# Patient Record
Sex: MALE | Race: WHITE | URBAN - NONMETROPOLITAN AREA
[De-identification: names, ages, dates, MRNs, and addresses within clinical notes are randomized per-mention and may not be internally consistent; named-entity substitution may affect disease eponyms.]

---

## 2018-01-03 ENCOUNTER — OFFICE VISIT (OUTPATIENT)
Dept: SURGERY | Age: 58
End: 2018-01-03

## 2018-01-03 VITALS
WEIGHT: 240.08 LBS | HEART RATE: 89 BPM | HEIGHT: 70 IN | SYSTOLIC BLOOD PRESSURE: 159 MMHG | DIASTOLIC BLOOD PRESSURE: 110 MMHG | BODY MASS INDEX: 34.37 KG/M2

## 2018-01-03 DIAGNOSIS — K40.90 INGUINAL HERNIA OF LEFT SIDE WITHOUT OBSTRUCTION OR GANGRENE: Primary | ICD-10-CM

## 2018-01-03 PROCEDURE — 99204 OFFICE O/P NEW MOD 45 MIN: CPT | Performed by: SURGERY

## 2018-01-08 ENCOUNTER — TELEPHONE (OUTPATIENT)
Dept: SURGERY | Age: 58
End: 2018-01-08

## 2018-01-09 ENCOUNTER — TELEPHONE (OUTPATIENT)
Dept: SURGERY | Age: 58
End: 2018-01-09

## 2018-01-19 PROBLEM — K40.20 NON-RECURRENT BILATERAL INGUINAL HERNIA WITHOUT OBSTRUCTION OR GANGRENE: Status: ACTIVE | Noted: 2018-01-19

## 2018-01-19 PROBLEM — K40.90 RIGHT INGUINAL HERNIA: Status: ACTIVE | Noted: 2018-01-19

## 2018-02-01 ENCOUNTER — OFFICE VISIT (OUTPATIENT)
Dept: SURGERY | Age: 58
End: 2018-02-01

## 2018-02-01 VITALS
WEIGHT: 240.08 LBS | BODY MASS INDEX: 34.37 KG/M2 | HEIGHT: 70 IN | SYSTOLIC BLOOD PRESSURE: 144 MMHG | DIASTOLIC BLOOD PRESSURE: 84 MMHG

## 2018-02-01 DIAGNOSIS — Z98.890 POST-OPERATIVE STATE: Primary | ICD-10-CM

## 2018-02-01 PROCEDURE — 99024 POSTOP FOLLOW-UP VISIT: CPT | Performed by: PHYSICIAN ASSISTANT

## 2018-02-15 ENCOUNTER — OFFICE VISIT (OUTPATIENT)
Dept: SURGERY | Age: 58
End: 2018-02-15

## 2018-02-15 VITALS
BODY MASS INDEX: 34.36 KG/M2 | HEIGHT: 70 IN | DIASTOLIC BLOOD PRESSURE: 90 MMHG | SYSTOLIC BLOOD PRESSURE: 138 MMHG | RESPIRATION RATE: 16 BRPM | WEIGHT: 240 LBS

## 2018-02-15 DIAGNOSIS — Z98.890 POST-OPERATIVE STATE: Primary | ICD-10-CM

## 2018-02-15 PROCEDURE — 99024 POSTOP FOLLOW-UP VISIT: CPT | Performed by: PHYSICIAN ASSISTANT

## 2018-02-15 RX ORDER — NAPROXEN 500 MG/1
TABLET ORAL
Refills: 2 | COMMUNITY
Start: 2017-12-28 | End: 2022-04-01

## 2018-02-15 NOTE — PROGRESS NOTES
Chief Complaint   Patient presents with    Post-Op Check     2nd p/o robotic RIHR         SUBJECTIVE:  Patient here for post op visit s/p robotic right inguinal hernia repair. This is 2nd visit. Pain is absent. Wounds: min bruising and no discharge. OBJECTIVE:  Physical Exam   Constitutional: He is oriented to person, place, and time. He appears well-developed and well-nourished. No distress. Abdominal: Soft. Bowel sounds are normal. He exhibits no distension and no mass. There is no tenderness. There is no rebound and no guarding. Neurological: He is alert and oriented to person, place, and time. Skin: Skin is warm and dry. No rash noted. He is not diaphoretic. No erythema. No pallor. Psychiatric: He has a normal mood and affect. Wound well healed without signs of active infection. Suture line intact. Abdomen soft, nontender, nondistended. ASSESSMENT:  Patient doing well on this post operative check. Wounds well healed. 1. Post-operative state        PLAN:  Continue same  Increase activity as tolerated  Back to work full duty no wt rest  PRN FU    No orders of the defined types were placed in this encounter. No orders of the defined types were placed in this encounter. Follow Up: Return if symptoms worsen or fail to improve.   Marylu Carl PA-C

## 2018-03-09 ASSESSMENT — ENCOUNTER SYMPTOMS
CONSTIPATION: 0
EYE REDNESS: 0
EYE ITCHING: 0
CHOKING: 0
APNEA: 0
RECTAL PAIN: 0
ANAL BLEEDING: 0
COLOR CHANGE: 0
STRIDOR: 0
SORE THROAT: 0
ABDOMINAL PAIN: 1
PHOTOPHOBIA: 0
BACK PAIN: 0

## 2018-04-11 PROBLEM — Z98.890 POST-OPERATIVE STATE: Status: RESOLVED | Noted: 2018-02-01 | Resolved: 2018-04-11

## 2023-01-06 ENCOUNTER — HOSPITAL ENCOUNTER (OUTPATIENT)
Age: 63
Discharge: HOME | End: 2023-01-06
Payer: COMMERCIAL

## 2023-01-06 LAB
ALT SERPL-CCNC: 13 U/L (ref 10–40)
ANION GAP SERPL CALCULATED.3IONS-SCNC: 11 MMOL/L (ref 4–16)
BASOPHILS ABSOLUTE: 0.1 K/CU MM
BASOPHILS RELATIVE PERCENT: 0.5 % (ref 0–1)
BUN BLDV-MCNC: 15 MG/DL (ref 6–23)
CALCIUM SERPL-MCNC: 8.8 MG/DL (ref 8.3–10.6)
CHLORIDE BLD-SCNC: 105 MMOL/L (ref 99–110)
CHOLESTEROL, FASTING: 190 MG/DL
CO2: 23 MMOL/L (ref 21–32)
CREAT SERPL-MCNC: 0.9 MG/DL (ref 0.9–1.3)
DIFFERENTIAL TYPE: ABNORMAL
EOSINOPHILS ABSOLUTE: 0.2 K/CU MM
EOSINOPHILS RELATIVE PERCENT: 2.4 % (ref 0–3)
GFR SERPL CREATININE-BSD FRML MDRD: >60 ML/MIN/1.73M2
GLUCOSE BLD-MCNC: 96 MG/DL (ref 70–99)
HCT VFR BLD CALC: 48.7 % (ref 42–52)
HDLC SERPL-MCNC: 33 MG/DL
HEMOGLOBIN: 16.8 GM/DL (ref 13.5–18)
IMMATURE NEUTROPHIL %: 0.3 % (ref 0–0.43)
LDL CHOLESTEROL CALCULATED: 125 MG/DL
LYMPHOCYTES ABSOLUTE: 2.6 K/CU MM
LYMPHOCYTES RELATIVE PERCENT: 27.7 % (ref 24–44)
MCH RBC QN AUTO: 29.3 PG (ref 27–31)
MCHC RBC AUTO-ENTMCNC: 34.5 % (ref 32–36)
MCV RBC AUTO: 84.8 FL (ref 78–100)
MONOCYTES ABSOLUTE: 0.4 K/CU MM
MONOCYTES RELATIVE PERCENT: 4.1 % (ref 0–4)
PDW BLD-RTO: 12 % (ref 11.7–14.9)
PLATELET # BLD: 289 K/CU MM (ref 140–440)
PMV BLD AUTO: 9.5 FL (ref 7.5–11.1)
POTASSIUM SERPL-SCNC: 4.2 MMOL/L (ref 3.5–5.1)
PROSTATE SPECIFIC ANTIGEN: 1.54 NG/ML (ref 0–4)
RBC # BLD: 5.74 M/CU MM (ref 4.6–6.2)
SEGMENTED NEUTROPHILS ABSOLUTE COUNT: 6.2 K/CU MM
SEGMENTED NEUTROPHILS RELATIVE PERCENT: 65 % (ref 36–66)
SODIUM BLD-SCNC: 139 MMOL/L (ref 135–145)
TOTAL IMMATURE NEUTOROPHIL: 0.03 K/CU MM
TRIGLYCERIDE, FASTING: 161 MG/DL
TSH HIGH SENSITIVITY: 1.38 UIU/ML (ref 0.27–4.2)
WBC # BLD: 9.5 K/CU MM (ref 4–10.5)

## 2023-01-06 PROCEDURE — 80061 LIPID PANEL: CPT

## 2023-01-06 PROCEDURE — 36415 COLL VENOUS BLD VENIPUNCTURE: CPT

## 2023-01-06 PROCEDURE — 84460 ALANINE AMINO (ALT) (SGPT): CPT

## 2023-01-06 PROCEDURE — 85025 COMPLETE CBC W/AUTO DIFF WBC: CPT

## 2023-01-06 PROCEDURE — G0103 PSA SCREENING: HCPCS

## 2023-01-06 PROCEDURE — 84443 ASSAY THYROID STIM HORMONE: CPT

## 2023-01-06 PROCEDURE — 80048 BASIC METABOLIC PNL TOTAL CA: CPT

## 2023-10-17 ENCOUNTER — HOSPITAL ENCOUNTER (EMERGENCY)
Age: 63
Discharge: HOME | End: 2023-10-17
Attending: EMERGENCY MEDICINE
Payer: COMMERCIAL

## 2023-10-17 VITALS
DIASTOLIC BLOOD PRESSURE: 92 MMHG | OXYGEN SATURATION: 97 % | HEART RATE: 75 BPM | RESPIRATION RATE: 18 BRPM | BODY MASS INDEX: 32.93 KG/M2 | TEMPERATURE: 97.7 F | HEIGHT: 70 IN | SYSTOLIC BLOOD PRESSURE: 152 MMHG | WEIGHT: 230 LBS

## 2023-10-17 DIAGNOSIS — R31.9 URINARY TRACT INFECTION WITH HEMATURIA, SITE UNSPECIFIED: Primary | ICD-10-CM

## 2023-10-17 DIAGNOSIS — N39.0 URINARY TRACT INFECTION WITH HEMATURIA, SITE UNSPECIFIED: Primary | ICD-10-CM

## 2023-10-17 LAB
BACTERIA: ABNORMAL /HPF
BILIRUBIN URINE: NEGATIVE MG/DL
BLOOD, URINE: ABNORMAL
CAST TYPE: ABNORMAL /HPF
CLARITY: CLEAR
COLOR: YELLOW
CRYSTAL TYPE: NEGATIVE /HPF
EPITHELIAL CELLS, UA: 1 /HPF
GLUCOSE, URINE: NEGATIVE MG/DL
KETONES, URINE: NEGATIVE MG/DL
LEUKOCYTE ESTERASE, URINE: NEGATIVE
NITRITE URINE, QUANTITATIVE: NEGATIVE
PH, URINE: 6 (ref 5–8)
PROTEIN UA: 100 MG/DL
RBC URINE: 8 /HPF (ref 0–3)
SPECIFIC GRAVITY UA: >1.03 (ref 1–1.03)
UROBILINOGEN, URINE: 1 MG/DL (ref 0.2–1)
WBC UA: 8 /HPF (ref 0–2)

## 2023-10-17 PROCEDURE — 6370000000 HC RX 637 (ALT 250 FOR IP): Performed by: EMERGENCY MEDICINE

## 2023-10-17 PROCEDURE — 81001 URINALYSIS AUTO W/SCOPE: CPT

## 2023-10-17 PROCEDURE — 99283 EMERGENCY DEPT VISIT LOW MDM: CPT

## 2023-10-17 RX ORDER — CEPHALEXIN 500 MG/1
500 CAPSULE ORAL
Status: COMPLETED | OUTPATIENT
Start: 2023-10-17 | End: 2023-10-17

## 2023-10-17 RX ORDER — CEPHALEXIN 500 MG/1
500 CAPSULE ORAL 4 TIMES DAILY
Qty: 28 CAPSULE | Refills: 0 | Status: SHIPPED | OUTPATIENT
Start: 2023-10-17 | End: 2023-10-24

## 2023-10-17 RX ADMIN — CEPHALEXIN 500 MG: 500 CAPSULE ORAL at 21:46

## 2023-10-17 ASSESSMENT — PAIN DESCRIPTION - LOCATION: LOCATION: OTHER (COMMENT)

## 2023-10-17 ASSESSMENT — PAIN SCALES - GENERAL: PAINLEVEL_OUTOF10: 2

## 2023-10-17 ASSESSMENT — LIFESTYLE VARIABLES
HOW OFTEN DO YOU HAVE A DRINK CONTAINING ALCOHOL: NEVER
HOW MANY STANDARD DRINKS CONTAINING ALCOHOL DO YOU HAVE ON A TYPICAL DAY: PATIENT DOES NOT DRINK

## 2023-10-17 ASSESSMENT — PAIN DESCRIPTION - PAIN TYPE: TYPE: ACUTE PAIN

## 2023-10-17 ASSESSMENT — PAIN - FUNCTIONAL ASSESSMENT: PAIN_FUNCTIONAL_ASSESSMENT: 0-10

## 2023-10-17 ASSESSMENT — PAIN DESCRIPTION - DESCRIPTORS: DESCRIPTORS: BURNING

## 2023-10-17 ASSESSMENT — PAIN DESCRIPTION - FREQUENCY: FREQUENCY: CONTINUOUS

## 2023-10-18 NOTE — ED NOTES
Pt made aware of need for urine sample. States he will let us know when he is able to go.       Frannie Quintana RN  10/17/23 2052

## 2023-10-18 NOTE — DISCHARGE INSTRUCTIONS
Drink lots of water. Keflex 4 times/day. Follow up with your doctor in 2 days for reevaluation. Return for worsening or concerning symptoms.

## 2023-10-21 NOTE — ED PROVIDER NOTES
CC: dysuria  Seen in room 3    HPI: This is a 72-year-old male with history of kidney stones who comes for evaluation of dysuria. Denied any pain save for this. He additionally notes a drop of blood at the end of urination. Denied trauma. Denied abdominal pain or flank pain. Does not feel like his previous stones.      Nursing Notes Reviewed    Past Medical History:   Diagnosis Date    Acid reflux     History of kidney stones     \"had kidney stone 2017- passed it\"    Hypertension     \"on medication since age 47's\"    No natural teeth      Past Surgical History:   Procedure Laterality Date    ANKLE SURGERY Left     age 17\"did have pins and then later removed\"    [address]      OTHER SURGICAL HISTORY Bilateral 2018    bilat inq hernia repair with mesh     Social History     Socioeconomic History    Marital status:      Spouse name: Not on file    Number of children: Not on file    Years of education: Not on file    Highest education level: Not on file   Occupational History    Not on file   Tobacco Use    Smoking status: Former     Packs/day: 0.50     Years: 34.00     Additional pack years: 0.00     Total pack years: 17.00     Types: Cigarettes     Quit date: 2005     Years since quittin.7    Smokeless tobacco: Never   Vaping Use    Vaping Use: Never used   Substance and Sexual Activity    Alcohol use: No    Drug use: Yes     Types: Marijuana (Weed)     Comment: \"use to use cocaine- last used 19 yrs ago, marijuana- last used  19 yrs ago\"    Sexual activity: Not on file   Other Topics Concern    Not on file   Social History Narrative    Not on file     Social Determinants of Health     Financial Resource Strain: Not on file   Food Insecurity: Not on file   Transportation Needs: Not on file   Physical Activity: Not on file   Stress: Not on file   Social Connections: Not on file   Intimate Partner Violence: Not on file   Housing Stability: Not on file     No Known Allergies    Physical exam:  Vitals: per triage note  General: awake, alert. No acute distress. Skin: No rashes noted. HENT: NC/AT. EOMI, PERRL. Mucous membranes moist.   Neck: Trachea midline. Supple. Chest: Symmetrical rise and fall of the chest. Normal work of breathing. Cardio: RRR  Abdomen: Soft, nontender, nondistended. Back: Active ROM. Extremities: No deformities. Pulses present. Neuro: A/O x 3. No gross focal motor deficits noted. --------  Medical decision making    Differential diagnosis includes but not limited to: indirect and direct pathophysiologic etiologies vascular, inflammatory, infectious, neoplastic, degenerative, deficiency, drugs, idiopathic, intoxication, iatrogenic, congenital, autoimmune, allergic, anatomic, trauma, endocrine, environmental, and metabolic. My bedside ultrasound: suprapubic abdominal ultrasound shows no evidence of urinary retention    ED course: Patient seen and evaluate by me or dysuria and hematuria. Vitals are stable. Exam benign, he has no urinary retention on POCUS. Urinalysis consistent with UTI. Will treat with Keflex. Discussed other differentials including bladder cancer with patient and recommended close follow up and possible urology evaluation if this does not clear up. I discussed all results with the patient at length and they were agreeable with the plan and verbalized understanding. Discharged stable with return precautions.          Medications   cephALEXin (KEFLEX) capsule 500 mg (500 mg Oral Given 10/17/23 5282)       Impression:  #1.  UTI      Beronica Montgomery MD  10/21/23 4109

## 2023-11-06 PROBLEM — E66.3 OVERWEIGHT: Status: ACTIVE | Noted: 2023-11-06

## 2023-11-06 PROBLEM — G89.29 CHRONIC LEFT-SIDED LOW BACK PAIN WITHOUT SCIATICA: Status: ACTIVE | Noted: 2023-11-06

## 2023-11-06 PROBLEM — I10 PRIMARY HYPERTENSION: Status: ACTIVE | Noted: 2023-11-06

## 2023-11-06 PROBLEM — M54.50 CHRONIC LEFT-SIDED LOW BACK PAIN WITHOUT SCIATICA: Status: ACTIVE | Noted: 2023-11-06

## 2023-11-06 PROBLEM — N30.01 ACUTE CYSTITIS WITH HEMATURIA: Status: ACTIVE | Noted: 2023-11-06

## 2023-11-06 PROBLEM — R31.1 BENIGN ESSENTIAL MICROSCOPIC HEMATURIA: Status: ACTIVE | Noted: 2023-11-06

## 2023-11-14 ENCOUNTER — HOSPITAL ENCOUNTER (OUTPATIENT)
Age: 63
Discharge: HOME | End: 2023-11-14
Payer: COMMERCIAL

## 2023-11-14 LAB — PROSTATE SPECIFIC ANTIGEN: 4.46 NG/ML (ref 0–4)

## 2023-11-14 PROCEDURE — G0103 PSA SCREENING: HCPCS

## 2023-11-14 PROCEDURE — 36415 COLL VENOUS BLD VENIPUNCTURE: CPT

## 2023-11-22 ENCOUNTER — HOSPITAL ENCOUNTER (OUTPATIENT)
Dept: CT IMAGING | Age: 63
Discharge: HOME | End: 2023-11-22
Attending: SPECIALIST
Payer: COMMERCIAL

## 2023-11-22 DIAGNOSIS — R31.0 GROSS HEMATURIA: ICD-10-CM

## 2023-11-22 LAB
EGFR, POC: >60 ML/MIN/1.73M2
POC CREATININE: 1.2 MG/DL (ref 0.9–1.3)

## 2023-11-22 PROCEDURE — 6360000004 HC RX CONTRAST MEDICATION: Performed by: SPECIALIST

## 2023-11-22 PROCEDURE — 74178 CT ABD&PLV WO CNTR FLWD CNTR: CPT

## 2023-11-22 PROCEDURE — 82565 ASSAY OF CREATININE: CPT

## 2023-11-22 RX ADMIN — IOPAMIDOL 120 ML: 755 INJECTION, SOLUTION INTRAVENOUS at 11:25

## 2023-11-27 ENCOUNTER — HOSPITAL ENCOUNTER (EMERGENCY)
Age: 63
Discharge: HOME | End: 2023-11-28
Attending: EMERGENCY MEDICINE
Payer: COMMERCIAL

## 2023-11-27 DIAGNOSIS — N30.01 ACUTE CYSTITIS WITH HEMATURIA: Primary | ICD-10-CM

## 2023-11-27 DIAGNOSIS — N13.30 HYDRONEPHROSIS, UNSPECIFIED HYDRONEPHROSIS TYPE: ICD-10-CM

## 2023-11-27 LAB
ALBUMIN SERPL-MCNC: 3.5 GM/DL (ref 3.4–5)
ALP BLD-CCNC: 112 IU/L (ref 40–129)
ALT SERPL-CCNC: 19 U/L (ref 10–40)
ANION GAP SERPL CALCULATED.3IONS-SCNC: 11 MMOL/L (ref 4–16)
AST SERPL-CCNC: 14 IU/L (ref 15–37)
BACTERIA: ABNORMAL /HPF
BASOPHILS ABSOLUTE: 0 K/CU MM
BASOPHILS RELATIVE PERCENT: 0.4 % (ref 0–1)
BILIRUB SERPL-MCNC: 0.3 MG/DL (ref 0–1)
BILIRUBIN URINE: NEGATIVE MG/DL
BLOOD, URINE: ABNORMAL
BUN SERPL-MCNC: 18 MG/DL (ref 6–23)
CALCIUM SERPL-MCNC: 8.7 MG/DL (ref 8.3–10.6)
CAST TYPE: ABNORMAL /HPF
CHLORIDE BLD-SCNC: 103 MMOL/L (ref 99–110)
CLARITY: CLEAR
CO2: 24 MMOL/L (ref 21–32)
COLOR: YELLOW
CREAT SERPL-MCNC: 1.5 MG/DL (ref 0.9–1.3)
CRYSTAL TYPE: NEGATIVE /HPF
DIFFERENTIAL TYPE: ABNORMAL
EOSINOPHILS ABSOLUTE: 0.3 K/CU MM
EOSINOPHILS RELATIVE PERCENT: 3.8 % (ref 0–3)
EPITHELIAL CELLS, UA: 1 /HPF
GFR SERPL CREATININE-BSD FRML MDRD: 52 ML/MIN/1.73M2
GLUCOSE SERPL-MCNC: 103 MG/DL (ref 70–99)
GLUCOSE, URINE: NEGATIVE MG/DL
HCT VFR BLD CALC: 41.3 % (ref 42–52)
HEMOGLOBIN: 14.1 GM/DL (ref 13.5–18)
IMMATURE NEUTROPHIL %: 0.5 % (ref 0–0.43)
KETONES, URINE: NEGATIVE MG/DL
LEUKOCYTE ESTERASE, URINE: ABNORMAL
LYMPHOCYTES ABSOLUTE: 2.2 K/CU MM
LYMPHOCYTES RELATIVE PERCENT: 28.5 % (ref 24–44)
MCH RBC QN AUTO: 29.6 PG (ref 27–31)
MCHC RBC AUTO-ENTMCNC: 34.1 % (ref 32–36)
MCV RBC AUTO: 86.8 FL (ref 78–100)
MONOCYTES ABSOLUTE: 0.6 K/CU MM
MONOCYTES RELATIVE PERCENT: 7.3 % (ref 0–4)
NITRITE URINE, QUANTITATIVE: NEGATIVE
PDW BLD-RTO: 12.2 % (ref 11.7–14.9)
PH, URINE: 6 (ref 5–8)
PLATELET # BLD: 305 K/CU MM (ref 140–440)
PMV BLD AUTO: 9.3 FL (ref 7.5–11.1)
POTASSIUM SERPL-SCNC: 3.6 MMOL/L (ref 3.5–5.1)
PROTEIN UA: 30 MG/DL
RBC # BLD: 4.76 M/CU MM (ref 4.6–6.2)
RBC URINE: 10 /HPF (ref 0–3)
SEGMENTED NEUTROPHILS ABSOLUTE COUNT: 4.5 K/CU MM
SEGMENTED NEUTROPHILS RELATIVE PERCENT: 59.5 % (ref 36–66)
SODIUM BLD-SCNC: 138 MMOL/L (ref 135–145)
SPECIFIC GRAVITY UA: 1.02 (ref 1–1.03)
TOTAL IMMATURE NEUTOROPHIL: 0.04 K/CU MM
TOTAL PROTEIN: 6.9 GM/DL (ref 6.4–8.2)
UROBILINOGEN, URINE: 0.2 MG/DL (ref 0.2–1)
WBC # BLD: 7.6 K/CU MM (ref 4–10.5)
WBC UA: 20 /HPF (ref 0–2)

## 2023-11-27 PROCEDURE — 85025 COMPLETE CBC W/AUTO DIFF WBC: CPT

## 2023-11-27 PROCEDURE — 6360000002 HC RX W HCPCS: Performed by: EMERGENCY MEDICINE

## 2023-11-27 PROCEDURE — 96375 TX/PRO/DX INJ NEW DRUG ADDON: CPT

## 2023-11-27 PROCEDURE — 2580000003 HC RX 258: Performed by: EMERGENCY MEDICINE

## 2023-11-27 PROCEDURE — 80053 COMPREHEN METABOLIC PANEL: CPT

## 2023-11-27 PROCEDURE — 99284 EMERGENCY DEPT VISIT MOD MDM: CPT

## 2023-11-27 PROCEDURE — 81001 URINALYSIS AUTO W/SCOPE: CPT

## 2023-11-27 PROCEDURE — 87086 URINE CULTURE/COLONY COUNT: CPT

## 2023-11-27 RX ORDER — MORPHINE SULFATE 4 MG/ML
4 INJECTION, SOLUTION INTRAMUSCULAR; INTRAVENOUS ONCE
Status: COMPLETED | OUTPATIENT
Start: 2023-11-27 | End: 2023-11-27

## 2023-11-27 RX ORDER — ACETAMINOPHEN 500 MG
1000 TABLET ORAL EVERY 6 HOURS PRN
COMMUNITY

## 2023-11-27 RX ORDER — KETOROLAC TROMETHAMINE 30 MG/ML
30 INJECTION, SOLUTION INTRAMUSCULAR; INTRAVENOUS ONCE
Status: COMPLETED | OUTPATIENT
Start: 2023-11-27 | End: 2023-11-27

## 2023-11-27 RX ORDER — 0.9 % SODIUM CHLORIDE 0.9 %
1000 INTRAVENOUS SOLUTION INTRAVENOUS ONCE
Status: COMPLETED | OUTPATIENT
Start: 2023-11-27 | End: 2023-11-28

## 2023-11-27 RX ADMIN — KETOROLAC TROMETHAMINE 30 MG: 30 INJECTION, SOLUTION INTRAMUSCULAR; INTRAVENOUS at 23:28

## 2023-11-27 RX ADMIN — MORPHINE SULFATE 4 MG: 4 INJECTION, SOLUTION INTRAMUSCULAR; INTRAVENOUS at 23:27

## 2023-11-27 RX ADMIN — SODIUM CHLORIDE 1000 ML: 9 INJECTION, SOLUTION INTRAVENOUS at 23:27

## 2023-11-27 ASSESSMENT — PAIN DESCRIPTION - LOCATION
LOCATION: FLANK;ABDOMEN
LOCATION: BACK

## 2023-11-27 ASSESSMENT — PAIN DESCRIPTION - PAIN TYPE: TYPE: ACUTE PAIN

## 2023-11-27 ASSESSMENT — PAIN DESCRIPTION - ONSET: ONSET: ON-GOING

## 2023-11-27 ASSESSMENT — PAIN DESCRIPTION - DESCRIPTORS
DESCRIPTORS: SPASM
DESCRIPTORS: BURNING;STABBING

## 2023-11-27 ASSESSMENT — PAIN DESCRIPTION - FREQUENCY: FREQUENCY: INTERMITTENT

## 2023-11-27 ASSESSMENT — PAIN - FUNCTIONAL ASSESSMENT
PAIN_FUNCTIONAL_ASSESSMENT: ACTIVITIES ARE NOT PREVENTED
PAIN_FUNCTIONAL_ASSESSMENT: 0-10
PAIN_FUNCTIONAL_ASSESSMENT: PREVENTS OR INTERFERES SOME ACTIVE ACTIVITIES AND ADLS

## 2023-11-27 ASSESSMENT — PAIN DESCRIPTION - ORIENTATION
ORIENTATION: LEFT
ORIENTATION: LEFT;LOWER

## 2023-11-27 ASSESSMENT — PAIN SCALES - GENERAL
PAINLEVEL_OUTOF10: 10
PAINLEVEL_OUTOF10: 8

## 2023-11-28 VITALS
HEIGHT: 70 IN | RESPIRATION RATE: 18 BRPM | WEIGHT: 216 LBS | OXYGEN SATURATION: 97 % | SYSTOLIC BLOOD PRESSURE: 146 MMHG | BODY MASS INDEX: 30.92 KG/M2 | TEMPERATURE: 97.8 F | HEART RATE: 86 BPM | DIASTOLIC BLOOD PRESSURE: 95 MMHG

## 2023-11-28 PROCEDURE — 6360000002 HC RX W HCPCS: Performed by: EMERGENCY MEDICINE

## 2023-11-28 PROCEDURE — 2580000003 HC RX 258: Performed by: EMERGENCY MEDICINE

## 2023-11-28 PROCEDURE — 96365 THER/PROPH/DIAG IV INF INIT: CPT

## 2023-11-28 RX ORDER — OXYCODONE HYDROCHLORIDE AND ACETAMINOPHEN 5; 325 MG/1; MG/1
1 TABLET ORAL EVERY 6 HOURS PRN
Qty: 20 TABLET | Refills: 0 | Status: SHIPPED | OUTPATIENT
Start: 2023-11-28 | End: 2023-12-03

## 2023-11-28 RX ORDER — CEPHALEXIN 500 MG/1
500 CAPSULE ORAL 2 TIMES DAILY
Qty: 20 CAPSULE | Refills: 0 | Status: SHIPPED | OUTPATIENT
Start: 2023-11-28 | End: 2023-12-08

## 2023-11-28 RX ORDER — TAMSULOSIN HYDROCHLORIDE 0.4 MG/1
0.4 CAPSULE ORAL DAILY
Qty: 30 CAPSULE | Refills: 0 | Status: SHIPPED | OUTPATIENT
Start: 2023-11-28

## 2023-11-28 RX ADMIN — CEFTRIAXONE SODIUM 1000 MG: 1 INJECTION, POWDER, FOR SOLUTION INTRAMUSCULAR; INTRAVENOUS at 00:17

## 2023-11-28 NOTE — DISCHARGE INSTRUCTIONS
Please call your urology team first thing in the morning to discuss urgent follow-up. Please inform them that the ER doctor spoke with MOOK Pettit who wanted you to have close ED follow up. Please head to [facility] should you spike a fever or have further intractable pain or had any difficulty voiding. Please complete antibiotics as prescribed.

## 2023-11-28 NOTE — ED PROVIDER NOTES
Profesionals : Consultant (UROLOGY)    Social Determinants : None    Records Reviewed : Outpatient Labs & Studies including recent CT imaging which is discussed with patient. Disposition Considerations (tests considered but not done, Shared Decision Making, Pt Expectation of Test or Tx.):   Appropriate for outpatient management    I discussed specific signs and symptoms on when to return to the emergency department as well as the need for close outpatient follow-up. Questions sought and answered with the patient. They voice understanding and agree with plan. I am the Primary Clinician of Record. Is this patient to be included in the SEP-1 Core Measure due to severe sepsis or septic shock? No   Exclusion criteria - the patient is NOT to be included for SEP-1 Core Measure due to:  2+ SIRS criteria are not met                  Clinical Impression:  1. Acute cystitis with hematuria    2. Hydronephrosis, unspecified hydronephrosis type      Disposition referral (if applicable):  Heide Myers, [address] [facility]  [phone]    Schedule an appointment as soon as possible for a visit   CALL FIRST THING IN THE MORNING TO DISCUSS URGENT ED FOLLOW UP    Asaf Alcantar MD  [address] [facility],[address] [address]  [address] [place] [address] Emergency Department  [address] [place] [address] Drive  [address] [facility]  [phone]  Today  If symptoms worsen    Disposition medications (if applicable):  New Prescriptions    CEPHALEXIN (KEFLEX) 500 MG CAPSULE    Take 1 capsule by mouth 2 times daily for 10 days    OXYCODONE-ACETAMINOPHEN (PERCOCET) 5-325 MG PER TABLET    Take 1 tablet by mouth every 6 hours as needed for Pain for up to 5 days. Intended supply: 5 days.  Take lowest dose possible to manage pain Max Daily Amount: 4 tablets       Comment: Please note this report has been produced using speech recognition software and may contain errors related to that system

## 2023-11-29 LAB
CULTURE: NORMAL
Lab: NORMAL
SPECIMEN: NORMAL

## 2023-12-05 ENCOUNTER — HOSPITAL ENCOUNTER (EMERGENCY)
Age: 63
Discharge: HOME | End: 2023-12-05
Attending: EMERGENCY MEDICINE
Payer: COMMERCIAL

## 2023-12-05 VITALS
TEMPERATURE: 98.1 F | RESPIRATION RATE: 22 BRPM | SYSTOLIC BLOOD PRESSURE: 157 MMHG | HEART RATE: 108 BPM | DIASTOLIC BLOOD PRESSURE: 115 MMHG | OXYGEN SATURATION: 98 % | WEIGHT: 216 LBS | HEIGHT: 70 IN | BODY MASS INDEX: 30.92 KG/M2

## 2023-12-05 DIAGNOSIS — R30.1 PAINFUL BLADDER SPASM: Primary | ICD-10-CM

## 2023-12-05 LAB
BACTERIA: ABNORMAL /HPF
BILIRUBIN URINE: NEGATIVE MG/DL
BLOOD, URINE: ABNORMAL
CAST TYPE: ABNORMAL /HPF
CLARITY: ABNORMAL
COLOR: YELLOW
COMMENT UA: ABNORMAL
CRYSTAL TYPE: ABNORMAL /HPF
EPITHELIAL CELLS, UA: ABNORMAL /HPF
GLUCOSE, URINE: NEGATIVE MG/DL
KETONES, URINE: NEGATIVE MG/DL
LEUKOCYTE ESTERASE, URINE: NEGATIVE
NITRITE URINE, QUANTITATIVE: NEGATIVE
PH, URINE: 5.5 (ref 5–8)
PROTEIN UA: 100 MG/DL
RBC URINE: >50 /HPF (ref 0–3)
SPECIFIC GRAVITY UA: 1.02 (ref 1–1.03)
UROBILINOGEN, URINE: 0.2 MG/DL (ref 0.2–1)
WBC UA: 10 /HPF (ref 0–2)

## 2023-12-05 PROCEDURE — 87086 URINE CULTURE/COLONY COUNT: CPT

## 2023-12-05 PROCEDURE — 99283 EMERGENCY DEPT VISIT LOW MDM: CPT

## 2023-12-05 PROCEDURE — 6370000000 HC RX 637 (ALT 250 FOR IP): Performed by: EMERGENCY MEDICINE

## 2023-12-05 PROCEDURE — 81001 URINALYSIS AUTO W/SCOPE: CPT

## 2023-12-05 RX ORDER — OXYBUTYNIN CHLORIDE 5 MG/1
5 TABLET ORAL ONCE
Status: COMPLETED | OUTPATIENT
Start: 2023-12-05 | End: 2023-12-05

## 2023-12-05 RX ORDER — PHENAZOPYRIDINE HYDROCHLORIDE 100 MG/1
200 TABLET, FILM COATED ORAL ONCE
Status: COMPLETED | OUTPATIENT
Start: 2023-12-05 | End: 2023-12-05

## 2023-12-05 RX ORDER — OXYBUTYNIN CHLORIDE 5 MG/1
5 TABLET ORAL 3 TIMES DAILY
Qty: 30 TABLET | Refills: 1 | Status: SHIPPED | OUTPATIENT
Start: 2023-12-05

## 2023-12-05 RX ADMIN — OXYBUTYNIN CHLORIDE 5 MG: 5 TABLET ORAL at 18:27

## 2023-12-05 RX ADMIN — PHENAZOPYRIDINE 200 MG: 100 TABLET ORAL at 18:04

## 2023-12-05 ASSESSMENT — PAIN DESCRIPTION - ORIENTATION: ORIENTATION: RIGHT;LEFT

## 2023-12-05 ASSESSMENT — PAIN DESCRIPTION - DESCRIPTORS: DESCRIPTORS: ACHING;DISCOMFORT

## 2023-12-05 ASSESSMENT — PAIN DESCRIPTION - LOCATION: LOCATION: ABDOMEN;FLANK

## 2023-12-05 ASSESSMENT — PAIN - FUNCTIONAL ASSESSMENT: PAIN_FUNCTIONAL_ASSESSMENT: 0-10

## 2023-12-05 ASSESSMENT — PAIN SCALES - GENERAL
PAINLEVEL_OUTOF10: 10
PAINLEVEL_OUTOF10: 10

## 2023-12-05 NOTE — ED PROVIDER NOTES
Clinical Impression:  1. Painful bladder spasm      Disposition referral (if applicable):  Lucho Mcduffie, [address]   [phone]    Schedule an appointment as soon as possible for a visit in 1 week  7:45 am WED am !    Disposition medications (if applicable):  New Prescriptions    OXYBUTYNIN (DITROPAN) 5 MG TABLET    Take 1 tablet by mouth 3 times daily           Morgan Hendrickson DO, FACEP      Comment: Please note this report has been produced using speech recognition software and maycontain errors related to that system including errors in grammar, punctuation, and spelling, as well as words and phrases that may be inappropriate. If there are any questions or concerns please feel free to contact thedictating provider for clarification.         Altaf Delacruz DO  12/05/23 6979

## 2023-12-06 NOTE — ED NOTES
Pt continues to complain of pain when discharging. Dr Mcclure Dire back to bedside.       Vasiliy Dawn RN  12/05/23 0333

## 2023-12-07 LAB
CULTURE: NORMAL
Lab: NORMAL
SPECIMEN: NORMAL

## 2023-12-13 ENCOUNTER — ANESTHESIA EVENT (OUTPATIENT)
Dept: OPERATING ROOM | Age: 63
End: 2023-12-13
Payer: COMMERCIAL

## 2023-12-13 NOTE — PROGRESS NOTES
Patient notified of surgery time at [facility] 12/14/23 0830 arrival 0700, NPO instructions and DOS meds reviewed

## 2023-12-14 ENCOUNTER — APPOINTMENT (OUTPATIENT)
Dept: GENERAL RADIOLOGY | Age: 63
End: 2023-12-14
Attending: SPECIALIST
Payer: COMMERCIAL

## 2023-12-14 ENCOUNTER — HOSPITAL ENCOUNTER (OUTPATIENT)
Age: 63
Setting detail: OUTPATIENT SURGERY
Discharge: HOME | End: 2023-12-16
Attending: SPECIALIST | Admitting: SPECIALIST
Payer: COMMERCIAL

## 2023-12-14 ENCOUNTER — ANESTHESIA (OUTPATIENT)
Dept: OPERATING ROOM | Age: 63
End: 2023-12-14
Payer: COMMERCIAL

## 2023-12-14 DIAGNOSIS — R31.9 HEMATURIA, UNSPECIFIED TYPE: ICD-10-CM

## 2023-12-14 PROCEDURE — 94761 N-INVAS EAR/PLS OXIMETRY MLT: CPT

## 2023-12-14 PROCEDURE — 2709999900 HC NON-CHARGEABLE SUPPLY: Performed by: SPECIALIST

## 2023-12-14 PROCEDURE — 2700000000 HC OXYGEN THERAPY PER DAY

## 2023-12-14 PROCEDURE — 2500000003 HC RX 250 WO HCPCS: Performed by: NURSE ANESTHETIST, CERTIFIED REGISTERED

## 2023-12-14 PROCEDURE — 6360000002 HC RX W HCPCS: Performed by: ANESTHESIOLOGY

## 2023-12-14 PROCEDURE — 93005 ELECTROCARDIOGRAM TRACING: CPT | Performed by: ANESTHESIOLOGY

## 2023-12-14 PROCEDURE — 7100000000 HC PACU RECOVERY - FIRST 15 MIN: Performed by: SPECIALIST

## 2023-12-14 PROCEDURE — 6370000000 HC RX 637 (ALT 250 FOR IP): Performed by: SPECIALIST

## 2023-12-14 PROCEDURE — 2580000003 HC RX 258: Performed by: SPECIALIST

## 2023-12-14 PROCEDURE — 6360000002 HC RX W HCPCS: Performed by: NURSE ANESTHETIST, CERTIFIED REGISTERED

## 2023-12-14 PROCEDURE — 3600000003 HC SURGERY LEVEL 3 BASE: Performed by: SPECIALIST

## 2023-12-14 PROCEDURE — 7100000001 HC PACU RECOVERY - ADDTL 15 MIN: Performed by: SPECIALIST

## 2023-12-14 PROCEDURE — C1769 GUIDE WIRE: HCPCS | Performed by: SPECIALIST

## 2023-12-14 PROCEDURE — C1758 CATHETER, URETERAL: HCPCS | Performed by: SPECIALIST

## 2023-12-14 PROCEDURE — 3700000001 HC ADD 15 MINUTES (ANESTHESIA): Performed by: SPECIALIST

## 2023-12-14 PROCEDURE — 2580000003 HC RX 258: Performed by: ANESTHESIOLOGY

## 2023-12-14 PROCEDURE — 3700000000 HC ANESTHESIA ATTENDED CARE: Performed by: SPECIALIST

## 2023-12-14 PROCEDURE — 3600000013 HC SURGERY LEVEL 3 ADDTL 15MIN: Performed by: SPECIALIST

## 2023-12-14 RX ORDER — FENTANYL CITRATE 50 UG/ML
INJECTION, SOLUTION INTRAMUSCULAR; INTRAVENOUS PRN
Status: DISCONTINUED | OUTPATIENT
Start: 2023-12-14 | End: 2023-12-14 | Stop reason: SDUPTHER

## 2023-12-14 RX ORDER — DROPERIDOL 2.5 MG/ML
0.62 INJECTION, SOLUTION INTRAMUSCULAR; INTRAVENOUS
Status: DISCONTINUED | OUTPATIENT
Start: 2023-12-14 | End: 2023-12-14 | Stop reason: HOSPADM

## 2023-12-14 RX ORDER — OXYCODONE HYDROCHLORIDE 5 MG/1
5 TABLET ORAL PRN
Status: DISCONTINUED | OUTPATIENT
Start: 2023-12-14 | End: 2023-12-14 | Stop reason: HOSPADM

## 2023-12-14 RX ORDER — SODIUM CHLORIDE 0.9 % (FLUSH) 0.9 %
5-40 SYRINGE (ML) INJECTION PRN
Status: DISCONTINUED | OUTPATIENT
Start: 2023-12-14 | End: 2023-12-14 | Stop reason: HOSPADM

## 2023-12-14 RX ORDER — ONDANSETRON 2 MG/ML
4 INJECTION INTRAMUSCULAR; INTRAVENOUS EVERY 6 HOURS PRN
Status: DISCONTINUED | OUTPATIENT
Start: 2023-12-14 | End: 2023-12-16 | Stop reason: HOSPADM

## 2023-12-14 RX ORDER — OXYCODONE HYDROCHLORIDE 5 MG/1
10 TABLET ORAL PRN
Status: DISCONTINUED | OUTPATIENT
Start: 2023-12-14 | End: 2023-12-14 | Stop reason: HOSPADM

## 2023-12-14 RX ORDER — ONDANSETRON 2 MG/ML
INJECTION INTRAMUSCULAR; INTRAVENOUS PRN
Status: DISCONTINUED | OUTPATIENT
Start: 2023-12-14 | End: 2023-12-14 | Stop reason: SDUPTHER

## 2023-12-14 RX ORDER — ROCURONIUM BROMIDE 10 MG/ML
INJECTION, SOLUTION INTRAVENOUS PRN
Status: DISCONTINUED | OUTPATIENT
Start: 2023-12-14 | End: 2023-12-14 | Stop reason: SDUPTHER

## 2023-12-14 RX ORDER — SODIUM CHLORIDE 0.9 % (FLUSH) 0.9 %
5-40 SYRINGE (ML) INJECTION EVERY 12 HOURS SCHEDULED
Status: DISCONTINUED | OUTPATIENT
Start: 2023-12-14 | End: 2023-12-14 | Stop reason: HOSPADM

## 2023-12-14 RX ORDER — LIDOCAINE HYDROCHLORIDE 20 MG/ML
JELLY TOPICAL EVERY 4 HOURS PRN
Status: DISCONTINUED | OUTPATIENT
Start: 2023-12-14 | End: 2023-12-16 | Stop reason: HOSPADM

## 2023-12-14 RX ORDER — HYDRALAZINE HYDROCHLORIDE 20 MG/ML
INJECTION INTRAMUSCULAR; INTRAVENOUS PRN
Status: DISCONTINUED | OUTPATIENT
Start: 2023-12-14 | End: 2023-12-14 | Stop reason: SDUPTHER

## 2023-12-14 RX ORDER — SODIUM CHLORIDE 450 MG/100ML
INJECTION, SOLUTION INTRAVENOUS CONTINUOUS
Status: DISCONTINUED | OUTPATIENT
Start: 2023-12-14 | End: 2023-12-16 | Stop reason: HOSPADM

## 2023-12-14 RX ORDER — DEXAMETHASONE SODIUM PHOSPHATE 4 MG/ML
INJECTION, SOLUTION INTRA-ARTICULAR; INTRALESIONAL; INTRAMUSCULAR; INTRAVENOUS; SOFT TISSUE PRN
Status: DISCONTINUED | OUTPATIENT
Start: 2023-12-14 | End: 2023-12-14 | Stop reason: SDUPTHER

## 2023-12-14 RX ORDER — LABETALOL HYDROCHLORIDE 5 MG/ML
INJECTION, SOLUTION INTRAVENOUS PRN
Status: DISCONTINUED | OUTPATIENT
Start: 2023-12-14 | End: 2023-12-14 | Stop reason: SDUPTHER

## 2023-12-14 RX ORDER — SODIUM CHLORIDE 9 MG/ML
INJECTION, SOLUTION INTRAVENOUS PRN
Status: DISCONTINUED | OUTPATIENT
Start: 2023-12-14 | End: 2023-12-14 | Stop reason: HOSPADM

## 2023-12-14 RX ORDER — SODIUM CHLORIDE, SODIUM LACTATE, POTASSIUM CHLORIDE, CALCIUM CHLORIDE 600; 310; 30; 20 MG/100ML; MG/100ML; MG/100ML; MG/100ML
INJECTION, SOLUTION INTRAVENOUS CONTINUOUS
Status: DISCONTINUED | OUTPATIENT
Start: 2023-12-14 | End: 2023-12-14 | Stop reason: HOSPADM

## 2023-12-14 RX ORDER — ONDANSETRON 2 MG/ML
4 INJECTION INTRAMUSCULAR; INTRAVENOUS
Status: DISCONTINUED | OUTPATIENT
Start: 2023-12-14 | End: 2023-12-14 | Stop reason: HOSPADM

## 2023-12-14 RX ORDER — PHENAZOPYRIDINE HYDROCHLORIDE 100 MG/1
200 TABLET, FILM COATED ORAL
Status: DISCONTINUED | OUTPATIENT
Start: 2023-12-14 | End: 2023-12-15

## 2023-12-14 RX ORDER — PROPOFOL 10 MG/ML
INJECTION, EMULSION INTRAVENOUS PRN
Status: DISCONTINUED | OUTPATIENT
Start: 2023-12-14 | End: 2023-12-14 | Stop reason: SDUPTHER

## 2023-12-14 RX ORDER — OXYCODONE HYDROCHLORIDE AND ACETAMINOPHEN 5; 325 MG/1; MG/1
1 TABLET ORAL EVERY 4 HOURS PRN
Status: DISCONTINUED | OUTPATIENT
Start: 2023-12-14 | End: 2023-12-16 | Stop reason: HOSPADM

## 2023-12-14 RX ORDER — FENTANYL CITRATE 50 UG/ML
25 INJECTION, SOLUTION INTRAMUSCULAR; INTRAVENOUS EVERY 5 MIN PRN
Status: DISCONTINUED | OUTPATIENT
Start: 2023-12-14 | End: 2023-12-14 | Stop reason: HOSPADM

## 2023-12-14 RX ORDER — MAGNESIUM HYDROXIDE 1200 MG/15ML
LIQUID ORAL CONTINUOUS PRN
Status: COMPLETED | OUTPATIENT
Start: 2023-12-14 | End: 2023-12-14

## 2023-12-14 RX ORDER — LIDOCAINE HYDROCHLORIDE 20 MG/ML
INJECTION, SOLUTION INFILTRATION; PERINEURAL PRN
Status: DISCONTINUED | OUTPATIENT
Start: 2023-12-14 | End: 2023-12-14 | Stop reason: SDUPTHER

## 2023-12-14 RX ADMIN — LIDOCAINE HYDROCHLORIDE: 20 JELLY TOPICAL at 15:05

## 2023-12-14 RX ADMIN — HYDROMORPHONE HYDROCHLORIDE 0.5 MG: 1 INJECTION, SOLUTION INTRAMUSCULAR; INTRAVENOUS; SUBCUTANEOUS at 08:49

## 2023-12-14 RX ADMIN — CEFAZOLIN 2000 MG: 2 INJECTION, POWDER, FOR SOLUTION INTRAMUSCULAR; INTRAVENOUS at 08:14

## 2023-12-14 RX ADMIN — FENTANYL CITRATE 25 MCG: 50 INJECTION, SOLUTION INTRAMUSCULAR; INTRAVENOUS at 09:47

## 2023-12-14 RX ADMIN — PROPOFOL 50 MG: 10 INJECTION, EMULSION INTRAVENOUS at 09:25

## 2023-12-14 RX ADMIN — SODIUM CHLORIDE, POTASSIUM CHLORIDE, SODIUM LACTATE AND CALCIUM CHLORIDE: 600; 310; 30; 20 INJECTION, SOLUTION INTRAVENOUS at 07:00

## 2023-12-14 RX ADMIN — FENTANYL CITRATE 50 MCG: 50 INJECTION, SOLUTION INTRAMUSCULAR; INTRAVENOUS at 08:21

## 2023-12-14 RX ADMIN — LIDOCAINE HYDROCHLORIDE 80 MG: 20 INJECTION, SOLUTION INFILTRATION; PERINEURAL at 08:23

## 2023-12-14 RX ADMIN — LABETALOL HYDROCHLORIDE 10 MG: 5 INJECTION, SOLUTION INTRAVENOUS at 08:52

## 2023-12-14 RX ADMIN — LIDOCAINE HYDROCHLORIDE: 20 JELLY TOPICAL at 10:24

## 2023-12-14 RX ADMIN — PROPOFOL 25 MG: 10 INJECTION, EMULSION INTRAVENOUS at 09:28

## 2023-12-14 RX ADMIN — OXYCODONE AND ACETAMINOPHEN 1 TABLET: 5; 325 TABLET ORAL at 11:19

## 2023-12-14 RX ADMIN — HYDRALAZINE HYDROCHLORIDE 5 MG: 20 INJECTION, SOLUTION INTRAMUSCULAR; INTRAVENOUS at 09:03

## 2023-12-14 RX ADMIN — FENTANYL CITRATE 25 MCG: 50 INJECTION, SOLUTION INTRAMUSCULAR; INTRAVENOUS at 09:53

## 2023-12-14 RX ADMIN — PHENAZOPYRIDINE 200 MG: 100 TABLET ORAL at 11:19

## 2023-12-14 RX ADMIN — OXYCODONE AND ACETAMINOPHEN 1 TABLET: 5; 325 TABLET ORAL at 15:04

## 2023-12-14 RX ADMIN — FENTANYL CITRATE 50 MCG: 50 INJECTION, SOLUTION INTRAMUSCULAR; INTRAVENOUS at 08:44

## 2023-12-14 RX ADMIN — PROPOFOL 150 MG: 10 INJECTION, EMULSION INTRAVENOUS at 08:23

## 2023-12-14 RX ADMIN — SODIUM CHLORIDE: 4.5 INJECTION, SOLUTION INTRAVENOUS at 12:07

## 2023-12-14 RX ADMIN — SUGAMMADEX 200 MG: 100 INJECTION, SOLUTION INTRAVENOUS at 09:13

## 2023-12-14 RX ADMIN — ROCURONIUM BROMIDE 50 MG: 10 INJECTION INTRAVENOUS at 08:23

## 2023-12-14 RX ADMIN — ONDANSETRON 4 MG: 2 INJECTION INTRAMUSCULAR; INTRAVENOUS at 08:50

## 2023-12-14 RX ADMIN — PROPOFOL 50 MG: 10 INJECTION, EMULSION INTRAVENOUS at 09:22

## 2023-12-14 RX ADMIN — HYDROMORPHONE HYDROCHLORIDE 0.5 MG: 1 INJECTION, SOLUTION INTRAMUSCULAR; INTRAVENOUS; SUBCUTANEOUS at 09:40

## 2023-12-14 RX ADMIN — DEXAMETHASONE SODIUM PHOSPHATE 4 MG: 4 INJECTION, SOLUTION INTRAMUSCULAR; INTRAVENOUS at 08:50

## 2023-12-14 ASSESSMENT — PAIN - FUNCTIONAL ASSESSMENT
PAIN_FUNCTIONAL_ASSESSMENT: 0-10
PAIN_FUNCTIONAL_ASSESSMENT: ACTIVITIES ARE NOT PREVENTED
PAIN_FUNCTIONAL_ASSESSMENT: 0-10
PAIN_FUNCTIONAL_ASSESSMENT: ACTIVITIES ARE NOT PREVENTED
PAIN_FUNCTIONAL_ASSESSMENT: ACTIVITIES ARE NOT PREVENTED

## 2023-12-14 ASSESSMENT — PAIN DESCRIPTION - LOCATION
LOCATION: PENIS
LOCATION: PENIS
LOCATION: GROIN

## 2023-12-14 ASSESSMENT — PAIN DESCRIPTION - DESCRIPTORS
DESCRIPTORS: ACHING;CRUSHING;DISCOMFORT;BURNING
DESCRIPTORS: BURNING
DESCRIPTORS: BURNING;DISCOMFORT

## 2023-12-14 ASSESSMENT — PAIN SCALES - GENERAL
PAINLEVEL_OUTOF10: 6
PAINLEVEL_OUTOF10: 7
PAINLEVEL_OUTOF10: 10

## 2023-12-14 NOTE — BRIEF OP NOTE
Brief Postoperative Note      Patient: Charlie Grigsby  YOB: 1960  MRN: [id number]    Date of Procedure: 12/14/2023    Pre-Op Diagnosis Codes:     * Hematuria, unspecified type [R31.9]    Post-Op Diagnosis: Same       Procedure(s):  BILATERAL CYSTOSCOPY RETROGRADE PYELOGRAM AND POSSIBLE LEFT URETEROSCOPY  CYSTOSCOPY TRANSURETHRAL RESECTION BLADDER TUMOR    Surgeon(s):  Alvino Garrison MD    Assistant:  * No surgical staff found *    Anesthesia: General    Estimated Blood Loss (mL): Minimal    Complications: None    Specimens:   ID Type Source Tests Collected by Time Destination   A : BLADDER TUMOR BIOPSY Tissue Tissue SURGICAL PATHOLOGY Alvino Garrison MD 12/14/2023 0912        Implants:  * No implants in log *      Drains: * No LDAs found *    Findings: large left sided bladder tumor; ureteral orifices not visible      Electronically signed by Alvino Garrison MD on 12/14/2023 at 9:33 AM

## 2023-12-14 NOTE — PROGRESS NOTES
4 Eyes Skin Assessment     NAME:  Jethro Vázquez  YOB: 1960  MEDICAL RECORD NUMBER:  [id number]    The patient is being assessed for  Admission    I agree that at least one RN has performed a thorough Head to Toe Skin Assessment on the patient. ALL assessment sites listed below have been assessed. Areas assessed by both nurses:    Head, Face, Ears, Shoulders, Back, Chest, Arms, Elbows, Hands, Sacrum. Buttock, Coccyx, Ischium, Legs. Feet and Heels, and Under Medical Devices         Does the Patient have a Wound?  No noted wound(s)       Guilherme Prevention initiated by RN: No  Wound Care Orders initiated by RN: No    Pressure Injury (Stage 3,4, Unstageable, DTI, NWPT, and Complex wounds) if present, place Wound referral order by RN under : No    New Ostomies, if present place, Ostomy referral order under : No     Nurse 1 eSignature: Electronically signed by Adan Zaman RN on 12/14/23 at 12:12 PM EST    **SHARE this note so that the co-signing nurse can place an eSignature**    Nurse 2 eSignature: {Esignature:[id number]}

## 2023-12-14 NOTE — ANESTHESIA POSTPROCEDURE EVALUATION
Department of Anesthesiology  Postprocedure Note    Patient: Sherrine Boast  MRN: [id number]  YOB: 1960  Date of evaluation: 12/14/2023    Procedure Summary     Date: 12/14/23 Room / Location: [address] [place] [address]    Anesthesia Start: 5401 Anesthesia Stop: 0940    Procedure: CYSTOSCOPY TRANSURETHRAL RESECTION BLADDER TUMOR Diagnosis:       Hematuria, unspecified type      (Hematuria, unspecified type [R31.9])    Surgeons: Ras Marshall MD Responsible Provider: Leora Starr MD    Anesthesia Type: general ASA Status: 2          Anesthesia Type: No value filed. Arthur Phase I: Arthur Score: 10    Arthur Phase II:      Anesthesia Post Evaluation    Patient location during evaluation: PACU  Level of consciousness: awake  Airway patency: patent  Nausea & Vomiting: no nausea  Cardiovascular status: hemodynamically stable  Respiratory status: acceptable  Pain management: adequate        There were no known notable events for this encounter.

## 2023-12-14 NOTE — OP NOTE
[address], [address] [place] [address]                                OPERATIVE REPORT    PATIENT NAME: Maria De Jesus Garza                   :        1960  MED REC NO:   [id number]                          ROOM:       1120  ACCOUNT NO:   [de-identified]                           ADMIT DATE: 2023  PROVIDER:     Natty Bird MD    DATE OF PROCEDURE:  2023    PREOPERATIVE DIAGNOSES:  1.  Bladder tumor. 2.  Left ureteral calculus. POSTOPERATIVE DIAGNOSES:  1.  Bladder tumor. 2.  Left ureteral calculus. OPERATION PERFORMED:  Cystoscopy and transurethral resection of large  bladder tumor. SURGEON:  Natty Bird MD    ANESTHESIA:  General anesthesia. ESTIMATED BLOOD LOSS:  Less than 100 mL. COMPLICATIONS:  Gross hematuria. BRIEF HISTORY:  This is a 25-year-old gentleman whom I saw in  consultation for hematuria, large left-sided bladder tumor and also  question of a 1.8-mm left distal ureteral stone as well as left  adenopathy in the pelvis adjacent to this left tumor, highly suspicious  for an invasive bladder cancer. He is here now for treatment of the  stone as well as the bladder tumor. DESCRIPTION OF THE PROCEDURE:   The patient was identified in the  holding area, taken to procedure room, and placed in the dorsal  lithotomy position. The patient's genital region was prepped and draped  in the usual sterile fashion. A 21-Nepali cystoscope sheath was  introduced per urethra under direct visualization. The bladder was all  along the left lateral wall and all proximal.  The left ureteral orifice  was not visible. At this point, I elected to hold off on the retrograde  ureteroscopy on the left side since it was not visible to go ahead and  perform the bladder tumor resection to see if we can see the ureteral  orifice.   At this point then, I removed the cystoscope and replaced it  with a 24-Nepali

## 2023-12-14 NOTE — PROGRESS NOTES
36 Pt arrived to PACU. Monitors applied and alarms set. Report received. Pt awake c/o burning in penis. Has 3 way nunez cath in place. Leg strap applied. 7514 Utilizing prn meds as needed for comfort. 7822 Dr. Krupa Montalvo at bedside. Will place orders for transfer and additional pain meds. 1010 Taking sips of water. Utilizing prn meds as needed. Room 1120 ready. 1017 Report called to 1N RN. Transport arranged.

## 2023-12-15 ENCOUNTER — APPOINTMENT (OUTPATIENT)
Dept: ULTRASOUND IMAGING | Age: 63
End: 2023-12-15
Attending: SPECIALIST
Payer: COMMERCIAL

## 2023-12-15 LAB
ANION GAP SERPL CALCULATED.3IONS-SCNC: 12 MMOL/L (ref 4–16)
BASOPHILS ABSOLUTE: 0 K/CU MM
BASOPHILS RELATIVE PERCENT: 0.1 % (ref 0–1)
BUN SERPL-MCNC: 22 MG/DL (ref 6–23)
CALCIUM SERPL-MCNC: 7.9 MG/DL (ref 8.3–10.6)
CHLORIDE BLD-SCNC: 103 MMOL/L (ref 99–110)
CO2: 23 MMOL/L (ref 21–32)
CREAT SERPL-MCNC: 2.1 MG/DL (ref 0.9–1.3)
DIFFERENTIAL TYPE: ABNORMAL
EOSINOPHILS ABSOLUTE: 0 K/CU MM
EOSINOPHILS RELATIVE PERCENT: 0.1 % (ref 0–3)
GFR SERPL CREATININE-BSD FRML MDRD: 35 ML/MIN/1.73M2
GLUCOSE SERPL-MCNC: 101 MG/DL (ref 70–99)
HCT VFR BLD CALC: 39.6 % (ref 42–52)
HEMOGLOBIN: 13.2 GM/DL (ref 13.5–18)
IMMATURE NEUTROPHIL %: 0.5 % (ref 0–0.43)
LYMPHOCYTES ABSOLUTE: 1.7 K/CU MM
LYMPHOCYTES RELATIVE PERCENT: 12.5 % (ref 24–44)
MCH RBC QN AUTO: 28.8 PG (ref 27–31)
MCHC RBC AUTO-ENTMCNC: 33.3 % (ref 32–36)
MCV RBC AUTO: 86.3 FL (ref 78–100)
MONOCYTES ABSOLUTE: 0.8 K/CU MM
MONOCYTES RELATIVE PERCENT: 5.7 % (ref 0–4)
NUCLEATED RBC %: 0 %
PDW BLD-RTO: 11.7 % (ref 11.7–14.9)
PLATELET # BLD: 303 K/CU MM (ref 140–440)
PMV BLD AUTO: 9.1 FL (ref 7.5–11.1)
POTASSIUM SERPL-SCNC: 3.6 MMOL/L (ref 3.5–5.1)
RBC # BLD: 4.59 M/CU MM (ref 4.6–6.2)
SEGMENTED NEUTROPHILS ABSOLUTE COUNT: 10.7 K/CU MM
SEGMENTED NEUTROPHILS RELATIVE PERCENT: 81.1 % (ref 36–66)
SODIUM BLD-SCNC: 138 MMOL/L (ref 135–145)
TOTAL IMMATURE NEUTOROPHIL: 0.07 K/CU MM
TOTAL NUCLEATED RBC: 0 K/CU MM
WBC # BLD: 13.2 K/CU MM (ref 4–10.5)

## 2023-12-15 PROCEDURE — 6370000000 HC RX 637 (ALT 250 FOR IP): Performed by: SPECIALIST

## 2023-12-15 PROCEDURE — 6370000000 HC RX 637 (ALT 250 FOR IP): Performed by: PHYSICIAN ASSISTANT

## 2023-12-15 PROCEDURE — 80048 BASIC METABOLIC PNL TOTAL CA: CPT

## 2023-12-15 PROCEDURE — 6370000000 HC RX 637 (ALT 250 FOR IP): Performed by: UROLOGY

## 2023-12-15 PROCEDURE — 85025 COMPLETE CBC W/AUTO DIFF WBC: CPT

## 2023-12-15 PROCEDURE — 97530 THERAPEUTIC ACTIVITIES: CPT

## 2023-12-15 PROCEDURE — 94761 N-INVAS EAR/PLS OXIMETRY MLT: CPT

## 2023-12-15 PROCEDURE — 36415 COLL VENOUS BLD VENIPUNCTURE: CPT

## 2023-12-15 PROCEDURE — 76775 US EXAM ABDO BACK WALL LIM: CPT

## 2023-12-15 PROCEDURE — 83874 ASSAY OF MYOGLOBIN: CPT

## 2023-12-15 PROCEDURE — 97163 PT EVAL HIGH COMPLEX 45 MIN: CPT

## 2023-12-15 PROCEDURE — 97116 GAIT TRAINING THERAPY: CPT

## 2023-12-15 RX ORDER — CLONIDINE HYDROCHLORIDE 0.1 MG/1
0.1 TABLET ORAL ONCE
Status: COMPLETED | OUTPATIENT
Start: 2023-12-15 | End: 2023-12-15

## 2023-12-15 RX ORDER — AMLODIPINE BESYLATE 10 MG/1
10 TABLET ORAL DAILY
Status: DISCONTINUED | OUTPATIENT
Start: 2023-12-15 | End: 2023-12-16 | Stop reason: HOSPADM

## 2023-12-15 RX ORDER — ACETAMINOPHEN 325 MG/1
650 TABLET ORAL EVERY 4 HOURS PRN
Status: DISCONTINUED | OUTPATIENT
Start: 2023-12-15 | End: 2023-12-16 | Stop reason: HOSPADM

## 2023-12-15 RX ORDER — POLYETHYLENE GLYCOL 3350 17 G/17G
17 POWDER, FOR SOLUTION ORAL DAILY
Status: DISCONTINUED | OUTPATIENT
Start: 2023-12-15 | End: 2023-12-16 | Stop reason: HOSPADM

## 2023-12-15 RX ORDER — ONDANSETRON 4 MG/1
4 TABLET, ORALLY DISINTEGRATING ORAL EVERY 6 HOURS PRN
Status: DISCONTINUED | OUTPATIENT
Start: 2023-12-15 | End: 2023-12-16 | Stop reason: HOSPADM

## 2023-12-15 RX ADMIN — OXYCODONE AND ACETAMINOPHEN 1 TABLET: 5; 325 TABLET ORAL at 09:14

## 2023-12-15 RX ADMIN — ACETAMINOPHEN 650 MG: 325 TABLET ORAL at 21:39

## 2023-12-15 RX ADMIN — CLONIDINE HYDROCHLORIDE 0.1 MG: 0.1 TABLET ORAL at 21:38

## 2023-12-15 RX ADMIN — ONDANSETRON 4 MG: 4 TABLET, ORALLY DISINTEGRATING ORAL at 16:05

## 2023-12-15 RX ADMIN — OXYCODONE AND ACETAMINOPHEN 1 TABLET: 5; 325 TABLET ORAL at 00:22

## 2023-12-15 RX ADMIN — LIDOCAINE HYDROCHLORIDE: 20 JELLY TOPICAL at 16:06

## 2023-12-15 RX ADMIN — OXYCODONE AND ACETAMINOPHEN 1 TABLET: 5; 325 TABLET ORAL at 20:11

## 2023-12-15 RX ADMIN — AMLODIPINE BESYLATE 10 MG: 10 TABLET ORAL at 10:25

## 2023-12-15 RX ADMIN — ACETAMINOPHEN 650 MG: 325 TABLET ORAL at 16:04

## 2023-12-15 ASSESSMENT — PAIN SCALES - GENERAL
PAINLEVEL_OUTOF10: 9
PAINLEVEL_OUTOF10: 6
PAINLEVEL_OUTOF10: 6
PAINLEVEL_OUTOF10: 1
PAINLEVEL_OUTOF10: 2
PAINLEVEL_OUTOF10: 8

## 2023-12-15 ASSESSMENT — PAIN DESCRIPTION - ORIENTATION
ORIENTATION: LOWER
ORIENTATION: MID

## 2023-12-15 ASSESSMENT — PAIN - FUNCTIONAL ASSESSMENT
PAIN_FUNCTIONAL_ASSESSMENT: ACTIVITIES ARE NOT PREVENTED

## 2023-12-15 ASSESSMENT — PAIN DESCRIPTION - DESCRIPTORS
DESCRIPTORS: ACHING
DESCRIPTORS: ACHING;THROBBING
DESCRIPTORS: BURNING
DESCRIPTORS: ACHING;THROBBING
DESCRIPTORS: ACHING

## 2023-12-15 ASSESSMENT — PAIN DESCRIPTION - LOCATION
LOCATION: GENERALIZED;ABDOMEN
LOCATION: HEAD
LOCATION: GROIN;PENIS
LOCATION: ABDOMEN
LOCATION: GROIN

## 2023-12-15 ASSESSMENT — PAIN SCALES - WONG BAKER
WONGBAKER_NUMERICALRESPONSE: 0
WONGBAKER_NUMERICALRESPONSE: 0

## 2023-12-15 NOTE — CARE COORDINATION
From home. Has PCP and insurance. Independent of his ADLs PTA. No CM needs id'd at this time. Discharge home with no needs. CM is available if needed. 12/15/23 1412   Service Assessment   Patient Orientation Alert and Oriented   Cognition Alert   History Provided By Medical Record   Primary Caregiver Self   Support Systems Family Members   Patient's Healthcare Decision Maker is: Legal Next of [facility]   PCP Verified by CM Yes   Prior Functional Level Independent in ADLs/IADLs   Current Functional Level Independent in ADLs/IADLs   Can patient return to prior living arrangement Yes   Ability to make needs known: Good   Family able to assist with home care needs: Yes   Would you like for me to discuss the discharge plan with any other family members/significant others, and if so, who?  No   Financial Resources Justin Lucero

## 2023-12-16 VITALS
BODY MASS INDEX: 30.92 KG/M2 | SYSTOLIC BLOOD PRESSURE: 163 MMHG | TEMPERATURE: 97.7 F | OXYGEN SATURATION: 94 % | WEIGHT: 216 LBS | RESPIRATION RATE: 18 BRPM | HEIGHT: 70 IN | HEART RATE: 92 BPM | DIASTOLIC BLOOD PRESSURE: 98 MMHG

## 2023-12-16 LAB
ANION GAP SERPL CALCULATED.3IONS-SCNC: 12 MMOL/L (ref 4–16)
BUN SERPL-MCNC: 24 MG/DL (ref 6–23)
CALCIUM SERPL-MCNC: 8.2 MG/DL (ref 8.3–10.6)
CHLORIDE BLD-SCNC: 102 MMOL/L (ref 99–110)
CO2: 24 MMOL/L (ref 21–32)
CREAT SERPL-MCNC: 2.1 MG/DL (ref 0.9–1.3)
EKG ATRIAL RATE: 90 BPM
EKG DIAGNOSIS: NORMAL
EKG P AXIS: 37 DEGREES
EKG P-R INTERVAL: 150 MS
EKG Q-T INTERVAL: 370 MS
EKG QRS DURATION: 90 MS
EKG QTC CALCULATION (BAZETT): 452 MS
EKG R AXIS: -24 DEGREES
EKG T AXIS: 16 DEGREES
EKG VENTRICULAR RATE: 90 BPM
GFR SERPL CREATININE-BSD FRML MDRD: 35 ML/MIN/1.73M2
GLUCOSE SERPL-MCNC: 102 MG/DL (ref 70–99)
HCT VFR BLD CALC: 39.9 % (ref 42–52)
HEMOGLOBIN: 13.2 GM/DL (ref 13.5–18)
MCH RBC QN AUTO: 28.6 PG (ref 27–31)
MCHC RBC AUTO-ENTMCNC: 33.1 % (ref 32–36)
MCV RBC AUTO: 86.4 FL (ref 78–100)
PDW BLD-RTO: 11.9 % (ref 11.7–14.9)
PLATELET # BLD: 325 K/CU MM (ref 140–440)
PMV BLD AUTO: 9 FL (ref 7.5–11.1)
POTASSIUM SERPL-SCNC: 3.5 MMOL/L (ref 3.5–5.1)
RBC # BLD: 4.62 M/CU MM (ref 4.6–6.2)
SODIUM BLD-SCNC: 138 MMOL/L (ref 135–145)
WBC # BLD: 13.6 K/CU MM (ref 4–10.5)

## 2023-12-16 PROCEDURE — 6370000000 HC RX 637 (ALT 250 FOR IP): Performed by: SPECIALIST

## 2023-12-16 PROCEDURE — 85027 COMPLETE CBC AUTOMATED: CPT

## 2023-12-16 PROCEDURE — 80048 BASIC METABOLIC PNL TOTAL CA: CPT

## 2023-12-16 PROCEDURE — 6370000000 HC RX 637 (ALT 250 FOR IP): Performed by: PHYSICIAN ASSISTANT

## 2023-12-16 PROCEDURE — 94761 N-INVAS EAR/PLS OXIMETRY MLT: CPT

## 2023-12-16 PROCEDURE — 36415 COLL VENOUS BLD VENIPUNCTURE: CPT

## 2023-12-16 PROCEDURE — 6370000000 HC RX 637 (ALT 250 FOR IP): Performed by: UROLOGY

## 2023-12-16 RX ADMIN — ACETAMINOPHEN 650 MG: 325 TABLET ORAL at 10:39

## 2023-12-16 RX ADMIN — AMLODIPINE BESYLATE 10 MG: 10 TABLET ORAL at 10:41

## 2023-12-16 NOTE — DISCHARGE SUMMARY
[address] [facility], [address] [place] [address]     Discharge Summary   [facility] 0 1 2  Patient ID:   Joanne Abernathy   [id number]   59 y.o.   1960     Admit date: 12/14/2023     Admission Problem/Diagnosis: Hematuria, unspecified type [R31.9] Active Problems:    * No active hospital problems. *  Resolved Problems:    * No resolved hospital problems. *      Admitting Physician: Adina Stringer MD     Discharge Diagnoses: bladder mass, hypertension     Condition at Discharge: good     Discharge date and time: 12/16/23 afternoon     Patient Instructions:     Current Discharge Medication List        CONTINUE these medications which have NOT CHANGED    Details   oxybutynin (DITROPAN) 5 MG tablet Take 1 tablet by mouth 3 times daily  Qty: 30 tablet, Refills: 1      tamsulosin (FLOMAX) 0.4 MG capsule Take 1 capsule by mouth daily  Qty: 30 capsule, Refills: 0      acetaminophen (TYLENOL) 500 MG tablet Take 2 tablets by mouth every 6 hours as needed for Pain      amLODIPine (NORVASC) 10 MG tablet TAKE 1 TABLET BY MOUTH DAILY  Refills: 3              Activity: no lifting, Driving, or Strenuous exercise for 1 month     Diet: regular diet     Wound Care: routine catheter care     Follow-up with Dr. Promise Mclaughlin this week with bmp/cbc to discuss pathology report. Consults: none     Procedures Performed & Treatment Rendered: TURBT 12/14/23 with Dr. Promise Mclaughlin, pathology pending     Summary of Hospital Stay/ Conclusions at Discharge: Joanne Abernathy was admitted after TURBT for hematuria for CBI. AF.  +HTN - IM consult wasn't able to be addressed. Home with leg bag, he will need to f/u with his PCP for HTN.       Taras Noriega MD     Electronically signed at 12/16/2023

## 2023-12-16 NOTE — PROGRESS NOTES
[address] [facility] [address] [employer], [address]   [facility]  Progress Note 0 1 2      Date: 2023   Patient: Natacha Llanos   : 1960   DOA: 2023   MRN: [id number]   ROOM#: 1120/1120-A     Subjective     I feel good     Objective     AF  163/98      CBC:   Lab Results   Component Value Date/Time    WBC 13.6 2023 04:55 AM    RBC 4.62 2023 04:55 AM    HGB 13.2 2023 04:55 AM    HCT 39.9 2023 04:55 AM    MCV 86.4 2023 04:55 AM    MCH 28.6 2023 04:55 AM    MCHC 33.1 2023 04:55 AM    RDW 11.9 2023 04:55 AM     2023 04:55 AM    MPV 9.0 2023 04:55 AM     BMP:    Lab Results   Component Value Date/Time     2023 04:55 AM    K 3.5 2023 04:55 AM     2023 04:55 AM    CO2 24 2023 04:55 AM    BUN 24 2023 04:55 AM    LABALBU 3.5 2023 11:15 PM    CREATININE 2.1 2023 04:55 AM    CALCIUM 8.2 2023 04:55 AM    GFRAA >60 2018 07:38 AM    LABGLOM 35 2023 04:55 AM    GLUCOSE 102 2023 04:55 AM       Imaging Studies:     CT ABDOMEN PELVIS W WO CONTRAST Additional Contrast? None     Result Date: 2023  EXAMINATION: CT OF THE ABDOMEN AND PELVIS WITH AND WITHOUT CONTRAST 2023 10:02 am TECHNIQUE: CT of the abdomen and pelvis was performed with and without the administration of intravenous contrast.  Multiplanar reformatted images are provided for review. Automated exposure control, iterative reconstruction, and/or weight based adjustment of the mA/kV was utilized to reduce the radiation dose to as low as reasonably achievable.  COMPARISON: 2017 HISTORY: ORDERING SYSTEM PROVIDED HISTORY: Gross hematuria TECHNOLOGIST PROVIDED HISTORY: Additional Contrast?->None STAT Creatinine as needed:->No Reason for Exam: Gross hematuria Additional signs and symptoms: 125 ml isovue 370 @ rt anti @ 1115 hrs, gfr>60, creat 1.2, Gross hematuria, FINDINGS: Lower Chest: A right lower is an incidental tiny calculus seen measuring 1.8 mm at the left UV junction. 5. Left high iliac lymphadenopathy with nodes measuring up to 1.2 cm. 6. Cholelithiasis but no acute cholecystitis. 7. Mild fat containing periumbilical hernia. 8. Consolidative lung infiltrate in the right lower lobe medially consistent with pneumonia. This does not have the appearance of metastatic disease. RECOMMENDATIONS: Urology consult, cystoscopy with biopsy. Physical Exam:     NAD  Urine clear    Assessment/Plan    Lyric Vázquez is a 59 y.o. male admitted 12/14/2023 for hematuria. 1) Bladder Tumor w Gross Hematuria: S/p cystoscopy and transurethral resection of large bladder tumor 12/14/23. Ureteral orifices not visible. Hgb 13.2; afebrile, hypertensive but had not taken home amlodipine              Urine clear yellow this morning. Tolerating diet, awaiting return of bowel function. PT/OT consulted. Will continue catheter upon discharge and remove for voiding trial next week. Pathology pending. 2) SHIRLEY: Cr 2.1, increased from 1.5 11/27. JANA 12/15/23 mild bilateral HN. 3) HTN: per IM. Will need to discuss with Dr. Lisa Arango his PCP. Disposition - home today with leg bag. Will need next with Dr. Khurram Ambrose with repeat labs - CBC & BMP - to discuss pathology. Patient seen and examined, chart reviewed.      Migdalia Velazquez MD     Electronically signed at 12/16/2023

## 2023-12-16 NOTE — PROGRESS NOTES
Physical Therapy  Facility/Department: [facility]  Physical Therapy Initial Assessment    Name: Obie Chong  : 1960  MRN: [id number]  Date of Service: 12/15/2023    Discharge Recommendations:  [address]          Patient Diagnosis(es): The encounter diagnosis was Hematuria, unspecified type. Past Medical History:  has a past medical history of Acid reflux, History of kidney stones, Hypertension, and No natural teeth. Past Surgical History:  has a past surgical history that includes Appendectomy (); Ankle surgery (Left); hernia repair; and other surgical history (Bilateral, 2018). Assessment   Body Structures, Functions, Activity Limitations Requiring Skilled Therapeutic Intervention: Decreased functional mobility ; Decreased safe awareness;Decreased high-level IADLs;Decreased ADL status; Decreased endurance;Decreased cognition;Decreased posture;Decreased strength;Decreased balance; Increased pain  Therapy Prognosis: Good  Decision Making: High Complexity  Clinical Presentation: unpredictable characteristics  Requires PT Follow-Up: Yes  Activity Tolerance  Activity Tolerance: Patient tolerated evaluation without incident     Plan   Physical Therapy Plan  General Plan: 3-5 times per week  Current Treatment Recommendations: Strengthening, ROM, Balance training, Functional mobility training, Transfer training, ADL/Self-care training, IADL training, Cognitive/Perceptual training, Endurance training, Equipment evaluation, education, & procurement, Pain management, Gait training, Stair training, Neuromuscular re-education, Safety education & training, Patient/Caregiver education & training, Therapeutic activities, Home exercise program, Positioning  Safety Devices  Type of Devices:  All fall risk precautions in place, Patient at risk for falls, Call light within reach, Left in chair, Chair alarm in place, Gait belt, Nurse notified

## 2023-12-17 LAB — MYOGLOBIN SERPL-MCNC: 69 NG/ML

## 2023-12-24 ENCOUNTER — APPOINTMENT (OUTPATIENT)
Dept: CT IMAGING | Age: 63
End: 2023-12-24
Payer: COMMERCIAL

## 2023-12-24 ENCOUNTER — HOSPITAL ENCOUNTER (EMERGENCY)
Age: 63
Discharge: HOME | End: 2023-12-24
Attending: EMERGENCY MEDICINE
Payer: COMMERCIAL

## 2023-12-24 VITALS
HEART RATE: 108 BPM | TEMPERATURE: 98.6 F | BODY MASS INDEX: 30.92 KG/M2 | DIASTOLIC BLOOD PRESSURE: 96 MMHG | OXYGEN SATURATION: 98 % | RESPIRATION RATE: 18 BRPM | HEIGHT: 70 IN | WEIGHT: 216 LBS | SYSTOLIC BLOOD PRESSURE: 144 MMHG

## 2023-12-24 DIAGNOSIS — R10.9 FLANK PAIN: ICD-10-CM

## 2023-12-24 DIAGNOSIS — R59.1 LYMPHADENOPATHY: ICD-10-CM

## 2023-12-24 DIAGNOSIS — N13.30 HYDRONEPHROSIS, UNSPECIFIED HYDRONEPHROSIS TYPE: Primary | ICD-10-CM

## 2023-12-24 LAB
ALBUMIN SERPL-MCNC: 3.5 GM/DL (ref 3.4–5)
ALP BLD-CCNC: 369 IU/L (ref 40–129)
ALT SERPL-CCNC: 32 U/L (ref 10–40)
ANION GAP SERPL CALCULATED.3IONS-SCNC: 16 MMOL/L (ref 7–16)
AST SERPL-CCNC: 20 IU/L (ref 15–37)
BACTERIA: ABNORMAL /HPF
BASOPHILS ABSOLUTE: 0.2 K/CU MM
BASOPHILS RELATIVE PERCENT: 1 % (ref 0–1)
BILIRUB SERPL-MCNC: 0.3 MG/DL (ref 0–1)
BILIRUBIN URINE: NEGATIVE MG/DL
BLOOD, URINE: ABNORMAL
BUN SERPL-MCNC: 15 MG/DL (ref 6–23)
CALCIUM SERPL-MCNC: 8.9 MG/DL (ref 8.3–10.6)
CAST TYPE: ABNORMAL /HPF
CHLORIDE BLD-SCNC: 100 MMOL/L (ref 99–110)
CLARITY: CLEAR
CO2: 23 MMOL/L (ref 21–32)
COLOR: YELLOW
CREAT SERPL-MCNC: 1.6 MG/DL (ref 0.9–1.3)
CRYSTAL TYPE: NEGATIVE /HPF
DIFFERENTIAL TYPE: ABNORMAL
EOSINOPHILS ABSOLUTE: 0.5 K/CU MM
EOSINOPHILS RELATIVE PERCENT: 3 % (ref 0–3)
EPITHELIAL CELLS, UA: 2 /HPF
GFR SERPL CREATININE-BSD FRML MDRD: 48 ML/MIN/1.73M2
GLUCOSE SERPL-MCNC: 105 MG/DL (ref 70–99)
GLUCOSE, URINE: NEGATIVE MG/DL
HCT VFR BLD CALC: 37.1 % (ref 42–52)
HEMOGLOBIN: 12.5 GM/DL (ref 13.5–18)
KETONES, URINE: NEGATIVE MG/DL
LEUKOCYTE ESTERASE, URINE: ABNORMAL
LYMPHOCYTES ABSOLUTE: 1.2 K/CU MM
LYMPHOCYTES RELATIVE PERCENT: 7 % (ref 24–44)
MCH RBC QN AUTO: 28.5 PG (ref 27–31)
MCHC RBC AUTO-ENTMCNC: 33.7 % (ref 32–36)
MCV RBC AUTO: 84.7 FL (ref 78–100)
MONOCYTES ABSOLUTE: 0.2 K/CU MM
MONOCYTES RELATIVE PERCENT: 1 % (ref 0–4)
NITRITE URINE, QUANTITATIVE: NEGATIVE
PDW BLD-RTO: 12 % (ref 11.7–14.9)
PH, URINE: 6 (ref 5–8)
PLATELET # BLD: 457 K/CU MM (ref 140–440)
PMV BLD AUTO: 9.2 FL (ref 7.5–11.1)
POTASSIUM SERPL-SCNC: 3.2 MMOL/L (ref 3.5–5.1)
PROTEIN UA: 100 MG/DL
RBC # BLD: 4.38 M/CU MM (ref 4.6–6.2)
RBC URINE: 100 /HPF (ref 0–3)
SEGMENTED NEUTROPHILS ABSOLUTE COUNT: 14.9 K/CU MM
SEGMENTED NEUTROPHILS RELATIVE PERCENT: 88 % (ref 36–66)
SODIUM BLD-SCNC: 139 MMOL/L (ref 135–145)
SPECIFIC GRAVITY UA: 1.02 (ref 1–1.03)
TARGET CELLS: ABNORMAL
TOTAL PROTEIN: 7.6 GM/DL (ref 6.4–8.2)
UROBILINOGEN, URINE: 0.2 MG/DL (ref 0.2–1)
WBC # BLD: 17 K/CU MM (ref 4–10.5)
WBC UA: 10 /HPF (ref 0–2)

## 2023-12-24 PROCEDURE — 99284 EMERGENCY DEPT VISIT MOD MDM: CPT

## 2023-12-24 PROCEDURE — 87186 SC STD MICRODIL/AGAR DIL: CPT

## 2023-12-24 PROCEDURE — 87077 CULTURE AEROBIC IDENTIFY: CPT

## 2023-12-24 PROCEDURE — 85007 BL SMEAR W/DIFF WBC COUNT: CPT

## 2023-12-24 PROCEDURE — 81001 URINALYSIS AUTO W/SCOPE: CPT

## 2023-12-24 PROCEDURE — 80053 COMPREHEN METABOLIC PANEL: CPT

## 2023-12-24 PROCEDURE — 6360000002 HC RX W HCPCS: Performed by: EMERGENCY MEDICINE

## 2023-12-24 PROCEDURE — 85027 COMPLETE CBC AUTOMATED: CPT

## 2023-12-24 PROCEDURE — 96375 TX/PRO/DX INJ NEW DRUG ADDON: CPT

## 2023-12-24 PROCEDURE — 87086 URINE CULTURE/COLONY COUNT: CPT

## 2023-12-24 PROCEDURE — 74176 CT ABD & PELVIS W/O CONTRAST: CPT

## 2023-12-24 PROCEDURE — 96374 THER/PROPH/DIAG INJ IV PUSH: CPT

## 2023-12-24 PROCEDURE — 6370000000 HC RX 637 (ALT 250 FOR IP): Performed by: EMERGENCY MEDICINE

## 2023-12-24 RX ORDER — CEPHALEXIN 500 MG/1
500 CAPSULE ORAL 2 TIMES DAILY
Qty: 20 CAPSULE | Refills: 0 | Status: ON HOLD | OUTPATIENT
Start: 2023-12-24 | End: 2024-01-03

## 2023-12-24 RX ORDER — CEPHALEXIN 500 MG/1
500 CAPSULE ORAL 2 TIMES DAILY
Qty: 20 CAPSULE | Refills: 0 | Status: SHIPPED | OUTPATIENT
Start: 2023-12-24 | End: 2023-12-24

## 2023-12-24 RX ORDER — ONDANSETRON 2 MG/ML
4 INJECTION INTRAMUSCULAR; INTRAVENOUS ONCE
Status: COMPLETED | OUTPATIENT
Start: 2023-12-24 | End: 2023-12-24

## 2023-12-24 RX ORDER — MORPHINE SULFATE 4 MG/ML
4 INJECTION, SOLUTION INTRAMUSCULAR; INTRAVENOUS ONCE
Status: COMPLETED | OUTPATIENT
Start: 2023-12-24 | End: 2023-12-24

## 2023-12-24 RX ORDER — OXYCODONE HYDROCHLORIDE AND ACETAMINOPHEN 5; 325 MG/1; MG/1
1 TABLET ORAL EVERY 6 HOURS PRN
Qty: 12 TABLET | Refills: 0 | Status: SHIPPED | OUTPATIENT
Start: 2023-12-24 | End: 2023-12-27

## 2023-12-24 RX ORDER — OXYCODONE HYDROCHLORIDE AND ACETAMINOPHEN 5; 325 MG/1; MG/1
1 TABLET ORAL EVERY 6 HOURS PRN
Qty: 12 TABLET | Refills: 0 | Status: SHIPPED | OUTPATIENT
Start: 2023-12-24 | End: 2023-12-24

## 2023-12-24 RX ORDER — CEPHALEXIN 500 MG/1
500 CAPSULE ORAL ONCE
Status: COMPLETED | OUTPATIENT
Start: 2023-12-24 | End: 2023-12-24

## 2023-12-24 RX ORDER — HYDROCODONE BITARTRATE AND ACETAMINOPHEN 5; 325 MG/1; MG/1
1 TABLET ORAL ONCE
Status: DISCONTINUED | OUTPATIENT
Start: 2023-12-24 | End: 2023-12-24

## 2023-12-24 RX ORDER — OXYCODONE HYDROCHLORIDE AND ACETAMINOPHEN 5; 325 MG/1; MG/1
1 TABLET ORAL ONCE
Status: COMPLETED | OUTPATIENT
Start: 2023-12-24 | End: 2023-12-24

## 2023-12-24 RX ORDER — ONDANSETRON 4 MG/1
4 TABLET, ORALLY DISINTEGRATING ORAL ONCE
Status: COMPLETED | OUTPATIENT
Start: 2023-12-24 | End: 2023-12-24

## 2023-12-24 RX ADMIN — CEPHALEXIN 500 MG: 500 CAPSULE ORAL at 22:56

## 2023-12-24 RX ADMIN — OXYCODONE HYDROCHLORIDE AND ACETAMINOPHEN 1 TABLET: 5; 325 TABLET ORAL at 22:56

## 2023-12-24 RX ADMIN — ONDANSETRON 4 MG: 4 TABLET, ORALLY DISINTEGRATING ORAL at 22:56

## 2023-12-24 RX ADMIN — MORPHINE SULFATE 4 MG: 4 INJECTION, SOLUTION INTRAMUSCULAR; INTRAVENOUS at 20:40

## 2023-12-24 RX ADMIN — ONDANSETRON 4 MG: 2 INJECTION INTRAMUSCULAR; INTRAVENOUS at 20:36

## 2023-12-25 NOTE — DISCHARGE INSTRUCTIONS
No kidney stones were seen on your CT scan today. Your CT scan continues to show thickening of your bladder as well as some swelling in your kidneys. Your kidney function is within its normal range. Urine did not overtly show signs of infection but we are starting on antibiotics given what looks like to be some inflammation in your bladder. Please follow-up promptly with urology for further evaluation management. If you develop any worsening or concerning symptoms, please seek immediate medical evaluation.

## 2023-12-25 NOTE — ED PROVIDER NOTES
[facility]      Pt Name: Marguerite Lao  MRN: [id number]  [address] [facility] 1960  Date of evaluation: 12/24/2023  Provider: Dario Acosta MD    CHIEF COMPLAINT       Chief Complaint   Patient presents with    Flank Pain     C/o sharp lt flank pain since last evening. Pt had kidney stones removed to lt side 12/14/23. Pt had a catheter removed on Friday , states he having no difficulty urinating at this time         [address] is a 59 y.o. male who presents to the emergency department  for   Chief Complaint   Patient presents with    Flank Pain     C/o sharp lt flank pain since last evening. Pt had kidney stones removed to lt side 12/14/23. Pt had a catheter removed on Friday , states he having no difficulty urinating at this time       59 yom presents reporting left sided flank pain. He has a h/o left sided flank pain 2/2 urinary obstruction from a bladder tumor. He was hospitalized at [facility] from 12/14 to 162/16 and underwent resection of the bladder tumor (path report shows urothelial carcinoma). He also had some small kidney stones. He was discharge home with an indwelling nunez catheter. He states that the cathter has been since removed. He reports overall hydrating well but developed left sided flank pain again over the past few days. He states he is urinating well. No fever, chills or other constitutional infectious symptoms. He denies hematuria. No flank or abdominal trauma. He does not identify any alleviating factors. Nursing Notes, Triage Notes & Vital Signs were reviewed. REVIEW OF SYSTEMS    (2-9 systems for level 4, 10 or more for level 5)     Review of Systems    Except as noted above the remainder of the review of systems was reviewed and negative.        PAST MEDICAL HISTORY     Past Medical History:   Diagnosis Date    Acid reflux     History of kidney stones     \"had kidney stone

## 2023-12-27 LAB
CULTURE: ABNORMAL
CULTURE: ABNORMAL
Lab: ABNORMAL
SPECIMEN: ABNORMAL

## 2023-12-31 ENCOUNTER — APPOINTMENT (OUTPATIENT)
Dept: GENERAL RADIOLOGY | Age: 63
End: 2023-12-31
Payer: COMMERCIAL

## 2023-12-31 ENCOUNTER — APPOINTMENT (OUTPATIENT)
Dept: CT IMAGING | Age: 63
End: 2023-12-31
Payer: COMMERCIAL

## 2023-12-31 ENCOUNTER — HOSPITAL ENCOUNTER (EMERGENCY)
Age: 63
Discharge: ANOTHER HOSPITAL | End: 2023-12-31
Attending: EMERGENCY MEDICINE
Payer: COMMERCIAL

## 2023-12-31 ENCOUNTER — HOSPITAL ENCOUNTER (INPATIENT)
Age: 63
LOS: 4 days | Discharge: HOME | DRG: 720 | End: 2024-01-04
Attending: INTERNAL MEDICINE | Admitting: INTERNAL MEDICINE
Payer: COMMERCIAL

## 2023-12-31 VITALS
HEIGHT: 70 IN | TEMPERATURE: 98.4 F | HEART RATE: 95 BPM | WEIGHT: 210 LBS | SYSTOLIC BLOOD PRESSURE: 165 MMHG | BODY MASS INDEX: 30.06 KG/M2 | OXYGEN SATURATION: 96 % | RESPIRATION RATE: 16 BRPM | DIASTOLIC BLOOD PRESSURE: 97 MMHG

## 2023-12-31 DIAGNOSIS — N32.89 BLADDER MASS: ICD-10-CM

## 2023-12-31 DIAGNOSIS — N10 ACUTE PYELONEPHRITIS: ICD-10-CM

## 2023-12-31 DIAGNOSIS — R10.9 LEFT FLANK PAIN: ICD-10-CM

## 2023-12-31 DIAGNOSIS — C67.8 MALIGNANT NEOPLASM OF OVERLAPPING SITES OF BLADDER (HCC): Primary | ICD-10-CM

## 2023-12-31 DIAGNOSIS — N13.5 BILATERAL URETERAL OBSTRUCTION: Primary | ICD-10-CM

## 2023-12-31 PROBLEM — N12 PYELONEPHRITIS: Status: ACTIVE | Noted: 2023-12-31

## 2023-12-31 LAB
ALBUMIN SERPL-MCNC: 3.4 GM/DL (ref 3.4–5)
ALP BLD-CCNC: 336 IU/L (ref 40–129)
ALT SERPL-CCNC: 22 U/L (ref 10–40)
ANION GAP SERPL CALCULATED.3IONS-SCNC: 12 MMOL/L (ref 7–16)
ANION GAP SERPL CALCULATED.3IONS-SCNC: 18 MMOL/L (ref 7–16)
AST SERPL-CCNC: 13 IU/L (ref 15–37)
BACTERIA: ABNORMAL /HPF
BASOPHILS ABSOLUTE: 0.2 K/CU MM
BASOPHILS RELATIVE PERCENT: 1 % (ref 0–1)
BILIRUB SERPL-MCNC: 0.5 MG/DL (ref 0–1)
BILIRUBIN URINE: NEGATIVE MG/DL
BLOOD, URINE: ABNORMAL
BUN SERPL-MCNC: 11 MG/DL (ref 6–23)
BUN SERPL-MCNC: 12 MG/DL (ref 6–23)
CALCIUM SERPL-MCNC: 8.7 MG/DL (ref 8.3–10.6)
CALCIUM SERPL-MCNC: 9.1 MG/DL (ref 8.3–10.6)
CAST TYPE: ABNORMAL /HPF
CHLORIDE BLD-SCNC: 97 MMOL/L (ref 99–110)
CHLORIDE BLD-SCNC: 98 MMOL/L (ref 99–110)
CLARITY: ABNORMAL
CO2: 22 MMOL/L (ref 21–32)
CO2: 27 MMOL/L (ref 21–32)
COLOR: YELLOW
CREAT SERPL-MCNC: 1.2 MG/DL (ref 0.9–1.3)
CREAT SERPL-MCNC: 1.2 MG/DL (ref 0.9–1.3)
CRP SERPL HS-MCNC: 52 MG/L
CRYSTAL TYPE: NEGATIVE /HPF
DIFFERENTIAL TYPE: ABNORMAL
EOSINOPHILS ABSOLUTE: 0.2 K/CU MM
EOSINOPHILS RELATIVE PERCENT: 1 % (ref 0–3)
EPITHELIAL CELLS, UA: 2 /HPF
ERYTHROCYTE SEDIMENTATION RATE: 85 MM/HR (ref 0–20)
GFR SERPL CREATININE-BSD FRML MDRD: >60 ML/MIN/1.73M2
GFR SERPL CREATININE-BSD FRML MDRD: >60 ML/MIN/1.73M2
GIANT PLATELETS: PRESENT
GLUCOSE SERPL-MCNC: 113 MG/DL (ref 70–99)
GLUCOSE SERPL-MCNC: 127 MG/DL (ref 70–99)
GLUCOSE, URINE: NEGATIVE MG/DL
HCT VFR BLD CALC: 39.8 % (ref 42–52)
HEMOGLOBIN: 13.3 GM/DL (ref 13.5–18)
KETONES, URINE: NEGATIVE MG/DL
LACTIC ACID, SEPSIS: 1.9 MMOL/L (ref 0.4–2)
LEUKOCYTE ESTERASE, URINE: ABNORMAL
LIPASE: 26 IU/L (ref 13–60)
LYMPHOCYTES ABSOLUTE: 1.4 K/CU MM
LYMPHOCYTES RELATIVE PERCENT: 8 % (ref 24–44)
MAGNESIUM: 1.8 MG/DL (ref 1.8–2.4)
MCH RBC QN AUTO: 28 PG (ref 27–31)
MCHC RBC AUTO-ENTMCNC: 33.4 % (ref 32–36)
MCV RBC AUTO: 83.8 FL (ref 78–100)
MONOCYTES ABSOLUTE: 0.5 K/CU MM
MONOCYTES RELATIVE PERCENT: 3 % (ref 0–4)
NITRITE URINE, QUANTITATIVE: NEGATIVE
PDW BLD-RTO: 11.8 % (ref 11.7–14.9)
PH, URINE: 7 (ref 5–8)
PLATELET # BLD: 591 K/CU MM (ref 140–440)
PMV BLD AUTO: 9 FL (ref 7.5–11.1)
POTASSIUM SERPL-SCNC: 3.3 MMOL/L (ref 3.5–5.1)
POTASSIUM SERPL-SCNC: 3.7 MMOL/L (ref 3.5–5.1)
PROCALCITONIN SERPL-MCNC: 0.1 NG/ML
PROTEIN UA: 100 MG/DL
RBC # BLD: 4.75 M/CU MM (ref 4.6–6.2)
RBC URINE: ABNORMAL /HPF (ref 0–3)
SARS-COV-2 RDRP RESP QL NAA+PROBE: NOT DETECTED
SEGMENTED NEUTROPHILS ABSOLUTE COUNT: 15 K/CU MM
SEGMENTED NEUTROPHILS RELATIVE PERCENT: 87 % (ref 36–66)
SODIUM BLD-SCNC: 137 MMOL/L (ref 135–145)
SODIUM BLD-SCNC: 137 MMOL/L (ref 135–145)
SOURCE: NORMAL
SPECIFIC GRAVITY UA: 1.01 (ref 1–1.03)
STOMATOCYTES: ABNORMAL
TARGET CELLS: ABNORMAL
TOTAL PROTEIN: 7.8 GM/DL (ref 6.4–8.2)
TROPONIN, HIGH SENSITIVITY: 24 NG/L (ref 0–22)
TROPONIN, HIGH SENSITIVITY: 26 NG/L (ref 0–22)
UROBILINOGEN, URINE: 1 MG/DL (ref 0.2–1)
WBC # BLD: 17.3 K/CU MM (ref 4–10.5)
WBC UA: ABNORMAL /HPF (ref 0–2)

## 2023-12-31 PROCEDURE — 81001 URINALYSIS AUTO W/SCOPE: CPT

## 2023-12-31 PROCEDURE — 71045 X-RAY EXAM CHEST 1 VIEW: CPT

## 2023-12-31 PROCEDURE — 84145 PROCALCITONIN (PCT): CPT

## 2023-12-31 PROCEDURE — 96361 HYDRATE IV INFUSION ADD-ON: CPT

## 2023-12-31 PROCEDURE — 96376 TX/PRO/DX INJ SAME DRUG ADON: CPT

## 2023-12-31 PROCEDURE — 87088 URINE BACTERIA CULTURE: CPT

## 2023-12-31 PROCEDURE — 87635 SARS-COV-2 COVID-19 AMP PRB: CPT

## 2023-12-31 PROCEDURE — 87040 BLOOD CULTURE FOR BACTERIA: CPT

## 2023-12-31 PROCEDURE — 96375 TX/PRO/DX INJ NEW DRUG ADDON: CPT

## 2023-12-31 PROCEDURE — 2500000003 HC RX 250 WO HCPCS: Performed by: EMERGENCY MEDICINE

## 2023-12-31 PROCEDURE — 2580000003 HC RX 258: Performed by: EMERGENCY MEDICINE

## 2023-12-31 PROCEDURE — 74177 CT ABD & PELVIS W/CONTRAST: CPT

## 2023-12-31 PROCEDURE — 87086 URINE CULTURE/COLONY COUNT: CPT

## 2023-12-31 PROCEDURE — 85652 RBC SED RATE AUTOMATED: CPT

## 2023-12-31 PROCEDURE — 6360000002 HC RX W HCPCS: Performed by: INTERNAL MEDICINE

## 2023-12-31 PROCEDURE — 96365 THER/PROPH/DIAG IV INF INIT: CPT

## 2023-12-31 PROCEDURE — 87186 SC STD MICRODIL/AGAR DIL: CPT

## 2023-12-31 PROCEDURE — 86140 C-REACTIVE PROTEIN: CPT

## 2023-12-31 PROCEDURE — 83605 ASSAY OF LACTIC ACID: CPT

## 2023-12-31 PROCEDURE — 36415 COLL VENOUS BLD VENIPUNCTURE: CPT

## 2023-12-31 PROCEDURE — 80048 BASIC METABOLIC PNL TOTAL CA: CPT

## 2023-12-31 PROCEDURE — 99285 EMERGENCY DEPT VISIT HI MDM: CPT

## 2023-12-31 PROCEDURE — 1200000000 HC SEMI PRIVATE

## 2023-12-31 PROCEDURE — 85007 BL SMEAR W/DIFF WBC COUNT: CPT

## 2023-12-31 PROCEDURE — 2580000003 HC RX 258: Performed by: INTERNAL MEDICINE

## 2023-12-31 PROCEDURE — 6360000002 HC RX W HCPCS: Performed by: EMERGENCY MEDICINE

## 2023-12-31 PROCEDURE — 83735 ASSAY OF MAGNESIUM: CPT

## 2023-12-31 PROCEDURE — 80053 COMPREHEN METABOLIC PANEL: CPT

## 2023-12-31 PROCEDURE — 6370000000 HC RX 637 (ALT 250 FOR IP): Performed by: INTERNAL MEDICINE

## 2023-12-31 PROCEDURE — 84484 ASSAY OF TROPONIN QUANT: CPT

## 2023-12-31 PROCEDURE — 94761 N-INVAS EAR/PLS OXIMETRY MLT: CPT

## 2023-12-31 PROCEDURE — 83690 ASSAY OF LIPASE: CPT

## 2023-12-31 PROCEDURE — 6360000004 HC RX CONTRAST MEDICATION: Performed by: EMERGENCY MEDICINE

## 2023-12-31 PROCEDURE — 85027 COMPLETE CBC AUTOMATED: CPT

## 2023-12-31 RX ORDER — SODIUM CHLORIDE 9 MG/ML
1000 INJECTION, SOLUTION INTRAVENOUS CONTINUOUS
Status: DISPENSED | OUTPATIENT
Start: 2023-12-31 | End: 2024-01-01

## 2023-12-31 RX ORDER — AMLODIPINE BESYLATE 10 MG/1
10 TABLET ORAL DAILY
Status: DISCONTINUED | OUTPATIENT
Start: 2023-12-31 | End: 2024-01-04 | Stop reason: HOSPADM

## 2023-12-31 RX ORDER — TAMSULOSIN HYDROCHLORIDE 0.4 MG/1
0.4 CAPSULE ORAL EVERY EVENING
Status: DISCONTINUED | OUTPATIENT
Start: 2023-12-31 | End: 2024-01-04 | Stop reason: HOSPADM

## 2023-12-31 RX ORDER — POTASSIUM CHLORIDE 7.45 MG/ML
10 INJECTION INTRAVENOUS PRN
Status: DISCONTINUED | OUTPATIENT
Start: 2023-12-31 | End: 2024-01-04 | Stop reason: HOSPADM

## 2023-12-31 RX ORDER — SODIUM CHLORIDE 9 MG/ML
500 INJECTION, SOLUTION INTRAVENOUS PRN
Status: DISCONTINUED | OUTPATIENT
Start: 2023-12-31 | End: 2024-01-04 | Stop reason: HOSPADM

## 2023-12-31 RX ORDER — SODIUM CHLORIDE 9 MG/ML
1000 INJECTION, SOLUTION INTRAVENOUS CONTINUOUS
Status: DISCONTINUED | OUTPATIENT
Start: 2023-12-31 | End: 2023-12-31 | Stop reason: HOSPADM

## 2023-12-31 RX ORDER — METOPROLOL TARTRATE 1 MG/ML
5 INJECTION, SOLUTION INTRAVENOUS ONCE
Status: COMPLETED | OUTPATIENT
Start: 2023-12-31 | End: 2023-12-31

## 2023-12-31 RX ORDER — ENOXAPARIN SODIUM 100 MG/ML
40 INJECTION SUBCUTANEOUS DAILY
Status: DISCONTINUED | OUTPATIENT
Start: 2024-01-01 | End: 2024-01-04 | Stop reason: HOSPADM

## 2023-12-31 RX ORDER — POLYETHYLENE GLYCOL 3350 17 G/17G
17 POWDER, FOR SOLUTION ORAL DAILY PRN
Status: DISCONTINUED | OUTPATIENT
Start: 2023-12-31 | End: 2024-01-04 | Stop reason: HOSPADM

## 2023-12-31 RX ORDER — POTASSIUM CHLORIDE 20 MEQ/1
40 TABLET, EXTENDED RELEASE ORAL PRN
Status: DISCONTINUED | OUTPATIENT
Start: 2023-12-31 | End: 2024-01-04 | Stop reason: HOSPADM

## 2023-12-31 RX ORDER — MAGNESIUM SULFATE IN WATER 40 MG/ML
2000 INJECTION, SOLUTION INTRAVENOUS PRN
Status: DISCONTINUED | OUTPATIENT
Start: 2023-12-31 | End: 2024-01-04 | Stop reason: HOSPADM

## 2023-12-31 RX ORDER — SODIUM CHLORIDE 0.9 % (FLUSH) 0.9 %
5-40 SYRINGE (ML) INJECTION PRN
Status: DISCONTINUED | OUTPATIENT
Start: 2023-12-31 | End: 2024-01-04 | Stop reason: HOSPADM

## 2023-12-31 RX ORDER — ONDANSETRON 4 MG/1
4 TABLET, ORALLY DISINTEGRATING ORAL EVERY 8 HOURS PRN
Status: DISCONTINUED | OUTPATIENT
Start: 2023-12-31 | End: 2024-01-04 | Stop reason: HOSPADM

## 2023-12-31 RX ORDER — MORPHINE SULFATE 4 MG/ML
4 INJECTION, SOLUTION INTRAMUSCULAR; INTRAVENOUS EVERY 30 MIN PRN
Status: COMPLETED | OUTPATIENT
Start: 2023-12-31 | End: 2023-12-31

## 2023-12-31 RX ORDER — ONDANSETRON 2 MG/ML
4 INJECTION INTRAMUSCULAR; INTRAVENOUS EVERY 6 HOURS PRN
Status: DISCONTINUED | OUTPATIENT
Start: 2023-12-31 | End: 2024-01-04 | Stop reason: HOSPADM

## 2023-12-31 RX ORDER — ONDANSETRON 2 MG/ML
4 INJECTION INTRAMUSCULAR; INTRAVENOUS EVERY 30 MIN PRN
Status: DISCONTINUED | OUTPATIENT
Start: 2023-12-31 | End: 2023-12-31 | Stop reason: HOSPADM

## 2023-12-31 RX ORDER — ACETAMINOPHEN 325 MG/1
650 TABLET ORAL EVERY 6 HOURS PRN
Status: DISCONTINUED | OUTPATIENT
Start: 2023-12-31 | End: 2024-01-04 | Stop reason: HOSPADM

## 2023-12-31 RX ORDER — MORPHINE SULFATE 4 MG/ML
4 INJECTION, SOLUTION INTRAMUSCULAR; INTRAVENOUS EVERY 30 MIN PRN
Status: DISCONTINUED | OUTPATIENT
Start: 2023-12-31 | End: 2023-12-31 | Stop reason: HOSPADM

## 2023-12-31 RX ORDER — ACETAMINOPHEN 650 MG/1
650 SUPPOSITORY RECTAL EVERY 6 HOURS PRN
Status: DISCONTINUED | OUTPATIENT
Start: 2023-12-31 | End: 2024-01-04 | Stop reason: HOSPADM

## 2023-12-31 RX ORDER — 0.9 % SODIUM CHLORIDE 0.9 %
500 INTRAVENOUS SOLUTION INTRAVENOUS ONCE
Status: COMPLETED | OUTPATIENT
Start: 2023-12-31 | End: 2023-12-31

## 2023-12-31 RX ORDER — SODIUM CHLORIDE 0.9 % (FLUSH) 0.9 %
5-40 SYRINGE (ML) INJECTION EVERY 12 HOURS SCHEDULED
Status: DISCONTINUED | OUTPATIENT
Start: 2023-12-31 | End: 2024-01-04 | Stop reason: HOSPADM

## 2023-12-31 RX ORDER — ONDANSETRON 2 MG/ML
4 INJECTION INTRAMUSCULAR; INTRAVENOUS EVERY 30 MIN PRN
Status: COMPLETED | OUTPATIENT
Start: 2023-12-31 | End: 2023-12-31

## 2023-12-31 RX ADMIN — ONDANSETRON 4 MG: 2 INJECTION INTRAMUSCULAR; INTRAVENOUS at 08:53

## 2023-12-31 RX ADMIN — ONDANSETRON 4 MG: 2 INJECTION INTRAMUSCULAR; INTRAVENOUS at 08:28

## 2023-12-31 RX ADMIN — MORPHINE SULFATE 4 MG: 4 INJECTION, SOLUTION INTRAMUSCULAR; INTRAVENOUS at 12:22

## 2023-12-31 RX ADMIN — SODIUM CHLORIDE: 9 INJECTION, SOLUTION INTRAVENOUS at 08:27

## 2023-12-31 RX ADMIN — METOROPROLOL TARTRATE 5 MG: 5 INJECTION, SOLUTION INTRAVENOUS at 08:54

## 2023-12-31 RX ADMIN — MORPHINE SULFATE 4 MG: 4 INJECTION, SOLUTION INTRAMUSCULAR; INTRAVENOUS at 08:53

## 2023-12-31 RX ADMIN — SODIUM CHLORIDE 500 ML: 9 INJECTION, SOLUTION INTRAVENOUS at 08:27

## 2023-12-31 RX ADMIN — ONDANSETRON 4 MG: 2 INJECTION INTRAMUSCULAR; INTRAVENOUS at 09:53

## 2023-12-31 RX ADMIN — MORPHINE SULFATE 4 MG: 4 INJECTION, SOLUTION INTRAMUSCULAR; INTRAVENOUS at 09:53

## 2023-12-31 RX ADMIN — AMLODIPINE BESYLATE 10 MG: 10 TABLET ORAL at 15:16

## 2023-12-31 RX ADMIN — CEFTRIAXONE SODIUM 1000 MG: 1 INJECTION, POWDER, FOR SOLUTION INTRAMUSCULAR; INTRAVENOUS at 09:30

## 2023-12-31 RX ADMIN — IOPAMIDOL 100 ML: 755 INJECTION, SOLUTION INTRAVENOUS at 09:23

## 2023-12-31 RX ADMIN — HYDROMORPHONE HYDROCHLORIDE 0.5 MG: 1 INJECTION, SOLUTION INTRAMUSCULAR; INTRAVENOUS; SUBCUTANEOUS at 18:45

## 2023-12-31 RX ADMIN — HYDROMORPHONE HYDROCHLORIDE 0.5 MG: 1 INJECTION, SOLUTION INTRAMUSCULAR; INTRAVENOUS; SUBCUTANEOUS at 22:57

## 2023-12-31 RX ADMIN — MORPHINE SULFATE 4 MG: 4 INJECTION, SOLUTION INTRAMUSCULAR; INTRAVENOUS at 08:28

## 2023-12-31 RX ADMIN — ONDANSETRON 4 MG: 2 INJECTION INTRAMUSCULAR; INTRAVENOUS at 12:21

## 2023-12-31 RX ADMIN — SODIUM CHLORIDE 1000 ML: 9 INJECTION, SOLUTION INTRAVENOUS at 16:40

## 2023-12-31 RX ADMIN — TAMSULOSIN HYDROCHLORIDE 0.4 MG: 0.4 CAPSULE ORAL at 18:44

## 2023-12-31 ASSESSMENT — PAIN SCALES - GENERAL
PAINLEVEL_OUTOF10: 5
PAINLEVEL_OUTOF10: 4
PAINLEVEL_OUTOF10: 3
PAINLEVEL_OUTOF10: 5
PAINLEVEL_OUTOF10: 7
PAINLEVEL_OUTOF10: 3
PAINLEVEL_OUTOF10: 7

## 2023-12-31 ASSESSMENT — PAIN - FUNCTIONAL ASSESSMENT
PAIN_FUNCTIONAL_ASSESSMENT: ACTIVITIES ARE NOT PREVENTED
PAIN_FUNCTIONAL_ASSESSMENT: ACTIVITIES ARE NOT PREVENTED
PAIN_FUNCTIONAL_ASSESSMENT: PREVENTS OR INTERFERES SOME ACTIVE ACTIVITIES AND ADLS
PAIN_FUNCTIONAL_ASSESSMENT: PREVENTS OR INTERFERES SOME ACTIVE ACTIVITIES AND ADLS
PAIN_FUNCTIONAL_ASSESSMENT: 0-10

## 2023-12-31 ASSESSMENT — PAIN DESCRIPTION - DESCRIPTORS
DESCRIPTORS: OTHER (COMMENT)
DESCRIPTORS: ACHING
DESCRIPTORS: STABBING
DESCRIPTORS: ACHING;STABBING

## 2023-12-31 ASSESSMENT — PAIN DESCRIPTION - LOCATION
LOCATION: BACK
LOCATION: ABDOMEN;FLANK
LOCATION: BACK;ABDOMEN
LOCATION: ABDOMEN;BACK
LOCATION: ABDOMEN

## 2023-12-31 ASSESSMENT — PAIN DESCRIPTION - ONSET
ONSET: ON-GOING
ONSET: ON-GOING

## 2023-12-31 ASSESSMENT — PAIN DESCRIPTION - PAIN TYPE
TYPE: ACUTE PAIN
TYPE: ACUTE PAIN

## 2023-12-31 ASSESSMENT — PAIN DESCRIPTION - ORIENTATION
ORIENTATION: RIGHT;LOWER
ORIENTATION: RIGHT;LOWER
ORIENTATION: LOWER
ORIENTATION: LEFT

## 2023-12-31 ASSESSMENT — PAIN DESCRIPTION - FREQUENCY
FREQUENCY: CONTINUOUS
FREQUENCY: CONTINUOUS

## 2023-12-31 NOTE — ED TRIAGE NOTES
Patient presented to ED with complaints of abd pain and dry heaving. States he has been having pain since being discharged from the hospital the last time but the dry heaving started this am because the pain is so bad. States he started having chest pain after he started dry heaving.

## 2023-12-31 NOTE — H&P
OBSERVED 12/31/2023 08:59 AM    UROBILINOGEN 1.0 12/31/2023 08:59 AM    BILIRUBINUR NEGATIVE 12/31/2023 08:59 AM    BLOODU MODERATE NUMBER OR AMOUNT OBSERVED 12/31/2023 08:59 AM    KETUA NEGATIVE 12/31/2023 08:59 AM     Urine Cultures: No results found for: \"LABURIN\"  Blood Cultures: No results found for: \"BC\"  No results found for: \"BLOODCULT2\"  Organism: No results found for: \"ORG\"    Imaging/Diagnostics Last 24 Hours   CT ABDOMEN PELVIS W IV CONTRAST    Result Date: 12/31/2023  EXAMINATION: CT OF THE ABDOMEN AND PELVIS WITH CONTRAST 12/31/2023 9:05 am TECHNIQUE: CT of the abdomen and pelvis was performed with the administration of intravenous contrast. Multiplanar reformatted images are provided for review. Automated exposure control, iterative reconstruction, and/or weight based adjustment of the mA/kV was utilized to reduce the radiation dose to as low as reasonably achievable. COMPARISON: 12/24/2023 HISTORY: ORDERING SYSTEM PROVIDED HISTORY: abdominal pain TECHNOLOGIST PROVIDED HISTORY: IV contrast only. Thank you. Reason for exam:->abdominal pain Reason for exam:->IV contrast only. Thank you. FINDINGS: Lower Chest: Motion degrades the quality of the exam.  The heart size is within normal limits.  There is no pericardial effusion.  An acute process is not identified at the lung bases. Organs: A focal hepatic abnormality is not identified.  A gallstone is noted. A focal splenic abnormality is not identified. A focal pancreatic abnormality is not appreciated.  The adrenal glands are unremarkable.  There is moderate left hydroureteronephrosis.  There is mild right hydroureteronephrosis.  There are areas of low attenuation within the right renal cortex.  There is delayed contrast opacification of the left kidney.  Renal cysts are noted bilaterally. GI/Bowel: Stool is noted throughout the colon.  Bowel is not obstructed. Appendix is not identified. Pelvis: There is a lobular mass involving the urinary bladder wall

## 2023-12-31 NOTE — ED PROVIDER NOTES
Cells 1+     Stomatocytes 1+     Giant PLTs PRESENT    Comprehensive Metabolic Panel   Result Value Ref Range    Sodium 137 135 - 145 MMOL/L    Potassium 3.3 (L) 3.5 - 5.1 MMOL/L    Chloride 97 (L) 99 - 110 mMol/L    CO2 22 21 - 32 MMOL/L    Anion Gap 18 (H) 7 - 16    Glucose 127 (H) 70 - 99 MG/DL    BUN 12 6 - 23 MG/DL    Creatinine 1.2 0.9 - 1.3 MG/DL    Est, Glom Filt Rate >60 >60 mL/min/1.73m2    Calcium 9.1 8.3 - 10.6 MG/DL    Total Protein 7.8 6.4 - 8.2 GM/DL    Albumin 3.4 3.4 - 5.0 GM/DL    Total Bilirubin 0.5 0.0 - 1.0 MG/DL    Alkaline Phosphatase 336 (H) 40 - 129 IU/L    ALT 22 10 - 40 U/L    AST 13 (L) 15 - 37 IU/L   Lipase   Result Value Ref Range    Lipase 26 13 - 60 IU/L   Lactate, Sepsis   Result Value Ref Range    Lactic Acid, Sepsis 1.9 0.4 - 2.0 mMOL/L   Urinalysis with Reflex to Culture    Specimen: Urine   Result Value Ref Range    Color, UA YELLOW YELLOW    Clarity, UA SLIGHTLY CLOUDY (A) CLEAR    Glucose, Urine NEGATIVE NEGATIVE MG/DL    Bilirubin Urine NEGATIVE NEGATIVE MG/DL    Ketones, Urine NEGATIVE NEGATIVE MG/DL    Specific Gravity, UA 1.015 1.001 - 1.035    Blood, Urine MODERATE NUMBER OR AMOUNT OBSERVED (A) NEGATIVE    pH, Urine 7.0 5.0 - 8.0    Protein,  (A) NEGATIVE MG/DL    Urobilinogen, Urine 1.0 0.2 - 1.0 MG/DL    Nitrite Urine, Quantitative NEGATIVE NEGATIVE    Leukocyte Esterase, Urine MODERATE NUMBER OR AMOUNT OBSERVED (A) NEGATIVE   Troponin   Result Value Ref Range    Troponin, High Sensitivity 26 (H) 0 - 22 ng/L   Troponin   Result Value Ref Range    Troponin, High Sensitivity 24 (H) 0 - 22 ng/L   Magnesium   Result Value Ref Range    Magnesium 1.8 1.8 - 2.4 mg/dl   Urine Microscopic with Reflex to Culture   Result Value Ref Range    RBC, UA TOO NUMEROUS TO COUNT 0 - 3 /HPF    WBC, UA TOO NUMEROUS TO COUNT 0 - 2 /HPF    Epithelial Cells, UA 2 /HPF    Cast Type NO CAST FORMS SEEN NO CAST FORMS SEEN /HPF    Bacteria, UA MANY (A) NEGATIVE /HPF    Crystal Type NEGATIVE

## 2024-01-01 ENCOUNTER — APPOINTMENT (OUTPATIENT)
Dept: CT IMAGING | Age: 64
DRG: 720 | End: 2024-01-01
Attending: INTERNAL MEDICINE
Payer: COMMERCIAL

## 2024-01-01 LAB
ALBUMIN SERPL-MCNC: 3.3 GM/DL (ref 3.4–5)
ALP BLD-CCNC: 294 IU/L (ref 40–129)
ALT SERPL-CCNC: 15 U/L (ref 10–40)
ANION GAP SERPL CALCULATED.3IONS-SCNC: 14 MMOL/L (ref 7–16)
AST SERPL-CCNC: 8 IU/L (ref 15–37)
BASOPHILS ABSOLUTE: 0.1 K/CU MM
BASOPHILS RELATIVE PERCENT: 0.3 % (ref 0–1)
BILIRUB SERPL-MCNC: 0.4 MG/DL (ref 0–1)
BILIRUBIN DIRECT: 0.2 MG/DL (ref 0–0.3)
BILIRUBIN, INDIRECT: 0.2 MG/DL (ref 0–0.7)
BUN SERPL-MCNC: 12 MG/DL (ref 6–23)
CALCIUM SERPL-MCNC: 8.3 MG/DL (ref 8.3–10.6)
CHLORIDE BLD-SCNC: 99 MMOL/L (ref 99–110)
CO2: 24 MMOL/L (ref 21–32)
CREAT SERPL-MCNC: 1.3 MG/DL (ref 0.9–1.3)
DIFFERENTIAL TYPE: ABNORMAL
EOSINOPHILS ABSOLUTE: 0.1 K/CU MM
EOSINOPHILS RELATIVE PERCENT: 0.6 % (ref 0–3)
FERRITIN: 336 NG/ML (ref 30–400)
FOLATE SERPL-MCNC: 8.9 NG/ML (ref 3.1–17.5)
GFR SERPL CREATININE-BSD FRML MDRD: >60 ML/MIN/1.73M2
GLUCOSE SERPL-MCNC: 104 MG/DL (ref 70–99)
HCT VFR BLD CALC: 38.3 % (ref 42–52)
HEMOGLOBIN: 12.6 GM/DL (ref 13.5–18)
IMMATURE NEUTROPHIL %: 0.4 % (ref 0–0.43)
IRON: 19 UG/DL (ref 59–158)
LYMPHOCYTES ABSOLUTE: 1.7 K/CU MM
LYMPHOCYTES RELATIVE PERCENT: 11.4 % (ref 24–44)
MAGNESIUM: 2.1 MG/DL (ref 1.8–2.4)
MCH RBC QN AUTO: 28.4 PG (ref 27–31)
MCHC RBC AUTO-ENTMCNC: 32.9 % (ref 32–36)
MCV RBC AUTO: 86.3 FL (ref 78–100)
MONOCYTES ABSOLUTE: 0.8 K/CU MM
MONOCYTES RELATIVE PERCENT: 5.4 % (ref 0–4)
NUCLEATED RBC %: 0 %
PCT TRANSFERRIN: 9 % (ref 10–44)
PDW BLD-RTO: 11.6 % (ref 11.7–14.9)
PHOSPHORUS: 3.7 MG/DL (ref 2.5–4.9)
PLATELET # BLD: 512 K/CU MM (ref 140–440)
PMV BLD AUTO: 8.9 FL (ref 7.5–11.1)
POTASSIUM SERPL-SCNC: 3.5 MMOL/L (ref 3.5–5.1)
RBC # BLD: 4.44 M/CU MM (ref 4.6–6.2)
SEGMENTED NEUTROPHILS ABSOLUTE COUNT: 11.9 K/CU MM
SEGMENTED NEUTROPHILS RELATIVE PERCENT: 81.9 % (ref 36–66)
SODIUM BLD-SCNC: 137 MMOL/L (ref 135–145)
TOTAL IMMATURE NEUTOROPHIL: 0.06 K/CU MM
TOTAL IRON BINDING CAPACITY: 216 UG/DL (ref 250–450)
TOTAL NUCLEATED RBC: 0 K/CU MM
TOTAL PROTEIN: 6.4 GM/DL (ref 6.4–8.2)
TSH SERPL DL<=0.005 MIU/L-ACNC: 1.66 UIU/ML (ref 0.27–4.2)
UNSATURATED IRON BINDING CAPACITY: 197 UG/DL (ref 110–370)
VITAMIN B-12: 793.3 PG/ML (ref 211–911)
WBC # BLD: 14.5 K/CU MM (ref 4–10.5)

## 2024-01-01 PROCEDURE — 71250 CT THORAX DX C-: CPT

## 2024-01-01 PROCEDURE — 6370000000 HC RX 637 (ALT 250 FOR IP): Performed by: INTERNAL MEDICINE

## 2024-01-01 PROCEDURE — 82248 BILIRUBIN DIRECT: CPT

## 2024-01-01 PROCEDURE — 82746 ASSAY OF FOLIC ACID SERUM: CPT

## 2024-01-01 PROCEDURE — 83550 IRON BINDING TEST: CPT

## 2024-01-01 PROCEDURE — 82607 VITAMIN B-12: CPT

## 2024-01-01 PROCEDURE — 36415 COLL VENOUS BLD VENIPUNCTURE: CPT

## 2024-01-01 PROCEDURE — 83540 ASSAY OF IRON: CPT

## 2024-01-01 PROCEDURE — 84075 ASSAY ALKALINE PHOSPHATASE: CPT

## 2024-01-01 PROCEDURE — 84080 ASSAY ALKALINE PHOSPHATASES: CPT

## 2024-01-01 PROCEDURE — 82728 ASSAY OF FERRITIN: CPT

## 2024-01-01 PROCEDURE — 80053 COMPREHEN METABOLIC PANEL: CPT

## 2024-01-01 PROCEDURE — 2580000003 HC RX 258: Performed by: INTERNAL MEDICINE

## 2024-01-01 PROCEDURE — 85025 COMPLETE CBC W/AUTO DIFF WBC: CPT

## 2024-01-01 PROCEDURE — 94761 N-INVAS EAR/PLS OXIMETRY MLT: CPT

## 2024-01-01 PROCEDURE — 6360000002 HC RX W HCPCS: Performed by: INTERNAL MEDICINE

## 2024-01-01 PROCEDURE — 1200000000 HC SEMI PRIVATE

## 2024-01-01 PROCEDURE — 84100 ASSAY OF PHOSPHORUS: CPT

## 2024-01-01 PROCEDURE — 83735 ASSAY OF MAGNESIUM: CPT

## 2024-01-01 PROCEDURE — 84443 ASSAY THYROID STIM HORMONE: CPT

## 2024-01-01 PROCEDURE — 99255 IP/OBS CONSLTJ NEW/EST HI 80: CPT | Performed by: INTERNAL MEDICINE

## 2024-01-01 RX ADMIN — HYDROMORPHONE HYDROCHLORIDE 0.5 MG: 1 INJECTION, SOLUTION INTRAMUSCULAR; INTRAVENOUS; SUBCUTANEOUS at 23:12

## 2024-01-01 RX ADMIN — CEFTRIAXONE 1000 MG: 1 INJECTION, POWDER, FOR SOLUTION INTRAMUSCULAR; INTRAVENOUS at 09:54

## 2024-01-01 RX ADMIN — HYDROMORPHONE HYDROCHLORIDE 0.5 MG: 1 INJECTION, SOLUTION INTRAMUSCULAR; INTRAVENOUS; SUBCUTANEOUS at 19:43

## 2024-01-01 RX ADMIN — HYDROMORPHONE HYDROCHLORIDE 0.5 MG: 1 INJECTION, SOLUTION INTRAMUSCULAR; INTRAVENOUS; SUBCUTANEOUS at 12:38

## 2024-01-01 RX ADMIN — HYDROMORPHONE HYDROCHLORIDE 0.5 MG: 1 INJECTION, SOLUTION INTRAMUSCULAR; INTRAVENOUS; SUBCUTANEOUS at 03:47

## 2024-01-01 RX ADMIN — HYDROMORPHONE HYDROCHLORIDE 0.5 MG: 1 INJECTION, SOLUTION INTRAMUSCULAR; INTRAVENOUS; SUBCUTANEOUS at 16:20

## 2024-01-01 RX ADMIN — ONDANSETRON 4 MG: 2 INJECTION INTRAMUSCULAR; INTRAVENOUS at 23:16

## 2024-01-01 RX ADMIN — ENOXAPARIN SODIUM 40 MG: 100 INJECTION SUBCUTANEOUS at 09:48

## 2024-01-01 RX ADMIN — SODIUM CHLORIDE, PRESERVATIVE FREE 10 ML: 5 INJECTION INTRAVENOUS at 19:57

## 2024-01-01 RX ADMIN — HYDROMORPHONE HYDROCHLORIDE 0.5 MG: 1 INJECTION, SOLUTION INTRAMUSCULAR; INTRAVENOUS; SUBCUTANEOUS at 09:47

## 2024-01-01 RX ADMIN — TAMSULOSIN HYDROCHLORIDE 0.4 MG: 0.4 CAPSULE ORAL at 16:21

## 2024-01-01 RX ADMIN — AMLODIPINE BESYLATE 10 MG: 10 TABLET ORAL at 09:48

## 2024-01-01 RX ADMIN — HYDROMORPHONE HYDROCHLORIDE 0.5 MG: 1 INJECTION, SOLUTION INTRAMUSCULAR; INTRAVENOUS; SUBCUTANEOUS at 06:26

## 2024-01-01 RX ADMIN — SODIUM CHLORIDE 1000 ML: 9 INJECTION, SOLUTION INTRAVENOUS at 06:29

## 2024-01-01 ASSESSMENT — PAIN SCALES - GENERAL
PAINLEVEL_OUTOF10: 4
PAINLEVEL_OUTOF10: 0
PAINLEVEL_OUTOF10: 7
PAINLEVEL_OUTOF10: 4
PAINLEVEL_OUTOF10: 7
PAINLEVEL_OUTOF10: 9
PAINLEVEL_OUTOF10: 7
PAINLEVEL_OUTOF10: 7

## 2024-01-01 ASSESSMENT — PAIN DESCRIPTION - DESCRIPTORS
DESCRIPTORS: CRAMPING
DESCRIPTORS: ACHING;SPASM
DESCRIPTORS: CRAMPING
DESCRIPTORS: JABBING
DESCRIPTORS: CRAMPING
DESCRIPTORS: DISCOMFORT
DESCRIPTORS: SHARP
DESCRIPTORS: CRAMPING

## 2024-01-01 ASSESSMENT — PAIN DESCRIPTION - ORIENTATION
ORIENTATION: LEFT
ORIENTATION: RIGHT;LEFT
ORIENTATION: LEFT
ORIENTATION: LEFT
ORIENTATION: RIGHT;LEFT
ORIENTATION: LEFT
ORIENTATION: RIGHT;LEFT
ORIENTATION: RIGHT;LEFT

## 2024-01-01 ASSESSMENT — PAIN DESCRIPTION - LOCATION
LOCATION: ABDOMEN
LOCATION: FLANK
LOCATION: FLANK
LOCATION: ABDOMEN;BACK;FLANK
LOCATION: FLANK
LOCATION: FLANK
LOCATION: ABDOMEN
LOCATION: FLANK

## 2024-01-01 ASSESSMENT — PAIN DESCRIPTION - PAIN TYPE
TYPE: ACUTE PAIN
TYPE: ACUTE PAIN

## 2024-01-01 ASSESSMENT — PAIN - FUNCTIONAL ASSESSMENT
PAIN_FUNCTIONAL_ASSESSMENT: ACTIVITIES ARE NOT PREVENTED

## 2024-01-01 ASSESSMENT — PAIN DESCRIPTION - FREQUENCY
FREQUENCY: INTERMITTENT
FREQUENCY: CONTINUOUS

## 2024-01-01 ASSESSMENT — PAIN DESCRIPTION - ONSET: ONSET: ON-GOING

## 2024-01-02 ENCOUNTER — APPOINTMENT (OUTPATIENT)
Dept: NUCLEAR MEDICINE | Age: 64
DRG: 720 | End: 2024-01-02
Attending: INTERNAL MEDICINE
Payer: COMMERCIAL

## 2024-01-02 LAB
ALBUMIN SERPL-MCNC: 3.4 GM/DL (ref 3.4–5)
ALP BLD-CCNC: 296 IU/L (ref 40–129)
ALT SERPL-CCNC: 13 U/L (ref 10–40)
ANION GAP SERPL CALCULATED.3IONS-SCNC: 15 MMOL/L (ref 7–16)
AST SERPL-CCNC: 9 IU/L (ref 15–37)
BASOPHILS ABSOLUTE: 0.1 K/CU MM
BASOPHILS RELATIVE PERCENT: 0.4 % (ref 0–1)
BILIRUB SERPL-MCNC: 0.3 MG/DL (ref 0–1)
BILIRUBIN DIRECT: 0.2 MG/DL (ref 0–0.3)
BILIRUBIN, INDIRECT: 0.1 MG/DL (ref 0–0.7)
BUN SERPL-MCNC: 13 MG/DL (ref 6–23)
CALCIUM SERPL-MCNC: 8.4 MG/DL (ref 8.3–10.6)
CHLORIDE BLD-SCNC: 98 MMOL/L (ref 99–110)
CO2: 22 MMOL/L (ref 21–32)
CREAT SERPL-MCNC: 1.2 MG/DL (ref 0.9–1.3)
CULTURE: ABNORMAL
CULTURE: ABNORMAL
DIFFERENTIAL TYPE: ABNORMAL
EOSINOPHILS ABSOLUTE: 0.1 K/CU MM
EOSINOPHILS RELATIVE PERCENT: 0.8 % (ref 0–3)
GFR SERPL CREATININE-BSD FRML MDRD: >60 ML/MIN/1.73M2
GLUCOSE SERPL-MCNC: 97 MG/DL (ref 70–99)
HCT VFR BLD CALC: 37.4 % (ref 42–52)
HEMOGLOBIN: 12.1 GM/DL (ref 13.5–18)
IMMATURE NEUTROPHIL %: 0.5 % (ref 0–0.43)
LYMPHOCYTES ABSOLUTE: 1.6 K/CU MM
LYMPHOCYTES RELATIVE PERCENT: 11.2 % (ref 24–44)
Lab: ABNORMAL
MAGNESIUM: 2.1 MG/DL (ref 1.8–2.4)
MCH RBC QN AUTO: 27.9 PG (ref 27–31)
MCHC RBC AUTO-ENTMCNC: 32.4 % (ref 32–36)
MCV RBC AUTO: 86.4 FL (ref 78–100)
MONOCYTES ABSOLUTE: 0.8 K/CU MM
MONOCYTES RELATIVE PERCENT: 5.8 % (ref 0–4)
NUCLEATED RBC %: 0 %
PDW BLD-RTO: 11.6 % (ref 11.7–14.9)
PHOSPHORUS: 4.1 MG/DL (ref 2.5–4.9)
PLATELET # BLD: 531 K/CU MM (ref 140–440)
PMV BLD AUTO: 9 FL (ref 7.5–11.1)
POTASSIUM SERPL-SCNC: 3.3 MMOL/L (ref 3.5–5.1)
RBC # BLD: 4.33 M/CU MM (ref 4.6–6.2)
SEGMENTED NEUTROPHILS ABSOLUTE COUNT: 11.6 K/CU MM
SEGMENTED NEUTROPHILS RELATIVE PERCENT: 81.3 % (ref 36–66)
SODIUM BLD-SCNC: 135 MMOL/L (ref 135–145)
SPECIMEN: ABNORMAL
TOTAL IMMATURE NEUTOROPHIL: 0.07 K/CU MM
TOTAL NUCLEATED RBC: 0 K/CU MM
TOTAL PROTEIN: 6.5 GM/DL (ref 6.4–8.2)
WBC # BLD: 14.2 K/CU MM (ref 4–10.5)

## 2024-01-02 PROCEDURE — 2580000003 HC RX 258: Performed by: INTERNAL MEDICINE

## 2024-01-02 PROCEDURE — 85025 COMPLETE CBC W/AUTO DIFF WBC: CPT

## 2024-01-02 PROCEDURE — 78306 BONE IMAGING WHOLE BODY: CPT | Performed by: INTERNAL MEDICINE

## 2024-01-02 PROCEDURE — 83735 ASSAY OF MAGNESIUM: CPT

## 2024-01-02 PROCEDURE — 6370000000 HC RX 637 (ALT 250 FOR IP): Performed by: INTERNAL MEDICINE

## 2024-01-02 PROCEDURE — A9503 TC99M MEDRONATE: HCPCS | Performed by: INTERNAL MEDICINE

## 2024-01-02 PROCEDURE — 80053 COMPREHEN METABOLIC PANEL: CPT

## 2024-01-02 PROCEDURE — 6360000002 HC RX W HCPCS: Performed by: INTERNAL MEDICINE

## 2024-01-02 PROCEDURE — 84100 ASSAY OF PHOSPHORUS: CPT

## 2024-01-02 PROCEDURE — 99232 SBSQ HOSP IP/OBS MODERATE 35: CPT | Performed by: PHYSICIAN ASSISTANT

## 2024-01-02 PROCEDURE — 3430000000 HC RX DIAGNOSTIC RADIOPHARMACEUTICAL: Performed by: INTERNAL MEDICINE

## 2024-01-02 PROCEDURE — 1200000000 HC SEMI PRIVATE

## 2024-01-02 PROCEDURE — 36415 COLL VENOUS BLD VENIPUNCTURE: CPT

## 2024-01-02 PROCEDURE — 82248 BILIRUBIN DIRECT: CPT

## 2024-01-02 RX ORDER — HYDRALAZINE HYDROCHLORIDE 20 MG/ML
10 INJECTION INTRAMUSCULAR; INTRAVENOUS EVERY 6 HOURS PRN
Status: DISCONTINUED | OUTPATIENT
Start: 2024-01-02 | End: 2024-01-04 | Stop reason: HOSPADM

## 2024-01-02 RX ORDER — TC 99M MEDRONATE 20 MG/10ML
28.5 INJECTION, POWDER, LYOPHILIZED, FOR SOLUTION INTRAVENOUS
Status: COMPLETED | OUTPATIENT
Start: 2024-01-02 | End: 2024-01-02

## 2024-01-02 RX ADMIN — HYDROMORPHONE HYDROCHLORIDE 0.5 MG: 1 INJECTION, SOLUTION INTRAMUSCULAR; INTRAVENOUS; SUBCUTANEOUS at 18:22

## 2024-01-02 RX ADMIN — HYDROMORPHONE HYDROCHLORIDE 0.5 MG: 1 INJECTION, SOLUTION INTRAMUSCULAR; INTRAVENOUS; SUBCUTANEOUS at 02:48

## 2024-01-02 RX ADMIN — POTASSIUM BICARBONATE 40 MEQ: 782 TABLET, EFFERVESCENT ORAL at 08:29

## 2024-01-02 RX ADMIN — ENOXAPARIN SODIUM 40 MG: 100 INJECTION SUBCUTANEOUS at 08:29

## 2024-01-02 RX ADMIN — HYDROMORPHONE HYDROCHLORIDE 0.5 MG: 1 INJECTION, SOLUTION INTRAMUSCULAR; INTRAVENOUS; SUBCUTANEOUS at 08:29

## 2024-01-02 RX ADMIN — AMLODIPINE BESYLATE 10 MG: 10 TABLET ORAL at 08:29

## 2024-01-02 RX ADMIN — SODIUM CHLORIDE, PRESERVATIVE FREE 10 ML: 5 INJECTION INTRAVENOUS at 20:41

## 2024-01-02 RX ADMIN — SODIUM CHLORIDE, PRESERVATIVE FREE 5 ML: 5 INJECTION INTRAVENOUS at 08:30

## 2024-01-02 RX ADMIN — HYDROMORPHONE HYDROCHLORIDE 0.5 MG: 1 INJECTION, SOLUTION INTRAMUSCULAR; INTRAVENOUS; SUBCUTANEOUS at 22:03

## 2024-01-02 RX ADMIN — TC 99M MEDRONATE 28.5 MILLICURIE: 20 INJECTION, POWDER, LYOPHILIZED, FOR SOLUTION INTRAVENOUS at 07:30

## 2024-01-02 RX ADMIN — HYDROMORPHONE HYDROCHLORIDE 0.5 MG: 1 INJECTION, SOLUTION INTRAMUSCULAR; INTRAVENOUS; SUBCUTANEOUS at 15:05

## 2024-01-02 RX ADMIN — HYDROMORPHONE HYDROCHLORIDE 0.5 MG: 1 INJECTION, SOLUTION INTRAMUSCULAR; INTRAVENOUS; SUBCUTANEOUS at 06:17

## 2024-01-02 RX ADMIN — TAMSULOSIN HYDROCHLORIDE 0.4 MG: 0.4 CAPSULE ORAL at 18:22

## 2024-01-02 RX ADMIN — HYDROMORPHONE HYDROCHLORIDE 0.5 MG: 1 INJECTION, SOLUTION INTRAMUSCULAR; INTRAVENOUS; SUBCUTANEOUS at 11:23

## 2024-01-02 RX ADMIN — CEFTRIAXONE 1000 MG: 1 INJECTION, POWDER, FOR SOLUTION INTRAMUSCULAR; INTRAVENOUS at 08:38

## 2024-01-02 ASSESSMENT — PAIN DESCRIPTION - LOCATION
LOCATION: ABDOMEN
LOCATION: BACK
LOCATION: ABDOMEN
LOCATION: BACK

## 2024-01-02 ASSESSMENT — PAIN DESCRIPTION - ORIENTATION
ORIENTATION: LEFT
ORIENTATION: LEFT
ORIENTATION: LOWER;LEFT
ORIENTATION: LEFT

## 2024-01-02 ASSESSMENT — PAIN DESCRIPTION - ONSET
ONSET: ON-GOING

## 2024-01-02 ASSESSMENT — PAIN DESCRIPTION - DESCRIPTORS
DESCRIPTORS: THROBBING
DESCRIPTORS: SHARP
DESCRIPTORS: THROBBING
DESCRIPTORS: THROBBING;STABBING
DESCRIPTORS: THROBBING
DESCRIPTORS: THROBBING;SHOOTING
DESCRIPTORS: SHARP;THROBBING
DESCRIPTORS: ACHING
DESCRIPTORS: STABBING
DESCRIPTORS: THROBBING

## 2024-01-02 ASSESSMENT — PAIN SCALES - GENERAL
PAINLEVEL_OUTOF10: 7
PAINLEVEL_OUTOF10: 8
PAINLEVEL_OUTOF10: 2
PAINLEVEL_OUTOF10: 6
PAINLEVEL_OUTOF10: 7
PAINLEVEL_OUTOF10: 7
PAINLEVEL_OUTOF10: 4
PAINLEVEL_OUTOF10: 2
PAINLEVEL_OUTOF10: 6
PAINLEVEL_OUTOF10: 8
PAINLEVEL_OUTOF10: 7

## 2024-01-02 ASSESSMENT — PAIN - FUNCTIONAL ASSESSMENT

## 2024-01-02 ASSESSMENT — PAIN DESCRIPTION - PAIN TYPE
TYPE: ACUTE PAIN

## 2024-01-02 ASSESSMENT — PAIN DESCRIPTION - FREQUENCY
FREQUENCY: CONTINUOUS

## 2024-01-02 NOTE — ACP (ADVANCE CARE PLANNING)
Advance Care Planning     Advance Care Planning Inpatient Note  [facility] Department    Today's Date: 1/2/2024  Unit: [facility]    Received request from patient.  Upon review of chart and communication with care team, patient's decision making abilities are not in question.. Patient was/were present in the room during visit.    Goals of ACP Conversation:  Discuss advance care planning documents  Facilitate a discussion related to patient's goals of care as they align with the patient's values and beliefs.    Health Care Decision Makers:       Primary Decision Maker: Chante Vázquez S - Spouse - [phone]  Summary:  Completed New Documents  Verified Healthcare Decision Maker    Advance Care Planning Documents (Patient Wishes):  Healthcare Power of /Advance Directive Appointment of Health Care Agent  Living Will/Advance Directive     Assessment:  We were able to complete the AD, papers scanned to the PT's chart and returned to the PT. PT wanted prayer, he finds means and comfort through his santiago and the support of his family. PT is dealing with cancer.   Interventions:  Provided education on documents for clarity and greater understanding  Discussed and provided education on state decision maker hierarchy  Assisted in the completion of documents according to patient's wishes at this time    Care Preferences Communicated:   No    Outcomes/Plan:  ACP Discussion: Completed  New advance directive completed.  Returned original document(s) to patient, as well as copies for distribution to appointed agents  Copy of advance directive given to staff to scan into medical record.    Electronically signed by Chaplain Donovan on 1/2/2024 at 11:32 AM

## 2024-01-02 NOTE — CARE COORDINATION
Pt lives in a home with his wife.  Pt has a PCP and has insurance to help with healthcare cost.  Pt is independent of his ADLs.  No needs identified at this time.  CM available if needed

## 2024-01-03 ENCOUNTER — APPOINTMENT (OUTPATIENT)
Dept: ULTRASOUND IMAGING | Age: 64
DRG: 720 | End: 2024-01-03
Attending: INTERNAL MEDICINE
Payer: COMMERCIAL

## 2024-01-03 LAB
ALBUMIN SERPL-MCNC: 3.4 GM/DL (ref 3.4–5)
ALP BLD-CCNC: 301 IU/L (ref 40–129)
ALP BONE SERPL-CCNC: 52 U/L (ref 0–55)
ALP LIVER SERPL-CCNC: 275 U/L (ref 0–94)
ALP OTHER SERPL-CCNC: 0 U/L
ALP SERPL-CCNC: 327 U/L (ref 40–120)
ALT SERPL-CCNC: 13 U/L (ref 10–40)
ANION GAP SERPL CALCULATED.3IONS-SCNC: 15 MMOL/L (ref 7–16)
AST SERPL-CCNC: 11 IU/L (ref 15–37)
BASOPHILS ABSOLUTE: 0.1 K/CU MM
BASOPHILS RELATIVE PERCENT: 0.4 % (ref 0–1)
BILIRUB SERPL-MCNC: 0.3 MG/DL (ref 0–1)
BILIRUBIN DIRECT: 0.2 MG/DL (ref 0–0.3)
BILIRUBIN, INDIRECT: 0.1 MG/DL (ref 0–0.7)
BUN SERPL-MCNC: 13 MG/DL (ref 6–23)
CALCIUM SERPL-MCNC: 8.6 MG/DL (ref 8.3–10.6)
CHLORIDE BLD-SCNC: 97 MMOL/L (ref 99–110)
CO2: 24 MMOL/L (ref 21–32)
CREAT SERPL-MCNC: 1.2 MG/DL (ref 0.9–1.3)
DIFFERENTIAL TYPE: ABNORMAL
EOSINOPHILS ABSOLUTE: 0.1 K/CU MM
EOSINOPHILS RELATIVE PERCENT: 0.6 % (ref 0–3)
GFR SERPL CREATININE-BSD FRML MDRD: >60 ML/MIN/1.73M2
GLUCOSE SERPL-MCNC: 109 MG/DL (ref 70–99)
HAV IGM SERPL QL IA: NON REACTIVE
HBV CORE IGM SERPL QL IA: NON REACTIVE
HBV SURFACE AG SERPL QL IA: NON REACTIVE
HCT VFR BLD CALC: 39.1 % (ref 42–52)
HCV AB SERPL QL IA: NON REACTIVE
HEMOGLOBIN: 12.8 GM/DL (ref 13.5–18)
IMMATURE NEUTROPHIL %: 0.4 % (ref 0–0.43)
LYMPHOCYTES ABSOLUTE: 1.5 K/CU MM
LYMPHOCYTES RELATIVE PERCENT: 10.7 % (ref 24–44)
MAGNESIUM: 2.2 MG/DL (ref 1.8–2.4)
MCH RBC QN AUTO: 27.9 PG (ref 27–31)
MCHC RBC AUTO-ENTMCNC: 32.7 % (ref 32–36)
MCV RBC AUTO: 85.2 FL (ref 78–100)
MONOCYTES ABSOLUTE: 0.8 K/CU MM
MONOCYTES RELATIVE PERCENT: 5.8 % (ref 0–4)
NUCLEATED RBC %: 0 %
PDW BLD-RTO: 11.5 % (ref 11.7–14.9)
PHOSPHORUS: 3.6 MG/DL (ref 2.5–4.9)
PLATELET # BLD: 554 K/CU MM (ref 140–440)
PMV BLD AUTO: 8.9 FL (ref 7.5–11.1)
POTASSIUM SERPL-SCNC: 3.5 MMOL/L (ref 3.5–5.1)
RBC # BLD: 4.59 M/CU MM (ref 4.6–6.2)
SEGMENTED NEUTROPHILS ABSOLUTE COUNT: 11.6 K/CU MM
SEGMENTED NEUTROPHILS RELATIVE PERCENT: 82.1 % (ref 36–66)
SODIUM BLD-SCNC: 136 MMOL/L (ref 135–145)
TOTAL IMMATURE NEUTOROPHIL: 0.06 K/CU MM
TOTAL NUCLEATED RBC: 0 K/CU MM
TOTAL PROTEIN: 6.9 GM/DL (ref 6.4–8.2)
WBC # BLD: 14.1 K/CU MM (ref 4–10.5)

## 2024-01-03 PROCEDURE — 6360000002 HC RX W HCPCS: Performed by: INTERNAL MEDICINE

## 2024-01-03 PROCEDURE — 80053 COMPREHEN METABOLIC PANEL: CPT

## 2024-01-03 PROCEDURE — 82248 BILIRUBIN DIRECT: CPT

## 2024-01-03 PROCEDURE — 6370000000 HC RX 637 (ALT 250 FOR IP): Performed by: INTERNAL MEDICINE

## 2024-01-03 PROCEDURE — 83735 ASSAY OF MAGNESIUM: CPT

## 2024-01-03 PROCEDURE — 36415 COLL VENOUS BLD VENIPUNCTURE: CPT

## 2024-01-03 PROCEDURE — 99232 SBSQ HOSP IP/OBS MODERATE 35: CPT | Performed by: INTERNAL MEDICINE

## 2024-01-03 PROCEDURE — 6360000002 HC RX W HCPCS: Performed by: STUDENT IN AN ORGANIZED HEALTH CARE EDUCATION/TRAINING PROGRAM

## 2024-01-03 PROCEDURE — 6370000000 HC RX 637 (ALT 250 FOR IP): Performed by: STUDENT IN AN ORGANIZED HEALTH CARE EDUCATION/TRAINING PROGRAM

## 2024-01-03 PROCEDURE — 94761 N-INVAS EAR/PLS OXIMETRY MLT: CPT

## 2024-01-03 PROCEDURE — 80074 ACUTE HEPATITIS PANEL: CPT

## 2024-01-03 PROCEDURE — 84100 ASSAY OF PHOSPHORUS: CPT

## 2024-01-03 PROCEDURE — 2580000003 HC RX 258: Performed by: INTERNAL MEDICINE

## 2024-01-03 PROCEDURE — 76775 US EXAM ABDO BACK WALL LIM: CPT

## 2024-01-03 PROCEDURE — 85025 COMPLETE CBC W/AUTO DIFF WBC: CPT

## 2024-01-03 PROCEDURE — 1200000000 HC SEMI PRIVATE

## 2024-01-03 PROCEDURE — 84080 ASSAY ALKALINE PHOSPHATASES: CPT

## 2024-01-03 RX ORDER — OXYCODONE HYDROCHLORIDE 5 MG/1
5 TABLET ORAL EVERY 4 HOURS PRN
Status: DISCONTINUED | OUTPATIENT
Start: 2024-01-03 | End: 2024-01-03

## 2024-01-03 RX ORDER — PROMETHAZINE HYDROCHLORIDE 25 MG/ML
6.25 INJECTION, SOLUTION INTRAMUSCULAR; INTRAVENOUS EVERY 6 HOURS PRN
Status: DISCONTINUED | OUTPATIENT
Start: 2024-01-03 | End: 2024-01-04 | Stop reason: HOSPADM

## 2024-01-03 RX ORDER — CARVEDILOL 6.25 MG/1
6.25 TABLET ORAL 2 TIMES DAILY WITH MEALS
Status: DISCONTINUED | OUTPATIENT
Start: 2024-01-03 | End: 2024-01-04 | Stop reason: HOSPADM

## 2024-01-03 RX ORDER — OXYCODONE HYDROCHLORIDE 5 MG/1
5 TABLET ORAL EVERY 4 HOURS PRN
Status: DISCONTINUED | OUTPATIENT
Start: 2024-01-03 | End: 2024-01-04 | Stop reason: HOSPADM

## 2024-01-03 RX ORDER — CYCLOBENZAPRINE HCL 10 MG
10 TABLET ORAL 3 TIMES DAILY PRN
Status: DISCONTINUED | OUTPATIENT
Start: 2024-01-03 | End: 2024-01-04 | Stop reason: HOSPADM

## 2024-01-03 RX ADMIN — CARVEDILOL 6.25 MG: 6.25 TABLET, FILM COATED ORAL at 16:58

## 2024-01-03 RX ADMIN — ENOXAPARIN SODIUM 40 MG: 100 INJECTION SUBCUTANEOUS at 09:53

## 2024-01-03 RX ADMIN — HYDROMORPHONE HYDROCHLORIDE 0.5 MG: 1 INJECTION, SOLUTION INTRAMUSCULAR; INTRAVENOUS; SUBCUTANEOUS at 06:25

## 2024-01-03 RX ADMIN — OXYCODONE HYDROCHLORIDE 5 MG: 5 TABLET ORAL at 11:34

## 2024-01-03 RX ADMIN — SODIUM CHLORIDE, PRESERVATIVE FREE 10 ML: 5 INJECTION INTRAVENOUS at 20:18

## 2024-01-03 RX ADMIN — ONDANSETRON 4 MG: 4 TABLET, ORALLY DISINTEGRATING ORAL at 16:58

## 2024-01-03 RX ADMIN — PROMETHAZINE HYDROCHLORIDE 6.25 MG: 25 INJECTION INTRAMUSCULAR; INTRAVENOUS at 20:18

## 2024-01-03 RX ADMIN — AMLODIPINE BESYLATE 10 MG: 10 TABLET ORAL at 09:54

## 2024-01-03 RX ADMIN — TAMSULOSIN HYDROCHLORIDE 0.4 MG: 0.4 CAPSULE ORAL at 16:58

## 2024-01-03 RX ADMIN — HYDROMORPHONE HYDROCHLORIDE 0.5 MG: 1 INJECTION, SOLUTION INTRAMUSCULAR; INTRAVENOUS; SUBCUTANEOUS at 02:48

## 2024-01-03 RX ADMIN — CYCLOBENZAPRINE 10 MG: 10 TABLET, FILM COATED ORAL at 16:58

## 2024-01-03 RX ADMIN — CYCLOBENZAPRINE 10 MG: 10 TABLET, FILM COATED ORAL at 20:18

## 2024-01-03 RX ADMIN — ACETAMINOPHEN 650 MG: 325 TABLET ORAL at 11:36

## 2024-01-03 RX ADMIN — OXYCODONE HYDROCHLORIDE 5 MG: 5 TABLET ORAL at 20:18

## 2024-01-03 RX ADMIN — SODIUM CHLORIDE, PRESERVATIVE FREE 10 ML: 5 INJECTION INTRAVENOUS at 09:56

## 2024-01-03 RX ADMIN — ONDANSETRON 4 MG: 2 INJECTION INTRAMUSCULAR; INTRAVENOUS at 10:46

## 2024-01-03 RX ADMIN — HYDROMORPHONE HYDROCHLORIDE 0.5 MG: 1 INJECTION, SOLUTION INTRAMUSCULAR; INTRAVENOUS; SUBCUTANEOUS at 00:27

## 2024-01-03 RX ADMIN — CYCLOBENZAPRINE 10 MG: 10 TABLET, FILM COATED ORAL at 09:54

## 2024-01-03 RX ADMIN — CARVEDILOL 6.25 MG: 6.25 TABLET, FILM COATED ORAL at 10:00

## 2024-01-03 RX ADMIN — CEFTRIAXONE 1000 MG: 1 INJECTION, POWDER, FOR SOLUTION INTRAMUSCULAR; INTRAVENOUS at 10:04

## 2024-01-03 ASSESSMENT — PAIN DESCRIPTION - ORIENTATION
ORIENTATION: LEFT
ORIENTATION: RIGHT;LOWER

## 2024-01-03 ASSESSMENT — PAIN DESCRIPTION - ONSET
ONSET: ON-GOING

## 2024-01-03 ASSESSMENT — PAIN SCALES - WONG BAKER: WONGBAKER_NUMERICALRESPONSE: 4

## 2024-01-03 ASSESSMENT — PAIN DESCRIPTION - PAIN TYPE
TYPE: ACUTE PAIN

## 2024-01-03 ASSESSMENT — PAIN DESCRIPTION - FREQUENCY
FREQUENCY: CONTINUOUS

## 2024-01-03 ASSESSMENT — PAIN DESCRIPTION - LOCATION
LOCATION: ABDOMEN

## 2024-01-03 ASSESSMENT — PAIN - FUNCTIONAL ASSESSMENT
PAIN_FUNCTIONAL_ASSESSMENT: ACTIVITIES ARE NOT PREVENTED

## 2024-01-03 ASSESSMENT — PAIN DESCRIPTION - DESCRIPTORS
DESCRIPTORS: THROBBING
DESCRIPTORS: STABBING
DESCRIPTORS: SHARP
DESCRIPTORS: ACHING;THROBBING
DESCRIPTORS: STABBING
DESCRIPTORS: THROBBING

## 2024-01-03 ASSESSMENT — PAIN SCALES - GENERAL
PAINLEVEL_OUTOF10: 8
PAINLEVEL_OUTOF10: 5
PAINLEVEL_OUTOF10: 0
PAINLEVEL_OUTOF10: 8
PAINLEVEL_OUTOF10: 6
PAINLEVEL_OUTOF10: 0
PAINLEVEL_OUTOF10: 2
PAINLEVEL_OUTOF10: 2
PAINLEVEL_OUTOF10: 6
PAINLEVEL_OUTOF10: 2
PAINLEVEL_OUTOF10: 8

## 2024-01-03 NOTE — CONSULTS
[address] [address]   Consult Note  [facility] 1 2 3 4 5    Date: 2024   Patient: Jethro Vázquez   : 1960   DOA: 2023   MRN: [id number]   ROOM#: 3018/3018-A      Reason for Consult: Bladder mass, hydronephrosis, flank pain   Requesting Physician: Dr. Parish  Collaborating Urologist on Call at time of admission: Dr. Ivan  CHIEF COMPLAINT: Left flank pain     History Obtained From: patient, electronic medical record    HISTORY OF PRESENT ILLNESS:                The patient is a 63 y.o. male with significant past medical history of kidney stones, HTN, bladder cancer, abd bilateral hydronephrosis who presented with left flank pain, abdominal pain, and dry heaving for several days. Work-up revealed a compliacted UTI with hydronephrosis bilaterally and possible pyelonephritis for which he wad admitted. He is voiding without difficulty and feeling improved today with complaints of mild LLQ abdominal pain and left flank pain with palpation. He does also report nausea and dry heaves when ambulating. I discussed with him possibility of needing left side vs bilateral percutaneous nephrostomy tube placement should his infection or renal function worsen. He verbalizes understanding.     ED Provider's HPI 23: Jethro Vázquez is a 63 y.o. male that presents to the emergency department by squad with complaints of left flank pain radiating into his left lower quadrant.  The patient has had ongoing pain for the past couple weeks.  He had surgery about 2 weeks ago with cystoscopy with biopsies taken for bladder cancer.  The patient has been to the ER since then on the  with a similar problem as to what he is having today.  He states his condition has not improved much and the Percocet is no longer helping his pain and and it is making him sick when he takes it in spite of using Zofran.  The patient states he took a half of a Percocet around 2:30 AM because of his pain and he 
BMI 30.13 kg/m²   CONSTITUTIONAL: awake, alert, cooperative, no apparent distress   EYES: pupils equal, round and reactive to light, sclera clear and mild pallor  ENT: Normocephalic, without obvious abnormality, atraumatic  NECK: supple, symmetrical, no jugular venous distension and no carotid bruits   HEMATOLOGIC/LYMPHATIC: no cervical, supraclavicular or axillary lymphadenopathy   LUNGS: clear to auscultation bilaterally.  CARDIOVASCULAR: regular rate and rhythm, normal S1 and S2, no murmur noted  ABDOMEN: normal bowel sounds x 4, soft, non-distended, left flank tenderness.  MUSCULOSKELETAL: full range of motion noted, tone is normal  NEUROLOGIC:  Motor skills grossly intact. CN II - XII grossly intact.  SKIN: Normal skin color, texture, turgor and no jaundice. Skin appears intact   EXTREMITIES: no LE edema, no cyanosis, no leg swelling, no clubbing    LABORATORY RESULTS  CBC:   Recent Labs     12/31/23  0820 01/01/24  0559   WBC 17.3* 14.5*   HGB 13.3* 12.6*   * 512*     BMP:    Recent Labs     12/31/23  0820 12/31/23  1607 01/01/24  0559    137 137   K 3.3* 3.7 3.5   CL 97* 98* 99   CO2 22 27 24   BUN 12 11 12   CREATININE 1.2 1.2 1.3   GLUCOSE 127* 113* 104*     Hepatic:   Recent Labs     12/31/23  0820 01/01/24  0559   AST 13* 8*   ALT 22 15   BILITOT 0.5 0.4   ALKPHOS 336* 294*     ASSESSMENT/RECOMMENDATION  1. 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  I discussed with him about his diagnosis. He was shocked with the diagnosis and eventually accepting the diagnosis.  I order CT chest, bone scan to r/o metastatic disease.  We discuss about neoadjuvant chemo, gemzar + cisplatin.    2. I order anemic w/u. He likely has reactive leukocytosis and thrombocytosis.    Will try to meet family in AM.    We will follow the patient. Thank you for allowing me to participate in the care of this very pleasant patient.

## 2024-01-04 VITALS
RESPIRATION RATE: 16 BRPM | OXYGEN SATURATION: 98 % | BODY MASS INDEX: 30.08 KG/M2 | HEIGHT: 70 IN | HEART RATE: 99 BPM | DIASTOLIC BLOOD PRESSURE: 98 MMHG | TEMPERATURE: 97.9 F | WEIGHT: 210.1 LBS | SYSTOLIC BLOOD PRESSURE: 158 MMHG

## 2024-01-04 DIAGNOSIS — C67.8 MALIGNANT NEOPLASM OF OVERLAPPING SITES OF BLADDER (HCC): Primary | ICD-10-CM

## 2024-01-04 LAB
EKG ATRIAL RATE: 112 BPM
EKG DIAGNOSIS: NORMAL
EKG P AXIS: 57 DEGREES
EKG P-R INTERVAL: 140 MS
EKG Q-T INTERVAL: 366 MS
EKG QRS DURATION: 86 MS
EKG QTC CALCULATION (BAZETT): 499 MS
EKG R AXIS: -39 DEGREES
EKG T AXIS: 52 DEGREES
EKG VENTRICULAR RATE: 112 BPM

## 2024-01-04 PROCEDURE — 6370000000 HC RX 637 (ALT 250 FOR IP): Performed by: STUDENT IN AN ORGANIZED HEALTH CARE EDUCATION/TRAINING PROGRAM

## 2024-01-04 PROCEDURE — 36415 COLL VENOUS BLD VENIPUNCTURE: CPT

## 2024-01-04 PROCEDURE — 94761 N-INVAS EAR/PLS OXIMETRY MLT: CPT

## 2024-01-04 PROCEDURE — 84100 ASSAY OF PHOSPHORUS: CPT

## 2024-01-04 PROCEDURE — 2580000003 HC RX 258: Performed by: INTERNAL MEDICINE

## 2024-01-04 PROCEDURE — 80048 BASIC METABOLIC PNL TOTAL CA: CPT

## 2024-01-04 PROCEDURE — 6370000000 HC RX 637 (ALT 250 FOR IP): Performed by: INTERNAL MEDICINE

## 2024-01-04 PROCEDURE — 6360000002 HC RX W HCPCS: Performed by: INTERNAL MEDICINE

## 2024-01-04 PROCEDURE — 83735 ASSAY OF MAGNESIUM: CPT

## 2024-01-04 PROCEDURE — 99231 SBSQ HOSP IP/OBS SF/LOW 25: CPT | Performed by: PHYSICIAN ASSISTANT

## 2024-01-04 RX ORDER — CIPROFLOXACIN 500 MG/1
500 TABLET, FILM COATED ORAL 2 TIMES DAILY
Qty: 10 TABLET | Refills: 0 | Status: SHIPPED | OUTPATIENT
Start: 2024-01-04 | End: 2024-01-09

## 2024-01-04 RX ORDER — CYCLOBENZAPRINE HCL 10 MG
10 TABLET ORAL 3 TIMES DAILY PRN
Qty: 30 TABLET | Refills: 0 | Status: SHIPPED | OUTPATIENT
Start: 2024-01-04 | End: 2024-01-14

## 2024-01-04 RX ORDER — CARVEDILOL 6.25 MG/1
6.25 TABLET ORAL 2 TIMES DAILY WITH MEALS
Qty: 60 TABLET | Refills: 3 | Status: SHIPPED | OUTPATIENT
Start: 2024-01-04

## 2024-01-04 RX ORDER — OXYCODONE HYDROCHLORIDE 5 MG/1
5 TABLET ORAL EVERY 4 HOURS PRN
Qty: 18 TABLET | Refills: 0 | Status: SHIPPED | OUTPATIENT
Start: 2024-01-04 | End: 2024-01-07

## 2024-01-04 RX ADMIN — ACETAMINOPHEN 650 MG: 325 TABLET ORAL at 00:15

## 2024-01-04 RX ADMIN — SODIUM CHLORIDE, PRESERVATIVE FREE 10 ML: 5 INJECTION INTRAVENOUS at 08:53

## 2024-01-04 RX ADMIN — ENOXAPARIN SODIUM 40 MG: 100 INJECTION SUBCUTANEOUS at 08:52

## 2024-01-04 RX ADMIN — AMLODIPINE BESYLATE 10 MG: 10 TABLET ORAL at 08:52

## 2024-01-04 RX ADMIN — ONDANSETRON 4 MG: 2 INJECTION INTRAMUSCULAR; INTRAVENOUS at 00:13

## 2024-01-04 RX ADMIN — ACETAMINOPHEN 650 MG: 325 TABLET ORAL at 09:32

## 2024-01-04 RX ADMIN — CEFTRIAXONE 1000 MG: 1 INJECTION, POWDER, FOR SOLUTION INTRAMUSCULAR; INTRAVENOUS at 08:57

## 2024-01-04 RX ADMIN — CARVEDILOL 6.25 MG: 6.25 TABLET, FILM COATED ORAL at 08:52

## 2024-01-04 ASSESSMENT — PAIN SCALES - GENERAL
PAINLEVEL_OUTOF10: 4
PAINLEVEL_OUTOF10: 5
PAINLEVEL_OUTOF10: 8
PAINLEVEL_OUTOF10: 0
PAINLEVEL_OUTOF10: 0

## 2024-01-04 ASSESSMENT — PAIN DESCRIPTION - DESCRIPTORS
DESCRIPTORS: ACHING;STABBING
DESCRIPTORS: ACHING

## 2024-01-04 ASSESSMENT — PAIN DESCRIPTION - ORIENTATION
ORIENTATION: LOWER;LEFT
ORIENTATION: LEFT

## 2024-01-04 ASSESSMENT — PAIN - FUNCTIONAL ASSESSMENT: PAIN_FUNCTIONAL_ASSESSMENT: ACTIVITIES ARE NOT PREVENTED

## 2024-01-04 ASSESSMENT — PAIN DESCRIPTION - LOCATION
LOCATION: BACK
LOCATION: ABDOMEN

## 2024-01-04 ASSESSMENT — PAIN SCALES - WONG BAKER: WONGBAKER_NUMERICALRESPONSE: 2

## 2024-01-04 NOTE — PROGRESS NOTES
V2.0  [facility] [place]      Name:  Jethro Vázquez /Age/Sex: 1960  (63 y.o. male)   MRN & CSN:  [id number] & [id number] Encounter Date/Time: 2024 12:34 PM EST    Location:  [address] [place] [address]-A PCP: Mono Schaffer MD       Hospital Day: 2    Assessment and Plan:   Jethro Vázquez is a 63 y.o. male with pmh of essential hypertension, BPH, recently diagnosed bladder cancer who presents with Pyelonephritis      Plan:    Pyelonephritis  Hydronephrosis 2/2 bladder mass  Invasive high-grade urothelial carcinoma, recently diagnosed  -Left flank pain; no fever, chills, rigors  -Leukocytosis, urinalysis shows significant pyuria  -Continue on ceftriaxone started in the ED  -Pain control with Dilaudid  -Urology and oncology on board, appreciate recs.  -Urine culture pending.     Essential hypertension  -Continue home amlodipine     BPH  -Continue tamsulosin    Diet Diet NPO Exceptions are: Ice Chips, Popsicles, Sips of Water with Meds   DVT Prophylaxis [] Lovenox, []  Heparin, [] SCDs, [] Ambulation,  [] Eliquis, [] Xarelto  [] Coumadin   Code Status Full Code   Disposition From:   Expected Disposition:   Estimated Date of Discharge:   Patient requires continued admission due to pending urine culture   Surrogate Decision Maker/ POA      Personally reviewed Lab Studies and Imaging     Reviewed CT abdomen which showed bladder mass    Subjective:     Chief Complaint: No chief complaint on file.       Jethro Vázquez is a 63 y.o. male who presents with left flank pain     Seen and examined in the morning.  Patient was crying and she just talk to oncology.  He says he was unaware his bladder mass was cancer before.  He continues to complain of left flank pain.  Left CVA tender to touch.  No fevers or chills.    Review of Systems:    Review of Systems    Negative if not mentioned above    Objective:     Intake/Output Summary (Last 24 hours) at 2024 1234  Last data filed at 2024 0600  Gross per 
   01/02/24 1130   Encounter Summary   Encounter Overview/Reason  Initial Encounter;Advance Care Planning   Service Provided For: Patient and family together   Referral/Consult From: [facility]   Support System Spouse;Family members   Last Encounter  01/02/24  (We were able to complete the AD, papers scanned to the PT's chart and returned to the PT. PT wanted prayer, he finds meaning and comfort through his santiago and the support of his family. PT is dealing with cancer.)   Complexity of Encounter High   Begin Time 1031   End Time  1131   Total Time Calculated 60 min   Spiritual/Emotional needs   Type Spiritual Support;Emotional Distress;Difficult news received   Grief, Loss, and Adjustments   Type Adjustment to illness   Advance Care Planning   Type ACP conversation;Completed AD/ACP document(s);Care Preferences Addressed   Assessment/Intervention/Outcome   Assessment Anxious;Concerns with suffering;Coping;Despair;Hopeful;Stress overload   Intervention Active listening;Discussed illness injury and it’s impact;Discussed belief system/[employer] practices/santiago;Discussed death, afterlife;Discussed meaning/purpose;Discussed relationship with God;Explored Coping Skills/Resources;Nurtured Hope;Prayer (assurance of)/Bell;Sustaining Presence/Ministry of presence   Outcome Acceptance;Comfort;Coping;Encouraged;Engaged in conversation;Expressed feelings, needs, and concerns;Expressed Gratitude;Less anxious, Less agitated;New perspective/awareness;Optimistic;Restored Hope;Venting emotion   Plan and Referrals   Plan/Referrals Developed Care Plan (see consult note);Continue Support (comment)       
  Physician Progress Note      PATIENT:               BRISSA BULLOCK  CSN #:                  [id number]  :                       1960  ADMIT DATE:       2023 1:48 PM  DISCH DATE:  RESPONDING  PROVIDER #:        Bobby Grey MD          QUERY TEXT:    Internal Medicine,    Pt admitted with pyonephrosis and noted elevated WBC and heart rate. If   possible, please document in the progress notes and discharge summary if you   are evaluating and /or treating any of the following:    The medical record reflects the following:  Risk Factors: UTI, malignancy, s/p TURP  Clinical Indicators: SIRS criteria-WBC up to 17, HR over 112, CRP 52, sed rate   85, unverified BP 73/36,  Treatment: labs, imaging, IVF, atb, Urology consult, supportive care    Thank you,  Lexus Davis RN CDS  [phone]  Options provided:  -- Sepsis, present on admission  -- Sepsis, present on admission, now resolved  -- Sepsis, developed following admission  -- Sepsis was ruled out  -- Other - I will add my own diagnosis  -- Disagree - Not applicable / Not valid  -- Disagree - Clinically unable to determine / Unknown  -- Refer to Clinical Documentation Reviewer    PROVIDER RESPONSE TEXT:    This patient has sepsis which was present on admission.    Query created by: Lexus Davis on 2024 4:09 PM      QUERY TEXT:    Internal Medicine,    Pt admitted with  pyonephrosis and noted to have underwent recent TUR for   bladder CA.   If possible, please document in the progress notes and discharge   summary if you are evaluating and/or treating any of the following:    The medical record reflects the following:  Risk Factors: bladder CA  Clinical Indicators:  recent TUR for bladder CA  Treatment: labs, imaging, Urology consult, IVF, IV atb    Thank you,  Lexus Davis RN CDS  [phone]  Options provided:  -- UTI due to previous urinary catheter  from recent TUR of bladder malignancy   on 23  -- UTI is related to TUR procedure on 
Discharge education completed with pt, pt demonstrated appropriate teach back, IV's removed, pt has prescription called in to pharmacy and prescription for his narcotic in his hand, medication's and side effect education completed, pt has discharge paperwork, pt denies any needs or questions at this time.    
Hematology Oncology Progress Note    Patient Name:  Jethro Vázquez  Patient :  1960  Patient MRN:  [id number]     Referring Provider: Mono Schaffer MD     Date of Service: 2023      Reason for Consult: bladder cancer.     Chief Complaint:  No chief complaint on file.    Patient Active Problem List:     Non-recurrent bilateral inguinal hernia without obstruction or gangrene     Right inguinal hernia     Lipoma, spermatic cord     Benign essential microscopic hematuria     Chronic left-sided low back pain without sciatica     Overweight     Acute cystitis with hematuria     Primary hypertension     Pyelonephritis    HPI:   Jethro Vázquez is a 63 y.o. male who presented to ED on 2023 due to left flank pain.  In 2023 he went to PCP with c/o frequent urination at night time.  He was referred to urologist.  2023 Urinary bladder; TUR:   -     INVASIVE HIGH GRADE UROTHELIAL CARCINOMA.    -     Muscularis propria: Present.   -     Microscopic tumor extension: Tumor invades into muscularis propria.   -     Lymphovascular invasion: Indeterminate.     Nobody has told him about diagnosis. He was in state of shock when I told him about his diagnosis.    2023 CT AP:  1. Left hydronephrosis and hydroureter without evidence of ureteric stones.  2. Mild right hydronephrosis and hydroureter.  3. Following administration of IV contrast the right renal function is normal and timely and the left side demonstrates typical findings of urinary obstruction.  4. Asymmetric thickening of the urinary bladder with a wall that measures 2.5 cm on the lateral aspect on the left as well as the posterior wall.  The anterior and right lateral walls appear normal.  It is felt that the obstruction is likely related to a bladder tumor.  There is an incidental tiny calculus seen measuring 1.8 mm at the left UV junction.  5. Left high iliac lymphadenopathy with nodes measuring up to 1.2 cm.  6. Cholelithiasis 
PAtient not in room and in ct scan  PAth noted from recent TURBT  Agree with staging his disease fully  Cr normal  If pain worsens or does not recede then PCN  UO's not visible during last cysto  
Pt again not in room during rounds. CT reviewed with Dr. Ivan. UO's not visible during last cystoscopy.   Recommend holding off on PCN placement at this time, continue IV abx. He is a poor surgical candidate. Recommend pursuing chemotherapy. Oncology following. Bone scan pending.  
Pt signed bed alarm refusal  
Cholelithiasis but no acute cholecystitis.  7. Mild fat containing periumbilical hernia.  8. Consolidative lung infiltrate in the right lower lobe medially consistent with neumonia.  This does not have the appearance of metastatic disease.     RECOMMENDATIONS:  Urology consult, cystoscopy with biopsy.    12/24/2023 CT AP:   1. Persistent asymmetrical thickening of the bladder which is most prominent towards the left and posteriorly concerning for malignancy.  Soft tissue attenuation extends lateral to the wall of the bladder on the left which could represent direct extension of tumor.  Fat stranding adjacent to the bladder appears increased and could reflect cystitis.  New retroperitoneal and pelvic lymphadenopathy is likely metastatic.  2. Bilateral hydronephrosis and hydroureter which is moderate on the left and  mild on the right with no visible stone.  This is likely related to bladder wall thickening.  3. No acute findings elsewhere in the abdomen or pelvis.  4. Colonic diverticulosis.  5. Stable 5 cm simple right renal cyst.  No follow-up imaging is recommended.    12/31/2023 CT AP: The known bladder mass is causing obstruction of the kidneys, more severe on the left with moderate hydroureteronephrosis and delayed contrast  opacification.   Areas of hypoattenuation within the right renal cortex, consider pyelonephritis.   Pelvic and retroperitoneal lymphadenopathy, most consistent with metastatic disease.   Cholelithiasis.    12/31/2023 CXR: no acute process.    11/27/2023 WBC 7.6, Hg 14.1, plat 305.  12/16/2023 WBC 13.6, Hg 13.2, plat 325.   12/31/2023 Sed rate 85.  1/1/2023 albumin 3.3, alp 294.   WBC 14.5, Hg 12.6, MCV 86.3, plat 512.    I order isoenzyme ALP, bone scan and CT chest for staging. Anemic w/u ordered.  Acute hep panel ordered.  If there is no distant metastatic disease, he will offered neoadjuvant chemo followed by bladder surgery.  Awaiting for urologist to evaluate him..    He has 4 
  Magnesium    Collection Time: 01/02/24 12:49 AM   Result Value Ref Range    Magnesium 2.1 1.8 - 2.4 mg/dl   Phosphorus    Collection Time: 01/02/24 12:49 AM   Result Value Ref Range    Phosphorus 4.1 2.5 - 4.9 MG/DL   Hepatic Function Panel    Collection Time: 01/02/24 12:49 AM   Result Value Ref Range    Albumin 3.4 3.4 - 5.0 GM/DL    Total Bilirubin 0.3 0.0 - 1.0 MG/DL    Bilirubin, Direct 0.2 0.0 - 0.3 MG/DL    Bilirubin, Indirect 0.1 0 - 0.7 MG/DL    Alkaline Phosphatase 296 (H) 40 - 129 IU/L    AST 9 (L) 15 - 37 IU/L    ALT 13 10 - 40 U/L    Total Protein 6.5 6.4 - 8.2 GM/DL   CBC with Auto Differential    Collection Time: 01/02/24 12:49 AM   Result Value Ref Range    WBC 14.2 (H) 4.0 - 10.5 K/CU MM    RBC 4.33 (L) 4.6 - 6.2 M/CU MM    Hemoglobin 12.1 (L) 13.5 - 18.0 GM/DL    Hematocrit 37.4 (L) 42 - 52 %    MCV 86.4 78 - 100 FL    MCH 27.9 27 - 31 PG    MCHC 32.4 32.0 - 36.0 %    RDW 11.6 (L) 11.7 - 14.9 %    Platelets 531 (H) 140 - 440 K/CU MM    MPV 9.0 7.5 - 11.1 FL    Differential Type AUTOMATED DIFFERENTIAL     Segs Relative 81.3 (H) 36 - 66 %    Lymphocytes % 11.2 (L) 24 - 44 %    Monocytes % 5.8 (H) 0 - 4 %    Eosinophils % 0.8 0 - 3 %    Basophils % 0.4 0 - 1 %    Segs Absolute 11.6 K/CU MM    Lymphocytes Absolute 1.6 K/CU MM    Monocytes Absolute 0.8 K/CU MM    Eosinophils Absolute 0.1 K/CU MM    Basophils Absolute 0.1 K/CU MM    Nucleated RBC % 0.0 %    Total Nucleated RBC 0.0 K/CU MM    Total Immature Neutrophil 0.07 K/CU MM    Immature Neutrophil % 0.5 (H) 0 - 0.43 %        Imaging/Diagnostics Last 24 Hours   CT ABDOMEN PELVIS W IV CONTRAST    Result Date: 12/31/2023  EXAMINATION: CT OF THE ABDOMEN AND PELVIS WITH CONTRAST 12/31/2023 9:05 am TECHNIQUE: CT of the abdomen and pelvis was performed with the administration of intravenous contrast. Multiplanar reformatted images are provided for review. Automated exposure control, iterative reconstruction, and/or weight based adjustment of the mA/kV 
01/03/24  0455   WBC 14.5* 14.2* 14.1*   HGB 12.6* 12.1* 12.8*   * 531* 554*     BMP:    Recent Labs     01/01/24  0559 01/02/24  0049 01/03/24  0455    135 136   K 3.5 3.3* 3.5   CL 99 98* 97*   CO2 24 22 24   BUN 12 13 13   CREATININE 1.3 1.2 1.2   GLUCOSE 104* 97 109*     Hepatic:   Recent Labs     01/01/24  0559 01/02/24  0049 01/03/24  0455   AST 8* 9* 11*   ALT 15 13 13   BILITOT 0.4 0.3 0.3   ALKPHOS 294* 296* 301*     NM BONE SCAN WHOLE BODY   Final Result   Multifocal degenerative change, without a generalized scintigraphic pattern   of osseous metastatic disease.      Bilateral hydronephrosis.         CT CHEST WO CONTRAST   Final Result   1.  Paraesophageal lymphadenopathy concerning for metastatic disease.      2.  Otherwise, no findings to suggest metastatic disease in the chest.      3.  Redemonstrated partially visualized extensive retroperitoneal   lymphadenopathy and left-sided hydronephrosis.           ASSESSMENT/RECOMMENDATION  1. 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  We discussed diagnosis which was not known to him prior to this admission.  He was shocked with the diagnosis and eventually accepting the diagnosis.  Note extensive retroperitoneal and pelvic LAD concerning for metatatic disease on CT.  CT chest with paraesophageal LAD concerning for metastatic disease.  Bone scan no mets.  We discussed neoadjuvant chemo, gemzar + cisplatin, may also need to discuss chemoradiation as urology feels he is a poor surgical candidate.     2. I ordered anemic w/u. He likely has reactive leukocytosis and thrombocytosis. Workup consistent with ACD/AI    No family at bedside this morning.    Please schedule FU at cancer ctr as outpatient.    Noemi Gambino MD    Imaging and labs reviewed and plan of care discussed with patient in depth.  We will follow along.  Please call with any questions.  
enlarged measuring 1.3 cm.  Right iliac lymphadenopathy measures up to 1.4 cm. Peritoneum/Retroperitoneum: Extensive retroperitoneal lymphadenopathy is present.  This measures up to 1.4 cm in short axis.  A retroaortic left renal vein is incidentally noted. There is no evidence of free intraperitoneal air. Bones/Soft Tissues: No acute abnormality.     The known bladder mass is causing obstruction of the kidneys, more severe on the left with moderate hydroureteronephrosis and delayed contrast opacification. Areas of hypoattenuation within the right renal cortex, consider pyelonephritis. Pelvic and retroperitoneal lymphadenopathy, most consistent with metastatic disease. Cholelithiasis.     XR CHEST PORTABLE    Result Date: 12/31/2023  EXAMINATION: ONE XRAY VIEW OF THE CHEST 12/31/2023 9:24 am COMPARISON: None. HISTORY: ORDERING SYSTEM PROVIDED HISTORY: chest pain TECHNOLOGIST PROVIDED HISTORY: Reason for exam:->chest pain FINDINGS: Cardiac silhouette is within normal limits. Pulmonary vasculature is within normal limits. The lungs are clear. No pneumothorax is identified. Bony structures are unremarkable.Calcified granuloma in the left mid lung is noted.     No acute cardiopulmonary process.       Electronically signed by Bobby Grey MD on 1/3/2024 at 12:50 PM   
is likely related to bladder wall thickening. 3. No acute findings elsewhere in the abdomen or pelvis. 4. Colonic diverticulosis. 5. Stable 5 cm simple right renal cyst.  No follow-up imaging is recommended.     US RETROPERITONEAL LIMITED    Result Date: 12/15/2023  EXAMINATION: ULTRASOUND OF THE KIDNEYS 12/15/2023 9:40 am COMPARISON: 11/22/2023 HISTORY: ORDERING SYSTEM PROVIDED HISTORY: SHIRLEY, possible hydro TECHNOLOGIST PROVIDED HISTORY: Reason for exam:->SHIRLEY, possible hydro FINDINGS: The right kidney measures 12.1 x 6.9 x 7.9 cm in length and the left kidney measures 12.7 x 7 x 7.9 cm in length.  The bilateral renal cortices measure 2 cm.  Simple cyst in the right kidney measuring up to 4.9 cm that requires no further follow-up imaging. Kidneys demonstrate normal cortical echogenicity.  Mild bilateral hydronephrosis.  No intrarenal stones.  No focal lesions.     Mild bilateral hydronephrosis.       Assessment & Plan:      Jethro Vázquez is a 63 y.o. male with pmhx of kidney stones, HTN, bladder cancer, abd bilateral hydronephrosis admitted 12/31/2023 for pyelonephritis:      1) Bladder Cancer w Bilateral Hydronephrosis: S/p cystoscopy and transurethral resection of large bladder tumor 12/14/23. Ureteral orifices not visible. Pathology with invasive high grade urothelial carcinoma.              CT a/p 12/31/23: The known bladder mass is causing obstruction of the kidneys, more severe on the left with moderate hydroureteronephrosis and delayed contrast opacification. Pelvic and retroperitoneal lymphadenopathy, most consistent with metastatic disease.   JANA 1/3/24: Moderate left and mild right hydronephrosis, unchanged from recent CT.              CT chest 1/1/23: Paraesophageal lymphadenopathy concerning for metastatic disease. Otherwise, no findings to suggest metastatic disease in the chest.              Cr 1.2              Oncology following; bone scan without metastatic disease. Plans for PET and chemo as

## 2024-01-04 NOTE — PLAN OF CARE
Problem: Discharge Planning  Goal: Discharge to home or other facility with appropriate resources  1/2/2024 0016 by Vikram Ordoñez, RN  Outcome: Progressing  1/1/2024 1433 by Heena Cesar, RN  Outcome: Progressing     Problem: Pain  Goal: Verbalizes/displays adequate comfort level or baseline comfort level  1/2/2024 0016 by Vikram Ordoñez, RN  Outcome: Progressing  1/1/2024 1433 by Heena Cesar, RN  Outcome: Progressing     
  Problem: Discharge Planning  Goal: Discharge to home or other facility with appropriate resources  1/3/2024 0546 by Vikram Ordoñez RN  Outcome: Progressing  1/2/2024 1723 by Kassie Castillo RN  Outcome: Progressing     Problem: Pain  Goal: Verbalizes/displays adequate comfort level or baseline comfort level  1/3/2024 0546 by Vikram Ordoñez RN  Outcome: Progressing  1/2/2024 1723 by Kassie Castillo RN  Outcome: Progressing     Problem: Safety - Adult  Goal: Free from fall injury  1/3/2024 0546 by Vikram Ordoñez RN  Outcome: Progressing  1/2/2024 1723 by Kassie Castillo RN  Outcome: Progressing     
  Problem: Discharge Planning  Goal: Discharge to home or other facility with appropriate resources  1/4/2024 1016 by Luis Collazo RN  Outcome: Completed  1/3/2024 2310 by Nicole Fisher RN  Outcome: Progressing     Problem: Pain  Goal: Verbalizes/displays adequate comfort level or baseline comfort level  1/4/2024 1016 by Luis Collazo RN  Outcome: Completed  1/3/2024 2310 by Nicole Fisher RN  Outcome: Progressing     Problem: Safety - Adult  Goal: Free from fall injury  1/4/2024 1016 by Luis Collazo RN  Outcome: Completed  1/3/2024 2310 by Nicole Fisher RN  Outcome: Progressing     
  Problem: Discharge Planning  Goal: Discharge to home or other facility with appropriate resources  12/31/2023 1420 by Marcel Thorpe RN  Outcome: Progressing  12/31/2023 1419 by Marcel Thorpe RN  Outcome: Progressing     Problem: Pain  Goal: Verbalizes/displays adequate comfort level or baseline comfort level  12/31/2023 1420 by Marcel Thorpe RN  Outcome: Progressing  12/31/2023 1419 by Marcel Thorpe RN  Outcome: Progressing  Flowsheets (Taken 12/31/2023 1404)  Verbalizes/displays adequate comfort level or baseline comfort level:   Assess pain using appropriate pain scale   Encourage patient to monitor pain and request assistance     
  Problem: Discharge Planning  Goal: Discharge to home or other facility with appropriate resources  12/31/2023 1835 by Marcel Thorpe RN  Outcome: Progressing  12/31/2023 1420 by Marcel Thorpe RN  Outcome: Progressing  12/31/2023 1419 by Marcel Thorpe RN  Outcome: Progressing     Problem: Pain  Goal: Verbalizes/displays adequate comfort level or baseline comfort level  12/31/2023 1835 by Marcel Thorpe RN  Outcome: Progressing  12/31/2023 1420 by Marecl Thorpe RN  Outcome: Progressing  12/31/2023 1419 by Marcel Thorpe RN  Outcome: Progressing  Flowsheets (Taken 12/31/2023 1404)  Verbalizes/displays adequate comfort level or baseline comfort level:   Assess pain using appropriate pain scale   Encourage patient to monitor pain and request assistance     
  Problem: Discharge Planning  Goal: Discharge to home or other facility with appropriate resources  Outcome: Progressing     Problem: Pain  Goal: Verbalizes/displays adequate comfort level or baseline comfort level  Outcome: Progressing     
  Problem: Discharge Planning  Goal: Discharge to home or other facility with appropriate resources  Outcome: Progressing     Problem: Pain  Goal: Verbalizes/displays adequate comfort level or baseline comfort level  Outcome: Progressing     Problem: Safety - Adult  Goal: Free from fall injury  Outcome: Progressing     
  Problem: Discharge Planning  Goal: Discharge to home or other facility with appropriate resources  Outcome: Progressing     Problem: Pain  Goal: Verbalizes/displays adequate comfort level or baseline comfort level  Outcome: Progressing  Flowsheets (Taken 12/31/2023 4284)  Verbalizes/displays adequate comfort level or baseline comfort level:   Assess pain using appropriate pain scale   Encourage patient to monitor pain and request assistance     
[employer]

## 2024-01-05 LAB
ANION GAP SERPL CALCULATED.3IONS-SCNC: 14 MMOL/L (ref 7–16)
BUN SERPL-MCNC: 18 MG/DL (ref 6–23)
CALCIUM SERPL-MCNC: 8.6 MG/DL (ref 8.3–10.6)
CHLORIDE BLD-SCNC: 98 MMOL/L (ref 99–110)
CO2: 25 MMOL/L (ref 21–32)
CREAT SERPL-MCNC: 1.4 MG/DL (ref 0.9–1.3)
CULTURE: NORMAL
CULTURE: NORMAL
GFR SERPL CREATININE-BSD FRML MDRD: 57 ML/MIN/1.73M2
GLUCOSE SERPL-MCNC: 116 MG/DL (ref 70–99)
Lab: NORMAL
Lab: NORMAL
MAGNESIUM: 2.3 MG/DL (ref 1.8–2.4)
PHOSPHORUS: 4.6 MG/DL (ref 2.5–4.9)
POTASSIUM SERPL-SCNC: 3.4 MMOL/L (ref 3.5–5.1)
SODIUM BLD-SCNC: 137 MMOL/L (ref 135–145)
SPECIMEN: NORMAL
SPECIMEN: NORMAL

## 2024-01-06 ENCOUNTER — HOSPITAL ENCOUNTER (EMERGENCY)
Age: 64
Discharge: HOME | End: 2024-01-07
Payer: COMMERCIAL

## 2024-01-06 DIAGNOSIS — N32.89 BLADDER MASS: ICD-10-CM

## 2024-01-06 DIAGNOSIS — R10.9 FLANK PAIN: Primary | ICD-10-CM

## 2024-01-06 PROCEDURE — 99284 EMERGENCY DEPT VISIT MOD MDM: CPT

## 2024-01-06 PROCEDURE — 6360000002 HC RX W HCPCS: Performed by: PHYSICIAN ASSISTANT

## 2024-01-06 PROCEDURE — 96361 HYDRATE IV INFUSION ADD-ON: CPT

## 2024-01-06 PROCEDURE — 2580000003 HC RX 258: Performed by: PHYSICIAN ASSISTANT

## 2024-01-06 PROCEDURE — 85025 COMPLETE CBC W/AUTO DIFF WBC: CPT

## 2024-01-06 PROCEDURE — 96374 THER/PROPH/DIAG INJ IV PUSH: CPT

## 2024-01-06 PROCEDURE — 93005 ELECTROCARDIOGRAM TRACING: CPT | Performed by: PHYSICIAN ASSISTANT

## 2024-01-06 PROCEDURE — 80053 COMPREHEN METABOLIC PANEL: CPT

## 2024-01-06 PROCEDURE — 83605 ASSAY OF LACTIC ACID: CPT

## 2024-01-06 RX ORDER — ONDANSETRON 2 MG/ML
4 INJECTION INTRAMUSCULAR; INTRAVENOUS EVERY 6 HOURS PRN
Status: DISCONTINUED | OUTPATIENT
Start: 2024-01-06 | End: 2024-01-07 | Stop reason: HOSPADM

## 2024-01-06 RX ORDER — 0.9 % SODIUM CHLORIDE 0.9 %
1000 INTRAVENOUS SOLUTION INTRAVENOUS ONCE
Status: COMPLETED | OUTPATIENT
Start: 2024-01-06 | End: 2024-01-07

## 2024-01-06 RX ORDER — MORPHINE SULFATE 4 MG/ML
4 INJECTION, SOLUTION INTRAMUSCULAR; INTRAVENOUS EVERY 30 MIN PRN
Status: DISCONTINUED | OUTPATIENT
Start: 2024-01-06 | End: 2024-01-07 | Stop reason: HOSPADM

## 2024-01-06 RX ADMIN — SODIUM CHLORIDE 1000 ML: 9 INJECTION, SOLUTION INTRAVENOUS at 23:39

## 2024-01-06 RX ADMIN — MORPHINE SULFATE 4 MG: 4 INJECTION, SOLUTION INTRAMUSCULAR; INTRAVENOUS at 23:39

## 2024-01-06 ASSESSMENT — PAIN SCALES - GENERAL: PAINLEVEL_OUTOF10: 10

## 2024-01-07 ENCOUNTER — APPOINTMENT (OUTPATIENT)
Dept: CT IMAGING | Age: 64
End: 2024-01-07
Payer: COMMERCIAL

## 2024-01-07 VITALS
DIASTOLIC BLOOD PRESSURE: 104 MMHG | TEMPERATURE: 98.6 F | HEART RATE: 97 BPM | SYSTOLIC BLOOD PRESSURE: 154 MMHG | RESPIRATION RATE: 16 BRPM | OXYGEN SATURATION: 94 %

## 2024-01-07 LAB
ALBUMIN SERPL-MCNC: 3.3 GM/DL (ref 3.4–5)
ALP BLD-CCNC: 259 IU/L (ref 40–128)
ALT SERPL-CCNC: 20 U/L (ref 10–40)
ANION GAP SERPL CALCULATED.3IONS-SCNC: 10 MMOL/L (ref 7–16)
AST SERPL-CCNC: 18 IU/L (ref 15–37)
BACTERIA: NEGATIVE /HPF
BASOPHILS ABSOLUTE: 0.1 K/CU MM
BASOPHILS RELATIVE PERCENT: 0.5 % (ref 0–1)
BILIRUB SERPL-MCNC: 0.2 MG/DL (ref 0–1)
BILIRUBIN URINE: NEGATIVE MG/DL
BLOOD, URINE: ABNORMAL
BUN SERPL-MCNC: 14 MG/DL (ref 6–23)
CALCIUM SERPL-MCNC: 8.6 MG/DL (ref 8.3–10.6)
CHLORIDE BLD-SCNC: 99 MMOL/L (ref 99–110)
CLARITY: CLEAR
CO2: 26 MMOL/L (ref 21–32)
COLOR: YELLOW
CREAT SERPL-MCNC: 1.1 MG/DL (ref 0.9–1.3)
DIFFERENTIAL TYPE: ABNORMAL
EKG ATRIAL RATE: 113 BPM
EKG DIAGNOSIS: NORMAL
EKG P AXIS: 57 DEGREES
EKG P-R INTERVAL: 138 MS
EKG Q-T INTERVAL: 334 MS
EKG QRS DURATION: 86 MS
EKG QTC CALCULATION (BAZETT): 458 MS
EKG R AXIS: -44 DEGREES
EKG T AXIS: 59 DEGREES
EKG VENTRICULAR RATE: 113 BPM
EOSINOPHILS ABSOLUTE: 0.4 K/CU MM
EOSINOPHILS RELATIVE PERCENT: 2.7 % (ref 0–3)
GFR SERPL CREATININE-BSD FRML MDRD: >60 ML/MIN/1.73M2
GLUCOSE SERPL-MCNC: 107 MG/DL (ref 70–99)
GLUCOSE, URINE: NEGATIVE MG/DL
HCT VFR BLD CALC: 37.7 % (ref 42–52)
HEMOGLOBIN: 12.5 GM/DL (ref 13.5–18)
IMMATURE NEUTROPHIL %: 0.6 % (ref 0–0.43)
KETONES, URINE: NEGATIVE MG/DL
LACTATE: 1.4 MMOL/L (ref 0.5–1.9)
LEUKOCYTE ESTERASE, URINE: ABNORMAL
LYMPHOCYTES ABSOLUTE: 2.4 K/CU MM
LYMPHOCYTES RELATIVE PERCENT: 17.8 % (ref 24–44)
MCH RBC QN AUTO: 28.3 PG (ref 27–31)
MCHC RBC AUTO-ENTMCNC: 33.2 % (ref 32–36)
MCV RBC AUTO: 85.5 FL (ref 78–100)
MONOCYTES ABSOLUTE: 0.8 K/CU MM
MONOCYTES RELATIVE PERCENT: 6.4 % (ref 0–4)
MUCUS: ABNORMAL HPF
NITRITE URINE, QUANTITATIVE: NEGATIVE
NUCLEATED RBC %: 0 %
PDW BLD-RTO: 11.7 % (ref 11.7–14.9)
PH, URINE: 7 (ref 5–8)
PLATELET # BLD: 533 K/CU MM (ref 140–440)
PMV BLD AUTO: 9 FL (ref 7.5–11.1)
POTASSIUM SERPL-SCNC: 3.4 MMOL/L (ref 3.5–5.1)
PROTEIN UA: ABNORMAL MG/DL
RBC # BLD: 4.41 M/CU MM (ref 4.6–6.2)
RBC URINE: 14 /HPF (ref 0–3)
SEGMENTED NEUTROPHILS ABSOLUTE COUNT: 9.5 K/CU MM
SEGMENTED NEUTROPHILS RELATIVE PERCENT: 72 % (ref 36–66)
SODIUM BLD-SCNC: 135 MMOL/L (ref 135–145)
SPECIFIC GRAVITY UA: 1.01 (ref 1–1.03)
TOTAL IMMATURE NEUTOROPHIL: 0.08 K/CU MM
TOTAL NUCLEATED RBC: 0 K/CU MM
TOTAL PROTEIN: 6.5 GM/DL (ref 6.4–8.2)
TRICHOMONAS: ABNORMAL /HPF
UROBILINOGEN, URINE: 2 MG/DL (ref 0.2–1)
WBC # BLD: 13.2 K/CU MM (ref 4–10.5)
WBC UA: 42 /HPF (ref 0–2)

## 2024-01-07 PROCEDURE — 93010 ELECTROCARDIOGRAM REPORT: CPT | Performed by: INTERNAL MEDICINE

## 2024-01-07 PROCEDURE — 6360000002 HC RX W HCPCS: Performed by: PHYSICIAN ASSISTANT

## 2024-01-07 PROCEDURE — 87086 URINE CULTURE/COLONY COUNT: CPT

## 2024-01-07 PROCEDURE — 74176 CT ABD & PELVIS W/O CONTRAST: CPT

## 2024-01-07 PROCEDURE — 81001 URINALYSIS AUTO W/SCOPE: CPT

## 2024-01-07 PROCEDURE — 96375 TX/PRO/DX INJ NEW DRUG ADDON: CPT

## 2024-01-07 PROCEDURE — 96376 TX/PRO/DX INJ SAME DRUG ADON: CPT

## 2024-01-07 RX ORDER — ORPHENADRINE CITRATE 30 MG/ML
60 INJECTION INTRAMUSCULAR; INTRAVENOUS ONCE
Status: COMPLETED | OUTPATIENT
Start: 2024-01-07 | End: 2024-01-07

## 2024-01-07 RX ADMIN — MORPHINE SULFATE 4 MG: 4 INJECTION, SOLUTION INTRAMUSCULAR; INTRAVENOUS at 02:58

## 2024-01-07 RX ADMIN — ORPHENADRINE CITRATE 60 MG: 60 INJECTION INTRAMUSCULAR; INTRAVENOUS at 03:54

## 2024-01-07 ASSESSMENT — PAIN - FUNCTIONAL ASSESSMENT: PAIN_FUNCTIONAL_ASSESSMENT: NONE - DENIES PAIN

## 2024-01-07 NOTE — ED TRIAGE NOTES
Patient recently diagnosed with cancer and took an oxycotin and ibuprofen an hour ago. States he was seen thrudaday for the same flank pain and told to return if the pain came back.

## 2024-01-07 NOTE — ED PROVIDER NOTES
12 lead EKG per my interpretation:  Sinus Tachycardia 113  Axis is   Left axis deviation  QTc is   458  There is no specific T wave changes appreciated.  There is no specific ST wave changes appreciated.    Prior EKG to compare with was not available         Erik Man DO  01/06/24 8739    
program.  Efforts were made to edit the dictations but occasionally words are mis-transcribed.)    Mahogany Murillo PA-C (electronically signed)            Mahogany Murillo PA-C  01/07/24 0345

## 2024-01-08 ENCOUNTER — TELEPHONE (OUTPATIENT)
Dept: ONCOLOGY | Age: 64
End: 2024-01-08

## 2024-01-08 DIAGNOSIS — Z11.59 NEED FOR HEPATITIS B SCREENING TEST: Primary | ICD-10-CM

## 2024-01-08 DIAGNOSIS — C67.8 MALIGNANT NEOPLASM OF OVERLAPPING SITES OF BLADDER (HCC): Primary | ICD-10-CM

## 2024-01-08 LAB
CULTURE: NORMAL
Lab: NORMAL
SPECIMEN: NORMAL

## 2024-01-08 NOTE — TELEPHONE ENCOUNTER
Patient called and states he saw Dr. Gambino in patient [facility]  and is to have a follow up office visit in our office.  Patient states he is in severe pain in back area.  Would like apt ASAP also would like to know if Dr. Gambino can RX pain medication before visit. Patient pharmacy is [facility].   Please advise.

## 2024-01-09 ENCOUNTER — CLINICAL DOCUMENTATION (OUTPATIENT)
Dept: ONCOLOGY | Age: 64
End: 2024-01-09

## 2024-01-09 ENCOUNTER — CLINICAL DOCUMENTATION (OUTPATIENT)
Facility: HOSPITAL | Age: 64
End: 2024-01-09

## 2024-01-09 DIAGNOSIS — C67.8 MALIGNANT NEOPLASM OF OVERLAPPING SITES OF BLADDER (HCC): Primary | ICD-10-CM

## 2024-01-09 DIAGNOSIS — C67.8 MALIGNANT NEOPLASM OF OVERLAPPING SITES OF BLADDER (HCC): ICD-10-CM

## 2024-01-09 RX ORDER — OXYCODONE HYDROCHLORIDE 5 MG/1
5 TABLET ORAL EVERY 4 HOURS PRN
Qty: 90 TABLET | Refills: 0 | Status: ON HOLD | OUTPATIENT
Start: 2024-01-09 | End: 2024-01-24

## 2024-01-09 RX ORDER — OXYCODONE HYDROCHLORIDE 5 MG/1
5 TABLET ORAL EVERY 4 HOURS PRN
Qty: 90 TABLET | Refills: 0 | Status: SHIPPED | OUTPATIENT
Start: 2024-01-09 | End: 2024-01-09 | Stop reason: SDUPTHER

## 2024-01-09 NOTE — TELEPHONE ENCOUNTER
This nurse spoke with Dr Franklin and verbal orders were obtained for Oxycodone 5 mg IR every 4 hours, 15 day supply ( pended to physician) and referral to pain management. This nurse placed  both orders and called the patient @ [phone]. The patient was notified of the medication and referral. He verbalized understanding and denies further needs at this time.

## 2024-01-09 NOTE — TELEPHONE ENCOUNTER
Oxycodone 5 mg IR- take 1 every 4 hours as needed for pain for up to 15 days pended to Dr Franklin.

## 2024-01-09 NOTE — PROGRESS NOTES
Evaluated the patient for patient assistance and no assistance is required at this time due to the patient having [employer]. I will evaluate if there are any insurance or treatment changes.    Bonnie Dangelo, CPC  Financial Navigator  [facility]  [phone]

## 2024-01-10 ENCOUNTER — HOSPITAL ENCOUNTER (INPATIENT)
Age: 64
LOS: 9 days | Discharge: HOME | End: 2024-01-19
Attending: STUDENT IN AN ORGANIZED HEALTH CARE EDUCATION/TRAINING PROGRAM | Admitting: INTERNAL MEDICINE
Payer: MEDICAID

## 2024-01-10 ENCOUNTER — APPOINTMENT (OUTPATIENT)
Dept: CT IMAGING | Age: 64
End: 2024-01-10
Payer: MEDICAID

## 2024-01-10 ENCOUNTER — HOSPITAL ENCOUNTER (EMERGENCY)
Age: 64
Discharge: ANOTHER HOSPITAL | End: 2024-01-10
Attending: EMERGENCY MEDICINE
Payer: MEDICAID

## 2024-01-10 VITALS
DIASTOLIC BLOOD PRESSURE: 109 MMHG | SYSTOLIC BLOOD PRESSURE: 170 MMHG | HEIGHT: 70 IN | WEIGHT: 195 LBS | HEART RATE: 107 BPM | RESPIRATION RATE: 13 BRPM | TEMPERATURE: 98.3 F | BODY MASS INDEX: 27.92 KG/M2 | OXYGEN SATURATION: 90 %

## 2024-01-10 DIAGNOSIS — C67.9 MALIGNANT NEOPLASM OF URINARY BLADDER, UNSPECIFIED SITE (HCC): Primary | ICD-10-CM

## 2024-01-10 DIAGNOSIS — N13.30 BILATERAL HYDRONEPHROSIS: ICD-10-CM

## 2024-01-10 DIAGNOSIS — C67.9 MALIGNANT NEOPLASM OF URINARY BLADDER, UNSPECIFIED SITE (HCC): ICD-10-CM

## 2024-01-10 DIAGNOSIS — R10.30 LOWER ABDOMINAL PAIN: ICD-10-CM

## 2024-01-10 DIAGNOSIS — R31.9 HEMATURIA, UNSPECIFIED TYPE: ICD-10-CM

## 2024-01-10 DIAGNOSIS — R10.9 BILATERAL FLANK PAIN: Primary | ICD-10-CM

## 2024-01-10 LAB
ALBUMIN SERPL-MCNC: 3.4 GM/DL (ref 3.4–5)
ALP BLD-CCNC: 307 IU/L (ref 40–129)
ALT SERPL-CCNC: 29 U/L (ref 10–40)
ANION GAP SERPL CALCULATED.3IONS-SCNC: 14 MMOL/L (ref 7–16)
APTT: 43.9 SECONDS (ref 25.1–37.1)
AST SERPL-CCNC: 19 IU/L (ref 15–37)
BASOPHILS ABSOLUTE: 0.1 K/CU MM
BASOPHILS RELATIVE PERCENT: 0.4 % (ref 0–1)
BILIRUB SERPL-MCNC: 0.3 MG/DL (ref 0–1)
BUN SERPL-MCNC: 21 MG/DL (ref 6–23)
CALCIUM SERPL-MCNC: 9.2 MG/DL (ref 8.3–10.6)
CHLORIDE BLD-SCNC: 100 MMOL/L (ref 99–110)
CO2: 23 MMOL/L (ref 21–32)
CREAT SERPL-MCNC: 1.3 MG/DL (ref 0.9–1.3)
DIFFERENTIAL TYPE: ABNORMAL
EOSINOPHILS ABSOLUTE: 0.3 K/CU MM
EOSINOPHILS RELATIVE PERCENT: 1.8 % (ref 0–3)
GFR SERPL CREATININE-BSD FRML MDRD: >60 ML/MIN/1.73M2
GLUCOSE SERPL-MCNC: 104 MG/DL (ref 70–99)
HCT VFR BLD CALC: 38.4 % (ref 42–52)
HEMOGLOBIN: 12.7 GM/DL (ref 13.5–18)
IMMATURE NEUTROPHIL %: 0.3 % (ref 0–0.43)
INR BLD: 1.1 INDEX
LYMPHOCYTES ABSOLUTE: 2.3 K/CU MM
LYMPHOCYTES RELATIVE PERCENT: 16.3 % (ref 24–44)
MCH RBC QN AUTO: 27.5 PG (ref 27–31)
MCHC RBC AUTO-ENTMCNC: 33.1 % (ref 32–36)
MCV RBC AUTO: 83.3 FL (ref 78–100)
MONOCYTES ABSOLUTE: 0.9 K/CU MM
MONOCYTES RELATIVE PERCENT: 6.3 % (ref 0–4)
PDW BLD-RTO: 11.9 % (ref 11.7–14.9)
PLATELET # BLD: 572 K/CU MM (ref 140–440)
PMV BLD AUTO: 9.2 FL (ref 7.5–11.1)
POTASSIUM SERPL-SCNC: 3.3 MMOL/L (ref 3.5–5.1)
PROTHROMBIN TIME: 14.9 SECONDS (ref 11.7–14.5)
RBC # BLD: 4.61 M/CU MM (ref 4.6–6.2)
SEGMENTED NEUTROPHILS ABSOLUTE COUNT: 10.5 K/CU MM
SEGMENTED NEUTROPHILS RELATIVE PERCENT: 74.9 % (ref 36–66)
SODIUM BLD-SCNC: 137 MMOL/L (ref 135–145)
TOTAL IMMATURE NEUTOROPHIL: 0.04 K/CU MM
TOTAL PROTEIN: 7.8 GM/DL (ref 6.4–8.2)
WBC # BLD: 14 K/CU MM (ref 4–10.5)

## 2024-01-10 PROCEDURE — 6360000002 HC RX W HCPCS: Performed by: INTERNAL MEDICINE

## 2024-01-10 PROCEDURE — 1200000000 HC SEMI PRIVATE

## 2024-01-10 PROCEDURE — 87086 URINE CULTURE/COLONY COUNT: CPT

## 2024-01-10 PROCEDURE — 6360000002 HC RX W HCPCS: Performed by: EMERGENCY MEDICINE

## 2024-01-10 PROCEDURE — 85730 THROMBOPLASTIN TIME PARTIAL: CPT

## 2024-01-10 PROCEDURE — 96376 TX/PRO/DX INJ SAME DRUG ADON: CPT

## 2024-01-10 PROCEDURE — 0T9330Z DRAINAGE OF RIGHT KIDNEY PELVIS WITH DRAINAGE DEVICE, PERCUTANEOUS APPROACH: ICD-10-PCS | Performed by: RADIOLOGY

## 2024-01-10 PROCEDURE — 85610 PROTHROMBIN TIME: CPT

## 2024-01-10 PROCEDURE — 81001 URINALYSIS AUTO W/SCOPE: CPT

## 2024-01-10 PROCEDURE — 2580000003 HC RX 258: Performed by: INTERNAL MEDICINE

## 2024-01-10 PROCEDURE — 74176 CT ABD & PELVIS W/O CONTRAST: CPT

## 2024-01-10 PROCEDURE — 85025 COMPLETE CBC W/AUTO DIFF WBC: CPT

## 2024-01-10 PROCEDURE — 6370000000 HC RX 637 (ALT 250 FOR IP): Performed by: INTERNAL MEDICINE

## 2024-01-10 PROCEDURE — 96375 TX/PRO/DX INJ NEW DRUG ADDON: CPT

## 2024-01-10 PROCEDURE — 80053 COMPREHEN METABOLIC PANEL: CPT

## 2024-01-10 PROCEDURE — 96374 THER/PROPH/DIAG INJ IV PUSH: CPT

## 2024-01-10 PROCEDURE — 99285 EMERGENCY DEPT VISIT HI MDM: CPT

## 2024-01-10 RX ORDER — TAMSULOSIN HYDROCHLORIDE 0.4 MG/1
0.4 CAPSULE ORAL DAILY
Status: DISCONTINUED | OUTPATIENT
Start: 2024-01-11 | End: 2024-01-19 | Stop reason: HOSPADM

## 2024-01-10 RX ORDER — SODIUM CHLORIDE 0.9 % (FLUSH) 0.9 %
5-40 SYRINGE (ML) INJECTION EVERY 12 HOURS SCHEDULED
Status: DISCONTINUED | OUTPATIENT
Start: 2024-01-10 | End: 2024-01-19 | Stop reason: HOSPADM

## 2024-01-10 RX ORDER — MORPHINE SULFATE 4 MG/ML
4 INJECTION, SOLUTION INTRAMUSCULAR; INTRAVENOUS EVERY 30 MIN PRN
Status: COMPLETED | OUTPATIENT
Start: 2024-01-10 | End: 2024-01-10

## 2024-01-10 RX ORDER — NALOXONE HYDROCHLORIDE 0.4 MG/ML
0.4 INJECTION, SOLUTION INTRAMUSCULAR; INTRAVENOUS; SUBCUTANEOUS PRN
Status: DISCONTINUED | OUTPATIENT
Start: 2024-01-10 | End: 2024-01-19 | Stop reason: HOSPADM

## 2024-01-10 RX ORDER — ONDANSETRON 2 MG/ML
4 INJECTION INTRAMUSCULAR; INTRAVENOUS EVERY 30 MIN PRN
Status: DISCONTINUED | OUTPATIENT
Start: 2024-01-10 | End: 2024-01-10 | Stop reason: HOSPADM

## 2024-01-10 RX ORDER — ONDANSETRON 4 MG/1
4 TABLET, ORALLY DISINTEGRATING ORAL EVERY 8 HOURS PRN
Status: DISCONTINUED | OUTPATIENT
Start: 2024-01-10 | End: 2024-01-19 | Stop reason: HOSPADM

## 2024-01-10 RX ORDER — POLYETHYLENE GLYCOL 3350 17 G/17G
17 POWDER, FOR SOLUTION ORAL DAILY PRN
Status: DISCONTINUED | OUTPATIENT
Start: 2024-01-10 | End: 2024-01-19 | Stop reason: HOSPADM

## 2024-01-10 RX ORDER — AMLODIPINE BESYLATE 10 MG/1
10 TABLET ORAL DAILY
Status: DISCONTINUED | OUTPATIENT
Start: 2024-01-11 | End: 2024-01-19 | Stop reason: HOSPADM

## 2024-01-10 RX ORDER — POTASSIUM CHLORIDE 20 MEQ/1
40 TABLET, EXTENDED RELEASE ORAL ONCE
Status: COMPLETED | OUTPATIENT
Start: 2024-01-10 | End: 2024-01-10

## 2024-01-10 RX ORDER — MORPHINE SULFATE 4 MG/ML
4 INJECTION, SOLUTION INTRAMUSCULAR; INTRAVENOUS EVERY 30 MIN PRN
Status: DISCONTINUED | OUTPATIENT
Start: 2024-01-10 | End: 2024-01-10 | Stop reason: HOSPADM

## 2024-01-10 RX ORDER — SODIUM CHLORIDE 9 MG/ML
INJECTION, SOLUTION INTRAVENOUS CONTINUOUS
Status: DISPENSED | OUTPATIENT
Start: 2024-01-11 | End: 2024-01-12

## 2024-01-10 RX ORDER — ACETAMINOPHEN 650 MG/1
650 SUPPOSITORY RECTAL EVERY 6 HOURS PRN
Status: DISCONTINUED | OUTPATIENT
Start: 2024-01-10 | End: 2024-01-19 | Stop reason: HOSPADM

## 2024-01-10 RX ORDER — MORPHINE SULFATE 4 MG/ML
4 INJECTION, SOLUTION INTRAMUSCULAR; INTRAVENOUS EVERY 4 HOURS PRN
Status: DISCONTINUED | OUTPATIENT
Start: 2024-01-10 | End: 2024-01-12

## 2024-01-10 RX ORDER — OXYCODONE HYDROCHLORIDE 5 MG/1
5 TABLET ORAL EVERY 4 HOURS PRN
Status: DISCONTINUED | OUTPATIENT
Start: 2024-01-10 | End: 2024-01-19 | Stop reason: HOSPADM

## 2024-01-10 RX ORDER — SODIUM CHLORIDE 9 MG/ML
INJECTION, SOLUTION INTRAVENOUS PRN
Status: DISCONTINUED | OUTPATIENT
Start: 2024-01-10 | End: 2024-01-19 | Stop reason: HOSPADM

## 2024-01-10 RX ORDER — CARVEDILOL 6.25 MG/1
6.25 TABLET ORAL 2 TIMES DAILY WITH MEALS
Status: DISCONTINUED | OUTPATIENT
Start: 2024-01-10 | End: 2024-01-19 | Stop reason: HOSPADM

## 2024-01-10 RX ORDER — ACETAMINOPHEN 325 MG/1
650 TABLET ORAL EVERY 6 HOURS PRN
Status: DISCONTINUED | OUTPATIENT
Start: 2024-01-10 | End: 2024-01-19 | Stop reason: HOSPADM

## 2024-01-10 RX ORDER — ONDANSETRON 2 MG/ML
4 INJECTION INTRAMUSCULAR; INTRAVENOUS EVERY 6 HOURS PRN
Status: DISCONTINUED | OUTPATIENT
Start: 2024-01-10 | End: 2024-01-19 | Stop reason: HOSPADM

## 2024-01-10 RX ORDER — SODIUM CHLORIDE 0.9 % (FLUSH) 0.9 %
5-40 SYRINGE (ML) INJECTION PRN
Status: DISCONTINUED | OUTPATIENT
Start: 2024-01-10 | End: 2024-01-19 | Stop reason: HOSPADM

## 2024-01-10 RX ADMIN — SODIUM CHLORIDE: 9 INJECTION, SOLUTION INTRAVENOUS at 23:02

## 2024-01-10 RX ADMIN — ONDANSETRON 4 MG: 2 INJECTION INTRAMUSCULAR; INTRAVENOUS at 18:38

## 2024-01-10 RX ADMIN — POTASSIUM CHLORIDE 40 MEQ: 1500 TABLET, EXTENDED RELEASE ORAL at 23:10

## 2024-01-10 RX ADMIN — MORPHINE SULFATE 4 MG: 4 INJECTION, SOLUTION INTRAMUSCULAR; INTRAVENOUS at 16:37

## 2024-01-10 RX ADMIN — ONDANSETRON 4 MG: 2 INJECTION INTRAMUSCULAR; INTRAVENOUS at 16:35

## 2024-01-10 RX ADMIN — CEFTRIAXONE 1000 MG: 1 INJECTION, POWDER, FOR SOLUTION INTRAMUSCULAR; INTRAVENOUS at 23:05

## 2024-01-10 RX ADMIN — SODIUM CHLORIDE, PRESERVATIVE FREE 10 ML: 5 INJECTION INTRAVENOUS at 22:48

## 2024-01-10 RX ADMIN — MORPHINE SULFATE 4 MG: 4 INJECTION, SOLUTION INTRAMUSCULAR; INTRAVENOUS at 17:16

## 2024-01-10 RX ADMIN — ONDANSETRON 4 MG: 2 INJECTION INTRAMUSCULAR; INTRAVENOUS at 23:10

## 2024-01-10 RX ADMIN — MORPHINE SULFATE 4 MG: 4 INJECTION, SOLUTION INTRAMUSCULAR; INTRAVENOUS at 22:47

## 2024-01-10 RX ADMIN — MORPHINE SULFATE 4 MG: 4 INJECTION, SOLUTION INTRAMUSCULAR; INTRAVENOUS at 18:39

## 2024-01-10 ASSESSMENT — PAIN DESCRIPTION - LOCATION
LOCATION: ABDOMEN
LOCATION: BACK
LOCATION: BACK

## 2024-01-10 ASSESSMENT — PAIN SCALES - GENERAL
PAINLEVEL_OUTOF10: 9
PAINLEVEL_OUTOF10: 9
PAINLEVEL_OUTOF10: 10

## 2024-01-10 ASSESSMENT — PAIN DESCRIPTION - DESCRIPTORS
DESCRIPTORS: OTHER (COMMENT)
DESCRIPTORS: ACHING;SHARP;STABBING

## 2024-01-10 ASSESSMENT — PAIN - FUNCTIONAL ASSESSMENT: PAIN_FUNCTIONAL_ASSESSMENT: 0-10

## 2024-01-10 ASSESSMENT — PAIN DESCRIPTION - ORIENTATION
ORIENTATION: LOWER;RIGHT
ORIENTATION: LOWER;MID
ORIENTATION: RIGHT;LOWER

## 2024-01-10 ASSESSMENT — LIFESTYLE VARIABLES
HOW MANY STANDARD DRINKS CONTAINING ALCOHOL DO YOU HAVE ON A TYPICAL DAY: PATIENT DOES NOT DRINK
HOW OFTEN DO YOU HAVE A DRINK CONTAINING ALCOHOL: NEVER

## 2024-01-10 ASSESSMENT — PAIN DESCRIPTION - PAIN TYPE: TYPE: ACUTE PAIN

## 2024-01-10 NOTE — ED PROVIDER NOTES
0.3 K/CU MM    Basophils Absolute 0.1 K/CU MM    Immature Neutrophil % 0.3 0 - 0.43 %    Total Immature Neutrophil 0.04 K/CU MM   Protime/INR & PTT   Result Value Ref Range    Protime 14.9 (H) 11.7 - 14.5 SECONDS    INR 1.1 INDEX    aPTT 43.9 (H) 25.1 - 37.1 SECONDS       Radiographs (if obtained):  [] The following radiograph was interpreted by myself in the absence of a radiologist:  [x] Radiologist's Report reviewed at time of ED visit:  CT ABDOMEN PELVIS WO CONTRAST   Final Result   1. Stable appearance of bilateral mild hydroureteronephrosis, slightly more   pronounced on the left side.   2. Unchanged appearance of metastatic lymphadenopathy involving the pelvic   sidewalls, retroperitoneum, retrocrural space.   3. Urinary bladder wall demonstrates eccentric wall thickening with   perivesical infiltration as before. Prostate gland is mildly enlarged.   4. Cholelithiasis.   5. Simple right renal cortical cyst is stable in size.   6. Retrocrural lymphadenopathy is unchanged from prior examination.             ED Course and MDM:  Patient presents to the emergency department with complaints of bilateral flank pain.  The patient is a history of bladder cancer and has been having hydronephrosis because he is unable to empty his bladder.  This is his third visit to the emergency department.  Initially he was admitted to [facility] on 1231 and it was determined that he was not a surgical candidate at that time.  He continues to have pain and his oral medicines at home are not helping.  CC/HPI Summary, DDx, ED Course, and Reassessment: I discussed his care with De Wilson who is on-call for urology.  He advised me to have the patient transferred to [facility] make him n.p.o. after midnight and they will put him on for procedure tomorrow.    History from : Patient    Limitations to history : None    Patient was given the following medications:  Medications   morphine sulfate (PF) injection 4 mg (4 mg IntraVENous Given

## 2024-01-10 NOTE — ED TRIAGE NOTES
Patient had bladder surgery with stone removal on 12/14/23, recently discharged from [facility] on 1/4/24. Patient now having left lower quadrant pain, very tender to touch. Left sided back pain as well. Took oxycontin about 30 minutes ago, amlodipine, and muscle relaxer. Urinating red blood and passing blood clots.   Pain became severe around an hour ago

## 2024-01-11 ENCOUNTER — APPOINTMENT (OUTPATIENT)
Dept: INTERVENTIONAL RADIOLOGY/VASCULAR | Age: 64
End: 2024-01-11
Attending: STUDENT IN AN ORGANIZED HEALTH CARE EDUCATION/TRAINING PROGRAM
Payer: MEDICAID

## 2024-01-11 LAB
ANION GAP SERPL CALCULATED.3IONS-SCNC: 12 MMOL/L (ref 7–16)
BACTERIA: ABNORMAL /HPF
BASOPHILS ABSOLUTE: 0.1 K/CU MM
BASOPHILS RELATIVE PERCENT: 0.6 % (ref 0–1)
BILIRUBIN URINE: NEGATIVE MG/DL
BLOOD, URINE: ABNORMAL
BUN SERPL-MCNC: 16 MG/DL (ref 6–23)
CALCIUM SERPL-MCNC: 8.4 MG/DL (ref 8.3–10.6)
CHLORIDE BLD-SCNC: 101 MMOL/L (ref 99–110)
CLARITY: ABNORMAL
CO2: 25 MMOL/L (ref 21–32)
COLOR: ABNORMAL
CREAT SERPL-MCNC: 1.5 MG/DL (ref 0.9–1.3)
DIFFERENTIAL TYPE: ABNORMAL
EOSINOPHILS ABSOLUTE: 0.3 K/CU MM
EOSINOPHILS RELATIVE PERCENT: 2.2 % (ref 0–3)
GFR SERPL CREATININE-BSD FRML MDRD: 52 ML/MIN/1.73M2
GLUCOSE SERPL-MCNC: 108 MG/DL (ref 70–99)
GLUCOSE, URINE: NEGATIVE MG/DL
HCT VFR BLD CALC: 39.7 % (ref 42–52)
HEMOGLOBIN: 12.6 GM/DL (ref 13.5–18)
IMMATURE NEUTROPHIL %: 0.3 % (ref 0–0.43)
KETONES, URINE: NEGATIVE MG/DL
LEUKOCYTE ESTERASE, URINE: ABNORMAL
LYMPHOCYTES ABSOLUTE: 2 K/CU MM
LYMPHOCYTES RELATIVE PERCENT: 16.4 % (ref 24–44)
MCH RBC QN AUTO: 27.7 PG (ref 27–31)
MCHC RBC AUTO-ENTMCNC: 31.7 % (ref 32–36)
MCV RBC AUTO: 87.3 FL (ref 78–100)
MONOCYTES ABSOLUTE: 0.9 K/CU MM
MONOCYTES RELATIVE PERCENT: 7.3 % (ref 0–4)
MUCUS: ABNORMAL HPF
NITRITE URINE, QUANTITATIVE: NEGATIVE
NUCLEATED RBC %: 0 %
PDW BLD-RTO: 11.9 % (ref 11.7–14.9)
PH, URINE: 6.5 (ref 5–8)
PLATELET # BLD: 529 K/CU MM (ref 140–440)
PMV BLD AUTO: 9.1 FL (ref 7.5–11.1)
POTASSIUM SERPL-SCNC: 4.1 MMOL/L (ref 3.5–5.1)
PROTEIN UA: 100 MG/DL
RBC # BLD: 4.55 M/CU MM (ref 4.6–6.2)
RBC URINE: 1898 /HPF (ref 0–3)
SEGMENTED NEUTROPHILS ABSOLUTE COUNT: 9 K/CU MM
SEGMENTED NEUTROPHILS RELATIVE PERCENT: 73.2 % (ref 36–66)
SODIUM BLD-SCNC: 138 MMOL/L (ref 135–145)
SPECIFIC GRAVITY UA: 1.01 (ref 1–1.03)
SQUAMOUS EPITHELIAL: 1 /HPF
TOTAL IMMATURE NEUTOROPHIL: 0.04 K/CU MM
TOTAL NUCLEATED RBC: 0 K/CU MM
TRICHOMONAS: ABNORMAL /HPF
UROBILINOGEN, URINE: 1 MG/DL (ref 0.2–1)
WBC # BLD: 12.3 K/CU MM (ref 4–10.5)
WBC CLUMP: ABNORMAL /HPF
WBC UA: 162 /HPF (ref 0–2)

## 2024-01-11 PROCEDURE — 94761 N-INVAS EAR/PLS OXIMETRY MLT: CPT

## 2024-01-11 PROCEDURE — 6360000002 HC RX W HCPCS: Performed by: INTERNAL MEDICINE

## 2024-01-11 PROCEDURE — 80048 BASIC METABOLIC PNL TOTAL CA: CPT

## 2024-01-11 PROCEDURE — 1200000000 HC SEMI PRIVATE

## 2024-01-11 PROCEDURE — 6360000004 HC RX CONTRAST MEDICATION

## 2024-01-11 PROCEDURE — 36415 COLL VENOUS BLD VENIPUNCTURE: CPT

## 2024-01-11 PROCEDURE — 85025 COMPLETE CBC W/AUTO DIFF WBC: CPT

## 2024-01-11 PROCEDURE — 2580000003 HC RX 258: Performed by: INTERNAL MEDICINE

## 2024-01-11 PROCEDURE — 6370000000 HC RX 637 (ALT 250 FOR IP): Performed by: INTERNAL MEDICINE

## 2024-01-11 RX ADMIN — MORPHINE SULFATE 4 MG: 4 INJECTION, SOLUTION INTRAMUSCULAR; INTRAVENOUS at 03:18

## 2024-01-11 RX ADMIN — TAMSULOSIN HYDROCHLORIDE 0.4 MG: 0.4 CAPSULE ORAL at 10:54

## 2024-01-11 RX ADMIN — CEFTRIAXONE 1000 MG: 1 INJECTION, POWDER, FOR SOLUTION INTRAMUSCULAR; INTRAVENOUS at 22:07

## 2024-01-11 RX ADMIN — MORPHINE SULFATE 4 MG: 4 INJECTION, SOLUTION INTRAMUSCULAR; INTRAVENOUS at 23:36

## 2024-01-11 RX ADMIN — CARVEDILOL 6.25 MG: 6.25 TABLET, FILM COATED ORAL at 00:04

## 2024-01-11 RX ADMIN — OXYCODONE HYDROCHLORIDE 5 MG: 5 TABLET ORAL at 20:03

## 2024-01-11 RX ADMIN — MORPHINE SULFATE 4 MG: 4 INJECTION, SOLUTION INTRAMUSCULAR; INTRAVENOUS at 09:31

## 2024-01-11 RX ADMIN — MORPHINE SULFATE 4 MG: 4 INJECTION, SOLUTION INTRAMUSCULAR; INTRAVENOUS at 16:54

## 2024-01-11 RX ADMIN — CARVEDILOL 6.25 MG: 6.25 TABLET, FILM COATED ORAL at 18:16

## 2024-01-11 RX ADMIN — OXYCODONE HYDROCHLORIDE 5 MG: 5 TABLET ORAL at 00:04

## 2024-01-11 RX ADMIN — CARVEDILOL 6.25 MG: 6.25 TABLET, FILM COATED ORAL at 10:54

## 2024-01-11 RX ADMIN — AMLODIPINE BESYLATE 10 MG: 10 TABLET ORAL at 10:54

## 2024-01-11 RX ADMIN — OXYCODONE HYDROCHLORIDE 5 MG: 5 TABLET ORAL at 10:55

## 2024-01-11 ASSESSMENT — PAIN DESCRIPTION - LOCATION
LOCATION: ABDOMEN;GROIN
LOCATION: ABDOMEN;BACK
LOCATION: ABDOMEN;GROIN
LOCATION: BACK
LOCATION: ABDOMEN;GROIN
LOCATION: ABDOMEN;GROIN

## 2024-01-11 ASSESSMENT — PAIN DESCRIPTION - DESCRIPTORS
DESCRIPTORS: THROBBING;SPASM
DESCRIPTORS: JABBING;GNAWING
DESCRIPTORS: SPASM
DESCRIPTORS: SPASM
DESCRIPTORS: ACHING;DISCOMFORT
DESCRIPTORS: SPASM

## 2024-01-11 ASSESSMENT — PAIN - FUNCTIONAL ASSESSMENT
PAIN_FUNCTIONAL_ASSESSMENT: ACTIVITIES ARE NOT PREVENTED

## 2024-01-11 ASSESSMENT — PAIN SCALES - GENERAL
PAINLEVEL_OUTOF10: 10
PAINLEVEL_OUTOF10: 7
PAINLEVEL_OUTOF10: 8
PAINLEVEL_OUTOF10: 7
PAINLEVEL_OUTOF10: 9

## 2024-01-11 ASSESSMENT — PAIN DESCRIPTION - ORIENTATION
ORIENTATION: LOWER
ORIENTATION: RIGHT
ORIENTATION: MID;LOWER

## 2024-01-11 NOTE — PROGRESS NOTES
IR PROGRESS NOTE    1/11/24    PT REFUSES BILATERAL NEPHROSTOMY TUBE PLACEMENT UNTIL ANESTHESIA ASSISTANCE BECAUSE HE IS UNABLE TO LAY ON HIS STOMACH DUE TO BLADDER CANCER.    WILL COORDINATE WITH ANESTHESIA DEPT AND TRY FOR 11/12/24    MIRA APONTE DO

## 2024-01-11 NOTE — CONSULTS
[address] [address]   Consult Note  [facility] 1 2 3 4 5    Date: 2024   Patient: Jethro Vázquez   : 1960   DOA: 1/10/2024   MRN: [id number]   ROOM#: [id number]     Reason for Consult: H/o bladder cancer, transfer from [place]   Requesting Physician: Dr. Ordoñez  Collaborating Urologist on Call at time of admission: Dr. Jc  CHIEF COMPLAINT: Abdominal and back pain    History Obtained From: patient, electronic medical record    HISTORY OF PRESENT ILLNESS:                The patient is a 63 y.o. male with significant past medical history of kidney stones, HTN, bladder cancer, and bilateral hydronephrosis who presented to the [facility] ED with abdominal and back pain. Work-up in the ED revealed SHIRLEY with persistent bilateral hydronephrosis, stable compared to CT from 23. During his recent admission, his left flank pain had improved and was stable with no SHIRLEY, so decision was made to avoid PCN placement for the time being. Pt reports his left flank pain resolved but he has since developed right flank and suprapubic pain at home requiring an ED visit 24 for pain control and again 1/10/24. He also endorses and episode of gross hematuria with clots yesterday morning. He was transferred to [facility] for further management. This morning, he reports persistent right flank pain.     ED Provider's HPI 1/10/24: Jethro Vázquez is a 63 y.o. male that presents to the emergency department with complaints of abdominal pain and back pain.  Patient has known history of the bladder tumor which is causing obstruction and he was seen by me on .  He was sent to [place] with the possibility of getting percutaneous tubes placed to drain his kidneys.  It was determined not to place those tubes at that time.  The patient was doing fairly well but on the  had to go back to the [facility] ER because of pain.  He got pain medicines in the ER and was feeling well and went  Bilateral hydronephrosis.  Excretion of activity is seen from bilateral kidneys and activity is seen within ureters and urinary bladder.     Multifocal degenerative change, without a generalized scintigraphic pattern of osseous metastatic disease. Bilateral hydronephrosis.     CT CHEST WO CONTRAST    Result Date: 1/1/2024  EXAMINATION: CT OF THE CHEST WITHOUT CONTRAST 1/1/2024 2:56 pm TECHNIQUE: CT of the chest was performed without the administration of intravenous contrast. Multiplanar reformatted images are provided for review. Automated exposure control, iterative reconstruction, and/or weight based adjustment of the mA/kV was utilized to reduce the radiation dose to as low as reasonably achievable. COMPARISON: 12/31/2023 chest radiograph and CT abdomen/pelvis HISTORY: ORDERING SYSTEM PROVIDED HISTORY: bladder cancer. TECHNOLOGIST PROVIDED HISTORY: Reason for exam:->bladder cancer. Reason for Exam: bladder cancer. FINDINGS: Chest Wall and Thoracic Inlet: No axillary or supraclavicular lymphadenopathy.  Visualized portions of the thyroid gland are unremarkable. Mediastinum and Cathy: Paraesophageal lymphadenopathy is noted.  A reference esophageal lymph node measures 1.0 cm (series 301, image 44).  A reference lower paraesophageal lymph node measures 1.2 cm (series 301, image 89). Partially calcified mediastinal and hilar lymph nodes likely represent sequelae of prior granulomatous disease.  Normal caliber of the thoracic aorta.  The main pulmonary artery is dilated measuring 3.6 cm, as can be seen in pulmonary hypertension.  The heart is normal in size. No pericardial effusion. Lungs/Pleura: No suspicious pulmonary nodules.  Scattered calcified granulomas.  Scattered linear atelectasis.  No pleural effusion. Upper Abdomen: Cholelithiasis without acute cholecystitis.  Extensive retroperitoneal lymphadenopathy.  Partially visualized left-sided hydronephrosis is grossly similar to prior. Bones: No suspicious osseous  identified.     1. Persistent asymmetrical thickening of the bladder which is most prominent towards the left and posteriorly concerning for malignancy.  Soft tissue attenuation extends lateral to the wall of the bladder on the left which could represent direct extension of tumor.  Fat stranding adjacent to the bladder appears increased and could reflect cystitis.  New retroperitoneal and pelvic lymphadenopathy is likely metastatic. 2. Bilateral hydronephrosis and hydroureter which is moderate on the left and mild on the right with no visible stone.  This is likely related to bladder wall thickening. 3. No acute findings elsewhere in the abdomen or pelvis. 4. Colonic diverticulosis. 5. Stable 5 cm simple right renal cyst.  No follow-up imaging is recommended.     US RETROPERITONEAL LIMITED    Result Date: 12/15/2023  EXAMINATION: ULTRASOUND OF THE KIDNEYS 12/15/2023 9:40 am COMPARISON: 11/22/2023 HISTORY: ORDERING SYSTEM PROVIDED HISTORY: SHIRLEY, possible hydro TECHNOLOGIST PROVIDED HISTORY: Reason for exam:->SHIRLEY, possible hydro FINDINGS: The right kidney measures 12.1 x 6.9 x 7.9 cm in length and the left kidney measures 12.7 x 7 x 7.9 cm in length.  The bilateral renal cortices measure 2 cm.  Simple cyst in the right kidney measuring up to 4.9 cm that requires no further follow-up imaging. Kidneys demonstrate normal cortical echogenicity.  Mild bilateral hydronephrosis.  No intrarenal stones.  No focal lesions.     Mild bilateral hydronephrosis.       Assessment & Plan:      Jethro Vázquez is a 63 y.o. male with pmhx of kidney stones, HTN, bladder cancer, abd bilateral hydronephrosis admitted 12/31/2023 for pyelonephritis:      1) Bladder Cancer w Bilateral Hydronephrosis   & SHIRLEY: S/p cystoscopy and transurethral resection of large bladder tumor 12/14/23. Ureteral orifices not visible. Pathology with invasive high grade urothelial carcinoma.   CT a/p 1/10/24: Stable appearance of bilateral mild

## 2024-01-11 NOTE — H&P
History and Physical      Name:  Jethro Vázquez /Age/Sex: 1960  (63 y.o. male)   MRN & CSN:  [id number] & [id number] Encounter Date/Time: 1/10/2024 9:31 PM EST   Location:  [address] [place] [address]- PCP: Mono Schaffer MD       Hospital Day: 1    Assessment and Plan:     #. Gross Hematuria.   #.  Flank pain, suprapubic pain  -CT abdomen/pelvis-stable appearance of bilateral mild hydroureteronephrosis, unchanged appearance of metastatic lymphadenopathy, urinary bladder wall demonstrated eccentric wall thickening with perivesical infiltration as before.  Simple right renal cortical cyst-stable  -Urology consulted from ED  -UA pending  -Continue ceftriaxone (urine culture in the past grew pansensitive E. coli)  -Keep n.p.o. after midnight  -IV fluids, antiemetics, analgesics as needed.    #.  Hypokalemia  -Replaced    #.  Recently diagnosed invasive high-grade urothelial carcinoma  -S/p cystoscopy-s/p bladder tumor resection-2023  -Following with Dr. Gambino    #.  History of left ureteral calculi-2023    #.  Hypertension-patient is on amlodipine, carvedilol-resume with holding parameters    #.  BPH-continue tamsulosin    #.  History of robotic inguinal hernia repair-2018    Disposition:   Current Living situation: Home    Diet Diet NPO  ADULT DIET; Clear Liquid   DVT Prophylaxis [x] SCDs-no chemical prophylaxis due to hematuria   Code Status Full Code   Surrogate Decision Maker/ POA      History from:   EMR, patient.    History of Present Illness:     Chief Complaint: Bladder cancer (HCC)  Jethro Vázquez is a 63 y.o. male with hypertension, BPH, history of left ureteral calculi, recently diagnosed urothelial carcinoma presented to [place] ED with complaints of right flank pain, suprapubic pain, hematuria.  Patient reported that when he woke up today morning he started having right flank pain, tender to touch, then started having suprapubic pain.  Patient also noticed having gross hematuria.  Later in  without CT findings to suggest acute cholecystitis.       Personally reviewed Lab Studies, Imaging.    Electronically signed by Sergio Ordoñez MD on 1/10/2024 at 9:31 PM    Comment: Please note this report has been produced using speech recognition software and may contain errors related to that system including errors in grammar, punctuation, and spelling, as well as words and phrases that may be inappropriate. If there are any questions or concerns please feel free to contact the dictating provider for clarification.

## 2024-01-11 NOTE — PLAN OF CARE
Problem: Discharge Planning  Goal: Discharge to home or other facility with appropriate resources  Outcome: Progressing     Problem: Pain  Goal: Verbalizes/displays adequate comfort level or baseline comfort level  Outcome: Progressing   Care plan reviewed with patient.  Patient verbalize understanding of the plan of care and contribute to goal setting.

## 2024-01-11 NOTE — CARE COORDINATION
01/11/24 1615   Service Assessment   Patient Orientation Alert and Oriented   Cognition Alert   History Provided By Patient;Spouse;Medical Record   Primary Caregiver Self   Support Systems Spouse/Significant Other;Family Members   Patient's Healthcare Decision Maker is: Named in Scanned ACP Document  (wife)   PCP Verified by CM Yes   Last Visit to PCP Within last 6 months   Prior Functional Level Independent in ADLs/IADLs   Current Functional Level Independent in ADLs/IADLs   Can patient return to prior living arrangement Yes   Ability to make needs known: Good   Family able to assist with home care needs: Yes   Would you like for me to discuss the discharge plan with any other family members/significant others, and if so, who? Yes  (wife)   Financial Resources Medicaid  (pending)   Social/Functional History   Lives With Spouse;Family   Type of Home House   Home Equipment None   Active  Yes  (wife can help with transportation as needed.)   Mode of Transportation Car   Condition of Participation: Discharge Planning   The Patient and/Or Patient Representative agree with the Discharge Plan? Yes     Reviewed chart, discussed in IDR and spoke with pt/family about discharge needs/plans.  Pt lives with wife and 4 children, Cyn the FC working with getting him back on Medicaid.  Pt has a PCP.  He is wanting to return home at discharge with family to help him as needed.  Requested assistance with POA, reviewed chart and found MPOA.  Spoke with again and wants finical POA, explained hospital does not help with finical POA.  He verbalized understanding. Plan at this time is home with family . CM available as needed.  .

## 2024-01-11 NOTE — PROGRESS NOTES
V2.0    [facility] Progress Note      Name:  Jethro Vázquez /Age/Sex: 1960  (63 y.o. male)   MRN & CSN:  [id number] & [id number] Encounter Date/Time: 2024 1:28 PM EST   Location:  [address] [place] [address]- PCP: Mono Schaffer MD     Attending:Cira Christensen MD       Hospital Day: 2    Assessment and Recommendations         Plan:     #. Gross Hematuria.   #.  Flank pain, suprapubic pain  -CT abdomen/pelvis-stable appearance of bilateral mild hydroureteronephrosis, unchanged appearance of metastatic lymphadenopathy, urinary bladder wall demonstrated eccentric wall thickening with perivesical infiltration as before.  Simple right renal cortical cyst-stable  -Urology consulted from ED  -UA pending  -Continue ceftriaxone (urine culture in the past grew pansensitive E. coli)  -Keep n.p.o. after midnight  -IV fluids, antiemetics, analgesics as needed.     #.  Hypokalemia  -Replaced     #.  Recently diagnosed invasive high-grade urothelial carcinoma  -S/p cystoscopy-s/p bladder tumor resection-2023  -Following with Dr. Gambino     #.  History of left ureteral calculi-2023     #.  Hypertension-patient is on amlodipine, carvedilol-resume with holding parameters     #.  BPH-continue tamsulosin     #.  History of robotic inguinal hernia repair-2018      Diet Diet NPO   DVT Prophylaxis [] Lovenox, []  Heparin, [] SCDs, [] Ambulation,  [] Eliquis, [] Xarelto  [] Coumadin   Code Status Full Code   Disposition From: Home  Expected Disposition: Home  Estimated Date of Discharge: 2-3 days  Patient requires continued admission due to gross hematuria   Surrogate Decision Maker/ POA       Personally reviewed Lab Studies and Imaging     Discussed management of the case with urology who recommended PCN placement by IR      Imaging that was interpreted personally includes CT abdomen pelvis without contrast and results show   1. Stable appearance of bilateral mild hydroureteronephrosis, slightly more  pronounced on the left  condition->Emergency Medical Condition (MA) Reason for Exam: right flank pain FINDINGS: Lower Chest: No distal esophageal thickening.  No cardiomegaly is identified. Similar sized prominent lymph nodes are seen adjacent to the esophagus when compared to the previous examination.  Calcified granuloma noted within the lingula.  Subsegmental atelectatic changes are identified. Organs: No acute liver or splenic abnormality is identified.  No adrenal abnormality is seen.  No pancreatic mass or ductal dilation.  No acute pancreatitis.  Cholelithiasis is seen without CT findings to suggest cholecystitis.  There is mild bilateral hydroureteronephrosis.  This is similar to the previous examination. GI/Bowel: No ileus or obstruction is identified.  No acute bowel process is seen.  No significant stool burden in the colon. Pelvis: Eccentric bladder wall thickening is identified related to known malignancy, with soft tissue extension into the adjacent perivesicular fat. There is also some stranding noted adjacent to the bladder.  No pelvic hemorrhage.  Pelvic lymphadenopathy is seen, with a 13 mm left iliac chain lymph node again identified, unchanged from the recent comparison study 6 days earlier. Peritoneum/Retroperitoneum: Retroperitoneal lymphadenopathy is identified, greatest in the left periaortic region, with similar sized lymph nodes including a 14 mm short axis left periaortic lymph node when compared to the recent study. Bones/Soft Tissues: No acute subcutaneous soft tissue abnormality.  No acute osseous abnormality is identified.  Mild degenerative changes seen in the lower thoracic and lumbar spine.     1. Bilateral hydroureteronephrosis is identified, without obstructive calculi.  Pyelitis is difficult to exclude.  Pyelonephritis is difficult to exclude, and questioned on the recent comparison study with contrast. 2. Known bladder mass, with metastatic lymphadenopathy seen in the pelvis, retroperitoneum as well as

## 2024-01-12 ENCOUNTER — ANESTHESIA (OUTPATIENT)
Dept: INTERVENTIONAL RADIOLOGY/VASCULAR | Age: 64
End: 2024-01-12
Payer: MEDICAID

## 2024-01-12 ENCOUNTER — ANESTHESIA EVENT (OUTPATIENT)
Dept: INTERVENTIONAL RADIOLOGY/VASCULAR | Age: 64
End: 2024-01-12
Payer: MEDICAID

## 2024-01-12 ENCOUNTER — APPOINTMENT (OUTPATIENT)
Dept: INTERVENTIONAL RADIOLOGY/VASCULAR | Age: 64
End: 2024-01-12
Attending: STUDENT IN AN ORGANIZED HEALTH CARE EDUCATION/TRAINING PROGRAM
Payer: MEDICAID

## 2024-01-12 LAB
CULTURE: NORMAL
Lab: NORMAL
SPECIMEN: NORMAL

## 2024-01-12 PROCEDURE — 2580000003 HC RX 258: Performed by: INTERNAL MEDICINE

## 2024-01-12 PROCEDURE — 2500000003 HC RX 250 WO HCPCS: Performed by: NURSE ANESTHETIST, CERTIFIED REGISTERED

## 2024-01-12 PROCEDURE — 6370000000 HC RX 637 (ALT 250 FOR IP): Performed by: INTERNAL MEDICINE

## 2024-01-12 PROCEDURE — 6360000002 HC RX W HCPCS: Performed by: STUDENT IN AN ORGANIZED HEALTH CARE EDUCATION/TRAINING PROGRAM

## 2024-01-12 PROCEDURE — 0T9430Z DRAINAGE OF LEFT KIDNEY PELVIS WITH DRAINAGE DEVICE, PERCUTANEOUS APPROACH: ICD-10-PCS | Performed by: RADIOLOGY

## 2024-01-12 PROCEDURE — 1200000000 HC SEMI PRIVATE

## 2024-01-12 PROCEDURE — C1769 GUIDE WIRE: HCPCS

## 2024-01-12 PROCEDURE — 94761 N-INVAS EAR/PLS OXIMETRY MLT: CPT

## 2024-01-12 PROCEDURE — 50432 PLMT NEPHROSTOMY CATHETER: CPT

## 2024-01-12 PROCEDURE — 6360000002 HC RX W HCPCS: Performed by: NURSE ANESTHETIST, CERTIFIED REGISTERED

## 2024-01-12 PROCEDURE — 2500000003 HC RX 250 WO HCPCS: Performed by: RADIOLOGY

## 2024-01-12 PROCEDURE — 6360000002 HC RX W HCPCS: Performed by: INTERNAL MEDICINE

## 2024-01-12 RX ORDER — LIDOCAINE HYDROCHLORIDE 20 MG/ML
INJECTION, SOLUTION EPIDURAL; INFILTRATION; INTRACAUDAL; PERINEURAL PRN
Status: DISCONTINUED | OUTPATIENT
Start: 2024-01-12 | End: 2024-01-12 | Stop reason: SDUPTHER

## 2024-01-12 RX ORDER — PROPOFOL 10 MG/ML
INJECTION, EMULSION INTRAVENOUS PRN
Status: DISCONTINUED | OUTPATIENT
Start: 2024-01-12 | End: 2024-01-12 | Stop reason: SDUPTHER

## 2024-01-12 RX ORDER — LIDOCAINE HYDROCHLORIDE 10 MG/ML
INJECTION, SOLUTION EPIDURAL; INFILTRATION; INTRACAUDAL; PERINEURAL PRN
Status: COMPLETED | OUTPATIENT
Start: 2024-01-12 | End: 2024-01-12

## 2024-01-12 RX ORDER — ROCURONIUM BROMIDE 10 MG/ML
INJECTION, SOLUTION INTRAVENOUS PRN
Status: DISCONTINUED | OUTPATIENT
Start: 2024-01-12 | End: 2024-01-12 | Stop reason: SDUPTHER

## 2024-01-12 RX ORDER — HYDROXYZINE HYDROCHLORIDE 50 MG/ML
50 INJECTION, SOLUTION INTRAMUSCULAR EVERY 6 HOURS PRN
Status: DISCONTINUED | OUTPATIENT
Start: 2024-01-12 | End: 2024-01-19 | Stop reason: HOSPADM

## 2024-01-12 RX ORDER — ONDANSETRON 2 MG/ML
INJECTION INTRAMUSCULAR; INTRAVENOUS PRN
Status: DISCONTINUED | OUTPATIENT
Start: 2024-01-12 | End: 2024-01-12 | Stop reason: SDUPTHER

## 2024-01-12 RX ORDER — SUCCINYLCHOLINE/SOD CL,ISO/PF 100 MG/5ML
SYRINGE (ML) INTRAVENOUS PRN
Status: DISCONTINUED | OUTPATIENT
Start: 2024-01-12 | End: 2024-01-12 | Stop reason: SDUPTHER

## 2024-01-12 RX ADMIN — HYDROMORPHONE HYDROCHLORIDE 0.25 MG: 1 INJECTION, SOLUTION INTRAMUSCULAR; INTRAVENOUS; SUBCUTANEOUS at 18:35

## 2024-01-12 RX ADMIN — OXYCODONE HYDROCHLORIDE 5 MG: 5 TABLET ORAL at 09:27

## 2024-01-12 RX ADMIN — PHENYLEPHRINE HYDROCHLORIDE 100 MCG: 10 INJECTION INTRAVENOUS at 13:32

## 2024-01-12 RX ADMIN — ACETAMINOPHEN 650 MG: 325 TABLET ORAL at 20:18

## 2024-01-12 RX ADMIN — HYDROXYZINE HYDROCHLORIDE 50 MG: 50 INJECTION, SOLUTION INTRAMUSCULAR at 10:21

## 2024-01-12 RX ADMIN — OXYCODONE HYDROCHLORIDE 5 MG: 5 TABLET ORAL at 02:55

## 2024-01-12 RX ADMIN — AMLODIPINE BESYLATE 10 MG: 10 TABLET ORAL at 08:33

## 2024-01-12 RX ADMIN — CARVEDILOL 6.25 MG: 6.25 TABLET, FILM COATED ORAL at 08:33

## 2024-01-12 RX ADMIN — ROCURONIUM BROMIDE 10 MG: 10 INJECTION INTRAVENOUS at 13:54

## 2024-01-12 RX ADMIN — SODIUM CHLORIDE, PRESERVATIVE FREE 10 ML: 5 INJECTION INTRAVENOUS at 08:27

## 2024-01-12 RX ADMIN — ONDANSETRON 4 MG: 2 INJECTION INTRAMUSCULAR; INTRAVENOUS at 13:32

## 2024-01-12 RX ADMIN — Medication 100 MG: at 13:10

## 2024-01-12 RX ADMIN — SUGAMMADEX 200 MG: 100 INJECTION, SOLUTION INTRAVENOUS at 14:15

## 2024-01-12 RX ADMIN — ROCURONIUM BROMIDE 45 MG: 10 INJECTION INTRAVENOUS at 13:22

## 2024-01-12 RX ADMIN — LIDOCAINE HYDROCHLORIDE 100 MG: 20 INJECTION, SOLUTION EPIDURAL; INFILTRATION; INTRACAUDAL; PERINEURAL at 13:10

## 2024-01-12 RX ADMIN — SODIUM CHLORIDE: 9 INJECTION, SOLUTION INTRAVENOUS at 02:51

## 2024-01-12 RX ADMIN — TAMSULOSIN HYDROCHLORIDE 0.4 MG: 0.4 CAPSULE ORAL at 08:33

## 2024-01-12 RX ADMIN — ROCURONIUM BROMIDE 5 MG: 10 INJECTION INTRAVENOUS at 13:10

## 2024-01-12 RX ADMIN — LIDOCAINE HYDROCHLORIDE 10 ML: 10 INJECTION, SOLUTION EPIDURAL; INFILTRATION; INTRACAUDAL; PERINEURAL at 14:03

## 2024-01-12 RX ADMIN — CEFTRIAXONE 1000 MG: 1 INJECTION, POWDER, FOR SOLUTION INTRAMUSCULAR; INTRAVENOUS at 23:05

## 2024-01-12 RX ADMIN — PROPOFOL 150 MG: 10 INJECTION, EMULSION INTRAVENOUS at 13:10

## 2024-01-12 RX ADMIN — MORPHINE SULFATE 4 MG: 4 INJECTION, SOLUTION INTRAMUSCULAR; INTRAVENOUS at 08:25

## 2024-01-12 RX ADMIN — OXYCODONE HYDROCHLORIDE 5 MG: 5 TABLET ORAL at 20:18

## 2024-01-12 ASSESSMENT — PAIN DESCRIPTION - ORIENTATION
ORIENTATION: RIGHT
ORIENTATION: RIGHT;LEFT
ORIENTATION: LOWER;MID
ORIENTATION: RIGHT;LOWER
ORIENTATION: LOWER

## 2024-01-12 ASSESSMENT — PAIN DESCRIPTION - LOCATION
LOCATION: FLANK
LOCATION: ABDOMEN
LOCATION: ABDOMEN;SCROTUM;BACK

## 2024-01-12 ASSESSMENT — PAIN DESCRIPTION - DESCRIPTORS
DESCRIPTORS: SHOOTING;SHARP;STABBING
DESCRIPTORS: SPASM
DESCRIPTORS: SHARP
DESCRIPTORS: STABBING;THROBBING
DESCRIPTORS: BURNING;SHARP

## 2024-01-12 ASSESSMENT — PAIN - FUNCTIONAL ASSESSMENT
PAIN_FUNCTIONAL_ASSESSMENT: INTOLERABLE, UNABLE TO DO ANY ACTIVE OR PASSIVE ACTIVITIES
PAIN_FUNCTIONAL_ASSESSMENT: PREVENTS OR INTERFERES SOME ACTIVE ACTIVITIES AND ADLS
PAIN_FUNCTIONAL_ASSESSMENT: PREVENTS OR INTERFERES SOME ACTIVE ACTIVITIES AND ADLS
PAIN_FUNCTIONAL_ASSESSMENT: ACTIVITIES ARE NOT PREVENTED

## 2024-01-12 ASSESSMENT — PAIN SCALES - GENERAL
PAINLEVEL_OUTOF10: 10
PAINLEVEL_OUTOF10: 10
PAINLEVEL_OUTOF10: 8
PAINLEVEL_OUTOF10: 8
PAINLEVEL_OUTOF10: 9

## 2024-01-12 ASSESSMENT — PAIN SCALES - WONG BAKER
WONGBAKER_NUMERICALRESPONSE: 0;2
WONGBAKER_NUMERICALRESPONSE: 0

## 2024-01-12 NOTE — PROGRESS NOTES
IR PREPROCEDURE NOTE    1/12/24    BILATERAL NEPHROSTOMY TUBE PLACEMENT DISCUSSED WITH PT INCLUDING RISKS, BENEFITS AND ALTERNATIVES AND PT ELECTS TO PROCEED WITH B/L NEPHROSTOMY TUBE PLACEMENT.    MIRA APONTE DO

## 2024-01-12 NOTE — PROGRESS NOTES
thickening is identified related to known malignancy, with soft tissue extension into the adjacent perivesicular fat. There is also some stranding noted adjacent to the bladder.  No pelvic hemorrhage.  Pelvic lymphadenopathy is seen, with a 13 mm left iliac chain lymph node again identified, unchanged from the recent comparison study 6 days earlier. Peritoneum/Retroperitoneum: Retroperitoneal lymphadenopathy is identified, greatest in the left periaortic region, with similar sized lymph nodes including a 14 mm short axis left periaortic lymph node when compared to the recent study. Bones/Soft Tissues: No acute subcutaneous soft tissue abnormality.  No acute osseous abnormality is identified.  Mild degenerative changes seen in the lower thoracic and lumbar spine.     1. Bilateral hydroureteronephrosis is identified, without obstructive calculi.  Pyelitis is difficult to exclude.  Pyelonephritis is difficult to exclude, and questioned on the recent comparison study with contrast. 2. Known bladder mass, with metastatic lymphadenopathy seen in the pelvis, retroperitoneum as well as the retrocrural region, with prominent lymph nodes seen adjacent to the distal thoracic esophagus. 3. Cholelithiasis without CT findings to suggest acute cholecystitis.     US RETROPERITONEAL LIMITED    Result Date: 1/3/2024  EXAMINATION: ULTRASOUND OF THE KIDNEYS 1/3/2024 3:01 pm COMPARISON: CT on 12/31/2023, ultrasound on 12/15/2023 HISTORY: Re-evaluate hydronephrosis. FINDINGS: The right kidney measures 12.8 cm in length and the left kidney measures 11.5 cm in length. Kidneys demonstrate normal cortical echogenicity.  Stable 4.5 cm cyst at the inferior pole of the right kidney.  Unchanged mild right and moderate left hydronephrosis.     Moderate left and mild right hydronephrosis, unchanged from recent CT.     NM BONE SCAN WHOLE BODY    Result Date: 1/2/2024  EXAMINATION: WHOLE BODY BONE SCAN  1/2/2024 TECHNIQUE: The patient was injected  normal limits. Pulmonary vasculature is within normal limits. The lungs are clear. No pneumothorax is identified. Bony structures are unremarkable.Calcified granuloma in the left mid lung is noted.     No acute cardiopulmonary process.     CT ABDOMEN PELVIS WO CONTRAST Additional Contrast? None    Result Date: 12/24/2023  EXAMINATION: CT OF THE ABDOMEN AND PELVIS WITHOUT CONTRAST 12/24/2023 6:26 pm TECHNIQUE: CT of the abdomen and pelvis was performed without the administration of intravenous contrast. Multiplanar reformatted images are provided for review. Automated exposure control, iterative reconstruction, and/or weight based adjustment of the mA/kV was utilized to reduce the radiation dose to as low as reasonably achievable. COMPARISON: 11/22/2023. HISTORY: ORDERING SYSTEM PROVIDED HISTORY: left flank pain; h/o kidney stones; concern for obstructing ureteral stone TECHNOLOGIST PROVIDED HISTORY: Reason for exam:->left flank pain; h/o kidney stones; concern for obstructing ureteral stone Additional Contrast?->None Decision Support Exception - unselect if not a suspected or confirmed emergency medical condition->Emergency Medical Condition (MA) Reason for Exam: LT Flank pain; h/o kidney stones FINDINGS: Lower Chest: There is no confluent airspace consolidation or pleural effusion. Organs: The liver, spleen, adrenal glands and pancreas are unremarkable. There is a least 1 gallstone in the gallbladder.  The spleen, adrenal glands and pancreas are unremarkable.  There is moderate left and mild right hydronephrosis and hydroureter.  There is no ureteral stone.  A 5 cm exophytic simple cyst at the lower pole the right kidney appears unchanged. GI/Bowel: There is no bowel dilatation or bowel wall thickening.  The stomach is unremarkable.  The appendix is not identified and may be absent.  There is no enlarged appendix. Pelvis: Asymmetrical thickening of the bladder which is more prominent towards the left and  7.9 cm in length.  The bilateral renal cortices measure 2 cm.  Simple cyst in the right kidney measuring up to 4.9 cm that requires no further follow-up imaging. Kidneys demonstrate normal cortical echogenicity.  Mild bilateral hydronephrosis.  No intrarenal stones.  No focal lesions.     Mild bilateral hydronephrosis.       Assessment & Plan:      Jethro Vázquez is a 63 y.o. male with pmhx of kidney stones, HTN, bladder cancer, abd bilateral hydronephrosis admitted 12/31/2023 for pyelonephritis:      1) Bladder Cancer w Bilateral Hydronephrosis   & SHIRLEY: S/p cystoscopy and transurethral resection of large bladder tumor 12/14/23. Ureteral orifices not visible. Pathology with invasive high grade urothelial carcinoma.              CT a/p 1/10/24: Stable appearance of bilateral mild hydroureteronephrosis, slightly more pronounced on the left side. Unchanged appearance of metastatic lymphadenopathy involving the pelvic sidewalls, retroperitoneum, retrocrural space. Urinary bladder wall demonstrates eccentric wall thickening with perivesical infiltration as before.              CT a/p 12/31/23: The known bladder mass is causing obstruction of the kidneys, more severe on the left with moderate hydroureteronephrosis and delayed contrast opacification. Pelvic and retroperitoneal lymphadenopathy, most consistent with metastatic disease.              Cr 1.5 1/11              Pt seen by oncology during hospital stay last week and recommend PET scan and chemo as outpatient as well as referral to pain management.              Recommend bilateral PCN placement with IR. To be performed in the OR today.     2) Complicated UTI: UA w small leuks, occasional bacteria, large blood; urine cx pending  Urine cx 12/31/23 +E.coli, pansensitive              WBC 12.3, afebrile              On IV Rocephin     Will follow.  Patient seen and examined, chart reviewed.     Electronically signed by Juan Zaidi PA-C on 1/12/2024 at 8:51 AM   Alert-The patient is alert, awake and responds to voice. The patient is oriented to time, place, and person. The triage nurse is able to obtain subjective information.

## 2024-01-12 NOTE — ANESTHESIA PRE PROCEDURE
disease.     2.  Otherwise, no findings to suggest metastatic disease in the chest.     3.  Redemonstrated partially visualized extensive retroperitoneal  lymphadenopathy and left-sided hydronephrosis.   Cardiovascular:    (+) hypertension:                  Neuro/Psych:   Negative Neuro/Psych ROS              GI/Hepatic/Renal:   (+) GERD:          Endo/Other:    (+) malignancy/cancer.                  ROS comment: Recently diagnosed invasive high-grade urothelial carcinoma  -S/p cystoscopy-s/p bladder tumor resection-12/2023 Abdominal:             Vascular: negative vascular ROS.         Other Findings:         Anesthesia Plan      general     ASA 3       Induction: intravenous.                          CHENG Little - CRNA   1/12/2024       Pre Anesthesia Assessment complete. Chart reviewed on 1/12/2024

## 2024-01-12 NOTE — PROGRESS NOTES
TRANSFER - OUT REPORT:    Verbal report given to KATHRYN Potter on Jethro Vázquez being transferred to  for routine post-op.  Pt transferred to PACU prior post extubation.  Bilateral nephrostomy tubes placed per Dr. Mendoza.         Report consisted of patient's Situation, Background, Assessment and   Recommendations(SBAR).     Information from the following report(s) Nurse Handoff Report was reviewed with the receiving nurse.    Opportunity for questions and clarification was provided.      Patient transported with:   Registered Nurse

## 2024-01-12 NOTE — PROGRESS NOTES
V2.0    [facility] [place]      Name:  Jethro Vázquez /Age/Sex: 1960  (63 y.o. male)   MRN & CSN:  [id number] & [id number] Encounter Date/Time: 2024 1:28 PM EST   Location:  [address] [place] [address]-A PCP: Mono Schaffer MD     Attending:Alberto Jerry,Bob       Hospital Day: 3    Assessment and Recommendations         Plan:     #. Gross Hematuria.   #.  Flank pain, suprapubic pain  Acute cystitis   -CT abdomen/pelvis-stable appearance of bilateral mild hydroureteronephrosis, unchanged appearance of metastatic lymphadenopathy, urinary bladder wall demonstrated eccentric wall thickening with perivesical infiltration as before.  Simple right renal cortical cyst-stable  -UA consistent with UTI  -Continue ceftriaxone (urine culture in the past grew pansensitive E. coli)  -Keep n.p.o. after midnight  -IV fluids, antiemetics, analgesics as needed.  S/p BILATERAL NEPHROSTOMY TUBE PLACEMENT by IR. Monitor for bleeding   Urology following      SHIRLEY - likely obstructive uropathy   Cr was trending up     #.  Hypokalemia - resolved   -Replaced     #.  Recently diagnosed invasive high-grade urothelial carcinoma  -S/p cystoscopy-s/p bladder tumor resection-2023  -Following with Dr. Gambino     #.  History of left ureteral calculi-2023     #.  Hypertension-patient is on amlodipine, carvedilol-resume with holding parameters     #.  BPH-continue tamsulosin     #.  History of robotic inguinal hernia repair-2018      Diet ADULT DIET; Regular   DVT Prophylaxis [] Lovenox, []  Heparin, [] SCDs, [] Ambulation,  [] Eliquis, [] Xarelto  [] Coumadin   Code Status Full Code   Disposition From: Home  Expected Disposition: Home  Estimated Date of Discharge: 2-3 days  Patient requires continued admission due to gross hematuria   Surrogate Decision Maker/ POA       Personally reviewed Lab Studies and Imaging     Discussed management of the case with urology who recommended PCN placement by IR      Imaging that was  Decision Support Exception - unselect if not a suspected or confirmed emergency medical condition->Emergency Medical Condition (MA) Reason for Exam: right flank pain FINDINGS: Lower Chest: No distal esophageal thickening.  No cardiomegaly is identified. Similar sized prominent lymph nodes are seen adjacent to the esophagus when compared to the previous examination.  Calcified granuloma noted within the lingula.  Subsegmental atelectatic changes are identified. Organs: No acute liver or splenic abnormality is identified.  No adrenal abnormality is seen.  No pancreatic mass or ductal dilation.  No acute pancreatitis.  Cholelithiasis is seen without CT findings to suggest cholecystitis.  There is mild bilateral hydroureteronephrosis.  This is similar to the previous examination. GI/Bowel: No ileus or obstruction is identified.  No acute bowel process is seen.  No significant stool burden in the colon. Pelvis: Eccentric bladder wall thickening is identified related to known malignancy, with soft tissue extension into the adjacent perivesicular fat. There is also some stranding noted adjacent to the bladder.  No pelvic hemorrhage.  Pelvic lymphadenopathy is seen, with a 13 mm left iliac chain lymph node again identified, unchanged from the recent comparison study 6 days earlier. Peritoneum/Retroperitoneum: Retroperitoneal lymphadenopathy is identified, greatest in the left periaortic region, with similar sized lymph nodes including a 14 mm short axis left periaortic lymph node when compared to the recent study. Bones/Soft Tissues: No acute subcutaneous soft tissue abnormality.  No acute osseous abnormality is identified.  Mild degenerative changes seen in the lower thoracic and lumbar spine.     1. Bilateral hydroureteronephrosis is identified, without obstructive calculi.  Pyelitis is difficult to exclude.  Pyelonephritis is difficult to exclude, and questioned on the recent comparison study with contrast. 2. Known

## 2024-01-12 NOTE — BRIEF OP NOTE
Brief Postoperative Note      Patient: Jethro Vázquez  YOB: 1960  MRN: [id number]    Date of Procedure: 1/12/2024    * No Diagnosis Codes entered *bladder cancer with b/l distal ureteral obstruction    Post-Op Diagnosis: Same       * No procedures listed *b/l antegrade nephrostogram and 8.5 fr b/l nephrostomy tube placement via posterior joe calyceal approach with us and fluoro guidance.    * No surgeons listed *juhi dolan do    Assistant:  * No surgical staff found *    Anesthesia: * No anesthesia type entered *general per pt request and local 1% lidocaine without epi    Estimated Blood Loss (mL): Minimal    Complications: None    Specimens:   * No specimens in log *    Implants:  * No implants in log *      Drains:   Nephrostomy Tube 1 Left Flank 8 fr (Active)   Dressing Status New dressing applied;Clean, dry & intact 01/12/24 1336   Dressing Type Other (Comment) 01/12/24 1336   Collection Container Leg bag 01/12/24 1336   Securement Method Securing device (Describe) 01/12/24 1336   Status Patent 01/12/24 1336   Urine Color Other (Comment) 01/12/24 1336   Urine Appearance Clear 01/12/24 1336       Nephrostomy Tube 2 Right Flank 8 fr (Active)   Dressing Status New dressing applied;Clean, dry & intact 01/12/24 1345   Dressing Type Other (Comment) 01/12/24 1345   Collection Container Leg bag 01/12/24 1345   Securement Method Securing device (Describe) 01/12/24 1345   Status Patent 01/12/24 1345   Urine Color Other (Comment) 01/12/24 1345   Urine Appearance Clear 01/12/24 1345       [REMOVED] Urinary Catheter 12/14/23 3 Way (Removed)   Catheter Indications Perioperative use for selected surgical procedures 12/16/23 0800   Site Assessment No urethral drainage 12/16/23 0800   Urine Color Pink 12/15/23 1704   Urine Appearance Clear 12/15/23 1704   Urine Odor Other (Comment) 12/15/23 1704   Collection Container Standard 12/14/23 1150   Securement Method Securing device (Describe) 12/14/23 1150

## 2024-01-12 NOTE — PROGRESS NOTES
1425 Pt arrived to PACU.  Monitors applied and alarms set.  Report received.   1440 More awake.  Denieds pain. Bilat neph tubes patent with small amts drainage Bilat.    1445 Taking sips of water.   1500 Report called to $n RN.  Transport arranged

## 2024-01-12 NOTE — ANESTHESIA POSTPROCEDURE EVALUATION
Department of Anesthesiology  Postprocedure Note    Patient: Jethro Vázquez  MRN: [id number]  YOB: 1960  Date of evaluation: 1/12/2024    Procedure Summary       Date: 01/12/24 Room / Location: [facility] Special Procedures    Anesthesia Start: 1306 Anesthesia Stop: 1429    Procedure: IR NEPHROSTOMY CATHETER PLACEMENT Diagnosis: (HYDRO)    Scheduled Providers:  Responsible Provider: Danny Chan MD    Anesthesia Type: general ASA Status: 3            Anesthesia Type: No value filed.    Arthur Phase I: Arthur Score: 8    Arthur Phase II:      Anesthesia Post Evaluation    Patient location during evaluation: PACU  Patient participation: complete - patient participated  Level of consciousness: sleepy but conscious  Pain score: 0  Airway patency: patent  Nausea & Vomiting: no nausea and no vomiting  Cardiovascular status: blood pressure returned to baseline and hemodynamically stable  Respiratory status: acceptable and face mask  Hydration status: stable  Pain management: adequate    No notable events documented.

## 2024-01-13 LAB
ALBUMIN SERPL-MCNC: 3 GM/DL (ref 3.4–5)
ALP BLD-CCNC: 260 IU/L (ref 40–129)
ALT SERPL-CCNC: 16 U/L (ref 10–40)
ANION GAP SERPL CALCULATED.3IONS-SCNC: 18 MMOL/L (ref 7–16)
AST SERPL-CCNC: 14 IU/L (ref 15–37)
BASOPHILS ABSOLUTE: 0.1 K/CU MM
BASOPHILS RELATIVE PERCENT: 0.5 % (ref 0–1)
BILIRUB SERPL-MCNC: 0.3 MG/DL (ref 0–1)
BUN SERPL-MCNC: 15 MG/DL (ref 6–23)
CALCIUM SERPL-MCNC: 8.4 MG/DL (ref 8.3–10.6)
CHLORIDE BLD-SCNC: 99 MMOL/L (ref 99–110)
CO2: 22 MMOL/L (ref 21–32)
CREAT SERPL-MCNC: 1.5 MG/DL (ref 0.9–1.3)
DIFFERENTIAL TYPE: ABNORMAL
EOSINOPHILS ABSOLUTE: 0.3 K/CU MM
EOSINOPHILS RELATIVE PERCENT: 2.6 % (ref 0–3)
GFR SERPL CREATININE-BSD FRML MDRD: 52 ML/MIN/1.73M2
GLUCOSE SERPL-MCNC: 85 MG/DL (ref 70–99)
HCT VFR BLD CALC: 36.4 % (ref 42–52)
HEMOGLOBIN: 11.5 GM/DL (ref 13.5–18)
IMMATURE NEUTROPHIL %: 0.4 % (ref 0–0.43)
LYMPHOCYTES ABSOLUTE: 2 K/CU MM
LYMPHOCYTES RELATIVE PERCENT: 18.9 % (ref 24–44)
MCH RBC QN AUTO: 27.5 PG (ref 27–31)
MCHC RBC AUTO-ENTMCNC: 31.6 % (ref 32–36)
MCV RBC AUTO: 87.1 FL (ref 78–100)
MONOCYTES ABSOLUTE: 0.7 K/CU MM
MONOCYTES RELATIVE PERCENT: 6.4 % (ref 0–4)
NUCLEATED RBC %: 0 %
PDW BLD-RTO: 11.7 % (ref 11.7–14.9)
PLATELET # BLD: 473 K/CU MM (ref 140–440)
PMV BLD AUTO: 9 FL (ref 7.5–11.1)
POTASSIUM SERPL-SCNC: 3.8 MMOL/L (ref 3.5–5.1)
RBC # BLD: 4.18 M/CU MM (ref 4.6–6.2)
SEGMENTED NEUTROPHILS ABSOLUTE COUNT: 7.6 K/CU MM
SEGMENTED NEUTROPHILS RELATIVE PERCENT: 71.2 % (ref 36–66)
SODIUM BLD-SCNC: 139 MMOL/L (ref 135–145)
TOTAL IMMATURE NEUTOROPHIL: 0.04 K/CU MM
TOTAL NUCLEATED RBC: 0 K/CU MM
TOTAL PROTEIN: 6.7 GM/DL (ref 6.4–8.2)
WBC # BLD: 10.7 K/CU MM (ref 4–10.5)

## 2024-01-13 PROCEDURE — 6370000000 HC RX 637 (ALT 250 FOR IP): Performed by: STUDENT IN AN ORGANIZED HEALTH CARE EDUCATION/TRAINING PROGRAM

## 2024-01-13 PROCEDURE — 36415 COLL VENOUS BLD VENIPUNCTURE: CPT

## 2024-01-13 PROCEDURE — 6370000000 HC RX 637 (ALT 250 FOR IP): Performed by: INTERNAL MEDICINE

## 2024-01-13 PROCEDURE — 6360000002 HC RX W HCPCS: Performed by: INTERNAL MEDICINE

## 2024-01-13 PROCEDURE — 85025 COMPLETE CBC W/AUTO DIFF WBC: CPT

## 2024-01-13 PROCEDURE — 94761 N-INVAS EAR/PLS OXIMETRY MLT: CPT

## 2024-01-13 PROCEDURE — 1200000000 HC SEMI PRIVATE

## 2024-01-13 PROCEDURE — 80053 COMPREHEN METABOLIC PANEL: CPT

## 2024-01-13 PROCEDURE — 2580000003 HC RX 258: Performed by: INTERNAL MEDICINE

## 2024-01-13 PROCEDURE — 6360000002 HC RX W HCPCS: Performed by: STUDENT IN AN ORGANIZED HEALTH CARE EDUCATION/TRAINING PROGRAM

## 2024-01-13 RX ORDER — METHOCARBAMOL 500 MG/1
1000 TABLET, FILM COATED ORAL 3 TIMES DAILY
Status: COMPLETED | OUTPATIENT
Start: 2024-01-13 | End: 2024-01-15

## 2024-01-13 RX ORDER — METHOCARBAMOL 500 MG/1
1500 TABLET, FILM COATED ORAL 3 TIMES DAILY
Status: DISCONTINUED | OUTPATIENT
Start: 2024-01-13 | End: 2024-01-13

## 2024-01-13 RX ADMIN — HYDROMORPHONE HYDROCHLORIDE 0.5 MG: 1 INJECTION, SOLUTION INTRAMUSCULAR; INTRAVENOUS; SUBCUTANEOUS at 13:07

## 2024-01-13 RX ADMIN — METHOCARBAMOL 1000 MG: 500 TABLET ORAL at 14:29

## 2024-01-13 RX ADMIN — AMLODIPINE BESYLATE 10 MG: 10 TABLET ORAL at 08:30

## 2024-01-13 RX ADMIN — ONDANSETRON 4 MG: 2 INJECTION INTRAMUSCULAR; INTRAVENOUS at 13:11

## 2024-01-13 RX ADMIN — SODIUM CHLORIDE, PRESERVATIVE FREE 10 ML: 5 INJECTION INTRAVENOUS at 21:18

## 2024-01-13 RX ADMIN — OXYCODONE HYDROCHLORIDE 5 MG: 5 TABLET ORAL at 23:16

## 2024-01-13 RX ADMIN — HYDROMORPHONE HYDROCHLORIDE 0.5 MG: 1 INJECTION, SOLUTION INTRAMUSCULAR; INTRAVENOUS; SUBCUTANEOUS at 17:39

## 2024-01-13 RX ADMIN — SODIUM CHLORIDE, PRESERVATIVE FREE 10 ML: 5 INJECTION INTRAVENOUS at 08:32

## 2024-01-13 RX ADMIN — HYDROMORPHONE HYDROCHLORIDE 0.25 MG: 1 INJECTION, SOLUTION INTRAMUSCULAR; INTRAVENOUS; SUBCUTANEOUS at 11:13

## 2024-01-13 RX ADMIN — CARVEDILOL 6.25 MG: 6.25 TABLET, FILM COATED ORAL at 17:40

## 2024-01-13 RX ADMIN — CARVEDILOL 6.25 MG: 6.25 TABLET, FILM COATED ORAL at 08:30

## 2024-01-13 RX ADMIN — CEFTRIAXONE 1000 MG: 1 INJECTION, POWDER, FOR SOLUTION INTRAMUSCULAR; INTRAVENOUS at 21:17

## 2024-01-13 RX ADMIN — ACETAMINOPHEN 650 MG: 325 TABLET ORAL at 03:55

## 2024-01-13 RX ADMIN — HYDROMORPHONE HYDROCHLORIDE 0.5 MG: 1 INJECTION, SOLUTION INTRAMUSCULAR; INTRAVENOUS; SUBCUTANEOUS at 21:11

## 2024-01-13 RX ADMIN — OXYCODONE HYDROCHLORIDE 5 MG: 5 TABLET ORAL at 03:55

## 2024-01-13 RX ADMIN — HYDROXYZINE HYDROCHLORIDE 50 MG: 50 INJECTION, SOLUTION INTRAMUSCULAR at 23:31

## 2024-01-13 RX ADMIN — METHOCARBAMOL 1000 MG: 500 TABLET ORAL at 21:19

## 2024-01-13 RX ADMIN — HYDROMORPHONE HYDROCHLORIDE 0.25 MG: 1 INJECTION, SOLUTION INTRAMUSCULAR; INTRAVENOUS; SUBCUTANEOUS at 02:26

## 2024-01-13 RX ADMIN — OXYCODONE HYDROCHLORIDE 5 MG: 5 TABLET ORAL at 16:38

## 2024-01-13 RX ADMIN — SODIUM CHLORIDE, PRESERVATIVE FREE 10 ML: 5 INJECTION INTRAVENOUS at 21:21

## 2024-01-13 RX ADMIN — TAMSULOSIN HYDROCHLORIDE 0.4 MG: 0.4 CAPSULE ORAL at 08:30

## 2024-01-13 ASSESSMENT — PAIN SCALES - GENERAL
PAINLEVEL_OUTOF10: 8
PAINLEVEL_OUTOF10: 8
PAINLEVEL_OUTOF10: 0
PAINLEVEL_OUTOF10: 7
PAINLEVEL_OUTOF10: 0
PAINLEVEL_OUTOF10: 10
PAINLEVEL_OUTOF10: 8
PAINLEVEL_OUTOF10: 7
PAINLEVEL_OUTOF10: 9
PAINLEVEL_OUTOF10: 0
PAINLEVEL_OUTOF10: 9
PAINLEVEL_OUTOF10: 9

## 2024-01-13 ASSESSMENT — PAIN DESCRIPTION - DIRECTION
RADIATING_TOWARDS: BACK
RADIATING_TOWARDS: BACK

## 2024-01-13 ASSESSMENT — PAIN DESCRIPTION - FREQUENCY
FREQUENCY: CONTINUOUS
FREQUENCY: CONTINUOUS

## 2024-01-13 ASSESSMENT — PAIN DESCRIPTION - ORIENTATION
ORIENTATION: RIGHT;LEFT
ORIENTATION: RIGHT;LEFT
ORIENTATION: RIGHT
ORIENTATION: RIGHT;LEFT
ORIENTATION: RIGHT
ORIENTATION: RIGHT;LEFT

## 2024-01-13 ASSESSMENT — PAIN DESCRIPTION - DESCRIPTORS
DESCRIPTORS: CRAMPING;SPASM
DESCRIPTORS: ACHING;CRAMPING
DESCRIPTORS: ACHING;CRAMPING
DESCRIPTORS: ACHING;STABBING
DESCRIPTORS: SHARP

## 2024-01-13 ASSESSMENT — PAIN SCALES - WONG BAKER
WONGBAKER_NUMERICALRESPONSE: 0
WONGBAKER_NUMERICALRESPONSE: 8;10

## 2024-01-13 ASSESSMENT — PAIN DESCRIPTION - ONSET
ONSET: ON-GOING
ONSET: ON-GOING

## 2024-01-13 ASSESSMENT — PAIN DESCRIPTION - LOCATION
LOCATION: BACK;ABDOMEN
LOCATION: FLANK
LOCATION: ABDOMEN
LOCATION: ABDOMEN;GROIN
LOCATION: BACK

## 2024-01-13 ASSESSMENT — PAIN DESCRIPTION - PAIN TYPE
TYPE: CHRONIC PAIN
TYPE: CHRONIC PAIN

## 2024-01-13 ASSESSMENT — PAIN - FUNCTIONAL ASSESSMENT
PAIN_FUNCTIONAL_ASSESSMENT: PREVENTS OR INTERFERES SOME ACTIVE ACTIVITIES AND ADLS

## 2024-01-13 NOTE — PROGRESS NOTES
[place]2.0    [facility] [place]      Name:  Jethro Vázquez /Age/Sex: 1960  (63 y.o. male)   MRN & CSN:  [id number] & [id number] Encounter Date/Time: 2024 1:28 PM EST   Location:  [address] [place] [address]-A PCP: Mono Schaffer MD     Attending:Alberto Jerry,*       Hospital Day: 4    Assessment and Recommendations         Plan:     #. Gross Hematuria.   #.  Flank pain, suprapubic pain  Acute cystitis   -CT abdomen/pelvis-stable appearance of bilateral mild hydroureteronephrosis, unchanged appearance of metastatic lymphadenopathy, urinary bladder wall demonstrated eccentric wall thickening with perivesical infiltration as before.  Simple right renal cortical cyst-stable  -UA consistent with UTI  -Continue ceftriaxone (urine culture in the past grew pansensitive E. coli)  -Keep n.p.o. after midnight  -IV fluids, antiemetics, analgesics as needed.  S/p BILATERAL NEPHROSTOMY TUBE PLACEMENT by IR. Monitor for bleeding   Urology following      SHIRLEY - likely obstructive uropathy   Cr was trending up     #.  Hypokalemia - resolved   -Replaced     #.  Recently diagnosed invasive high-grade urothelial carcinoma  -S/p cystoscopy-s/p bladder tumor resection-2023  -Following with Dr. Gambino     #.  History of left ureteral calculi-2023     #.  Hypertension-patient is on amlodipine, carvedilol-resume with holding parameters     #.  BPH-continue tamsulosin     #.  History of robotic inguinal hernia repair-2018      Diet ADULT DIET; Regular   DVT Prophylaxis [] Lovenox, []  Heparin, [] SCDs, [] Ambulation,  [] Eliquis, [] Xarelto  [] Coumadin   Code Status Full Code   Disposition From: Home  Expected Disposition: Home  Estimated Date of Discharge: 2-3 days  Patient requires continued admission due to pending urology clearance    Surrogate Decision Maker/ POA       Personally reviewed Lab Studies and Imaging     Discussed management of the case with urology who recommended PCN placement by IR      Imaging that

## 2024-01-14 LAB
ALBUMIN SERPL-MCNC: 3.2 GM/DL (ref 3.4–5)
ALP BLD-CCNC: 258 IU/L (ref 40–128)
ALT SERPL-CCNC: 11 U/L (ref 10–40)
ANION GAP SERPL CALCULATED.3IONS-SCNC: 12 MMOL/L (ref 7–16)
AST SERPL-CCNC: 15 IU/L (ref 15–37)
BASOPHILS ABSOLUTE: 0 K/CU MM
BASOPHILS RELATIVE PERCENT: 0.2 % (ref 0–1)
BILIRUB SERPL-MCNC: 0.3 MG/DL (ref 0–1)
BUN SERPL-MCNC: 16 MG/DL (ref 6–23)
CALCIUM SERPL-MCNC: 8.5 MG/DL (ref 8.3–10.6)
CHLORIDE BLD-SCNC: 98 MMOL/L (ref 99–110)
CO2: 25 MMOL/L (ref 21–32)
CREAT SERPL-MCNC: 1.3 MG/DL (ref 0.9–1.3)
DIFFERENTIAL TYPE: ABNORMAL
EOSINOPHILS ABSOLUTE: 0.2 K/CU MM
EOSINOPHILS RELATIVE PERCENT: 1.7 % (ref 0–3)
GFR SERPL CREATININE-BSD FRML MDRD: >60 ML/MIN/1.73M2
GLUCOSE SERPL-MCNC: 124 MG/DL (ref 70–99)
HCT VFR BLD CALC: 37 % (ref 42–52)
HEMOGLOBIN: 12.2 GM/DL (ref 13.5–18)
IMMATURE NEUTROPHIL %: 0.4 % (ref 0–0.43)
LYMPHOCYTES ABSOLUTE: 1.6 K/CU MM
LYMPHOCYTES RELATIVE PERCENT: 13 % (ref 24–44)
MCH RBC QN AUTO: 28 PG (ref 27–31)
MCHC RBC AUTO-ENTMCNC: 33 % (ref 32–36)
MCV RBC AUTO: 84.9 FL (ref 78–100)
MONOCYTES ABSOLUTE: 0.7 K/CU MM
MONOCYTES RELATIVE PERCENT: 5.9 % (ref 0–4)
NUCLEATED RBC %: 0 %
PDW BLD-RTO: 11.5 % (ref 11.7–14.9)
PLATELET # BLD: 463 K/CU MM (ref 140–440)
PMV BLD AUTO: 8.8 FL (ref 7.5–11.1)
POTASSIUM SERPL-SCNC: 3.8 MMOL/L (ref 3.5–5.1)
RBC # BLD: 4.36 M/CU MM (ref 4.6–6.2)
SEGMENTED NEUTROPHILS ABSOLUTE COUNT: 9.7 K/CU MM
SEGMENTED NEUTROPHILS RELATIVE PERCENT: 78.8 % (ref 36–66)
SODIUM BLD-SCNC: 135 MMOL/L (ref 135–145)
TOTAL IMMATURE NEUTOROPHIL: 0.05 K/CU MM
TOTAL NUCLEATED RBC: 0 K/CU MM
TOTAL PROTEIN: 6.6 GM/DL (ref 6.4–8.2)
WBC # BLD: 12.3 K/CU MM (ref 4–10.5)

## 2024-01-14 PROCEDURE — 2580000003 HC RX 258: Performed by: INTERNAL MEDICINE

## 2024-01-14 PROCEDURE — 6370000000 HC RX 637 (ALT 250 FOR IP): Performed by: INTERNAL MEDICINE

## 2024-01-14 PROCEDURE — 36415 COLL VENOUS BLD VENIPUNCTURE: CPT

## 2024-01-14 PROCEDURE — 94761 N-INVAS EAR/PLS OXIMETRY MLT: CPT

## 2024-01-14 PROCEDURE — 97165 OT EVAL LOW COMPLEX 30 MIN: CPT

## 2024-01-14 PROCEDURE — 85025 COMPLETE CBC W/AUTO DIFF WBC: CPT

## 2024-01-14 PROCEDURE — 6360000002 HC RX W HCPCS: Performed by: STUDENT IN AN ORGANIZED HEALTH CARE EDUCATION/TRAINING PROGRAM

## 2024-01-14 PROCEDURE — 80053 COMPREHEN METABOLIC PANEL: CPT

## 2024-01-14 PROCEDURE — 1200000000 HC SEMI PRIVATE

## 2024-01-14 PROCEDURE — 97530 THERAPEUTIC ACTIVITIES: CPT

## 2024-01-14 PROCEDURE — 6370000000 HC RX 637 (ALT 250 FOR IP): Performed by: STUDENT IN AN ORGANIZED HEALTH CARE EDUCATION/TRAINING PROGRAM

## 2024-01-14 PROCEDURE — 6360000002 HC RX W HCPCS: Performed by: INTERNAL MEDICINE

## 2024-01-14 RX ADMIN — HYDROMORPHONE HYDROCHLORIDE 0.5 MG: 1 INJECTION, SOLUTION INTRAMUSCULAR; INTRAVENOUS; SUBCUTANEOUS at 02:09

## 2024-01-14 RX ADMIN — HYDROMORPHONE HYDROCHLORIDE 0.5 MG: 1 INJECTION, SOLUTION INTRAMUSCULAR; INTRAVENOUS; SUBCUTANEOUS at 18:59

## 2024-01-14 RX ADMIN — ONDANSETRON 4 MG: 4 TABLET, ORALLY DISINTEGRATING ORAL at 09:21

## 2024-01-14 RX ADMIN — METHOCARBAMOL 1000 MG: 500 TABLET ORAL at 09:10

## 2024-01-14 RX ADMIN — CARVEDILOL 6.25 MG: 6.25 TABLET, FILM COATED ORAL at 17:24

## 2024-01-14 RX ADMIN — OXYCODONE HYDROCHLORIDE 5 MG: 5 TABLET ORAL at 04:22

## 2024-01-14 RX ADMIN — ACETAMINOPHEN 650 MG: 325 TABLET ORAL at 22:20

## 2024-01-14 RX ADMIN — METHOCARBAMOL 1000 MG: 500 TABLET ORAL at 15:02

## 2024-01-14 RX ADMIN — OXYCODONE HYDROCHLORIDE 5 MG: 5 TABLET ORAL at 09:11

## 2024-01-14 RX ADMIN — SODIUM CHLORIDE, PRESERVATIVE FREE 10 ML: 5 INJECTION INTRAVENOUS at 19:37

## 2024-01-14 RX ADMIN — HYDROMORPHONE HYDROCHLORIDE 0.5 MG: 1 INJECTION, SOLUTION INTRAMUSCULAR; INTRAVENOUS; SUBCUTANEOUS at 23:50

## 2024-01-14 RX ADMIN — CEFTRIAXONE 1000 MG: 1 INJECTION, POWDER, FOR SOLUTION INTRAMUSCULAR; INTRAVENOUS at 22:22

## 2024-01-14 RX ADMIN — METHOCARBAMOL 1000 MG: 500 TABLET ORAL at 19:37

## 2024-01-14 RX ADMIN — CARVEDILOL 6.25 MG: 6.25 TABLET, FILM COATED ORAL at 09:10

## 2024-01-14 RX ADMIN — OXYCODONE HYDROCHLORIDE 5 MG: 5 TABLET ORAL at 18:10

## 2024-01-14 RX ADMIN — ACETAMINOPHEN 650 MG: 325 TABLET ORAL at 04:19

## 2024-01-14 RX ADMIN — TAMSULOSIN HYDROCHLORIDE 0.4 MG: 0.4 CAPSULE ORAL at 09:10

## 2024-01-14 RX ADMIN — OXYCODONE HYDROCHLORIDE 5 MG: 5 TABLET ORAL at 13:11

## 2024-01-14 RX ADMIN — OXYCODONE HYDROCHLORIDE 5 MG: 5 TABLET ORAL at 22:20

## 2024-01-14 RX ADMIN — SODIUM CHLORIDE, PRESERVATIVE FREE 10 ML: 5 INJECTION INTRAVENOUS at 09:11

## 2024-01-14 RX ADMIN — AMLODIPINE BESYLATE 10 MG: 10 TABLET ORAL at 09:10

## 2024-01-14 ASSESSMENT — PAIN - FUNCTIONAL ASSESSMENT
PAIN_FUNCTIONAL_ASSESSMENT: PREVENTS OR INTERFERES SOME ACTIVE ACTIVITIES AND ADLS
PAIN_FUNCTIONAL_ASSESSMENT: PREVENTS OR INTERFERES WITH MANY ACTIVE NOT PASSIVE ACTIVITIES
PAIN_FUNCTIONAL_ASSESSMENT: PREVENTS OR INTERFERES SOME ACTIVE ACTIVITIES AND ADLS
PAIN_FUNCTIONAL_ASSESSMENT: ACTIVITIES ARE NOT PREVENTED
PAIN_FUNCTIONAL_ASSESSMENT: PREVENTS OR INTERFERES SOME ACTIVE ACTIVITIES AND ADLS
PAIN_FUNCTIONAL_ASSESSMENT: ACTIVITIES ARE NOT PREVENTED
PAIN_FUNCTIONAL_ASSESSMENT: ACTIVITIES ARE NOT PREVENTED

## 2024-01-14 ASSESSMENT — PAIN SCALES - GENERAL
PAINLEVEL_OUTOF10: 10
PAINLEVEL_OUTOF10: 9
PAINLEVEL_OUTOF10: 0
PAINLEVEL_OUTOF10: 6
PAINLEVEL_OUTOF10: 9
PAINLEVEL_OUTOF10: 7
PAINLEVEL_OUTOF10: 9
PAINLEVEL_OUTOF10: 0
PAINLEVEL_OUTOF10: 2
PAINLEVEL_OUTOF10: 10
PAINLEVEL_OUTOF10: 7

## 2024-01-14 ASSESSMENT — PAIN DESCRIPTION - DESCRIPTORS
DESCRIPTORS: OTHER (COMMENT)
DESCRIPTORS: TIGHTNESS
DESCRIPTORS: STABBING
DESCRIPTORS: STABBING;SHARP
DESCRIPTORS: SHARP;STABBING

## 2024-01-14 ASSESSMENT — PAIN DESCRIPTION - ONSET
ONSET: ON-GOING
ONSET: ON-GOING
ONSET: SUDDEN

## 2024-01-14 ASSESSMENT — PAIN DESCRIPTION - DIRECTION
RADIATING_TOWARDS: BACK
RADIATING_TOWARDS: BACK

## 2024-01-14 ASSESSMENT — PAIN DESCRIPTION - PAIN TYPE
TYPE: CHRONIC PAIN
TYPE: CHRONIC PAIN
TYPE: SURGICAL PAIN

## 2024-01-14 ASSESSMENT — PAIN DESCRIPTION - LOCATION
LOCATION: ABDOMEN
LOCATION: OTHER (COMMENT)
LOCATION: ABDOMEN
LOCATION: ABDOMEN;GROIN
LOCATION: ABDOMEN
LOCATION: FLANK

## 2024-01-14 ASSESSMENT — PAIN DESCRIPTION - ORIENTATION
ORIENTATION: LEFT
ORIENTATION: RIGHT
ORIENTATION: LEFT
ORIENTATION: RIGHT

## 2024-01-14 ASSESSMENT — PAIN DESCRIPTION - FREQUENCY
FREQUENCY: CONTINUOUS
FREQUENCY: INTERMITTENT
FREQUENCY: CONTINUOUS

## 2024-01-14 ASSESSMENT — PAIN SCALES - WONG BAKER
WONGBAKER_NUMERICALRESPONSE: 0
WONGBAKER_NUMERICALRESPONSE: 2
WONGBAKER_NUMERICALRESPONSE: 0
WONGBAKER_NUMERICALRESPONSE: 10
WONGBAKER_NUMERICALRESPONSE: 0
WONGBAKER_NUMERICALRESPONSE: 0
WONGBAKER_NUMERICALRESPONSE: 2

## 2024-01-14 NOTE — PROGRESS NOTES
Occupational Therapy    [facility] ACUTE CARE OCCUPATIONAL THERAPY EVALUATION  Jethro Vázquez, 1960, 4116/4116-A, 1/14/2024    History  [place]:  There were no encounter diagnoses.  Patient  has a past medical history of Acid reflux, History of kidney stones, Hypertension, Malignant neoplasm of overlapping sites of bladder (HCC), and No natural teeth.  Patient  has a past surgical history that includes Appendectomy (1987); Ankle surgery (Left); hernia repair; other surgical history (Bilateral, 01/19/2018); and Cystoscopy (N/A, 12/14/2023).    Subjective:  Patient states:  \"I'm going to need some therapy at home\".    Pain:  3/10 pain in lower back where kofi drain outputs are placed    Communication with other providers:  Handoff to RN  Restrictions: General Precautions, Fall Risk    Home Setup/Prior level of function  Social/Functional History  Lives With: Spouse, Family  Type of Home: House  Home Layout: Two level, Able to Live on Main level with bedroom/bathroom  Home Access: Stairs to enter with rails  Entrance Stairs - Number of Steps: 3  Bathroom Shower/Tub: Walk-in shower  Bathroom Toilet: Standard  Bathroom Accessibility: Accessible  Home Equipment: None  Has the patient had two or more falls in the past year or any fall with injury in the past year?: No  ADL Assistance: Independent  Homemaking Assistance: Independent  Homemaking Responsibilities: Yes  Ambulation Assistance: Independent  Transfer Assistance: Independent  Active : Yes (wife can help with transportation as needed.)  Mode of Transportation: Car    Examination of body systems (includes body structures/functions, activity/participation limitations):  Observation:  Supine in bed upon arrival, agreeable to therapy   Vision:  Readers  Hearing:  WFL  Cardiopulmonary:  No 02 needs      Body Systems and functions:  ROM R/L:  WFL.    Strength R/L:  4+/5,   Sensation: WFL  Tone: Normal  Coordination: WFL  Perception:

## 2024-01-14 NOTE — PROGRESS NOTES
V2.0    [facility] [place]      Name:  Jethro Vázquez /Age/Sex: 1960  (63 y.o. male)   MRN & CSN:  [id number] & [id number] Encounter Date/Time: 2024 1:28 PM EST   Location:  [address] [place] [address]- PCP: Mono Schaffer MD     Attending:Alberto Jerry,*       Hospital Day: 5    Assessment and Recommendations         Plan:     Gross Hematuria.   Flank pain, suprapubic pain  Acute cystitis   -CT abdomen/pelvis-stable appearance of bilateral mild hydroureteronephrosis, unchanged appearance of metastatic lymphadenopathy, urinary bladder wall demonstrated eccentric wall thickening with perivesical infiltration as before.  Simple right renal cortical cyst-stable  -UA consistent with UTI  -Continue ceftriaxone (urine culture in the past grew pansensitive E. coli)  -Keep n.p.o. after midnight  -IV fluids, antiemetics, analgesics as needed.  S/p BILATERAL NEPHROSTOMY TUBE PLACEMENT by IR. Monitor for bleeding   Discussed with urology - Advised to monitor drain output for today. Evaluated in AM for possible DC     SHIRLEY - likely obstructive uropathy - Resolved    #.  Hypokalemia - resolved   -Replaced     #.  Recently diagnosed invasive high-grade urothelial carcinoma  -S/p cystoscopy-s/p bladder tumor resection-2023  -Following with Dr. Gambino     #.  History of left ureteral calculi-2023     #.  Hypertension-patient is on amlodipine, carvedilol-resume with holding parameters     #.  BPH-continue tamsulosin     #.  History of robotic inguinal hernia repair-2018      Diet ADULT DIET; Regular   DVT Prophylaxis [] Lovenox, []  Heparin, [] SCDs, [] Ambulation,  [] Eliquis, [] Xarelto  [] Coumadin   Code Status Full Code   Disposition From: Home  Expected Disposition: Home  Estimated Date of Discharge: 2-3 days  Patient requires continued admission due to pending urology clearance    Surrogate Decision Maker/ POA       Personally reviewed Lab Studies and Imaging     Discussed management of the case with  Adrenal glands: Normal. No focal lesions are seen. Kidneys and ureters: Stable appearance of bilateral mild hydroureteronephrosis, slightly more pronounced on the left side.  Simple right renal cortical cyst is stable in size.  The ureters are dilated to the level of ureterovesical junction. Abdominal aorta and branches: Normal in caliber. IVC and portal vein: Normal in caliber. GI/Bowel: Normal in caliber and wall thickeness.  No evidence of appendicitis. Peritoneum/Retroperitoneum: Unchanged appearance of metastatic lymphadenopathy involving the pelvic sidewalls, retroperitoneum, retrocrural space. PELVIS Urinary bladder wall demonstrates eccentric wall thickening with perivesical infiltration as before. Prostate gland is mildly enlarged. Bones/Soft Tissues: Normal No soft tissue swelling is seen.     1. Stable appearance of bilateral mild hydroureteronephrosis, slightly more pronounced on the left side. 2. Unchanged appearance of metastatic lymphadenopathy involving the pelvic sidewalls, retroperitoneum, retrocrural space. 3. Urinary bladder wall demonstrates eccentric wall thickening with perivesical infiltration as before. Prostate gland is mildly enlarged. 4. Cholelithiasis. 5. Simple right renal cortical cyst is stable in size. 6. Retrocrural lymphadenopathy is unchanged from prior examination.     CT ABDOMEN PELVIS WO CONTRAST Additional Contrast? None    Result Date: 1/7/2024  EXAMINATION: CT OF THE ABDOMEN AND PELVIS WITHOUT CONTRAST 1/7/2024 2:02 am TECHNIQUE: CT of the abdomen and pelvis was performed without the administration of intravenous contrast. Multiplanar reformatted images are provided for review. Automated exposure control, iterative reconstruction, and/or weight based adjustment of the mA/kV was utilized to reduce the radiation dose to as low as reasonably achievable. COMPARISON: 12/31/2023 HISTORY: ORDERING SYSTEM PROVIDED HISTORY: right flank pain TECHNOLOGIST PROVIDED HISTORY: Reason for

## 2024-01-15 ENCOUNTER — APPOINTMENT (OUTPATIENT)
Dept: CT IMAGING | Age: 64
End: 2024-01-15
Attending: STUDENT IN AN ORGANIZED HEALTH CARE EDUCATION/TRAINING PROGRAM
Payer: MEDICAID

## 2024-01-15 LAB
ALBUMIN SERPL-MCNC: 3.2 GM/DL (ref 3.4–5)
ALP BLD-CCNC: 262 IU/L (ref 40–128)
ALT SERPL-CCNC: 12 U/L (ref 10–40)
ANION GAP SERPL CALCULATED.3IONS-SCNC: 13 MMOL/L (ref 7–16)
AST SERPL-CCNC: 16 IU/L (ref 15–37)
BASOPHILS ABSOLUTE: 0.1 K/CU MM
BASOPHILS RELATIVE PERCENT: 0.4 % (ref 0–1)
BILIRUB SERPL-MCNC: 0.2 MG/DL (ref 0–1)
BUN SERPL-MCNC: 17 MG/DL (ref 6–23)
CALCIUM SERPL-MCNC: 8.7 MG/DL (ref 8.3–10.6)
CHLORIDE BLD-SCNC: 97 MMOL/L (ref 99–110)
CO2: 26 MMOL/L (ref 21–32)
CREAT SERPL-MCNC: 1.2 MG/DL (ref 0.9–1.3)
DIFFERENTIAL TYPE: ABNORMAL
EOSINOPHILS ABSOLUTE: 0.3 K/CU MM
EOSINOPHILS RELATIVE PERCENT: 2.2 % (ref 0–3)
GFR SERPL CREATININE-BSD FRML MDRD: >60 ML/MIN/1.73M2
GLUCOSE SERPL-MCNC: 120 MG/DL (ref 70–99)
HCT VFR BLD CALC: 38.1 % (ref 42–52)
HEMOGLOBIN: 12.4 GM/DL (ref 13.5–18)
IMMATURE NEUTROPHIL %: 0.3 % (ref 0–0.43)
LYMPHOCYTES ABSOLUTE: 1.5 K/CU MM
LYMPHOCYTES RELATIVE PERCENT: 12.6 % (ref 24–44)
MCH RBC QN AUTO: 27.6 PG (ref 27–31)
MCHC RBC AUTO-ENTMCNC: 32.5 % (ref 32–36)
MCV RBC AUTO: 84.9 FL (ref 78–100)
MONOCYTES ABSOLUTE: 0.7 K/CU MM
MONOCYTES RELATIVE PERCENT: 5.6 % (ref 0–4)
NUCLEATED RBC %: 0 %
PDW BLD-RTO: 11.6 % (ref 11.7–14.9)
PLATELET # BLD: 514 K/CU MM (ref 140–440)
PMV BLD AUTO: 9.1 FL (ref 7.5–11.1)
POTASSIUM SERPL-SCNC: 3.9 MMOL/L (ref 3.5–5.1)
RBC # BLD: 4.49 M/CU MM (ref 4.6–6.2)
SEGMENTED NEUTROPHILS ABSOLUTE COUNT: 9.2 K/CU MM
SEGMENTED NEUTROPHILS RELATIVE PERCENT: 78.9 % (ref 36–66)
SODIUM BLD-SCNC: 136 MMOL/L (ref 135–145)
TOTAL IMMATURE NEUTOROPHIL: 0.04 K/CU MM
TOTAL NUCLEATED RBC: 0 K/CU MM
TOTAL PROTEIN: 6.9 GM/DL (ref 6.4–8.2)
WBC # BLD: 11.6 K/CU MM (ref 4–10.5)

## 2024-01-15 PROCEDURE — 2580000003 HC RX 258: Performed by: INTERNAL MEDICINE

## 2024-01-15 PROCEDURE — 6370000000 HC RX 637 (ALT 250 FOR IP): Performed by: STUDENT IN AN ORGANIZED HEALTH CARE EDUCATION/TRAINING PROGRAM

## 2024-01-15 PROCEDURE — 36415 COLL VENOUS BLD VENIPUNCTURE: CPT

## 2024-01-15 PROCEDURE — 6360000002 HC RX W HCPCS: Performed by: STUDENT IN AN ORGANIZED HEALTH CARE EDUCATION/TRAINING PROGRAM

## 2024-01-15 PROCEDURE — 85025 COMPLETE CBC W/AUTO DIFF WBC: CPT

## 2024-01-15 PROCEDURE — 6370000000 HC RX 637 (ALT 250 FOR IP): Performed by: INTERNAL MEDICINE

## 2024-01-15 PROCEDURE — 1200000000 HC SEMI PRIVATE

## 2024-01-15 PROCEDURE — 74176 CT ABD & PELVIS W/O CONTRAST: CPT

## 2024-01-15 PROCEDURE — 94761 N-INVAS EAR/PLS OXIMETRY MLT: CPT

## 2024-01-15 PROCEDURE — 80053 COMPREHEN METABOLIC PANEL: CPT

## 2024-01-15 RX ORDER — OXYCODONE HCL 10 MG/1
10 TABLET, FILM COATED, EXTENDED RELEASE ORAL 2 TIMES DAILY
Qty: 14 TABLET | Refills: 0 | Status: SHIPPED | OUTPATIENT
Start: 2024-01-15 | End: 2024-01-19 | Stop reason: HOSPADM

## 2024-01-15 RX ORDER — HYDROMORPHONE HYDROCHLORIDE 2 MG/1
2 TABLET ORAL EVERY 4 HOURS PRN
Qty: 20 TABLET | Refills: 0 | Status: SHIPPED | OUTPATIENT
Start: 2024-01-15 | End: 2024-01-19

## 2024-01-15 RX ORDER — KETOROLAC TROMETHAMINE 30 MG/ML
15 INJECTION, SOLUTION INTRAMUSCULAR; INTRAVENOUS ONCE
Status: COMPLETED | OUTPATIENT
Start: 2024-01-15 | End: 2024-01-15

## 2024-01-15 RX ORDER — POLYETHYLENE GLYCOL 3350 17 G/17G
17 POWDER, FOR SOLUTION ORAL DAILY PRN
Qty: 30 PACKET | Refills: 0 | Status: SHIPPED | OUTPATIENT
Start: 2024-01-15 | End: 2024-01-19

## 2024-01-15 RX ADMIN — CARVEDILOL 6.25 MG: 6.25 TABLET, FILM COATED ORAL at 08:30

## 2024-01-15 RX ADMIN — OXYCODONE HYDROCHLORIDE 5 MG: 5 TABLET ORAL at 14:47

## 2024-01-15 RX ADMIN — HYDROMORPHONE HYDROCHLORIDE 0.5 MG: 1 INJECTION, SOLUTION INTRAMUSCULAR; INTRAVENOUS; SUBCUTANEOUS at 13:43

## 2024-01-15 RX ADMIN — HYDROMORPHONE HYDROCHLORIDE 1 MG: 1 INJECTION, SOLUTION INTRAMUSCULAR; INTRAVENOUS; SUBCUTANEOUS at 16:55

## 2024-01-15 RX ADMIN — AMLODIPINE BESYLATE 10 MG: 10 TABLET ORAL at 08:30

## 2024-01-15 RX ADMIN — METHOCARBAMOL 1000 MG: 500 TABLET ORAL at 08:30

## 2024-01-15 RX ADMIN — KETOROLAC TROMETHAMINE 15 MG: 30 INJECTION, SOLUTION INTRAMUSCULAR; INTRAVENOUS at 16:55

## 2024-01-15 RX ADMIN — CARVEDILOL 6.25 MG: 6.25 TABLET, FILM COATED ORAL at 20:13

## 2024-01-15 RX ADMIN — HYDROMORPHONE HYDROCHLORIDE 0.25 MG: 1 INJECTION, SOLUTION INTRAMUSCULAR; INTRAVENOUS; SUBCUTANEOUS at 15:20

## 2024-01-15 RX ADMIN — SODIUM CHLORIDE, PRESERVATIVE FREE 10 ML: 5 INJECTION INTRAVENOUS at 20:06

## 2024-01-15 RX ADMIN — OXYCODONE HYDROCHLORIDE 5 MG: 5 TABLET ORAL at 06:32

## 2024-01-15 RX ADMIN — ACETAMINOPHEN 650 MG: 325 TABLET ORAL at 06:32

## 2024-01-15 RX ADMIN — HYDROMORPHONE HYDROCHLORIDE 0.5 MG: 1 INJECTION, SOLUTION INTRAMUSCULAR; INTRAVENOUS; SUBCUTANEOUS at 04:45

## 2024-01-15 RX ADMIN — SODIUM CHLORIDE, PRESERVATIVE FREE 10 ML: 5 INJECTION INTRAVENOUS at 08:32

## 2024-01-15 RX ADMIN — TAMSULOSIN HYDROCHLORIDE 0.4 MG: 0.4 CAPSULE ORAL at 08:30

## 2024-01-15 ASSESSMENT — PAIN DESCRIPTION - LOCATION
LOCATION: BACK
LOCATION: PENIS
LOCATION: BACK
LOCATION: PENIS
LOCATION: ABDOMEN

## 2024-01-15 ASSESSMENT — PAIN SCALES - GENERAL
PAINLEVEL_OUTOF10: 9
PAINLEVEL_OUTOF10: 3
PAINLEVEL_OUTOF10: 10
PAINLEVEL_OUTOF10: 7
PAINLEVEL_OUTOF10: 7
PAINLEVEL_OUTOF10: 10
PAINLEVEL_OUTOF10: 8

## 2024-01-15 ASSESSMENT — PAIN DESCRIPTION - ORIENTATION
ORIENTATION: RIGHT
ORIENTATION: RIGHT
ORIENTATION: MID

## 2024-01-15 ASSESSMENT — PAIN DESCRIPTION - DESCRIPTORS
DESCRIPTORS: ACHING
DESCRIPTORS: STABBING
DESCRIPTORS: STABBING
DESCRIPTORS: ACHING
DESCRIPTORS: ACHING

## 2024-01-15 NOTE — CARE COORDINATION
Reviewed chart, discussed in IDR and spoke with pt.  He understands can not have HC d/t no insurance but concerned that with who could irrigate nephro tube.   Spoke with his nurse Yoana, nursing has been irrigating the tube whenever they need to empty it which has been every 4 hrs.  Order states once a shift and PRN , she will teach pt/family to take care of tube along with irrigations.

## 2024-01-15 NOTE — PROGRESS NOTES
Physical Therapy    Chart review complete. Upon arrival, pt found resting in bed. Pt reports that he is discharging home today and does not have any therapy needs. Spoke with CM who confirmed pt is discharging home today. PT orders has been completed.

## 2024-01-15 NOTE — DISCHARGE SUMMARY
V2.0  Discharge Summary     Please consider this as Progress note    Name:  Jethro Vázquez /Age/Sex: 1960 (63 y.o. male)   Admit Date: 1/10/2024  Discharge Date: 1/15/24    MRN & CSN:  [id number] & [id number] Encounter Date and Time 1/15/24 1:24 PM EST    Attending:  Alberto Jerry,* Discharging Provider: Alberto Cordoba MD       Hospital Course:     Brief HPI: Jethro Vázquez is a 63 y.o. male with hypertension, BPH, history of left ureteral calculi, recently diagnosed urothelial carcinoma presented to [place] ED with complaints of right flank pain, suprapubic pain, hematuria.  Patient reported that when he woke up today morning he started having right flank pain, tender to touch, then started having suprapubic pain.  Patient also noticed having gross hematuria.  Later in the afternoon patient blood clots.  Pain was getting worse which prompted him to go to the ER.  Patient denied any fever, but admits to chills.  Patient also admits to dysuria.  Denying any chest pain, shortness of breath, denies any constipation.  Last BM was yesterday.  Vitals on arrival-/96, , RR 20, temp 98.3, saturating 98% on room air.  Labs significant for potassium 3.3, random glucose 104, WBC 14, hemoglobin 12.7, platelets 572.  UA pending.  CT abdomen/pelvis done in ED.  Patient received morphine, Zofran in ED.  Urology consulted from ED.     Brief Problem Based Course:     Gross Hematuria.   Flank pain, suprapubic pain  Acute cystitis   -CT abdomen/pelvis-stable appearance of bilateral mild hydroureteronephrosis, unchanged appearance of metastatic lymphadenopathy, urinary bladder wall demonstrated eccentric wall thickening with perivesical infiltration as before.  Simple right renal cortical cyst-stable  -UA consistent with UTI  -Continue ceftriaxone (urine culture in the past grew pansensitive E. coli)  -s/p IV fluids, antiemetics, analgesics as needed.  S/p BILATERAL NEPHROSTOMY TUBE PLACEMENT

## 2024-01-15 NOTE — PROGRESS NOTES
[address] [address]   Progress Note  [facility] 0 1 2      Date: 1/15/2024   Patient: Jethro Vázquez   : 1960   DOA: 1/10/2024   MRN: [id number]   ROOM#: 4116/4116-A     Admit Date: 1/10/2024     Collaborating Urologist on Call at time of admission: Dr. Jc   CC: Abdominal and back pain   Reason for Consult: H/o bladder cancer, transfer from [place]      Subjective:     Pain: mild, no nausea and no vomiting,   Bowel Movement/Flatus: Passing flatus  Voiding: Bilateral PCN's with clear yellow on the right, and blood-tinged on the left     Pt resting in bed, reports suprapubic pain in the evening requiring pan medication. His flank pain has improved since PCN placement.    Objective:    Vitals:   BP (!) 141/96   Pulse (!) 114   Temp 97.4 °F (36.3 °C) (Oral)   Resp 18   Ht 1.778 m (5' 10\")   Wt 87.1 kg (192 lb 0.3 oz)   SpO2 98%   BMI 27.55 kg/m²   Temp  Av.8 °F (36.6 °C)  Min: 97.4 °F (36.3 °C)  Max: 98.1 °F (36.7 °C)    Intake/Output Summary (Last 24 hours) at 1/15/2024 0905  Last data filed at 1/15/2024 0832  Gross per 24 hour   Intake 568 ml   Output 675 ml   Net -107 ml       Physical Exam:  Gen:    Pleasant male, in NAD  Neuro: Non-focal  Resp:  Unlabored breathing  Abd:    Soft, non-distended, TTP in suprapubic region  Back:   Bilateral PCN's with clear yellow on the right, and blood-tinged on the left  Ext:     No edema of bilateral LEs    Labs:  WBC:    Lab Results   Component Value Date/Time    WBC 12.3 2024 10:23 AM     Hemoglobin/Hematocrit:    Lab Results   Component Value Date/Time    HGB 12.2 2024 10:23 AM    HCT 37.0 2024 10:23 AM     BMP:   Lab Results   Component Value Date/Time     2024 10:23 AM    K 3.8 2024 10:23 AM    CL 98 2024 10:23 AM    CO2 25 2024 10:23 AM    BUN 16 2024 10:23 AM    LABALBU 3.2 2024 10:23 AM    CREATININE 1.3 2024 10:23 AM    CALCIUM 8.5 2024 10:23 AM    GFRAA  12/31/2023  EXAMINATION: ONE XRAY VIEW OF THE CHEST 12/31/2023 9:24 am COMPARISON: None. HISTORY: ORDERING SYSTEM PROVIDED HISTORY: chest pain TECHNOLOGIST PROVIDED HISTORY: Reason for exam:->chest pain FINDINGS: Cardiac silhouette is within normal limits. Pulmonary vasculature is within normal limits. The lungs are clear. No pneumothorax is identified. Bony structures are unremarkable.Calcified granuloma in the left mid lung is noted.     No acute cardiopulmonary process.     CT ABDOMEN PELVIS WO CONTRAST Additional Contrast? None    Result Date: 12/24/2023  EXAMINATION: CT OF THE ABDOMEN AND PELVIS WITHOUT CONTRAST 12/24/2023 6:26 pm TECHNIQUE: CT of the abdomen and pelvis was performed without the administration of intravenous contrast. Multiplanar reformatted images are provided for review. Automated exposure control, iterative reconstruction, and/or weight based adjustment of the mA/kV was utilized to reduce the radiation dose to as low as reasonably achievable. COMPARISON: 11/22/2023. HISTORY: ORDERING SYSTEM PROVIDED HISTORY: left flank pain; h/o kidney stones; concern for obstructing ureteral stone TECHNOLOGIST PROVIDED HISTORY: Reason for exam:->left flank pain; h/o kidney stones; concern for obstructing ureteral stone Additional Contrast?->None Decision Support Exception - unselect if not a suspected or confirmed emergency medical condition->Emergency Medical Condition (MA) Reason for Exam: LT Flank pain; h/o kidney stones FINDINGS: Lower Chest: There is no confluent airspace consolidation or pleural effusion. Organs: The liver, spleen, adrenal glands and pancreas are unremarkable. There is a least 1 gallstone in the gallbladder.  The spleen, adrenal glands and pancreas are unremarkable.  There is moderate left and mild right hydronephrosis and hydroureter.  There is no ureteral stone.  A 5 cm exophytic simple cyst at the lower pole the right kidney appears unchanged. GI/Bowel: There is no bowel dilatation

## 2024-01-16 ENCOUNTER — APPOINTMENT (OUTPATIENT)
Dept: CT IMAGING | Age: 64
End: 2024-01-16
Attending: STUDENT IN AN ORGANIZED HEALTH CARE EDUCATION/TRAINING PROGRAM
Payer: MEDICAID

## 2024-01-16 PROCEDURE — 2580000003 HC RX 258: Performed by: INTERNAL MEDICINE

## 2024-01-16 PROCEDURE — 2580000003 HC RX 258: Performed by: STUDENT IN AN ORGANIZED HEALTH CARE EDUCATION/TRAINING PROGRAM

## 2024-01-16 PROCEDURE — 6370000000 HC RX 637 (ALT 250 FOR IP): Performed by: STUDENT IN AN ORGANIZED HEALTH CARE EDUCATION/TRAINING PROGRAM

## 2024-01-16 PROCEDURE — 94761 N-INVAS EAR/PLS OXIMETRY MLT: CPT

## 2024-01-16 PROCEDURE — 6370000000 HC RX 637 (ALT 250 FOR IP): Performed by: INTERNAL MEDICINE

## 2024-01-16 PROCEDURE — 6360000002 HC RX W HCPCS

## 2024-01-16 PROCEDURE — 1200000000 HC SEMI PRIVATE

## 2024-01-16 PROCEDURE — 6360000002 HC RX W HCPCS: Performed by: STUDENT IN AN ORGANIZED HEALTH CARE EDUCATION/TRAINING PROGRAM

## 2024-01-16 RX ORDER — SODIUM CHLORIDE, SODIUM LACTATE, POTASSIUM CHLORIDE, AND CALCIUM CHLORIDE .6; .31; .03; .02 G/100ML; G/100ML; G/100ML; G/100ML
500 INJECTION, SOLUTION INTRAVENOUS ONCE
Status: COMPLETED | OUTPATIENT
Start: 2024-01-16 | End: 2024-01-16

## 2024-01-16 RX ORDER — PHENAZOPYRIDINE HYDROCHLORIDE 100 MG/1
200 TABLET, FILM COATED ORAL
Status: DISPENSED | OUTPATIENT
Start: 2024-01-16 | End: 2024-01-17

## 2024-01-16 RX ADMIN — PHENAZOPYRIDINE 200 MG: 100 TABLET ORAL at 13:53

## 2024-01-16 RX ADMIN — HYDROMORPHONE HYDROCHLORIDE 1 MG: 1 INJECTION, SOLUTION INTRAMUSCULAR; INTRAVENOUS; SUBCUTANEOUS at 18:30

## 2024-01-16 RX ADMIN — ACETAMINOPHEN 650 MG: 325 TABLET ORAL at 20:13

## 2024-01-16 RX ADMIN — HYDROMORPHONE HYDROCHLORIDE 1 MG: 1 INJECTION, SOLUTION INTRAMUSCULAR; INTRAVENOUS; SUBCUTANEOUS at 07:42

## 2024-01-16 RX ADMIN — SODIUM CHLORIDE, PRESERVATIVE FREE 10 ML: 5 INJECTION INTRAVENOUS at 08:18

## 2024-01-16 RX ADMIN — AMLODIPINE BESYLATE 10 MG: 10 TABLET ORAL at 08:16

## 2024-01-16 RX ADMIN — OXYCODONE HYDROCHLORIDE 5 MG: 5 TABLET ORAL at 17:03

## 2024-01-16 RX ADMIN — HYDROMORPHONE HYDROCHLORIDE 1 MG: 1 INJECTION, SOLUTION INTRAMUSCULAR; INTRAVENOUS; SUBCUTANEOUS at 02:03

## 2024-01-16 RX ADMIN — PHENAZOPYRIDINE 200 MG: 100 TABLET ORAL at 17:02

## 2024-01-16 RX ADMIN — ACETAMINOPHEN 650 MG: 325 TABLET ORAL at 04:36

## 2024-01-16 RX ADMIN — OXYCODONE HYDROCHLORIDE 5 MG: 5 TABLET ORAL at 04:36

## 2024-01-16 RX ADMIN — HYDROMORPHONE HYDROCHLORIDE 0.5 MG: 1 INJECTION, SOLUTION INTRAMUSCULAR; INTRAVENOUS; SUBCUTANEOUS at 21:06

## 2024-01-16 RX ADMIN — SODIUM CHLORIDE, POTASSIUM CHLORIDE, SODIUM LACTATE AND CALCIUM CHLORIDE 500 ML: 600; 310; 30; 20 INJECTION, SOLUTION INTRAVENOUS at 09:56

## 2024-01-16 RX ADMIN — SODIUM CHLORIDE, PRESERVATIVE FREE 5 ML: 5 INJECTION INTRAVENOUS at 21:26

## 2024-01-16 RX ADMIN — OXYCODONE HYDROCHLORIDE 5 MG: 5 TABLET ORAL at 12:24

## 2024-01-16 RX ADMIN — HYDROMORPHONE HYDROCHLORIDE 1 MG: 1 INJECTION, SOLUTION INTRAMUSCULAR; INTRAVENOUS; SUBCUTANEOUS at 13:53

## 2024-01-16 RX ADMIN — CARVEDILOL 6.25 MG: 6.25 TABLET, FILM COATED ORAL at 17:02

## 2024-01-16 RX ADMIN — CARVEDILOL 6.25 MG: 6.25 TABLET, FILM COATED ORAL at 08:16

## 2024-01-16 RX ADMIN — ACETAMINOPHEN 650 MG: 325 TABLET ORAL at 12:22

## 2024-01-16 RX ADMIN — TAMSULOSIN HYDROCHLORIDE 0.4 MG: 0.4 CAPSULE ORAL at 08:16

## 2024-01-16 ASSESSMENT — PAIN SCALES - GENERAL
PAINLEVEL_OUTOF10: 10
PAINLEVEL_OUTOF10: 9
PAINLEVEL_OUTOF10: 4
PAINLEVEL_OUTOF10: 9
PAINLEVEL_OUTOF10: 4
PAINLEVEL_OUTOF10: 8
PAINLEVEL_OUTOF10: 9
PAINLEVEL_OUTOF10: 3
PAINLEVEL_OUTOF10: 9
PAINLEVEL_OUTOF10: 10

## 2024-01-16 ASSESSMENT — PAIN DESCRIPTION - LOCATION
LOCATION: ABDOMEN
LOCATION: GENERALIZED
LOCATION: GENERALIZED
LOCATION: BACK
LOCATION: ABDOMEN
LOCATION: ABDOMEN
LOCATION: GENERALIZED

## 2024-01-16 ASSESSMENT — PAIN DESCRIPTION - DESCRIPTORS
DESCRIPTORS: SHARP
DESCRIPTORS: ACHING
DESCRIPTORS: ACHING
DESCRIPTORS: SHARP
DESCRIPTORS: ACHING
DESCRIPTORS: ACHING;DISCOMFORT
DESCRIPTORS: JABBING

## 2024-01-16 ASSESSMENT — PAIN - FUNCTIONAL ASSESSMENT

## 2024-01-16 ASSESSMENT — PAIN DESCRIPTION - ORIENTATION
ORIENTATION: LOWER;MID
ORIENTATION: RIGHT;LEFT;MID;OUTER;LOWER
ORIENTATION: RIGHT;LEFT;DISTAL;MID
ORIENTATION: LOWER
ORIENTATION: LOWER;MID
ORIENTATION: MID
ORIENTATION: RIGHT

## 2024-01-16 ASSESSMENT — PAIN SCALES - WONG BAKER
WONGBAKER_NUMERICALRESPONSE: 0
WONGBAKER_NUMERICALRESPONSE: 0

## 2024-01-16 NOTE — DISCHARGE INSTRUCTIONS
Health Centers     [facility]   [address]  [address]  [facility] [address]  [phone]  [facility]  Monday - Friday 8 am to 8 pm; Saturday 9 am to 1 pm  (All insurances or no insurance with sliding scale payment)     [facility]   [phone]  [address] ([facility])  Monday/Wednesday/Thursday 7 am to 7 pm   (All insurances or no insurance with sliding scale payment)     [facility]  [phone]   [address]   (All insurances or no insurance with sliding scale payment)        The [facility] and [facility] are available for hospital follow up appointments. Follow up appointments are important and should be schedule BEFORE you leave the hospital. They will help keep you well and at home instead of in the hospital.      Please also schedule a New Patient Appointment with the Primary Care Provider (PCP) of your choice. It normally takes a few weeks to get an appointment with a new PCP, so call TODAY. The PCP list below are all known to be accepting new patients.     If issues arise, call your health insurance for in network PCP options.  [place] PCP    [facility] Physician Finder/Scheduling   [phone]    Dr. Navarrete, Dr. Costa- [facility]  [phone]  [address], [address]   (All insurances accepted)    Dr. José Brewster DO - [facility]   [address] [place] [address]  [phone]   (All insurances accepted)    Dr. Carey - [facility]   [phone]  [address] [facility]  (No managed or traditional Medicaid)     Lisa Flores [facility]ices  [phone]  [address]   (All insurances accepted)    Mauri DELVALLE & Rene DELVALLE - [facility]   [phone]  [address]

## 2024-01-16 NOTE — PROGRESS NOTES
[address] [address]   Progress Note  [facility] 0 1 2      Date: 2024   Patient: Jethro Vázquez   : 1960   DOA: 1/10/2024   MRN: [id number]   ROOM#: [id number]/4116-A     Admit Date: 1/10/2024     Collaborating Urologist on Call at time of admission: Dr. Jc   CC: Abdominal and back pain   Reason for Consult: H/o bladder cancer, transfer from [place]      Subjective:     Pain: moderate, no nausea and no vomiting,   Bowel Movement/Flatus: Passing flatus  Voiding: Bilateral PCN's with clear yellow urine     Pt resting in bed, reports persistent suprapubic pain. His flank pain has improved since PCN placement.    Objective:    Vitals:   /85   Pulse (!) 104   Temp 98 °F (36.7 °C) (Oral)   Resp 16   Ht 1.778 m (5' 10\")   Wt 87.1 kg (192 lb 0.3 oz)   SpO2 95%   BMI 27.55 kg/m²   Temp  Av.2 °F (36.8 °C)  Min: 98 °F (36.7 °C)  Max: 98.5 °F (36.9 °C)    Intake/Output Summary (Last 24 hours) at 2024 0943  Last data filed at 2024 0606  Gross per 24 hour   Intake 1200 ml   Output 525 ml   Net 675 ml       Physical Exam:  Gen:    Pleasant male, in NAD  Neuro: Non-focal  Resp:  Unlabored breathing  Abd:    Soft, non-distended, TTP in suprapubic region  Back:   Bilateral PCN's with clear yellow urine  Ext:     No edema of bilateral LEs    Labs:  WBC:    Lab Results   Component Value Date/Time    WBC 11.6 01/15/2024 02:09 PM     Hemoglobin/Hematocrit:    Lab Results   Component Value Date/Time    HGB 12.4 01/15/2024 02:09 PM    HCT 38.1 01/15/2024 02:09 PM     BMP:   Lab Results   Component Value Date/Time     01/15/2024 02:09 PM    K 3.9 01/15/2024 02:09 PM    CL 97 01/15/2024 02:09 PM    CO2 26 01/15/2024 02:09 PM    BUN 17 01/15/2024 02:09 PM    LABALBU 3.2 01/15/2024 02:09 PM    CREATININE 1.2 01/15/2024 02:09 PM    CALCIUM 8.7 01/15/2024 02:09 PM    GFRAA >60 2018 07:38 AM    LABGLOM >60 01/15/2024 02:09 PM     Urine Culture 1/10/24: No growth  Urine  appendix. Pelvis: Asymmetrical thickening of the bladder which is more prominent towards the left and posteriorly is again noted.  Soft tissue attenuation along the left side of the bladder extends beyond the wall of the bladder. Fat stranding adjacent the bladder appears increased. Peritoneum/Retroperitoneum: The abdominal aorta is not enlarged.  There is a retroaortic left renal vein.  There is no new retroperitoneal lymphadenopathy.  Left periaortic lymph nodes measure up to 1.5 cm in shortest dimension.  Aortocaval lymph nodes measure up to 1.3 cm.  Retrocaval lymph nodes measure up to 1.2 cm.  A right common iliac chain lymph node measures 1.1 cm.  A left iliac chain lymph node measures 1.2 cm and there are additional prominent left iliac chain lymph nodes.  No free fluid, free air or focal fluid collection is identified. Bones/Soft Tissues: No fracture or destructive bone lesion is identified.     1. Persistent asymmetrical thickening of the bladder which is most prominent towards the left and posteriorly concerning for malignancy.  Soft tissue attenuation extends lateral to the wall of the bladder on the left which could represent direct extension of tumor.  Fat stranding adjacent to the bladder appears increased and could reflect cystitis.  New retroperitoneal and pelvic lymphadenopathy is likely metastatic. 2. Bilateral hydronephrosis and hydroureter which is moderate on the left and mild on the right with no visible stone.  This is likely related to bladder wall thickening. 3. No acute findings elsewhere in the abdomen or pelvis. 4. Colonic diverticulosis. 5. Stable 5 cm simple right renal cyst.  No follow-up imaging is recommended.       Assessment & Plan:      Jethro Vázquez is a 63 y.o. male with pmhx of kidney stones, HTN, bladder cancer, abd bilateral hydronephrosis admitted 12/31/2023 for pyelonephritis:      1) Bladder Cancer w Bilateral Hydronephrosis   & SHIRLEY: S/p bilateral PCN placement with IR  1/12/24  S/p cystoscopy and transurethral resection of large bladder tumor 12/14/23. Ureteral orifices not visible. Pathology with invasive high grade urothelial carcinoma.  CT a/p 1/15/24: Interval placement of bilateral percutaneous nephrostomy tubes with  resolution of hydronephrosis. No perinephric hematoma. Asymmetric thickening of the bladder which is eccentric into the left with associated perivesicular inflammation. Stable compared to prior exams. Stable metastatic lymphadenopathy involving the external iliac region and multiple retroperitoneal periaortic lymph nodes.              CT a/p 1/10/24: Stable appearance of bilateral mild hydroureteronephrosis, slightly more pronounced on the left side. Unchanged appearance of metastatic lymphadenopathy involving the pelvic sidewalls, retroperitoneum, retrocrural space. Urinary bladder wall demonstrates eccentric wall thickening with perivesical infiltration as before.              CT a/p 12/31/23: The known bladder mass is causing obstruction of the kidneys, more severe on the left with moderate hydroureteronephrosis and delayed contrast opacification. Pelvic and retroperitoneal lymphadenopathy, most consistent with metastatic disease.              Cr 1.2, improving              Pt seen by oncology during last hospitalization with plans for PET scan and chemo as outpatient as well as referral to pain management.              Continue bilateral PCN's. Recommend exchange with IR in 3 months.   CT a/p with IV contrast ordered   Suspect his suprapubic pain is secondary to bladder cancer. Recommend hem/onc consult for pain control recommendations.    2) Complicated UTI: UA w small leuks, occasional bacteria, large blood; urine cx negative  Urine cx 12/31/23 +E.coli, pansensitive              WBC 11.6, afebrile    Will follow.  Patient seen and examined, chart reviewed.     Electronically signed by Juan Zaidi PA-C on 1/16/2024 at 9:43 AM

## 2024-01-16 NOTE — CARE COORDINATION
CM placed options for PCP on discharge instructions. LH  1100 Per IDR poss discharge tomorrow. Per CM notes pt will be returning home with wife. Pt is educated per nursing for Nephrostomy tube. Pt is unable to have home care, No pcp and medicaid pending only.   CM into see pt and informed that pts sister in law will do the flushes. CM informed that his wife will be coming into see pt. And CM instructed him to call for nursing to educate her on the care of the nephrostomy drains. CM updated Melissa with the above information.

## 2024-01-16 NOTE — PROGRESS NOTES
V2.0    [facility] Progress Note      Name:  Jethro Vázquez /Age/Sex: 1960  (63 y.o. male)   MRN & CSN:  [id number] & [id number] Encounter Date/Time: 2024 11:23 AM EST   [facility] P[facility]: Mono Schaffer MD     Attending:Vladislav Brewster MD       Hospital Day: 7    Assessment and Recommendations   Jethro Vázquez is a 63 y.o. male with pmh of hypertension, BPH, history of left ureteral calculi, recently diagnosed urothelial carcinoma  who presents with Bladder cancer (HCC)      Plan:     Gross Hematuria.   Flank pain, suprapubic pain  Acute cystitis   -CT abdomen/pelvis-stable appearance of bilateral mild hydroureteronephrosis, unchanged appearance of metastatic lymphadenopathy, urinary bladder wall demonstrated eccentric wall thickening with perivesical infiltration as before.  Simple right renal cortical cyst-stable  -UA consistent with UTI  -Continue ceftriaxone (urine culture in the past grew pansensitive E. coli)  -Keep n.p.o. after midnight  -IV fluids, antiemetics, analgesics as needed.  S/p BILATERAL NEPHROSTOMY TUBE PLACEMENT by IR. Monitor for bleeding   Discussed with urology - Plan for repeat CT A/p w IV contrast  -Onc consulted per Uro     SHIRLEY - likely obstructive uropathy - Resolved     #Hypokalemia - resolved   -Replaced     #.  Recently diagnosed invasive high-grade urothelial carcinoma  -S/p cystoscopy-s/p bladder tumor resection-2023  -Following with Dr. Gambino     #.  History of left ureteral calculi-2023     #.  Hypertension-patient is on amlodipine, carvedilol-resume with holding parameters     #.  BPH-continue tamsulosin     #.  History of robotic inguinal hernia repair-2018      Diet ADULT DIET; Regular   DVT Prophylaxis [] Lovenox, []  Heparin, [] SCDs, [] Ambulation,  [] Eliquis, [] Xarelto  [] Coumadin   Code Status Full Code   Disposition From: Home  Expected Disposition: Home   Estimated Date of Discharge: 1 day  Patient requires continued admission due  abdominal wall.     1. Interval placement of bilateral percutaneous nephrostomy tubes with resolution of hydronephrosis.  No perinephric hematoma. 2. Asymmetric thickening of the bladder which is eccentric into the left with associated perivesicular inflammation.  Stable compared to prior exams. 3. Stable metastatic lymphadenopathy involving the external iliac region and multiple retroperitoneal periaortic lymph nodes. 4. Cholelithiasis without acute cholecystitis.     CT ABDOMEN PELVIS WO CONTRAST    Result Date: 1/10/2024  EXAMINATION: CT OF THE ABDOMEN AND PELVIS WITHOUT CONTRAST 1/10/2024 3:44 pm TECHNIQUE: CT of the abdomen and pelvis was performed without the administration of intravenous contrast. Multiplanar reformatted images are provided for review. Automated exposure control, iterative reconstruction, and/or weight based adjustment of the mA/kV was utilized to reduce the radiation dose to as low as reasonably achievable. COMPARISON: None. HISTORY: ORDERING SYSTEM PROVIDED HISTORY: flank pain TECHNOLOGIST PROVIDED HISTORY: Reason for exam:->flank pain Reason for Exam: flank pain Additional signs and symptoms: flank pain Relevant Medical/Surgical History: flank pain FINDINGS: Lower Chest: No acute consolidation is seen.  Retrocrural lymphadenopathy is unchanged from prior examination. Organs: Liver: Liver is normal. No focal lesions are seen. Spleen: Normal. Pancreas: Normal Gallbladder: Radiopaque calculi are seen. Adrenal glands: Normal. No focal lesions are seen. Kidneys and ureters: Stable appearance of bilateral mild hydroureteronephrosis, slightly more pronounced on the left side.  Simple right renal cortical cyst is stable in size.  The ureters are dilated to the level of ureterovesical junction. Abdominal aorta and branches: Normal in caliber. IVC and portal vein: Normal in caliber. GI/Bowel: Normal in caliber and wall thickeness.  No evidence of appendicitis. Peritoneum/Retroperitoneum: Unchanged     LEUKOCYTESUR SMALL NUMBER OR AMOUNT OBSERVED 01/10/2024 11:51 PM    UROBILINOGEN 1.0 01/10/2024 11:51 PM    BILIRUBINUR NEGATIVE 01/10/2024 11:51 PM    BLOODU LARGE NUMBER OR AMOUNT OBSERVED 01/10/2024 11:51 PM    KETUA NEGATIVE 01/10/2024 11:51 PM     Urine Cultures: No results found for: \"LABURIN\"  Blood Cultures: No results found for: \"BC\"  No results found for: \"BLOODCULT2\"  Organism: No results found for: \"ORG\"      Electronically signed by Vladislav Brewster MD on 1/16/2024 at 11:23 AM

## 2024-01-17 ENCOUNTER — APPOINTMENT (OUTPATIENT)
Dept: CT IMAGING | Age: 64
End: 2024-01-17
Attending: STUDENT IN AN ORGANIZED HEALTH CARE EDUCATION/TRAINING PROGRAM
Payer: MEDICAID

## 2024-01-17 LAB
ALBUMIN SERPL-MCNC: 3.3 GM/DL (ref 3.4–5)
ALP BLD-CCNC: 234 IU/L (ref 40–128)
ALT SERPL-CCNC: 12 U/L (ref 10–40)
ANION GAP SERPL CALCULATED.3IONS-SCNC: 13 MMOL/L (ref 7–16)
AST SERPL-CCNC: 16 IU/L (ref 15–37)
BASOPHILS ABSOLUTE: 0.1 K/CU MM
BASOPHILS RELATIVE PERCENT: 0.6 % (ref 0–1)
BILIRUB SERPL-MCNC: 0.2 MG/DL (ref 0–1)
BUN SERPL-MCNC: 12 MG/DL (ref 6–23)
CALCIUM SERPL-MCNC: 8.6 MG/DL (ref 8.3–10.6)
CHLORIDE BLD-SCNC: 98 MMOL/L (ref 99–110)
CO2: 26 MMOL/L (ref 21–32)
CREAT SERPL-MCNC: 0.9 MG/DL (ref 0.9–1.3)
DIFFERENTIAL TYPE: ABNORMAL
EOSINOPHILS ABSOLUTE: 0.4 K/CU MM
EOSINOPHILS RELATIVE PERCENT: 3.9 % (ref 0–3)
GFR SERPL CREATININE-BSD FRML MDRD: >60 ML/MIN/1.73M2
GLUCOSE SERPL-MCNC: 90 MG/DL (ref 70–99)
HCT VFR BLD CALC: 36.6 % (ref 42–52)
HEMOGLOBIN: 12.2 GM/DL (ref 13.5–18)
IMMATURE NEUTROPHIL %: 0.4 % (ref 0–0.43)
LYMPHOCYTES ABSOLUTE: 2.1 K/CU MM
LYMPHOCYTES RELATIVE PERCENT: 19.9 % (ref 24–44)
MAGNESIUM: 2 MG/DL (ref 1.8–2.4)
MCH RBC QN AUTO: 27.9 PG (ref 27–31)
MCHC RBC AUTO-ENTMCNC: 33.3 % (ref 32–36)
MCV RBC AUTO: 83.8 FL (ref 78–100)
MONOCYTES ABSOLUTE: 0.7 K/CU MM
MONOCYTES RELATIVE PERCENT: 6.7 % (ref 0–4)
NUCLEATED RBC %: 0 %
PDW BLD-RTO: 11.7 % (ref 11.7–14.9)
PHOSPHORUS: 3.9 MG/DL (ref 2.5–4.9)
PLATELET # BLD: 499 K/CU MM (ref 140–440)
PMV BLD AUTO: 9.2 FL (ref 7.5–11.1)
POTASSIUM SERPL-SCNC: 3.7 MMOL/L (ref 3.5–5.1)
RBC # BLD: 4.37 M/CU MM (ref 4.6–6.2)
SEGMENTED NEUTROPHILS ABSOLUTE COUNT: 7.2 K/CU MM
SEGMENTED NEUTROPHILS RELATIVE PERCENT: 68.5 % (ref 36–66)
SODIUM BLD-SCNC: 137 MMOL/L (ref 135–145)
TOTAL IMMATURE NEUTOROPHIL: 0.04 K/CU MM
TOTAL NUCLEATED RBC: 0 K/CU MM
TOTAL PROTEIN: 6.7 GM/DL (ref 6.4–8.2)
WBC # BLD: 10.5 K/CU MM (ref 4–10.5)

## 2024-01-17 PROCEDURE — 1200000000 HC SEMI PRIVATE

## 2024-01-17 PROCEDURE — 2580000003 HC RX 258: Performed by: INTERNAL MEDICINE

## 2024-01-17 PROCEDURE — 83735 ASSAY OF MAGNESIUM: CPT

## 2024-01-17 PROCEDURE — 94761 N-INVAS EAR/PLS OXIMETRY MLT: CPT

## 2024-01-17 PROCEDURE — 6360000002 HC RX W HCPCS: Performed by: INTERNAL MEDICINE

## 2024-01-17 PROCEDURE — 6370000000 HC RX 637 (ALT 250 FOR IP): Performed by: PHYSICIAN ASSISTANT

## 2024-01-17 PROCEDURE — 6360000002 HC RX W HCPCS

## 2024-01-17 PROCEDURE — 74177 CT ABD & PELVIS W/CONTRAST: CPT

## 2024-01-17 PROCEDURE — 85025 COMPLETE CBC W/AUTO DIFF WBC: CPT

## 2024-01-17 PROCEDURE — APPNB180 APP NON BILLABLE TIME > 60 MINS: Performed by: PHYSICIAN ASSISTANT

## 2024-01-17 PROCEDURE — 6360000002 HC RX W HCPCS: Performed by: STUDENT IN AN ORGANIZED HEALTH CARE EDUCATION/TRAINING PROGRAM

## 2024-01-17 PROCEDURE — 6360000004 HC RX CONTRAST MEDICATION: Performed by: PHYSICIAN ASSISTANT

## 2024-01-17 PROCEDURE — 2700000000 HC OXYGEN THERAPY PER DAY

## 2024-01-17 PROCEDURE — 36415 COLL VENOUS BLD VENIPUNCTURE: CPT

## 2024-01-17 PROCEDURE — 2580000003 HC RX 258: Performed by: PHYSICIAN ASSISTANT

## 2024-01-17 PROCEDURE — 80053 COMPREHEN METABOLIC PANEL: CPT

## 2024-01-17 PROCEDURE — 84100 ASSAY OF PHOSPHORUS: CPT

## 2024-01-17 PROCEDURE — 6370000000 HC RX 637 (ALT 250 FOR IP): Performed by: INTERNAL MEDICINE

## 2024-01-17 PROCEDURE — 99223 1ST HOSP IP/OBS HIGH 75: CPT | Performed by: INTERNAL MEDICINE

## 2024-01-17 RX ORDER — OXYCODONE HCL 20 MG/1
20 TABLET, FILM COATED, EXTENDED RELEASE ORAL EVERY 12 HOURS SCHEDULED
Status: DISCONTINUED | OUTPATIENT
Start: 2024-01-17 | End: 2024-01-19 | Stop reason: HOSPADM

## 2024-01-17 RX ORDER — SODIUM CHLORIDE 0.9 % (FLUSH) 0.9 %
5-40 SYRINGE (ML) INJECTION 2 TIMES DAILY
Status: DISCONTINUED | OUTPATIENT
Start: 2024-01-17 | End: 2024-01-19 | Stop reason: HOSPADM

## 2024-01-17 RX ORDER — SENNOSIDES A AND B 8.6 MG/1
1 TABLET, FILM COATED ORAL 2 TIMES DAILY
Status: DISCONTINUED | OUTPATIENT
Start: 2024-01-17 | End: 2024-01-19 | Stop reason: HOSPADM

## 2024-01-17 RX ADMIN — HYDROMORPHONE HYDROCHLORIDE 1 MG: 1 INJECTION, SOLUTION INTRAMUSCULAR; INTRAVENOUS; SUBCUTANEOUS at 11:12

## 2024-01-17 RX ADMIN — OXYCODONE HYDROCHLORIDE 5 MG: 5 TABLET ORAL at 00:56

## 2024-01-17 RX ADMIN — CARVEDILOL 6.25 MG: 6.25 TABLET, FILM COATED ORAL at 16:27

## 2024-01-17 RX ADMIN — METHOCARBAMOL 1000 MG: 100 INJECTION INTRAMUSCULAR; INTRAVENOUS at 11:19

## 2024-01-17 RX ADMIN — HYDROMORPHONE HYDROCHLORIDE 1 MG: 1 INJECTION, SOLUTION INTRAMUSCULAR; INTRAVENOUS; SUBCUTANEOUS at 18:15

## 2024-01-17 RX ADMIN — OXYCODONE HYDROCHLORIDE 5 MG: 5 TABLET ORAL at 07:28

## 2024-01-17 RX ADMIN — OXYCODONE HYDROCHLORIDE 20 MG: 20 TABLET, FILM COATED, EXTENDED RELEASE ORAL at 20:13

## 2024-01-17 RX ADMIN — IOPAMIDOL 75 ML: 755 INJECTION, SOLUTION INTRAVENOUS at 01:30

## 2024-01-17 RX ADMIN — ONDANSETRON 4 MG: 2 INJECTION INTRAMUSCULAR; INTRAVENOUS at 08:37

## 2024-01-17 RX ADMIN — SODIUM CHLORIDE 100 ML: 9 INJECTION, SOLUTION INTRAVENOUS at 11:18

## 2024-01-17 RX ADMIN — CARVEDILOL 6.25 MG: 6.25 TABLET, FILM COATED ORAL at 08:37

## 2024-01-17 RX ADMIN — HYDROMORPHONE HYDROCHLORIDE 1 MG: 1 INJECTION, SOLUTION INTRAMUSCULAR; INTRAVENOUS; SUBCUTANEOUS at 23:09

## 2024-01-17 RX ADMIN — AMLODIPINE BESYLATE 10 MG: 10 TABLET ORAL at 08:36

## 2024-01-17 RX ADMIN — HYDROMORPHONE HYDROCHLORIDE 0.5 MG: 1 INJECTION, SOLUTION INTRAMUSCULAR; INTRAVENOUS; SUBCUTANEOUS at 05:00

## 2024-01-17 RX ADMIN — HYDROMORPHONE HYDROCHLORIDE 1 MG: 1 INJECTION, SOLUTION INTRAMUSCULAR; INTRAVENOUS; SUBCUTANEOUS at 06:19

## 2024-01-17 RX ADMIN — SODIUM CHLORIDE, PRESERVATIVE FREE 10 ML: 5 INJECTION INTRAVENOUS at 02:00

## 2024-01-17 RX ADMIN — ONDANSETRON 4 MG: 2 INJECTION INTRAMUSCULAR; INTRAVENOUS at 02:21

## 2024-01-17 RX ADMIN — HYDROMORPHONE HYDROCHLORIDE 1 MG: 1 INJECTION, SOLUTION INTRAMUSCULAR; INTRAVENOUS; SUBCUTANEOUS at 02:09

## 2024-01-17 RX ADMIN — SENNOSIDES 8.6 MG: 8.6 TABLET, FILM COATED ORAL at 20:14

## 2024-01-17 RX ADMIN — OXYCODONE HYDROCHLORIDE 20 MG: 20 TABLET, FILM COATED, EXTENDED RELEASE ORAL at 08:42

## 2024-01-17 RX ADMIN — TAMSULOSIN HYDROCHLORIDE 0.4 MG: 0.4 CAPSULE ORAL at 08:36

## 2024-01-17 RX ADMIN — SODIUM CHLORIDE, PRESERVATIVE FREE 10 ML: 5 INJECTION INTRAVENOUS at 08:39

## 2024-01-17 ASSESSMENT — PAIN DESCRIPTION - DESCRIPTORS
DESCRIPTORS: SHARP
DESCRIPTORS: SHARP
DESCRIPTORS: BURNING
DESCRIPTORS: ACHING
DESCRIPTORS: SPASM
DESCRIPTORS: ACHING;DISCOMFORT
DESCRIPTORS: STABBING;SPASM
DESCRIPTORS: SPASM
DESCRIPTORS: SPASM

## 2024-01-17 ASSESSMENT — PAIN DESCRIPTION - LOCATION
LOCATION: ABDOMEN
LOCATION: ABDOMEN
LOCATION: BACK;ABDOMEN
LOCATION: ABDOMEN
LOCATION: ABDOMEN
LOCATION: BACK;ABDOMEN
LOCATION: ABDOMEN;PENIS
LOCATION: ABDOMEN;GROIN
LOCATION: ABDOMEN
LOCATION: BACK

## 2024-01-17 ASSESSMENT — PAIN - FUNCTIONAL ASSESSMENT
PAIN_FUNCTIONAL_ASSESSMENT: ACTIVITIES ARE NOT PREVENTED
PAIN_FUNCTIONAL_ASSESSMENT: INTOLERABLE, UNABLE TO DO ANY ACTIVE OR PASSIVE ACTIVITIES
PAIN_FUNCTIONAL_ASSESSMENT: ACTIVITIES ARE NOT PREVENTED
PAIN_FUNCTIONAL_ASSESSMENT: PREVENTS OR INTERFERES WITH ALL ACTIVE AND SOME PASSIVE ACTIVITIES
PAIN_FUNCTIONAL_ASSESSMENT: PREVENTS OR INTERFERES WITH MANY ACTIVE NOT PASSIVE ACTIVITIES

## 2024-01-17 ASSESSMENT — PAIN DESCRIPTION - ORIENTATION
ORIENTATION: LOWER;MID
ORIENTATION: LOWER
ORIENTATION: RIGHT;LEFT
ORIENTATION: LOWER

## 2024-01-17 ASSESSMENT — PAIN SCALES - GENERAL
PAINLEVEL_OUTOF10: 7
PAINLEVEL_OUTOF10: 9
PAINLEVEL_OUTOF10: 9
PAINLEVEL_OUTOF10: 8
PAINLEVEL_OUTOF10: 10
PAINLEVEL_OUTOF10: 9
PAINLEVEL_OUTOF10: 9
PAINLEVEL_OUTOF10: 7
PAINLEVEL_OUTOF10: 8
PAINLEVEL_OUTOF10: 7

## 2024-01-17 NOTE — PROGRESS NOTES
01/17/24 1427   Encounter Summary   Encounter Overview/Reason  Initial Encounter   Service Provided For: Patient   Referral/Consult From: [facility]   Support System Spouse;Parent;Children;Family members   Last Encounter  01/17/24  (Patient talked about his health and then requested prayer support.)   Complexity of Encounter Low   Begin Time 1400   End Time  1429   Total Time Calculated 29 min   Crisis   Type Family Care   Spiritual/Emotional needs   Type Spiritual Support   Grief, Loss, and Adjustments   Type Adjustment to illness   Assessment/Intervention/Outcome   Assessment Concerns with suffering   Intervention Active listening;Discussed belief system/Jainism practices/santiago;Discussed illness injury and it’s impact;Empowerment;Prayer (assurance of)/[place]   Outcome Coping;Encouraged;Engaged in conversation;Expressed Gratitude;Receptive   Plan and Referrals   Plan/Referrals Continue Support (comment)  (as needed)

## 2024-01-17 NOTE — PROGRESS NOTES
V2.0    [facility] Progress Note      Name:  Jethro Vázquez /Age/Sex: 1960  (63 y.o. male)   MRN & CSN:  [id number] & [id number] Encounter Date/Time: 2024 11:23 AM EST   [facility] P[facility]: Mono Schaffer MD     Attending:Vladislav Brewster MD       Hospital Day: 8    Assessment and Recommendations   Jethro Vázquez is a 63 y.o. male with pmh of hypertension, BPH, history of left ureteral calculi, recently diagnosed urothelial carcinoma  who presents with Bladder cancer (HCC)      Plan:     Gross Hematuria.   Flank pain, suprapubic pain  Acute cystitis   -CT abdomen/pelvis-stable appearance of bilateral mild hydroureteronephrosis, unchanged appearance of metastatic lymphadenopathy, urinary bladder wall demonstrated eccentric wall thickening with perivesical infiltration as before.  Simple right renal cortical cyst-stable  -UA consistent with UTI  -Continue ceftriaxone (urine culture in the past grew pansensitive E. coli)  -Keep n.p.o. after midnight  -IV fluids, antiemetics, analgesics as needed.  S/p BILATERAL NEPHROSTOMY TUBE PLACEMENT by IR. Monitor for bleeding   Discussed with urology - repeat CT A/p w IV contrast as below  1.  Bilateral nephrostomy tubes are present as with the 01/15/2024 study.   There is no new perinephric hematoma or hematoma along the course of the  nephrostomy tubes. 2.  There is not a significant degree of residual hydronephrosis compared  with the pre nephrostomy study of 01/10/2024.  Tiny focus of gas in the right  renal collecting system may be secondary to the nephrostomy tube.  3.  There is delayed enhancement of the left kidney compared to the right  side.  This could be vascular in nature with stenosis of the left renal  artery and narrowing of the left renal vein.  The left renal vein is retroaortic and likely compressed with the severe retroperitoneal  lymphadenopathy. 4. The known malignancy at the urinary bladder with asymmetric and eccentric  wall  reformatted images are provided for review. Automated exposure control, iterative reconstruction, and/or weight based adjustment of the mA/kV was utilized to reduce the radiation dose to as low as reasonably achievable. COMPARISON: None. HISTORY: ORDERING SYSTEM PROVIDED HISTORY: flank pain TECHNOLOGIST PROVIDED HISTORY: Reason for exam:->flank pain Reason for Exam: flank pain Additional signs and symptoms: flank pain Relevant Medical/Surgical History: flank pain FINDINGS: Lower Chest: No acute consolidation is seen.  Retrocrural lymphadenopathy is unchanged from prior examination. Organs: Liver: Liver is normal. No focal lesions are seen. Spleen: Normal. Pancreas: Normal Gallbladder: Radiopaque calculi are seen. Adrenal glands: Normal. No focal lesions are seen. Kidneys and ureters: Stable appearance of bilateral mild hydroureteronephrosis, slightly more pronounced on the left side.  Simple right renal cortical cyst is stable in size.  The ureters are dilated to the level of ureterovesical junction. Abdominal aorta and branches: Normal in caliber. IVC and portal vein: Normal in caliber. GI/Bowel: Normal in caliber and wall thickeness.  No evidence of appendicitis. Peritoneum/Retroperitoneum: Unchanged appearance of metastatic lymphadenopathy involving the pelvic sidewalls, retroperitoneum, retrocrural space. PELVIS Urinary bladder wall demonstrates eccentric wall thickening with perivesical infiltration as before. Prostate gland is mildly enlarged. Bones/Soft Tissues: Normal No soft tissue swelling is seen.     1. Stable appearance of bilateral mild hydroureteronephrosis, slightly more pronounced on the left side. 2. Unchanged appearance of metastatic lymphadenopathy involving the pelvic sidewalls, retroperitoneum, retrocrural space. 3. Urinary bladder wall demonstrates eccentric wall thickening with perivesical infiltration as before. Prostate gland is mildly enlarged. 4. Cholelithiasis. 5. Simple right renal

## 2024-01-17 NOTE — PROGRESS NOTES
Comprehensive Nutrition Assessment    Type and Reason for Visit:  Initial, RD Nutrition Re-Screen/LOS    Nutrition Recommendations/Plan:   Continue current diet order at this time   Encourage proper hydration/fluids intake  Please document all PO intakes in I/O   Will add snacks upon pt requests if desired     Malnutrition Assessment:  Malnutrition Status:  At risk for malnutrition (Comment) (01/17/24 1717)    Context:  Acute Illness     Findings of the 6 clinical characteristics of malnutrition:  Energy Intake:  Mild decrease in energy intake (Comment)  Weight Loss:   (3% in 2 mos)     Body Fat Loss:  No significant body fat loss Orbital, Triceps, Fat Overlying Ribs, Buccal region   Muscle Mass Loss:  No significant muscle mass loss Temples (temporalis), Clavicles (pectoralis & deltoids), Calf (gastrocnemius), Hand (interosseous), Scapula (trapezius), Thigh (quadraceps)  Fluid Accumulation:  No significant fluid accumulation Extremities   Strength:  Not Performed    Nutrition Assessment:    Presented to right flank pain, suprapubic pain, hematuria. H/O HTN, BPH, L ureteral calculi, recently diagnosed urothelial carcinoma. Currently on regular diet, pt is eating better than earlier in stay. Now consuming at least half of meals. No signficant wt loss. No significant muscle/fat wasting per NFPE. Pt reports does not want any supplements, requests for chips. C/o taste changes but trying to eat. States UBW of 217lb with mild wt loss. Continue to encourage good hydration and oral intake daily for strength and healing. Follow as mdoerate nutrition risk.    Nutrition Related Findings:    Meds and labs reviewed. Last BM known 1/10 +dilaudid and zofran given prn Wound Type:  (nephrostomy tubes placed)       Intake/Output Summary (Last 24 hours) at 1/17/2024 1718  Last data filed at 1/17/2024 1631  Gross per 24 hour   Intake 240 ml   Output 1850 ml   Net -1610 ml     Current Nutrition Intake & Therapies:    Average Meal

## 2024-01-17 NOTE — CONSULTS
Hematology Oncology Inpatient Consult    Patient Name:  Jethro Vázquez  Patient :  1960  Patient MRN:  [id number]     Primary Oncologist: Noemi Gambino MD  Referring Provider: Mono Schaffer MD     Date of Service: 1/10/2024      Reason for Consult: cancer related pain     Chief Complaint:  No chief complaint on file.    Principal Problem:    Bladder cancer (HCC)  Resolved Problems:    * No resolved hospital problems. *      HPI:   Jethro Vázquez is a pleasant 63 y.o. male known to us with  recently treatment naive urothelial carcinoma. He is s/p bilateral nephrostomy tube placement.  He has pelvic and retroperitoneal as well as paraesophageal LAD concerning for metastatic disease.  Bone scan was negative in 2024. Plan was for outpatient PET however he has been hospitalized for pain.    CT A/P with contrast on 2023 was non-acute with no significant residual hydronephrosis however did redemonstrate significant LAD.     On exam pt c/o uncontrolled pain in both lower flank areas as well as in the groin.  He also notes that he is still putting out ~10cc at a time of urine via his penis which is painful but expected per urology.  Reviewed his current pain medication usage in depth with him receiving 112 MME.  Reviewed addition of 20mg long acting oxycodone representing half his total usage over the day with expectation of 2-3 doses before full effect with spacing of PRN doses.  I also asked him to take oral pain medication PRN as this comes into effect instead of IV.  Reviewed that without treatment his pain will continue as seems concordant with known disease (retroperitoneal LAD likely causing flank symptoms) and we need a sustainable plan to get him out of the hospital.     Denies fever/chills, CP/SOB, N/V.  Constipated with several days since last BM, discussed bowel regimen and OID. No new headache or vision changes.    CT AP W/ Contrast 2024  IMPRESSION:  1.  Bilateral

## 2024-01-17 NOTE — CARE COORDINATION
Reviewed chart, discussed in IDR, plan for possible d/c home tomorrow with family to assist with care of nephro tubes.  CM available as needed.

## 2024-01-17 NOTE — CARE COORDINATION
Reviewed chart, discussed in IDR and spoke with pt/wife and mother in-law.  Plan remains home, possibly tomorrow if pain remains under controled.  He also believes that they insurance know but not clear which medicaid product or who told them .  Called Keila the FC,  Medicaid is still pending.

## 2024-01-17 NOTE — PROGRESS NOTES
[address] [address]   Progress Note  [facility] 0 1 2      Date: 2024   Patient: Jethro Vázquez   : 1960   DOA: 1/10/2024   MRN: [id number]   ROOM#: 4116/4116-A     Admit Date: 1/10/2024     Collaborating Urologist on Call at time of admission: Dr. Jc   CC: Abdominal and back pain   Reason for Consult: H/o bladder cancer, transfer from [place]      Subjective:     Pain: mild, no nausea and no vomiting,   Bowel Movement/Flatus: Passing flatus  Voiding: Bilateral PCN's with clear yellow urine     Pt resting in bed, reports persistent suprapubic pain, improved today.     Objective:    Vitals:   BP (!) 181/112   Pulse (!) 103   Temp 98.6 °F (37 °C) (Oral)   Resp 18   Ht 1.778 m (5' 10\")   Wt 87.1 kg (192 lb 0.3 oz)   SpO2 97%   BMI 27.55 kg/m²   Temp  Av.2 °F (36.8 °C)  Min: 97.9 °F (36.6 °C)  Max: 98.6 °F (37 °C)    Intake/Output Summary (Last 24 hours) at 2024 1159  Last data filed at 2024 1033  Gross per 24 hour   Intake 480 ml   Output 2050 ml   Net -1570 ml       Physical Exam:  Gen:    Pleasant male, in NAD  Neuro: Non-focal  Resp:  Unlabored breathing  Abd:    Soft, non-distended, TTP in suprapubic region  Back:   Bilateral PCN's with clear yellow urine  Ext:     No edema of bilateral LEs    Labs:  WBC:    Lab Results   Component Value Date/Time    WBC 10.5 2024 12:45 AM     Hemoglobin/Hematocrit:    Lab Results   Component Value Date/Time    HGB 12.2 2024 12:45 AM    HCT 36.6 2024 12:45 AM     BMP:   Lab Results   Component Value Date/Time     2024 12:45 AM    K 3.7 2024 12:45 AM    CL 98 2024 12:45 AM    CO2 26 2024 12:45 AM    BUN 12 2024 12:45 AM    LABALBU 3.3 2024 12:45 AM    CREATININE 0.9 2024 12:45 AM    CALCIUM 8.6 2024 12:45 AM    GFRAA >60 2018 07:38 AM    LABGLOM >60 2024 12:45 AM     Urine Culture 1/10/24: No growth  Urine Culture 23: +E.coli,  with the right side.  The left renal artery does appear stenotic.  There is air retroaortic left renal vein which also appears narrowed and compressed by the retroperitoneal lymphadenopathy and limited space between the aorta and spine.  Mild perinephric edema is present around both kidneys.  There is no acute high-density hemorrhage or hematoma around the kidneys or along the course of the nephrostomy tubes. GI/Bowel: Mild gastritis is also suspected.  The small bowel and colon are not dilated.  No CT evidence of focal colonic diverticulitis.  There is no pneumoperitoneum. Pelvis: The urinary bladder has eccentric masslike wall thickening along the left lateral, posterior, and inferior aspects.  The previous studies also had the wall thickening and known malignancy.  Prostate appears stable. Stranding and inflammation about the margin of the urinary bladder and in the pelvis.  Small amounts of non loculated free fluid.  Some peritoneal thickening along the anterior inferior abdomen and pelvis as well.  However the overall features are not significantly changed from the previous exams. Peritoneum/Retroperitoneum: Extensive retroperitoneal lymphadenopathy is again noted.  Edema of the retroperitoneal fat around the lymph nodes.  There is atherosclerosis of the aorta but no abdominal aortic aneurysm.  Additional enlarged lymph nodes in the left pelvis and iliac chain more than the right side.  Fat containing left inguinal hernia without complication. Bones/Soft Tissues: The bones appear stable from the recent studies.  There are degenerative changes along the spine.  No collapse or acute fractures are identified.     1.  Bilateral nephrostomy tubes are present as with the 01/15/2024 study. There is no new perinephric hematoma or hematoma along the course of the nephrostomy tubes. 2.  There is not a significant degree of residual hydronephrosis compared with the pre nephrostomy study of 01/10/2024.  Tiny focus of gas  in the right renal collecting system may be secondary to the nephrostomy tube. 3.  There is delayed enhancement of the left kidney compared to the right side.  This could be vascular in nature with stenosis of the left renal artery and narrowing of the left renal vein.  The left renal vein is retroaortic and likely compressed with the severe retroperitoneal lymphadenopathy. 4.  The known malignancy at the urinary bladder with asymmetric and eccentric wall thickening.  Fat stranding and inflammation around the margin of the urinary bladder and in the pelvis.  Multiple enlarged lymphadenopathy throughout the retroperitoneum and in the pelvis. 5.  No new fracture or destruction is seen at the bones.  There is no bowel perforation or pneumoperitoneum.     CT ABDOMEN PELVIS WO CONTRAST Additional Contrast? None    Result Date: 1/15/2024  EXAMINATION: CT OF THE ABDOMEN AND PELVIS WITHOUT CONTRAST 1/15/2024 6:34 pm TECHNIQUE: CT of the abdomen and pelvis was performed without the administration of intravenous contrast. Multiplanar reformatted images are provided for review. Automated exposure control, iterative reconstruction, and/or weight based adjustment of the mA/kV was utilized to reduce the radiation dose to as low as reasonably achievable. COMPARISON: CT abdomen pelvis without contrast 01/10/2024 HISTORY: ORDERING SYSTEM PROVIDED HISTORY: refractory abdominal pain TECHNOLOGIST PROVIDED HISTORY: Reason for exam:->refractory abdominal pain Additional Contrast?->None Reason for Exam: refractory abdominal pain FINDINGS: Lower Chest: There is bibasilar atelectasis without pleural effusion. Organs: The liver is normal in size with smooth contours.  There is no biliary ductal dilatation.  The gallbladder is physiologically distended with a radiopaque laminated calculus in the body.  There is no gallbladder wall thickening or pericholecystic fluid.  The spleen is normal in size.  The adrenal glands are normal.  The pancreas

## 2024-01-18 LAB
ALBUMIN SERPL-MCNC: 3.4 GM/DL (ref 3.4–5)
ALP BLD-CCNC: 218 IU/L (ref 40–128)
ALT SERPL-CCNC: 11 U/L (ref 10–40)
ANION GAP SERPL CALCULATED.3IONS-SCNC: 15 MMOL/L (ref 7–16)
AST SERPL-CCNC: 14 IU/L (ref 15–37)
BASOPHILS ABSOLUTE: 0.1 K/CU MM
BASOPHILS RELATIVE PERCENT: 0.6 % (ref 0–1)
BILIRUB SERPL-MCNC: 0.2 MG/DL (ref 0–1)
BUN SERPL-MCNC: 12 MG/DL (ref 6–23)
CALCIUM SERPL-MCNC: 9 MG/DL (ref 8.3–10.6)
CHLORIDE BLD-SCNC: 98 MMOL/L (ref 99–110)
CO2: 24 MMOL/L (ref 21–32)
CREAT SERPL-MCNC: 1.2 MG/DL (ref 0.9–1.3)
DIFFERENTIAL TYPE: ABNORMAL
EOSINOPHILS ABSOLUTE: 0.4 K/CU MM
EOSINOPHILS RELATIVE PERCENT: 3.1 % (ref 0–3)
GFR SERPL CREATININE-BSD FRML MDRD: >60 ML/MIN/1.73M2
GLUCOSE SERPL-MCNC: 97 MG/DL (ref 70–99)
HCT VFR BLD CALC: 37.3 % (ref 42–52)
HEMOGLOBIN: 12.4 GM/DL (ref 13.5–18)
IMMATURE NEUTROPHIL %: 0.4 % (ref 0–0.43)
LYMPHOCYTES ABSOLUTE: 2.2 K/CU MM
LYMPHOCYTES RELATIVE PERCENT: 18.8 % (ref 24–44)
MAGNESIUM: 2.2 MG/DL (ref 1.8–2.4)
MCH RBC QN AUTO: 27.8 PG (ref 27–31)
MCHC RBC AUTO-ENTMCNC: 33.2 % (ref 32–36)
MCV RBC AUTO: 83.6 FL (ref 78–100)
MONOCYTES ABSOLUTE: 0.8 K/CU MM
MONOCYTES RELATIVE PERCENT: 6.8 % (ref 0–4)
NUCLEATED RBC %: 0 %
PDW BLD-RTO: 11.7 % (ref 11.7–14.9)
PHOSPHORUS: 5 MG/DL (ref 2.5–4.9)
PLATELET # BLD: 507 K/CU MM (ref 140–440)
PMV BLD AUTO: 9.2 FL (ref 7.5–11.1)
POTASSIUM SERPL-SCNC: 3.9 MMOL/L (ref 3.5–5.1)
RBC # BLD: 4.46 M/CU MM (ref 4.6–6.2)
SEGMENTED NEUTROPHILS ABSOLUTE COUNT: 8.3 K/CU MM
SEGMENTED NEUTROPHILS RELATIVE PERCENT: 70.3 % (ref 36–66)
SODIUM BLD-SCNC: 137 MMOL/L (ref 135–145)
TOTAL IMMATURE NEUTOROPHIL: 0.05 K/CU MM
TOTAL NUCLEATED RBC: 0 K/CU MM
TOTAL PROTEIN: 6.6 GM/DL (ref 6.4–8.2)
WBC # BLD: 11.8 K/CU MM (ref 4–10.5)

## 2024-01-18 PROCEDURE — 2580000003 HC RX 258: Performed by: INTERNAL MEDICINE

## 2024-01-18 PROCEDURE — 83735 ASSAY OF MAGNESIUM: CPT

## 2024-01-18 PROCEDURE — 6370000000 HC RX 637 (ALT 250 FOR IP): Performed by: STUDENT IN AN ORGANIZED HEALTH CARE EDUCATION/TRAINING PROGRAM

## 2024-01-18 PROCEDURE — 6360000002 HC RX W HCPCS: Performed by: INTERNAL MEDICINE

## 2024-01-18 PROCEDURE — 1200000000 HC SEMI PRIVATE

## 2024-01-18 PROCEDURE — 2700000000 HC OXYGEN THERAPY PER DAY

## 2024-01-18 PROCEDURE — 6360000002 HC RX W HCPCS: Performed by: STUDENT IN AN ORGANIZED HEALTH CARE EDUCATION/TRAINING PROGRAM

## 2024-01-18 PROCEDURE — 80053 COMPREHEN METABOLIC PANEL: CPT

## 2024-01-18 PROCEDURE — 85025 COMPLETE CBC W/AUTO DIFF WBC: CPT

## 2024-01-18 PROCEDURE — 36415 COLL VENOUS BLD VENIPUNCTURE: CPT

## 2024-01-18 PROCEDURE — 94761 N-INVAS EAR/PLS OXIMETRY MLT: CPT

## 2024-01-18 PROCEDURE — 6370000000 HC RX 637 (ALT 250 FOR IP): Performed by: INTERNAL MEDICINE

## 2024-01-18 PROCEDURE — 2580000003 HC RX 258: Performed by: PHYSICIAN ASSISTANT

## 2024-01-18 PROCEDURE — 84100 ASSAY OF PHOSPHORUS: CPT

## 2024-01-18 PROCEDURE — 6370000000 HC RX 637 (ALT 250 FOR IP): Performed by: PHYSICIAN ASSISTANT

## 2024-01-18 PROCEDURE — 99232 SBSQ HOSP IP/OBS MODERATE 35: CPT | Performed by: INTERNAL MEDICINE

## 2024-01-18 RX ORDER — ACETAMINOPHEN 325 MG/1
650 TABLET ORAL EVERY 6 HOURS
Status: DISCONTINUED | OUTPATIENT
Start: 2024-01-18 | End: 2024-01-19 | Stop reason: HOSPADM

## 2024-01-18 RX ADMIN — SODIUM CHLORIDE, PRESERVATIVE FREE 10 ML: 5 INJECTION INTRAVENOUS at 20:05

## 2024-01-18 RX ADMIN — SODIUM CHLORIDE, PRESERVATIVE FREE 10 ML: 5 INJECTION INTRAVENOUS at 09:51

## 2024-01-18 RX ADMIN — ACETAMINOPHEN 650 MG: 325 TABLET ORAL at 20:05

## 2024-01-18 RX ADMIN — SENNOSIDES 8.6 MG: 8.6 TABLET, FILM COATED ORAL at 20:05

## 2024-01-18 RX ADMIN — CARVEDILOL 6.25 MG: 6.25 TABLET, FILM COATED ORAL at 09:48

## 2024-01-18 RX ADMIN — AMLODIPINE BESYLATE 10 MG: 10 TABLET ORAL at 09:48

## 2024-01-18 RX ADMIN — TAMSULOSIN HYDROCHLORIDE 0.4 MG: 0.4 CAPSULE ORAL at 09:48

## 2024-01-18 RX ADMIN — ONDANSETRON 4 MG: 2 INJECTION INTRAMUSCULAR; INTRAVENOUS at 17:05

## 2024-01-18 RX ADMIN — OXYCODONE HYDROCHLORIDE 20 MG: 20 TABLET, FILM COATED, EXTENDED RELEASE ORAL at 21:17

## 2024-01-18 RX ADMIN — ACETAMINOPHEN 650 MG: 325 TABLET ORAL at 02:45

## 2024-01-18 RX ADMIN — HYDROMORPHONE HYDROCHLORIDE 1 MG: 1 INJECTION, SOLUTION INTRAMUSCULAR; INTRAVENOUS; SUBCUTANEOUS at 16:46

## 2024-01-18 RX ADMIN — OXYCODONE HYDROCHLORIDE 5 MG: 5 TABLET ORAL at 02:45

## 2024-01-18 RX ADMIN — METHOCARBAMOL 1000 MG: 100 INJECTION INTRAMUSCULAR; INTRAVENOUS at 16:50

## 2024-01-18 RX ADMIN — SENNOSIDES 8.6 MG: 8.6 TABLET, FILM COATED ORAL at 09:48

## 2024-01-18 RX ADMIN — OXYCODONE HYDROCHLORIDE 20 MG: 20 TABLET, FILM COATED, EXTENDED RELEASE ORAL at 09:49

## 2024-01-18 RX ADMIN — CARVEDILOL 6.25 MG: 6.25 TABLET, FILM COATED ORAL at 16:47

## 2024-01-18 ASSESSMENT — PAIN SCALES - GENERAL
PAINLEVEL_OUTOF10: 3
PAINLEVEL_OUTOF10: 6
PAINLEVEL_OUTOF10: 8
PAINLEVEL_OUTOF10: 10
PAINLEVEL_OUTOF10: 2
PAINLEVEL_OUTOF10: 6
PAINLEVEL_OUTOF10: 8

## 2024-01-18 ASSESSMENT — PAIN DESCRIPTION - LOCATION
LOCATION: ABDOMEN;BACK
LOCATION: ABDOMEN;GROIN
LOCATION: ABDOMEN
LOCATION: PENIS
LOCATION: BACK;FLANK

## 2024-01-18 ASSESSMENT — PAIN DESCRIPTION - DESCRIPTORS
DESCRIPTORS: ACHING;THROBBING
DESCRIPTORS: BURNING;STABBING
DESCRIPTORS: ACHING
DESCRIPTORS: BURNING;STABBING
DESCRIPTORS: ACHING;TIGHTNESS

## 2024-01-18 ASSESSMENT — PAIN DESCRIPTION - FREQUENCY: FREQUENCY: INTERMITTENT

## 2024-01-18 ASSESSMENT — PAIN DESCRIPTION - ORIENTATION
ORIENTATION: RIGHT
ORIENTATION: MID
ORIENTATION: LOWER

## 2024-01-18 ASSESSMENT — PAIN - FUNCTIONAL ASSESSMENT
PAIN_FUNCTIONAL_ASSESSMENT: PREVENTS OR INTERFERES SOME ACTIVE ACTIVITIES AND ADLS
PAIN_FUNCTIONAL_ASSESSMENT: INTOLERABLE, UNABLE TO DO ANY ACTIVE OR PASSIVE ACTIVITIES
PAIN_FUNCTIONAL_ASSESSMENT: PREVENTS OR INTERFERES SOME ACTIVE ACTIVITIES AND ADLS

## 2024-01-18 ASSESSMENT — PAIN DESCRIPTION - PAIN TYPE
TYPE: CHRONIC PAIN
TYPE: CHRONIC PAIN

## 2024-01-18 ASSESSMENT — PAIN DESCRIPTION - ONSET: ONSET: ON-GOING

## 2024-01-18 NOTE — CARE COORDINATION
Reviewed chart, discussed in IDR and spoke with pt. Updated that Medicaid still pending.  Referral to Med Assist and they can assist with MC contin/pain medications.  Will need to do meds to beds.   No other needs identified at this time.

## 2024-01-18 NOTE — CARE COORDINATION
1/17/2023 Received referral from CM pt may need assistance with medication cost(s) at time of discharge.  Note: Med [facility]ist will help with cost of pain medication(s).  Following pt as requested.

## 2024-01-18 NOTE — PROGRESS NOTES
Hematology Oncology Progress Note    Patient Name:  Jethro Vázquez  Patient :  1960  Patient MRN:  [id number]     Primary Oncologist: Noemi Gambino MD  Referring Provider: Mono Schaffer MD     Date of Service: 1/10/2024      HPI:   Jethro Vázquez is a pleasant 63 y.o. male known to us with  recently treatment naive urothelial carcinoma. He is s/p bilateral nephrostomy tube placement.  He has pelvic and retroperitoneal as well as paraesophageal LAD concerning for metastatic disease.  Bone scan was negative in 2024. Plan was for outpatient PET however he has been hospitalized for pain.    CT A/P with contrast on 2023 was non-acute with no significant residual hydronephrosis however did redemonstrate significant LAD.     On exam pt c/o uncontrolled pain in both lower flank areas as well as in the groin.  He also notes that he is still putting out ~10cc at a time of urine via his penis which is painful but expected per urology.  Reviewed his current pain medication usage in depth with him receiving 112 MME.  Reviewed addition of 20mg long acting oxycodone representing half his total usage over the day with expectation of 2-3 doses before full effect with spacing of PRN doses.  I also asked him to take oral pain medication PRN as this comes into effect instead of IV.  Reviewed that without treatment his pain will continue as seems concordant with known disease (retroperitoneal LAD likely causing flank symptoms) and we need a sustainable plan to get him out of the hospital.     Denies fever/chills, CP/SOB, N/V.  Constipated with several days since last BM, discussed bowel regimen and OID. No new headache or vision changes.  CT AP W/ Contrast 2024  1.  Bilateral nephrostomy tubes are present as with the 01/15/2024 study.  There is no new perinephric hematoma or hematoma along the course of the  nephrostomy tubes.   2.  There is not a significant degree of residual hydronephrosis  the left renal vein.  The left renal vein is   retroaortic and likely compressed with the severe retroperitoneal   lymphadenopathy.      4.  The known malignancy at the urinary bladder with asymmetric and eccentric   wall thickening.  Fat stranding and inflammation around the margin of the   urinary bladder and in the pelvis.  Multiple enlarged lymphadenopathy   throughout the retroperitoneum and in the pelvis.      5.  No new fracture or destruction is seen at the bones.  There is no bowel   perforation or pneumoperitoneum.         CT ABDOMEN PELVIS WO CONTRAST Additional Contrast? None   Final Result   1. Interval placement of bilateral percutaneous nephrostomy tubes with   resolution of hydronephrosis.  No perinephric hematoma.   2. Asymmetric thickening of the bladder which is eccentric into the left with   associated perivesicular inflammation.  Stable compared to prior exams.   3. Stable metastatic lymphadenopathy involving the external iliac region and   multiple retroperitoneal periaortic lymph nodes.   4. Cholelithiasis without acute cholecystitis.         IR GUIDED NEPHROSTOMY CATH PLACEMENT    (Results Pending)     Assessment/Plan:    #Muscle Invasive High Grade Bladder Ca s/p TUR 12/14/2023  -treatment naive  -Noted extensive retroperitoneal and pelvic LAD concerning for metatatic disease on CT.  -CT Chest with paraesophageal LAD concerning for metastatic disease.  -Bone scan without metastatic disease  -Planned for outpatient PET-CT to further characterize LAD, especially in chest.  -D/w urology, at minimum pelvic and retroperitoneal LAD very likely metastatic making him poor surgical candidate. Noted that this LAD is persistent on subsequent imaging and quite advanced  -Plan for outpatient cisplatin/gemcitabine, if definitely not a surgical candidate will need to discuss radiation    #Cancer Related Pain  -pain description concordant with imaging, predominately in groin and lower bilateral flanks, more

## 2024-01-18 NOTE — PROGRESS NOTES
V2.0    [facility] [place]      Name:  Jethro Vázquez /Age/Sex: 1960  (63 y.o. male)   MRN & CSN:  [id number] & [id number] Encounter Date/Time: 2024 11:23 AM EST   [facility] PCP: Mono Schaffer MD     Attending:lVadislav Brewster MD       Hospital Day: 9    Assessment and Recommendations   Jethro Vázquez is a 63 y.o. male with pmh of hypertension, BPH, history of left ureteral calculi, recently diagnosed urothelial carcinoma  who presents with Bladder cancer (HCC)      Plan:     Gross Hematuria.   Flank pain, suprapubic pain  Acute cystitis   -CT abdomen/pelvis-stable appearance of bilateral mild hydroureteronephrosis, unchanged appearance of metastatic lymphadenopathy, urinary bladder wall demonstrated eccentric wall thickening with perivesical infiltration as before.  Simple right renal cortical cyst-stable  -UA consistent with UTI  -Continue ceftriaxone (urine culture in the past grew pansensitive E. coli)  -Keep n.p.o. after midnight  -IV fluids, antiemetics, analgesics as needed.  S/p BILATERAL NEPHROSTOMY TUBE PLACEMENT by IR. Monitor for bleeding   Discussed with urology - repeat CT A/p w IV contrast as below  1.  Bilateral nephrostomy tubes are present as with the 01/15/2024 study.   There is no new perinephric hematoma or hematoma along the course of the  nephrostomy tubes. 2.  There is not a significant degree of residual hydronephrosis compared  with the pre nephrostomy study of 01/10/2024.  Tiny focus of gas in the right  renal collecting system may be secondary to the nephrostomy tube.  3.  There is delayed enhancement of the left kidney compared to the right  side.  This could be vascular in nature with stenosis of the left renal  artery and narrowing of the left renal vein.  The left renal vein is retroaortic and likely compressed with the severe retroperitoneal  lymphadenopathy. 4. The known malignancy at the urinary bladder with asymmetric and eccentric  wall  \"ORG\"      Electronically signed by Vladislav Brewster MD on 1/18/2024 at 1:34 PM

## 2024-01-18 NOTE — PROGRESS NOTES
[address] [address]   Progress Note  [facility] 0 1 2      Date: 2024   Patient: Jethro Vázquez   : 1960   DOA: 1/10/2024   MRN: [id number]   ROOM#: 4116/4116-A     Admit Date: 1/10/2024     Collaborating Urologist on Call at time of admission: Dr. cJ   CC: Abdominal and back pain   Reason for Consult: H/o bladder cancer, transfer from [place]      Subjective:     Pain: mild, no nausea and no vomiting,   Bowel Movement/Flatus: Passing flatus  Voiding: Bilateral PCN's with clear yellow urine     Pt resting in bed, reports improved pain today.    Objective:    Vitals:   BP (!) 139/90   Pulse 91   Temp 98.4 °F (36.9 °C) (Oral)   Resp 18   Ht 1.778 m (5' 10\")   Wt 87.1 kg (192 lb 0.3 oz)   SpO2 96%   BMI 27.55 kg/m²   Temp  Av.1 °F (36.7 °C)  Min: 97.8 °F (36.6 °C)  Max: 98.4 °F (36.9 °C)    Intake/Output Summary (Last 24 hours) at 2024 1129  Last data filed at 2024 0633  Gross per 24 hour   Intake --   Output 850 ml   Net -850 ml         Physical Exam:  Gen:    Pleasant male, in NAD  Neuro: Non-focal  Resp:  Unlabored breathing  Abd:    Soft, non-distended, TTP in suprapubic region  Back:   Bilateral PCN's with clear yellow urine  Ext:     No edema of bilateral LEs    Labs:  WBC:    Lab Results   Component Value Date/Time    WBC 11.8 2024 01:18 AM     Hemoglobin/Hematocrit:    Lab Results   Component Value Date/Time    HGB 12.4 2024 01:18 AM    HCT 37.3 2024 01:18 AM     BMP:   Lab Results   Component Value Date/Time     2024 01:18 AM    K 3.9 2024 01:18 AM    CL 98 2024 01:18 AM    CO2 24 2024 01:18 AM    BUN 12 2024 01:18 AM    LABALBU 3.4 2024 01:18 AM    CREATININE 1.2 2024 01:18 AM    CALCIUM 9.0 2024 01:18 AM    GFRAA >60 2018 07:38 AM    LABGLOM >60 2024 01:18 AM     Urine Culture 1/10/24: No growth  Urine Culture 23: +E.coli, pansensitive    Imaging:  I  am TECHNIQUE: CT of the abdomen and pelvis was performed without the administration of intravenous contrast. Multiplanar reformatted images are provided for review. Automated exposure control, iterative reconstruction, and/or weight based adjustment of the mA/kV was utilized to reduce the radiation dose to as low as reasonably achievable. COMPARISON: 12/31/2023 HISTORY: ORDERING SYSTEM PROVIDED HISTORY: right flank pain TECHNOLOGIST PROVIDED HISTORY: Reason for exam:->right flank pain Additional Contrast?->None Decision Support Exception - unselect if not a suspected or confirmed emergency medical condition->Emergency Medical Condition (MA) Reason for Exam: right flank pain FINDINGS: Lower Chest: No distal esophageal thickening.  No cardiomegaly is identified. Similar sized prominent lymph nodes are seen adjacent to the esophagus when compared to the previous examination.  Calcified granuloma noted within the lingula.  Subsegmental atelectatic changes are identified. Organs: No acute liver or splenic abnormality is identified.  No adrenal abnormality is seen.  No pancreatic mass or ductal dilation.  No acute pancreatitis.  Cholelithiasis is seen without CT findings to suggest cholecystitis.  There is mild bilateral hydroureteronephrosis.  This is similar to the previous examination. GI/Bowel: No ileus or obstruction is identified.  No acute bowel process is seen.  No significant stool burden in the colon. Pelvis: Eccentric bladder wall thickening is identified related to known malignancy, with soft tissue extension into the adjacent perivesicular fat. There is also some stranding noted adjacent to the bladder.  No pelvic hemorrhage.  Pelvic lymphadenopathy is seen, with a 13 mm left iliac chain lymph node again identified, unchanged from the recent comparison study 6 days earlier. Peritoneum/Retroperitoneum: Retroperitoneal lymphadenopathy is identified, greatest in the left periaortic region, with similar sized  outpatient as well as referral to pain management.              Continue bilateral PCN's. Recommend exchange with IR in 3 months.              Suspect his suprapubic pain is secondary to bladder cancer. Hem/onc consulted.     2) Complicated UTI: UA w small leuks, occasional bacteria, large blood; urine cx negative  Urine cx 12/31/23 +E.coli, pansensitive              WBC 10.5, afebrile     Pt stable from a  standpoint. Pt to follow up in our office with Dr. Ivan in 2 weeks for reevaluation.    Patient seen and examined, chart reviewed.     Electronically signed by Juan Zaidi PA-C on 1/18/2024 at 11:29 AM

## 2024-01-18 NOTE — CARE COORDINATION
Received a call from CM, inquiring if Med Assist can assist with pain medications (cost) for a pt w/a cancer dx.  Verified with Med Assist Manager, we can assist with paid medications up to $100.00.

## 2024-01-19 VITALS
HEIGHT: 70 IN | HEART RATE: 108 BPM | BODY MASS INDEX: 27.49 KG/M2 | TEMPERATURE: 98.5 F | OXYGEN SATURATION: 95 % | SYSTOLIC BLOOD PRESSURE: 123 MMHG | WEIGHT: 192.02 LBS | RESPIRATION RATE: 16 BRPM | DIASTOLIC BLOOD PRESSURE: 100 MMHG

## 2024-01-19 LAB
ALBUMIN SERPL-MCNC: 3.5 GM/DL (ref 3.4–5)
ALP BLD-CCNC: 233 IU/L (ref 40–128)
ALT SERPL-CCNC: 12 U/L (ref 10–40)
ANION GAP SERPL CALCULATED.3IONS-SCNC: 13 MMOL/L (ref 7–16)
AST SERPL-CCNC: 13 IU/L (ref 15–37)
BASOPHILS ABSOLUTE: 0.1 K/CU MM
BASOPHILS RELATIVE PERCENT: 0.6 % (ref 0–1)
BILIRUB SERPL-MCNC: 0.3 MG/DL (ref 0–1)
BUN SERPL-MCNC: 14 MG/DL (ref 6–23)
CALCIUM SERPL-MCNC: 9 MG/DL (ref 8.3–10.6)
CHLORIDE BLD-SCNC: 96 MMOL/L (ref 99–110)
CO2: 28 MMOL/L (ref 21–32)
CREAT SERPL-MCNC: 1.2 MG/DL (ref 0.9–1.3)
DIFFERENTIAL TYPE: ABNORMAL
EOSINOPHILS ABSOLUTE: 0.4 K/CU MM
EOSINOPHILS RELATIVE PERCENT: 3.6 % (ref 0–3)
GFR SERPL CREATININE-BSD FRML MDRD: >60 ML/MIN/1.73M2
GLUCOSE SERPL-MCNC: 97 MG/DL (ref 70–99)
HCT VFR BLD CALC: 40.6 % (ref 42–52)
HEMOGLOBIN: 13 GM/DL (ref 13.5–18)
IMMATURE NEUTROPHIL %: 0.6 % (ref 0–0.43)
LYMPHOCYTES ABSOLUTE: 2.1 K/CU MM
LYMPHOCYTES RELATIVE PERCENT: 17.4 % (ref 24–44)
MAGNESIUM: 2.2 MG/DL (ref 1.8–2.4)
MCH RBC QN AUTO: 27.5 PG (ref 27–31)
MCHC RBC AUTO-ENTMCNC: 32 % (ref 32–36)
MCV RBC AUTO: 86 FL (ref 78–100)
MONOCYTES ABSOLUTE: 0.7 K/CU MM
MONOCYTES RELATIVE PERCENT: 6 % (ref 0–4)
NUCLEATED RBC %: 0 %
PDW BLD-RTO: 11.6 % (ref 11.7–14.9)
PHOSPHORUS: 4.5 MG/DL (ref 2.5–4.9)
PLATELET # BLD: 510 K/CU MM (ref 140–440)
PMV BLD AUTO: 8.8 FL (ref 7.5–11.1)
POTASSIUM SERPL-SCNC: 3.7 MMOL/L (ref 3.5–5.1)
RBC # BLD: 4.72 M/CU MM (ref 4.6–6.2)
SEGMENTED NEUTROPHILS ABSOLUTE COUNT: 8.7 K/CU MM
SEGMENTED NEUTROPHILS RELATIVE PERCENT: 71.8 % (ref 36–66)
SODIUM BLD-SCNC: 137 MMOL/L (ref 135–145)
TOTAL IMMATURE NEUTOROPHIL: 0.07 K/CU MM
TOTAL NUCLEATED RBC: 0 K/CU MM
TOTAL PROTEIN: 7.2 GM/DL (ref 6.4–8.2)
WBC # BLD: 12.1 K/CU MM (ref 4–10.5)

## 2024-01-19 PROCEDURE — 51798 US URINE CAPACITY MEASURE: CPT

## 2024-01-19 PROCEDURE — 2580000003 HC RX 258: Performed by: PHYSICIAN ASSISTANT

## 2024-01-19 PROCEDURE — 36415 COLL VENOUS BLD VENIPUNCTURE: CPT

## 2024-01-19 PROCEDURE — 6360000002 HC RX W HCPCS: Performed by: INTERNAL MEDICINE

## 2024-01-19 PROCEDURE — 85025 COMPLETE CBC W/AUTO DIFF WBC: CPT

## 2024-01-19 PROCEDURE — 2580000003 HC RX 258: Performed by: STUDENT IN AN ORGANIZED HEALTH CARE EDUCATION/TRAINING PROGRAM

## 2024-01-19 PROCEDURE — 94761 N-INVAS EAR/PLS OXIMETRY MLT: CPT

## 2024-01-19 PROCEDURE — 2580000003 HC RX 258: Performed by: INTERNAL MEDICINE

## 2024-01-19 PROCEDURE — 6370000000 HC RX 637 (ALT 250 FOR IP): Performed by: STUDENT IN AN ORGANIZED HEALTH CARE EDUCATION/TRAINING PROGRAM

## 2024-01-19 PROCEDURE — 6370000000 HC RX 637 (ALT 250 FOR IP): Performed by: PHYSICIAN ASSISTANT

## 2024-01-19 PROCEDURE — 6370000000 HC RX 637 (ALT 250 FOR IP): Performed by: INTERNAL MEDICINE

## 2024-01-19 PROCEDURE — 80053 COMPREHEN METABOLIC PANEL: CPT

## 2024-01-19 PROCEDURE — 99232 SBSQ HOSP IP/OBS MODERATE 35: CPT | Performed by: INTERNAL MEDICINE

## 2024-01-19 PROCEDURE — 83735 ASSAY OF MAGNESIUM: CPT

## 2024-01-19 PROCEDURE — 84100 ASSAY OF PHOSPHORUS: CPT

## 2024-01-19 PROCEDURE — 2700000000 HC OXYGEN THERAPY PER DAY

## 2024-01-19 RX ORDER — OXYCODONE HCL 20 MG/1
20 TABLET, FILM COATED, EXTENDED RELEASE ORAL EVERY 12 HOURS SCHEDULED
Qty: 10 TABLET | Refills: 0 | Status: SHIPPED | OUTPATIENT
Start: 2024-01-19 | End: 2024-01-24

## 2024-01-19 RX ORDER — CARVEDILOL 6.25 MG/1
6.25 TABLET ORAL 2 TIMES DAILY WITH MEALS
Qty: 60 TABLET | Refills: 3 | Status: SHIPPED | OUTPATIENT
Start: 2024-01-19

## 2024-01-19 RX ORDER — AMLODIPINE BESYLATE 10 MG/1
10 TABLET ORAL DAILY
Qty: 30 TABLET | Refills: 3 | Status: SHIPPED | OUTPATIENT
Start: 2024-01-19

## 2024-01-19 RX ORDER — TAMSULOSIN HYDROCHLORIDE 0.4 MG/1
0.4 CAPSULE ORAL DAILY
Qty: 30 CAPSULE | Refills: 0 | Status: SHIPPED | OUTPATIENT
Start: 2024-01-19

## 2024-01-19 RX ORDER — SENNOSIDES A AND B 8.6 MG/1
1 TABLET, FILM COATED ORAL 2 TIMES DAILY PRN
Qty: 60 TABLET | Refills: 0 | Status: SHIPPED | OUTPATIENT
Start: 2024-01-19 | End: 2024-01-19

## 2024-01-19 RX ORDER — POLYETHYLENE GLYCOL 3350 17 G/17G
17 POWDER, FOR SOLUTION ORAL DAILY PRN
Qty: 30 PACKET | Refills: 0 | Status: SHIPPED | OUTPATIENT
Start: 2024-01-19 | End: 2024-02-18

## 2024-01-19 RX ORDER — SENNOSIDES A AND B 8.6 MG/1
1 TABLET, FILM COATED ORAL 2 TIMES DAILY PRN
Qty: 60 TABLET | Refills: 0 | Status: SHIPPED | OUTPATIENT
Start: 2024-01-19 | End: 2024-02-18

## 2024-01-19 RX ORDER — SODIUM CHLORIDE, SODIUM LACTATE, POTASSIUM CHLORIDE, AND CALCIUM CHLORIDE .6; .31; .03; .02 G/100ML; G/100ML; G/100ML; G/100ML
500 INJECTION, SOLUTION INTRAVENOUS ONCE
Status: COMPLETED | OUTPATIENT
Start: 2024-01-19 | End: 2024-01-19

## 2024-01-19 RX ORDER — OXYCODONE HCL 20 MG/1
20 TABLET, FILM COATED, EXTENDED RELEASE ORAL EVERY 12 HOURS SCHEDULED
Qty: 14 TABLET | Refills: 0 | Status: SHIPPED | OUTPATIENT
Start: 2024-01-19 | End: 2024-01-19

## 2024-01-19 RX ORDER — HYDROMORPHONE HYDROCHLORIDE 2 MG/1
2 TABLET ORAL EVERY 4 HOURS PRN
Qty: 20 TABLET | Refills: 0 | Status: SHIPPED | OUTPATIENT
Start: 2024-01-19 | End: 2024-01-24

## 2024-01-19 RX ORDER — TAMSULOSIN HYDROCHLORIDE 0.4 MG/1
0.4 CAPSULE ORAL DAILY
Qty: 30 CAPSULE | Refills: 0 | Status: SHIPPED | OUTPATIENT
Start: 2024-01-19 | End: 2024-01-19

## 2024-01-19 RX ADMIN — AMLODIPINE BESYLATE 10 MG: 10 TABLET ORAL at 09:04

## 2024-01-19 RX ADMIN — CARVEDILOL 6.25 MG: 6.25 TABLET, FILM COATED ORAL at 17:39

## 2024-01-19 RX ADMIN — ACETAMINOPHEN 650 MG: 325 TABLET ORAL at 09:04

## 2024-01-19 RX ADMIN — SODIUM CHLORIDE, PRESERVATIVE FREE 10 ML: 5 INJECTION INTRAVENOUS at 09:06

## 2024-01-19 RX ADMIN — OXYCODONE HYDROCHLORIDE 5 MG: 5 TABLET ORAL at 17:39

## 2024-01-19 RX ADMIN — ACETAMINOPHEN 650 MG: 325 TABLET ORAL at 02:11

## 2024-01-19 RX ADMIN — ACETAMINOPHEN 650 MG: 325 TABLET ORAL at 11:53

## 2024-01-19 RX ADMIN — OXYCODONE HYDROCHLORIDE 5 MG: 5 TABLET ORAL at 09:26

## 2024-01-19 RX ADMIN — SODIUM CHLORIDE, POTASSIUM CHLORIDE, SODIUM LACTATE AND CALCIUM CHLORIDE 500 ML: 600; 310; 30; 20 INJECTION, SOLUTION INTRAVENOUS at 13:25

## 2024-01-19 RX ADMIN — CARVEDILOL 6.25 MG: 6.25 TABLET, FILM COATED ORAL at 09:04

## 2024-01-19 RX ADMIN — ONDANSETRON 4 MG: 2 INJECTION INTRAMUSCULAR; INTRAVENOUS at 09:12

## 2024-01-19 RX ADMIN — OXYCODONE HYDROCHLORIDE 5 MG: 5 TABLET ORAL at 13:20

## 2024-01-19 RX ADMIN — TAMSULOSIN HYDROCHLORIDE 0.4 MG: 0.4 CAPSULE ORAL at 09:05

## 2024-01-19 RX ADMIN — SENNOSIDES 8.6 MG: 8.6 TABLET, FILM COATED ORAL at 09:04

## 2024-01-19 RX ADMIN — ACETAMINOPHEN 650 MG: 325 TABLET ORAL at 17:38

## 2024-01-19 RX ADMIN — OXYCODONE HYDROCHLORIDE 20 MG: 20 TABLET, FILM COATED, EXTENDED RELEASE ORAL at 09:05

## 2024-01-19 ASSESSMENT — PAIN DESCRIPTION - ORIENTATION
ORIENTATION: RIGHT;LEFT
ORIENTATION: MID;LOWER
ORIENTATION: MID
ORIENTATION: MID;LOWER
ORIENTATION: MID;LOWER

## 2024-01-19 ASSESSMENT — PAIN DESCRIPTION - DESCRIPTORS
DESCRIPTORS: SPASM;PRESSURE
DESCRIPTORS: ACHING;SPASM
DESCRIPTORS: PRESSURE;SPASM
DESCRIPTORS: ACHING
DESCRIPTORS: ACHING;PRESSURE;SPASM

## 2024-01-19 ASSESSMENT — PAIN SCALES - GENERAL
PAINLEVEL_OUTOF10: 0
PAINLEVEL_OUTOF10: 5
PAINLEVEL_OUTOF10: 5
PAINLEVEL_OUTOF10: 4
PAINLEVEL_OUTOF10: 5
PAINLEVEL_OUTOF10: 2

## 2024-01-19 ASSESSMENT — PAIN DESCRIPTION - ONSET: ONSET: GRADUAL

## 2024-01-19 ASSESSMENT — PAIN DESCRIPTION - LOCATION
LOCATION: ABDOMEN;PENIS;PELVIS
LOCATION: BACK;PENIS
LOCATION: ABDOMEN;PELVIS;PENIS
LOCATION: ABDOMEN

## 2024-01-19 ASSESSMENT — PAIN - FUNCTIONAL ASSESSMENT
PAIN_FUNCTIONAL_ASSESSMENT: PREVENTS OR INTERFERES WITH ALL ACTIVE AND SOME PASSIVE ACTIVITIES
PAIN_FUNCTIONAL_ASSESSMENT: PREVENTS OR INTERFERES WITH ALL ACTIVE AND SOME PASSIVE ACTIVITIES
PAIN_FUNCTIONAL_ASSESSMENT: ACTIVITIES ARE NOT PREVENTED
PAIN_FUNCTIONAL_ASSESSMENT: PREVENTS OR INTERFERES WITH MANY ACTIVE NOT PASSIVE ACTIVITIES
PAIN_FUNCTIONAL_ASSESSMENT: PREVENTS OR INTERFERES SOME ACTIVE ACTIVITIES AND ADLS

## 2024-01-19 ASSESSMENT — PAIN DESCRIPTION - FREQUENCY: FREQUENCY: INTERMITTENT

## 2024-01-19 ASSESSMENT — PAIN DESCRIPTION - PAIN TYPE: TYPE: CHRONIC PAIN

## 2024-01-19 NOTE — PROGRESS NOTES
Outpatient Pharmacy Progress Note for Meds-to-Beds    Total number of Prescriptions Filled: 3  The following medications were dispensed to the patient during the discharge process:  Amlodipine  Tamsulosin  hydromortphone    Additional Documentation:  Medication(s) were delivered to the patient's room prior to discharge  Med Assist voucher covered cost of meds  Oxycontin out of stock- paper Rx given to patient      Thank you for letting us serve your patients.  [id number]    Phone: [phone]    Fax: [phone]

## 2024-01-19 NOTE — PLAN OF CARE
Problem: Discharge Planning  Goal: Discharge to home or other facility with appropriate resources  Outcome: Progressing     Problem: Pain  Goal: Verbalizes/displays adequate comfort level or baseline comfort level  Outcome: Progressing     Problem: Safety - Adult  Goal: Free from fall injury  Outcome: Progressing     Problem: Nutrition Deficit:  Goal: Optimize nutritional status  Outcome: Progressing

## 2024-01-19 NOTE — CARE COORDINATION
Approved a 1x voucher for 4 (four) medications.  Faxed voucher to [facility] pharmacy.  Pt is currently pending Medicaid.      Added patient to the flagged list. If pt requests additional help he must complete a Med Assist application and provide all required documents to determine eligibility for ongoing assistance.

## 2024-01-19 NOTE — PROGRESS NOTES
Patient given oxy ER and Oxy IR this morning as well as scheduled Tylenol. Patient educated on the difference between extended release and immediate release. He thought if he was still in pain after extended release that he needed \"the pain shot.\" I educated him on how the oxy IR gives a fast pain relief as well and to have more controlled pain that he needed to start taking that every 4 hours if he was in pain. He had only taken the oxy IR once daily the past two days. Patient requested to wait on taking any Dilaudid right now because he would like to go home this evening.

## 2024-01-19 NOTE — CARE COORDINATION
Reviewed chart, discussed in IDR and plan d/c home today with family to assist with Neph tube care and Meds through Meds to beds so Med Assist can help pt.

## 2024-01-19 NOTE — DISCHARGE SUMMARY
V2.0  Discharge Summary    Name:  Jethro Vázquez /Age/Sex: 1960 (63 y.o. male)   Admit Date: 1/10/2024  Discharge Date: 24    MRN & CSN:  [id number] & [id number] Encounter Date and Time 24 12:46 PM EST    Attending:  Vladislav Brewster MD Discharging Provider: Vladislav Brewster MD       Hospital Course:     Brief HPI: as per admitting, \" Jethro Vázquez is a 63 y.o. male with pmh of essential hypertension, BPH, recently diagnosed bladder cancer, who presents with left flank pain.  Patient was recently diagnosed with a bladder mass-biopsy done 2 weeks prior by urology team-surgical pathology reveals invasive high-grade urothelial carcinoma.  CT abdomen pelvis done in the ED shows a bladder mass causing rrqncdeyxka-qkigaidhhpzdce-bzhmrv pyelonephritis.  Urinalysis shows significant pyuria.  Patient does not report any fever chills or rigors.  Patient was started on ceftriaxone in the emergency.  Patient will be admitted to [facility] and continue on antibiotic, urology will be consulted and so will hematology/oncology team. \"    Urology and oncology were consulted for invasive urothelial neoplasm recently diagnosed. Pt s/p bl PCN placement w IR . Repeat CT A/P  - Bilateral PCN's with no significant hydronephrosis. Bladder wall thickening with no distention or evidence of perforation. No bony mets. Pt s/p 5 day course of Ceftriaxone IV for possible UTI, Ucx w NGTD. Based on onc notes \"D/w urology, at minimum pelvic and retroperitoneal LAD very likely metastatic making him poor surgical candidate. Noted that this LAD is persistent on subsequent imaging and quite advanced. Plan for outpatient cisplatin /gemcitabine, if definitely not a surgical candidate will need to discuss radiationChemo as outpatient\"    Leukocytosis and thrombocytosis likely reactive per Hem    Hospital course w pain management was a challenge, appreciate Onc input, Onc prescribed outpatient Pain meds.     Pthas multiple  11:51 PM    RBCUA 1,898 01/10/2024 11:51 PM    MUCUS RARE 01/10/2024 11:51 PM    TRICHOMONAS NONE SEEN 01/10/2024 11:51 PM    BACTERIA OCCASIONAL 01/10/2024 11:51 PM    CLARITYU CLOUDY 01/10/2024 11:51 PM    SPECGRAV 1.010 01/10/2024 11:51 PM    LEUKOCYTESUR SMALL NUMBER OR AMOUNT OBSERVED 01/10/2024 11:51 PM    UROBILINOGEN 1.0 01/10/2024 11:51 PM    BILIRUBINUR NEGATIVE 01/10/2024 11:51 PM    BLOODU LARGE NUMBER OR AMOUNT OBSERVED 01/10/2024 11:51 PM    KETUA NEGATIVE 01/10/2024 11:51 PM     Urine Cultures: No results found for: \"LABURIN\"  Blood Cultures: No results found for: \"BC\"  No results found for: \"BLOODCULT2\"  Organism: No results found for: \"ORG\"    Time Spent Discharging patient 35 minutes    Electronically signed by Vladislav Brewster MD on 1/19/2024 at 12:46 PM

## 2024-01-19 NOTE — PROGRESS NOTES
Hematology Oncology Progress Note    Patient Name:  Jethro Vázquez  Patient :  1960  Patient MRN:  [id number]     Primary Oncologist: Noemi Gambino MD  Referring Provider: Mono Schaffer MD     Date of Service: 1/10/2024      HPI:   Jethro Vázquez is a pleasant 63 y.o. male known to us with  recently treatment naive urothelial carcinoma. He is s/p bilateral nephrostomy tube placement.  He has pelvic and retroperitoneal as well as paraesophageal LAD concerning for metastatic disease.  Bone scan was negative in 2024. Plan was for outpatient PET however he has been hospitalized for pain.    CT A/P with contrast on 2023 was non-acute with no significant residual hydronephrosis however did redemonstrate significant LAD.     On exam pt c/o uncontrolled pain in both lower flank areas as well as in the groin.  He also notes that he is still putting out ~10cc at a time of urine via his penis which is painful but expected per urology.  Reviewed his current pain medication usage in depth with him receiving 112 MME.  Reviewed addition of 20mg long acting oxycodone representing half his total usage over the day with expectation of 2-3 doses before full effect with spacing of PRN doses.  I also asked him to take oral pain medication PRN as this comes into effect instead of IV.  Reviewed that without treatment his pain will continue as seems concordant with known disease (retroperitoneal LAD likely causing flank symptoms) and we need a sustainable plan to get him out of the hospital.     CT AP W/ Contrast 2024  1.  Bilateral nephrostomy tubes are present as with the 01/15/2024 study.  There is no new perinephric hematoma or hematoma along the course of the  nephrostomy tubes.   2.  There is not a significant degree of residual hydronephrosis compared  with the pre nephrostomy study of 01/10/2024.  Tiny focus of gas in the right  renal collecting system may be secondary to the nephrostomy tube.  from LAD than from kidneys themselves.  -has had multiple ED visits for uncontrolled pain delaying treatment.  Reviewed with him that treatment response is the most durable means of pain control.  -Escalated based on usage as follows:  Usage 1/16/2023 112 MME  Long acting: added oxycodone ER 20mg BID for 60mme  Short Acting: Oxycodone 5mg q4hr  Rescue: Dilaudid IV 1mg q4hr.  Encouraged oral use in order to be able to discharge from hospital  Adjunct: Tylenol 650mg can be given in between opiate doses.  No convincing neuropathic pain  Pain is controlled. Awaiting for Med assist to help the cost of pain medication.    #Opioid Induced Constipation  -Senna 8.6mg BID. Can be increased to 17.2mg BID if needed.  Reduce if >3 BM daily.  -Miralax daily PRN, recommend every day until BM every 24 hours    #Anemia  -most likely r/t ACD/AI with possible functional iron deficiency based on recent indices.  Stable at baseline.  1/18/2024 creat 1.2. . WBC 11.8, Hg 12.4, plat 507.   He likely has reactive leukocytosis and thrombocytosis.    He understands that he needs FU with cancer ctr as soon as he is DC.    Noemi Gambino    Thank you for allowing me to participate in the care of this very pleasant patient.  Labs, rationale, and plan of care all discussed in depth with patient and questions and concerns addressed.

## 2024-01-20 ENCOUNTER — APPOINTMENT (OUTPATIENT)
Dept: CT IMAGING | Age: 64
End: 2024-01-20

## 2024-01-20 ENCOUNTER — HOSPITAL ENCOUNTER (EMERGENCY)
Age: 64
Discharge: HOME | End: 2024-01-20
Attending: EMERGENCY MEDICINE

## 2024-01-20 VITALS
RESPIRATION RATE: 16 BRPM | TEMPERATURE: 97.6 F | HEART RATE: 85 BPM | SYSTOLIC BLOOD PRESSURE: 160 MMHG | DIASTOLIC BLOOD PRESSURE: 97 MMHG | OXYGEN SATURATION: 96 %

## 2024-01-20 DIAGNOSIS — Z85.51 HISTORY OF BLADDER CANCER: ICD-10-CM

## 2024-01-20 DIAGNOSIS — G89.18 POST-OP PAIN: Primary | ICD-10-CM

## 2024-01-20 DIAGNOSIS — R10.9 ABDOMINAL PAIN, UNSPECIFIED ABDOMINAL LOCATION: ICD-10-CM

## 2024-01-20 DIAGNOSIS — R30.0 DYSURIA: ICD-10-CM

## 2024-01-20 LAB
ALBUMIN SERPL-MCNC: 3.2 GM/DL (ref 3.4–5)
ALP BLD-CCNC: 212 IU/L (ref 40–128)
ALT SERPL-CCNC: 10 U/L (ref 10–40)
ANION GAP SERPL CALCULATED.3IONS-SCNC: 12 MMOL/L (ref 7–16)
AST SERPL-CCNC: 14 IU/L (ref 15–37)
BACTERIA: NEGATIVE /HPF
BASOPHILS ABSOLUTE: 0.1 K/CU MM
BASOPHILS RELATIVE PERCENT: 0.5 % (ref 0–1)
BILIRUB SERPL-MCNC: 0.3 MG/DL (ref 0–1)
BILIRUBIN URINE: NEGATIVE MG/DL
BLOOD, URINE: ABNORMAL
BUN SERPL-MCNC: 15 MG/DL (ref 6–23)
CALCIUM SERPL-MCNC: 8.9 MG/DL (ref 8.3–10.6)
CHLORIDE BLD-SCNC: 97 MMOL/L (ref 99–110)
CLARITY: ABNORMAL
CO2: 27 MMOL/L (ref 21–32)
COLOR: YELLOW
CREAT SERPL-MCNC: 1.2 MG/DL (ref 0.9–1.3)
DIFFERENTIAL TYPE: ABNORMAL
EOSINOPHILS ABSOLUTE: 0.3 K/CU MM
EOSINOPHILS RELATIVE PERCENT: 2 % (ref 0–3)
GFR SERPL CREATININE-BSD FRML MDRD: >60 ML/MIN/1.73M2
GLUCOSE SERPL-MCNC: 115 MG/DL (ref 70–99)
GLUCOSE, URINE: NEGATIVE MG/DL
HCT VFR BLD CALC: 37.2 % (ref 42–52)
HEMOGLOBIN: 12 GM/DL (ref 13.5–18)
IMMATURE NEUTROPHIL %: 0.5 % (ref 0–0.43)
KETONES, URINE: 15 MG/DL
LACTATE: 1.4 MMOL/L (ref 0.5–1.9)
LEUKOCYTE ESTERASE, URINE: ABNORMAL
LIPASE: 18 IU/L (ref 13–60)
LYMPHOCYTES ABSOLUTE: 1.6 K/CU MM
LYMPHOCYTES RELATIVE PERCENT: 12.6 % (ref 24–44)
MCH RBC QN AUTO: 27.7 PG (ref 27–31)
MCHC RBC AUTO-ENTMCNC: 32.3 % (ref 32–36)
MCV RBC AUTO: 85.9 FL (ref 78–100)
MONOCYTES ABSOLUTE: 0.7 K/CU MM
MONOCYTES RELATIVE PERCENT: 5.6 % (ref 0–4)
MUCUS: ABNORMAL HPF
NITRITE URINE, QUANTITATIVE: NEGATIVE
NUCLEATED RBC %: 0 %
PDW BLD-RTO: 11.7 % (ref 11.7–14.9)
PH, URINE: 6.5 (ref 5–8)
PLATELET # BLD: 480 K/CU MM (ref 140–440)
PMV BLD AUTO: 9.1 FL (ref 7.5–11.1)
POTASSIUM SERPL-SCNC: 3.8 MMOL/L (ref 3.5–5.1)
PROTEIN UA: 100 MG/DL
RBC # BLD: 4.33 M/CU MM (ref 4.6–6.2)
RBC URINE: 1003 /HPF (ref 0–3)
SEGMENTED NEUTROPHILS ABSOLUTE COUNT: 10.3 K/CU MM
SEGMENTED NEUTROPHILS RELATIVE PERCENT: 78.8 % (ref 36–66)
SODIUM BLD-SCNC: 136 MMOL/L (ref 135–145)
SPECIFIC GRAVITY UA: 1.01 (ref 1–1.03)
TOTAL IMMATURE NEUTOROPHIL: 0.06 K/CU MM
TOTAL NUCLEATED RBC: 0 K/CU MM
TOTAL PROTEIN: 7.3 GM/DL (ref 6.4–8.2)
TRICHOMONAS: ABNORMAL /HPF
UROBILINOGEN, URINE: 1 MG/DL (ref 0.2–1)
WBC # BLD: 13 K/CU MM (ref 4–10.5)
WBC UA: 42 /HPF (ref 0–2)

## 2024-01-20 PROCEDURE — 80053 COMPREHEN METABOLIC PANEL: CPT

## 2024-01-20 PROCEDURE — 81001 URINALYSIS AUTO W/SCOPE: CPT

## 2024-01-20 PROCEDURE — 83690 ASSAY OF LIPASE: CPT

## 2024-01-20 PROCEDURE — 96374 THER/PROPH/DIAG INJ IV PUSH: CPT

## 2024-01-20 PROCEDURE — 87086 URINE CULTURE/COLONY COUNT: CPT

## 2024-01-20 PROCEDURE — 83605 ASSAY OF LACTIC ACID: CPT

## 2024-01-20 PROCEDURE — 74176 CT ABD & PELVIS W/O CONTRAST: CPT

## 2024-01-20 PROCEDURE — 6370000000 HC RX 637 (ALT 250 FOR IP): Performed by: EMERGENCY MEDICINE

## 2024-01-20 PROCEDURE — 6360000002 HC RX W HCPCS: Performed by: EMERGENCY MEDICINE

## 2024-01-20 PROCEDURE — 96375 TX/PRO/DX INJ NEW DRUG ADDON: CPT

## 2024-01-20 PROCEDURE — 99284 EMERGENCY DEPT VISIT MOD MDM: CPT

## 2024-01-20 PROCEDURE — 85025 COMPLETE CBC W/AUTO DIFF WBC: CPT

## 2024-01-20 PROCEDURE — 2580000003 HC RX 258: Performed by: EMERGENCY MEDICINE

## 2024-01-20 PROCEDURE — 96376 TX/PRO/DX INJ SAME DRUG ADON: CPT

## 2024-01-20 RX ORDER — ONDANSETRON 2 MG/ML
4 INJECTION INTRAMUSCULAR; INTRAVENOUS EVERY 30 MIN PRN
Status: DISCONTINUED | OUTPATIENT
Start: 2024-01-20 | End: 2024-01-20 | Stop reason: HOSPADM

## 2024-01-20 RX ORDER — ONDANSETRON 4 MG/1
4 TABLET, ORALLY DISINTEGRATING ORAL 3 TIMES DAILY PRN
Qty: 21 TABLET | Refills: 0 | Status: SHIPPED | OUTPATIENT
Start: 2024-01-20

## 2024-01-20 RX ORDER — OXYCODONE HYDROCHLORIDE AND ACETAMINOPHEN 5; 325 MG/1; MG/1
2 TABLET ORAL ONCE
Status: COMPLETED | OUTPATIENT
Start: 2024-01-20 | End: 2024-01-20

## 2024-01-20 RX ORDER — OXYBUTYNIN CHLORIDE 5 MG/1
5 TABLET ORAL 3 TIMES DAILY
Status: DISCONTINUED | OUTPATIENT
Start: 2024-01-20 | End: 2024-01-20 | Stop reason: HOSPADM

## 2024-01-20 RX ORDER — 0.9 % SODIUM CHLORIDE 0.9 %
1000 INTRAVENOUS SOLUTION INTRAVENOUS ONCE
Status: COMPLETED | OUTPATIENT
Start: 2024-01-20 | End: 2024-01-20

## 2024-01-20 RX ORDER — NAPROXEN 500 MG/1
500 TABLET ORAL 2 TIMES DAILY
Qty: 60 TABLET | Refills: 0 | Status: SHIPPED | OUTPATIENT
Start: 2024-01-20

## 2024-01-20 RX ORDER — OXYCODONE HYDROCHLORIDE AND ACETAMINOPHEN 5; 325 MG/1; MG/1
1 TABLET ORAL EVERY 6 HOURS PRN
Qty: 12 TABLET | Refills: 0 | Status: SHIPPED | OUTPATIENT
Start: 2024-01-20 | End: 2024-01-23

## 2024-01-20 RX ORDER — DICYCLOMINE HCL 20 MG
20 TABLET ORAL 4 TIMES DAILY
Qty: 40 TABLET | Refills: 0 | Status: SHIPPED | OUTPATIENT
Start: 2024-01-20

## 2024-01-20 RX ORDER — MORPHINE SULFATE 4 MG/ML
4 INJECTION, SOLUTION INTRAMUSCULAR; INTRAVENOUS EVERY 30 MIN PRN
Status: DISCONTINUED | OUTPATIENT
Start: 2024-01-20 | End: 2024-01-20 | Stop reason: HOSPADM

## 2024-01-20 RX ADMIN — MORPHINE SULFATE 4 MG: 4 INJECTION, SOLUTION INTRAMUSCULAR; INTRAVENOUS at 12:46

## 2024-01-20 RX ADMIN — MORPHINE SULFATE 4 MG: 4 INJECTION, SOLUTION INTRAMUSCULAR; INTRAVENOUS at 11:31

## 2024-01-20 RX ADMIN — OXYBUTYNIN CHLORIDE 5 MG: 5 TABLET ORAL at 13:34

## 2024-01-20 RX ADMIN — ONDANSETRON 4 MG: 2 INJECTION INTRAMUSCULAR; INTRAVENOUS at 11:31

## 2024-01-20 RX ADMIN — SODIUM CHLORIDE 1000 ML: 9 INJECTION, SOLUTION INTRAVENOUS at 11:36

## 2024-01-20 RX ADMIN — OXYCODONE AND ACETAMINOPHEN 2 TABLET: 5; 325 TABLET ORAL at 16:12

## 2024-01-20 ASSESSMENT — ENCOUNTER SYMPTOMS
NAUSEA: 1
RESPIRATORY NEGATIVE: 1
ABDOMINAL PAIN: 1
ALLERGIC/IMMUNOLOGIC NEGATIVE: 1
EYES NEGATIVE: 1

## 2024-01-20 NOTE — ED PROVIDER NOTES
4.33 (L) 4.6 - 6.2 M/CU MM    Hemoglobin 12.0 (L) 13.5 - 18.0 GM/DL    Hematocrit 37.2 (L) 42 - 52 %    MCV 85.9 78 - 100 FL    MCH 27.7 27 - 31 PG    MCHC 32.3 32.0 - 36.0 %    RDW 11.7 11.7 - 14.9 %    Platelets 480 (H) 140 - 440 K/CU MM    MPV 9.1 7.5 - 11.1 FL    Differential Type AUTOMATED DIFFERENTIAL     Segs Relative 78.8 (H) 36 - 66 %    Lymphocytes % 12.6 (L) 24 - 44 %    Monocytes % 5.6 (H) 0 - 4 %    Eosinophils % 2.0 0 - 3 %    Basophils % 0.5 0 - 1 %    Segs Absolute 10.3 K/CU MM    Lymphocytes Absolute 1.6 K/CU MM    Monocytes Absolute 0.7 K/CU MM    Eosinophils Absolute 0.3 K/CU MM    Basophils Absolute 0.1 K/CU MM    Nucleated RBC % 0.0 %    Total Nucleated RBC 0.0 K/CU MM    Total Immature Neutrophil 0.06 K/CU MM    Immature Neutrophil % 0.5 (H) 0 - 0.43 %   Comprehensive Metabolic Panel   Result Value Ref Range    Sodium 136 135 - 145 MMOL/L    Potassium 3.8 3.5 - 5.1 MMOL/L    Chloride 97 (L) 99 - 110 mMol/L    CO2 27 21 - 32 MMOL/L    Anion Gap 12 7 - 16    Glucose 115 (H) 70 - 99 MG/DL    BUN 15 6 - 23 MG/DL    Creatinine 1.2 0.9 - 1.3 MG/DL    Est, Glom Filt Rate >60 >60 mL/min/1.73m2    Calcium 8.9 8.3 - 10.6 MG/DL    Total Protein 7.3 6.4 - 8.2 GM/DL    Albumin 3.2 (L) 3.4 - 5.0 GM/DL    Total Bilirubin 0.3 0.0 - 1.0 MG/DL    Alkaline Phosphatase 212 (H) 40 - 128 IU/L    ALT 10 10 - 40 U/L    AST 14 (L) 15 - 37 IU/L   Lipase   Result Value Ref Range    Lipase 18 13 - 60 IU/L   Lactic Acid   Result Value Ref Range    Lactate 1.4 0.5 - 1.9 mMOL/L   Microscopic Urinalysis   Result Value Ref Range    RBC, UA 1,003 (H) 0 - 3 /HPF    WBC, UA 42 (H) 0 - 2 /HPF    Bacteria, UA NEGATIVE NEGATIVE /HPF    Mucus, UA RARE (A) NEGATIVE HPF    Trichomonas NONE SEEN NONE SEEN /HPF        Radiographs (if obtained):  [] The following radiograph was interpreted by myself in the absence of a radiologist:  [x] Radiologist's Report Reviewed:    CT Abd/pelv    CT ABDOMEN PELVIS W IV CONTRAST Additional Contrast?  [address]  [place]  [facility] [address] [phone]            DISPOSITION MEDICATIONS (if applicable):  New Prescriptions    DICYCLOMINE (BENTYL) 20 MG TABLET    Take 1 tablet by mouth 4 times daily    NAPROXEN (NAPROSYN) 500 MG TABLET    Take 1 tablet by mouth 2 times daily    ONDANSETRON (ZOFRAN-ODT) 4 MG DISINTEGRATING TABLET    Take 1 tablet by mouth 3 times daily as needed for Nausea or Vomiting    OXYCODONE-ACETAMINOPHEN (PERCOCET) 5-325 MG PER TABLET    Take 1 tablet by mouth every 6 hours as needed for Pain for up to 3 days. Intended supply: 3 days. Take lowest dose possible to manage pain Max Daily Amount: 4 tablets          DO Donovan Hendricks James, DO  01/20/24 1538

## 2024-01-20 NOTE — CARE COORDINATION
CM consulted for d/c planning. Pt was just d/c 1/19/24 where he admitted on 1/10/24 for bladder cancer. Pt is from home with spouse, does have PCP listed but no insurance. Pt does have a public benefits ac in media. Pt was approved for Med assist and had Meds to bed before d/c. Pt received three of the four scripts. Missing script was provided as a paper script for oxycodone due to not being in stock at outpt pharmacy. Pt returns because he was unable to get script for oxycodone. Noted two pharmacy's were out and third pharmacy was $150.00.     CM reached out to Bernadette with [employer]. Pt can receive help for the missing script. Pharmacy options are [facility] and [facility] CM called both pharmacies and both have none in stock.     CM updated Dr. Man.

## 2024-01-22 NOTE — CARE COORDINATION
1/22/2024  Med Assist Manager, Bernadette Armstrong -  I rec'd a call from Bonnie Badillo CM in ED on 1/20/24. Pt returned after being discharged on Friday. He did receive M2B for 3 of the 4 meds but according to notes, our outpt. pharmacy did not have the oxycodone in stock and this patient who has bladder ca needed the pain meds. I explained to her that because he was a CA patient I would approve another one time voucher to either [facility] or [facility] pharmacy but only for a 3 day supply and then explained further that if his script was for more than that he would either have to pay for the remaining because the script becomes void no regardless of how many days were filled. She stated that she would let him know. Today (1/22/24) reading MAGY notes, she did call both [facility] and they did not have this med in stock so I will make sure we contact him to see if we can assist him.

## 2024-01-22 NOTE — CARE COORDINATION
I contacted pt this a.m. to verify he received his medications pt stated he went to [facility] pharmacy to fill the 20 mg Oxycotin (12 hr release) and pharmacist stated he could not fill that script w/other pain meds he was taking, as that would be too much (narcotics) in his system.  Pt stated he did get other pain meds filled and he is doing ok, taking one day at a time. He stated he did receive a [employer] card I.D. #[id number] [employer] Plan, per [employer] he is eligible with this plan.  He stated his wife must have used the [employer] card for his scripts because they were only $4.00.  Pt expressed need for home health nurse and also felt he needed a hospital bed.  Advised pt to contact the Cancer Center for his follow-up appt and voice his concerns and they can assist him further with these matters.  With a physician order, [employer] can provide a 1x voucher for a one-month hospital bed rental, if needed.

## 2024-01-22 NOTE — CARE COORDINATION
Mailed pt a [employer] application today, along with program information and a postage paid envelope.

## 2024-01-23 ENCOUNTER — TELEPHONE (OUTPATIENT)
Dept: INFUSION THERAPY | Age: 64
End: 2024-01-23

## 2024-01-23 LAB
CULTURE: NORMAL
Lab: NORMAL
SPECIMEN: NORMAL

## 2024-01-23 NOTE — TELEPHONE ENCOUNTER
Called patient regarding chemo ed and start date of tx, patient is scheduled for chemo ed on 1/29 @ 10AM and tx on 1/30 @ 8:15AM, advised them that 1 visitor allowed at time of education and day of their treatments, patient states understanding

## 2024-01-24 ENCOUNTER — CLINICAL DOCUMENTATION (OUTPATIENT)
Dept: RADIATION ONCOLOGY | Age: 64
End: 2024-01-24

## 2024-01-24 NOTE — PROGRESS NOTES
LSW received PC from Pt who expressed distress regarding his cancer diagnosis. Pt agreed to be referred to [facility] Counselor Shelly Starr, NORMA, for supportive counseling. LSW made the referral.   Pt stated he is in need of a hospital bed due to discomfort from nephrostomy tubes. Pt is scheduled for new patient follow-up with medical oncologist on 1-26-24. It was recommended he speak to oncologist about the possibility of ordering a hospital bed through his insurance.

## 2024-01-26 ENCOUNTER — HOSPITAL ENCOUNTER (OUTPATIENT)
Dept: INFUSION THERAPY | Age: 64
Discharge: HOME | End: 2024-01-26
Payer: COMMERCIAL

## 2024-01-26 ENCOUNTER — OFFICE VISIT (OUTPATIENT)
Dept: ONCOLOGY | Age: 64
End: 2024-01-26
Payer: COMMERCIAL

## 2024-01-26 VITALS
SYSTOLIC BLOOD PRESSURE: 129 MMHG | HEIGHT: 70 IN | DIASTOLIC BLOOD PRESSURE: 73 MMHG | OXYGEN SATURATION: 98 % | TEMPERATURE: 97.6 F | WEIGHT: 188.2 LBS | BODY MASS INDEX: 26.94 KG/M2 | HEART RATE: 122 BPM

## 2024-01-26 DIAGNOSIS — C67.8 MALIGNANT NEOPLASM OF OVERLAPPING SITES OF BLADDER (HCC): Primary | ICD-10-CM

## 2024-01-26 DIAGNOSIS — Z11.59 NEED FOR HEPATITIS B SCREENING TEST: ICD-10-CM

## 2024-01-26 PROCEDURE — 99211 OFF/OP EST MAY X REQ PHY/QHP: CPT

## 2024-01-26 PROCEDURE — 3074F SYST BP LT 130 MM HG: CPT | Performed by: INTERNAL MEDICINE

## 2024-01-26 PROCEDURE — 3078F DIAST BP <80 MM HG: CPT | Performed by: INTERNAL MEDICINE

## 2024-01-26 PROCEDURE — 99214 OFFICE O/P EST MOD 30 MIN: CPT | Performed by: INTERNAL MEDICINE

## 2024-01-26 RX ORDER — OXYCODONE HYDROCHLORIDE AND ACETAMINOPHEN 5; 325 MG/1; MG/1
1 TABLET ORAL EVERY 6 HOURS PRN
Qty: 60 TABLET | Refills: 0 | Status: SHIPPED | OUTPATIENT
Start: 2024-01-26 | End: 2024-02-10

## 2024-01-26 RX ORDER — DICYCLOMINE HCL 20 MG
20 TABLET ORAL 4 TIMES DAILY
Qty: 60 TABLET | Refills: 0 | Status: SHIPPED | OUTPATIENT
Start: 2024-01-26 | End: 2024-02-10

## 2024-01-26 ASSESSMENT — PATIENT HEALTH QUESTIONNAIRE - PHQ9
SUM OF ALL RESPONSES TO PHQ9 QUESTIONS 1 & 2: 2
2. FEELING DOWN, DEPRESSED OR HOPELESS: 2
SUM OF ALL RESPONSES TO PHQ QUESTIONS 1-9: 2
1. LITTLE INTEREST OR PLEASURE IN DOING THINGS: 0
SUM OF ALL RESPONSES TO PHQ QUESTIONS 1-9: 2

## 2024-01-26 NOTE — PROGRESS NOTES
MA Rooming Questions  Patient: Jethro Vázquez  MRN: [id number]    Date: 1/26/2024      NEW PATIENT   DECREASED APPETITE AND THINGS DO NOT TASTE GOOD     Wt Readings from Last 3 Encounters:   01/26/24 85.4 kg (188 lb 3.2 oz)   01/15/24 87.1 kg (192 lb 0.3 oz)   01/10/24 88.5 kg (195 lb)      5. Did the patient have a depression screening completed today? Yes    PHQ-9 Total Score: 2 (1/26/2024 11:37 AM)       PHQ-9 Given to (if applicable):               PHQ-9 Score (if applicable):                     [] Positive     []  Negative              Does question #9 need addressed (if applicable)                     [] Yes    []  No               Ingrid Clark, CMA      
85.9 01/20/2024    MCH 27.7 01/20/2024    MCHC 32.3 01/20/2024    RDW 11.7 01/20/2024     (H) 01/20/2024    MPV 9.1 01/20/2024    SEGSPCT 78.8 (H) 01/20/2024    EOSRELPCT 2.0 01/20/2024    BASOPCT 0.5 01/20/2024    LYMPHOPCT 12.6 (L) 01/20/2024    MONOPCT 5.6 (H) 01/20/2024    SEGSABS 10.3 01/20/2024    EOSABS 0.3 01/20/2024    BASOSABS 0.1 01/20/2024    LYMPHSABS 1.6 01/20/2024    MONOSABS 0.7 01/20/2024    DIFFTYPE AUTOMATED DIFFERENTIAL 01/20/2024     Lab Results   Component Value Date    ESR 85 (H) 12/31/2023     Chemistry:  Lab Results   Component Value Date     01/20/2024    K 3.8 01/20/2024    CL 97 (L) 01/20/2024    CO2 27 01/20/2024    BUN 15 01/20/2024    CREATININE 1.2 01/20/2024    GLUCOSE 115 (H) 01/20/2024    CALCIUM 8.9 01/20/2024    PROT 7.3 01/20/2024    LABALBU 3.2 (L) 01/20/2024    BILITOT 0.3 01/20/2024    ALKPHOS 212 (H) 01/20/2024    AST 14 (L) 01/20/2024    ALT 10 01/20/2024    LABGLOM >60 01/20/2024    GFRAA >60 01/19/2018    PHOS 4.5 01/19/2024    MG 2.2 01/19/2024     Lab Results   Component Value Date    TSHHS 1.660 01/01/2024     Immunology:  Lab Results   Component Value Date    PROT 7.3 01/20/2024     Coagulation Panel:  Lab Results   Component Value Date    PROTIME 14.9 (H) 01/10/2024    INR 1.1 01/10/2024    APTT 43.9 (H) 01/10/2024     Anemia Panel:  Lab Results   Component Value Date    NWRSPPUH62 793.3 01/01/2024    FOLATE 8.9 01/01/2024     Tumor Markers:  Lab Results   Component Value Date    PSA 4.46 (H) 11/14/2023      Observations:  No data recorded      Assessment & Plan:    1. Muscle Invasive High Grade Bladder Ca s/p TUR 12/14/2023  -treatment naive  -Noted extensive retroperitoneal and pelvic LAD concerning for metatatic disease on CT.  -CT Chest with paraesophageal LAD concerning for metastatic disease.  -Bone scan without metastatic disease  -Planned for outpatient PET-CT to further characterize LAD, especially in chest.  -D/w urology, at minimum pelvic and

## 2024-01-27 NOTE — PROGRESS NOTES
Physician Progress Note      PATIENT:               BRISSA BULLOCK  CSN #:                  [id number]  :                       1960  ADMIT DATE:       1/10/2024 9:04 PM  DISCH DATE:        2024 7:02 PM  RESPONDING  PROVIDER #:        Vladislav Brewster MD          QUERY TEXT:    Pt admitted with gross hematuria. Pt noted to have bladder CA and cystitis. If   possible, please document in progress notes and discharge summary the   etiology of the gross hematuria.    The medical record reflects the following:  Risk Factors: Bladder CA, cystitis  Clinical Indicators:  progress noterecently diagnosed urothelial carcinoma   who presents with Bladder cancer... Gross Hematuria. Flank pain, suprapubic   pain Acute cystitis\", Urine Cx on  \"ESCHERICHIA COLI >100,000 CFU/ml\",   repeat urine Cx on  & 1/10 shows no growth.  Treatment: Urology and oncology consults, nephrostomy placement, IV   ceftriaxone.    Thank you,  Paula PATTERSON, RN, [facility] [phone]  Options provided:  -- Gross hematuria due to cystitis  -- Gross hematuria due to bladder CA  -- Gross hematuria due to, please specify.  -- Other - I will add my own diagnosis  -- Disagree - Not applicable / Not valid  -- Disagree - Clinically unable to determine / Unknown  -- Refer to Clinical Documentation Reviewer    PROVIDER RESPONSE TEXT:    This patient has gross hematuria due to bladder CA.    Query created by: Paula Guy on 2024 10:03 AM      Electronically signed by:  Vladislav Brewster MD 2024 10:01 PM

## 2024-01-29 ENCOUNTER — CLINICAL DOCUMENTATION (OUTPATIENT)
Dept: ONCOLOGY | Age: 64
End: 2024-01-29

## 2024-01-29 ENCOUNTER — HOSPITAL ENCOUNTER (OUTPATIENT)
Dept: INFUSION THERAPY | Age: 64
Discharge: HOME | End: 2024-01-29
Payer: COMMERCIAL

## 2024-01-29 ENCOUNTER — NURSE ONLY (OUTPATIENT)
Dept: ONCOLOGY | Age: 64
End: 2024-01-29
Payer: COMMERCIAL

## 2024-01-29 ENCOUNTER — HOSPITAL ENCOUNTER (EMERGENCY)
Age: 64
Discharge: HOME | End: 2024-01-29
Attending: EMERGENCY MEDICINE
Payer: COMMERCIAL

## 2024-01-29 VITALS
WEIGHT: 187 LBS | HEART RATE: 112 BPM | BODY MASS INDEX: 26.77 KG/M2 | SYSTOLIC BLOOD PRESSURE: 118 MMHG | DIASTOLIC BLOOD PRESSURE: 60 MMHG | OXYGEN SATURATION: 96 % | HEIGHT: 70 IN | TEMPERATURE: 97.6 F

## 2024-01-29 VITALS
TEMPERATURE: 97.6 F | OXYGEN SATURATION: 99 % | DIASTOLIC BLOOD PRESSURE: 72 MMHG | BODY MASS INDEX: 26.77 KG/M2 | RESPIRATION RATE: 18 BRPM | HEIGHT: 70 IN | WEIGHT: 187 LBS | SYSTOLIC BLOOD PRESSURE: 138 MMHG | HEART RATE: 99 BPM

## 2024-01-29 DIAGNOSIS — R59.0 INGUINAL ADENOPATHY: Primary | ICD-10-CM

## 2024-01-29 DIAGNOSIS — C67.8 MALIGNANT NEOPLASM OF OVERLAPPING SITES OF BLADDER (HCC): ICD-10-CM

## 2024-01-29 DIAGNOSIS — C67.8 MALIGNANT NEOPLASM OF OVERLAPPING SITES OF BLADDER (HCC): Primary | ICD-10-CM

## 2024-01-29 DIAGNOSIS — Z11.59 NEED FOR HEPATITIS B SCREENING TEST: ICD-10-CM

## 2024-01-29 LAB
ALBUMIN SERPL-MCNC: 3.5 GM/DL (ref 3.4–5)
ALP BLD-CCNC: 336 IU/L (ref 40–128)
ALT SERPL-CCNC: 16 U/L (ref 10–40)
ANION GAP SERPL CALCULATED.3IONS-SCNC: 13 MMOL/L (ref 7–16)
AST SERPL-CCNC: 20 IU/L (ref 15–37)
BASOPHILS ABSOLUTE: 0.1 K/CU MM
BASOPHILS RELATIVE PERCENT: 0.4 % (ref 0–1)
BILIRUB SERPL-MCNC: 0.4 MG/DL (ref 0–1)
BUN SERPL-MCNC: 17 MG/DL (ref 6–23)
CALCIUM SERPL-MCNC: 9.5 MG/DL (ref 8.3–10.6)
CHLORIDE BLD-SCNC: 93 MMOL/L (ref 99–110)
CO2: 28 MMOL/L (ref 21–32)
CREAT SERPL-MCNC: 1.5 MG/DL (ref 0.9–1.3)
DIFFERENTIAL TYPE: ABNORMAL
EOSINOPHILS ABSOLUTE: 0.3 K/CU MM
EOSINOPHILS RELATIVE PERCENT: 2.3 % (ref 0–3)
GFR SERPL CREATININE-BSD FRML MDRD: 52 ML/MIN/1.73M2
GLUCOSE SERPL-MCNC: 115 MG/DL (ref 70–99)
HBV SURFACE AB SERPL IA-ACNC: <3.5 M[IU]/ML
HBV SURFACE AG SERPL QL IA: NON REACTIVE
HCT VFR BLD CALC: 40.7 % (ref 42–52)
HEMOGLOBIN: 13.2 GM/DL (ref 13.5–18)
LYMPHOCYTES ABSOLUTE: 2 K/CU MM
LYMPHOCYTES RELATIVE PERCENT: 15.4 % (ref 24–44)
MAGNESIUM: 2 MG/DL (ref 1.8–2.4)
MCH RBC QN AUTO: 27.7 PG (ref 27–31)
MCHC RBC AUTO-ENTMCNC: 32.4 % (ref 32–36)
MCV RBC AUTO: 85.5 FL (ref 78–100)
MONOCYTES ABSOLUTE: 0.8 K/CU MM
MONOCYTES RELATIVE PERCENT: 6.6 % (ref 0–4)
PDW BLD-RTO: 12.4 % (ref 11.7–14.9)
PLATELET # BLD: 489 K/CU MM (ref 140–440)
PMV BLD AUTO: 8.9 FL (ref 7.5–11.1)
POTASSIUM SERPL-SCNC: 3.6 MMOL/L (ref 3.5–5.1)
RBC # BLD: 4.76 M/CU MM (ref 4.6–6.2)
SEGMENTED NEUTROPHILS ABSOLUTE COUNT: 9.5 K/CU MM
SEGMENTED NEUTROPHILS RELATIVE PERCENT: 75.3 % (ref 36–66)
SODIUM BLD-SCNC: 134 MMOL/L (ref 135–145)
TOTAL PROTEIN: 7.3 GM/DL (ref 6.4–8.2)
WBC # BLD: 12.7 K/CU MM (ref 4–10.5)

## 2024-01-29 PROCEDURE — 99211 OFF/OP EST MAY X REQ PHY/QHP: CPT | Performed by: INTERNAL MEDICINE

## 2024-01-29 PROCEDURE — 6360000002 HC RX W HCPCS: Performed by: EMERGENCY MEDICINE

## 2024-01-29 PROCEDURE — 80053 COMPREHEN METABOLIC PANEL: CPT

## 2024-01-29 PROCEDURE — 85025 COMPLETE CBC W/AUTO DIFF WBC: CPT

## 2024-01-29 PROCEDURE — 99213 OFFICE O/P EST LOW 20 MIN: CPT

## 2024-01-29 PROCEDURE — 83735 ASSAY OF MAGNESIUM: CPT

## 2024-01-29 PROCEDURE — 87340 HEPATITIS B SURFACE AG IA: CPT

## 2024-01-29 PROCEDURE — 86704 HEP B CORE ANTIBODY TOTAL: CPT

## 2024-01-29 PROCEDURE — 96372 THER/PROPH/DIAG INJ SC/IM: CPT

## 2024-01-29 PROCEDURE — 86706 HEP B SURFACE ANTIBODY: CPT

## 2024-01-29 PROCEDURE — 36415 COLL VENOUS BLD VENIPUNCTURE: CPT

## 2024-01-29 PROCEDURE — 99284 EMERGENCY DEPT VISIT MOD MDM: CPT

## 2024-01-29 RX ORDER — OLANZAPINE 2.5 MG/1
2.5 TABLET, FILM COATED ORAL NIGHTLY
Qty: 24 TABLET | Refills: 0 | Status: SHIPPED | OUTPATIENT
Start: 2024-01-29

## 2024-01-29 RX ORDER — DEXAMETHASONE 4 MG/1
4 TABLET ORAL
Qty: 18 TABLET | Refills: 0 | Status: SHIPPED | OUTPATIENT
Start: 2024-01-29

## 2024-01-29 RX ORDER — ONDANSETRON HYDROCHLORIDE 8 MG/1
8 TABLET, FILM COATED ORAL EVERY 8 HOURS PRN
Qty: 90 TABLET | Refills: 3 | Status: SHIPPED | OUTPATIENT
Start: 2024-01-29

## 2024-01-29 RX ADMIN — HYDROMORPHONE HYDROCHLORIDE 2 MG: 1 INJECTION, SOLUTION INTRAMUSCULAR; INTRAVENOUS; SUBCUTANEOUS at 17:37

## 2024-01-29 ASSESSMENT — PAIN SCALES - GENERAL
PAINLEVEL_OUTOF10: 10
PAINLEVEL_OUTOF10: 3
PAINLEVEL_OUTOF10: 10

## 2024-01-29 ASSESSMENT — ENCOUNTER SYMPTOMS: ABDOMINAL PAIN: 1

## 2024-01-29 ASSESSMENT — PAIN - FUNCTIONAL ASSESSMENT: PAIN_FUNCTIONAL_ASSESSMENT: 0-10

## 2024-01-29 NOTE — PROGRESS NOTES
Reviewed Hereditary Cancer Genetic screening form, with information provided he does not meet criteria nor indicate a desire for testing.

## 2024-01-29 NOTE — ED PROVIDER NOTES
Triage Chief Complaint:   Groin Pain (Started a few hours ago ) and Abdominal Pain (Bilat mid abd pain )    [place]:Jethro Vázquez is a 63 y.o. male that presents to the ED with left groin pain.    Patient has high-grade urothelial cancer nonoperative candidate recently underwent a cystoscopy with bladder tumor resection.  He has bilateral PERC neph tubes.  He complains of pain over the past couple hours in his left inguinal area he has known metastatic disease.  He has been making urine appropriately no reported fevers or chills he has been taking his oxycodone his pain is more severe than typical.  No nausea no vomiting.        Past Medical History:   Diagnosis Date    Acid reflux     History of kidney stones     \"had kidney stone 2017- passed it\"    Hypertension     \"on medication since age 51's\"    Malignant neoplasm of overlapping sites of bladder (HCC) 2024    No natural teeth      Past Surgical History:   Procedure Laterality Date    ANKLE SURGERY Left     age 17\"did have pins and then later removed\"    APPENDECTOMY      CYSTOSCOPY N/A 2023    CYSTOSCOPY TRANSURETHRAL RESECTION BLADDER TUMOR performed by Sanaz Ivan MD at [facility] OR    HERNIA REPAIR      OTHER SURGICAL HISTORY Bilateral 2018    bilat inq hernia repair with mesh     Family History   Problem Relation Age of Onset    High Blood Pressure Mother     Cancer Father         throat cancer    High Blood Pressure Brother      Social History     Socioeconomic History    Marital status:      Spouse name: Not on file    Number of children: Not on file    Years of education: Not on file    Highest education level: Not on file   Occupational History    Not on file   Tobacco Use    Smoking status: Former     Current packs/day: 0.00     Average packs/day: 0.5 packs/day for 34.0 years (17.0 ttl pk-yrs)     Types: Cigarettes     Start date: 1971     Quit date: 2005     Years since quittin.0     Passive

## 2024-01-29 NOTE — PROGRESS NOTES
Patient arrived a[place]ne for treatment planning and chemotherapy education.  Discussed treatment plan, potential side effects, prevention, and symptom management.  Reviewed chemotherapy education folder and drug monograph.  Patient's regimen will be Cisplatin every 21 days then every 28 days beginning Cycle 2 with Gemzar days 1 & 8 every 21 days then every 28 days beginning cycle 2    Patient signed chemotherapy consent for Cisplatin and Gemzar.  Copy of consent given to patient.    Reviewed chemotherapy schedule/calendar.     Rx for Zofran, Olanzapine and Dexamethasone sent to pharmacy per physician.  Patient given calendar and written instructions for these medications and how/when to take.      Patient's premeds as follows:  Olanzapine 2.5 mg - to take one tab at hs x4 nights starting night before chemo.  Dexamethasone 4 mg one tab in am with food x3 days starting day after chemo.  Zofran 8 mg one tab every 8 hours PRN.    Distress thermometer given to patient to fill out - this RN reviewed with patient. Patient states that he is having difficulty with paying his bills, food for himself and his family and also would like to talk to a counselor. Referrals placed for  and pain management. Copy given to Psychosocial Coordinator.    Patient also given saline flushes for his nephrosotomy tubes and this nurse contacted the urology office to advise of the patient's concerns with blood in his urine and urine passing from his urethra instead of the nephrostomy tubes.    Patient given on-call phone number for after office hours and weekends.    CBC, CMP and Hep B panels drawn today prior to first treatment.    Confirmed first chemotherapy appointment for Cisplatin and Gemzar on 01/30/2024 at 0815.

## 2024-01-30 ENCOUNTER — HOSPITAL ENCOUNTER (OUTPATIENT)
Dept: INFUSION THERAPY | Age: 64
Discharge: HOME | End: 2024-01-30
Payer: COMMERCIAL

## 2024-01-30 VITALS
SYSTOLIC BLOOD PRESSURE: 115 MMHG | OXYGEN SATURATION: 96 % | RESPIRATION RATE: 18 BRPM | WEIGHT: 183.6 LBS | DIASTOLIC BLOOD PRESSURE: 72 MMHG | TEMPERATURE: 97.8 F | BODY MASS INDEX: 26.28 KG/M2 | HEIGHT: 70 IN | HEART RATE: 73 BPM

## 2024-01-30 DIAGNOSIS — C67.8 MALIGNANT NEOPLASM OF OVERLAPPING SITES OF BLADDER (HCC): Primary | ICD-10-CM

## 2024-01-30 DIAGNOSIS — Z11.59 NEED FOR HEPATITIS B SCREENING TEST: ICD-10-CM

## 2024-01-30 LAB
ALBUMIN SERPL-MCNC: 3.2 GM/DL (ref 3.4–5)
ALP BLD-CCNC: 352 IU/L (ref 40–128)
ALT SERPL-CCNC: 13 U/L (ref 10–40)
ANION GAP SERPL CALCULATED.3IONS-SCNC: 14 MMOL/L (ref 7–16)
AST SERPL-CCNC: 16 IU/L (ref 15–37)
BILIRUB SERPL-MCNC: 0.4 MG/DL (ref 0–1)
BUN SERPL-MCNC: 13 MG/DL (ref 6–23)
CALCIUM SERPL-MCNC: 8.2 MG/DL (ref 8.3–10.6)
CHLORIDE BLD-SCNC: 99 MMOL/L (ref 99–110)
CO2: 22 MMOL/L (ref 21–32)
CREAT SERPL-MCNC: 1 MG/DL (ref 0.9–1.3)
GFR SERPL CREATININE-BSD FRML MDRD: >60 ML/MIN/1.73M2
GLUCOSE SERPL-MCNC: 107 MG/DL (ref 70–99)
HBV CORE AB SERPL QL IA: NEGATIVE
POTASSIUM SERPL-SCNC: 4.4 MMOL/L (ref 3.5–5.1)
SODIUM BLD-SCNC: 135 MMOL/L (ref 135–145)
TOTAL PROTEIN: 6.8 GM/DL (ref 6.4–8.2)

## 2024-01-30 PROCEDURE — 96375 TX/PRO/DX INJ NEW DRUG ADDON: CPT

## 2024-01-30 PROCEDURE — 96361 HYDRATE IV INFUSION ADD-ON: CPT

## 2024-01-30 PROCEDURE — 6360000002 HC RX W HCPCS: Performed by: INTERNAL MEDICINE

## 2024-01-30 PROCEDURE — 2580000003 HC RX 258: Performed by: INTERNAL MEDICINE

## 2024-01-30 PROCEDURE — 96413 CHEMO IV INFUSION 1 HR: CPT

## 2024-01-30 PROCEDURE — 80053 COMPREHEN METABOLIC PANEL: CPT

## 2024-01-30 PROCEDURE — 96417 CHEMO IV INFUS EACH ADDL SEQ: CPT

## 2024-01-30 PROCEDURE — 96367 TX/PROPH/DG ADDL SEQ IV INF: CPT

## 2024-01-30 RX ORDER — SODIUM CHLORIDE 9 MG/ML
5-250 INJECTION, SOLUTION INTRAVENOUS PRN
Status: DISCONTINUED | OUTPATIENT
Start: 2024-01-30 | End: 2024-01-31 | Stop reason: HOSPADM

## 2024-01-30 RX ORDER — MEPERIDINE HYDROCHLORIDE 50 MG/ML
12.5 INJECTION INTRAMUSCULAR; INTRAVENOUS; SUBCUTANEOUS PRN
Status: CANCELLED | OUTPATIENT
Start: 2024-01-30

## 2024-01-30 RX ORDER — HEPARIN SODIUM (PORCINE) LOCK FLUSH IV SOLN 100 UNIT/ML 100 UNIT/ML
500 SOLUTION INTRAVENOUS PRN
Status: CANCELLED | OUTPATIENT
Start: 2024-01-30

## 2024-01-30 RX ORDER — ACETAMINOPHEN 325 MG/1
650 TABLET ORAL
Status: CANCELLED | OUTPATIENT
Start: 2024-01-30

## 2024-01-30 RX ORDER — MEPERIDINE HYDROCHLORIDE 50 MG/ML
12.5 INJECTION INTRAMUSCULAR; INTRAVENOUS; SUBCUTANEOUS PRN
OUTPATIENT
Start: 2024-02-06

## 2024-01-30 RX ORDER — DIPHENHYDRAMINE HYDROCHLORIDE 50 MG/ML
50 INJECTION INTRAMUSCULAR; INTRAVENOUS
Status: CANCELLED | OUTPATIENT
Start: 2024-01-30

## 2024-01-30 RX ORDER — ONDANSETRON 2 MG/ML
8 INJECTION INTRAMUSCULAR; INTRAVENOUS
OUTPATIENT
Start: 2024-02-06

## 2024-01-30 RX ORDER — PALONOSETRON 0.05 MG/ML
0.25 INJECTION, SOLUTION INTRAVENOUS ONCE
Status: CANCELLED | OUTPATIENT
Start: 2024-02-06

## 2024-01-30 RX ORDER — EPINEPHRINE 1 MG/ML
0.3 INJECTION, SOLUTION, CONCENTRATE INTRAVENOUS PRN
Status: CANCELLED | OUTPATIENT
Start: 2024-01-30

## 2024-01-30 RX ORDER — HEPARIN SODIUM (PORCINE) LOCK FLUSH IV SOLN 100 UNIT/ML 100 UNIT/ML
500 SOLUTION INTRAVENOUS PRN
OUTPATIENT
Start: 2024-02-06

## 2024-01-30 RX ORDER — SODIUM CHLORIDE 0.9 % (FLUSH) 0.9 %
5-40 SYRINGE (ML) INJECTION PRN
Status: CANCELLED | OUTPATIENT
Start: 2024-02-06

## 2024-01-30 RX ORDER — SODIUM CHLORIDE 9 MG/ML
5-250 INJECTION, SOLUTION INTRAVENOUS PRN
Status: CANCELLED | OUTPATIENT
Start: 2024-01-30

## 2024-01-30 RX ORDER — FAMOTIDINE 10 MG/ML
20 INJECTION, SOLUTION INTRAVENOUS
Status: CANCELLED | OUTPATIENT
Start: 2024-01-30

## 2024-01-30 RX ORDER — PALONOSETRON 0.05 MG/ML
0.25 INJECTION, SOLUTION INTRAVENOUS ONCE
Status: COMPLETED | OUTPATIENT
Start: 2024-01-30 | End: 2024-01-30

## 2024-01-30 RX ORDER — ALBUTEROL SULFATE 90 UG/1
4 AEROSOL, METERED RESPIRATORY (INHALATION) PRN
Status: CANCELLED | OUTPATIENT
Start: 2024-01-30

## 2024-01-30 RX ORDER — SODIUM CHLORIDE 9 MG/ML
INJECTION, SOLUTION INTRAVENOUS CONTINUOUS
Status: CANCELLED | OUTPATIENT
Start: 2024-01-30

## 2024-01-30 RX ORDER — SODIUM CHLORIDE 9 MG/ML
INJECTION, SOLUTION INTRAVENOUS CONTINUOUS
OUTPATIENT
Start: 2024-02-06

## 2024-01-30 RX ORDER — ACETAMINOPHEN 325 MG/1
650 TABLET ORAL
OUTPATIENT
Start: 2024-02-06

## 2024-01-30 RX ORDER — FAMOTIDINE 10 MG/ML
20 INJECTION, SOLUTION INTRAVENOUS
OUTPATIENT
Start: 2024-02-06

## 2024-01-30 RX ORDER — PALONOSETRON 0.05 MG/ML
0.25 INJECTION, SOLUTION INTRAVENOUS ONCE
Status: CANCELLED | OUTPATIENT
Start: 2024-01-30

## 2024-01-30 RX ORDER — ONDANSETRON 2 MG/ML
8 INJECTION INTRAMUSCULAR; INTRAVENOUS
Status: CANCELLED | OUTPATIENT
Start: 2024-01-30

## 2024-01-30 RX ORDER — SODIUM CHLORIDE 0.9 % (FLUSH) 0.9 %
5-40 SYRINGE (ML) INJECTION PRN
Status: CANCELLED | OUTPATIENT
Start: 2024-01-30

## 2024-01-30 RX ORDER — ALBUTEROL SULFATE 90 UG/1
4 AEROSOL, METERED RESPIRATORY (INHALATION) PRN
OUTPATIENT
Start: 2024-02-06

## 2024-01-30 RX ORDER — EPINEPHRINE 1 MG/ML
0.3 INJECTION, SOLUTION, CONCENTRATE INTRAVENOUS PRN
OUTPATIENT
Start: 2024-02-06

## 2024-01-30 RX ORDER — DIPHENHYDRAMINE HYDROCHLORIDE 50 MG/ML
50 INJECTION INTRAMUSCULAR; INTRAVENOUS
OUTPATIENT
Start: 2024-02-06

## 2024-01-30 RX ORDER — SODIUM CHLORIDE 9 MG/ML
5-250 INJECTION, SOLUTION INTRAVENOUS PRN
Status: CANCELLED | OUTPATIENT
Start: 2024-02-06

## 2024-01-30 RX ORDER — SODIUM CHLORIDE 9 MG/ML
5-250 INJECTION, SOLUTION INTRAVENOUS PRN
OUTPATIENT
Start: 2024-02-06

## 2024-01-30 RX ADMIN — POTASSIUM CHLORIDE: 2 INJECTION, SOLUTION, CONCENTRATE INTRAVENOUS at 09:33

## 2024-01-30 RX ADMIN — SODIUM CHLORIDE 20 ML/HR: 9 INJECTION, SOLUTION INTRAVENOUS at 10:53

## 2024-01-30 RX ADMIN — POTASSIUM CHLORIDE: 2 INJECTION, SOLUTION, CONCENTRATE INTRAVENOUS at 13:08

## 2024-01-30 RX ADMIN — FOSAPREPITANT 150 MG: 150 INJECTION, POWDER, LYOPHILIZED, FOR SOLUTION INTRAVENOUS at 10:54

## 2024-01-30 RX ADMIN — CISPLATIN 70 MG: 1 INJECTION INTRAVENOUS at 13:10

## 2024-01-30 RX ADMIN — DEXAMETHASONE SODIUM PHOSPHATE 12 MG: 4 INJECTION, SOLUTION INTRAMUSCULAR; INTRAVENOUS at 11:29

## 2024-01-30 RX ADMIN — GEMCITABINE 2000 MG: 38 INJECTION, SOLUTION INTRAVENOUS at 12:01

## 2024-01-30 RX ADMIN — PALONOSETRON 0.25 MG: 0.25 INJECTION, SOLUTION INTRAVENOUS at 10:51

## 2024-01-30 NOTE — PROGRESS NOTES
Ambulated to infusion area, here today to begin chemotherapy. Treatment authorized and consent signed during treatment planning yesterday. No concerns at this time. PIV placed in right hand,positive blood return noted. Labs reviewed, Creat 1.5. Discussed with Dr. Franklin, will redraw CMP after hydration. Discussed with patient, and verbalized understanding. Premedication, pre/post hydration and  chemo administered as ordered. Call light within reach. During Gemzar infusion, patient c/o burning to IV. Infusion stopped and patient assessed. Reported pain around infusion site, positive blood return noted. PIV removed, and a new IV placed in left hand, to which patient also reported burning to site. This IV also pulled, and new IV placed in left forearm. Discussed port placement with patient and is agreeable. Dr. Franklin updated, and referral for IR port insertion placed.  Tolerated remainder of treatment without issues. Discharge instructions provided. [facility] 02/05 for labs.    Status appropriately assessed and documented. All required labs and results reviewed. Treatment approved by provider. Treatment orders and medications verified by 2 Registered Nurses where applicable. Treatment plan was confirmed with patient prior to administration, and educated the need to report any treatment-related symptoms      Prior to administration, when applicable, the following 8 elements of medication administration were reviewed with 2nd Registered Nurse prior to dosing: drug name, drug dose, infusion volume when prepared in a syringe, rate of administration, expiration dates and/or times, appearance and integrity of drug(s), and rate of pump for infusion.  The 5 rights of medication administration have been verified.

## 2024-01-31 ENCOUNTER — TELEPHONE (OUTPATIENT)
Dept: ONCOLOGY | Age: 64
End: 2024-01-31

## 2024-01-31 ENCOUNTER — CLINICAL DOCUMENTATION (OUTPATIENT)
Dept: ONCOLOGY | Age: 64
End: 2024-01-31

## 2024-01-31 NOTE — PROGRESS NOTES
[facility], signed by physician, patient's wife notified that forms are available for pickup at

## 2024-01-31 NOTE — TELEPHONE ENCOUNTER
Patient called given time and prep for PET scan to be done on 2/10/24 [facility] arrival at 10 AM.

## 2024-02-01 DIAGNOSIS — C67.8 MALIGNANT NEOPLASM OF OVERLAPPING SITES OF BLADDER (HCC): Primary | ICD-10-CM

## 2024-02-06 ENCOUNTER — HOSPITAL ENCOUNTER (OUTPATIENT)
Dept: INFUSION THERAPY | Age: 64
Discharge: HOME | End: 2024-02-06
Payer: COMMERCIAL

## 2024-02-06 ENCOUNTER — OFFICE VISIT (OUTPATIENT)
Dept: ONCOLOGY | Age: 64
End: 2024-02-06
Payer: COMMERCIAL

## 2024-02-06 VITALS
WEIGHT: 180.6 LBS | TEMPERATURE: 96.8 F | SYSTOLIC BLOOD PRESSURE: 133 MMHG | BODY MASS INDEX: 25.86 KG/M2 | HEIGHT: 70 IN | DIASTOLIC BLOOD PRESSURE: 71 MMHG | OXYGEN SATURATION: 97 % | HEART RATE: 89 BPM

## 2024-02-06 VITALS
BODY MASS INDEX: 25.86 KG/M2 | HEART RATE: 89 BPM | SYSTOLIC BLOOD PRESSURE: 133 MMHG | HEIGHT: 70 IN | OXYGEN SATURATION: 97 % | DIASTOLIC BLOOD PRESSURE: 71 MMHG | WEIGHT: 180.6 LBS | TEMPERATURE: 96.8 F

## 2024-02-06 DIAGNOSIS — C67.8 MALIGNANT NEOPLASM OF OVERLAPPING SITES OF BLADDER (HCC): ICD-10-CM

## 2024-02-06 DIAGNOSIS — Z11.59 NEED FOR HEPATITIS B SCREENING TEST: Primary | ICD-10-CM

## 2024-02-06 DIAGNOSIS — G89.3 CANCER RELATED PAIN: ICD-10-CM

## 2024-02-06 DIAGNOSIS — C67.8 MALIGNANT NEOPLASM OF OVERLAPPING SITES OF BLADDER (HCC): Primary | ICD-10-CM

## 2024-02-06 LAB
ALBUMIN SERPL-MCNC: 3.2 GM/DL (ref 3.4–5)
ALP BLD-CCNC: 288 IU/L (ref 40–128)
ALT SERPL-CCNC: 43 U/L (ref 10–40)
ANION GAP SERPL CALCULATED.3IONS-SCNC: 12 MMOL/L (ref 7–16)
AST SERPL-CCNC: 21 IU/L (ref 15–37)
BASOPHILS ABSOLUTE: 0 K/CU MM
BASOPHILS RELATIVE PERCENT: 0 % (ref 0–1)
BILIRUB SERPL-MCNC: 0.3 MG/DL (ref 0–1)
BUN SERPL-MCNC: 18 MG/DL (ref 6–23)
CALCIUM SERPL-MCNC: 8.4 MG/DL (ref 8.3–10.6)
CHLORIDE BLD-SCNC: 99 MMOL/L (ref 99–110)
CO2: 25 MMOL/L (ref 21–32)
CREAT SERPL-MCNC: 0.9 MG/DL (ref 0.9–1.3)
DIFFERENTIAL TYPE: ABNORMAL
EGFR, POC: >60 ML/MIN/1.73M2
EOSINOPHILS ABSOLUTE: 0.2 K/CU MM
EOSINOPHILS RELATIVE PERCENT: 2.7 % (ref 0–3)
GFR SERPL CREATININE-BSD FRML MDRD: >60 ML/MIN/1.73M2
GLUCOSE BLD-MCNC: 136 MG/DL (ref 70–99)
GLUCOSE SERPL-MCNC: 133 MG/DL (ref 70–99)
HCT VFR BLD CALC: 35.2 % (ref 42–52)
HEMOGLOBIN: 11.4 GM/DL (ref 13.5–18)
LYMPHOCYTES ABSOLUTE: 0.8 K/CU MM
LYMPHOCYTES RELATIVE PERCENT: 14.1 % (ref 24–44)
MAGNESIUM: 1.9 MG/DL (ref 1.8–2.4)
MCH RBC QN AUTO: 27.3 PG (ref 27–31)
MCHC RBC AUTO-ENTMCNC: 32.4 % (ref 32–36)
MCV RBC AUTO: 84.4 FL (ref 78–100)
MONOCYTES ABSOLUTE: 0.1 K/CU MM
MONOCYTES RELATIVE PERCENT: 2.2 % (ref 0–4)
PDW BLD-RTO: 12.1 % (ref 11.7–14.9)
PLATELET # BLD: 249 K/CU MM (ref 140–440)
PMV BLD AUTO: 8.4 FL (ref 7.5–11.1)
POC BUN: 19 MG/DL (ref 6–23)
POC CALCIUM: 1.13 MMOL/L (ref 1.15–1.33)
POC CHLORIDE: 101 MMOL/L (ref 99–100)
POC CO2: 24 MMOL/L (ref 21–32)
POC CREATININE: 0.7 MG/DL (ref 0.5–1.2)
POTASSIUM SERPL-SCNC: 3.9 MMOL/L (ref 3.5–5.1)
POTASSIUM SERPL-SCNC: 4 MMOL/L (ref 3.5–5.1)
RBC # BLD: 4.17 M/CU MM (ref 4.6–6.2)
SEGMENTED NEUTROPHILS ABSOLUTE COUNT: 4.7 K/CU MM
SEGMENTED NEUTROPHILS RELATIVE PERCENT: 81 % (ref 36–66)
SODIUM BLD-SCNC: 136 MMOL/L (ref 135–145)
SODIUM BLD-SCNC: 138 MMOL/L (ref 135–145)
SOURCE, BLOOD GAS: ABNORMAL
TOTAL PROTEIN: 6.3 GM/DL (ref 6.4–8.2)
WBC # BLD: 5.8 K/CU MM (ref 4–10.5)

## 2024-02-06 PROCEDURE — 83735 ASSAY OF MAGNESIUM: CPT

## 2024-02-06 PROCEDURE — 96415 CHEMO IV INFUSION ADDL HR: CPT

## 2024-02-06 PROCEDURE — 96367 TX/PROPH/DG ADDL SEQ IV INF: CPT

## 2024-02-06 PROCEDURE — 2580000003 HC RX 258: Performed by: INTERNAL MEDICINE

## 2024-02-06 PROCEDURE — 96361 HYDRATE IV INFUSION ADD-ON: CPT

## 2024-02-06 PROCEDURE — 6360000002 HC RX W HCPCS: Performed by: INTERNAL MEDICINE

## 2024-02-06 PROCEDURE — 96375 TX/PRO/DX INJ NEW DRUG ADDON: CPT

## 2024-02-06 PROCEDURE — 3075F SYST BP GE 130 - 139MM HG: CPT | Performed by: PHYSICIAN ASSISTANT

## 2024-02-06 PROCEDURE — 96413 CHEMO IV INFUSION 1 HR: CPT

## 2024-02-06 PROCEDURE — 99214 OFFICE O/P EST MOD 30 MIN: CPT | Performed by: PHYSICIAN ASSISTANT

## 2024-02-06 PROCEDURE — 96417 CHEMO IV INFUS EACH ADDL SEQ: CPT

## 2024-02-06 PROCEDURE — 80053 COMPREHEN METABOLIC PANEL: CPT

## 2024-02-06 PROCEDURE — 3078F DIAST BP <80 MM HG: CPT | Performed by: PHYSICIAN ASSISTANT

## 2024-02-06 PROCEDURE — 85025 COMPLETE CBC W/AUTO DIFF WBC: CPT

## 2024-02-06 RX ORDER — DICYCLOMINE HCL 20 MG
20 TABLET ORAL 4 TIMES DAILY
Qty: 60 TABLET | Refills: 0 | Status: SHIPPED | OUTPATIENT
Start: 2024-02-06 | End: 2024-02-21

## 2024-02-06 RX ORDER — PALONOSETRON 0.05 MG/ML
0.25 INJECTION, SOLUTION INTRAVENOUS ONCE
Status: COMPLETED | OUTPATIENT
Start: 2024-02-06 | End: 2024-02-06

## 2024-02-06 RX ORDER — DEXAMETHASONE 4 MG/1
4 TABLET ORAL
Qty: 18 TABLET | Refills: 0 | Status: SHIPPED | OUTPATIENT
Start: 2024-02-06

## 2024-02-06 RX ORDER — SODIUM CHLORIDE 9 MG/ML
5-250 INJECTION, SOLUTION INTRAVENOUS PRN
Status: DISCONTINUED | OUTPATIENT
Start: 2024-02-06 | End: 2024-02-07 | Stop reason: HOSPADM

## 2024-02-06 RX ORDER — OXYCODONE HCL 10 MG/1
10 TABLET, FILM COATED, EXTENDED RELEASE ORAL EVERY 12 HOURS
Qty: 60 EACH | Refills: 0 | Status: SHIPPED | OUTPATIENT
Start: 2024-02-06 | End: 2024-03-07

## 2024-02-06 RX ORDER — TAMSULOSIN HYDROCHLORIDE 0.4 MG/1
0.4 CAPSULE ORAL DAILY
Qty: 30 CAPSULE | Refills: 0 | Status: SHIPPED | OUTPATIENT
Start: 2024-02-06

## 2024-02-06 RX ORDER — SODIUM CHLORIDE 0.9 % (FLUSH) 0.9 %
5-40 SYRINGE (ML) INJECTION PRN
Status: DISCONTINUED | OUTPATIENT
Start: 2024-02-06 | End: 2024-02-07 | Stop reason: HOSPADM

## 2024-02-06 RX ADMIN — PALONOSETRON 0.25 MG: 0.25 INJECTION, SOLUTION INTRAVENOUS at 12:15

## 2024-02-06 RX ADMIN — GEMCITABINE 1900 MG: 38 INJECTION, SOLUTION INTRAVENOUS at 12:41

## 2024-02-06 RX ADMIN — POTASSIUM CHLORIDE: 2 INJECTION, SOLUTION, CONCENTRATE INTRAVENOUS at 10:51

## 2024-02-06 RX ADMIN — DEXAMETHASONE SODIUM PHOSPHATE 12 MG: 4 INJECTION, SOLUTION INTRAMUSCULAR; INTRAVENOUS at 10:50

## 2024-02-06 RX ADMIN — CISPLATIN 67 MG: 1 INJECTION INTRAVENOUS at 13:54

## 2024-02-06 RX ADMIN — POTASSIUM CHLORIDE: 2 INJECTION, SOLUTION, CONCENTRATE INTRAVENOUS at 13:52

## 2024-02-06 RX ADMIN — FOSAPREPITANT 150 MG: 150 INJECTION, POWDER, LYOPHILIZED, FOR SOLUTION INTRAVENOUS at 12:01

## 2024-02-06 NOTE — PROGRESS NOTES
Patient Name: Jethro Vázquez  Patient : 1960  Patient MRN: [id number]     Primary Oncologist: Noemi Gambino MD  PCP: Mono Schaffer MD     Date of Service: 2024      Chief Complaint:   Chief Complaint   Patient presents with    Follow-up        Patient Active Problem List:     Non-recurrent bilateral inguinal hernia without obstruction or gangrene     Right inguinal hernia     Lipoma, spermatic cord     Benign essential microscopic hematuria     Chronic left-sided low back pain without sciatica     Overweight     Acute cystitis with hematuria     Primary hypertension     Pyelonephritis     Malignant neoplasm of overlapping sites of bladder (HCC)     Need for hepatitis B screening test     Bladder cancer (HCC)     HPI:   Jethro Vázquez is a pleasant 63 y.o. male who was diagnosed with treatment naive urothelial carcinoma in 2023. He is s/p bilateral nephrostomy tube placement.  He has pelvic and retroperitoneal as well as paraesophageal LAD concerning for metastatic disease. Bone scan was negative in 2024. Plan was for outpatient PET however he has been hospitalized for pain.     CT A/P with contrast on 2023 was non-acute with no significant residual hydronephrosis however did redemonstrate significant LAD.      CT AP W/ Contrast 2024:  1.  Bilateral nephrostomy tubes are present as with the 01/15/2024 study. There is no new perinephric hematoma or hematoma along the course of the nephrostomy tubes.   2.  There is not a significant degree of residual hydronephrosis compared with the pre nephrostomy study of 01/10/2024.  Tiny focus of gas in the right renal collecting system may be secondary to the nephrostomy tube.   3.  There is delayed enhancement of the left kidney compared to the right side.  This could be vascular in nature with stenosis of the left renal artery and narrowing of the left renal vein.  The left renal vein is retroaortic and likely compressed with the

## 2024-02-06 NOTE — PROGRESS NOTES
Status appropriately assessed and documented. All required labs and results reviewed. Treatment approved by provider. Treatment orders and medications verified by 2 Registered Nurses where applicable. Treatment plan was confirmed with patient prior to administration, and educated the need to report any treatment-related symptoms      Ambulated to infusion area, here today for chemotherapy. Pt states had been lightheaded at times and loss of taste. Had a chairside visit with MOOK Talley, talked about his pain and medication refills. Right dorsal forearm  positive blood return noted. Labs reviewed, Labs within defined limits.  Chemo administered as ordered. Call light within reach. Tolerated infusion without incident.  Discharge instructions provided.    Pts nephro tubes are bothering him and he asked for supplies.  Advised to for him to call his urologist for the supplies and to follow up.

## 2024-02-10 ENCOUNTER — HOSPITAL ENCOUNTER (OUTPATIENT)
Dept: PET IMAGING | Age: 64
Discharge: HOME | End: 2024-02-10
Attending: INTERNAL MEDICINE

## 2024-02-10 DIAGNOSIS — C67.8 MALIGNANT NEOPLASM OF OVERLAPPING SITES OF BLADDER (HCC): ICD-10-CM

## 2024-02-12 DIAGNOSIS — C67.8 MALIGNANT NEOPLASM OF OVERLAPPING SITES OF BLADDER (HCC): ICD-10-CM

## 2024-02-12 RX ORDER — OLANZAPINE 2.5 MG/1
2.5 TABLET, FILM COATED ORAL NIGHTLY
Qty: 24 TABLET | Refills: 0 | Status: SHIPPED | OUTPATIENT
Start: 2024-02-12

## 2024-02-13 ENCOUNTER — HOSPITAL ENCOUNTER (OUTPATIENT)
Dept: INFUSION THERAPY | Age: 64
Discharge: HOME | End: 2024-02-13
Payer: COMMERCIAL

## 2024-02-13 DIAGNOSIS — C67.8 MALIGNANT NEOPLASM OF OVERLAPPING SITES OF BLADDER (HCC): ICD-10-CM

## 2024-02-13 DIAGNOSIS — R30.0 DYSURIA: Primary | ICD-10-CM

## 2024-02-13 LAB
BACTERIA: ABNORMAL /HPF
BILIRUBIN URINE: NEGATIVE MG/DL
BLOOD, URINE: ABNORMAL
CLARITY: CLEAR
COLOR: YELLOW
GLUCOSE, URINE: NEGATIVE MG/DL
KETONES, URINE: NEGATIVE MG/DL
LEUKOCYTE ESTERASE, URINE: ABNORMAL
NITRITE URINE, QUANTITATIVE: NEGATIVE
PH, URINE: 6 (ref 5–8)
PROTEIN UA: 100 MG/DL
RBC URINE: 25 /HPF (ref 0–3)
SPECIFIC GRAVITY UA: 1.02 (ref 1–1.03)
TRICHOMONAS: ABNORMAL /HPF
UROBILINOGEN, URINE: 1 MG/DL (ref 0.2–1)
WBC CLUMP: ABNORMAL /HPF
WBC UA: 798 /HPF (ref 0–2)

## 2024-02-13 PROCEDURE — 87086 URINE CULTURE/COLONY COUNT: CPT

## 2024-02-13 PROCEDURE — 87077 CULTURE AEROBIC IDENTIFY: CPT

## 2024-02-13 PROCEDURE — 87186 SC STD MICRODIL/AGAR DIL: CPT

## 2024-02-13 PROCEDURE — 81001 URINALYSIS AUTO W/SCOPE: CPT

## 2024-02-14 RX ORDER — DEXAMETHASONE 4 MG/1
4 TABLET ORAL
Qty: 18 TABLET | Refills: 0 | OUTPATIENT
Start: 2024-02-14

## 2024-02-15 LAB
CULTURE: ABNORMAL
Lab: ABNORMAL
SPECIMEN: ABNORMAL

## 2024-02-16 ENCOUNTER — CLINICAL DOCUMENTATION (OUTPATIENT)
Dept: ONCOLOGY | Age: 64
End: 2024-02-16

## 2024-02-16 DIAGNOSIS — C67.8 MALIGNANT NEOPLASM OF OVERLAPPING SITES OF BLADDER (HCC): Primary | ICD-10-CM

## 2024-02-16 DIAGNOSIS — Z11.59 NEED FOR HEPATITIS B SCREENING TEST: ICD-10-CM

## 2024-02-16 RX ORDER — CIPROFLOXACIN 500 MG/1
500 TABLET, FILM COATED ORAL 2 TIMES DAILY
Qty: 14 TABLET | Refills: 0 | Status: SHIPPED | OUTPATIENT
Start: 2024-02-16 | End: 2024-02-23

## 2024-02-16 RX ORDER — OXYCODONE HYDROCHLORIDE AND ACETAMINOPHEN 5; 325 MG/1; MG/1
1 TABLET ORAL EVERY 6 HOURS PRN
Qty: 56 TABLET | Refills: 0 | Status: SHIPPED | OUTPATIENT
Start: 2024-02-16 | End: 2024-03-01

## 2024-02-16 NOTE — PROGRESS NOTES
Physician recommending to prescribe cipro 500 mg BID for 7 days based on urine culture results. RX e-scribed to [facility] pharmacy in [place] per physician order. Called patient @ [phone] to update. Voices understanding. Patient also requesting refill on percocet 5-325 Q6 hours PRN. States he took his last pain pill yesterday. Order pended for physician. No further needs addressed at this time.

## 2024-02-19 DIAGNOSIS — C67.8 MALIGNANT NEOPLASM OF OVERLAPPING SITES OF BLADDER (HCC): ICD-10-CM

## 2024-02-19 DIAGNOSIS — Z11.59 NEED FOR HEPATITIS B SCREENING TEST: Primary | ICD-10-CM

## 2024-02-19 RX ORDER — SODIUM CHLORIDE 9 MG/ML
5-250 INJECTION, SOLUTION INTRAVENOUS PRN
Status: CANCELLED | OUTPATIENT
Start: 2024-02-20

## 2024-02-19 RX ORDER — DIPHENHYDRAMINE HYDROCHLORIDE 50 MG/ML
50 INJECTION INTRAMUSCULAR; INTRAVENOUS
Status: CANCELLED | OUTPATIENT
Start: 2024-02-27

## 2024-02-19 RX ORDER — SODIUM CHLORIDE 0.9 % (FLUSH) 0.9 %
5-40 SYRINGE (ML) INJECTION PRN
Status: CANCELLED | OUTPATIENT
Start: 2024-02-20

## 2024-02-19 RX ORDER — FAMOTIDINE 10 MG/ML
20 INJECTION, SOLUTION INTRAVENOUS
Status: CANCELLED | OUTPATIENT
Start: 2024-02-20

## 2024-02-19 RX ORDER — HEPARIN SODIUM (PORCINE) LOCK FLUSH IV SOLN 100 UNIT/ML 100 UNIT/ML
500 SOLUTION INTRAVENOUS PRN
Status: CANCELLED | OUTPATIENT
Start: 2024-02-27

## 2024-02-19 RX ORDER — EPINEPHRINE 1 MG/ML
0.3 INJECTION, SOLUTION, CONCENTRATE INTRAVENOUS PRN
Status: CANCELLED | OUTPATIENT
Start: 2024-02-27

## 2024-02-19 RX ORDER — SODIUM CHLORIDE 9 MG/ML
INJECTION, SOLUTION INTRAVENOUS CONTINUOUS
Status: CANCELLED | OUTPATIENT
Start: 2024-02-20

## 2024-02-19 RX ORDER — ACETAMINOPHEN 325 MG/1
650 TABLET ORAL
Status: CANCELLED | OUTPATIENT
Start: 2024-02-27

## 2024-02-19 RX ORDER — SODIUM CHLORIDE 9 MG/ML
5-250 INJECTION, SOLUTION INTRAVENOUS PRN
Status: CANCELLED | OUTPATIENT
Start: 2024-02-27

## 2024-02-19 RX ORDER — HEPARIN SODIUM (PORCINE) LOCK FLUSH IV SOLN 100 UNIT/ML 100 UNIT/ML
500 SOLUTION INTRAVENOUS PRN
Status: CANCELLED | OUTPATIENT
Start: 2024-02-20

## 2024-02-19 RX ORDER — MEPERIDINE HYDROCHLORIDE 50 MG/ML
12.5 INJECTION INTRAMUSCULAR; INTRAVENOUS; SUBCUTANEOUS PRN
Status: CANCELLED | OUTPATIENT
Start: 2024-02-27

## 2024-02-19 RX ORDER — ONDANSETRON 2 MG/ML
8 INJECTION INTRAMUSCULAR; INTRAVENOUS
Status: CANCELLED | OUTPATIENT
Start: 2024-02-27

## 2024-02-19 RX ORDER — ALBUTEROL SULFATE 90 UG/1
4 AEROSOL, METERED RESPIRATORY (INHALATION) PRN
Status: CANCELLED | OUTPATIENT
Start: 2024-02-20

## 2024-02-19 RX ORDER — PALONOSETRON 0.05 MG/ML
0.25 INJECTION, SOLUTION INTRAVENOUS ONCE
Status: CANCELLED | OUTPATIENT
Start: 2024-02-27

## 2024-02-19 RX ORDER — SODIUM CHLORIDE 9 MG/ML
INJECTION, SOLUTION INTRAVENOUS CONTINUOUS
Status: CANCELLED | OUTPATIENT
Start: 2024-02-27

## 2024-02-19 RX ORDER — DIPHENHYDRAMINE HYDROCHLORIDE 50 MG/ML
50 INJECTION INTRAMUSCULAR; INTRAVENOUS
Status: CANCELLED | OUTPATIENT
Start: 2024-02-20

## 2024-02-19 RX ORDER — EPINEPHRINE 1 MG/ML
0.3 INJECTION, SOLUTION, CONCENTRATE INTRAVENOUS PRN
Status: CANCELLED | OUTPATIENT
Start: 2024-02-20

## 2024-02-19 RX ORDER — FAMOTIDINE 10 MG/ML
20 INJECTION, SOLUTION INTRAVENOUS
Status: CANCELLED | OUTPATIENT
Start: 2024-02-27

## 2024-02-19 RX ORDER — ALBUTEROL SULFATE 90 UG/1
4 AEROSOL, METERED RESPIRATORY (INHALATION) PRN
Status: CANCELLED | OUTPATIENT
Start: 2024-02-27

## 2024-02-19 RX ORDER — MEPERIDINE HYDROCHLORIDE 50 MG/ML
12.5 INJECTION INTRAMUSCULAR; INTRAVENOUS; SUBCUTANEOUS PRN
Status: CANCELLED | OUTPATIENT
Start: 2024-02-20

## 2024-02-19 RX ORDER — ONDANSETRON 2 MG/ML
8 INJECTION INTRAMUSCULAR; INTRAVENOUS
Status: CANCELLED | OUTPATIENT
Start: 2024-02-20

## 2024-02-19 RX ORDER — PALONOSETRON 0.05 MG/ML
0.25 INJECTION, SOLUTION INTRAVENOUS ONCE
Status: CANCELLED | OUTPATIENT
Start: 2024-02-20

## 2024-02-19 RX ORDER — SODIUM CHLORIDE 0.9 % (FLUSH) 0.9 %
5-40 SYRINGE (ML) INJECTION PRN
Status: CANCELLED | OUTPATIENT
Start: 2024-02-27

## 2024-02-19 RX ORDER — ACETAMINOPHEN 325 MG/1
650 TABLET ORAL
Status: CANCELLED | OUTPATIENT
Start: 2024-02-20

## 2024-02-20 ENCOUNTER — HOSPITAL ENCOUNTER (OUTPATIENT)
Dept: INFUSION THERAPY | Age: 64
Discharge: HOME | End: 2024-02-20
Payer: COMMERCIAL

## 2024-02-20 ENCOUNTER — CLINICAL DOCUMENTATION (OUTPATIENT)
Dept: RADIATION ONCOLOGY | Age: 64
End: 2024-02-20

## 2024-02-20 VITALS
WEIGHT: 175.4 LBS | DIASTOLIC BLOOD PRESSURE: 73 MMHG | HEART RATE: 95 BPM | TEMPERATURE: 97.5 F | BODY MASS INDEX: 25.11 KG/M2 | HEIGHT: 70 IN | OXYGEN SATURATION: 99 % | SYSTOLIC BLOOD PRESSURE: 119 MMHG

## 2024-02-20 DIAGNOSIS — C67.8 MALIGNANT NEOPLASM OF OVERLAPPING SITES OF BLADDER (HCC): Primary | ICD-10-CM

## 2024-02-20 DIAGNOSIS — Z11.59 NEED FOR HEPATITIS B SCREENING TEST: Primary | ICD-10-CM

## 2024-02-20 DIAGNOSIS — C67.8 MALIGNANT NEOPLASM OF OVERLAPPING SITES OF BLADDER (HCC): ICD-10-CM

## 2024-02-20 LAB
ALBUMIN SERPL-MCNC: 3.3 GM/DL (ref 3.4–5)
ALP BLD-CCNC: 267 IU/L (ref 40–129)
ALT SERPL-CCNC: 17 U/L (ref 10–40)
ANION GAP SERPL CALCULATED.3IONS-SCNC: 11 MMOL/L (ref 7–16)
AST SERPL-CCNC: 16 IU/L (ref 15–37)
BASOPHILS ABSOLUTE: 0 K/CU MM
BASOPHILS RELATIVE PERCENT: 0.7 % (ref 0–1)
BILIRUB SERPL-MCNC: 0.2 MG/DL (ref 0–1)
BUN SERPL-MCNC: 13 MG/DL (ref 6–23)
CALCIUM SERPL-MCNC: 8.7 MG/DL (ref 8.3–10.6)
CHLORIDE BLD-SCNC: 101 MMOL/L (ref 99–110)
CO2: 26 MMOL/L (ref 21–32)
CREAT SERPL-MCNC: 1.2 MG/DL (ref 0.9–1.3)
DIFFERENTIAL TYPE: ABNORMAL
EGFR, POC: >60 ML/MIN/1.73M2
EOSINOPHILS ABSOLUTE: 0.2 K/CU MM
EOSINOPHILS RELATIVE PERCENT: 3.7 % (ref 0–3)
GFR SERPL CREATININE-BSD FRML MDRD: >60 ML/MIN/1.73M2
GLUCOSE BLD-MCNC: 97 MG/DL (ref 70–99)
GLUCOSE SERPL-MCNC: 93 MG/DL (ref 70–99)
HCT VFR BLD CALC: 36.5 % (ref 42–52)
HEMOGLOBIN: 11.4 GM/DL (ref 13.5–18)
LYMPHOCYTES ABSOLUTE: 1.9 K/CU MM
LYMPHOCYTES RELATIVE PERCENT: 43.9 % (ref 24–44)
MAGNESIUM: 2.2 MG/DL (ref 1.8–2.4)
MCH RBC QN AUTO: 27.1 PG (ref 27–31)
MCHC RBC AUTO-ENTMCNC: 31.2 % (ref 32–36)
MCV RBC AUTO: 86.9 FL (ref 78–100)
MONOCYTES ABSOLUTE: 0.7 K/CU MM
MONOCYTES RELATIVE PERCENT: 14.9 % (ref 0–4)
PDW BLD-RTO: 13.2 % (ref 11.7–14.9)
PLATELET # BLD: 626 K/CU MM (ref 140–440)
PMV BLD AUTO: 8.3 FL (ref 7.5–11.1)
POC BUN: 14 MG/DL (ref 6–23)
POC CALCIUM: 1.11 MMOL/L (ref 1.15–1.33)
POC CHLORIDE: 106 MMOL/L (ref 99–100)
POC CO2: 26 MMOL/L (ref 21–32)
POC CREATININE: 0.9 MG/DL (ref 0.5–1.2)
POTASSIUM SERPL-SCNC: 4.3 MMOL/L (ref 3.5–5.1)
POTASSIUM SERPL-SCNC: 5 MMOL/L (ref 3.5–5.1)
RBC # BLD: 4.2 M/CU MM (ref 4.6–6.2)
SEGMENTED NEUTROPHILS ABSOLUTE COUNT: 1.6 K/CU MM
SEGMENTED NEUTROPHILS RELATIVE PERCENT: 36.8 % (ref 36–66)
SODIUM BLD-SCNC: 138 MMOL/L (ref 135–145)
SODIUM BLD-SCNC: 141 MMOL/L (ref 135–145)
SOURCE, BLOOD GAS: ABNORMAL
TOTAL PROTEIN: 7 GM/DL (ref 6.4–8.2)
WBC # BLD: 4.4 K/CU MM (ref 4–10.5)

## 2024-02-20 PROCEDURE — 36415 COLL VENOUS BLD VENIPUNCTURE: CPT

## 2024-02-20 PROCEDURE — 2580000003 HC RX 258: Performed by: INTERNAL MEDICINE

## 2024-02-20 PROCEDURE — 96417 CHEMO IV INFUS EACH ADDL SEQ: CPT

## 2024-02-20 PROCEDURE — 96361 HYDRATE IV INFUSION ADD-ON: CPT

## 2024-02-20 PROCEDURE — 96375 TX/PRO/DX INJ NEW DRUG ADDON: CPT

## 2024-02-20 PROCEDURE — 96367 TX/PROPH/DG ADDL SEQ IV INF: CPT

## 2024-02-20 PROCEDURE — 80053 COMPREHEN METABOLIC PANEL: CPT

## 2024-02-20 PROCEDURE — 6360000002 HC RX W HCPCS: Performed by: INTERNAL MEDICINE

## 2024-02-20 PROCEDURE — 83735 ASSAY OF MAGNESIUM: CPT

## 2024-02-20 PROCEDURE — 96413 CHEMO IV INFUSION 1 HR: CPT

## 2024-02-20 PROCEDURE — 96415 CHEMO IV INFUSION ADDL HR: CPT

## 2024-02-20 PROCEDURE — 85025 COMPLETE CBC W/AUTO DIFF WBC: CPT

## 2024-02-20 RX ORDER — SODIUM CHLORIDE 9 MG/ML
5-250 INJECTION, SOLUTION INTRAVENOUS PRN
Status: DISCONTINUED | OUTPATIENT
Start: 2024-02-20 | End: 2024-02-21 | Stop reason: HOSPADM

## 2024-02-20 RX ORDER — PALONOSETRON 0.05 MG/ML
0.25 INJECTION, SOLUTION INTRAVENOUS ONCE
Status: COMPLETED | OUTPATIENT
Start: 2024-02-20 | End: 2024-02-20

## 2024-02-20 RX ADMIN — PALONOSETRON 0.25 MG: 0.25 INJECTION, SOLUTION INTRAVENOUS at 10:32

## 2024-02-20 RX ADMIN — CISPLATIN 67 MG: 1 INJECTION INTRAVENOUS at 12:15

## 2024-02-20 RX ADMIN — SODIUM CHLORIDE 20 ML/HR: 9 INJECTION, SOLUTION INTRAVENOUS at 10:32

## 2024-02-20 RX ADMIN — POTASSIUM CHLORIDE: 2 INJECTION, SOLUTION, CONCENTRATE INTRAVENOUS at 09:25

## 2024-02-20 RX ADMIN — GEMCITABINE 1900 MG: 38 INJECTION, SOLUTION INTRAVENOUS at 11:24

## 2024-02-20 RX ADMIN — DEXAMETHASONE SODIUM PHOSPHATE 12 MG: 4 INJECTION INTRA-ARTICULAR; INTRALESIONAL; INTRAMUSCULAR; INTRAVENOUS; SOFT TISSUE at 11:00

## 2024-02-20 RX ADMIN — SODIUM CHLORIDE 150 MG: 9 INJECTION, SOLUTION INTRAVENOUS at 10:33

## 2024-02-20 RX ADMIN — POTASSIUM CHLORIDE: 2 INJECTION, SOLUTION, CONCENTRATE INTRAVENOUS at 12:14

## 2024-02-20 NOTE — PROGRESS NOTES
LSW met with Pt and Spouse at chairside while he was receiving chemotherapy treatment.   Pt stated he is doing better emotionally and is receiving much support from his spouse and family. Pt also shared he is finding strength in his spirituality. Pt was denied a hospital bed through his insurance. He continues to deal with physical discomfort but states he does not like taking medication and only takes pain meds when needed. Pt hopes to have the urostomy tubes removed sometime in March.

## 2024-02-20 NOTE — PROGRESS NOTES
Pt here for tx. With pre and post hydration. PIV placed with blood return noted. CBC CMP POC drawn and sent. Pt states intermittent tingling to bilateral toes after tx's. Which started with first tx. Pt instructed to inform us if tingling worsens or stays constant. Pt has no concerns or issues to discuss with physician at this time.     Patient's status assessed and documented appropriately.  All labs and required results were also reviewed today.  Treatment parameters have been reviewed.  Today's treatment has been approved by the provider.        Treatment orders and medication sequencing (when applicable) was verified by 2 registered nurses.  The treatment plan was confirmed with the patient prior to administration, and the patient understands the need to report any treatment-related symptoms.    Prior to administration, when applicable, the following 8 elements of medication administration were reviewed with 2nd Registered Nurse prior to dosing: drug name, drug dose, infusion volume when prepared in a syringe, rate of administration, expiration dates and/or times, appearance and integrity of drug(s), and rate of pump for infusion.  The 5 rights of medication administration have been verified.      Pt tolerated tx without incident left ambulatory discharge instructions given

## 2024-02-22 RX ORDER — DICYCLOMINE HCL 20 MG
20 TABLET ORAL 4 TIMES DAILY
Qty: 60 TABLET | Refills: 0 | Status: SHIPPED | OUTPATIENT
Start: 2024-02-22 | End: 2024-03-08

## 2024-02-27 ENCOUNTER — HOSPITAL ENCOUNTER (OUTPATIENT)
Dept: INFUSION THERAPY | Age: 64
Discharge: HOME | End: 2024-02-27
Payer: COMMERCIAL

## 2024-02-27 ENCOUNTER — OFFICE VISIT (OUTPATIENT)
Dept: ONCOLOGY | Age: 64
End: 2024-02-27
Payer: COMMERCIAL

## 2024-02-27 VITALS
DIASTOLIC BLOOD PRESSURE: 74 MMHG | WEIGHT: 175.8 LBS | OXYGEN SATURATION: 98 % | HEART RATE: 81 BPM | TEMPERATURE: 98 F | HEIGHT: 70 IN | RESPIRATION RATE: 18 BRPM | SYSTOLIC BLOOD PRESSURE: 119 MMHG | BODY MASS INDEX: 25.17 KG/M2

## 2024-02-27 VITALS
SYSTOLIC BLOOD PRESSURE: 119 MMHG | BODY MASS INDEX: 25.17 KG/M2 | WEIGHT: 175.8 LBS | TEMPERATURE: 98 F | HEIGHT: 70 IN | DIASTOLIC BLOOD PRESSURE: 74 MMHG | HEART RATE: 81 BPM | OXYGEN SATURATION: 98 %

## 2024-02-27 DIAGNOSIS — C67.8 MALIGNANT NEOPLASM OF OVERLAPPING SITES OF BLADDER (HCC): Primary | ICD-10-CM

## 2024-02-27 DIAGNOSIS — C67.8 MALIGNANT NEOPLASM OF OVERLAPPING SITES OF BLADDER (HCC): ICD-10-CM

## 2024-02-27 DIAGNOSIS — Z11.59 NEED FOR HEPATITIS B SCREENING TEST: Primary | ICD-10-CM

## 2024-02-27 LAB
ALBUMIN SERPL-MCNC: 3.5 GM/DL (ref 3.4–5)
ALP BLD-CCNC: 188 IU/L (ref 40–128)
ALT SERPL-CCNC: 14 U/L (ref 10–40)
ANION GAP SERPL CALCULATED.3IONS-SCNC: 13 MMOL/L (ref 7–16)
AST SERPL-CCNC: 12 IU/L (ref 15–37)
BANDED NEUTROPHILS ABSOLUTE COUNT: 0.21 K/CU MM
BANDED NEUTROPHILS RELATIVE PERCENT: 4 % (ref 5–11)
BILIRUB SERPL-MCNC: 0.2 MG/DL (ref 0–1)
BUN SERPL-MCNC: 12 MG/DL (ref 6–23)
CALCIUM SERPL-MCNC: 9.1 MG/DL (ref 8.3–10.6)
CHLORIDE BLD-SCNC: 101 MMOL/L (ref 99–110)
CO2: 25 MMOL/L (ref 21–32)
CREAT SERPL-MCNC: 1 MG/DL (ref 0.9–1.3)
DIFFERENTIAL TYPE: ABNORMAL
EGFR, POC: >60 ML/MIN/1.73M2
EOSINOPHILS ABSOLUTE: 0.1 K/CU MM
EOSINOPHILS RELATIVE PERCENT: 1 % (ref 0–3)
GFR SERPL CREATININE-BSD FRML MDRD: >60 ML/MIN/1.73M2
GLUCOSE BLD-MCNC: 100 MG/DL (ref 70–99)
GLUCOSE SERPL-MCNC: 97 MG/DL (ref 70–99)
HCT VFR BLD CALC: 35.2 % (ref 42–52)
HEMOGLOBIN: 11.4 GM/DL (ref 13.5–18)
LYMPHOCYTES ABSOLUTE: 2.3 K/CU MM
LYMPHOCYTES RELATIVE PERCENT: 45 % (ref 24–44)
MAGNESIUM: 2.1 MG/DL (ref 1.8–2.4)
MCH RBC QN AUTO: 27.2 PG (ref 27–31)
MCHC RBC AUTO-ENTMCNC: 32.4 % (ref 32–36)
MCV RBC AUTO: 84 FL (ref 78–100)
MONOCYTES ABSOLUTE: 0.3 K/CU MM
MONOCYTES RELATIVE PERCENT: 6 % (ref 0–4)
PDW BLD-RTO: 14 % (ref 11.7–14.9)
PLATELET # BLD: 486 K/CU MM (ref 140–440)
PMV BLD AUTO: 7.8 FL (ref 7.5–11.1)
POC BUN: 11 MG/DL (ref 6–23)
POC CALCIUM: 1.2 MMOL/L (ref 1.15–1.33)
POC CHLORIDE: 105 MMOL/L (ref 99–100)
POC CO2: 28 MMOL/L (ref 21–32)
POC CREATININE: 0.9 MG/DL (ref 0.5–1.2)
POTASSIUM SERPL-SCNC: 4.1 MMOL/L (ref 3.5–5.1)
POTASSIUM SERPL-SCNC: 4.4 MMOL/L (ref 3.5–5.1)
RBC # BLD: 4.19 M/CU MM (ref 4.6–6.2)
SEGMENTED NEUTROPHILS ABSOLUTE COUNT: 2.3 K/CU MM
SEGMENTED NEUTROPHILS RELATIVE PERCENT: 44 % (ref 36–66)
SODIUM BLD-SCNC: 139 MMOL/L (ref 135–145)
SODIUM BLD-SCNC: 141 MMOL/L (ref 135–145)
SOURCE, BLOOD GAS: ABNORMAL
TOTAL PROTEIN: 6.9 GM/DL (ref 6.4–8.2)
WBC # BLD: 5.2 K/CU MM (ref 4–10.5)
WBC # BLD: ABNORMAL 10*3/UL

## 2024-02-27 PROCEDURE — 96367 TX/PROPH/DG ADDL SEQ IV INF: CPT

## 2024-02-27 PROCEDURE — 99214 OFFICE O/P EST MOD 30 MIN: CPT | Performed by: INTERNAL MEDICINE

## 2024-02-27 PROCEDURE — 96413 CHEMO IV INFUSION 1 HR: CPT

## 2024-02-27 PROCEDURE — 2580000003 HC RX 258: Performed by: INTERNAL MEDICINE

## 2024-02-27 PROCEDURE — 6360000002 HC RX W HCPCS: Performed by: INTERNAL MEDICINE

## 2024-02-27 PROCEDURE — 80053 COMPREHEN METABOLIC PANEL: CPT

## 2024-02-27 PROCEDURE — 96361 HYDRATE IV INFUSION ADD-ON: CPT

## 2024-02-27 PROCEDURE — 3078F DIAST BP <80 MM HG: CPT | Performed by: INTERNAL MEDICINE

## 2024-02-27 PROCEDURE — 85027 COMPLETE CBC AUTOMATED: CPT

## 2024-02-27 PROCEDURE — 85007 BL SMEAR W/DIFF WBC COUNT: CPT

## 2024-02-27 PROCEDURE — 3074F SYST BP LT 130 MM HG: CPT | Performed by: INTERNAL MEDICINE

## 2024-02-27 PROCEDURE — 83735 ASSAY OF MAGNESIUM: CPT

## 2024-02-27 PROCEDURE — 96417 CHEMO IV INFUS EACH ADDL SEQ: CPT

## 2024-02-27 PROCEDURE — 96375 TX/PRO/DX INJ NEW DRUG ADDON: CPT

## 2024-02-27 RX ORDER — SODIUM CHLORIDE 0.9 % (FLUSH) 0.9 %
5-40 SYRINGE (ML) INJECTION PRN
Status: DISCONTINUED | OUTPATIENT
Start: 2024-02-27 | End: 2024-02-28 | Stop reason: HOSPADM

## 2024-02-27 RX ORDER — PALONOSETRON 0.05 MG/ML
0.25 INJECTION, SOLUTION INTRAVENOUS ONCE
Status: COMPLETED | OUTPATIENT
Start: 2024-02-27 | End: 2024-02-27

## 2024-02-27 RX ORDER — SODIUM CHLORIDE 9 MG/ML
5-250 INJECTION, SOLUTION INTRAVENOUS PRN
Status: DISCONTINUED | OUTPATIENT
Start: 2024-02-27 | End: 2024-02-28 | Stop reason: HOSPADM

## 2024-02-27 RX ADMIN — CISPLATIN 67 MG: 1 INJECTION INTRAVENOUS at 12:43

## 2024-02-27 RX ADMIN — POTASSIUM CHLORIDE: 2 INJECTION, SOLUTION, CONCENTRATE INTRAVENOUS at 09:48

## 2024-02-27 RX ADMIN — DEXAMETHASONE SODIUM PHOSPHATE 12 MG: 4 INJECTION, SOLUTION INTRAMUSCULAR; INTRAVENOUS at 11:26

## 2024-02-27 RX ADMIN — POTASSIUM CHLORIDE: 2 INJECTION, SOLUTION, CONCENTRATE INTRAVENOUS at 12:41

## 2024-02-27 RX ADMIN — GEMCITABINE 1900 MG: 38 INJECTION, SOLUTION INTRAVENOUS at 11:48

## 2024-02-27 RX ADMIN — PALONOSETRON 0.25 MG: 0.25 INJECTION, SOLUTION INTRAVENOUS at 10:53

## 2024-02-27 RX ADMIN — SODIUM CHLORIDE 150 MG: 9 INJECTION, SOLUTION INTRAVENOUS at 10:53

## 2024-02-27 NOTE — PROGRESS NOTES
MA Rooming Questions  Patient: Jethro Vázquez  MRN: [id number]    Date: 2/27/2024        1. Do you have any new issues?   no         2. Do you need any refills on medications?    no    3. Have you had any imaging done since your last visit?   yes - CT SCAN 2/10    4. Have you been hospitalized or seen in the emergency room since your last visit here?   no    5. Did the patient have a depression screening completed today? No    No data recorded     PHQ-9 Given to (if applicable):               PHQ-9 Score (if applicable):                     [] Positive     []  Negative              Does question #9 need addressed (if applicable)                     [] Yes    []  No               Shannan Buckner MA      
q4hr  Rescue: Dilaudid IV 1mg q4hr.  Encouraged oral use in order to be able to discharge from hospital  Adjunct: Tylenol 650mg can be given in between opiate doses.  No convincing neuropathic pain  Pain is controlled. Awaiting for Med assist to help the cost of pain medication.  Pain to pelvis and lower back is ~ level 4. I gave refill of percocet and bentyl.  Pain is controlled. I recommend to stay active and drink enough fluid.     3. Opioid Induced Constipation  -Senna 8.6mg BID. Can be increased to 17.2mg BID if needed.  Reduce if >3 BM daily.  -Miralax daily PRN, recommend every day until BM every 24 hours     4. Anemia  -most likely r/t ACD/AI with possible functional iron deficiency based on recent indices.  Stable at baseline.  1/18/2024 creat 1.2. . WBC 11.8, Hg 12.4, plat 507.   He likely has reactive leukocytosis and thrombocytosis.  Will monitor CBC.    RTC in 3 weeks or sooner.    Recent imaging and labs were reviewed and discussed with the patient.

## 2024-02-27 NOTE — PROGRESS NOTES
Patient arrived to treatment suite for blood draw, pre-medications and chemotherapy infusion.  PIV started in right wrist area and blood drawn from site and sent to lab for processing.  Patient has no questions or concerns for the doctor at this time.  Treatment approved and given.  Patient tolerated well.  Left treatment suite ambulatory.  Discharge instructions provided.    Patient's status assessed and documented appropriately.  All labs and required results were also reviewed today.  Treatment parameters have been reviewed.  Today's treatment has been approved by the provider.  Treatment orders and medication sequencing (when applicable) was verified by 2 registered nurses.  The treatment plan was confirmed with the patient prior to administration, and the patient understands the need to report any treatment-related symptoms.    Prior to administration, when applicable, the following 8 elements of medication administration were reviewed with 2nd Registered Nurse prior to dosing: drug name, drug dose, infusion volume when prepared in a syringe, rate of administration, expiration dates and/or times, appearance and integrity of drug(s), and rate of pump for infusion.  The 5 rights of medication administration have been verified.

## 2024-03-01 NOTE — PROGRESS NOTES
Patient Name: Jethro Vázquez  Patient : 1960  Patient MRN: [id number]     Primary Oncologist: Noemi Gambino MD  Referring Provider: Mono Schaffer MD     Date of Service: 3/19/2024      Chief Complaint:   Chief Complaint   Patient presents with    Follow-up     FU visit    Patient Active Problem List:     Non-recurrent bilateral inguinal hernia without obstruction or gangrene     Right inguinal hernia     Lipoma, spermatic cord     Benign essential microscopic hematuria     Chronic left-sided low back pain without sciatica     Overweight     Primary hypertension     Pyelonephritis     Malignant neoplasm of overlapping sites of bladder      Need for hepatitis B screening test     Bladder cancer (HCC)     HPI:   Jethro Vázquez is a pleasant 64 y.o. male who was diagnosed with treatment naive urothelial carcinoma in 2023. He is s/p bilateral nephrostomy tube placement.  He has pelvic and retroperitoneal as well as paraesophageal LAD concerning for metastatic disease. Bone scan was negative in 2024. Plan was for outpatient PET however he has been hospitalized for pain.     CT A/P with contrast on 2023 was non-acute with no significant residual hydronephrosis however did redemonstrate significant LAD.      CT AP W/ Contrast 2024:  1.  Bilateral nephrostomy tubes are present as with the 01/15/2024 study. There is no new perinephric hematoma or hematoma along the course of the nephrostomy tubes.   2.  There is not a significant degree of residual hydronephrosis compared with the pre nephrostomy study of 01/10/2024.  Tiny focus of gas in the right renal collecting system may be secondary to the nephrostomy tube.   3.  There is delayed enhancement of the left kidney compared to the right side.  This could be vascular in nature with stenosis of the left renal artery and narrowing of the left renal vein.  The left renal vein is retroaortic and likely compressed with the severe

## 2024-03-04 ENCOUNTER — HOSPITAL ENCOUNTER (INPATIENT)
Age: 64
LOS: 3 days | Discharge: HOME | DRG: 418 | End: 2024-03-07
Attending: STUDENT IN AN ORGANIZED HEALTH CARE EDUCATION/TRAINING PROGRAM | Admitting: INTERNAL MEDICINE
Payer: COMMERCIAL

## 2024-03-04 ENCOUNTER — HOSPITAL ENCOUNTER (EMERGENCY)
Age: 64
Discharge: ANOTHER HOSPITAL | End: 2024-03-04
Attending: EMERGENCY MEDICINE
Payer: COMMERCIAL

## 2024-03-04 ENCOUNTER — APPOINTMENT (OUTPATIENT)
Dept: CT IMAGING | Age: 64
End: 2024-03-04
Payer: COMMERCIAL

## 2024-03-04 ENCOUNTER — APPOINTMENT (OUTPATIENT)
Dept: ULTRASOUND IMAGING | Age: 64
End: 2024-03-04
Payer: COMMERCIAL

## 2024-03-04 ENCOUNTER — ANESTHESIA EVENT (OUTPATIENT)
Dept: OPERATING ROOM | Age: 64
DRG: 418 | End: 2024-03-04
Payer: COMMERCIAL

## 2024-03-04 VITALS
WEIGHT: 175 LBS | RESPIRATION RATE: 12 BRPM | DIASTOLIC BLOOD PRESSURE: 92 MMHG | HEART RATE: 75 BPM | OXYGEN SATURATION: 100 % | BODY MASS INDEX: 25.05 KG/M2 | TEMPERATURE: 98 F | SYSTOLIC BLOOD PRESSURE: 136 MMHG | HEIGHT: 70 IN

## 2024-03-04 DIAGNOSIS — C67.8 MALIGNANT NEOPLASM OF OVERLAPPING SITES OF BLADDER (HCC): ICD-10-CM

## 2024-03-04 DIAGNOSIS — K80.00 ACUTE CALCULOUS CHOLECYSTITIS: Primary | ICD-10-CM

## 2024-03-04 DIAGNOSIS — K81.0 ACUTE CHOLECYSTITIS: ICD-10-CM

## 2024-03-04 DIAGNOSIS — D69.6 THROMBOCYTOPENIA (HCC): Primary | ICD-10-CM

## 2024-03-04 DIAGNOSIS — C67.9 MALIGNANT NEOPLASM OF URINARY BLADDER, UNSPECIFIED SITE (HCC): ICD-10-CM

## 2024-03-04 DIAGNOSIS — R10.11 ABDOMINAL PAIN, RIGHT UPPER QUADRANT: ICD-10-CM

## 2024-03-04 LAB
ALBUMIN SERPL-MCNC: 3.3 GM/DL (ref 3.4–5)
ALP BLD-CCNC: 134 IU/L (ref 40–129)
ALT SERPL-CCNC: 24 U/L (ref 10–40)
ANION GAP SERPL CALCULATED.3IONS-SCNC: 15 MMOL/L (ref 7–16)
AST SERPL-CCNC: 13 IU/L (ref 15–37)
BACTERIA: ABNORMAL /HPF
BASOPHILS ABSOLUTE: 0 K/CU MM
BASOPHILS RELATIVE PERCENT: 0.5 % (ref 0–1)
BILIRUB SERPL-MCNC: 0.2 MG/DL (ref 0–1)
BILIRUBIN URINE: NEGATIVE MG/DL
BLOOD, URINE: ABNORMAL
BUN SERPL-MCNC: 20 MG/DL (ref 6–23)
CALCIUM SERPL-MCNC: 8.5 MG/DL (ref 8.3–10.6)
CAST TYPE: ABNORMAL /HPF
CHLORIDE BLD-SCNC: 102 MMOL/L (ref 99–110)
CLARITY: ABNORMAL
CO2: 22 MMOL/L (ref 21–32)
COLOR: YELLOW
CREAT SERPL-MCNC: 0.9 MG/DL (ref 0.9–1.3)
CRYSTAL TYPE: NEGATIVE /HPF
DIFFERENTIAL TYPE: ABNORMAL
EOSINOPHILS ABSOLUTE: 0 K/CU MM
EOSINOPHILS RELATIVE PERCENT: 0.2 % (ref 0–3)
EPITHELIAL CELLS, UA: 1 /HPF
GFR SERPL CREATININE-BSD FRML MDRD: >60 ML/MIN/1.73M2
GLUCOSE SERPL-MCNC: 92 MG/DL (ref 70–99)
GLUCOSE, URINE: NEGATIVE MG/DL
HCT VFR BLD CALC: 33.1 % (ref 42–52)
HEMOGLOBIN: 10.8 GM/DL (ref 13.5–18)
IMMATURE NEUTROPHIL %: 0.5 % (ref 0–0.43)
KETONES, URINE: NEGATIVE MG/DL
LACTIC ACID, SEPSIS: 1.5 MMOL/L (ref 0.4–2)
LACTIC ACID, SEPSIS: 2.6 MMOL/L (ref 0.4–2)
LEUKOCYTE ESTERASE, URINE: ABNORMAL
LIPASE: 66 IU/L (ref 13–60)
LYMPHOCYTES ABSOLUTE: 2.7 K/CU MM
LYMPHOCYTES RELATIVE PERCENT: 61.3 % (ref 24–44)
MCH RBC QN AUTO: 26.7 PG (ref 27–31)
MCHC RBC AUTO-ENTMCNC: 32.6 % (ref 32–36)
MCV RBC AUTO: 81.9 FL (ref 78–100)
MONOCYTES ABSOLUTE: 0 K/CU MM
MONOCYTES RELATIVE PERCENT: 0.5 % (ref 0–4)
NITRITE URINE, QUANTITATIVE: NEGATIVE
PDW BLD-RTO: 13.4 % (ref 11.7–14.9)
PH, URINE: 7.5 (ref 5–8)
PLATELET # BLD: 178 K/CU MM (ref 140–440)
PMV BLD AUTO: 8.6 FL (ref 7.5–11.1)
POTASSIUM SERPL-SCNC: 4.3 MMOL/L (ref 3.5–5.1)
PROTEIN UA: ABNORMAL MG/DL
RBC # BLD: 4.04 M/CU MM (ref 4.6–6.2)
RBC URINE: 2 /HPF (ref 0–3)
SEGMENTED NEUTROPHILS ABSOLUTE COUNT: 1.6 K/CU MM
SEGMENTED NEUTROPHILS RELATIVE PERCENT: 37 % (ref 36–66)
SODIUM BLD-SCNC: 139 MMOL/L (ref 135–145)
SPECIFIC GRAVITY UA: 1.01 (ref 1–1.03)
TOTAL IMMATURE NEUTOROPHIL: 0.02 K/CU MM
TOTAL PROTEIN: 6.7 GM/DL (ref 6.4–8.2)
UROBILINOGEN, URINE: 0.2 MG/DL (ref 0.2–1)
WBC # BLD: 4.3 K/CU MM (ref 4–10.5)
WBC UA: 150 /HPF (ref 0–2)

## 2024-03-04 PROCEDURE — 87186 SC STD MICRODIL/AGAR DIL: CPT

## 2024-03-04 PROCEDURE — G0379 DIRECT REFER HOSPITAL OBSERV: HCPCS

## 2024-03-04 PROCEDURE — 96372 THER/PROPH/DIAG INJ SC/IM: CPT

## 2024-03-04 PROCEDURE — 96365 THER/PROPH/DIAG IV INF INIT: CPT

## 2024-03-04 PROCEDURE — 83690 ASSAY OF LIPASE: CPT

## 2024-03-04 PROCEDURE — 6360000002 HC RX W HCPCS: Performed by: EMERGENCY MEDICINE

## 2024-03-04 PROCEDURE — 96366 THER/PROPH/DIAG IV INF ADDON: CPT

## 2024-03-04 PROCEDURE — 2580000003 HC RX 258: Performed by: EMERGENCY MEDICINE

## 2024-03-04 PROCEDURE — 76705 ECHO EXAM OF ABDOMEN: CPT

## 2024-03-04 PROCEDURE — 85025 COMPLETE CBC W/AUTO DIFF WBC: CPT

## 2024-03-04 PROCEDURE — 99285 EMERGENCY DEPT VISIT HI MDM: CPT

## 2024-03-04 PROCEDURE — 6370000000 HC RX 637 (ALT 250 FOR IP): Performed by: INTERNAL MEDICINE

## 2024-03-04 PROCEDURE — 80053 COMPREHEN METABOLIC PANEL: CPT

## 2024-03-04 PROCEDURE — 1200000000 HC SEMI PRIVATE

## 2024-03-04 PROCEDURE — 96361 HYDRATE IV INFUSION ADD-ON: CPT

## 2024-03-04 PROCEDURE — 74177 CT ABD & PELVIS W/CONTRAST: CPT

## 2024-03-04 PROCEDURE — G0378 HOSPITAL OBSERVATION PER HR: HCPCS

## 2024-03-04 PROCEDURE — 99222 1ST HOSP IP/OBS MODERATE 55: CPT | Performed by: SURGERY

## 2024-03-04 PROCEDURE — 96376 TX/PRO/DX INJ SAME DRUG ADON: CPT

## 2024-03-04 PROCEDURE — 2500000003 HC RX 250 WO HCPCS: Performed by: SURGERY

## 2024-03-04 PROCEDURE — 87077 CULTURE AEROBIC IDENTIFY: CPT

## 2024-03-04 PROCEDURE — 87086 URINE CULTURE/COLONY COUNT: CPT

## 2024-03-04 PROCEDURE — 83605 ASSAY OF LACTIC ACID: CPT

## 2024-03-04 PROCEDURE — 6360000002 HC RX W HCPCS: Performed by: INTERNAL MEDICINE

## 2024-03-04 PROCEDURE — 96375 TX/PRO/DX INJ NEW DRUG ADDON: CPT

## 2024-03-04 PROCEDURE — 81001 URINALYSIS AUTO W/SCOPE: CPT

## 2024-03-04 PROCEDURE — 6360000004 HC RX CONTRAST MEDICATION: Performed by: EMERGENCY MEDICINE

## 2024-03-04 PROCEDURE — 2580000003 HC RX 258: Performed by: INTERNAL MEDICINE

## 2024-03-04 RX ORDER — SODIUM CHLORIDE 9 MG/ML
INJECTION, SOLUTION INTRAVENOUS PRN
Status: DISCONTINUED | OUTPATIENT
Start: 2024-03-04 | End: 2024-03-07 | Stop reason: HOSPADM

## 2024-03-04 RX ORDER — ONDANSETRON 4 MG/1
4 TABLET, ORALLY DISINTEGRATING ORAL EVERY 8 HOURS PRN
Status: DISCONTINUED | OUTPATIENT
Start: 2024-03-04 | End: 2024-03-07 | Stop reason: HOSPADM

## 2024-03-04 RX ORDER — SODIUM CHLORIDE 9 MG/ML
INJECTION, SOLUTION INTRAVENOUS CONTINUOUS
Status: DISCONTINUED | OUTPATIENT
Start: 2024-03-04 | End: 2024-03-04 | Stop reason: HOSPADM

## 2024-03-04 RX ORDER — INDOCYANINE GREEN AND WATER 25 MG
2.5 KIT INJECTION ONCE
Status: COMPLETED | OUTPATIENT
Start: 2024-03-04 | End: 2024-03-04

## 2024-03-04 RX ORDER — OXYCODONE HYDROCHLORIDE 5 MG/1
5 TABLET ORAL EVERY 4 HOURS PRN
Status: DISCONTINUED | OUTPATIENT
Start: 2024-03-04 | End: 2024-03-07 | Stop reason: HOSPADM

## 2024-03-04 RX ORDER — POLYETHYLENE GLYCOL 3350 17 G/17G
17 POWDER, FOR SOLUTION ORAL DAILY PRN
Status: DISCONTINUED | OUTPATIENT
Start: 2024-03-04 | End: 2024-03-07 | Stop reason: HOSPADM

## 2024-03-04 RX ORDER — TAMSULOSIN HYDROCHLORIDE 0.4 MG/1
0.4 CAPSULE ORAL DAILY
Status: DISCONTINUED | OUTPATIENT
Start: 2024-03-04 | End: 2024-03-07 | Stop reason: HOSPADM

## 2024-03-04 RX ORDER — ENOXAPARIN SODIUM 100 MG/ML
40 INJECTION SUBCUTANEOUS DAILY
Status: DISCONTINUED | OUTPATIENT
Start: 2024-03-04 | End: 2024-03-07 | Stop reason: HOSPADM

## 2024-03-04 RX ORDER — CARVEDILOL 6.25 MG/1
6.25 TABLET ORAL 2 TIMES DAILY WITH MEALS
Status: DISCONTINUED | OUTPATIENT
Start: 2024-03-04 | End: 2024-03-07 | Stop reason: HOSPADM

## 2024-03-04 RX ORDER — AMLODIPINE BESYLATE 10 MG/1
10 TABLET ORAL DAILY
Status: DISCONTINUED | OUTPATIENT
Start: 2024-03-05 | End: 2024-03-07 | Stop reason: HOSPADM

## 2024-03-04 RX ORDER — PROCHLORPERAZINE EDISYLATE 5 MG/ML
10 INJECTION INTRAMUSCULAR; INTRAVENOUS EVERY 6 HOURS PRN
Status: DISCONTINUED | OUTPATIENT
Start: 2024-03-04 | End: 2024-03-07 | Stop reason: HOSPADM

## 2024-03-04 RX ORDER — 0.9 % SODIUM CHLORIDE 0.9 %
1000 INTRAVENOUS SOLUTION INTRAVENOUS ONCE
Status: COMPLETED | OUTPATIENT
Start: 2024-03-04 | End: 2024-03-04

## 2024-03-04 RX ORDER — ACETAMINOPHEN 325 MG/1
650 TABLET ORAL EVERY 6 HOURS PRN
Status: DISCONTINUED | OUTPATIENT
Start: 2024-03-04 | End: 2024-03-07 | Stop reason: HOSPADM

## 2024-03-04 RX ORDER — ONDANSETRON 2 MG/ML
4 INJECTION INTRAMUSCULAR; INTRAVENOUS EVERY 6 HOURS PRN
Status: DISCONTINUED | OUTPATIENT
Start: 2024-03-04 | End: 2024-03-07 | Stop reason: HOSPADM

## 2024-03-04 RX ORDER — SODIUM CHLORIDE 0.9 % (FLUSH) 0.9 %
5-40 SYRINGE (ML) INJECTION EVERY 12 HOURS SCHEDULED
Status: DISCONTINUED | OUTPATIENT
Start: 2024-03-04 | End: 2024-03-07 | Stop reason: HOSPADM

## 2024-03-04 RX ORDER — ACETAMINOPHEN 650 MG/1
650 SUPPOSITORY RECTAL EVERY 6 HOURS PRN
Status: DISCONTINUED | OUTPATIENT
Start: 2024-03-04 | End: 2024-03-07 | Stop reason: HOSPADM

## 2024-03-04 RX ORDER — ONDANSETRON 2 MG/ML
4 INJECTION INTRAMUSCULAR; INTRAVENOUS EVERY 30 MIN PRN
Status: DISCONTINUED | OUTPATIENT
Start: 2024-03-04 | End: 2024-03-04 | Stop reason: HOSPADM

## 2024-03-04 RX ORDER — KETOROLAC TROMETHAMINE 15 MG/ML
15 INJECTION, SOLUTION INTRAMUSCULAR; INTRAVENOUS ONCE
Status: COMPLETED | OUTPATIENT
Start: 2024-03-04 | End: 2024-03-04

## 2024-03-04 RX ORDER — SODIUM CHLORIDE, SODIUM LACTATE, POTASSIUM CHLORIDE, CALCIUM CHLORIDE 600; 310; 30; 20 MG/100ML; MG/100ML; MG/100ML; MG/100ML
INJECTION, SOLUTION INTRAVENOUS CONTINUOUS
Status: DISCONTINUED | OUTPATIENT
Start: 2024-03-04 | End: 2024-03-07 | Stop reason: HOSPADM

## 2024-03-04 RX ORDER — SODIUM CHLORIDE 0.9 % (FLUSH) 0.9 %
5-40 SYRINGE (ML) INJECTION PRN
Status: DISCONTINUED | OUTPATIENT
Start: 2024-03-04 | End: 2024-03-07 | Stop reason: HOSPADM

## 2024-03-04 RX ADMIN — TAMSULOSIN HYDROCHLORIDE 0.4 MG: 0.4 CAPSULE ORAL at 10:59

## 2024-03-04 RX ADMIN — SODIUM CHLORIDE, PRESERVATIVE FREE 10 ML: 5 INJECTION INTRAVENOUS at 22:57

## 2024-03-04 RX ADMIN — ONDANSETRON 4 MG: 4 TABLET, ORALLY DISINTEGRATING ORAL at 18:33

## 2024-03-04 RX ADMIN — HYDROMORPHONE HYDROCHLORIDE 1 MG: 1 INJECTION, SOLUTION INTRAMUSCULAR; INTRAVENOUS; SUBCUTANEOUS at 08:02

## 2024-03-04 RX ADMIN — OXYCODONE HYDROCHLORIDE 5 MG: 5 TABLET ORAL at 18:32

## 2024-03-04 RX ADMIN — IOPAMIDOL 100 ML: 755 INJECTION, SOLUTION INTRAVENOUS at 05:24

## 2024-03-04 RX ADMIN — SODIUM CHLORIDE, POTASSIUM CHLORIDE, SODIUM LACTATE AND CALCIUM CHLORIDE: 600; 310; 30; 20 INJECTION, SOLUTION INTRAVENOUS at 11:40

## 2024-03-04 RX ADMIN — SODIUM CHLORIDE: 9 INJECTION, SOLUTION INTRAVENOUS at 10:57

## 2024-03-04 RX ADMIN — SODIUM CHLORIDE: 9 INJECTION, SOLUTION INTRAVENOUS at 04:56

## 2024-03-04 RX ADMIN — INDOCYANINE GREEN AND WATER 2.5 MG: KIT at 22:56

## 2024-03-04 RX ADMIN — ONDANSETRON 4 MG: 2 INJECTION INTRAMUSCULAR; INTRAVENOUS at 04:12

## 2024-03-04 RX ADMIN — SODIUM CHLORIDE 1000 ML: 9 INJECTION, SOLUTION INTRAVENOUS at 04:57

## 2024-03-04 RX ADMIN — SODIUM CHLORIDE, PRESERVATIVE FREE 10 ML: 5 INJECTION INTRAVENOUS at 11:00

## 2024-03-04 RX ADMIN — KETOROLAC TROMETHAMINE 15 MG: 15 INJECTION, SOLUTION INTRAMUSCULAR; INTRAVENOUS at 04:12

## 2024-03-04 RX ADMIN — HYDROMORPHONE HYDROCHLORIDE 1 MG: 1 INJECTION, SOLUTION INTRAMUSCULAR; INTRAVENOUS; SUBCUTANEOUS at 04:13

## 2024-03-04 RX ADMIN — PROCHLORPERAZINE EDISYLATE 10 MG: 5 INJECTION INTRAMUSCULAR; INTRAVENOUS at 10:59

## 2024-03-04 RX ADMIN — SODIUM CHLORIDE 3000 MG: 900 INJECTION INTRAVENOUS at 06:43

## 2024-03-04 RX ADMIN — PIPERACILLIN AND TAZOBACTAM 3375 MG: 3; .375 INJECTION, POWDER, FOR SOLUTION INTRAVENOUS at 23:05

## 2024-03-04 RX ADMIN — CARVEDILOL 6.25 MG: 6.25 TABLET, FILM COATED ORAL at 18:13

## 2024-03-04 RX ADMIN — ENOXAPARIN SODIUM 40 MG: 100 INJECTION SUBCUTANEOUS at 11:00

## 2024-03-04 RX ADMIN — PIPERACILLIN AND TAZOBACTAM 4500 MG: 4; .5 INJECTION, POWDER, FOR SOLUTION INTRAVENOUS at 10:58

## 2024-03-04 ASSESSMENT — PAIN - FUNCTIONAL ASSESSMENT
PAIN_FUNCTIONAL_ASSESSMENT: ACTIVITIES ARE NOT PREVENTED
PAIN_FUNCTIONAL_ASSESSMENT: 0-10
PAIN_FUNCTIONAL_ASSESSMENT: ACTIVITIES ARE NOT PREVENTED

## 2024-03-04 ASSESSMENT — PAIN SCALES - GENERAL
PAINLEVEL_OUTOF10: 7
PAINLEVEL_OUTOF10: 5
PAINLEVEL_OUTOF10: 7
PAINLEVEL_OUTOF10: 7

## 2024-03-04 ASSESSMENT — PAIN DESCRIPTION - DESCRIPTORS
DESCRIPTORS: SQUEEZING
DESCRIPTORS: SHARP;SQUEEZING
DESCRIPTORS: SQUEEZING;TIGHTNESS
DESCRIPTORS: SQUEEZING;SHARP

## 2024-03-04 ASSESSMENT — PAIN DESCRIPTION - PAIN TYPE
TYPE: ACUTE PAIN
TYPE: ACUTE PAIN

## 2024-03-04 ASSESSMENT — PAIN DESCRIPTION - ORIENTATION
ORIENTATION: RIGHT;UPPER
ORIENTATION: RIGHT;UPPER
ORIENTATION: RIGHT
ORIENTATION: RIGHT;UPPER

## 2024-03-04 ASSESSMENT — PAIN DESCRIPTION - LOCATION
LOCATION: ABDOMEN

## 2024-03-04 ASSESSMENT — LIFESTYLE VARIABLES: SMOKING_STATUS: 1

## 2024-03-04 ASSESSMENT — ENCOUNTER SYMPTOMS: ABDOMINAL PAIN: 1

## 2024-03-04 ASSESSMENT — PAIN DESCRIPTION - FREQUENCY
FREQUENCY: INTERMITTENT
FREQUENCY: CONTINUOUS

## 2024-03-04 NOTE — ED NOTES
Report given to Marcelina at [facility]. Made aware of transport being here at this time.

## 2024-03-04 NOTE — ED PROVIDER NOTES
Emergency Department Encounter  Location: [facility] EMERGENCY DEPARTMENT    Patient: Jethro Vázquez  MRN: [id number]  : 1960  Date of evaluation: 3/4/2024  ED Provider: Ho Holloway DO, FACEP    Chief Complaint:    Abdominal Pain (Pt states that he is having right side pain. Pt states that his pain is under his right rib. Pt states that he woke up about 0300 in pain.)    [place]:  Jethro Vázquez is a 64 y.o. male that presents to the emergency department by squad with sudden onset of pain that awoke him around 3 AM.  The patient is having pain in his right flank.  Patient has a history of bladder cancer with urinary obstruction.  He has an indwelling urostomy tube on the right side and is well on the left side.  His pain is localizing in his right flank as he describes it \"under my rib\".  He has vomited once prior to arrival.  The patient is having severe pain that is sharp localizing in his right flank.      ROS - see HPI, below listed is current ROS at time of my eval:  At least 10 systems reviewed and otherwise acutely negative except as in the [place].  General:  No fevers, no chills, no weakness  Eyes:  No recent vison changes, no discharge  ENT:  No sore throat, no nasal congestion, no hearing changes  Cardiovascular:  No chest pain, no palpitations  Respiratory:  No shortness of breath, no cough, no wheezing  Gastrointestinal: Positive for right flank pain with vomiting  musculoskeletal:  No muscle pain, no joint pain  Skin:  No rash, no pruritis, no easy bruising  Neurologic:  No speech problems, no headache, no extremity numbness, no extremity tingling, no extremity weakness  Psychiatric:  No anxiety  Genitourinary:  No dysuria, no hematuria  Endocrine:  No unexpected weight gain, no unexpected weight loss  Extremities:  no edema, no pain    Past Medical History:   Diagnosis Date    Acid reflux     History of kidney stones     \"had kidney stone 2017- passed it\"    Hypertension     \"on

## 2024-03-04 NOTE — PROGRESS NOTES
IR Procedure at [facility]: Inpatient as of 3/4/2024.    NPO at Midnight     (Paracentesis, Thoracentesis, Thyroid Bx and Injections may have a light breakfast)  2.   Follow your directions as prescribed by the doctor for your procedure and medications.  3.   Consult your provider as to when to stop blood thinner  4.   Do not take any pain medication within 6 hours of your procedure  5.   Do not drink any alcoholic beverages or use any street drugs 24 hours before procedure.  6.   Please wear simple, loose fitting clothing to the hospital.  Do not bring valuables (money,             credit cards, checkbooks, etc.)     7.   If you  have a Living Will and Durable Power of  for Healthcare, please bring in a copy.  8.   Please bring picture ID,  insurance card, paperwork from the doctors office            (H & P, Consent,  & card for implantable devices).  9.   Report to the information desk on the ground floor.  10. Take a shower the night before or morning of your procedure, do not apply any lotion, oil or powder.  11. If you are going to be sedated for the procedure, you will need a responsible adult to drive you home.

## 2024-03-04 NOTE — H&P
V2.0  History and Physical      Name:  Jethro Vázquez /Age/Sex: 1960  (64 y.o. male)   MRN & CSN:  [id number] & [id number] Encounter Date/Time: 3/4/2024 10:01 AM EST   Location:  1111/1111-A PCP: Mono Schaffer MD       Hospital Day: 1    Assessment and Plan:   Jethro Vázquez is a 64 y.o. male who presents with Acute cholecystitis    Hospital Problems             Last Modified POA    * (Principal) Acute cholecystitis 3/4/2024 Yes       Plan:  Acute Cholecystitis: Plan for OR tomorrow, NPO at MN and ok for clears today, continue gentle IVF, IV abx, pain and nausea control.  Lactic Acidosis: Secondary to above, resolved  UTI?: Continue IV abx and monitor; Urine culture pending  History of Invasive High-Grade Urothelial Carcinoma: s/p bladder resection in 2023, has bilateral nephrostomy tubes, f/u Urology/Onc as OP  Hx HTN: Continue Coreg and Amlodipine  Hx BPH: Continue Tamsulosin    I had a conversation with the patient while family was present regarding the patient's code status and patient does want chest compressions but does not want to be intubated.  We did discuss that compression were likely futile without adequate oxygenation.  Code status updated to limited code.    Disposition:   Current Living situation: Home  Expected Disposition: Home  Estimated D/C: 1-2 days    Diet Diet NPO   DVT Prophylaxis [x] Lovenox, []  Heparin, [] SCDs, [] Ambulation,  [] Eliquis, [] Xarelto, [] Coumadin   Code Status Limited   Surrogate Decision Maker/ POA      Personally reviewed Lab Studies and Imaging     Discussed management of the case with overnight physician who accepted patient for acute cholecystitis and I agree    Imaging that was interpreted personally includes CT A/P with some stranding around the gallbladder, noted stone, nephrostomy tubes appeared in appropriate position.        History from:     patient    History of Present Illness:     Chief Complaint: Right Upper Quadrant Abdominal

## 2024-03-04 NOTE — PROGRESS NOTES
Pt added to OR schedule for 3/5/24 at 7:30 AM for cholecystectomy.    Full consult to follow once pt arrived.    Rufus Mendes II, MD

## 2024-03-04 NOTE — CONSULTS
normal.   Abdominal:      Palpations: Abdomen is soft.      Tenderness: There is abdominal tenderness.   Musculoskeletal:         General: No swelling.   Skin:     General: Skin is warm.   Neurological:      General: No focal deficit present.      Mental Status: He is alert.           DATA:    CBC:   Lab Results   Component Value Date/Time    WBC 4.3 03/04/2024 03:49 AM    RBC 4.04 03/04/2024 03:49 AM    HGB 10.8 03/04/2024 03:49 AM    HCT 33.1 03/04/2024 03:49 AM    MCV 81.9 03/04/2024 03:49 AM    MCH 26.7 03/04/2024 03:49 AM    MCHC 32.6 03/04/2024 03:49 AM    RDW 13.4 03/04/2024 03:49 AM     03/04/2024 03:49 AM    MPV 8.6 03/04/2024 03:49 AM     CBC with Differential:    Lab Results   Component Value Date/Time    WBC 4.3 03/04/2024 03:49 AM    RBC 4.04 03/04/2024 03:49 AM    HGB 10.8 03/04/2024 03:49 AM    HCT 33.1 03/04/2024 03:49 AM     03/04/2024 03:49 AM    MCV 81.9 03/04/2024 03:49 AM    MCH 26.7 03/04/2024 03:49 AM    MCHC 32.6 03/04/2024 03:49 AM    RDW 13.4 03/04/2024 03:49 AM    SEGSPCT 37.0 03/04/2024 03:49 AM    BANDSPCT 4 02/27/2024 08:36 AM    LYMPHOPCT 61.3 03/04/2024 03:49 AM    MONOPCT 0.5 03/04/2024 03:49 AM    EOSPCT 2.2 02/08/2011 03:20 PM    BASOPCT 0.5 03/04/2024 03:49 AM    MONOSABS 0.0 03/04/2024 03:49 AM    LYMPHSABS 2.7 03/04/2024 03:49 AM    EOSABS 0.0 03/04/2024 03:49 AM    BASOSABS 0.0 03/04/2024 03:49 AM    DIFFTYPE AUTOMATED DIFFERENTIAL 03/04/2024 03:49 AM     WBC:    Lab Results   Component Value Date/Time    WBC 4.3 03/04/2024 03:49 AM     Platelets:    Lab Results   Component Value Date/Time     03/04/2024 03:49 AM     Hemoglobin/Hematocrit:    Lab Results   Component Value Date/Time    HGB 10.8 03/04/2024 03:49 AM    HCT 33.1 03/04/2024 03:49 AM     CMP:    Lab Results   Component Value Date/Time     03/04/2024 03:49 AM    K 4.3 03/04/2024 03:49 AM     03/04/2024 03:49 AM    CO2 22 03/04/2024 03:49 AM    BUN 20 03/04/2024 03:49 AM    CREATININE

## 2024-03-04 NOTE — CARE COORDINATION
03/04/24 1440   Service Assessment   Patient Orientation Alert and Oriented   Cognition Alert   History Provided By Medical Record;Patient   Primary Caregiver Self   Support Systems Spouse/Significant Other;Children   Patient's Healthcare Decision Maker is: Named in Scanned ACP Document   PCP Verified by CM Yes   Last Visit to PCP Within last year   Prior Functional Level Assistance with the following:;Housework;Shopping;Cooking   Current Functional Level Assistance with the following:;Housework;Shopping;Cooking   Can patient return to prior living arrangement Yes   Ability to make needs known: Good   Family able to assist with home care needs: Yes   Would you like for me to discuss the discharge plan with any other family members/significant others, and if so, who? Yes   Financial Resources Other (Comment)   Community Resources ECF/Home Care

## 2024-03-05 ENCOUNTER — ANESTHESIA (OUTPATIENT)
Dept: OPERATING ROOM | Age: 64
DRG: 418 | End: 2024-03-05
Payer: COMMERCIAL

## 2024-03-05 ENCOUNTER — APPOINTMENT (OUTPATIENT)
Dept: GENERAL RADIOLOGY | Age: 64
DRG: 418 | End: 2024-03-05
Attending: STUDENT IN AN ORGANIZED HEALTH CARE EDUCATION/TRAINING PROGRAM
Payer: COMMERCIAL

## 2024-03-05 PROBLEM — Z78.9 POOR INTRAVENOUS ACCESS: Status: ACTIVE | Noted: 2024-03-05

## 2024-03-05 PROBLEM — K66.0 ABDOMINAL ADHESIONS: Status: ACTIVE | Noted: 2024-03-05

## 2024-03-05 LAB
ANION GAP SERPL CALCULATED.3IONS-SCNC: 9 MMOL/L (ref 7–16)
BASOPHILS ABSOLUTE: 0 K/CU MM
BASOPHILS RELATIVE PERCENT: 0.5 % (ref 0–1)
BUN SERPL-MCNC: 10 MG/DL (ref 6–23)
CALCIUM SERPL-MCNC: 8.7 MG/DL (ref 8.3–10.6)
CHLORIDE BLD-SCNC: 102 MMOL/L (ref 99–110)
CO2: 27 MMOL/L (ref 21–32)
CREAT SERPL-MCNC: 0.8 MG/DL (ref 0.9–1.3)
DIFFERENTIAL TYPE: ABNORMAL
EOSINOPHILS ABSOLUTE: 0 K/CU MM
EOSINOPHILS RELATIVE PERCENT: 0.5 % (ref 0–3)
GFR SERPL CREATININE-BSD FRML MDRD: >60 ML/MIN/1.73M2
GLUCOSE SERPL-MCNC: 85 MG/DL (ref 70–99)
HCT VFR BLD CALC: 33.5 % (ref 42–52)
HEMOGLOBIN: 10.5 GM/DL (ref 13.5–18)
IMMATURE NEUTROPHIL %: 0.5 % (ref 0–0.43)
LYMPHOCYTES ABSOLUTE: 1.6 K/CU MM
LYMPHOCYTES RELATIVE PERCENT: 74.2 % (ref 24–44)
MCH RBC QN AUTO: 26.2 PG (ref 27–31)
MCHC RBC AUTO-ENTMCNC: 31.3 % (ref 32–36)
MCV RBC AUTO: 83.5 FL (ref 78–100)
MONOCYTES ABSOLUTE: 0 K/CU MM
MONOCYTES RELATIVE PERCENT: 1.8 % (ref 0–4)
NUCLEATED RBC %: 0 %
PDW BLD-RTO: 13.2 % (ref 11.7–14.9)
PLATELET # BLD: 123 K/CU MM (ref 140–440)
PMV BLD AUTO: 9 FL (ref 7.5–11.1)
POTASSIUM SERPL-SCNC: 4.3 MMOL/L (ref 3.5–5.1)
RBC # BLD: 4.01 M/CU MM (ref 4.6–6.2)
SEGMENTED NEUTROPHILS ABSOLUTE COUNT: 0.5 K/CU MM
SEGMENTED NEUTROPHILS RELATIVE PERCENT: 22.5 % (ref 36–66)
SODIUM BLD-SCNC: 138 MMOL/L (ref 135–145)
TOTAL IMMATURE NEUTOROPHIL: 0.01 K/CU MM
TOTAL NUCLEATED RBC: 0 K/CU MM
WBC # BLD: 2.2 K/CU MM (ref 4–10.5)

## 2024-03-05 PROCEDURE — 7100000000 HC PACU RECOVERY - FIRST 15 MIN: Performed by: SURGERY

## 2024-03-05 PROCEDURE — 36415 COLL VENOUS BLD VENIPUNCTURE: CPT

## 2024-03-05 PROCEDURE — 3700000001 HC ADD 15 MINUTES (ANESTHESIA): Performed by: SURGERY

## 2024-03-05 PROCEDURE — C1889 IMPLANT/INSERT DEVICE, NOC: HCPCS

## 2024-03-05 PROCEDURE — 6370000000 HC RX 637 (ALT 250 FOR IP): Performed by: INTERNAL MEDICINE

## 2024-03-05 PROCEDURE — 2709999900 HC NON-CHARGEABLE SUPPLY: Performed by: SURGERY

## 2024-03-05 PROCEDURE — S2900 ROBOTIC SURGICAL SYSTEM: HCPCS | Performed by: SURGERY

## 2024-03-05 PROCEDURE — 3700000000 HC ANESTHESIA ATTENDED CARE: Performed by: SURGERY

## 2024-03-05 PROCEDURE — 80048 BASIC METABOLIC PNL TOTAL CA: CPT

## 2024-03-05 PROCEDURE — 71045 X-RAY EXAM CHEST 1 VIEW: CPT

## 2024-03-05 PROCEDURE — 2580000003 HC RX 258: Performed by: INTERNAL MEDICINE

## 2024-03-05 PROCEDURE — 36556 INSERT NON-TUNNEL CV CATH: CPT | Performed by: SURGERY

## 2024-03-05 PROCEDURE — 6360000002 HC RX W HCPCS: Performed by: INTERNAL MEDICINE

## 2024-03-05 PROCEDURE — 0DNW4ZZ RELEASE PERITONEUM, PERCUTANEOUS ENDOSCOPIC APPROACH: ICD-10-PCS | Performed by: SURGERY

## 2024-03-05 PROCEDURE — 3600000019 HC SURGERY ROBOT ADDTL 15MIN: Performed by: SURGERY

## 2024-03-05 PROCEDURE — 2500000003 HC RX 250 WO HCPCS

## 2024-03-05 PROCEDURE — 7100000001 HC PACU RECOVERY - ADDTL 15 MIN: Performed by: SURGERY

## 2024-03-05 PROCEDURE — 6360000002 HC RX W HCPCS: Performed by: SURGERY

## 2024-03-05 PROCEDURE — 6360000002 HC RX W HCPCS: Performed by: ANESTHESIOLOGY

## 2024-03-05 PROCEDURE — 2580000003 HC RX 258: Performed by: SURGERY

## 2024-03-05 PROCEDURE — 1200000000 HC SEMI PRIVATE

## 2024-03-05 PROCEDURE — 6360000002 HC RX W HCPCS

## 2024-03-05 PROCEDURE — G0378 HOSPITAL OBSERVATION PER HR: HCPCS

## 2024-03-05 PROCEDURE — 3600000009 HC SURGERY ROBOT BASE: Performed by: SURGERY

## 2024-03-05 PROCEDURE — C1751 CATH, INF, PER/CENT/MIDLINE: HCPCS | Performed by: SURGERY

## 2024-03-05 PROCEDURE — 2720000010 HC SURG SUPPLY STERILE: Performed by: SURGERY

## 2024-03-05 PROCEDURE — 85025 COMPLETE CBC W/AUTO DIFF WBC: CPT

## 2024-03-05 PROCEDURE — C1889 IMPLANT/INSERT DEVICE, NOC: HCPCS | Performed by: SURGERY

## 2024-03-05 PROCEDURE — 94761 N-INVAS EAR/PLS OXIMETRY MLT: CPT

## 2024-03-05 PROCEDURE — 0FT44ZZ RESECTION OF GALLBLADDER, PERCUTANEOUS ENDOSCOPIC APPROACH: ICD-10-PCS | Performed by: SURGERY

## 2024-03-05 PROCEDURE — 8E0W4CZ ROBOTIC ASSISTED PROCEDURE OF TRUNK REGION, PERCUTANEOUS ENDOSCOPIC APPROACH: ICD-10-PCS | Performed by: SURGERY

## 2024-03-05 PROCEDURE — 88304 TISSUE EXAM BY PATHOLOGIST: CPT | Performed by: PATHOLOGY

## 2024-03-05 PROCEDURE — 47562 LAPAROSCOPIC CHOLECYSTECTOMY: CPT | Performed by: SURGERY

## 2024-03-05 PROCEDURE — 2780000010 HC IMPLANT OTHER: Performed by: SURGERY

## 2024-03-05 PROCEDURE — P9045 ALBUMIN (HUMAN), 5%, 250 ML: HCPCS

## 2024-03-05 PROCEDURE — 02HV33Z INSERTION OF INFUSION DEVICE INTO SUPERIOR VENA CAVA, PERCUTANEOUS APPROACH: ICD-10-PCS | Performed by: SURGERY

## 2024-03-05 DEVICE — CLIP INT L POLYMER LOK LIG HEM O LOK (6EA/PK): Type: IMPLANTABLE DEVICE | Site: BILE DUCT | Status: FUNCTIONAL

## 2024-03-05 RX ORDER — BUPIVACAINE HYDROCHLORIDE 5 MG/ML
INJECTION, SOLUTION EPIDURAL; INTRACAUDAL
Status: COMPLETED | OUTPATIENT
Start: 2024-03-05 | End: 2024-03-05

## 2024-03-05 RX ORDER — ROCURONIUM BROMIDE 10 MG/ML
INJECTION, SOLUTION INTRAVENOUS PRN
Status: DISCONTINUED | OUTPATIENT
Start: 2024-03-05 | End: 2024-03-05 | Stop reason: SDUPTHER

## 2024-03-05 RX ORDER — EPHEDRINE SULFATE 50 MG/ML
INJECTION INTRAVENOUS PRN
Status: DISCONTINUED | OUTPATIENT
Start: 2024-03-05 | End: 2024-03-05 | Stop reason: SDUPTHER

## 2024-03-05 RX ORDER — ONDANSETRON 2 MG/ML
4 INJECTION INTRAMUSCULAR; INTRAVENOUS
Status: DISCONTINUED | OUTPATIENT
Start: 2024-03-05 | End: 2024-03-05 | Stop reason: HOSPADM

## 2024-03-05 RX ORDER — SODIUM CHLORIDE 9 MG/ML
INJECTION, SOLUTION INTRAVENOUS PRN
Status: DISCONTINUED | OUTPATIENT
Start: 2024-03-05 | End: 2024-03-05 | Stop reason: HOSPADM

## 2024-03-05 RX ORDER — FENTANYL CITRATE 50 UG/ML
INJECTION, SOLUTION INTRAMUSCULAR; INTRAVENOUS PRN
Status: DISCONTINUED | OUTPATIENT
Start: 2024-03-05 | End: 2024-03-05 | Stop reason: SDUPTHER

## 2024-03-05 RX ORDER — SODIUM CHLORIDE 0.9 % (FLUSH) 0.9 %
5-40 SYRINGE (ML) INJECTION EVERY 12 HOURS SCHEDULED
Status: DISCONTINUED | OUTPATIENT
Start: 2024-03-05 | End: 2024-03-05 | Stop reason: HOSPADM

## 2024-03-05 RX ORDER — FENTANYL CITRATE 50 UG/ML
25 INJECTION, SOLUTION INTRAMUSCULAR; INTRAVENOUS EVERY 5 MIN PRN
Status: DISCONTINUED | OUTPATIENT
Start: 2024-03-05 | End: 2024-03-05 | Stop reason: HOSPADM

## 2024-03-05 RX ORDER — SODIUM CHLORIDE 0.9 % (FLUSH) 0.9 %
5-40 SYRINGE (ML) INJECTION PRN
Status: DISCONTINUED | OUTPATIENT
Start: 2024-03-05 | End: 2024-03-05 | Stop reason: HOSPADM

## 2024-03-05 RX ORDER — OXYCODONE HYDROCHLORIDE 5 MG/1
10 TABLET ORAL PRN
Status: DISCONTINUED | OUTPATIENT
Start: 2024-03-05 | End: 2024-03-05 | Stop reason: HOSPADM

## 2024-03-05 RX ORDER — PROPOFOL 10 MG/ML
INJECTION, EMULSION INTRAVENOUS PRN
Status: DISCONTINUED | OUTPATIENT
Start: 2024-03-05 | End: 2024-03-05 | Stop reason: SDUPTHER

## 2024-03-05 RX ORDER — DEXAMETHASONE SODIUM PHOSPHATE 4 MG/ML
INJECTION, SOLUTION INTRA-ARTICULAR; INTRALESIONAL; INTRAMUSCULAR; INTRAVENOUS; SOFT TISSUE PRN
Status: DISCONTINUED | OUTPATIENT
Start: 2024-03-05 | End: 2024-03-05 | Stop reason: SDUPTHER

## 2024-03-05 RX ORDER — OXYCODONE HYDROCHLORIDE 5 MG/1
5 TABLET ORAL PRN
Status: DISCONTINUED | OUTPATIENT
Start: 2024-03-05 | End: 2024-03-05 | Stop reason: HOSPADM

## 2024-03-05 RX ORDER — ONDANSETRON 2 MG/ML
INJECTION INTRAMUSCULAR; INTRAVENOUS PRN
Status: DISCONTINUED | OUTPATIENT
Start: 2024-03-05 | End: 2024-03-05 | Stop reason: SDUPTHER

## 2024-03-05 RX ORDER — ALBUMIN, HUMAN INJ 5% 5 %
SOLUTION INTRAVENOUS PRN
Status: DISCONTINUED | OUTPATIENT
Start: 2024-03-05 | End: 2024-03-05 | Stop reason: SDUPTHER

## 2024-03-05 RX ORDER — DROPERIDOL 2.5 MG/ML
0.62 INJECTION, SOLUTION INTRAMUSCULAR; INTRAVENOUS
Status: DISCONTINUED | OUTPATIENT
Start: 2024-03-05 | End: 2024-03-05 | Stop reason: HOSPADM

## 2024-03-05 RX ADMIN — PROPOFOL 120 MG: 10 INJECTION, EMULSION INTRAVENOUS at 08:05

## 2024-03-05 RX ADMIN — SODIUM CHLORIDE: 9 INJECTION, SOLUTION INTRAVENOUS at 15:09

## 2024-03-05 RX ADMIN — AMLODIPINE BESYLATE 10 MG: 10 TABLET ORAL at 15:14

## 2024-03-05 RX ADMIN — FENTANYL CITRATE 100 MCG: 50 INJECTION, SOLUTION INTRAMUSCULAR; INTRAVENOUS at 08:58

## 2024-03-05 RX ADMIN — ONDANSETRON 4 MG: 2 INJECTION INTRAMUSCULAR; INTRAVENOUS at 08:48

## 2024-03-05 RX ADMIN — DEXAMETHASONE SODIUM PHOSPHATE 4 MG: 4 INJECTION, SOLUTION INTRAMUSCULAR; INTRAVENOUS at 08:48

## 2024-03-05 RX ADMIN — CEFAZOLIN 2000 MG: 2 INJECTION, POWDER, FOR SOLUTION INTRAMUSCULAR; INTRAVENOUS at 08:42

## 2024-03-05 RX ADMIN — EPHEDRINE SULFATE 15 MG: 50 INJECTION INTRAVENOUS at 08:48

## 2024-03-05 RX ADMIN — EPHEDRINE SULFATE 25 MG: 50 INJECTION INTRAVENOUS at 08:58

## 2024-03-05 RX ADMIN — SUGAMMADEX 200 MG: 100 INJECTION, SOLUTION INTRAVENOUS at 09:50

## 2024-03-05 RX ADMIN — SODIUM CHLORIDE, POTASSIUM CHLORIDE, SODIUM LACTATE AND CALCIUM CHLORIDE: 600; 310; 30; 20 INJECTION, SOLUTION INTRAVENOUS at 19:45

## 2024-03-05 RX ADMIN — SODIUM CHLORIDE, PRESERVATIVE FREE 10 ML: 5 INJECTION INTRAVENOUS at 15:18

## 2024-03-05 RX ADMIN — SODIUM CHLORIDE, PRESERVATIVE FREE 10 ML: 5 INJECTION INTRAVENOUS at 12:15

## 2024-03-05 RX ADMIN — ROCURONIUM BROMIDE 50 MG: 10 INJECTION INTRAVENOUS at 08:05

## 2024-03-05 RX ADMIN — TAMSULOSIN HYDROCHLORIDE 0.4 MG: 0.4 CAPSULE ORAL at 15:15

## 2024-03-05 RX ADMIN — OXYCODONE HYDROCHLORIDE 5 MG: 5 TABLET ORAL at 23:04

## 2024-03-05 RX ADMIN — OXYCODONE HYDROCHLORIDE 5 MG: 5 TABLET ORAL at 13:55

## 2024-03-05 RX ADMIN — PIPERACILLIN AND TAZOBACTAM 3375 MG: 3; .375 INJECTION, POWDER, FOR SOLUTION INTRAVENOUS at 15:10

## 2024-03-05 RX ADMIN — FENTANYL CITRATE 25 MCG: 50 INJECTION INTRAMUSCULAR; INTRAVENOUS at 10:46

## 2024-03-05 RX ADMIN — HYDROMORPHONE HYDROCHLORIDE 0.5 MG: 1 INJECTION, SOLUTION INTRAMUSCULAR; INTRAVENOUS; SUBCUTANEOUS at 12:14

## 2024-03-05 RX ADMIN — FENTANYL CITRATE 25 MCG: 50 INJECTION INTRAMUSCULAR; INTRAVENOUS at 10:28

## 2024-03-05 RX ADMIN — SODIUM CHLORIDE, PRESERVATIVE FREE 10 ML: 5 INJECTION INTRAVENOUS at 19:45

## 2024-03-05 RX ADMIN — HYDROMORPHONE HYDROCHLORIDE 0.5 MG: 1 INJECTION, SOLUTION INTRAMUSCULAR; INTRAVENOUS; SUBCUTANEOUS at 17:25

## 2024-03-05 RX ADMIN — ONDANSETRON 4 MG: 4 TABLET, ORALLY DISINTEGRATING ORAL at 15:14

## 2024-03-05 RX ADMIN — CARVEDILOL 6.25 MG: 6.25 TABLET, FILM COATED ORAL at 17:25

## 2024-03-05 RX ADMIN — ROCURONIUM BROMIDE 50 MG: 10 INJECTION INTRAVENOUS at 08:42

## 2024-03-05 RX ADMIN — PIPERACILLIN AND TAZOBACTAM 3375 MG: 3; .375 INJECTION, POWDER, FOR SOLUTION INTRAVENOUS at 08:42

## 2024-03-05 RX ADMIN — ALBUMIN (HUMAN) 12.5 G: 12.5 INJECTION, SOLUTION INTRAVENOUS at 09:09

## 2024-03-05 RX ADMIN — FENTANYL CITRATE 25 MCG: 50 INJECTION INTRAMUSCULAR; INTRAVENOUS at 10:17

## 2024-03-05 RX ADMIN — EPHEDRINE SULFATE 10 MG: 50 INJECTION INTRAVENOUS at 08:45

## 2024-03-05 RX ADMIN — PROCHLORPERAZINE EDISYLATE 10 MG: 5 INJECTION INTRAMUSCULAR; INTRAVENOUS at 17:31

## 2024-03-05 RX ADMIN — PIPERACILLIN AND TAZOBACTAM 3375 MG: 3; .375 INJECTION, POWDER, FOR SOLUTION INTRAVENOUS at 23:08

## 2024-03-05 ASSESSMENT — PAIN DESCRIPTION - ONSET
ONSET: ON-GOING

## 2024-03-05 ASSESSMENT — PAIN DESCRIPTION - LOCATION
LOCATION: ABDOMEN

## 2024-03-05 ASSESSMENT — PAIN DESCRIPTION - ORIENTATION
ORIENTATION: MID
ORIENTATION: LEFT;UPPER
ORIENTATION: MID
ORIENTATION: MID
ORIENTATION: LEFT
ORIENTATION: MID
ORIENTATION: LEFT;UPPER

## 2024-03-05 ASSESSMENT — PAIN DESCRIPTION - FREQUENCY
FREQUENCY: CONTINUOUS
FREQUENCY: CONTINUOUS
FREQUENCY: INTERMITTENT
FREQUENCY: INTERMITTENT
FREQUENCY: CONTINUOUS

## 2024-03-05 ASSESSMENT — PAIN SCALES - WONG BAKER
WONGBAKER_NUMERICALRESPONSE: 2
WONGBAKER_NUMERICALRESPONSE: HURTS A LITTLE BIT
WONGBAKER_NUMERICALRESPONSE: HURTS LITTLE MORE
WONGBAKER_NUMERICALRESPONSE: 4
WONGBAKER_NUMERICALRESPONSE: HURTS LITTLE MORE
WONGBAKER_NUMERICALRESPONSE: 4
WONGBAKER_NUMERICALRESPONSE: 2
WONGBAKER_NUMERICALRESPONSE: 2
WONGBAKER_NUMERICALRESPONSE: 4
WONGBAKER_NUMERICALRESPONSE: HURTS A LITTLE BIT
WONGBAKER_NUMERICALRESPONSE: HURTS A LITTLE BIT
WONGBAKER_NUMERICALRESPONSE: HURTS LITTLE MORE

## 2024-03-05 ASSESSMENT — PAIN DESCRIPTION - PAIN TYPE
TYPE: SURGICAL PAIN

## 2024-03-05 ASSESSMENT — LIFESTYLE VARIABLES: SMOKING_STATUS: 1

## 2024-03-05 ASSESSMENT — PAIN - FUNCTIONAL ASSESSMENT
PAIN_FUNCTIONAL_ASSESSMENT: PREVENTS OR INTERFERES SOME ACTIVE ACTIVITIES AND ADLS
PAIN_FUNCTIONAL_ASSESSMENT: ACTIVITIES ARE NOT PREVENTED
PAIN_FUNCTIONAL_ASSESSMENT: PREVENTS OR INTERFERES SOME ACTIVE ACTIVITIES AND ADLS
PAIN_FUNCTIONAL_ASSESSMENT: ACTIVITIES ARE NOT PREVENTED
PAIN_FUNCTIONAL_ASSESSMENT: 0-10
PAIN_FUNCTIONAL_ASSESSMENT: PREVENTS OR INTERFERES SOME ACTIVE ACTIVITIES AND ADLS
PAIN_FUNCTIONAL_ASSESSMENT: ACTIVITIES ARE NOT PREVENTED
PAIN_FUNCTIONAL_ASSESSMENT: ACTIVITIES ARE NOT PREVENTED

## 2024-03-05 ASSESSMENT — PAIN SCALES - GENERAL
PAINLEVEL_OUTOF10: 5
PAINLEVEL_OUTOF10: 6
PAINLEVEL_OUTOF10: 8
PAINLEVEL_OUTOF10: 7
PAINLEVEL_OUTOF10: 5
PAINLEVEL_OUTOF10: 10
PAINLEVEL_OUTOF10: 7
PAINLEVEL_OUTOF10: 8
PAINLEVEL_OUTOF10: 8
PAINLEVEL_OUTOF10: 4
PAINLEVEL_OUTOF10: 6
PAINLEVEL_OUTOF10: 10
PAINLEVEL_OUTOF10: 8
PAINLEVEL_OUTOF10: 8
PAINLEVEL_OUTOF10: 5

## 2024-03-05 ASSESSMENT — PAIN DESCRIPTION - DESCRIPTORS
DESCRIPTORS: SHARP
DESCRIPTORS: ACHING
DESCRIPTORS: PRESSURE
DESCRIPTORS: PRESSURE
DESCRIPTORS: SHARP
DESCRIPTORS: PRESSURE
DESCRIPTORS: PRESSURE
DESCRIPTORS: SHARP
DESCRIPTORS: TIGHTNESS
DESCRIPTORS: SQUEEZING;TIGHTNESS

## 2024-03-05 NOTE — PROCEDURES
Patient Name: Jethro Vázquez   Medical Record Number: [id number]  Date: 3/5/2024   Time: 8:38 AM   Room/Bed: OR/NONE    Central Line Placement Procedure Note    Indication: poor peripheral access    Consent: Unable to be obtained due to the emergent nature of this procedure.    Estimated blood loss: Less than 5 mL    Procedure: The patient was positioned appropriately and the skin over the left subclavian vein was prepped with betadine and draped in a sterile fashion. Local anesthesia was not performed due to the emergent nature of this procedure. A large bore needle was used to identify the vein.  A guide wire was then inserted into the vein through the needle and directed toward the superior vena cava. A triple lumen catheter was then inserted into the vessel over the guide wire using the Seldinger technique.  All ports showed good, free flowing blood return and were flushed with saline solution.  The catheter was then securely fastened to the skin with sutures and with an adhesive dressing and covered with a sterile dressing.  A post procedure X-ray was ordered and is still pending at this time.    The patient tolerated the procedure without difficulty.    Complications: None    Rufus Mendes II, MD

## 2024-03-05 NOTE — OP NOTE
Operative Report    Patient Information:    Jethro Vázquez  [id number]  64 y.o.  1960    Indications: The above patient presented with symptomatic gallbladder disease and was worked up appropriately--including imaging, laboratory data, and history and physical exam. After discussion with the patient, the decision was made to undergo robotic-assisted laparoscopic, possible open, cholecystectomy with the possibility of intraoperative cholangiogram and/or Firefly fluorescence imaging using indocyanine green (ICG) dye.    Pre-Operative Diagnosis:  1. Acute cholecystitis    Post-Operative Diagnosis:  1. Acute cholecystitis      2. Extensive intra-abdominal adhesions    Procedure:  1. Robotic-assisted laparoscopic cholecystectomy with ICG fluorescence imaging    2. Laparoscopic lysis of adhesions, extensive (greater than 60 minutes)    Surgeon: Rufus Mendes MD, FACS, FASS    Assistant: Kathy Pierson    Anesthesia: General endotracheal    ASA: Per anesthesia    Findings: Consistent with post-operative diagnosis    IVF: 500 mL    Estimated Blood Loss: Less than 35 mL    Specimen(s): Gallbladder and contents    Complications:  None apparent    Condition: Stable    Disposition: PACU    Operative Details: The patient was met in the pre-operative holding area. An informed consent was obtained after discussing the risks, benefits, complications, treatment options, and expected outcomes.    The risks discussed included: adverse reaction to medication(s), pulmonary aspiration, perforation of viscus, bleeding, recurrent infection, finding a normal gallbladder, the need for additional procedures, failure to diagnose a condition, the possibility of converting to an open procedure, damage to nearby structures (specifically biliary structures), and creating a complication requiring transfusion or a separate operation.    The patient and/or family concurred with the proposed plan, giving informed consent.    The patient

## 2024-03-05 NOTE — ANESTHESIA POSTPROCEDURE EVALUATION
Department of Anesthesiology  Postprocedure Note    Patient: Jethro Vázquez  MRN: [id number]  YOB: 1960  Date of evaluation: 3/5/2024    Procedure Summary       Date: 03/05/24 Room / Location: [address]    Anesthesia Start: 0753 Anesthesia Stop: 1007    Procedure: CHOLECYSTECTOMY LAPAROSCOPIC ROBOTIC WITH EXTENSIVE LYSIS OF ADHESIONS (Abdomen) Diagnosis:       Acute cholecystitis      (Acute cholecystitis [K81.0])    Surgeons: Rufus Mendes II, MD Responsible Provider: Geovanni Bhatti MD    Anesthesia Type: General ASA Status: 3            Anesthesia Type: General    Arthur Phase I:      Arthur Phase II:      Anesthesia Post Evaluation    Patient location during evaluation: PACU  Patient participation: complete - patient participated  Level of consciousness: awake  Airway patency: patent  Nausea & Vomiting: no nausea  Hydration status: euvolemic  Pain management: adequate    There were no known notable events for this encounter.

## 2024-03-05 NOTE — PLAN OF CARE
Problem: Discharge Planning  Goal: Discharge to home or other facility with appropriate resources  3/5/2024 0122 by Jimena Hackett RN  Outcome: Progressing  3/4/2024 1256 by Kelli Hooper RN  Outcome: Progressing     Problem: Pain  Goal: Verbalizes/displays adequate comfort level or baseline comfort level  3/5/2024 0122 by Jimena Hackett RN  Outcome: Progressing  3/4/2024 1256 by Kelli Hooper RN  Outcome: Progressing     Problem: Safety - Adult  Goal: Free from fall injury  3/5/2024 0122 by Jimena Hackett RN  Outcome: Progressing  3/4/2024 1256 by Kelli Hooper RN  Outcome: Progressing

## 2024-03-05 NOTE — ANESTHESIA PRE PROCEDURE
SpO2:    98%   Weight: 79.4 kg (175 lb)      Height: 1.778 m (5' 10\")                                                 BP Readings from Last 3 Encounters:   03/04/24 122/85   03/04/24 (!) 136/92   02/27/24 119/74       NPO Status:                                                                                 BMI:   Wt Readings from Last 3 Encounters:   03/04/24 79.4 kg (175 lb)   03/04/24 79.4 kg (175 lb)   02/27/24 79.7 kg (175 lb 12.8 oz)     Body mass index is 25.11 kg/m².    CBC:   Lab Results   Component Value Date/Time    WBC 2.2 03/05/2024 05:56 AM    RBC 4.01 03/05/2024 05:56 AM    HGB 10.5 03/05/2024 05:56 AM    HCT 33.5 03/05/2024 05:56 AM    MCV 83.5 03/05/2024 05:56 AM    RDW 13.2 03/05/2024 05:56 AM     03/05/2024 05:56 AM       CMP:   Lab Results   Component Value Date/Time     03/05/2024 05:56 AM    K 4.3 03/05/2024 05:56 AM     03/05/2024 05:56 AM    CO2 27 03/05/2024 05:56 AM    BUN 10 03/05/2024 05:56 AM    CREATININE 0.8 03/05/2024 05:56 AM    GFRAA >60 01/19/2018 07:38 AM    LABGLOM >60 03/05/2024 05:56 AM    GLUCOSE 85 03/05/2024 05:56 AM    PROT 6.7 03/04/2024 03:49 AM    PROT 7.4 02/08/2011 03:20 PM    CALCIUM 8.7 03/05/2024 05:56 AM    BILITOT 0.2 03/04/2024 03:49 AM    ALKPHOS 134 03/04/2024 03:49 AM    AST 13 03/04/2024 03:49 AM    ALT 24 03/04/2024 03:49 AM       POC Tests: No results for input(s): \"POCGLU\", \"POCNA\", \"POCK\", \"POCCL\", \"POCBUN\", \"POCHEMO\", \"POCHCT\" in the last 72 hours.    Coags:   Lab Results   Component Value Date/Time    PROTIME 14.9 01/10/2024 04:00 PM    PROTIME 10.5 02/08/2011 03:20 PM    INR 1.1 01/10/2024 04:00 PM    APTT 43.9 01/10/2024 04:00 PM       HCG (If Applicable): No results found for: \"PREGTESTUR\", \"PREGSERUM\", \"HCG\", \"HCGQUANT\"     ABGs: No results found for: \"PHART\", \"PO2ART\", \"WCT4IJP\", \"KIT3EQX\", \"BEART\", \"G1KLOLRB\"     Type & Screen (If Applicable):  No results found for: \"LABABO\", \"LABRH\"    Drug/Infectious Status (If 
  Component Value Date/Time    COVID19 NOT DETECTED 12/31/2023 08:30 AM           Anesthesia Evaluation    Airway:           Dental:          Pulmonary:   (+)           current smoker                           Cardiovascular:    (+) hypertension:                  Neuro/Psych:               GI/Hepatic/Renal:   (+) GERD:          Endo/Other:    (+) malignancy/cancer.                  ROS comment: Bladder cancer Abdominal:             Vascular:          Other Findings:             Anesthesia Plan      general     ASA 3     (Chart review)  Induction: intravenous.                            CHENG Hansen - CRNA   3/4/2024

## 2024-03-06 LAB
ALBUMIN SERPL-MCNC: 3.4 GM/DL (ref 3.4–5)
ALP BLD-CCNC: 128 IU/L (ref 40–128)
ALT SERPL-CCNC: 27 U/L (ref 10–40)
ANION GAP SERPL CALCULATED.3IONS-SCNC: 9 MMOL/L (ref 7–16)
AST SERPL-CCNC: 21 IU/L (ref 15–37)
BASOPHILS ABSOLUTE: 0 K/CU MM
BASOPHILS RELATIVE PERCENT: 0.2 % (ref 0–1)
BILIRUB SERPL-MCNC: 0.4 MG/DL (ref 0–1)
BUN SERPL-MCNC: 12 MG/DL (ref 6–23)
CALCIUM SERPL-MCNC: 9.2 MG/DL (ref 8.3–10.6)
CHLORIDE BLD-SCNC: 101 MMOL/L (ref 99–110)
CO2: 27 MMOL/L (ref 21–32)
CREAT SERPL-MCNC: 0.9 MG/DL (ref 0.9–1.3)
CULTURE: ABNORMAL
CULTURE: ABNORMAL
DIFFERENTIAL TYPE: ABNORMAL
EOSINOPHILS ABSOLUTE: 0 K/CU MM
EOSINOPHILS RELATIVE PERCENT: 0.2 % (ref 0–3)
GFR SERPL CREATININE-BSD FRML MDRD: >60 ML/MIN/1.73M2
GLUCOSE SERPL-MCNC: 94 MG/DL (ref 70–99)
HCT VFR BLD CALC: 29.4 % (ref 42–52)
HEMOGLOBIN: 9.4 GM/DL (ref 13.5–18)
IMMATURE NEUTROPHIL %: 0.4 % (ref 0–0.43)
LYMPHOCYTES ABSOLUTE: 1.4 K/CU MM
LYMPHOCYTES RELATIVE PERCENT: 29.3 % (ref 24–44)
Lab: ABNORMAL
MCH RBC QN AUTO: 26.6 PG (ref 27–31)
MCHC RBC AUTO-ENTMCNC: 32 % (ref 32–36)
MCV RBC AUTO: 83.3 FL (ref 78–100)
MONOCYTES ABSOLUTE: 0.3 K/CU MM
MONOCYTES RELATIVE PERCENT: 5.3 % (ref 0–4)
NUCLEATED RBC %: 0 %
PDW BLD-RTO: 13.2 % (ref 11.7–14.9)
PLATELET # BLD: 118 K/CU MM (ref 140–440)
PMV BLD AUTO: 9.5 FL (ref 7.5–11.1)
POTASSIUM SERPL-SCNC: 4.8 MMOL/L (ref 3.5–5.1)
RBC # BLD: 3.53 M/CU MM (ref 4.6–6.2)
SEGMENTED NEUTROPHILS ABSOLUTE COUNT: 3 K/CU MM
SEGMENTED NEUTROPHILS RELATIVE PERCENT: 64.6 % (ref 36–66)
SODIUM BLD-SCNC: 137 MMOL/L (ref 135–145)
SPECIMEN: ABNORMAL
TOTAL IMMATURE NEUTOROPHIL: 0.02 K/CU MM
TOTAL NUCLEATED RBC: 0 K/CU MM
TOTAL PROTEIN: 6 GM/DL (ref 6.4–8.2)
WBC # BLD: 4.7 K/CU MM (ref 4–10.5)

## 2024-03-06 PROCEDURE — APPNB30 APP NON BILLABLE TIME 0-30 MINS: Performed by: NURSE PRACTITIONER

## 2024-03-06 PROCEDURE — 6360000002 HC RX W HCPCS: Performed by: INTERNAL MEDICINE

## 2024-03-06 PROCEDURE — 96366 THER/PROPH/DIAG IV INF ADDON: CPT

## 2024-03-06 PROCEDURE — 80053 COMPREHEN METABOLIC PANEL: CPT

## 2024-03-06 PROCEDURE — 36415 COLL VENOUS BLD VENIPUNCTURE: CPT

## 2024-03-06 PROCEDURE — 6370000000 HC RX 637 (ALT 250 FOR IP): Performed by: INTERNAL MEDICINE

## 2024-03-06 PROCEDURE — 94761 N-INVAS EAR/PLS OXIMETRY MLT: CPT

## 2024-03-06 PROCEDURE — 96376 TX/PRO/DX INJ SAME DRUG ADON: CPT

## 2024-03-06 PROCEDURE — 99024 POSTOP FOLLOW-UP VISIT: CPT | Performed by: NURSE PRACTITIONER

## 2024-03-06 PROCEDURE — 96375 TX/PRO/DX INJ NEW DRUG ADDON: CPT

## 2024-03-06 PROCEDURE — G0378 HOSPITAL OBSERVATION PER HR: HCPCS

## 2024-03-06 PROCEDURE — 1200000000 HC SEMI PRIVATE

## 2024-03-06 PROCEDURE — 36556 INSERT NON-TUNNEL CV CATH: CPT

## 2024-03-06 PROCEDURE — 2580000003 HC RX 258: Performed by: INTERNAL MEDICINE

## 2024-03-06 PROCEDURE — 96361 HYDRATE IV INFUSION ADD-ON: CPT

## 2024-03-06 PROCEDURE — 85025 COMPLETE CBC W/AUTO DIFF WBC: CPT

## 2024-03-06 RX ORDER — ONDANSETRON HYDROCHLORIDE 8 MG/1
8 TABLET, FILM COATED ORAL EVERY 8 HOURS PRN
Qty: 30 TABLET | Refills: 0 | Status: SHIPPED | OUTPATIENT
Start: 2024-03-06

## 2024-03-06 RX ORDER — AMOXICILLIN 500 MG/1
1000 CAPSULE ORAL EVERY 8 HOURS SCHEDULED
Status: DISCONTINUED | OUTPATIENT
Start: 2024-03-06 | End: 2024-03-07 | Stop reason: HOSPADM

## 2024-03-06 RX ORDER — POLYETHYLENE GLYCOL 3350 17 G/17G
17 POWDER, FOR SOLUTION ORAL DAILY
Qty: 1530 G | Refills: 1 | Status: SHIPPED | OUTPATIENT
Start: 2024-03-06 | End: 2024-09-02

## 2024-03-06 RX ADMIN — CARVEDILOL 6.25 MG: 6.25 TABLET, FILM COATED ORAL at 09:13

## 2024-03-06 RX ADMIN — OXYCODONE HYDROCHLORIDE 5 MG: 5 TABLET ORAL at 20:55

## 2024-03-06 RX ADMIN — OXYCODONE HYDROCHLORIDE 5 MG: 5 TABLET ORAL at 05:50

## 2024-03-06 RX ADMIN — OXYCODONE HYDROCHLORIDE 5 MG: 5 TABLET ORAL at 13:16

## 2024-03-06 RX ADMIN — HYDROMORPHONE HYDROCHLORIDE 0.5 MG: 1 INJECTION, SOLUTION INTRAMUSCULAR; INTRAVENOUS; SUBCUTANEOUS at 23:36

## 2024-03-06 RX ADMIN — SODIUM CHLORIDE, POTASSIUM CHLORIDE, SODIUM LACTATE AND CALCIUM CHLORIDE: 600; 310; 30; 20 INJECTION, SOLUTION INTRAVENOUS at 05:47

## 2024-03-06 RX ADMIN — SODIUM CHLORIDE, PRESERVATIVE FREE 10 ML: 5 INJECTION INTRAVENOUS at 20:55

## 2024-03-06 RX ADMIN — SODIUM CHLORIDE, PRESERVATIVE FREE 5 ML: 5 INJECTION INTRAVENOUS at 09:31

## 2024-03-06 RX ADMIN — HYDROMORPHONE HYDROCHLORIDE 0.5 MG: 1 INJECTION, SOLUTION INTRAMUSCULAR; INTRAVENOUS; SUBCUTANEOUS at 00:54

## 2024-03-06 RX ADMIN — TAMSULOSIN HYDROCHLORIDE 0.4 MG: 0.4 CAPSULE ORAL at 09:13

## 2024-03-06 RX ADMIN — SODIUM CHLORIDE, POTASSIUM CHLORIDE, SODIUM LACTATE AND CALCIUM CHLORIDE: 600; 310; 30; 20 INJECTION, SOLUTION INTRAVENOUS at 15:57

## 2024-03-06 RX ADMIN — HYDROMORPHONE HYDROCHLORIDE 0.5 MG: 1 INJECTION, SOLUTION INTRAMUSCULAR; INTRAVENOUS; SUBCUTANEOUS at 09:23

## 2024-03-06 RX ADMIN — CARVEDILOL 6.25 MG: 6.25 TABLET, FILM COATED ORAL at 15:56

## 2024-03-06 RX ADMIN — HYDROMORPHONE HYDROCHLORIDE 0.5 MG: 1 INJECTION, SOLUTION INTRAMUSCULAR; INTRAVENOUS; SUBCUTANEOUS at 17:13

## 2024-03-06 RX ADMIN — AMOXICILLIN 1000 MG: 500 CAPSULE ORAL at 15:56

## 2024-03-06 RX ADMIN — PIPERACILLIN AND TAZOBACTAM 3375 MG: 3; .375 INJECTION, POWDER, FOR SOLUTION INTRAVENOUS at 06:47

## 2024-03-06 RX ADMIN — AMLODIPINE BESYLATE 10 MG: 10 TABLET ORAL at 09:13

## 2024-03-06 RX ADMIN — AMOXICILLIN 1000 MG: 500 CAPSULE ORAL at 20:56

## 2024-03-06 ASSESSMENT — PAIN DESCRIPTION - LOCATION
LOCATION: ABDOMEN
LOCATION: ABDOMEN
LOCATION: SHOULDER
LOCATION: ABDOMEN

## 2024-03-06 ASSESSMENT — PAIN - FUNCTIONAL ASSESSMENT

## 2024-03-06 ASSESSMENT — PAIN SCALES - WONG BAKER
WONGBAKER_NUMERICALRESPONSE: 6
WONGBAKER_NUMERICALRESPONSE: HURTS LITTLE MORE
WONGBAKER_NUMERICALRESPONSE: 6
WONGBAKER_NUMERICALRESPONSE: 4
WONGBAKER_NUMERICALRESPONSE: HURTS EVEN MORE
WONGBAKER_NUMERICALRESPONSE: 4
WONGBAKER_NUMERICALRESPONSE: HURTS EVEN MORE
WONGBAKER_NUMERICALRESPONSE: HURTS A LITTLE BIT
WONGBAKER_NUMERICALRESPONSE: HURTS EVEN MORE
WONGBAKER_NUMERICALRESPONSE: HURTS LITTLE MORE
WONGBAKER_NUMERICALRESPONSE: 2
WONGBAKER_NUMERICALRESPONSE: 6
WONGBAKER_NUMERICALRESPONSE: 4
WONGBAKER_NUMERICALRESPONSE: 6
WONGBAKER_NUMERICALRESPONSE: 2
WONGBAKER_NUMERICALRESPONSE: HURTS EVEN MORE
WONGBAKER_NUMERICALRESPONSE: 2
WONGBAKER_NUMERICALRESPONSE: HURTS A LITTLE BIT
WONGBAKER_NUMERICALRESPONSE: HURTS LITTLE MORE
WONGBAKER_NUMERICALRESPONSE: 4
WONGBAKER_NUMERICALRESPONSE: 6
WONGBAKER_NUMERICALRESPONSE: HURTS EVEN MORE
WONGBAKER_NUMERICALRESPONSE: HURTS LITTLE MORE
WONGBAKER_NUMERICALRESPONSE: 4
WONGBAKER_NUMERICALRESPONSE: HURTS A LITTLE BIT
WONGBAKER_NUMERICALRESPONSE: HURTS LITTLE MORE

## 2024-03-06 ASSESSMENT — PAIN DESCRIPTION - PAIN TYPE: TYPE: SURGICAL PAIN

## 2024-03-06 ASSESSMENT — PAIN SCALES - GENERAL
PAINLEVEL_OUTOF10: 4
PAINLEVEL_OUTOF10: 7
PAINLEVEL_OUTOF10: 8
PAINLEVEL_OUTOF10: 7
PAINLEVEL_OUTOF10: 8
PAINLEVEL_OUTOF10: 6
PAINLEVEL_OUTOF10: 9
PAINLEVEL_OUTOF10: 6
PAINLEVEL_OUTOF10: 4
PAINLEVEL_OUTOF10: 6

## 2024-03-06 ASSESSMENT — PAIN DESCRIPTION - ORIENTATION
ORIENTATION: RIGHT;LEFT;MID
ORIENTATION: LEFT;UPPER
ORIENTATION: LEFT
ORIENTATION: LEFT;UPPER
ORIENTATION: LEFT

## 2024-03-06 ASSESSMENT — PAIN DESCRIPTION - DESCRIPTORS
DESCRIPTORS: SHARP
DESCRIPTORS: ACHING;SHARP
DESCRIPTORS: SHARP
DESCRIPTORS: ACHING;SORE
DESCRIPTORS: SHARP

## 2024-03-06 ASSESSMENT — PAIN DESCRIPTION - ONSET: ONSET: ON-GOING

## 2024-03-06 ASSESSMENT — PAIN DESCRIPTION - FREQUENCY: FREQUENCY: INTERMITTENT

## 2024-03-06 NOTE — PLAN OF CARE
Problem: Discharge Planning  Goal: Discharge to home or other facility with appropriate resources  3/6/2024 0805 by Talya Kee  Outcome: Progressing  3/5/2024 2245 by Enedina Adames, RN  Outcome: Progressing     Problem: Pain  Goal: Verbalizes/displays adequate comfort level or baseline comfort level  3/6/2024 0805 by Talya Kee  Outcome: Progressing  3/5/2024 2245 by Enedina Adames, RN  Outcome: Progressing     Problem: Safety - Adult  Goal: Free from fall injury  3/6/2024 0805 by Talya Kee  Outcome: Progressing  3/5/2024 2245 by Enedina Adames, RN  Outcome: Progressing

## 2024-03-06 NOTE — PLAN OF CARE
Problem: Discharge Planning  Goal: Discharge to home or other facility with appropriate resources  3/6/2024 1848 by Marina Chavez LPN  Outcome: Progressing  3/6/2024 0805 by Talya Kee  Outcome: Progressing     Problem: Pain  Goal: Verbalizes/displays adequate comfort level or baseline comfort level  3/6/2024 1848 by Marina Chavez LPN  Outcome: Progressing  3/6/2024 0805 by Talya Kee  Outcome: Progressing     Problem: Safety - Adult  Goal: Free from fall injury  3/6/2024 1848 by Marina Chavez LPN  Outcome: Progressing  3/6/2024 0805 by Talya Kee  Outcome: Progressing

## 2024-03-07 ENCOUNTER — APPOINTMENT (OUTPATIENT)
Dept: INTERVENTIONAL RADIOLOGY/VASCULAR | Age: 64
DRG: 418 | End: 2024-03-07
Attending: STUDENT IN AN ORGANIZED HEALTH CARE EDUCATION/TRAINING PROGRAM
Payer: COMMERCIAL

## 2024-03-07 VITALS
WEIGHT: 175 LBS | RESPIRATION RATE: 16 BRPM | HEIGHT: 70 IN | TEMPERATURE: 97.5 F | OXYGEN SATURATION: 97 % | HEART RATE: 82 BPM | BODY MASS INDEX: 25.05 KG/M2 | DIASTOLIC BLOOD PRESSURE: 92 MMHG | SYSTOLIC BLOOD PRESSURE: 134 MMHG

## 2024-03-07 LAB
ALBUMIN SERPL-MCNC: 3.3 GM/DL (ref 3.4–5)
ALP BLD-CCNC: 109 IU/L (ref 40–129)
ALT SERPL-CCNC: 20 U/L (ref 10–40)
ANION GAP SERPL CALCULATED.3IONS-SCNC: 10 MMOL/L (ref 7–16)
AST SERPL-CCNC: 12 IU/L (ref 15–37)
BASOPHILS ABSOLUTE: 0 K/CU MM
BASOPHILS RELATIVE PERCENT: 0.2 % (ref 0–1)
BILIRUB SERPL-MCNC: 0.2 MG/DL (ref 0–1)
BUN SERPL-MCNC: 10 MG/DL (ref 6–23)
CALCIUM SERPL-MCNC: 8.2 MG/DL (ref 8.3–10.6)
CHLORIDE BLD-SCNC: 102 MMOL/L (ref 99–110)
CO2: 25 MMOL/L (ref 21–32)
CREAT SERPL-MCNC: 0.7 MG/DL (ref 0.9–1.3)
DIFFERENTIAL TYPE: ABNORMAL
EOSINOPHILS ABSOLUTE: 0 K/CU MM
EOSINOPHILS RELATIVE PERCENT: 0.8 % (ref 0–3)
GFR SERPL CREATININE-BSD FRML MDRD: >60 ML/MIN/1.73M2
GLUCOSE SERPL-MCNC: 78 MG/DL (ref 70–99)
HCT VFR BLD CALC: 29.1 % (ref 42–52)
HEMOGLOBIN: 8.9 GM/DL (ref 13.5–18)
IMMATURE NEUTROPHIL %: 0.8 % (ref 0–0.43)
INR BLD: 1.1 INDEX
LYMPHOCYTES ABSOLUTE: 1.8 K/CU MM
LYMPHOCYTES RELATIVE PERCENT: 36.8 % (ref 24–44)
MCH RBC QN AUTO: 26 PG (ref 27–31)
MCHC RBC AUTO-ENTMCNC: 30.6 % (ref 32–36)
MCV RBC AUTO: 85.1 FL (ref 78–100)
MONOCYTES ABSOLUTE: 0.4 K/CU MM
MONOCYTES RELATIVE PERCENT: 8.7 % (ref 0–4)
NUCLEATED RBC %: 0 %
PDW BLD-RTO: 14.3 % (ref 11.7–14.9)
PLATELET # BLD: 78 K/CU MM (ref 140–440)
PMV BLD AUTO: 9.5 FL (ref 7.5–11.1)
POTASSIUM SERPL-SCNC: 3.7 MMOL/L (ref 3.5–5.1)
PROTHROMBIN TIME: 14.5 SECONDS (ref 11.7–14.5)
RBC # BLD: 3.42 M/CU MM (ref 4.6–6.2)
SEGMENTED NEUTROPHILS ABSOLUTE COUNT: 2.5 K/CU MM
SEGMENTED NEUTROPHILS RELATIVE PERCENT: 52.7 % (ref 36–66)
SODIUM BLD-SCNC: 137 MMOL/L (ref 135–145)
TOTAL IMMATURE NEUTOROPHIL: 0.04 K/CU MM
TOTAL NUCLEATED RBC: 0 K/CU MM
TOTAL PROTEIN: 5.5 GM/DL (ref 6.4–8.2)
WBC # BLD: 4.8 K/CU MM (ref 4–10.5)

## 2024-03-07 PROCEDURE — 6360000002 HC RX W HCPCS

## 2024-03-07 PROCEDURE — 76937 US GUIDE VASCULAR ACCESS: CPT

## 2024-03-07 PROCEDURE — 36415 COLL VENOUS BLD VENIPUNCTURE: CPT

## 2024-03-07 PROCEDURE — G0378 HOSPITAL OBSERVATION PER HR: HCPCS

## 2024-03-07 PROCEDURE — 6360000002 HC RX W HCPCS: Performed by: RADIOLOGY

## 2024-03-07 PROCEDURE — 2580000003 HC RX 258: Performed by: INTERNAL MEDICINE

## 2024-03-07 PROCEDURE — 6370000000 HC RX 637 (ALT 250 FOR IP): Performed by: INTERNAL MEDICINE

## 2024-03-07 PROCEDURE — 80053 COMPREHEN METABOLIC PANEL: CPT

## 2024-03-07 PROCEDURE — 2709999900 IR PORT PLACEMENT > 5 YEARS

## 2024-03-07 PROCEDURE — 2580000003 HC RX 258: Performed by: RADIOLOGY

## 2024-03-07 PROCEDURE — 2500000003 HC RX 250 WO HCPCS: Performed by: RADIOLOGY

## 2024-03-07 PROCEDURE — 2580000003 HC RX 258

## 2024-03-07 PROCEDURE — 96361 HYDRATE IV INFUSION ADD-ON: CPT

## 2024-03-07 PROCEDURE — 36561 INSERT TUNNELED CV CATH: CPT

## 2024-03-07 PROCEDURE — 94761 N-INVAS EAR/PLS OXIMETRY MLT: CPT

## 2024-03-07 PROCEDURE — 96375 TX/PRO/DX INJ NEW DRUG ADDON: CPT

## 2024-03-07 PROCEDURE — 6360000002 HC RX W HCPCS: Performed by: INTERNAL MEDICINE

## 2024-03-07 PROCEDURE — 85610 PROTHROMBIN TIME: CPT

## 2024-03-07 PROCEDURE — 77001 FLUOROGUIDE FOR VEIN DEVICE: CPT

## 2024-03-07 PROCEDURE — 96376 TX/PRO/DX INJ SAME DRUG ADON: CPT

## 2024-03-07 PROCEDURE — 85025 COMPLETE CBC W/AUTO DIFF WBC: CPT

## 2024-03-07 PROCEDURE — 36561 INSERT TUNNELED CV CATH: CPT | Performed by: RADIOLOGY

## 2024-03-07 PROCEDURE — 2500000003 HC RX 250 WO HCPCS

## 2024-03-07 RX ORDER — OXYCODONE HYDROCHLORIDE 5 MG/1
5 TABLET ORAL EVERY 4 HOURS PRN
Qty: 15 TABLET | Refills: 0 | Status: SHIPPED | OUTPATIENT
Start: 2024-03-07 | End: 2024-03-18

## 2024-03-07 RX ORDER — HEPARIN SODIUM,PORCINE/PF 1 UNIT/ML
SYRINGE (ML) INTRAVENOUS PRN
Status: COMPLETED | OUTPATIENT
Start: 2024-03-07 | End: 2024-03-07

## 2024-03-07 RX ORDER — AMOXICILLIN 500 MG/1
500 CAPSULE ORAL 3 TIMES DAILY
Qty: 9 CAPSULE | Refills: 0 | Status: SHIPPED | OUTPATIENT
Start: 2024-03-07 | End: 2024-03-10

## 2024-03-07 RX ORDER — LIDOCAINE HYDROCHLORIDE AND EPINEPHRINE BITARTRATE 20; .01 MG/ML; MG/ML
INJECTION, SOLUTION SUBCUTANEOUS PRN
Status: COMPLETED | OUTPATIENT
Start: 2024-03-07 | End: 2024-03-07

## 2024-03-07 RX ORDER — LIDOCAINE HYDROCHLORIDE 10 MG/ML
INJECTION, SOLUTION EPIDURAL; INFILTRATION; INTRACAUDAL; PERINEURAL PRN
Status: COMPLETED | OUTPATIENT
Start: 2024-03-07 | End: 2024-03-07

## 2024-03-07 RX ADMIN — OXYCODONE HYDROCHLORIDE 5 MG: 5 TABLET ORAL at 04:22

## 2024-03-07 RX ADMIN — SODIUM CHLORIDE, POTASSIUM CHLORIDE, SODIUM LACTATE AND CALCIUM CHLORIDE: 600; 310; 30; 20 INJECTION, SOLUTION INTRAVENOUS at 01:50

## 2024-03-07 RX ADMIN — Medication 4 ML: at 10:25

## 2024-03-07 RX ADMIN — AMOXICILLIN 1000 MG: 500 CAPSULE ORAL at 06:16

## 2024-03-07 RX ADMIN — CEFAZOLIN 1000 MG: 1 INJECTION, POWDER, FOR SOLUTION INTRAMUSCULAR; INTRAVENOUS; PARENTERAL at 09:54

## 2024-03-07 RX ADMIN — AMLODIPINE BESYLATE 10 MG: 10 TABLET ORAL at 08:29

## 2024-03-07 RX ADMIN — SODIUM CHLORIDE, PRESERVATIVE FREE 10 ML: 5 INJECTION INTRAVENOUS at 08:29

## 2024-03-07 RX ADMIN — HYDROMORPHONE HYDROCHLORIDE 0.5 MG: 1 INJECTION, SOLUTION INTRAMUSCULAR; INTRAVENOUS; SUBCUTANEOUS at 06:16

## 2024-03-07 RX ADMIN — OXYCODONE HYDROCHLORIDE 5 MG: 5 TABLET ORAL at 12:46

## 2024-03-07 RX ADMIN — LIDOCAINE HYDROCHLORIDE 3 ML: 10 INJECTION, SOLUTION EPIDURAL; INFILTRATION; INTRACAUDAL; PERINEURAL at 10:02

## 2024-03-07 RX ADMIN — TAMSULOSIN HYDROCHLORIDE 0.4 MG: 0.4 CAPSULE ORAL at 08:29

## 2024-03-07 RX ADMIN — ONDANSETRON 4 MG: 4 TABLET, ORALLY DISINTEGRATING ORAL at 11:54

## 2024-03-07 RX ADMIN — CARVEDILOL 6.25 MG: 6.25 TABLET, FILM COATED ORAL at 08:29

## 2024-03-07 RX ADMIN — LIDOCAINE HYDROCHLORIDE,EPINEPHRINE BITARTRATE 10 ML: 20; .01 INJECTION, SOLUTION INFILTRATION; PERINEURAL at 10:05

## 2024-03-07 RX ADMIN — AMOXICILLIN 1000 MG: 500 CAPSULE ORAL at 13:52

## 2024-03-07 ASSESSMENT — PAIN DESCRIPTION - LOCATION
LOCATION: ABDOMEN

## 2024-03-07 ASSESSMENT — PAIN SCALES - GENERAL
PAINLEVEL_OUTOF10: 2
PAINLEVEL_OUTOF10: 9
PAINLEVEL_OUTOF10: 2
PAINLEVEL_OUTOF10: 7
PAINLEVEL_OUTOF10: 7
PAINLEVEL_OUTOF10: 9
PAINLEVEL_OUTOF10: 5

## 2024-03-07 ASSESSMENT — PAIN DESCRIPTION - DESCRIPTORS
DESCRIPTORS: ACHING
DESCRIPTORS: SHARP
DESCRIPTORS: ACHING
DESCRIPTORS: ACHING;DISCOMFORT

## 2024-03-07 ASSESSMENT — PAIN DESCRIPTION - ORIENTATION
ORIENTATION: LEFT;UPPER
ORIENTATION: LEFT

## 2024-03-07 ASSESSMENT — PAIN - FUNCTIONAL ASSESSMENT
PAIN_FUNCTIONAL_ASSESSMENT: PREVENTS OR INTERFERES SOME ACTIVE ACTIVITIES AND ADLS
PAIN_FUNCTIONAL_ASSESSMENT: PREVENTS OR INTERFERES SOME ACTIVE ACTIVITIES AND ADLS
PAIN_FUNCTIONAL_ASSESSMENT: ACTIVITIES ARE NOT PREVENTED
PAIN_FUNCTIONAL_ASSESSMENT: PREVENTS OR INTERFERES SOME ACTIVE ACTIVITIES AND ADLS

## 2024-03-07 ASSESSMENT — PAIN SCALES - WONG BAKER
WONGBAKER_NUMERICALRESPONSE: HURTS A LITTLE BIT
WONGBAKER_NUMERICALRESPONSE: 2

## 2024-03-07 NOTE — DISCHARGE INSTR - DIET

## 2024-03-07 NOTE — PROGRESS NOTES
V2.0    [facility] [place] Note      Name:  Jethro Vázquez /Age/Sex: 1960  (64 y.o. male)   MRN & CSN:  [id number] & [id number] Encounter Date/Time: 3/6/2024 1:58 PM EST   Location:  1111/1111-A PCP: Mono Schaffer MD     Attending:Haroon Whitehead MD       Hospital Day: 3    Assessment and Recommendations   Jethro Vázquez is a 64 y.o. male       3/6  -reports he does not feel good today  -requests port to be put in  -d/w surgery   -pt worried about not being able to get iv access for outpt port  -currently with central line  -called to ir and asked them to place a port, order entered    Acute cholecystitis   -status post robotic-assisted laparoscopic cholecystectomy 3/5  -status post laparoscopic lysis of adhesions, extensive 3/5    Lactic acidosis - likely due to above - resolved    Enterococcus Faecalis positive urine cx - on Zosyn IV - follow up sensitivity    History of Invasive High-Grade Urothelial Carcinoma: s/p bladder resection in 2023, has bilateral nephrostomy tubes, f/u Urology/Onc as OP    On chemotherapy    Hx HTN: Continue Coreg and Amlodipine    Hx BPH: Continue Tamsulosin      Diet ADULT DIET; Regular   DVT Prophylaxis [x] Lovenox, []  Heparin, [] SCDs, [] Ambulation,  [] Eliquis, [] Xarelto  [] Coumadin   Code Status Limited   Disposition From: home  Expected Disposition: home  Estimated Date of Discharge: w3/7  Patient requires continued admission due to above   Surrogate Decision Maker/ Chante Purvis (Spouse)      Subjective:     Chief Complaint:     Jethro Vázquez is a 64 y.o. male     Reports he does not feel well today      Review of Systems:      Pertinent positives and negatives discussed in HPI    Objective:     Intake/Output Summary (Last 24 hours) at 3/6/2024 0942  Last data filed at 3/6/2024 0938  Gross per 24 hour   Intake 250 ml   Output 1235 ml   Net -985 ml        Vitals:   Vitals:    24 0550 24 0730 24 0913 24 0923 
   03/07/24 1453   Encounter Summary   Encounter Overview/Reason  Initial Encounter   Service Provided For: Patient and family together   Referral/Consult From: [facility] [place]   Support System Spouse   Last Encounter  03/07/24  (PT feeling better, recovering from surgery, has peace with God, not fearful of death, he's fighting cancer but things are going well, he finds meaning in santiago and meaning, he wanted prayer.)   Complexity of Encounter Low   Begin Time 1434   End Time  1455   Total Time Calculated 21 min   Spiritual/Emotional needs   Type Spiritual Support   Grief, Loss, and Adjustments   Type Adjustment to illness   Assessment/Intervention/Outcome   Assessment Calm;Concerns with suffering;Coping;Hopeful   Intervention Active listening;Discussed belief system/Taoism practices/santiago;Discussed illness injury and it’s impact;Discussed meaning/purpose;Discussed relationship with God;Discussed death, afterlife;Explored/Affirmed feelings, thoughts, concerns;Explored Coping Skills/Resources;Nurtured Hope;Prayer (assurance of)/[place];Sustaining Presence/Ministry of presence   Outcome Acceptance;Comfort;Connection/Belonging;Coping;Encouraged;Expressed Gratitude;Peace   Plan and Referrals   Plan/Referrals Continue Support (comment)       
  Pharmacy Note - Extended Infusion Beta-Lactam Adjustment    Piperacillin/Tazobactam 3375mg Q8h for treatment of Intra-abdominal Infection. Per [facility] Extended Infusion Beta-Lactam Policy, piperacillin/tazobactam will be changed to 4500mg loading dose followed by 3375mg Q8h extended infusion    Estimated Creatinine Clearance: Estimated Creatinine Clearance: 87 mL/min (based on SCr of 0.9 mg/dL).    BMI: Body mass index is 25.11 kg/m².    Please call with any questions.    Thank you,    Ezra Stubbs, [facility]  
1005 pt arrived from OR to PACU. Monitors applied and alarms in place. Report rec'd from Imo CRNA  1017 pt medicated for pain  1025 tolerating ice chips appropriately  1028 pt remedicated for pain  1046 pt medicated for pain  1050 Report called to Sharri RN   1107 xray at bedside  1113 transport at bedside  
4 Eyes Skin Assessment     NAME:  Jethro Vázquez  YOB: 1960  MEDICAL RECORD NUMBER:  [id number]    The patient is being assessed for  Admission    I agree that at least one RN has performed a thorough Head to Toe Skin Assessment on the patient. ALL assessment sites listed below have been assessed.      Areas assessed by both nurses:    Head, Face, Ears, Shoulders, Back, Chest, Arms, Elbows, Hands, Sacrum. Buttock, Coccyx, Ischium, Legs. Feet and Heels, and Under Medical Devices         Does the Patient have a Wound? Yes wound(s) were present on assessment. LDA wound assessment was Initiated and completed by RN       Guilherme Prevention initiated by RN: No  Wound Care Orders initiated by RN: No    Pressure Injury (Stage 3,4, Unstageable, DTI, NWPT, and Complex wounds) if present, place Wound referral order by RN under : No    New Ostomies, if present place, Ostomy referral order under : No     Nurse 1 eSignature: Electronically signed by Marcelina Rucker RN on 3/4/24 at 11:01 AM EST    **SHARE this note so that the co-signing nurse can place an eSignature**    Nurse 2 eSignature: {Esignature:[id number]}   
Ambulated patient 100 feet in hallway with assistance of another nursing student. Patient tolerated, with no complaints of pain at this time.   
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  
I called the doctor that ps gave me to call for the IR consult that doc was  Reinaldo Bishop  and they was on call last night and on call tonight but no other doc came up for me to ps for the consult  
New IV inserted in patient.  Called Fito in pharmacy to send SALVADOR Castillo for pre-surgery tomorrow.  
Outpatient Pharmacy Progress Note for Meds-to-Beds    Total number of Prescriptions Filled: 2  The following medications were dispensed to the patient during the discharge process:  ondansetron  polyethylene glycol    Additional Documentation:  Patient picked-up the medication(s) in the OP Pharmacy      Thank you for letting us serve your patients.  [place] [id number]    Phone: [phone]    Fax: [phone]        
Patient state pain level is at a 6, notified lpn  
Spoke with Dr Whitehead that we do not have an IR doctor today and we can perform this procedure Thursday or Friday of this week.  
TRANSFER - OUT REPORT:    Verbal report given to Marina PERALTA on Jethro Vázquez being transferred to [facility] for routine progression of patient care       Report consisted of patient's Situation, Background, Assessment and   Recommendations(SBAR).     Pt had a mediport placed by Dr Hamm.  Vitals q15 x4 ordered.  Pt tolerated procedure vss, alert and oriented x4.    Information from the following report(s) Nurse Handoff Report was reviewed with the receiving nurse.    Opportunity for questions and clarification was provided.      Patient transported with:   Registered Nurse    
Wife works until 4 pm  
  Height:             Physical Exam:      General: NAD  Eyes: EOMI  Neuro: Alert.  Psych: Mood appropriate.         Medications:   Medications:    sodium chloride flush  5-40 mL IntraVENous 2 times per day    enoxaparin  40 mg SubCUTAneous Daily    piperacillin-tazobactam  3,375 mg IntraVENous Q8H    carvedilol  6.25 mg Oral BID WC    amLODIPine  10 mg Oral Daily    tamsulosin  0.4 mg Oral Daily      Infusions:    sodium chloride 25 mL/hr at 03/04/24 1057    lactated ringers IV soln 100 mL/hr at 03/05/24 0255     PRN Meds: sodium chloride flush, 5-40 mL, PRN  sodium chloride, , PRN  ondansetron, 4 mg, Q8H PRN   Or  ondansetron, 4 mg, Q6H PRN  polyethylene glycol, 17 g, Daily PRN  acetaminophen, 650 mg, Q6H PRN   Or  acetaminophen, 650 mg, Q6H PRN  HYDROmorphone, 0.5 mg, Q4H PRN  prochlorperazine, 10 mg, Q6H PRN  oxyCODONE, 5 mg, Q4H PRN        Labs and Imaging   XR CHEST PORTABLE    Result Date: 3/5/2024  EXAM: PORTABLE AP CHEST X-RAY, time INDICATION: central line placement, COMPARISON: 12/31/2023 FINDINGS: Left subclavian central catheter tip in SVC. Heart size normal. No focal airspace consolidation. No pleural effusion or pneumothorax.     1. No acute radiographic abnormality. Electronically signed by Yunior Lockhart MD    US ABDOMEN LIMITED Specify organ? GALLBLADDER    Result Date: 3/4/2024  EXAM: US ABDOMEN LIMITED INDICATION: Right upper quadrant pain, gallstones and cholecystitis seen on CT COMPARISON: CT dated 3/4/2024 TECHNIQUE: Grayscale imaging was performed for evaluation of the liver, gallbladder, common bile duct, pancreas, and right kidney; with limited grayscale and color Doppler evaluation of the upper abdominal aorta and inferior vena cava. FINDINGS: Liver: Normal morphology.  No focal hepatic lesions. Biliary/CBD: 7 mm. No intra or extrahepatic ductal dilatation. Gallbladder: Cholelithiasis. Mild gallbladder wall thickening. No pericholecystic fluid. Negative sonographic Villatoro's sign. Pancreas: 
General: No focal deficit present.      Mental Status: He is alert and oriented to person, place, and time.   Psychiatric:         Mood and Affect: Mood normal.         Behavior: Behavior normal.         Scheduled Meds:   sodium chloride flush  5-40 mL IntraVENous 2 times per day    enoxaparin  40 mg SubCUTAneous Daily    piperacillin-tazobactam  3,375 mg IntraVENous Q8H    carvedilol  6.25 mg Oral BID WC    amLODIPine  10 mg Oral Daily    tamsulosin  0.4 mg Oral Daily     ContinuousInfusions:   sodium chloride 25 mL/hr at 03/05/24 1509    lactated ringers IV soln 100 mL/hr at 03/06/24 0547     PRN Meds:sodium chloride flush, sodium chloride, ondansetron **OR** ondansetron, polyethylene glycol, acetaminophen **OR** acetaminophen, HYDROmorphone, prochlorperazine, oxyCODONE      Labs/Imaging Results:   Lab Results   Component Value Date    WBC 4.7 03/06/2024    HGB 9.4 (L) 03/06/2024    HCT 29.4 (L) 03/06/2024    MCV 83.3 03/06/2024     (L) 03/06/2024     Lab Results   Component Value Date     03/06/2024    K 4.8 03/06/2024     03/06/2024    CO2 27 03/06/2024    BUN 12 03/06/2024    CREATININE 0.9 03/06/2024    GLUCOSE 94 03/06/2024    CALCIUM 9.2 03/06/2024    PROT 6.0 (L) 03/06/2024    LABALBU 3.4 03/06/2024    BILITOT 0.4 03/06/2024    ALKPHOS 128 03/06/2024    AST 21 03/06/2024    ALT 27 03/06/2024    LABGLOM >60 03/06/2024    GFRAA >60 01/19/2018       Assessment:     Jethro Vázquez is a 65 yo male s/p robot assisted cholecystectomy    Plan:     - Doing well from surgery standpoint  - Tolerating regular diet  - Okay for discharge from surgery standpoint  - Will need to follow up with Dr. Mendes in the office in 1-2 weeks  - Call with questions or concerns      Electronically signed by CHENG Ballard - CNP on 3/6/2024 at 7:40 AM

## 2024-03-07 NOTE — DISCHARGE SUMMARY
V2.0  Discharge Summary    Name:  Jethro Vázquez /Age/Sex: 1960 (64 y.o. male)   Admit Date: 3/4/2024  Discharge Date: 3/7/24    MRN & CSN:  [id number] & [id number] Encounter Date and Time 3/7/24 1:27 PM EST    Attending:  James Garay MD Discharging Provider: JAMES GARAY MD       Hospital Course:     Brief HPI: Jethro Vázquez is a 64 y.o. male who presented with ***    Brief Problem Based Course:     Acute cholecystitis   -status post robotic-assisted laparoscopic cholecystectomy 3/5  -status post laparoscopic lysis of adhesions, extensive 3/5        Path report: Chronic active cholecystitis with cholelithiasis    Bladder cancer, on chemotherapy  -Status post TURBT of large bladder tumor 2023 by Dr. Ivan  -CT abdomen pelvis done in the ED showed decreasing retroperitoneal and pelvic adenopathy consistent with treatment response    Obstructive uropathy - had bilateral nephrostomy tubes placed in IR 2024    Complicated UTI due to Enterococcus faecalis    Port for chemotherapy placed - while here, the day of discharge    Former smoker - quit in     Hypertension  -On Coreg and amlodipine    BPH  -Continue tamsulosin    BMI is 25.1    See several recent admissions    Medications Administered 2024 which was 7 days prior to admission:     OP CISplatin 35 mg/m2 (split-dosing) + gemcitabine 1000 mg/m2 D1 and 8 Q28D  Day 8, Cycle 1  dexAMETHasone Sodium Phosphate 12 mg in sodium... 12 mg  potassium chloride 10 mEq, magnesium sulfate 1,000... 983.6 mL/hr  fosaprepitant (EMEND) 150 mg in sodium chloride... 150 mg  Palonosetron HCl 0.25 mg  gemcitabine HCl (GEMZAR) 1,900 mg in sodium... 1900 mg  potassium chloride 10 mEq, magnesium sulfate 1,000... 983.6 mL/hr  CISplatin (PLATINOL) 67 mg in sodium chloride 0.9... 67 mg    The patient expressed appropriate understanding of, and agreement with the discharge recommendations, medications, and plan.     Consults this

## 2024-03-07 NOTE — OR NURSING
Informed Dr Hamm that the patients following lab values plt of 78, per Dr Hamm ok to continue with the procedure.

## 2024-03-07 NOTE — DISCHARGE INSTR - COC
Continuity of Care Form    Patient Name: Jethro Vázquez   :  1960  MRN:  [id number]    Admit date:  3/4/2024  Discharge date:  ***    Code Status Order: Limited   Advance Directives:   Advance Care Flowsheet Documentation       Date/Time Healthcare Directive Type of Healthcare Directive Copy in Chart Healthcare Agent Appointed Healthcare Agent's Name Healthcare Agent's Phone Number    24 0908 Yes, patient has an advance directive for healthcare treatment -- Yes, copy in chart -- -- --    24 0736 Yes, patient has an advance directive for healthcare treatment -- Yes, copy in chart -- -- --            Admitting Physician:  Judson Calvin MD  PCP: Mono Schaffer MD    Discharging Nurse: ***  Discharging Hospital Unit/Room#: 1111/1111-A  Discharging Unit Phone Number: ***    Emergency Contact:   Extended Emergency Contact Information  Primary Emergency Contact: [facility]ia [facility]  Address: [address] [place] [address]  Home Phone: [phone]  Relation: Spouse    Past Surgical History:  Past Surgical History:   Procedure Laterality Date    ANKLE SURGERY Left     age 17\"did have pins and then later removed\"    APPENDECTOMY      CHOLECYSTECTOMY, LAPAROSCOPIC N/A 3/5/2024    CHOLECYSTECTOMY LAPAROSCOPIC ROBOTIC WITH EXTENSIVE LYSIS OF ADHESIONS performed by Rufus Mendes II, MD at [facility] OR    CYSTOSCOPY N/A 2023    CYSTOSCOPY TRANSURETHRAL RESECTION BLADDER TUMOR performed by Sanaz Ivan MD at [facility] OR    HERNIA REPAIR      IR NEPHROSTOMY CATHETER PLACEMENT  2024    IR NEPHROSTOMY CATHETER PLACEMENT 2024 [facility] SPECIAL PROCEDURES    IR PORT PLACEMENT EQUAL OR GREATER THAN 5 YEARS  3/7/2024    IR PORT PLACEMENT EQUAL OR GREATER THAN 5 YEARS 3/7/2024 [facility] PROCEDURES    OTHER SURGICAL HISTORY Bilateral 2018    bilat inq hernia repair with mesh       Immunization History:   Immunization History   Administered

## 2024-03-07 NOTE — PLAN OF CARE
Problem: Discharge Planning  Goal: Discharge to home or other facility with appropriate resources  3/6/2024 2300 by Enedina Adames RN  Outcome: Progressing  3/6/2024 1848 by Marina Chavez LPN  Outcome: Progressing     Problem: Pain  Goal: Verbalizes/displays adequate comfort level or baseline comfort level  3/6/2024 2300 by Enedina Adames RN  Outcome: Progressing  3/6/2024 1848 by Marina Chavez LPN  Outcome: Progressing     Problem: Safety - Adult  Goal: Free from fall injury  3/6/2024 2300 by Enedina Adames RN  Outcome: Progressing  3/6/2024 1848 by Marina Chavez LPN  Outcome: Progressing

## 2024-03-08 ENCOUNTER — HOSPITAL ENCOUNTER (OUTPATIENT)
Dept: INTERVENTIONAL RADIOLOGY/VASCULAR | Age: 64
Discharge: HOME | End: 2024-03-08

## 2024-03-15 DIAGNOSIS — Z11.59 NEED FOR HEPATITIS B SCREENING TEST: ICD-10-CM

## 2024-03-15 DIAGNOSIS — C67.8 MALIGNANT NEOPLASM OF OVERLAPPING SITES OF BLADDER (HCC): Primary | ICD-10-CM

## 2024-03-15 RX ORDER — TAMSULOSIN HYDROCHLORIDE 0.4 MG/1
CAPSULE ORAL DAILY
Qty: 30 CAPSULE | Refills: 0 | Status: SHIPPED | OUTPATIENT
Start: 2024-03-15

## 2024-03-17 RX ORDER — AMOXICILLIN 875 MG/1
875 TABLET, COATED ORAL 2 TIMES DAILY
Qty: 10 TABLET | Refills: 0 | Status: SHIPPED | OUTPATIENT
Start: 2024-03-17 | End: 2024-03-22

## 2024-03-18 ENCOUNTER — HOSPITAL ENCOUNTER (OUTPATIENT)
Dept: INFUSION THERAPY | Age: 64
Discharge: HOME | End: 2024-03-18
Payer: COMMERCIAL

## 2024-03-18 DIAGNOSIS — C67.8 MALIGNANT NEOPLASM OF OVERLAPPING SITES OF BLADDER (HCC): ICD-10-CM

## 2024-03-18 DIAGNOSIS — Z11.59 NEED FOR HEPATITIS B SCREENING TEST: ICD-10-CM

## 2024-03-18 LAB
ALBUMIN SERPL-MCNC: 3.9 GM/DL (ref 3.4–5)
ALP BLD-CCNC: 123 IU/L (ref 40–129)
ALT SERPL-CCNC: 9 U/L (ref 10–40)
ANION GAP SERPL CALCULATED.3IONS-SCNC: 14 MMOL/L (ref 7–16)
AST SERPL-CCNC: 10 IU/L (ref 15–37)
BASOPHILS ABSOLUTE: 0.1 K/CU MM
BASOPHILS RELATIVE PERCENT: 1.7 % (ref 0–1)
BILIRUB SERPL-MCNC: 0.2 MG/DL (ref 0–1)
BUN SERPL-MCNC: 15 MG/DL (ref 6–23)
CALCIUM SERPL-MCNC: 9.3 MG/DL (ref 8.3–10.6)
CHLORIDE BLD-SCNC: 108 MMOL/L (ref 99–110)
CO2: 24 MMOL/L (ref 21–32)
CREAT SERPL-MCNC: 1.1 MG/DL (ref 0.9–1.3)
DIFFERENTIAL TYPE: ABNORMAL
EOSINOPHILS ABSOLUTE: 0.4 K/CU MM
EOSINOPHILS RELATIVE PERCENT: 6.7 % (ref 0–3)
GFR SERPL CREATININE-BSD FRML MDRD: >60 ML/MIN/1.73M2
GLUCOSE SERPL-MCNC: 117 MG/DL (ref 70–99)
HCT VFR BLD CALC: 37.1 % (ref 42–52)
HEMOGLOBIN: 11.6 GM/DL (ref 13.5–18)
LYMPHOCYTES ABSOLUTE: 2 K/CU MM
LYMPHOCYTES RELATIVE PERCENT: 30.7 % (ref 24–44)
MAGNESIUM: 2 MG/DL (ref 1.8–2.4)
MCH RBC QN AUTO: 28.4 PG (ref 27–31)
MCHC RBC AUTO-ENTMCNC: 31.3 % (ref 32–36)
MCV RBC AUTO: 90.9 FL (ref 78–100)
MONOCYTES ABSOLUTE: 0.6 K/CU MM
MONOCYTES RELATIVE PERCENT: 9.2 % (ref 0–4)
PDW BLD-RTO: 18.2 % (ref 11.7–14.9)
PLATELET # BLD: 411 K/CU MM (ref 140–440)
PMV BLD AUTO: 8.4 FL (ref 7.5–11.1)
POTASSIUM SERPL-SCNC: 4.7 MMOL/L (ref 3.5–5.1)
RBC # BLD: 4.08 M/CU MM (ref 4.6–6.2)
SEGMENTED NEUTROPHILS ABSOLUTE COUNT: 3.3 K/CU MM
SEGMENTED NEUTROPHILS RELATIVE PERCENT: 51.7 % (ref 36–66)
SODIUM BLD-SCNC: 146 MMOL/L (ref 135–145)
TOTAL PROTEIN: 6.8 GM/DL (ref 6.4–8.2)
WBC # BLD: 6.4 K/CU MM (ref 4–10.5)

## 2024-03-18 PROCEDURE — 80053 COMPREHEN METABOLIC PANEL: CPT

## 2024-03-18 PROCEDURE — 36415 COLL VENOUS BLD VENIPUNCTURE: CPT

## 2024-03-18 PROCEDURE — 85025 COMPLETE CBC W/AUTO DIFF WBC: CPT

## 2024-03-18 PROCEDURE — 83735 ASSAY OF MAGNESIUM: CPT

## 2024-03-18 RX ORDER — DIPHENHYDRAMINE HYDROCHLORIDE 50 MG/ML
50 INJECTION INTRAMUSCULAR; INTRAVENOUS
Status: CANCELLED | OUTPATIENT
Start: 2024-03-19

## 2024-03-18 RX ORDER — MEPERIDINE HYDROCHLORIDE 50 MG/ML
12.5 INJECTION INTRAMUSCULAR; INTRAVENOUS; SUBCUTANEOUS PRN
Status: CANCELLED | OUTPATIENT
Start: 2024-03-19

## 2024-03-18 RX ORDER — MEPERIDINE HYDROCHLORIDE 50 MG/ML
12.5 INJECTION INTRAMUSCULAR; INTRAVENOUS; SUBCUTANEOUS PRN
OUTPATIENT
Start: 2024-03-26

## 2024-03-18 RX ORDER — ONDANSETRON 2 MG/ML
8 INJECTION INTRAMUSCULAR; INTRAVENOUS
OUTPATIENT
Start: 2024-03-26

## 2024-03-18 RX ORDER — SODIUM CHLORIDE 9 MG/ML
INJECTION, SOLUTION INTRAVENOUS CONTINUOUS
OUTPATIENT
Start: 2024-03-26

## 2024-03-18 RX ORDER — EPINEPHRINE 1 MG/ML
0.3 INJECTION, SOLUTION, CONCENTRATE INTRAVENOUS PRN
OUTPATIENT
Start: 2024-03-26

## 2024-03-18 RX ORDER — SODIUM CHLORIDE 9 MG/ML
INJECTION, SOLUTION INTRAVENOUS CONTINUOUS
Status: CANCELLED | OUTPATIENT
Start: 2024-03-19

## 2024-03-18 RX ORDER — SODIUM CHLORIDE 9 MG/ML
5-250 INJECTION, SOLUTION INTRAVENOUS PRN
OUTPATIENT
Start: 2024-03-26

## 2024-03-18 RX ORDER — SODIUM CHLORIDE 9 MG/ML
5-250 INJECTION, SOLUTION INTRAVENOUS PRN
Status: CANCELLED | OUTPATIENT
Start: 2024-03-19

## 2024-03-18 RX ORDER — FAMOTIDINE 10 MG/ML
20 INJECTION, SOLUTION INTRAVENOUS
Status: CANCELLED | OUTPATIENT
Start: 2024-03-19

## 2024-03-18 RX ORDER — SODIUM CHLORIDE 0.9 % (FLUSH) 0.9 %
5-40 SYRINGE (ML) INJECTION PRN
Status: CANCELLED | OUTPATIENT
Start: 2024-03-19

## 2024-03-18 RX ORDER — PALONOSETRON 0.05 MG/ML
0.25 INJECTION, SOLUTION INTRAVENOUS ONCE
Status: CANCELLED | OUTPATIENT
Start: 2024-03-19

## 2024-03-18 RX ORDER — ACETAMINOPHEN 325 MG/1
650 TABLET ORAL
Status: CANCELLED | OUTPATIENT
Start: 2024-03-19

## 2024-03-18 RX ORDER — ALBUTEROL SULFATE 90 UG/1
4 AEROSOL, METERED RESPIRATORY (INHALATION) PRN
OUTPATIENT
Start: 2024-03-26

## 2024-03-18 RX ORDER — OLANZAPINE 2.5 MG/1
2.5 TABLET, FILM COATED ORAL NIGHTLY
Qty: 24 TABLET | Refills: 0 | Status: SHIPPED | OUTPATIENT
Start: 2024-03-18

## 2024-03-18 RX ORDER — PALONOSETRON 0.05 MG/ML
0.25 INJECTION, SOLUTION INTRAVENOUS ONCE
OUTPATIENT
Start: 2024-03-26

## 2024-03-18 RX ORDER — ACETAMINOPHEN 325 MG/1
650 TABLET ORAL
OUTPATIENT
Start: 2024-03-26

## 2024-03-18 RX ORDER — OXYCODONE HYDROCHLORIDE AND ACETAMINOPHEN 5; 325 MG/1; MG/1
1 TABLET ORAL EVERY 6 HOURS PRN
Qty: 56 TABLET | Refills: 0 | Status: SHIPPED | OUTPATIENT
Start: 2024-03-18 | End: 2024-04-01

## 2024-03-18 RX ORDER — FAMOTIDINE 10 MG/ML
20 INJECTION, SOLUTION INTRAVENOUS
OUTPATIENT
Start: 2024-03-26

## 2024-03-18 RX ORDER — EPINEPHRINE 1 MG/ML
0.3 INJECTION, SOLUTION, CONCENTRATE INTRAVENOUS PRN
Status: CANCELLED | OUTPATIENT
Start: 2024-03-19

## 2024-03-18 RX ORDER — DIPHENHYDRAMINE HYDROCHLORIDE 50 MG/ML
50 INJECTION INTRAMUSCULAR; INTRAVENOUS
OUTPATIENT
Start: 2024-03-26

## 2024-03-18 RX ORDER — HEPARIN SODIUM (PORCINE) LOCK FLUSH IV SOLN 100 UNIT/ML 100 UNIT/ML
500 SOLUTION INTRAVENOUS PRN
Status: CANCELLED | OUTPATIENT
Start: 2024-03-19

## 2024-03-18 RX ORDER — ONDANSETRON 2 MG/ML
8 INJECTION INTRAMUSCULAR; INTRAVENOUS
Status: CANCELLED | OUTPATIENT
Start: 2024-03-19

## 2024-03-18 RX ORDER — HEPARIN SODIUM (PORCINE) LOCK FLUSH IV SOLN 100 UNIT/ML 100 UNIT/ML
500 SOLUTION INTRAVENOUS PRN
OUTPATIENT
Start: 2024-03-26

## 2024-03-18 RX ORDER — SODIUM CHLORIDE 0.9 % (FLUSH) 0.9 %
5-40 SYRINGE (ML) INJECTION PRN
OUTPATIENT
Start: 2024-03-26

## 2024-03-18 RX ORDER — ALBUTEROL SULFATE 90 UG/1
4 AEROSOL, METERED RESPIRATORY (INHALATION) PRN
Status: CANCELLED | OUTPATIENT
Start: 2024-03-19

## 2024-03-19 ENCOUNTER — OFFICE VISIT (OUTPATIENT)
Dept: ONCOLOGY | Age: 64
End: 2024-03-19
Payer: COMMERCIAL

## 2024-03-19 ENCOUNTER — CLINICAL DOCUMENTATION (OUTPATIENT)
Dept: ONCOLOGY | Age: 64
End: 2024-03-19

## 2024-03-19 ENCOUNTER — HOSPITAL ENCOUNTER (OUTPATIENT)
Dept: INFUSION THERAPY | Age: 64
Discharge: HOME | End: 2024-03-19
Payer: COMMERCIAL

## 2024-03-19 VITALS
TEMPERATURE: 97.2 F | WEIGHT: 184.4 LBS | HEIGHT: 70 IN | BODY MASS INDEX: 26.4 KG/M2 | SYSTOLIC BLOOD PRESSURE: 154 MMHG | RESPIRATION RATE: 16 BRPM | HEART RATE: 92 BPM | DIASTOLIC BLOOD PRESSURE: 82 MMHG | OXYGEN SATURATION: 98 %

## 2024-03-19 VITALS
TEMPERATURE: 97.2 F | HEIGHT: 70 IN | RESPIRATION RATE: 16 BRPM | SYSTOLIC BLOOD PRESSURE: 154 MMHG | HEART RATE: 92 BPM | WEIGHT: 184.4 LBS | OXYGEN SATURATION: 98 % | BODY MASS INDEX: 26.4 KG/M2 | DIASTOLIC BLOOD PRESSURE: 82 MMHG

## 2024-03-19 DIAGNOSIS — C67.8 MALIGNANT NEOPLASM OF OVERLAPPING SITES OF BLADDER (HCC): Primary | ICD-10-CM

## 2024-03-19 DIAGNOSIS — Z11.59 NEED FOR HEPATITIS B SCREENING TEST: ICD-10-CM

## 2024-03-19 PROCEDURE — 99214 OFFICE O/P EST MOD 30 MIN: CPT | Performed by: INTERNAL MEDICINE

## 2024-03-19 PROCEDURE — 3077F SYST BP >= 140 MM HG: CPT | Performed by: INTERNAL MEDICINE

## 2024-03-19 PROCEDURE — 96413 CHEMO IV INFUSION 1 HR: CPT

## 2024-03-19 PROCEDURE — 96361 HYDRATE IV INFUSION ADD-ON: CPT

## 2024-03-19 PROCEDURE — 6360000002 HC RX W HCPCS: Performed by: INTERNAL MEDICINE

## 2024-03-19 PROCEDURE — 96417 CHEMO IV INFUS EACH ADDL SEQ: CPT

## 2024-03-19 PROCEDURE — 3079F DIAST BP 80-89 MM HG: CPT | Performed by: INTERNAL MEDICINE

## 2024-03-19 PROCEDURE — 96375 TX/PRO/DX INJ NEW DRUG ADDON: CPT

## 2024-03-19 PROCEDURE — 96367 TX/PROPH/DG ADDL SEQ IV INF: CPT

## 2024-03-19 PROCEDURE — 2580000003 HC RX 258: Performed by: INTERNAL MEDICINE

## 2024-03-19 RX ORDER — SODIUM CHLORIDE 9 MG/ML
5-250 INJECTION, SOLUTION INTRAVENOUS PRN
Status: DISCONTINUED | OUTPATIENT
Start: 2024-03-19 | End: 2024-03-20 | Stop reason: HOSPADM

## 2024-03-19 RX ORDER — PALONOSETRON 0.05 MG/ML
0.25 INJECTION, SOLUTION INTRAVENOUS ONCE
Status: COMPLETED | OUTPATIENT
Start: 2024-03-19 | End: 2024-03-19

## 2024-03-19 RX ORDER — SODIUM CHLORIDE 0.9 % (FLUSH) 0.9 %
5-40 SYRINGE (ML) INJECTION PRN
Status: DISCONTINUED | OUTPATIENT
Start: 2024-03-19 | End: 2024-03-20 | Stop reason: HOSPADM

## 2024-03-19 RX ORDER — HEPARIN 100 UNIT/ML
500 SYRINGE INTRAVENOUS PRN
Status: DISCONTINUED | OUTPATIENT
Start: 2024-03-19 | End: 2024-03-20 | Stop reason: HOSPADM

## 2024-03-19 RX ADMIN — SODIUM CHLORIDE 150 MG: 9 INJECTION, SOLUTION INTRAVENOUS at 09:56

## 2024-03-19 RX ADMIN — DEXAMETHASONE SODIUM PHOSPHATE 12 MG: 4 INJECTION, SOLUTION INTRAMUSCULAR; INTRAVENOUS at 10:42

## 2024-03-19 RX ADMIN — GEMCITABINE HYDROCHLORIDE 1900 MG: 2 INJECTION, SOLUTION INTRAVENOUS at 11:02

## 2024-03-19 RX ADMIN — POTASSIUM CHLORIDE: 2 INJECTION, SOLUTION, CONCENTRATE INTRAVENOUS at 08:45

## 2024-03-19 RX ADMIN — HEPARIN 500 UNITS: 100 SYRINGE at 13:44

## 2024-03-19 RX ADMIN — PALONOSETRON 0.25 MG: 0.25 INJECTION, SOLUTION INTRAVENOUS at 09:50

## 2024-03-19 RX ADMIN — CISPLATIN 67 MG: 1 INJECTION INTRAVENOUS at 11:40

## 2024-03-19 RX ADMIN — POTASSIUM CHLORIDE: 2 INJECTION, SOLUTION, CONCENTRATE INTRAVENOUS at 11:38

## 2024-03-19 RX ADMIN — SODIUM CHLORIDE 20 ML/HR: 9 INJECTION, SOLUTION INTRAVENOUS at 09:56

## 2024-03-19 ASSESSMENT — PAIN SCALES - GENERAL: PAINLEVEL_OUTOF10: 6

## 2024-03-19 NOTE — PROGRESS NOTES
Status appropriately assessed and documented. All required labs and results reviewed. Treatment approved by provider. Treatment orders and medications verified by 2 Registered Nurses where applicable. Treatment plan was confirmed with patient prior to administration, and educated the need to report any treatment-related symptoms      Ambulated to infusion area, here today for chemotherapy. Had an OV with Dr. Franklin, No concerns at this time. Right chest mediport accessed, positive blood return noted. Labs reviewed, Labs within defined limits.  Chemo administered as ordered. Call light within reach. Tolerated infusion without incident.  Discharge instructions provided.

## 2024-03-19 NOTE — PROGRESS NOTES
MA Rooming Questions  Patient: Jethro Vázquez  MRN: [id number]    Date: 3/19/2024        1. Do you have any new issues?   no         2. Do you need any refills on medications?    no    3. Have you had any imaging done since your last visit?   yes - US of gallbladder states had a ct scan of the gallbladder as well.     4. Have you been hospitalized or seen in the emergency room since your last visit here?   yes - Hospital 3/4-3/7    5. Did the patient have a depression screening completed today? No    No data recorded     PHQ-9 Given to (if applicable):               PHQ-9 Score (if applicable):                     [] Positive     []  Negative              Does question #9 need addressed (if applicable)                     [] Yes    []  No               Mable Phoenix MA

## 2024-03-22 ENCOUNTER — HOSPITAL ENCOUNTER (OUTPATIENT)
Dept: PET IMAGING | Age: 64
Discharge: HOME | End: 2024-03-22
Attending: INTERNAL MEDICINE
Payer: COMMERCIAL

## 2024-03-22 PROCEDURE — 2580000003 HC RX 258: Performed by: INTERNAL MEDICINE

## 2024-03-22 PROCEDURE — 78815 PET IMAGE W/CT SKULL-THIGH: CPT

## 2024-03-22 PROCEDURE — 3430000000 HC RX DIAGNOSTIC RADIOPHARMACEUTICAL: Performed by: INTERNAL MEDICINE

## 2024-03-22 PROCEDURE — A9609 HC RX DIAGNOSTIC RADIOPHARMACEUTICAL: HCPCS | Performed by: INTERNAL MEDICINE

## 2024-03-22 RX ORDER — FLUDEOXYGLUCOSE F 18 200 MCI/ML
18.14 INJECTION, SOLUTION INTRAVENOUS
Status: COMPLETED | OUTPATIENT
Start: 2024-03-22 | End: 2024-03-22

## 2024-03-22 RX ORDER — SODIUM CHLORIDE 0.9 % (FLUSH) 0.9 %
10 SYRINGE (ML) INJECTION PRN
Status: COMPLETED | OUTPATIENT
Start: 2024-03-22 | End: 2024-03-22

## 2024-03-22 RX ADMIN — FLUDEOXYGLUCOSE F 18 18.14 MILLICURIE: 200 INJECTION, SOLUTION INTRAVENOUS at 10:19

## 2024-03-22 RX ADMIN — SODIUM CHLORIDE, PRESERVATIVE FREE 10 ML: 5 INJECTION INTRAVENOUS at 10:19

## 2024-03-25 ENCOUNTER — HOSPITAL ENCOUNTER (OUTPATIENT)
Dept: INFUSION THERAPY | Age: 64
Discharge: HOME | End: 2024-03-25
Payer: MEDICARE

## 2024-03-25 ENCOUNTER — OFFICE VISIT (OUTPATIENT)
Dept: ONCOLOGY | Age: 64
End: 2024-03-25
Payer: COMMERCIAL

## 2024-03-25 VITALS
DIASTOLIC BLOOD PRESSURE: 94 MMHG | TEMPERATURE: 97.9 F | OXYGEN SATURATION: 98 % | HEART RATE: 91 BPM | BODY MASS INDEX: 26.83 KG/M2 | SYSTOLIC BLOOD PRESSURE: 146 MMHG | HEIGHT: 70 IN | WEIGHT: 187.4 LBS

## 2024-03-25 DIAGNOSIS — C67.8 MALIGNANT NEOPLASM OF OVERLAPPING SITES OF BLADDER (HCC): Primary | ICD-10-CM

## 2024-03-25 DIAGNOSIS — Z11.59 NEED FOR HEPATITIS B SCREENING TEST: ICD-10-CM

## 2024-03-25 DIAGNOSIS — C67.8 MALIGNANT NEOPLASM OF OVERLAPPING SITES OF BLADDER (HCC): ICD-10-CM

## 2024-03-25 LAB
ALBUMIN SERPL-MCNC: 3.8 GM/DL (ref 3.4–5)
ALP BLD-CCNC: 120 IU/L (ref 40–128)
ALT SERPL-CCNC: 13 U/L (ref 10–40)
ANION GAP SERPL CALCULATED.3IONS-SCNC: 13 MMOL/L (ref 7–16)
AST SERPL-CCNC: 11 IU/L (ref 15–37)
BASOPHILS ABSOLUTE: 0.1 K/CU MM
BASOPHILS RELATIVE PERCENT: 1.1 % (ref 0–1)
BILIRUB SERPL-MCNC: 0.3 MG/DL (ref 0–1)
BUN SERPL-MCNC: 17 MG/DL (ref 6–23)
CALCIUM SERPL-MCNC: 9.2 MG/DL (ref 8.3–10.6)
CHLORIDE BLD-SCNC: 100 MMOL/L (ref 99–110)
CO2: 25 MMOL/L (ref 21–32)
CREAT SERPL-MCNC: 0.9 MG/DL (ref 0.9–1.3)
DIFFERENTIAL TYPE: ABNORMAL
EOSINOPHILS ABSOLUTE: 0.1 K/CU MM
EOSINOPHILS RELATIVE PERCENT: 2.2 % (ref 0–3)
GFR SERPL CREATININE-BSD FRML MDRD: >90 ML/MIN/1.73M2
GLUCOSE SERPL-MCNC: 100 MG/DL (ref 70–99)
HCT VFR BLD CALC: 34.5 % (ref 42–52)
HEMOGLOBIN: 11 GM/DL (ref 13.5–18)
LYMPHOCYTES ABSOLUTE: 1.6 K/CU MM
LYMPHOCYTES RELATIVE PERCENT: 34.5 % (ref 24–44)
MAGNESIUM: 1.9 MG/DL (ref 1.8–2.4)
MCH RBC QN AUTO: 28.3 PG (ref 27–31)
MCHC RBC AUTO-ENTMCNC: 31.9 % (ref 32–36)
MCV RBC AUTO: 88.7 FL (ref 78–100)
MONOCYTES ABSOLUTE: 0.2 K/CU MM
MONOCYTES RELATIVE PERCENT: 5.3 % (ref 0–4)
PDW BLD-RTO: 16.8 % (ref 11.7–14.9)
PLATELET # BLD: 234 K/CU MM (ref 140–440)
PMV BLD AUTO: 8.3 FL (ref 7.5–11.1)
POTASSIUM SERPL-SCNC: 4.5 MMOL/L (ref 3.5–5.1)
RBC # BLD: 3.89 M/CU MM (ref 4.6–6.2)
SEGMENTED NEUTROPHILS ABSOLUTE COUNT: 2.6 K/CU MM
SEGMENTED NEUTROPHILS RELATIVE PERCENT: 56.9 % (ref 36–66)
SODIUM BLD-SCNC: 138 MMOL/L (ref 135–145)
TOTAL PROTEIN: 6.7 GM/DL (ref 6.4–8.2)
WBC # BLD: 4.6 K/CU MM (ref 4–10.5)

## 2024-03-25 PROCEDURE — 3077F SYST BP >= 140 MM HG: CPT | Performed by: INTERNAL MEDICINE

## 2024-03-25 PROCEDURE — 36415 COLL VENOUS BLD VENIPUNCTURE: CPT

## 2024-03-25 PROCEDURE — 80053 COMPREHEN METABOLIC PANEL: CPT

## 2024-03-25 PROCEDURE — 99214 OFFICE O/P EST MOD 30 MIN: CPT | Performed by: INTERNAL MEDICINE

## 2024-03-25 PROCEDURE — 85025 COMPLETE CBC W/AUTO DIFF WBC: CPT

## 2024-03-25 PROCEDURE — 3080F DIAST BP >= 90 MM HG: CPT | Performed by: INTERNAL MEDICINE

## 2024-03-25 PROCEDURE — 83735 ASSAY OF MAGNESIUM: CPT

## 2024-03-25 PROCEDURE — 99211 OFF/OP EST MAY X REQ PHY/QHP: CPT

## 2024-03-25 RX ORDER — CEPHALEXIN 500 MG/1
500 CAPSULE ORAL 2 TIMES DAILY
Qty: 14 CAPSULE | Refills: 0 | Status: SHIPPED | OUTPATIENT
Start: 2024-03-25 | End: 2024-04-01

## 2024-03-25 NOTE — PROGRESS NOTES
Patient Name: Jethro Vázquez  Patient : 1960  Patient MRN: [id number]     Primary Oncologist: Noemi Gambino MD  Referring Provider: Mono Schaffer MD     Date of Service: 3/25/2024      Chief Complaint:   Chief Complaint   Patient presents with    Follow-up     FU visit    Patient Active Problem List:     Non-recurrent bilateral inguinal hernia without obstruction or gangrene     Right inguinal hernia     Lipoma, spermatic cord     Benign essential microscopic hematuria     Chronic left-sided low back pain without sciatica     Overweight     Primary hypertension     Pyelonephritis     Malignant neoplasm of overlapping sites of bladder      Need for hepatitis B screening test     Bladder cancer (HCC)     HPI:   Jethro Vázquez is a pleasant 64 y.o. male who was diagnosed with treatment naive urothelial carcinoma in 2023. He is s/p bilateral nephrostomy tube placement.  He has pelvic and retroperitoneal as well as paraesophageal LAD concerning for metastatic disease. Bone scan was negative in 2024. Plan was for outpatient PET however he has been hospitalized for pain.     CT A/P with contrast on 2023 was non-acute with no significant residual hydronephrosis however did redemonstrate significant LAD.      CT AP W/ Contrast 2024:  1.  Bilateral nephrostomy tubes are present as with the 01/15/2024 study. There is no new perinephric hematoma or hematoma along the course of the nephrostomy tubes.   2.  There is not a significant degree of residual hydronephrosis compared with the pre nephrostomy study of 01/10/2024.  Tiny focus of gas in the right renal collecting system may be secondary to the nephrostomy tube.   3.  There is delayed enhancement of the left kidney compared to the right side.  This could be vascular in nature with stenosis of the left renal artery and narrowing of the left renal vein.  The left renal vein is retroaortic and likely compressed with the severe

## 2024-03-25 NOTE — PROGRESS NOTES
MA Rooming Questions  Patient: Jethro Vázquez  MRN: [id number]    Date: 3/25/2024        1. Do you have any new issues?   yes -          2. Do you need any refills on medications?    no    3. Have you had any imaging done since your last visit?   yes - Pet Scan 03/22    4. Have you been hospitalized or seen in the emergency room since your last visit here?   no    5. Did the patient have a depression screening completed today? No    No data recorded     PHQ-9 Given to (if applicable):               PHQ-9 Score (if applicable):                     [] Positive     []  Negative              Does question #9 need addressed (if applicable)                     [] Yes    []  No               Barbara Lang MA

## 2024-03-25 NOTE — PROGRESS NOTES
IR Procedure at [facility]: Spoke with patient and he will arrive at 1100 at [facility] on 4/1/2024 for his procedure at 1230. Also went over below instructions and told patient to take his medications as scheduled.    NPO at Midnight     (Paracentesis, Thoracentesis, Thyroid Bx and Injections may have a light breakfast)  2.   Follow your directions as prescribed by the doctor for your procedure and medications.  3.   Consult your provider as to when to stop blood thinner  4.   Do not take any pain medication within 6 hours of your procedure  5.   Do not drink any alcoholic beverages or use any street drugs 24 hours before procedure.  6.   Please wear simple, loose fitting clothing to the hospital.  Do not bring valuables (money,             credit cards, checkbooks, etc.)     7.   If you  have a Living Will and Durable Power of  for Healthcare, please bring in a copy.  8.   Please bring picture ID,  insurance card, paperwork from the doctors office            (H & P, Consent,  & card for implantable devices).  9.   Report to the information desk on the ground floor.  10. Take a shower the night before or morning of your procedure, do not apply any lotion, oil or powder.  11. If you are going to be sedated for the procedure, you will need a responsible adult to drive you home.

## 2024-03-26 ENCOUNTER — HOSPITAL ENCOUNTER (EMERGENCY)
Age: 64
Discharge: LEFT BEFORE DISCHARGE | End: 2024-03-26

## 2024-03-26 ENCOUNTER — HOSPITAL ENCOUNTER (OUTPATIENT)
Dept: INFUSION THERAPY | Age: 64
Discharge: HOME | End: 2024-03-26
Payer: MEDICARE

## 2024-03-26 VITALS
OXYGEN SATURATION: 98 % | TEMPERATURE: 98.7 F | SYSTOLIC BLOOD PRESSURE: 143 MMHG | WEIGHT: 187.8 LBS | DIASTOLIC BLOOD PRESSURE: 81 MMHG | HEART RATE: 104 BPM | BODY MASS INDEX: 26.88 KG/M2 | HEIGHT: 70 IN

## 2024-03-26 VITALS
DIASTOLIC BLOOD PRESSURE: 84 MMHG | OXYGEN SATURATION: 97 % | TEMPERATURE: 98 F | RESPIRATION RATE: 15 BRPM | SYSTOLIC BLOOD PRESSURE: 149 MMHG | HEART RATE: 95 BPM

## 2024-03-26 DIAGNOSIS — Z11.59 NEED FOR HEPATITIS B SCREENING TEST: ICD-10-CM

## 2024-03-26 DIAGNOSIS — C67.8 MALIGNANT NEOPLASM OF OVERLAPPING SITES OF BLADDER (HCC): Primary | ICD-10-CM

## 2024-03-26 PROCEDURE — 96417 CHEMO IV INFUS EACH ADDL SEQ: CPT

## 2024-03-26 PROCEDURE — 96368 THER/DIAG CONCURRENT INF: CPT

## 2024-03-26 PROCEDURE — 96360 HYDRATION IV INFUSION INIT: CPT

## 2024-03-26 PROCEDURE — 6360000002 HC RX W HCPCS: Performed by: INTERNAL MEDICINE

## 2024-03-26 PROCEDURE — 96413 CHEMO IV INFUSION 1 HR: CPT

## 2024-03-26 PROCEDURE — 2580000003 HC RX 258: Performed by: INTERNAL MEDICINE

## 2024-03-26 PROCEDURE — 96375 TX/PRO/DX INJ NEW DRUG ADDON: CPT

## 2024-03-26 PROCEDURE — 96367 TX/PROPH/DG ADDL SEQ IV INF: CPT

## 2024-03-26 RX ORDER — SODIUM CHLORIDE 9 MG/ML
5-250 INJECTION, SOLUTION INTRAVENOUS PRN
Status: DISCONTINUED | OUTPATIENT
Start: 2024-03-26 | End: 2024-03-27 | Stop reason: HOSPADM

## 2024-03-26 RX ORDER — HEPARIN 100 UNIT/ML
500 SYRINGE INTRAVENOUS PRN
Status: DISCONTINUED | OUTPATIENT
Start: 2024-03-26 | End: 2024-03-27 | Stop reason: HOSPADM

## 2024-03-26 RX ORDER — PALONOSETRON 0.05 MG/ML
0.25 INJECTION, SOLUTION INTRAVENOUS ONCE
Status: COMPLETED | OUTPATIENT
Start: 2024-03-26 | End: 2024-03-26

## 2024-03-26 RX ORDER — SODIUM CHLORIDE 0.9 % (FLUSH) 0.9 %
5-40 SYRINGE (ML) INJECTION PRN
Status: DISCONTINUED | OUTPATIENT
Start: 2024-03-26 | End: 2024-03-27 | Stop reason: HOSPADM

## 2024-03-26 RX ADMIN — SODIUM CHLORIDE, PRESERVATIVE FREE 10 ML: 5 INJECTION INTRAVENOUS at 14:05

## 2024-03-26 RX ADMIN — SODIUM CHLORIDE 20 ML/HR: 9 INJECTION, SOLUTION INTRAVENOUS at 09:16

## 2024-03-26 RX ADMIN — CISPLATIN 67 MG: 1 INJECTION INTRAVENOUS at 11:54

## 2024-03-26 RX ADMIN — SODIUM CHLORIDE 150 MG: 9 INJECTION, SOLUTION INTRAVENOUS at 10:24

## 2024-03-26 RX ADMIN — PALONOSETRON 0.25 MG: 0.25 INJECTION, SOLUTION INTRAVENOUS at 10:20

## 2024-03-26 RX ADMIN — GEMCITABINE 1900 MG: 38 INJECTION, SOLUTION INTRAVENOUS at 11:13

## 2024-03-26 RX ADMIN — DEXAMETHASONE SODIUM PHOSPHATE 12 MG: 4 INJECTION, SOLUTION INTRAMUSCULAR; INTRAVENOUS at 10:48

## 2024-03-26 RX ADMIN — POTASSIUM CHLORIDE: 2 INJECTION, SOLUTION, CONCENTRATE INTRAVENOUS at 11:54

## 2024-03-26 RX ADMIN — POTASSIUM CHLORIDE: 2 INJECTION, SOLUTION, CONCENTRATE INTRAVENOUS at 09:16

## 2024-03-26 RX ADMIN — HEPARIN 500 UNITS: 100 SYRINGE at 14:05

## 2024-03-26 NOTE — PROGRESS NOTES
Arrived to treatment suite for scheduled Cisplatin and Gemzar infusion.  Labs reviewed, within treatment parameters.  Assessment complete, taking Oxycodone for pain, currently on Keflex for bumps on arms and using Zofran for nausea.  All have been effective in treating.      Mediport accessed per protocol.  Positive blood return noted.  Treatment plan approved, released and completed as ordered.  Pt tolerated well.  Mediport de-accessed per protocol.  AVS provided.  Discharge ambulatory in stable condition.  Mary Chaudhry RN

## 2024-03-28 ENCOUNTER — HOSPITAL ENCOUNTER (EMERGENCY)
Age: 64
Discharge: HOME | End: 2024-03-28
Attending: STUDENT IN AN ORGANIZED HEALTH CARE EDUCATION/TRAINING PROGRAM
Payer: MEDICARE

## 2024-03-28 ENCOUNTER — HOSPITAL ENCOUNTER (EMERGENCY)
Age: 64
Discharge: HOME | End: 2024-03-28
Payer: MEDICARE

## 2024-03-28 ENCOUNTER — APPOINTMENT (OUTPATIENT)
Dept: CT IMAGING | Age: 64
End: 2024-03-28
Payer: MEDICARE

## 2024-03-28 VITALS
SYSTOLIC BLOOD PRESSURE: 156 MMHG | HEIGHT: 70 IN | HEART RATE: 78 BPM | BODY MASS INDEX: 26.88 KG/M2 | RESPIRATION RATE: 18 BRPM | TEMPERATURE: 98 F | DIASTOLIC BLOOD PRESSURE: 87 MMHG | OXYGEN SATURATION: 100 % | WEIGHT: 187.8 LBS

## 2024-03-28 VITALS
RESPIRATION RATE: 19 BRPM | HEART RATE: 85 BPM | TEMPERATURE: 97.9 F | OXYGEN SATURATION: 98 % | DIASTOLIC BLOOD PRESSURE: 84 MMHG | SYSTOLIC BLOOD PRESSURE: 108 MMHG

## 2024-03-28 DIAGNOSIS — Z11.59 NEED FOR HEPATITIS B SCREENING TEST: ICD-10-CM

## 2024-03-28 DIAGNOSIS — C67.8 MALIGNANT NEOPLASM OF OVERLAPPING SITES OF BLADDER (HCC): Primary | ICD-10-CM

## 2024-03-28 DIAGNOSIS — T83.83XA NEPHROSTOMY TUBE BLEED (HCC): Primary | ICD-10-CM

## 2024-03-28 DIAGNOSIS — T83.098A MALFUNCTION OF NEPHROSTOMY TUBE (HCC): ICD-10-CM

## 2024-03-28 DIAGNOSIS — N39.0 URINARY TRACT INFECTION WITHOUT HEMATURIA, SITE UNSPECIFIED: Primary | ICD-10-CM

## 2024-03-28 LAB
ANION GAP SERPL CALCULATED.3IONS-SCNC: 10 MMOL/L (ref 7–16)
BACTERIA: NEGATIVE /HPF
BASOPHILS ABSOLUTE: 0 K/CU MM
BASOPHILS RELATIVE PERCENT: 0 % (ref 0–1)
BILIRUBIN URINE: ABNORMAL MG/DL
BLOOD, URINE: ABNORMAL
BUN SERPL-MCNC: 26 MG/DL (ref 6–23)
CALCIUM SERPL-MCNC: 8 MG/DL (ref 8.3–10.6)
CHLORIDE BLD-SCNC: 104 MMOL/L (ref 99–110)
CLARITY: ABNORMAL
CO2: 23 MMOL/L (ref 21–32)
COLOR: ABNORMAL
CREAT SERPL-MCNC: 0.8 MG/DL (ref 0.9–1.3)
DIFFERENTIAL TYPE: ABNORMAL
EOSINOPHILS ABSOLUTE: 0 K/CU MM
EOSINOPHILS RELATIVE PERCENT: 0.2 % (ref 0–3)
GFR SERPL CREATININE-BSD FRML MDRD: >90 ML/MIN/1.73M2
GLUCOSE SERPL-MCNC: 81 MG/DL (ref 70–99)
GLUCOSE, URINE: 100 MG/DL
HCT VFR BLD CALC: 30.9 % (ref 42–52)
HEMOGLOBIN: 9.9 GM/DL (ref 13.5–18)
IMMATURE NEUTROPHIL %: 0.4 % (ref 0–0.43)
KETONES, URINE: ABNORMAL MG/DL
LEUKOCYTE ESTERASE, URINE: ABNORMAL
LYMPHOCYTES ABSOLUTE: 1.2 K/CU MM
LYMPHOCYTES RELATIVE PERCENT: 21.8 % (ref 24–44)
MCH RBC QN AUTO: 28.5 PG (ref 27–31)
MCHC RBC AUTO-ENTMCNC: 32 % (ref 32–36)
MCV RBC AUTO: 89 FL (ref 78–100)
MONOCYTES ABSOLUTE: 0.2 K/CU MM
MONOCYTES RELATIVE PERCENT: 3.4 % (ref 0–4)
NITRITE URINE, QUANTITATIVE: POSITIVE
PDW BLD-RTO: 17 % (ref 11.7–14.9)
PH, URINE: 5 (ref 5–8)
PLATELET # BLD: 113 K/CU MM (ref 140–440)
PMV BLD AUTO: 9.4 FL (ref 7.5–11.1)
POTASSIUM SERPL-SCNC: 3.9 MMOL/L (ref 3.5–5.1)
PROTEIN UA: 100 MG/DL
RBC # BLD: 3.47 M/CU MM (ref 4.6–6.2)
RBC URINE: 2309 /HPF (ref 0–3)
SEGMENTED NEUTROPHILS ABSOLUTE COUNT: 4.1 K/CU MM
SEGMENTED NEUTROPHILS RELATIVE PERCENT: 74.2 % (ref 36–66)
SODIUM BLD-SCNC: 137 MMOL/L (ref 135–145)
SPECIFIC GRAVITY UA: 1.02 (ref 1–1.03)
TOTAL IMMATURE NEUTOROPHIL: 0.02 K/CU MM
TRICHOMONAS: ABNORMAL /HPF
UROBILINOGEN, URINE: 1 MG/DL (ref 0.2–1)
WBC # BLD: 5.6 K/CU MM (ref 4–10.5)
WBC UA: 442 /HPF (ref 0–2)

## 2024-03-28 PROCEDURE — 51798 US URINE CAPACITY MEASURE: CPT

## 2024-03-28 PROCEDURE — 85025 COMPLETE CBC W/AUTO DIFF WBC: CPT

## 2024-03-28 PROCEDURE — 74177 CT ABD & PELVIS W/CONTRAST: CPT

## 2024-03-28 PROCEDURE — 6360000004 HC RX CONTRAST MEDICATION: Performed by: STUDENT IN AN ORGANIZED HEALTH CARE EDUCATION/TRAINING PROGRAM

## 2024-03-28 PROCEDURE — 6370000000 HC RX 637 (ALT 250 FOR IP): Performed by: PHYSICIAN ASSISTANT

## 2024-03-28 PROCEDURE — 80048 BASIC METABOLIC PNL TOTAL CA: CPT

## 2024-03-28 PROCEDURE — 81001 URINALYSIS AUTO W/SCOPE: CPT

## 2024-03-28 PROCEDURE — 99285 EMERGENCY DEPT VISIT HI MDM: CPT

## 2024-03-28 PROCEDURE — 99283 EMERGENCY DEPT VISIT LOW MDM: CPT

## 2024-03-28 RX ORDER — PALONOSETRON 0.05 MG/ML
0.25 INJECTION, SOLUTION INTRAVENOUS ONCE
OUTPATIENT
Start: 2024-04-16

## 2024-03-28 RX ORDER — ACETAMINOPHEN 325 MG/1
650 TABLET ORAL
OUTPATIENT
Start: 2024-04-16

## 2024-03-28 RX ORDER — FAMOTIDINE 10 MG/ML
20 INJECTION, SOLUTION INTRAVENOUS
OUTPATIENT
Start: 2024-04-23

## 2024-03-28 RX ORDER — ALBUTEROL SULFATE 90 UG/1
4 AEROSOL, METERED RESPIRATORY (INHALATION) PRN
OUTPATIENT
Start: 2024-04-23

## 2024-03-28 RX ORDER — ALBUTEROL SULFATE 90 UG/1
4 AEROSOL, METERED RESPIRATORY (INHALATION) PRN
OUTPATIENT
Start: 2024-04-16

## 2024-03-28 RX ORDER — SODIUM CHLORIDE 9 MG/ML
5-250 INJECTION, SOLUTION INTRAVENOUS PRN
OUTPATIENT
Start: 2024-04-23

## 2024-03-28 RX ORDER — MEPERIDINE HYDROCHLORIDE 50 MG/ML
12.5 INJECTION INTRAMUSCULAR; INTRAVENOUS; SUBCUTANEOUS PRN
OUTPATIENT
Start: 2024-04-23

## 2024-03-28 RX ORDER — CEPHALEXIN 250 MG/1
500 CAPSULE ORAL ONCE
Status: COMPLETED | OUTPATIENT
Start: 2024-03-28 | End: 2024-03-28

## 2024-03-28 RX ORDER — DIPHENHYDRAMINE HYDROCHLORIDE 50 MG/ML
50 INJECTION INTRAMUSCULAR; INTRAVENOUS
OUTPATIENT
Start: 2024-04-16

## 2024-03-28 RX ORDER — SODIUM CHLORIDE 0.9 % (FLUSH) 0.9 %
5-40 SYRINGE (ML) INJECTION PRN
OUTPATIENT
Start: 2024-04-23

## 2024-03-28 RX ORDER — SODIUM CHLORIDE 9 MG/ML
5-250 INJECTION, SOLUTION INTRAVENOUS PRN
OUTPATIENT
Start: 2024-04-16

## 2024-03-28 RX ORDER — ACETAMINOPHEN 325 MG/1
650 TABLET ORAL
OUTPATIENT
Start: 2024-04-23

## 2024-03-28 RX ORDER — ONDANSETRON 2 MG/ML
8 INJECTION INTRAMUSCULAR; INTRAVENOUS
OUTPATIENT
Start: 2024-04-23

## 2024-03-28 RX ORDER — HEPARIN SODIUM (PORCINE) LOCK FLUSH IV SOLN 100 UNIT/ML 100 UNIT/ML
500 SOLUTION INTRAVENOUS PRN
OUTPATIENT
Start: 2024-04-16

## 2024-03-28 RX ORDER — MEPERIDINE HYDROCHLORIDE 50 MG/ML
12.5 INJECTION INTRAMUSCULAR; INTRAVENOUS; SUBCUTANEOUS PRN
OUTPATIENT
Start: 2024-04-16

## 2024-03-28 RX ORDER — SODIUM CHLORIDE 0.9 % (FLUSH) 0.9 %
5-40 SYRINGE (ML) INJECTION PRN
OUTPATIENT
Start: 2024-04-16

## 2024-03-28 RX ORDER — ONDANSETRON 2 MG/ML
8 INJECTION INTRAMUSCULAR; INTRAVENOUS
OUTPATIENT
Start: 2024-04-16

## 2024-03-28 RX ORDER — EPINEPHRINE 1 MG/ML
0.3 INJECTION, SOLUTION, CONCENTRATE INTRAVENOUS PRN
OUTPATIENT
Start: 2024-04-23

## 2024-03-28 RX ORDER — CEPHALEXIN 500 MG/1
500 CAPSULE ORAL 4 TIMES DAILY
Qty: 20 CAPSULE | Refills: 0 | Status: SHIPPED | OUTPATIENT
Start: 2024-03-28 | End: 2024-04-02

## 2024-03-28 RX ORDER — SODIUM CHLORIDE 9 MG/ML
INJECTION, SOLUTION INTRAVENOUS CONTINUOUS
OUTPATIENT
Start: 2024-04-23

## 2024-03-28 RX ORDER — HEPARIN SODIUM (PORCINE) LOCK FLUSH IV SOLN 100 UNIT/ML 100 UNIT/ML
500 SOLUTION INTRAVENOUS PRN
OUTPATIENT
Start: 2024-04-23

## 2024-03-28 RX ORDER — SODIUM CHLORIDE 9 MG/ML
INJECTION, SOLUTION INTRAVENOUS CONTINUOUS
OUTPATIENT
Start: 2024-04-16

## 2024-03-28 RX ORDER — PALONOSETRON 0.05 MG/ML
0.25 INJECTION, SOLUTION INTRAVENOUS ONCE
OUTPATIENT
Start: 2024-04-23

## 2024-03-28 RX ORDER — EPINEPHRINE 1 MG/ML
0.3 INJECTION, SOLUTION, CONCENTRATE INTRAVENOUS PRN
OUTPATIENT
Start: 2024-04-16

## 2024-03-28 RX ORDER — DIPHENHYDRAMINE HYDROCHLORIDE 50 MG/ML
50 INJECTION INTRAMUSCULAR; INTRAVENOUS
OUTPATIENT
Start: 2024-04-23

## 2024-03-28 RX ORDER — FAMOTIDINE 10 MG/ML
20 INJECTION, SOLUTION INTRAVENOUS
OUTPATIENT
Start: 2024-04-16

## 2024-03-28 RX ADMIN — CEPHALEXIN 500 MG: 250 CAPSULE ORAL at 17:38

## 2024-03-28 RX ADMIN — IOPAMIDOL 100 ML: 755 INJECTION, SOLUTION INTRAVENOUS at 08:31

## 2024-03-28 ASSESSMENT — PAIN DESCRIPTION - ORIENTATION: ORIENTATION: RIGHT

## 2024-03-28 ASSESSMENT — PAIN DESCRIPTION - LOCATION: LOCATION: FLANK

## 2024-03-28 ASSESSMENT — PAIN SCALES - GENERAL: PAINLEVEL_OUTOF10: 4

## 2024-03-28 ASSESSMENT — PAIN - FUNCTIONAL ASSESSMENT: PAIN_FUNCTIONAL_ASSESSMENT: 0-10

## 2024-03-28 NOTE — ED PROVIDER NOTES
32.0 - 36.0 %    RDW 17.0 (H) 11.7 - 14.9 %    Platelets 113 (L) 140 - 440 K/CU MM    MPV 9.4 7.5 - 11.1 FL    Differential Type AUTOMATED DIFFERENTIAL     Segs Relative 74.2 (H) 36 - 66 %    Lymphocytes % 21.8 (L) 24 - 44 %    Monocytes % 3.4 0 - 4 %    Eosinophils % 0.2 0 - 3 %    Basophils % 0.0 0 - 1 %    Segs Absolute 4.1 K/CU MM    Lymphocytes Absolute 1.2 K/CU MM    Monocytes Absolute 0.2 K/CU MM    Eosinophils Absolute 0.0 K/CU MM    Basophils Absolute 0.0 K/CU MM    Immature Neutrophil % 0.4 0 - 0.43 %    Total Immature Neutrophil 0.02 K/CU MM   BMP   Result Value Ref Range    Sodium 137 135 - 145 MMOL/L    Potassium 3.9 3.5 - 5.1 MMOL/L    Chloride 104 99 - 110 mMol/L    CO2 23 21 - 32 MMOL/L    Anion Gap 10 7 - 16    Glucose 81 70 - 99 MG/DL    BUN 26 (H) 6 - 23 MG/DL    Creatinine 0.8 (L) 0.9 - 1.3 MG/DL    Est, Glom Filt Rate >90 >60 mL/min/1.73m2    Calcium 8.0 (L) 8.3 - 10.6 MG/DL      Radiographs (if obtained):  Radiologist's Report Reviewed:  No results found.        MDM:  Assessment: 64-year-old male with past medical history of bladder cancer and obstruction with bilateral nephrostomy tubes presents with blood in his nephrostomy bag after the bag was tugged    Ddx including but not limited to: Nephrostomy tube malposition, trauma to the kidney, anemia    ED Course as of 03/28/24 0908   Thu Mar 28, 2024   0837 WBC: 5.6 [AR]   0837 Hemoglobin Quant(!): 9.9  Around his baseline [AR]   0847 Patient discussed with urology, Candice DELVALLE who is covering for Dr. Robbins.  He reports that there is no need to replace the nephrostomy tube unless it is grossly out of position in the CT abdomen and pelvis.  He also recommends flushing it with 5 cc of normal saline. [AR]   0850 Creatinine(!): 0.8  Baseline [AR]   0903 CT ABDOMEN PELVIS W IV CONTRAST Additional Contrast? None  IMPRESSION:     Persistent severe bladder wall thickening and stable appearing bilateral nephrostomy drainage tubes in place.      Moderate

## 2024-03-28 NOTE — DISCHARGE INSTRUCTIONS
-Follow-up with your urologist as scheduled  -Follow-up with your cancer doctor, Dr. Smith, and show him the results of the imaging below  -Come back to the emergency department if develop severe pain, severe amount of blood coming from your tube, or if you are concerned    CT ABDOMEN PELVIS W IV CONTRAST Additional Contrast? None   Final Result      Persistent severe bladder wall thickening and stable appearing bilateral nephrostomy drainage tubes in place.       Moderate stool noted throughout the colon without signs of any obstruction. Mild fatty liver appreciated. Previous abdominal wall surgery noted. Gallbladder no longer visualized and may have been resected.      Lytic bony expansion of the posterior medial acetabulum which may reflect bone metastasis or bone lesion.      Electronically signed by Gallito Feng DO

## 2024-03-28 NOTE — ED PROVIDER NOTES
medications (if applicable):  New Prescriptions    CEPHALEXIN (KEFLEX) 500 MG CAPSULE    Take 1 capsule by mouth 4 times daily for 5 days         Comment: Please note this report has been produced using speech recognition software and may contain errors related to that system including errors in grammar, punctuation, and spelling, as well as words and phrases that may be inappropriate. If there are any questions or concerns please feel free to contact the dictating provider for clarification.    Please note Images are personally interpreted by this Provider (MOOK Orellana. )However final disposition is made with deference to Radiologist interpretation of said images.       FELIPE Ortega Jeremy A, PA-C  03/28/24 4068

## 2024-03-28 NOTE — DISCHARGE INSTR - COC
Continuity of Care Form    Patient Name: Jethro Vázquez   :  1960  MRN:  [id number]    Admit date:  3/28/2024  Discharge date:  ***    Code Status Order: Prior   Advance Directives:     Admitting Physician:  No admitting provider for patient encounter.  PCP: Mono Schaffer MD    Discharging Nurse: ***  Discharging Hospital Unit/Room#:   Discharging Unit Phone Number: ***    Emergency Contact:   Extended Emergency Contact Information  Primary Emergency Contact: Chante Vázquez [facility]  Address: [address] [place] [address]  Home Phone: [phone]  Relation: Spouse    Past Surgical History:  Past Surgical History:   Procedure Laterality Date    ANKLE SURGERY Left     age 17\"did have pins and then later removed\"    APPENDECTOMY      CHOLECYSTECTOMY, LAPAROSCOPIC N/A 3/5/2024    CHOLECYSTECTOMY LAPAROSCOPIC ROBOTIC WITH EXTENSIVE LYSIS OF ADHESIONS performed by Rufus Mendes II, MD at [facility] OR    CYSTOSCOPY N/A 2023    CYSTOSCOPY TRANSURETHRAL RESECTION BLADDER TUMOR performed by Sanaz Ivan MD at [facility] OR    HERNIA REPAIR      IR NEPHROSTOMY CATHETER PLACEMENT  2024    IR NEPHROSTOMY CATHETER PLACEMENT 2024 [facility] SPECIAL PROCEDURES    IR PORT PLACEMENT EQUAL OR GREATER THAN 5 YEARS  3/7/2024    IR PORT PLACEMENT EQUAL OR GREATER THAN 5 YEARS 3/7/2024 [facility] SPECIAL PROCEDURES    OTHER SURGICAL HISTORY Bilateral 2018    bilat inq hernia repair with mesh       Immunization History:   Immunization History   Administered Date(s) Administered    TDaP, ADACEL (age 10y-64y), BOOSTRIX (age 10y+), IM, 0.5mL 10/26/2012       Active Problems:  Patient Active Problem List   Diagnosis Code    Non-recurrent bilateral inguinal hernia without obstruction or gangrene K40.20    Right inguinal hernia K40.90    Lipoma, 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

## 2024-03-28 NOTE — ED NOTES
Per perfect serve, Dean DELVALLE is providing coverage for Dr Robbins and a voice message left at this time.

## 2024-03-28 NOTE — ED NOTES
Flushed right nephrostomy drainage tubing and emptied drainage bag. Clear drainage only at this time. Right side dressing replaced and taped well.

## 2024-03-31 NOTE — PROGRESS NOTES
Patient Name: Jethro Vázquez  Patient : 1960  Patient MRN: [id number]     Primary Oncologist: Noemi Gambino MD  Referring Provider: Mono Schaffer MD     Date of Service: 2024      Chief Complaint:   No chief complaint on file.    FU visit    Patient Active Problem List:     Non-recurrent bilateral inguinal hernia without obstruction or gangrene     Right inguinal hernia     Lipoma, spermatic cord     Benign essential microscopic hematuria     Chronic left-sided low back pain without sciatica     Overweight     Primary hypertension     Pyelonephritis     Malignant neoplasm of overlapping sites of bladder      Need for hepatitis B screening test     Bladder cancer      HPI:   Jethro Vázquez is a pleasant 64 y.o. male who was diagnosed with treatment naive urothelial carcinoma in 2023. He is s/p bilateral nephrostomy tube placement.  He has pelvic and retroperitoneal as well as paraesophageal LAD concerning for metastatic disease. Bone scan was negative in 2024. Plan was for outpatient PET however he has been hospitalized for pain.     CT A/P with contrast on 2023 was non-acute with no significant residual hydronephrosis however did redemonstrate significant LAD.    CT AP W/ Contrast 2024:  1.  Bilateral nephrostomy tubes are present as with the 01/15/2024 study. There is no new perinephric hematoma or hematoma along the course of the nephrostomy tubes.   2.  There is not a significant degree of residual hydronephrosis compared with the pre nephrostomy study of 01/10/2024.  Tiny focus of gas in the right renal collecting system may be secondary to the nephrostomy tube.   3.  There is delayed enhancement of the left kidney compared to the right side.  This could be vascular in nature with stenosis of the left renal artery and narrowing of the left renal vein.  The left renal vein is retroaortic and likely compressed with the severe retroperitoneal lymphadenopathy.   4.  The

## 2024-04-01 ENCOUNTER — HOSPITAL ENCOUNTER (OUTPATIENT)
Dept: INTERVENTIONAL RADIOLOGY/VASCULAR | Age: 64
Discharge: HOME | End: 2024-04-01
Payer: COMMERCIAL

## 2024-04-01 VITALS
DIASTOLIC BLOOD PRESSURE: 85 MMHG | SYSTOLIC BLOOD PRESSURE: 140 MMHG | RESPIRATION RATE: 12 BRPM | HEART RATE: 83 BPM | OXYGEN SATURATION: 97 %

## 2024-04-01 DIAGNOSIS — C67.4 MALIGNANT NEOPLASM OF POSTERIOR WALL OF URINARY BLADDER (HCC): ICD-10-CM

## 2024-04-01 PROCEDURE — 0T25X0Z CHANGE DRAINAGE DEVICE IN KIDNEY, EXTERNAL APPROACH: ICD-10-PCS | Performed by: RADIOLOGY

## 2024-04-01 PROCEDURE — 2500000003 HC RX 250 WO HCPCS

## 2024-04-01 PROCEDURE — 6360000002 HC RX W HCPCS: Performed by: RADIOLOGY

## 2024-04-01 PROCEDURE — C2617 STENT, NON-COR, TEM W/O DEL: HCPCS

## 2024-04-01 PROCEDURE — 6360000002 HC RX W HCPCS

## 2024-04-01 PROCEDURE — 2580000003 HC RX 258

## 2024-04-01 PROCEDURE — 0T7D3DZ DILATION OF URETHRA WITH INTRALUMINAL DEVICE, PERCUTANEOUS APPROACH: ICD-10-PCS | Performed by: RADIOLOGY

## 2024-04-01 PROCEDURE — 50693 PLMT URETERAL STENT PRQ: CPT

## 2024-04-01 RX ORDER — FENTANYL CITRATE 50 UG/ML
INJECTION, SOLUTION INTRAMUSCULAR; INTRAVENOUS PRN
Status: COMPLETED | OUTPATIENT
Start: 2024-04-01 | End: 2024-04-01

## 2024-04-01 RX ORDER — SODIUM CHLORIDE 0.9 % (FLUSH) 0.9 %
10 SYRINGE (ML) INJECTION PRN
Status: DISCONTINUED | OUTPATIENT
Start: 2024-04-01 | End: 2024-04-02 | Stop reason: HOSPADM

## 2024-04-01 RX ADMIN — FENTANYL CITRATE 50 MCG: 50 INJECTION, SOLUTION INTRAMUSCULAR; INTRAVENOUS at 12:47

## 2024-04-01 RX ADMIN — FENTANYL CITRATE 25 MCG: 50 INJECTION, SOLUTION INTRAMUSCULAR; INTRAVENOUS at 12:54

## 2024-04-01 NOTE — PROGRESS NOTES
TRANSFER - OUT REPORT: Bilateral ureteral stents placed by Dr. Hamm. Pt tolerated procedure well, VSS, denies pain, Bilateral neph tubes removed, dressing clean dry and intact.     Verbal report given to Tara/Marcelina on Jethro Vázquez      Report consisted of patient's Situation, Background, Assessment and   Recommendations(SBAR).     Information from the following report(s) Nurse Handoff Report was reviewed with the receiving nurse.    Opportunity for questions and clarification was provided.      Patient transported with:   Registered Nurse

## 2024-04-04 ENCOUNTER — HOSPITAL ENCOUNTER (INPATIENT)
Age: 64
LOS: 5 days | Discharge: HOME | DRG: 871 | End: 2024-04-09
Attending: STUDENT IN AN ORGANIZED HEALTH CARE EDUCATION/TRAINING PROGRAM
Payer: COMMERCIAL

## 2024-04-04 ENCOUNTER — HOSPITAL ENCOUNTER (EMERGENCY)
Age: 64
Discharge: ANOTHER HOSPITAL | End: 2024-04-04
Attending: EMERGENCY MEDICINE
Payer: MEDICARE

## 2024-04-04 ENCOUNTER — APPOINTMENT (OUTPATIENT)
Dept: CT IMAGING | Age: 64
End: 2024-04-04
Attending: EMERGENCY MEDICINE
Payer: MEDICARE

## 2024-04-04 VITALS
OXYGEN SATURATION: 92 % | WEIGHT: 184 LBS | BODY MASS INDEX: 26.34 KG/M2 | SYSTOLIC BLOOD PRESSURE: 104 MMHG | DIASTOLIC BLOOD PRESSURE: 65 MMHG | HEIGHT: 70 IN | HEART RATE: 106 BPM | TEMPERATURE: 99.8 F | RESPIRATION RATE: 14 BRPM

## 2024-04-04 DIAGNOSIS — N39.0 URINARY TRACT INFECTION WITH HEMATURIA, SITE UNSPECIFIED: ICD-10-CM

## 2024-04-04 DIAGNOSIS — C67.9 MALIGNANT NEOPLASM OF URINARY BLADDER, UNSPECIFIED SITE (HCC): ICD-10-CM

## 2024-04-04 DIAGNOSIS — R31.9 URINARY TRACT INFECTION WITH HEMATURIA, SITE UNSPECIFIED: ICD-10-CM

## 2024-04-04 DIAGNOSIS — N30.01 ACUTE CYSTITIS WITH HEMATURIA: Primary | ICD-10-CM

## 2024-04-04 DIAGNOSIS — R10.31 ABDOMINAL PAIN, RIGHT LOWER QUADRANT: Primary | ICD-10-CM

## 2024-04-04 DIAGNOSIS — N17.9 AKI (ACUTE KIDNEY INJURY) (HCC): ICD-10-CM

## 2024-04-04 DIAGNOSIS — R11.2 NAUSEA AND VOMITING, UNSPECIFIED VOMITING TYPE: ICD-10-CM

## 2024-04-04 LAB
ALBUMIN SERPL-MCNC: 3.3 GM/DL (ref 3.4–5)
ALP BLD-CCNC: 135 IU/L (ref 40–129)
ALT SERPL-CCNC: 17 U/L (ref 10–40)
ANION GAP SERPL CALCULATED.3IONS-SCNC: 16 MMOL/L (ref 7–16)
AST SERPL-CCNC: 19 IU/L (ref 15–37)
BACTERIA: ABNORMAL /HPF
BASOPHILS ABSOLUTE: 0 K/CU MM
BASOPHILS RELATIVE PERCENT: 0.1 % (ref 0–1)
BILIRUB SERPL-MCNC: 0.6 MG/DL (ref 0–1)
BILIRUBIN URINE: NEGATIVE MG/DL
BLOOD, URINE: ABNORMAL
BUN SERPL-MCNC: 43 MG/DL (ref 6–23)
CALCIUM SERPL-MCNC: 8.6 MG/DL (ref 8.3–10.6)
CAST TYPE: ABNORMAL /HPF
CHLORIDE BLD-SCNC: 91 MMOL/L (ref 99–110)
CLARITY: CLEAR
CO2: 22 MMOL/L (ref 21–32)
COLOR: YELLOW
CREAT SERPL-MCNC: 2.7 MG/DL (ref 0.9–1.3)
CRYSTAL TYPE: NEGATIVE /HPF
DIFFERENTIAL TYPE: ABNORMAL
EOSINOPHILS ABSOLUTE: 0 K/CU MM
EOSINOPHILS RELATIVE PERCENT: 0 % (ref 0–3)
EPITHELIAL CELLS, UA: NEGATIVE /HPF
GFR SERPL CREATININE-BSD FRML MDRD: 26 ML/MIN/1.73M2
GLUCOSE SERPL-MCNC: 133 MG/DL (ref 70–99)
GLUCOSE, URINE: NEGATIVE MG/DL
HCT VFR BLD CALC: 29.1 % (ref 42–52)
HEMOGLOBIN: 9.9 GM/DL (ref 13.5–18)
IMMATURE NEUTROPHIL %: 0.6 % (ref 0–0.43)
KETONES, URINE: NEGATIVE MG/DL
LACTIC ACID, SEPSIS: 1.8 MMOL/L (ref 0.4–2)
LEUKOCYTE ESTERASE, URINE: ABNORMAL
LYMPHOCYTES ABSOLUTE: 0.7 K/CU MM
LYMPHOCYTES RELATIVE PERCENT: 10 % (ref 24–44)
MCH RBC QN AUTO: 28.3 PG (ref 27–31)
MCHC RBC AUTO-ENTMCNC: 34 % (ref 32–36)
MCV RBC AUTO: 83.1 FL (ref 78–100)
MONOCYTES ABSOLUTE: 0.3 K/CU MM
MONOCYTES RELATIVE PERCENT: 4.3 % (ref 0–4)
NITRITE URINE, QUANTITATIVE: NEGATIVE
PDW BLD-RTO: 17 % (ref 11.7–14.9)
PH, URINE: 6 (ref 5–8)
PLATELET # BLD: 59 K/CU MM (ref 140–440)
PMV BLD AUTO: 11.5 FL (ref 7.5–11.1)
POTASSIUM SERPL-SCNC: 3.8 MMOL/L (ref 3.5–5.1)
PROTEIN UA: >300 MG/DL
RBC # BLD: 3.5 M/CU MM (ref 4.6–6.2)
RBC URINE: 10 /HPF (ref 0–3)
SEGMENTED NEUTROPHILS ABSOLUTE COUNT: 6.1 K/CU MM
SEGMENTED NEUTROPHILS RELATIVE PERCENT: 85 % (ref 36–66)
SODIUM BLD-SCNC: 129 MMOL/L (ref 135–145)
SPECIFIC GRAVITY UA: >1.03 (ref 1–1.03)
TOTAL IMMATURE NEUTOROPHIL: 0.04 K/CU MM
TOTAL PROTEIN: 6.9 GM/DL (ref 6.4–8.2)
UROBILINOGEN, URINE: 0.2 MG/DL (ref 0.2–1)
WBC # BLD: 7.1 K/CU MM (ref 4–10.5)
WBC UA: 15 /HPF (ref 0–2)

## 2024-04-04 PROCEDURE — 85025 COMPLETE CBC W/AUTO DIFF WBC: CPT

## 2024-04-04 PROCEDURE — 6370000000 HC RX 637 (ALT 250 FOR IP): Performed by: STUDENT IN AN ORGANIZED HEALTH CARE EDUCATION/TRAINING PROGRAM

## 2024-04-04 PROCEDURE — 80053 COMPREHEN METABOLIC PANEL: CPT

## 2024-04-04 PROCEDURE — 1200000000 HC SEMI PRIVATE

## 2024-04-04 PROCEDURE — 87040 BLOOD CULTURE FOR BACTERIA: CPT

## 2024-04-04 PROCEDURE — 96376 TX/PRO/DX INJ SAME DRUG ADON: CPT

## 2024-04-04 PROCEDURE — 51798 US URINE CAPACITY MEASURE: CPT

## 2024-04-04 PROCEDURE — 86140 C-REACTIVE PROTEIN: CPT

## 2024-04-04 PROCEDURE — 2580000003 HC RX 258: Performed by: EMERGENCY MEDICINE

## 2024-04-04 PROCEDURE — 6360000002 HC RX W HCPCS: Performed by: EMERGENCY MEDICINE

## 2024-04-04 PROCEDURE — 87086 URINE CULTURE/COLONY COUNT: CPT

## 2024-04-04 PROCEDURE — 96365 THER/PROPH/DIAG IV INF INIT: CPT

## 2024-04-04 PROCEDURE — 84145 PROCALCITONIN (PCT): CPT

## 2024-04-04 PROCEDURE — 96375 TX/PRO/DX INJ NEW DRUG ADDON: CPT

## 2024-04-04 PROCEDURE — 93005 ELECTROCARDIOGRAM TRACING: CPT | Performed by: EMERGENCY MEDICINE

## 2024-04-04 PROCEDURE — 81001 URINALYSIS AUTO W/SCOPE: CPT

## 2024-04-04 PROCEDURE — 6360000002 HC RX W HCPCS: Performed by: STUDENT IN AN ORGANIZED HEALTH CARE EDUCATION/TRAINING PROGRAM

## 2024-04-04 PROCEDURE — 83605 ASSAY OF LACTIC ACID: CPT

## 2024-04-04 PROCEDURE — 2580000003 HC RX 258: Performed by: STUDENT IN AN ORGANIZED HEALTH CARE EDUCATION/TRAINING PROGRAM

## 2024-04-04 PROCEDURE — 74176 CT ABD & PELVIS W/O CONTRAST: CPT

## 2024-04-04 PROCEDURE — 99285 EMERGENCY DEPT VISIT HI MDM: CPT

## 2024-04-04 RX ORDER — ONDANSETRON 2 MG/ML
4 INJECTION INTRAMUSCULAR; INTRAVENOUS EVERY 30 MIN PRN
Status: DISCONTINUED | OUTPATIENT
Start: 2024-04-04 | End: 2024-04-04 | Stop reason: HOSPADM

## 2024-04-04 RX ORDER — SODIUM CHLORIDE 0.9 % (FLUSH) 0.9 %
5-40 SYRINGE (ML) INJECTION 2 TIMES DAILY
Status: DISCONTINUED | OUTPATIENT
Start: 2024-04-05 | End: 2024-04-09 | Stop reason: HOSPADM

## 2024-04-04 RX ORDER — SODIUM CHLORIDE 9 MG/ML
1000 INJECTION, SOLUTION INTRAVENOUS CONTINUOUS
Status: DISCONTINUED | OUTPATIENT
Start: 2024-04-04 | End: 2024-04-04 | Stop reason: HOSPADM

## 2024-04-04 RX ORDER — SODIUM CHLORIDE 9 MG/ML
INJECTION, SOLUTION INTRAVENOUS PRN
Status: DISCONTINUED | OUTPATIENT
Start: 2024-04-04 | End: 2024-04-09 | Stop reason: HOSPADM

## 2024-04-04 RX ORDER — HEPARIN SODIUM 5000 [USP'U]/ML
5000 INJECTION, SOLUTION INTRAVENOUS; SUBCUTANEOUS EVERY 8 HOURS SCHEDULED
Status: DISCONTINUED | OUTPATIENT
Start: 2024-04-04 | End: 2024-04-09 | Stop reason: HOSPADM

## 2024-04-04 RX ORDER — ONDANSETRON 2 MG/ML
4 INJECTION INTRAMUSCULAR; INTRAVENOUS EVERY 6 HOURS PRN
Status: DISCONTINUED | OUTPATIENT
Start: 2024-04-04 | End: 2024-04-09 | Stop reason: HOSPADM

## 2024-04-04 RX ORDER — MORPHINE SULFATE 4 MG/ML
4 INJECTION, SOLUTION INTRAMUSCULAR; INTRAVENOUS EVERY 30 MIN PRN
Status: DISCONTINUED | OUTPATIENT
Start: 2024-04-04 | End: 2024-04-04 | Stop reason: HOSPADM

## 2024-04-04 RX ORDER — SODIUM CHLORIDE 0.9 % (FLUSH) 0.9 %
5-40 SYRINGE (ML) INJECTION PRN
Status: DISCONTINUED | OUTPATIENT
Start: 2024-04-04 | End: 2024-04-09 | Stop reason: HOSPADM

## 2024-04-04 RX ORDER — ACETAMINOPHEN 325 MG/1
650 TABLET ORAL EVERY 6 HOURS PRN
Status: DISCONTINUED | OUTPATIENT
Start: 2024-04-04 | End: 2024-04-09 | Stop reason: HOSPADM

## 2024-04-04 RX ORDER — ONDANSETRON 4 MG/1
4 TABLET, ORALLY DISINTEGRATING ORAL EVERY 8 HOURS PRN
Status: DISCONTINUED | OUTPATIENT
Start: 2024-04-04 | End: 2024-04-09 | Stop reason: HOSPADM

## 2024-04-04 RX ORDER — 0.9 % SODIUM CHLORIDE 0.9 %
1000 INTRAVENOUS SOLUTION INTRAVENOUS ONCE
Status: COMPLETED | OUTPATIENT
Start: 2024-04-04 | End: 2024-04-04

## 2024-04-04 RX ORDER — ACETAMINOPHEN 325 MG/1
650 TABLET ORAL ONCE
Status: COMPLETED | OUTPATIENT
Start: 2024-04-04 | End: 2024-04-04

## 2024-04-04 RX ORDER — ACETAMINOPHEN 650 MG/1
650 SUPPOSITORY RECTAL EVERY 6 HOURS PRN
Status: DISCONTINUED | OUTPATIENT
Start: 2024-04-04 | End: 2024-04-09 | Stop reason: HOSPADM

## 2024-04-04 RX ADMIN — SODIUM CHLORIDE 1000 ML: 9 INJECTION, SOLUTION INTRAVENOUS at 21:21

## 2024-04-04 RX ADMIN — MORPHINE SULFATE 4 MG: 4 INJECTION, SOLUTION INTRAMUSCULAR; INTRAVENOUS at 18:38

## 2024-04-04 RX ADMIN — AMPICILLIN SODIUM AND SULBACTAM SODIUM 3000 MG: 2; 1 INJECTION, POWDER, FOR SOLUTION INTRAMUSCULAR; INTRAVENOUS at 20:12

## 2024-04-04 RX ADMIN — ACETAMINOPHEN 650 MG: 325 TABLET ORAL at 21:13

## 2024-04-04 RX ADMIN — SODIUM CHLORIDE 1000 ML: 9 INJECTION, SOLUTION INTRAVENOUS at 18:35

## 2024-04-04 RX ADMIN — ONDANSETRON 4 MG: 2 INJECTION INTRAMUSCULAR; INTRAVENOUS at 18:34

## 2024-04-04 RX ADMIN — ONDANSETRON 4 MG: 2 INJECTION INTRAMUSCULAR; INTRAVENOUS at 20:52

## 2024-04-04 RX ADMIN — MORPHINE SULFATE 4 MG: 4 INJECTION, SOLUTION INTRAMUSCULAR; INTRAVENOUS at 20:52

## 2024-04-04 ASSESSMENT — PAIN DESCRIPTION - FREQUENCY
FREQUENCY: CONTINUOUS
FREQUENCY: CONTINUOUS

## 2024-04-04 ASSESSMENT — PAIN DESCRIPTION - ORIENTATION
ORIENTATION: LOWER
ORIENTATION: LOWER

## 2024-04-04 ASSESSMENT — PAIN DESCRIPTION - DESCRIPTORS
DESCRIPTORS: SHARP
DESCRIPTORS: SHARP

## 2024-04-04 ASSESSMENT — PAIN DESCRIPTION - LOCATION
LOCATION: ABDOMEN

## 2024-04-04 ASSESSMENT — PAIN SCALES - GENERAL
PAINLEVEL_OUTOF10: 7
PAINLEVEL_OUTOF10: 5

## 2024-04-04 ASSESSMENT — PAIN - FUNCTIONAL ASSESSMENT: PAIN_FUNCTIONAL_ASSESSMENT: 0-10

## 2024-04-04 NOTE — ED PROVIDER NOTES
Emergency Department Encounter  Location: [facility] EMERGENCY DEPARTMENT    Patient: Jethro Vázquez  MRN: [id number]  : 1960  Date of evaluation: 2024  ED Provider: Ho Holloway DO, FACEP    Chief Complaint:    Illness    [place]:  Jethro Vázquez is a 64 y.o. male that presents to the emergency department by squad with complaints of nausea and vomiting and lower abdominal pain.  Patient has history of bladder cancer.  He states he is felt dizzy and has had nausea and vomiting since Dr. Ivan took his nephrostomy tubes out on Monday.  The patient states on Monday he was dizzy on Tuesday started vomiting.  He states he has vomited 3-4 times today.  He is complaining of lower abdominal pain.  He denies fever or chills.  He presents to the ER with an elevated heart rate in the 112-120 range.  He does not have a history of a fever.  He denies shortness of breath.  He denies diarrhea.  He denies blood in his urine      ROS - see HPI, below listed is current ROS at time of my eval:  At least 10 systems reviewed and otherwise acutely negative except as in the [place].  General:  No fevers, no chills, no weakness  Eyes:  No recent vison changes, no discharge  ENT:  No sore throat, no nasal congestion, no hearing changes  Cardiovascular:  No chest pain, no palpitations  Respiratory:  No shortness of breath, no cough, no wheezing  Gastrointestinal: Positive for low abdominal pain with nausea and vomiting, no diarrhea musculoskeletal:  No muscle pain, no joint pain  Skin:  No rash, no pruritis, no easy bruising  Neurologic:  No speech problems, no headache, no extremity numbness, no extremity tingling, no extremity weakness  Psychiatric:  No anxiety  Genitourinary:  No dysuria, no hematuria  Endocrine:  No unexpected weight gain, no unexpected weight loss  Extremities:  no edema, no pain    Past Medical History:   Diagnosis Date    Acid reflux     History of kidney stones     \"had kidney stone 2017-

## 2024-04-04 NOTE — ED TRIAGE NOTES
Pt arrives to the ED with generalized illness. Pt stated that he had stents placed in both kidneys on Monday at [facility]. Pt stated that he was discharged on Tuesday, and has been vomiting ever since. Pt alert. Hx of bladder cancer.

## 2024-04-05 ENCOUNTER — APPOINTMENT (OUTPATIENT)
Dept: ULTRASOUND IMAGING | Age: 64
DRG: 871 | End: 2024-04-05
Attending: STUDENT IN AN ORGANIZED HEALTH CARE EDUCATION/TRAINING PROGRAM
Payer: COMMERCIAL

## 2024-04-05 LAB
ALBUMIN SERPL-MCNC: 2.9 GM/DL (ref 3.4–5)
ALP BLD-CCNC: 122 IU/L (ref 40–129)
ALT SERPL-CCNC: 14 U/L (ref 10–40)
ANION GAP SERPL CALCULATED.3IONS-SCNC: 14 MMOL/L (ref 7–16)
AST SERPL-CCNC: 15 IU/L (ref 15–37)
BACTERIA: NEGATIVE /HPF
BASOPHILS ABSOLUTE: 0 K/CU MM
BASOPHILS RELATIVE PERCENT: 0.7 % (ref 0–1)
BILIRUB SERPL-MCNC: 0.3 MG/DL (ref 0–1)
BILIRUBIN URINE: NEGATIVE MG/DL
BLOOD, URINE: ABNORMAL
BUN SERPL-MCNC: 50 MG/DL (ref 6–23)
CALCIUM SERPL-MCNC: 7.6 MG/DL (ref 8.3–10.6)
CHLORIDE BLD-SCNC: 96 MMOL/L (ref 99–110)
CHLORIDE URINE RANDOM: 24 MMOL/L (ref 43–210)
CLARITY: ABNORMAL
CO2: 22 MMOL/L (ref 21–32)
COLOR: YELLOW
CREAT SERPL-MCNC: 3.2 MG/DL (ref 0.9–1.3)
CREATININE URINE: 127.2 MG/DL (ref 39–259)
CREATININE URINE: 82.8 MG/DL (ref 39–259)
DIFFERENTIAL TYPE: ABNORMAL
EKG ATRIAL RATE: 116 BPM
EKG DIAGNOSIS: NORMAL
EKG P AXIS: 51 DEGREES
EKG P-R INTERVAL: 146 MS
EKG Q-T INTERVAL: 326 MS
EKG QRS DURATION: 84 MS
EKG QTC CALCULATION (BAZETT): 453 MS
EKG R AXIS: -3 DEGREES
EKG T AXIS: 48 DEGREES
EKG VENTRICULAR RATE: 116 BPM
EOSINOPHILS ABSOLUTE: 0 K/CU MM
EOSINOPHILS RELATIVE PERCENT: 0 % (ref 0–3)
GFR SERPL CREATININE-BSD FRML MDRD: 21 ML/MIN/1.73M2
GLUCOSE SERPL-MCNC: 113 MG/DL (ref 70–99)
GLUCOSE, URINE: NEGATIVE MG/DL
HCT VFR BLD CALC: 27.7 % (ref 42–52)
HEMOGLOBIN: 9.1 GM/DL (ref 13.5–18)
IMMATURE NEUTROPHIL %: 2.9 % (ref 0–0.43)
KETONES, URINE: NEGATIVE MG/DL
LEUKOCYTE ESTERASE, URINE: ABNORMAL
LYMPHOCYTES ABSOLUTE: 0.4 K/CU MM
LYMPHOCYTES RELATIVE PERCENT: 7.9 % (ref 24–44)
MCH RBC QN AUTO: 27.6 PG (ref 27–31)
MCHC RBC AUTO-ENTMCNC: 32.9 % (ref 32–36)
MCV RBC AUTO: 83.9 FL (ref 78–100)
MONOCYTES ABSOLUTE: 0.4 K/CU MM
MONOCYTES RELATIVE PERCENT: 7.5 % (ref 0–4)
NITRITE URINE, QUANTITATIVE: NEGATIVE
NUCLEATED RBC %: 0 %
PDW BLD-RTO: 17.1 % (ref 11.7–14.9)
PH, URINE: 5.5 (ref 5–8)
PLATELET # BLD: 69 K/CU MM (ref 140–440)
PMV BLD AUTO: 11.5 FL (ref 7.5–11.1)
POTASSIUM SERPL-SCNC: 4 MMOL/L (ref 3.5–5.1)
POTASSIUM, UR: 31.2 MMOL/L (ref 22–119)
PROT/CREAT RATIO, UR: 2.9
PROT/CREAT RATIO, UR: 3.1
PROTEIN UA: >300 MG/DL
RBC # BLD: 3.3 M/CU MM (ref 4.6–6.2)
RBC URINE: 376 /HPF (ref 0–3)
SEGMENTED NEUTROPHILS ABSOLUTE COUNT: 4.4 K/CU MM
SEGMENTED NEUTROPHILS RELATIVE PERCENT: 81 % (ref 36–66)
SODIUM BLD-SCNC: 132 MMOL/L (ref 135–145)
SODIUM URINE: 39 MMOL/L (ref 35–167)
SPECIFIC GRAVITY UA: 1.02 (ref 1–1.03)
TOTAL CK: 104 IU/L (ref 38–174)
TOTAL IMMATURE NEUTOROPHIL: 0.16 K/CU MM
TOTAL NUCLEATED RBC: 0 K/CU MM
TOTAL PROTEIN: 5.6 GM/DL (ref 6.4–8.2)
TRICHOMONAS: ABNORMAL /HPF
URINE TOTAL PROTEIN: 241.3 MG/DL
URINE TOTAL PROTEIN: 392.7 MG/DL
UROBILINOGEN, URINE: 0.2 MG/DL (ref 0.2–1)
WBC # BLD: 5.4 K/CU MM (ref 4–10.5)
WBC CLUMP: ABNORMAL /HPF
WBC UA: 314 /HPF (ref 0–2)

## 2024-04-05 PROCEDURE — 1200000000 HC SEMI PRIVATE

## 2024-04-05 PROCEDURE — 85025 COMPLETE CBC W/AUTO DIFF WBC: CPT

## 2024-04-05 PROCEDURE — 93010 ELECTROCARDIOGRAM REPORT: CPT | Performed by: INTERNAL MEDICINE

## 2024-04-05 PROCEDURE — 84300 ASSAY OF URINE SODIUM: CPT

## 2024-04-05 PROCEDURE — 6370000000 HC RX 637 (ALT 250 FOR IP): Performed by: STUDENT IN AN ORGANIZED HEALTH CARE EDUCATION/TRAINING PROGRAM

## 2024-04-05 PROCEDURE — 76775 US EXAM ABDO BACK WALL LIM: CPT

## 2024-04-05 PROCEDURE — 80053 COMPREHEN METABOLIC PANEL: CPT

## 2024-04-05 PROCEDURE — 36415 COLL VENOUS BLD VENIPUNCTURE: CPT

## 2024-04-05 PROCEDURE — 6360000002 HC RX W HCPCS: Performed by: STUDENT IN AN ORGANIZED HEALTH CARE EDUCATION/TRAINING PROGRAM

## 2024-04-05 PROCEDURE — 6360000002 HC RX W HCPCS: Performed by: HOSPITALIST

## 2024-04-05 PROCEDURE — 84156 ASSAY OF PROTEIN URINE: CPT

## 2024-04-05 PROCEDURE — 6370000000 HC RX 637 (ALT 250 FOR IP): Performed by: HOSPITALIST

## 2024-04-05 PROCEDURE — 81001 URINALYSIS AUTO W/SCOPE: CPT

## 2024-04-05 PROCEDURE — 94761 N-INVAS EAR/PLS OXIMETRY MLT: CPT

## 2024-04-05 PROCEDURE — 2580000003 HC RX 258: Performed by: INTERNAL MEDICINE

## 2024-04-05 PROCEDURE — 51798 US URINE CAPACITY MEASURE: CPT

## 2024-04-05 PROCEDURE — 82570 ASSAY OF URINE CREATININE: CPT

## 2024-04-05 PROCEDURE — 84133 ASSAY OF URINE POTASSIUM: CPT

## 2024-04-05 PROCEDURE — 82550 ASSAY OF CK (CPK): CPT

## 2024-04-05 PROCEDURE — 82436 ASSAY OF URINE CHLORIDE: CPT

## 2024-04-05 PROCEDURE — 2580000003 HC RX 258: Performed by: STUDENT IN AN ORGANIZED HEALTH CARE EDUCATION/TRAINING PROGRAM

## 2024-04-05 PROCEDURE — 87205 SMEAR GRAM STAIN: CPT

## 2024-04-05 RX ORDER — TAMSULOSIN HYDROCHLORIDE 0.4 MG/1
0.4 CAPSULE ORAL DAILY
Status: DISCONTINUED | OUTPATIENT
Start: 2024-04-05 | End: 2024-04-09 | Stop reason: HOSPADM

## 2024-04-05 RX ORDER — SODIUM CHLORIDE, SODIUM LACTATE, POTASSIUM CHLORIDE, CALCIUM CHLORIDE 600; 310; 30; 20 MG/100ML; MG/100ML; MG/100ML; MG/100ML
INJECTION, SOLUTION INTRAVENOUS CONTINUOUS
Status: DISCONTINUED | OUTPATIENT
Start: 2024-04-05 | End: 2024-04-05

## 2024-04-05 RX ORDER — OXYCODONE HYDROCHLORIDE AND ACETAMINOPHEN 5; 325 MG/1; MG/1
1 TABLET ORAL EVERY 6 HOURS PRN
Status: DISCONTINUED | OUTPATIENT
Start: 2024-04-05 | End: 2024-04-09 | Stop reason: HOSPADM

## 2024-04-05 RX ORDER — CARVEDILOL 6.25 MG/1
3.12 TABLET ORAL 2 TIMES DAILY WITH MEALS
Status: DISCONTINUED | OUTPATIENT
Start: 2024-04-05 | End: 2024-04-09 | Stop reason: HOSPADM

## 2024-04-05 RX ORDER — SODIUM CHLORIDE 9 MG/ML
INJECTION, SOLUTION INTRAVENOUS CONTINUOUS
Status: DISCONTINUED | OUTPATIENT
Start: 2024-04-05 | End: 2024-04-09 | Stop reason: HOSPADM

## 2024-04-05 RX ORDER — AMLODIPINE BESYLATE 10 MG/1
10 TABLET ORAL DAILY
Status: DISCONTINUED | OUTPATIENT
Start: 2024-04-05 | End: 2024-04-09 | Stop reason: HOSPADM

## 2024-04-05 RX ORDER — PROMETHAZINE HYDROCHLORIDE 25 MG/ML
6.25 INJECTION, SOLUTION INTRAMUSCULAR; INTRAVENOUS EVERY 6 HOURS PRN
Status: DISCONTINUED | OUTPATIENT
Start: 2024-04-05 | End: 2024-04-09 | Stop reason: HOSPADM

## 2024-04-05 RX ADMIN — ACETAMINOPHEN 650 MG: 325 TABLET ORAL at 06:39

## 2024-04-05 RX ADMIN — SODIUM CHLORIDE: 9 INJECTION, SOLUTION INTRAVENOUS at 23:48

## 2024-04-05 RX ADMIN — SODIUM CHLORIDE: 9 INJECTION, SOLUTION INTRAVENOUS at 15:22

## 2024-04-05 RX ADMIN — OXYCODONE AND ACETAMINOPHEN 1 TABLET: 5; 325 TABLET ORAL at 14:43

## 2024-04-05 RX ADMIN — SODIUM CHLORIDE, PRESERVATIVE FREE 10 ML: 5 INJECTION INTRAVENOUS at 20:11

## 2024-04-05 RX ADMIN — SODIUM CHLORIDE: 9 INJECTION, SOLUTION INTRAVENOUS at 00:34

## 2024-04-05 RX ADMIN — OXYCODONE AND ACETAMINOPHEN 1 TABLET: 5; 325 TABLET ORAL at 21:21

## 2024-04-05 RX ADMIN — CEFTRIAXONE SODIUM 2000 MG: 2 INJECTION, POWDER, FOR SOLUTION INTRAMUSCULAR; INTRAVENOUS at 00:35

## 2024-04-05 RX ADMIN — HYDROMORPHONE HYDROCHLORIDE 0.5 MG: 1 INJECTION, SOLUTION INTRAMUSCULAR; INTRAVENOUS; SUBCUTANEOUS at 00:28

## 2024-04-05 RX ADMIN — SODIUM CHLORIDE, POTASSIUM CHLORIDE, SODIUM LACTATE AND CALCIUM CHLORIDE: 600; 310; 30; 20 INJECTION, SOLUTION INTRAVENOUS at 03:41

## 2024-04-05 RX ADMIN — HYDROMORPHONE HYDROCHLORIDE 0.5 MG: 1 INJECTION, SOLUTION INTRAMUSCULAR; INTRAVENOUS; SUBCUTANEOUS at 04:55

## 2024-04-05 RX ADMIN — ONDANSETRON 4 MG: 2 INJECTION INTRAMUSCULAR; INTRAVENOUS at 11:10

## 2024-04-05 RX ADMIN — ONDANSETRON 4 MG: 2 INJECTION INTRAMUSCULAR; INTRAVENOUS at 16:58

## 2024-04-05 RX ADMIN — PROMETHAZINE HYDROCHLORIDE 6.25 MG: 25 INJECTION INTRAMUSCULAR; INTRAVENOUS at 20:08

## 2024-04-05 RX ADMIN — VANCOMYCIN HYDROCHLORIDE 1500 MG: 1.5 INJECTION, POWDER, LYOPHILIZED, FOR SOLUTION INTRAVENOUS at 01:38

## 2024-04-05 RX ADMIN — HYDROMORPHONE HYDROCHLORIDE 0.5 MG: 1 INJECTION, SOLUTION INTRAMUSCULAR; INTRAVENOUS; SUBCUTANEOUS at 11:11

## 2024-04-05 RX ADMIN — TAMSULOSIN HYDROCHLORIDE 0.4 MG: 0.4 CAPSULE ORAL at 09:22

## 2024-04-05 RX ADMIN — SODIUM CHLORIDE, PRESERVATIVE FREE 10 ML: 5 INJECTION INTRAVENOUS at 00:29

## 2024-04-05 RX ADMIN — CEFTRIAXONE SODIUM 2000 MG: 2 INJECTION, POWDER, FOR SOLUTION INTRAMUSCULAR; INTRAVENOUS at 23:50

## 2024-04-05 RX ADMIN — ONDANSETRON 4 MG: 2 INJECTION INTRAMUSCULAR; INTRAVENOUS at 05:42

## 2024-04-05 ASSESSMENT — PAIN DESCRIPTION - LOCATION
LOCATION: PELVIS
LOCATION: PELVIS
LOCATION: ABDOMEN
LOCATION: ABDOMEN
LOCATION: PELVIS
LOCATION: PELVIS
LOCATION: HEAD

## 2024-04-05 ASSESSMENT — PAIN DESCRIPTION - DESCRIPTORS
DESCRIPTORS: NAGGING
DESCRIPTORS: ACHING
DESCRIPTORS: ACHING;DISCOMFORT
DESCRIPTORS: TENDER;NAGGING
DESCRIPTORS: ACHING;DISCOMFORT
DESCRIPTORS: DISCOMFORT

## 2024-04-05 ASSESSMENT — PAIN SCALES - WONG BAKER
WONGBAKER_NUMERICALRESPONSE: HURTS A LITTLE BIT

## 2024-04-05 ASSESSMENT — PAIN DESCRIPTION - PAIN TYPE
TYPE: CHRONIC PAIN
TYPE: ACUTE PAIN
TYPE: CHRONIC PAIN
TYPE: CHRONIC PAIN
TYPE: ACUTE PAIN
TYPE: CHRONIC PAIN

## 2024-04-05 ASSESSMENT — PAIN DESCRIPTION - FREQUENCY
FREQUENCY: INTERMITTENT

## 2024-04-05 ASSESSMENT — PAIN DESCRIPTION - ORIENTATION
ORIENTATION: MID
ORIENTATION: RIGHT
ORIENTATION: RIGHT
ORIENTATION: RIGHT;LOWER;ANTERIOR
ORIENTATION: LOWER;ANTERIOR
ORIENTATION: RIGHT;ANTERIOR
ORIENTATION: MID

## 2024-04-05 ASSESSMENT — PAIN SCALES - GENERAL
PAINLEVEL_OUTOF10: 8
PAINLEVEL_OUTOF10: 0
PAINLEVEL_OUTOF10: 4
PAINLEVEL_OUTOF10: 7
PAINLEVEL_OUTOF10: 6
PAINLEVEL_OUTOF10: 4
PAINLEVEL_OUTOF10: 7
PAINLEVEL_OUTOF10: 8
PAINLEVEL_OUTOF10: 7
PAINLEVEL_OUTOF10: 0

## 2024-04-05 ASSESSMENT — PAIN - FUNCTIONAL ASSESSMENT

## 2024-04-05 ASSESSMENT — PAIN DESCRIPTION - ONSET
ONSET: ON-GOING
ONSET: GRADUAL

## 2024-04-05 NOTE — CARE COORDINATION
04/05/24 1436   Service Assessment   Patient Orientation Alert and Oriented   Cognition Alert   History Provided By Patient;Medical Record   Primary Caregiver Self   Support Systems Spouse/Significant Other;Family Members   PCP Verified by CM Yes   Last Visit to PCP Within last two years  (would like a new PCP, list placed in AVS)   Prior Functional Level Independent in ADLs/IADLs   Current Functional Level Assistance with the following:;Mobility;Bathing;Dressing;Other (see comment)  (faitgues easily)   Can patient return to prior living arrangement Unknown at present   Ability to make needs known: Good   Family able to assist with home care needs: Yes   Financial Resources Medicare   Social/Functional History   Lives With Spouse   Type of Home House   Home Layout One level   Home Equipment Cane   Active  Yes  (family can help with transportation as needed.)   Mode of Transportation Car   Condition of Participation: Discharge Planning   The Patient and/or Patient Representative was provided with a Choice of Provider? Patient   The Patient and/Or Patient Representative agree with the Discharge Plan? Yes   Freedom of Choice list was provided with basic dialogue that supports the patient's individualized plan of care/goals, treatment preferences, and shares the quality data associated with the providers?  Yes     Reviewed chart, discussed in IDR and spoke with pt about discharge needs/plans. Pt lives with his wife, he has a PCP but after last d/c left vm for PCP and no return call , so he is looking for a new PCP.  List placed in AVS.    Also interested in home care.  Gave him PPO SNU and HC list to review for possible d/c options.  CM will continue to follow.

## 2024-04-05 NOTE — ED NOTES
Report called to Ann PERALTA. Patient to go to [facility] room 4117. [facility] transport to  at 2220.

## 2024-04-05 NOTE — CONSULTS
Nephrology Service Consultation    Patient:  Jethro Vázquez  MRN: [id number]  Consulting physician:  Samson Lira MD  Reason for Consult: Acute renal failure versus CKD    History Obtained From:  patient, electronic medical record  PCP: Mono Schaffer MD    HISTORY OF PRESENT ILLNESS:   The patient is a 64 y.o. male who presents with acute renal failure after recent nephrostomy tube removal known to me from initial evaluation last year but did not follow-up after that.  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  Patient states he had his nephrostomy tubes recently exchanged and removed and was doing well up until 2448 hrs. ago and had increased abdominal pain nausea vomiting.  Presents to the hospital with possible UTI and feels better this morning.  Currently no nephrostomy tubes only stents in place.  History of renal stones history of hypertension and renal function had been stable up until this admission likely with acute renal failure from UTI and dehydration was not on any ACE ARB's and diuretics and renal asked to follow-up    Past Medical History:        Diagnosis Date    Acid reflux     History of kidney stones     \"had kidney stone 12/2017- passed it\"    Hypertension     \"on medication since age 50's\"    Malignant neoplasm of overlapping sites of bladder (HCC) 01/04/2024    No natural teeth        Past Surgical History:        Procedure Laterality Date    ANKLE SURGERY Left     age 17\"did have pins and then later removed\"    APPENDECTOMY  1987    CHOLECYSTECTOMY, LAPAROSCOPIC N/A 03/05/2024    CHOLECYSTECTOMY LAPAROSCOPIC ROBOTIC WITH EXTENSIVE LYSIS OF ADHESIONS performed by Rufus Mendes II, MD at [facility] OR    CYSTOSCOPY N/A 12/14/2023    CYSTOSCOPY TRANSURETHRAL RESECTION BLADDER TUMOR performed by

## 2024-04-05 NOTE — PROGRESS NOTES
V2.0  [facility] Hospitalist Progress Note      Name:  Jethro Vázquez /Age/Sex: 1960  (64 y.o. male)   MRN & CSN:  [id number] & [id number] Encounter Date/Time: 2024 12:35 PM EDT    Location:  [address] [place] [address] PCP: Mono Schaffer MD       Hospital Day: 2    Assessment and Plan:   Jethro Vázquez is a 64 y.o. 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   IV Vancomycin and IV Rocephin based on prior Cx  Follow up urine and blood Cx  -BCx : Pending.  UCx : Pending.  Urology consult.  Tmax 102.2.  History of Enterococcus in previous urine cultures.     SHIRLEY  Imaging negative for obstructive uropathy  UA with large blood and >300 protein  Infection related and poor intake vs developing glomerular disease due to malignancy  -On IVF.  D/W nephrology will continue to follow.  Increased creatinine 3.2.  CK1 104.  UPC 3.1.    Hyponatremia  - Improved sodium 132.  Received IVF.     Chronic anemia of malignancy and chemotherapy  -Hemoglobin 9.1.    Thrombocytopenia, 59k seem subacute  Repeat CBC and if persistent may need further workup for low platelets  Hold chemical DVT PPx  No overt bleeding  -Improved platelets 69.     Hypertension  Hold home amlodipine and carvedilol    Diet ADULT DIET; Regular; Low Sodium (2 gm)    DVT Prophylaxis [] Lovenox, [] Heparin, [] Eliquis, [] Xarelto  [x] SCDs     Code Status Full Code   Disposition Expected Disposition: TBD  Estimated Date of Discharge: TBD  Patient requires continued admission due to/sepsis     Personally reviewed Lab Studies and Imaging         Subjective/Interval history:   Chief Complaint: No chief complaint on file.     Recent

## 2024-04-05 NOTE — PROGRESS NOTES
4 Eyes Skin Assessment     NAME:  Jethro Vázquez  YOB: 1960  MEDICAL RECORD NUMBER:  [id number]    The patient is being assessed for  Admission    I agree that at least one RN has performed a thorough Head to Toe Skin Assessment on the patient. ALL assessment sites listed below have been assessed.      Areas assessed by both nurses:    Head, Face, Ears, Shoulders, Back, Chest, Arms, Elbows, Hands, Sacrum. Buttock, Coccyx, Ischium, Legs. Feet and Heels, and Under Medical Devices         Does the Patient have a Wound? No noted wound(s) Two incisions lower left and right back where nephrostomy tubes were taken out       Guilherme Prevention initiated by RN: Yes  Wound Care Orders initiated by RN: No    Pressure Injury (Stage 3,4, Unstageable, DTI, NWPT, and Complex wounds) if present, place Wound referral order by RN under : No    New Ostomies, if present place, Ostomy referral order under : No     Nurse 1 eSignature: Electronically signed by Ann Rodríguez RN on 4/4/24 at 11:30 PM EDT    **SHARE this note so that the co-signing nurse can place an eSignature**    Nurse 2 eSignature: Electronically signed by Boby Haji RN on 4/5/24 at 1:46 AM EDT

## 2024-04-05 NOTE — PROGRESS NOTES
PHARMACY VANCOMYCIN MONITORING SERVICE  Pharmacy consulted by Dr. Cherrie Brewster for monitoring and adjustment.    Indication for treatment: Sepsis likely secondary to complicated UTI  Goal trough: Trough Goal: 10-15 mcg/mL  AUC/OCTAVIO: <500    Risk Factors for MRSA Identified:   Hospitalization within the past 90 days, Received IV antibiotics within the past 90 days    Pertinent Laboratory Values:   Temp Readings from Last 3 Encounters:   04/05/24 98.2 °F (36.8 °C)   04/04/24 99.8 °F (37.7 °C) (Oral)   03/28/24 97.9 °F (36.6 °C) (Oral)     Recent Labs     04/04/24  1825   WBC 7.1     Recent Labs     04/04/24  1825   BUN 43*   CREATININE 2.7*     Estimated Creatinine Clearance: 29 mL/min (A) (based on SCr of 2.7 mg/dL (H)).    Intake/Output Summary (Last 24 hours) at 4/5/2024 0556  Last data filed at 4/5/2024 0458  Gross per 24 hour   Intake --   Output 450 ml   Net -450 ml     Last Encounter Weight:  Wt Readings from Last 3 Encounters:   04/04/24 76.7 kg (169 lb 1.5 oz)   04/04/24 83.5 kg (184 lb)   03/28/24 85.2 kg (187 lb 12.8 oz)       Pertinent Cultures:   Date    Source    Results  04/04   Blood    In process  04/04   Urine    In process    Assessment:  HPI: 64 y.o. male with history of HTN, muscle invasive high-grade bladder cancer (undergoing chemotherapy and had recent exchange of nephrostomy tubes for bilateral ureteral stents).  Patient presented to ED with fatigue, fever, chills, and abdominal pain.  SCr = 2.7, BUN = 43, and incomplete I/O data  SHIRLEY (SCr was 0.8 on 03/28)  Day(s) of therapy: 1 of 7  Vancomycin concentration:   04/06 - To be collected    Plan:  Vancomycin 1,500 mg IV initial dose; Plan for intermittent dosing based on random levels 2/2 SHIRLEY  Pharmacy will continue to monitor patient and adjust therapy as indicated    VANCOMYCIN CONCENTRATION SCHEDULED FOR 04/06/2024 @ 6 AM    Thank you for the consult.  Judson Yo RPH  4/5/2024 5:56 AM

## 2024-04-05 NOTE — ED PROVIDER NOTES
Patient was received in [facility].  Patient with a history of bladder cancer.  Has had complicated urologic course.  Most recently had urostomy tubes removed on 4/1/2024 with stents placed.  Since that time has not been feeling well has not been eating or drinking much.  Workup here is notable for urinary tract infection.  On review of previous cultures it appears most recent culture was from the beginning of March and was susceptible to ampicillin therefore this was ordered.  Initial plan was for CT scan with contrast but given the patient's decreased GFR with a new SHIRLEY seen on today's labs this was done without contrast.  No acute fluid collections are seen.  Patient will require transfer to [place] for continued management of his SHIRLEY and urinary tract infection in the setting of his recent procedures and bladder cancer.    Tammi Handy MD  04/04/24  8:32 PM       Tammi Handy MD  04/04/24 2032

## 2024-04-05 NOTE — H&P
History and Physical      Name:  Jethro Vázquez /Age/Sex: 1960  (64 y.o. male)   MRN & CSN:  [id number] & [id number] Encounter Date/Time: 2024 11:24 PM EDT   Location:  [address] [place] [address] PCP: Mono Schaffer MD       Assessment and Plan:   Jethro Vázquez is a 64 y.o. male with hypertension, bilateral inguinal hernia, muscle invasive high-grade bladder cancer status post TUR 2023 and recent exchange of nephrostomy tubes for bilateral ureteral stents presented as a transfer from [facility] ER with fatigue fever chills and abdominal pain.    Sepsis POA  Likely secondary to complicated UTI   Muscle Invasive High Grade Bladder Ca s/p TUR 2023 undergoing chemotherapy  Nephrostomy tube exchange and BL ureteral stent placement   CT abdomen and pelvis reviewed, BL ureteral stents, perinephric and periureteral fat stranding in the retroperitoneum.   IV Vancomycin and IV Rocephin based on prior Cx  Follow up urine and blood Cx    SHIRLEY  Imaging negative for obstructive uropathy  UA with large blood and >300 protein  Infection related and poor intake vs developing glomerular disease due to malignancy  IV LR infusion  Protein Cr ratio  Monitor BMP, rest of the management as above    Chronic anemia of malignancy and chemotherapy  Thrombocytopenia, 59k seem subacute  Repeat CBC and if persistent may need further workup for low platelets  Hold chemical DVT PPx  No overt bleeding    Hypertension  Hold home amlodipine and carvedilol      Inpatient MedSurg  Full code    Disposition:     Current Living situation: Home  Expected Disposition: Home  Estimated D/C: 2 days    Diet ADULT DIET; Regular; Low Sodium (2 gm)   DVT Prophylaxis [] Lovenox, []  Heparin, [x] SCDs, [] Ambulation,  [] Eliquis, [] Xarelto, [] Coumadin   Code Status Full Code   Surrogate Decision Maker/ POA Chante SUERO     Personally reviewed Lab Studies and Imaging     Discussed management of the case with ER provider who recommended

## 2024-04-05 NOTE — DISCHARGE INSTRUCTIONS
You were admitted with a urinary tract infection and kidney injury. Your kidney injury has improved significantly and your urine cultures have returned and you have been put on the appropriate antibiotics that you can take orally.     Please follow up with urology in 2-3 weeks, nephrology (kidney doctor) in 1-2 weeks and your primary care provider in 1 week (if you do not have one, please pick one from the list below and follow up as soon as possible.     If your symptoms come back or worsen, please return to the emergency room.      1. Call to schedule follow up hospital follow up appointment:   [facility]  [address]  [address]  [place] [address]  Tel: [phone]    [facility]  [address]  [place] [id number] [phone]     [facility]   [address], [place]   [phone]     2. Establish care with a primary care provider  Physician Finder [phone]     [facility]  [address], [address], [place] [id number]  [phone]  CHENG Parrish,CNP    [facility]  [address] [place] [address]  [phone]  MD Babita Huang MD Deanna N. Smith, APRN, CNP  Nidia Hager, DO Donovan Whitfield APRN, CNP     [facility]  [address], [address], [place] [address]  [phone]  Kacey Kwon, FELIPE Reed MD Marin Kitchens, FELIPE    [facility]  [address] [place] [id number]  [phone]  Candy Carey MD    [facility]  [address] [place] [address]  [phone]  MD Erik Zaidi MD    [facility]  [address] [place], [place]

## 2024-04-06 ENCOUNTER — APPOINTMENT (OUTPATIENT)
Dept: GENERAL RADIOLOGY | Age: 64
DRG: 871 | End: 2024-04-06
Attending: STUDENT IN AN ORGANIZED HEALTH CARE EDUCATION/TRAINING PROGRAM
Payer: COMMERCIAL

## 2024-04-06 LAB
ALBUMIN SERPL-MCNC: 2.8 GM/DL (ref 3.4–5)
ANION GAP SERPL CALCULATED.3IONS-SCNC: 12 MMOL/L (ref 7–16)
BUN SERPL-MCNC: 49 MG/DL (ref 6–23)
CALCIUM SERPL-MCNC: 7.7 MG/DL (ref 8.3–10.6)
CHLORIDE BLD-SCNC: 96 MMOL/L (ref 99–110)
CO2: 23 MMOL/L (ref 21–32)
CREAT SERPL-MCNC: 2.7 MG/DL (ref 0.9–1.3)
DOSE AMOUNT: NORMAL
DOSE TIME: NORMAL
EOSINOPHIL, URINE: NEGATIVE /HPF
GFR SERPL CREATININE-BSD FRML MDRD: 26 ML/MIN/1.73M2
GLUCOSE SERPL-MCNC: 97 MG/DL (ref 70–99)
PHOSPHORUS: 3.6 MG/DL (ref 2.5–4.9)
POTASSIUM SERPL-SCNC: 4 MMOL/L (ref 3.5–5.1)
PROCALCITONIN SERPL-MCNC: 4.26 NG/ML
SODIUM BLD-SCNC: 131 MMOL/L (ref 135–145)
VANCOMYCIN RANDOM: 8.1 UG/ML

## 2024-04-06 PROCEDURE — 2580000003 HC RX 258: Performed by: STUDENT IN AN ORGANIZED HEALTH CARE EDUCATION/TRAINING PROGRAM

## 2024-04-06 PROCEDURE — 6360000002 HC RX W HCPCS: Performed by: STUDENT IN AN ORGANIZED HEALTH CARE EDUCATION/TRAINING PROGRAM

## 2024-04-06 PROCEDURE — 71045 X-RAY EXAM CHEST 1 VIEW: CPT

## 2024-04-06 PROCEDURE — 2580000003 HC RX 258: Performed by: INTERNAL MEDICINE

## 2024-04-06 PROCEDURE — 6370000000 HC RX 637 (ALT 250 FOR IP): Performed by: STUDENT IN AN ORGANIZED HEALTH CARE EDUCATION/TRAINING PROGRAM

## 2024-04-06 PROCEDURE — 80048 BASIC METABOLIC PNL TOTAL CA: CPT

## 2024-04-06 PROCEDURE — 6370000000 HC RX 637 (ALT 250 FOR IP): Performed by: HOSPITALIST

## 2024-04-06 PROCEDURE — 94761 N-INVAS EAR/PLS OXIMETRY MLT: CPT

## 2024-04-06 PROCEDURE — 1200000000 HC SEMI PRIVATE

## 2024-04-06 PROCEDURE — 80202 ASSAY OF VANCOMYCIN: CPT

## 2024-04-06 PROCEDURE — 36591 DRAW BLOOD OFF VENOUS DEVICE: CPT

## 2024-04-06 PROCEDURE — 84145 PROCALCITONIN (PCT): CPT

## 2024-04-06 PROCEDURE — 80069 RENAL FUNCTION PANEL: CPT

## 2024-04-06 PROCEDURE — 36415 COLL VENOUS BLD VENIPUNCTURE: CPT

## 2024-04-06 RX ADMIN — SODIUM CHLORIDE: 9 INJECTION, SOLUTION INTRAVENOUS at 02:58

## 2024-04-06 RX ADMIN — ONDANSETRON 4 MG: 2 INJECTION INTRAMUSCULAR; INTRAVENOUS at 21:08

## 2024-04-06 RX ADMIN — VANCOMYCIN HYDROCHLORIDE 1250 MG: 1.25 INJECTION, POWDER, LYOPHILIZED, FOR SOLUTION INTRAVENOUS at 10:16

## 2024-04-06 RX ADMIN — CEFTRIAXONE SODIUM 2000 MG: 2 INJECTION, POWDER, FOR SOLUTION INTRAMUSCULAR; INTRAVENOUS at 23:55

## 2024-04-06 RX ADMIN — SODIUM CHLORIDE: 9 INJECTION, SOLUTION INTRAVENOUS at 23:50

## 2024-04-06 RX ADMIN — OXYCODONE AND ACETAMINOPHEN 1 TABLET: 5; 325 TABLET ORAL at 13:08

## 2024-04-06 RX ADMIN — SODIUM CHLORIDE: 9 INJECTION, SOLUTION INTRAVENOUS at 23:53

## 2024-04-06 RX ADMIN — ONDANSETRON 4 MG: 2 INJECTION INTRAMUSCULAR; INTRAVENOUS at 13:17

## 2024-04-06 RX ADMIN — TAMSULOSIN HYDROCHLORIDE 0.4 MG: 0.4 CAPSULE ORAL at 08:55

## 2024-04-06 RX ADMIN — ACETAMINOPHEN 650 MG: 325 TABLET ORAL at 02:55

## 2024-04-06 RX ADMIN — OXYCODONE AND ACETAMINOPHEN 1 TABLET: 5; 325 TABLET ORAL at 20:46

## 2024-04-06 ASSESSMENT — PAIN SCALES - GENERAL
PAINLEVEL_OUTOF10: 8
PAINLEVEL_OUTOF10: 7
PAINLEVEL_OUTOF10: 0
PAINLEVEL_OUTOF10: 9

## 2024-04-06 ASSESSMENT — PAIN DESCRIPTION - ORIENTATION
ORIENTATION: MID
ORIENTATION: RIGHT;ANTERIOR
ORIENTATION: RIGHT;LEFT;MID

## 2024-04-06 ASSESSMENT — PAIN DESCRIPTION - LOCATION
LOCATION: BUTTOCKS;ABDOMEN
LOCATION: ABDOMEN;HEAD
LOCATION: HEAD

## 2024-04-06 ASSESSMENT — PAIN DESCRIPTION - DESCRIPTORS
DESCRIPTORS: ACHING;DISCOMFORT
DESCRIPTORS: ACHING;DISCOMFORT
DESCRIPTORS: SHARP;NAGGING

## 2024-04-06 ASSESSMENT — PAIN - FUNCTIONAL ASSESSMENT
PAIN_FUNCTIONAL_ASSESSMENT: PREVENTS OR INTERFERES WITH ALL ACTIVE AND SOME PASSIVE ACTIVITIES
PAIN_FUNCTIONAL_ASSESSMENT: ACTIVITIES ARE NOT PREVENTED
PAIN_FUNCTIONAL_ASSESSMENT: PREVENTS OR INTERFERES SOME ACTIVE ACTIVITIES AND ADLS

## 2024-04-06 ASSESSMENT — PAIN SCALES - WONG BAKER
WONGBAKER_NUMERICALRESPONSE: HURTS A LITTLE BIT
WONGBAKER_NUMERICALRESPONSE: HURTS A LITTLE BIT

## 2024-04-06 NOTE — PROGRESS NOTES
Nephrology Progress Note  4/6/2024 10:05 AM  Subjective:     Interval History: Jethro Vázquez is a 64 y.o. male with doing somewhat better no distress resting in bed states making stable urine        Data:   Scheduled Meds:   vancomycin  1,250 mg IntraVENous Once    [Held by provider] amLODIPine  10 mg Oral Daily    [Held by provider] carvedilol  3.125 mg Oral BID WC    tamsulosin  0.4 mg Oral Daily    vancomycin (VANCOCIN) intermittent dosing (placeholder)   Other RX Placeholder    sodium chloride flush  5-40 mL IntraVENous BID    [Held by provider] heparin (porcine)  5,000 Units SubCUTAneous 3 times per day    cefTRIAXone (ROCEPHIN) IV  2,000 mg IntraVENous Q24H     Continuous Infusions:   sodium chloride 100 mL/hr at 04/06/24 0258    sodium chloride Stopped (04/06/24 0256)         CBC   Recent Labs     04/04/24 1825 04/05/24  0840   WBC 7.1 5.4   HGB 9.9* 9.1*   HCT 29.1* 27.7*   PLT 59* 69*      BMP   Recent Labs     04/04/24 1825 04/05/24  0840 04/06/24  0350   * 132* 131*   K 3.8 4.0 4.0   CL 91* 96* 96*   CO2 22 22 23   PHOS  --   --  3.6   BUN 43* 50* 49*   CREATININE 2.7* 3.2* 2.7*     Hepatic:   Recent Labs     04/04/24 1825 04/05/24  0840   AST 19 15   ALT 17 14   BILITOT 0.6 0.3   ALKPHOS 135* 122     Troponin: No results for input(s): \"TROPONINI\" in the last 72 hours.  BNP: No results for input(s): \"BNP\" in the last 72 hours.  Lipids: No results for input(s): \"CHOL\", \"HDL\" in the last 72 hours.    Invalid input(s): \"LDLCALCU\"  ABGs: No results found for: \"PHART\", \"PO2ART\", \"TSU1DXK\"  INR: No results for input(s): \"INR\" in the last 72 hours.  Renal Labs  Albumin:    Lab Results   Component Value Date/Time    LABALBU 2.8 04/06/2024 03:50 AM     Calcium:    Lab Results   Component Value Date/Time    CALCIUM 7.7 04/06/2024 03:50 AM     Phosphorus:    Lab Results   Component Value Date/Time    PHOS 3.6 04/06/2024 03:50 AM     U/A:    Lab Results   Component Value Date/Time    NITRU NEGATIVE  04/05/2024 03:00 PM    COLORU YELLOW 04/05/2024 03:00 PM    WBCUA 314 04/05/2024 03:00 PM    RBCUA 376 04/05/2024 03:00 PM    MUCUS RARE 01/20/2024 01:32 PM    TRICHOMONAS NONE SEEN 04/05/2024 03:00 PM    BACTERIA NEGATIVE 04/05/2024 03:00 PM    CLARITYU CLOUDY 04/05/2024 03:00 PM    SPECGRAV 1.025 04/05/2024 03:00 PM    UROBILINOGEN 0.2 04/05/2024 03:00 PM    BILIRUBINUR NEGATIVE 04/05/2024 03:00 PM    BLOODU LARGE NUMBER OR AMOUNT OBSERVED 04/05/2024 03:00 PM    KETUA NEGATIVE 04/05/2024 03:00 PM     ABG:  No results found for: \"PHART\", \"GNN7JWH\", \"PO2ART\", \"AND9YBQ\", \"BEART\", \"THGBART\", \"VJP0MHK\", \"D7HLKSOP\"  HgBA1c:  No results found for: \"LABA1C\"  Microalbumen/Creatinine ratio:  No components found for: \"RUCREAT\"  TSH:  No results found for: \"TSH\"  IRON:    Lab Results   Component Value Date/Time    IRON 19 01/01/2024 12:37 PM     Iron Saturation:  No components found for: \"PERCENTFE\"  TIBC:    Lab Results   Component Value Date/Time    TIBC 216 01/01/2024 12:37 PM     FERRITIN:    Lab Results   Component Value Date/Time    FERRITIN 336 01/01/2024 12:37 PM     RPR:  No results found for: \"RPR\"  WOOD:  No results found for: \"ANATITER\", \"WOOD\"  24 Hour Urine for Creatinine Clearance:  No components found for: \"CREAT4\", \"UHRS10\", \"UTV10\"      Objective:   I/O: 04/05 0701 - 04/06 0700  In: 1090 [P.O.:1080; I.V.:10]  Out: 1150 [Urine:1150]  I/O last 3 completed shifts:  In: 1090 [P.O.:1080; I.V.:10]  Out: 1800 [Urine:1800]  I/O this shift:  In: 480 [P.O.:480]  Out: 400 [Urine:400]  Vitals: /84   Pulse 88   Temp 98.8 °F (37.1 °C) (Oral)   Resp 18   Ht 1.778 m (5' 10\")   Wt 79.6 kg (175 lb 7.8 oz)   SpO2 99%   BMI 25.18 kg/m²  {  General appearance: awake weak  HEENT: Head: Normal, normocephalic, atraumatic.  Neck: supple, symmetrical, trachea midline  Lungs: diminished breath sounds bilaterally  Heart: S1, S2 normal  Abdomen: abnormal findings:  soft nt  Extremities: edema trace  Neurologic: Mental status:

## 2024-04-06 NOTE — CONSULTS
[address] [address]   Consult Note  [facility] 1 2 3 4 5    Date: 2024   Patient: Jethro Vázquez   : 1960   DOA: 2024   MRN: [id number]   ROOM#: [id number]/4117-A     Reason for Consult: Recent procedure, UTI  Requesting Physician: Dr. Lira  Collaborating Urologist on Call at time of admission: Dr. Jc  CHIEF COMPLAINT: Lower abdominal pain    History Obtained From: patient, electronic medical record    HISTORY OF PRESENT ILLNESS:                The patient is a 64 y.o. male with significant past medical history of kidney stones, HTN, bladder cancer, and bilateral hydronephrosis s/p internalization of bilateral ureteral stents and nephrostomy tube removal with IR 24 who presented with lower abdominal pain and nausea/vomiting. Work-up revealed SHIRLEY (Cr 2.7) and concern for sepsis secondary to complicated UTI. He CT was negative for hydronephrosis but did show perinephric and periureteral fat stranding. This morning, pt reports feeling better overall but still has mild lower abdominal pain and nausea. No difficulty voiding.    ED Provider's HPI 24: Jethro Vázquez is a 64 y.o. male that presents to the emergency department by squad with complaints of nausea and vomiting and lower abdominal pain.  Patient has history of bladder cancer.  He states he is felt dizzy and has had nausea and vomiting since Dr. Ivan took his nephrostomy tubes out on Monday.  The patient states on Monday he was dizzy on Tuesday started vomiting.  He states he has vomited 3-4 times today.  He is complaining of lower abdominal pain.  He denies fever or chills.  He presents to the ER with an elevated heart rate in the 112-120 range.  He does not have a history of a fever.  He denies shortness of breath.  He denies diarrhea.  He denies blood in his urine.    Past Medical History:        Diagnosis Date    Acid reflux     History of kidney stones     \"had kidney stone 2017- passed it\"     NODES: No retroperitoneal or pelvic adenopathy is seen. VESSELS: The aorta is normal in caliber with calcification.  Other vasculature should be assessed with contrast enhancement. REPRODUCTIVE ORGANS: Prostate is mildly prominent. URINARY BLADDER: Bladder see compressed. Ureteral stents are identified in the posterior aspect of the bladder. ABDOMINAL WALL: Normal. BONES: No significant abnormality. OTHER FINDINGS: None.     Bilateral ureteral stents are identified without significant hydronephrosis. There is perinephric and periureteral fat stranding in the retroperitoneum. This is  likely related to recent intervention. The bladder is not well evaluated as it is largely decompressed Electronically signed by Dolores Singh MD    IR GUIDED NEPHROSTOMY CATH PLACEMENT    Result Date: 4/1/2024  PROCEDURE:  IR GUIDED NEPHROSTOMY CATH PLACEMENT DATE:  4/1/2024 1:05 PM HISTORY: Malignant neoplasm of posterior wall of bladder  Malignant neoplasm of posterior wall of bladder / Reason for exam?->Malignant neoplasm of posterior wall of urinary bladder (HCC) PROVIDER: Nikos Hamm MD COMPARISON: Bilateral nephrostomy catheter placement 1/11/2020 TECHNIQUE: Informed consent was obtained from the patient prior to the procedure. Contrast was injected to visualize the pelvic calyces and ureter. There was delayed exit of contrast from the ureterovesical junction into the bladder. The nephrostomy catheter was removed over a stiff angled tip Glidewire which was negotiated on each side through the ureter and into the bladder. An 8 Uzbek 22 cm internal ureteral stent was then successfully deployed with the inferior loop within the bladder and the superior loop within the renal pelvis on the left side as well as the renal pelvis and lower pole calyx on the right side. The patient received 75 mcg fentanyl intravenously during the procedure. This was given by Darby Clark RN. RADIATION DOSE: 7.0 minutes of fluoroscopic time was

## 2024-04-06 NOTE — PROGRESS NOTES
V2.0  [facility]      Name:  Jethro Vázquez /Age/Sex: 1960  (64 y.o. male)   MRN & CSN:  [id number] & [id number] Encounter Date/Time: 2024 12:35 PM EDT    Location:  [address] [place] [address]-A PCP: Mono Schaffer MD       Hospital Day: 3    Assessment and Plan:   Jethro Vázquez is a 64 y.o. 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   IV Vancomycin and IV Rocephin based on prior Cx  Follow up urine and blood Cx  -BCx : Pending.  UCx : Pending.  History of Enterococcus in previous urine cultures.  -Last fever .  No urology intervention at this time.     SHIRLEY  Imaging negative for obstructive uropathy  UA with large blood and >300 protein  Infection related and poor intake vs developing glomerular disease due to malignancy  -On IVF.  CK:104.  UPC 3.1.  - Improved creatinine 2.7.  Renal US : No acute process.    Hyponatremia  - Received IVF.  -Sodium 131.     Chronic anemia of malignancy and chemotherapy  -Hemoglobin 9.1.    Thrombocytopenia, 59k seem subacute  Repeat CBC and if persistent may need further workup for low platelets  Hold chemical DVT PPx  No overt bleeding  -Improved platelets 69.     Hypertension  Hold home amlodipine and carvedilol  - Holding BP meds until blood pressure is improved.    Diet ADULT DIET; Regular; Low Sodium (2 gm)    DVT Prophylaxis [] Lovenox, [] Heparin, [] Eliquis, [] Xarelto  [x] SCDs     Code Status Full Code   Disposition Expected Disposition: Home  Estimated Date of Discharge: TBD  Patient requires continued admission due to sepsis and awaiting cultures and sensitivities     Personally reviewed Lab Studies and

## 2024-04-06 NOTE — PLAN OF CARE
Problem: Discharge Planning  Goal: Discharge to home or other facility with appropriate resources  4/5/2024 2213 by Marjan Beaulieu RN  Outcome: Progressing  4/5/2024 1023 by Dolores Lucas LPN  Outcome: Progressing     Problem: Pain  Goal: Verbalizes/displays adequate comfort level or baseline comfort level  4/5/2024 2213 by Marjan Beaulieu RN  Outcome: Progressing  4/5/2024 1023 by Dolores Lucas LPN  Outcome: Progressing     Problem: Safety - Adult  Goal: Free from fall injury  4/5/2024 2213 by Marjan Beaulieu RN  Outcome: Progressing  4/5/2024 1023 by Dolores Lucas LPN  Outcome: Progressing

## 2024-04-06 NOTE — PROGRESS NOTES
PHARMACY VANCOMYCIN MONITORING SERVICE  Pharmacy consulted by Dr. Cherrie Brewster for monitoring and adjustment.    Indication for treatment: Sepsis likely secondary to complicated UTI  Goal trough: Trough Goal: 10-15 mcg/mL  AUC/OCTAVIO: <500    Risk Factors for MRSA Identified:   Hospitalization within the past 90 days, Received IV antibiotics within the past 90 days    Pertinent Laboratory Values:   Temp Readings from Last 3 Encounters:   04/06/24 99 °F (37.2 °C) (Oral)   04/04/24 99.8 °F (37.7 °C) (Oral)   03/28/24 97.9 °F (36.6 °C) (Oral)     Recent Labs     04/04/24  1825 04/05/24  0840   WBC 7.1 5.4       Recent Labs     04/04/24  1825 04/05/24  0840 04/06/24  0350   BUN 43* 50* 49*   CREATININE 2.7* 3.2* 2.7*       Estimated Creatinine Clearance: 29 mL/min (A) (based on SCr of 2.7 mg/dL (H)).    Intake/Output Summary (Last 24 hours) at 4/6/2024 0853  Last data filed at 4/6/2024 0844  Gross per 24 hour   Intake 1090 ml   Output 1550 ml   Net -460 ml       Last Encounter Weight:  Wt Readings from Last 3 Encounters:   04/06/24 79.6 kg (175 lb 7.8 oz)   04/04/24 83.5 kg (184 lb)   03/28/24 85.2 kg (187 lb 12.8 oz)   VANCOMYCIN TROUGH:  No results for input(s): \"VANCOTROUGH\" in the last 72 hours.  VANCOMYCIN RANDOM:    Recent Labs     04/06/24  0350   VANCORANDOM 8.1      Pertinent Cultures:   Date    Source    Results  04/04   Blood    In process  04/04   Urine    In process    Assessment:  HPI: 64 y.o. male with history of HTN, muscle invasive high-grade bladder cancer (undergoing chemotherapy and had recent exchange of nephrostomy tubes for bilateral ureteral stents).  Patient presented to ED with fatigue, fever, chills, and abdominal pain.  SCr = 2.7, BUN = 49, and incomplete I/O data  SHIRLEY (SCr was 0.8 on 03/28)  Day(s) of therapy: 2 of 7  Vancomycin concentration:   04/06 - 8.1    Plan:  Vancomycin 1,250mg; Plan for intermittent dosing based on random levels 2/2 SHIRLEY  Pharmacy will continue to monitor patient and

## 2024-04-07 LAB
ALBUMIN SERPL-MCNC: 2.7 GM/DL (ref 3.4–5)
ANION GAP SERPL CALCULATED.3IONS-SCNC: 12 MMOL/L (ref 7–16)
BACTERIA: ABNORMAL /HPF
BASOPHILS ABSOLUTE: 0 K/CU MM
BASOPHILS RELATIVE PERCENT: 0.7 % (ref 0–1)
BILIRUBIN URINE: NEGATIVE MG/DL
BLOOD, URINE: ABNORMAL
BUN SERPL-MCNC: 37 MG/DL (ref 6–23)
CALCIUM SERPL-MCNC: 7.4 MG/DL (ref 8.3–10.6)
CHLORIDE BLD-SCNC: 102 MMOL/L (ref 99–110)
CLARITY: CLEAR
CO2: 23 MMOL/L (ref 21–32)
COLOR: YELLOW
CREAT SERPL-MCNC: 2.1 MG/DL (ref 0.9–1.3)
CULTURE: NORMAL
CULTURE: NORMAL
DIFFERENTIAL TYPE: ABNORMAL
DOSE AMOUNT: NORMAL
DOSE TIME: NORMAL
EOSINOPHILS ABSOLUTE: 0.1 K/CU MM
EOSINOPHILS RELATIVE PERCENT: 2.6 % (ref 0–3)
GFR SERPL CREATININE-BSD FRML MDRD: 35 ML/MIN/1.73M2
GLUCOSE SERPL-MCNC: 112 MG/DL (ref 70–99)
GLUCOSE, URINE: NEGATIVE MG/DL
HCT VFR BLD CALC: 27 % (ref 42–52)
HEMOGLOBIN: 8.5 GM/DL (ref 13.5–18)
IMMATURE NEUTROPHIL %: 1.6 % (ref 0–0.43)
KETONES, URINE: NEGATIVE MG/DL
LEUKOCYTE ESTERASE, URINE: ABNORMAL
LYMPHOCYTES ABSOLUTE: 0.9 K/CU MM
LYMPHOCYTES RELATIVE PERCENT: 20.1 % (ref 24–44)
Lab: NORMAL
Lab: NORMAL
MCH RBC QN AUTO: 27.4 PG (ref 27–31)
MCHC RBC AUTO-ENTMCNC: 31.5 % (ref 32–36)
MCV RBC AUTO: 87.1 FL (ref 78–100)
MONOCYTES ABSOLUTE: 0.6 K/CU MM
MONOCYTES RELATIVE PERCENT: 13.8 % (ref 0–4)
MUCUS: ABNORMAL HPF
NITRITE URINE, QUANTITATIVE: NEGATIVE
NUCLEATED RBC %: 0 %
PDW BLD-RTO: 17.2 % (ref 11.7–14.9)
PH, URINE: 6 (ref 5–8)
PHOSPHORUS: 2.6 MG/DL (ref 2.5–4.9)
PLATELET # BLD: 163 K/CU MM (ref 140–440)
PMV BLD AUTO: 10.5 FL (ref 7.5–11.1)
POTASSIUM SERPL-SCNC: 4.1 MMOL/L (ref 3.5–5.1)
PROTEIN UA: 30 MG/DL
RBC # BLD: 3.1 M/CU MM (ref 4.6–6.2)
RBC URINE: 122 /HPF (ref 0–3)
SEGMENTED NEUTROPHILS ABSOLUTE COUNT: 2.6 K/CU MM
SEGMENTED NEUTROPHILS RELATIVE PERCENT: 61.2 % (ref 36–66)
SODIUM BLD-SCNC: 137 MMOL/L (ref 135–145)
SPECIFIC GRAVITY UA: 1.01 (ref 1–1.03)
SPECIMEN: NORMAL
SPECIMEN: NORMAL
TOTAL IMMATURE NEUTOROPHIL: 0.07 K/CU MM
TOTAL NUCLEATED RBC: 0 K/CU MM
TRICHOMONAS: ABNORMAL /HPF
UROBILINOGEN, URINE: 0.2 MG/DL (ref 0.2–1)
VANCOMYCIN RANDOM: 12.2 UG/ML
WBC # BLD: 4.3 K/CU MM (ref 4–10.5)
WBC UA: 21 /HPF (ref 0–2)

## 2024-04-07 PROCEDURE — 85025 COMPLETE CBC W/AUTO DIFF WBC: CPT

## 2024-04-07 PROCEDURE — 80048 BASIC METABOLIC PNL TOTAL CA: CPT

## 2024-04-07 PROCEDURE — 81001 URINALYSIS AUTO W/SCOPE: CPT

## 2024-04-07 PROCEDURE — 2580000003 HC RX 258: Performed by: INTERNAL MEDICINE

## 2024-04-07 PROCEDURE — 82570 ASSAY OF URINE CREATININE: CPT

## 2024-04-07 PROCEDURE — 1200000000 HC SEMI PRIVATE

## 2024-04-07 PROCEDURE — 6360000002 HC RX W HCPCS: Performed by: HOSPITALIST

## 2024-04-07 PROCEDURE — 6370000000 HC RX 637 (ALT 250 FOR IP): Performed by: STUDENT IN AN ORGANIZED HEALTH CARE EDUCATION/TRAINING PROGRAM

## 2024-04-07 PROCEDURE — 80069 RENAL FUNCTION PANEL: CPT

## 2024-04-07 PROCEDURE — 2580000003 HC RX 258: Performed by: STUDENT IN AN ORGANIZED HEALTH CARE EDUCATION/TRAINING PROGRAM

## 2024-04-07 PROCEDURE — 36415 COLL VENOUS BLD VENIPUNCTURE: CPT

## 2024-04-07 PROCEDURE — 80202 ASSAY OF VANCOMYCIN: CPT

## 2024-04-07 PROCEDURE — 6370000000 HC RX 637 (ALT 250 FOR IP): Performed by: HOSPITALIST

## 2024-04-07 PROCEDURE — 84156 ASSAY OF PROTEIN URINE: CPT

## 2024-04-07 PROCEDURE — 94761 N-INVAS EAR/PLS OXIMETRY MLT: CPT

## 2024-04-07 PROCEDURE — 6360000002 HC RX W HCPCS: Performed by: STUDENT IN AN ORGANIZED HEALTH CARE EDUCATION/TRAINING PROGRAM

## 2024-04-07 RX ORDER — PHENAZOPYRIDINE HYDROCHLORIDE 100 MG/1
200 TABLET, FILM COATED ORAL
Status: DISCONTINUED | OUTPATIENT
Start: 2024-04-07 | End: 2024-04-07 | Stop reason: CLARIF

## 2024-04-07 RX ADMIN — SODIUM CHLORIDE, PRESERVATIVE FREE 10 ML: 5 INJECTION INTRAVENOUS at 20:40

## 2024-04-07 RX ADMIN — ONDANSETRON 4 MG: 2 INJECTION INTRAMUSCULAR; INTRAVENOUS at 20:38

## 2024-04-07 RX ADMIN — PROMETHAZINE HYDROCHLORIDE 6.25 MG: 25 INJECTION INTRAMUSCULAR; INTRAVENOUS at 00:38

## 2024-04-07 RX ADMIN — VANCOMYCIN HYDROCHLORIDE 1000 MG: 1 INJECTION, POWDER, LYOPHILIZED, FOR SOLUTION INTRAVENOUS at 16:12

## 2024-04-07 RX ADMIN — SODIUM CHLORIDE: 9 INJECTION, SOLUTION INTRAVENOUS at 20:41

## 2024-04-07 RX ADMIN — OXYCODONE AND ACETAMINOPHEN 1 TABLET: 5; 325 TABLET ORAL at 20:38

## 2024-04-07 RX ADMIN — OXYCODONE AND ACETAMINOPHEN 1 TABLET: 5; 325 TABLET ORAL at 11:45

## 2024-04-07 RX ADMIN — TAMSULOSIN HYDROCHLORIDE 0.4 MG: 0.4 CAPSULE ORAL at 09:03

## 2024-04-07 RX ADMIN — SODIUM CHLORIDE: 9 INJECTION, SOLUTION INTRAVENOUS at 10:11

## 2024-04-07 ASSESSMENT — PAIN DESCRIPTION - DESCRIPTORS
DESCRIPTORS: ACHING;DISCOMFORT

## 2024-04-07 ASSESSMENT — PAIN - FUNCTIONAL ASSESSMENT
PAIN_FUNCTIONAL_ASSESSMENT: ACTIVITIES ARE NOT PREVENTED

## 2024-04-07 ASSESSMENT — PAIN DESCRIPTION - ORIENTATION
ORIENTATION: MID
ORIENTATION: MID;RIGHT
ORIENTATION: MID

## 2024-04-07 ASSESSMENT — PAIN SCALES - GENERAL
PAINLEVEL_OUTOF10: 8
PAINLEVEL_OUTOF10: 7
PAINLEVEL_OUTOF10: 7
PAINLEVEL_OUTOF10: 4

## 2024-04-07 ASSESSMENT — PAIN DESCRIPTION - LOCATION
LOCATION: ABDOMEN

## 2024-04-07 ASSESSMENT — PAIN DESCRIPTION - FREQUENCY
FREQUENCY: INTERMITTENT
FREQUENCY: INTERMITTENT

## 2024-04-07 ASSESSMENT — PAIN SCALES - WONG BAKER
WONGBAKER_NUMERICALRESPONSE: HURTS A LITTLE BIT
WONGBAKER_NUMERICALRESPONSE: HURTS A LITTLE BIT

## 2024-04-07 ASSESSMENT — PAIN DESCRIPTION - PAIN TYPE
TYPE: CHRONIC PAIN
TYPE: CHRONIC PAIN

## 2024-04-07 NOTE — PLAN OF CARE
Problem: Discharge Planning  Goal: Discharge to home or other facility with appropriate resources  Outcome: Progressing     Problem: Pain  Goal: Verbalizes/displays adequate comfort level or baseline comfort level  Outcome: Progressing     Problem: Safety - Adult  Goal: Free from fall injury  Outcome: Progressing     Problem: Neurosensory - Adult  Goal: Achieves stable or improved neurological status  Outcome: Progressing  Goal: Absence of seizures  Outcome: Progressing  Goal: Remains free of injury related to seizures activity  Outcome: Progressing  Goal: Achieves maximal functionality and self care  Outcome: Progressing     Problem: Respiratory - Adult  Goal: Achieves optimal ventilation and oxygenation  Outcome: Progressing     Problem: Gastrointestinal - Adult  Goal: Minimal or absence of nausea and vomiting  Outcome: Progressing  Goal: Maintains or returns to baseline bowel function  Outcome: Progressing  Goal: Maintains adequate nutritional intake  Outcome: Progressing  Goal: Establish and maintain optimal ostomy function  Outcome: Progressing     Problem: Genitourinary - Adult  Goal: Absence of urinary retention  Outcome: Progressing     Problem: Infection - Adult  Goal: Absence of infection at discharge  Outcome: Progressing  Goal: Absence of infection during hospitalization  Outcome: Progressing  Goal: Absence of fever/infection during anticipated neutropenic period  Outcome: Progressing     Problem: Metabolic/Fluid and Electrolytes - Adult  Goal: Electrolytes maintained within normal limits  Outcome: Progressing  Goal: Hemodynamic stability and optimal renal function maintained  Outcome: Progressing     Problem: Hematologic - Adult  Goal: Maintains hematologic stability  Outcome: Progressing     Problem: Skin/Tissue Integrity - Adult  Goal: Skin integrity remains intact  Outcome: Progressing  Goal: Incisions, wounds, or drain sites healing without S/S of infection  Outcome: Progressing  Goal: Oral mucous  membranes remain intact  Outcome: Progressing     Problem: Musculoskeletal - Adult  Goal: Return mobility to safest level of function  Outcome: Progressing

## 2024-04-07 NOTE — PROGRESS NOTES
PHARMACY VANCOMYCIN MONITORING SERVICE  Pharmacy consulted by Dr. Cherrie Brewster for monitoring and adjustment.    Indication for treatment: Sepsis likely secondary to complicated UTI  Goal trough: Trough Goal: 10-15 mcg/mL  AUC/OCTAVIO: <500    Risk Factors for MRSA Identified:   Hospitalization within the past 90 days, Received IV antibiotics within the past 90 days    Pertinent Laboratory Values:   Temp Readings from Last 3 Encounters:   04/07/24 97.9 °F (36.6 °C) (Axillary)   04/04/24 99.8 °F (37.7 °C) (Oral)   03/28/24 97.9 °F (36.6 °C) (Oral)     Recent Labs     04/04/24  1825 04/05/24  0840 04/07/24  0117   WBC 7.1 5.4 4.3       Recent Labs     04/05/24  0840 04/06/24  0350 04/07/24  0117   BUN 50* 49* 37*   CREATININE 3.2* 2.7* 2.1*       Estimated Creatinine Clearance: 37 mL/min (A) (based on SCr of 2.1 mg/dL (H)).    Intake/Output Summary (Last 24 hours) at 4/7/2024 1420  Last data filed at 4/7/2024 1026  Gross per 24 hour   Intake 490 ml   Output 525 ml   Net -35 ml       Last Encounter Weight:  Wt Readings from Last 3 Encounters:   04/07/24 83.3 kg (183 lb 10.3 oz)   04/04/24 83.5 kg (184 lb)   03/28/24 85.2 kg (187 lb 12.8 oz)   VANCOMYCIN TROUGH:  No results for input(s): \"VANCOTROUGH\" in the last 72 hours.  VANCOMYCIN RANDOM:    Recent Labs     04/06/24  0350 04/07/24  0117   VANCORANDOM 8.1 12.2        Pertinent Cultures:   Date    Source    Results  04/04   Blood    No growth  04/04   Urine    In process    Assessment:  HPI: 64 y.o. male with history of HTN, muscle invasive high-grade bladder cancer (undergoing chemotherapy and had recent exchange of nephrostomy tubes for bilateral ureteral stents).  Patient presented to ED with fatigue, fever, chills, and abdominal pain.  ARF (ATN/volume depletion vs obstruction), SCR elevated @2.7 yesterday, improved down to 2.1 today (baseline 0.8), UOP appears ok  Day(s) of therapy: 3 of 7  Vancomycin concentration:   04/06 - 8.1  4/7 - 12.2, 15 hour level post  1250mg dose, predicted trough of 16.9 on 1250mg q24h.    Plan:  Renal trends improved, SCR down to 2.1 today, switch to scheduled dosing.  Start vancomycin 1000mg ivpb q24h for a predicted trough of 14.1 at steady state.  Recheck the vanco level in 2 days.  Pharmacy will continue to monitor patient and adjust therapy as indicated    VANCOMYCIN CONCENTRATION SCHEDULED FOR 4/9 @06:00    Thank you for the consult.  Santy Mcfarlane RP  4/7/2024 2:20 PM

## 2024-04-07 NOTE — PROGRESS NOTES
[address]  Progress Note 0 1 2      Date: 2024   Patient: Jethro Vázquez   : 1960   DOA: 2024   MRN: [id number]   ROOM#: 4117/4117-A     Subjective     I'm feeling better.     Objective     AF/VSS  Good UOP    CBC:   Lab Results   Component Value Date/Time    WBC 4.3 2024 01:17 AM    RBC 3.10 2024 01:17 AM    HGB 8.5 2024 01:17 AM    HCT 27.0 2024 01:17 AM    MCV 87.1 2024 01:17 AM    MCH 27.4 2024 01:17 AM    MCHC 31.5 2024 01:17 AM    RDW 17.2 2024 01:17 AM     2024 01:17 AM    MPV 10.5 2024 01:17 AM     BMP:    Lab Results   Component Value Date/Time     2024 01:17 AM    K 4.1 2024 01:17 AM     2024 01:17 AM    CO2 23 2024 01:17 AM    BUN 37 2024 01:17 AM    LABALBU 2.7 2024 01:17 AM    CREATININE 2.1 2024 01:17 AM    CALCIUM 7.4 2024 01:17 AM    GFRAA >60 2018 07:38 AM    LABGLOM 35 2024 01:17 AM    GLUCOSE 112 2024 01:17 AM      24 18:25   CULTURE, BLOOD 1 Rpt   CULTURE, BLOOD 2 Rpt   Rpt: View report in Results Review for more information  Urine culture no growth to date, same with blood cultures.    Imaging Studies:      24 12:43   PET CT SKULL BASE TO MID THIGH Rpt   Rpt: View report in Results Review for more information  IMPRESSION:     1. Retroperitoneal lymph nodes stable in size compared to 3/4/2024, decreased in size from 2024, and without hypermetabolic activity consistent with treated metastatic lymphadenopathy.  2. Generalized bladder wall thickening.        24 19:49   CT ABDOMEN PELVIS WO IV CONTRAST Rpt   Rpt: View report in Results Review for more information    IMPRESSION:     Bilateral ureteral stents are identified without significant hydronephrosis. There is perinephric and periureteral fat stranding in the retroperitoneum. This is  likely related to recent

## 2024-04-07 NOTE — PLAN OF CARE
Problem: Discharge Planning  Goal: Discharge to home or other facility with appropriate resources  4/6/2024 2242 by Marjan Beaulieu RN  Outcome: Progressing  4/6/2024 1059 by Betty Haskins LPN  Outcome: Progressing     Problem: Pain  Goal: Verbalizes/displays adequate comfort level or baseline comfort level  4/6/2024 2242 by Marjan Beaulieu RN  Outcome: Progressing  4/6/2024 1059 by Betty Haskins LPN  Outcome: Progressing     Problem: Safety - Adult  Goal: Free from fall injury  4/6/2024 2242 by Marjan Beaulieu RN  Outcome: Progressing  4/6/2024 1059 by Betty Haskins LPN  Outcome: Progressing     Problem: Neurosensory - Adult  Goal: Achieves stable or improved neurological status  4/6/2024 2242 by Marjan Beaulieu RN  Outcome: Progressing  4/6/2024 1059 by Betty Haskins LPN  Outcome: Progressing  Goal: Absence of seizures  4/6/2024 2242 by Marjan Beaulieu RN  Outcome: Progressing  4/6/2024 1059 by Betty Haskins LPN  Outcome: Progressing  Goal: Remains free of injury related to seizures activity  4/6/2024 2242 by Marjan Beaulieu RN  Outcome: Progressing  4/6/2024 1059 by Betty Haskins LPN  Outcome: Progressing  Goal: Achieves maximal functionality and self care  4/6/2024 2242 by Marjan Beaulieu RN  Outcome: Progressing  4/6/2024 1059 by Betty Haskins LPN  Outcome: Progressing     Problem: Respiratory - Adult  Goal: Achieves optimal ventilation and oxygenation  4/6/2024 2242 by Marjan Beaulieu RN  Outcome: Progressing  4/6/2024 1059 by Betty Haskins LPN  Outcome: Progressing     Problem: Gastrointestinal - Adult  Goal: Minimal or absence of nausea and vomiting  4/6/2024 2242 by Marjan Beaulieu RN  Outcome: Progressing  4/6/2024 1059 by Betty Haskins LPN  Outcome: Progressing  Goal: Maintains or returns to baseline bowel function  4/6/2024 2242 by Marjan Beaulieu RN  Outcome: Progressing  4/6/2024 1059 by Betty Haskins LPN  Outcome: Progressing  Goal: Maintains adequate nutritional

## 2024-04-07 NOTE — PROGRESS NOTES
Nephrology Progress Note  4/7/2024 10:45 AM  Subjective:     Interval History: Jethro Vázquez is a 64 y.o. male appears to be doing better overall no distress today      Data:   Scheduled Meds:   [Held by provider] amLODIPine  10 mg Oral Daily    [Held by provider] carvedilol  3.125 mg Oral BID WC    tamsulosin  0.4 mg Oral Daily    vancomycin (VANCOCIN) intermittent dosing (placeholder)   Other RX Placeholder    sodium chloride flush  5-40 mL IntraVENous BID    [Held by provider] heparin (porcine)  5,000 Units SubCUTAneous 3 times per day    cefTRIAXone (ROCEPHIN) IV  2,000 mg IntraVENous Q24H     Continuous Infusions:   sodium chloride 100 mL/hr at 04/07/24 1011    sodium chloride 50 mL/hr at 04/06/24 2353         CBC   Recent Labs     04/04/24  1825 04/05/24  0840 04/07/24  0117   WBC 7.1 5.4 4.3   HGB 9.9* 9.1* 8.5*   HCT 29.1* 27.7* 27.0*   PLT 59* 69* 163      BMP   Recent Labs     04/05/24  0840 04/06/24  0350 04/07/24  0117   * 131* 137   K 4.0 4.0 4.1   CL 96* 96* 102   CO2 22 23 23   PHOS  --  3.6 2.6   BUN 50* 49* 37*   CREATININE 3.2* 2.7* 2.1*     Hepatic:   Recent Labs     04/04/24  1825 04/05/24  0840   AST 19 15   ALT 17 14   BILITOT 0.6 0.3   ALKPHOS 135* 122     Troponin: No results for input(s): \"TROPONINI\" in the last 72 hours.  BNP: No results for input(s): \"BNP\" in the last 72 hours.  Lipids: No results for input(s): \"CHOL\", \"HDL\" in the last 72 hours.    Invalid input(s): \"LDLCALCU\"  ABGs: No results found for: \"PHART\", \"PO2ART\", \"ESW9GQX\"  INR: No results for input(s): \"INR\" in the last 72 hours.  Renal Labs  Albumin:    Lab Results   Component Value Date/Time    LABALBU 2.7 04/07/2024 01:17 AM     Calcium:    Lab Results   Component Value Date/Time    CALCIUM 7.4 04/07/2024 01:17 AM     Phosphorus:    Lab Results   Component Value Date/Time    PHOS 2.6 04/07/2024 01:17 AM     U/A:    Lab Results   Component Value Date/Time    NITRU NEGATIVE 04/05/2024 03:00 PM    COLORU YELLOW  Plan:      IMP:  #1 acute renal failure from ATN/volume depletion versus obstruction  #2 muscle involvement bladder cancer  #3 obstructive uropathy with bilateral nephrostomy tubes changed to renal stents  #4 possible UTI/cystitis  #5 hypertension  #6 hyponatremia  #7 protein malnutrition with nephrotic syndrome    Plan     : Renal function improving creatinine 2.1 stable urine output with stents in place  #2 follow-up oncology and urology and treatment plan currently stable  #3 maintain with renal stents  #4 treated for UTI  #5 blood pressure stable  #6 sodium potassium stable  #7 recheck UA and urine protein at this time medical management can treat as an outpatient             ARJUN AGUSTIN MD, MD

## 2024-04-07 NOTE — PROGRESS NOTES
V2.0  [facility] Hospitalist Progress Note      Name:  Jethro Vázquez /Age/Sex: 1960  (64 y.o. male)   MRN & CSN:  [id number] & [id number] Encounter Date/Time: 2024 12:35 PM EDT    Location:  [address] [place] [address]-A PCP: Mono Schaffer MD       Hospital Day: 4    Assessment and Plan:   Jethro Vázquez is a 64 y.o. 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   -BCx : without growth.  UCx : Pending.  History of Enterococcus in previous urine cultures.  -Last fever .  No urology intervention at this time.  IV Vancomycin and IV Rocephin based on prior Cx  Follow up urine and blood Cx  Cannot use pyridium due to current kidney function     SHIRLEY-improving  Imaging negative for obstructive uropathy  UA with large blood and >300 protein  Infection related and poor intake vs developing glomerular disease due to malignancy  Renal ultrasound: no acute abnormalities  -On IVF.  CK:104.  UPC 3.1.  - Improved creatinine 2.7.  Renal US : No acute process.    Hyponatremia  - Received IVF.  -Sodium 131.     Chronic anemia of malignancy and chemotherapy  -Hemoglobin 9.1.    Thrombocytopenia, 59k seem subacute  Repeat CBC and if persistent may need further workup for low platelets  Hold chemical DVT PPx  No overt bleeding  -Improved platelets 69.     Hypertension  Hold home amlodipine and carvedilol  - Holding BP meds until blood pressure is improved.    Diet ADULT DIET; Regular; Low Sodium (2 gm)    DVT Prophylaxis [] Lovenox, [] Heparin, [] Eliquis, [] Xarelto  [x] SCDs     Code Status Full Code   Disposition Expected Disposition: Home  Estimated Date of Discharge: 1-2 days  Patient  mg, Q6H PRN  oxyCODONE-acetaminophen, 1 tablet, Q6H PRN  sodium chloride flush, 5-40 mL, PRN  sodium chloride, , PRN  ondansetron, 4 mg, Q8H PRN   Or  ondansetron, 4 mg, Q6H PRN  acetaminophen, 650 mg, Q6H PRN   Or  acetaminophen, 650 mg, Q6H PRN  HYDROmorphone, 0.5 mg, Q4H PRN        Labs      Recent Labs     04/04/24  1825 04/05/24  0840 04/07/24  0117   WBC 7.1 5.4 4.3   HGB 9.9* 9.1* 8.5*   HCT 29.1* 27.7* 27.0*   PLT 59* 69* 163      Recent Labs     04/05/24  0840 04/06/24  0350 04/07/24  0117   * 131* 137   K 4.0 4.0 4.1   CL 96* 96* 102   CO2 22 23 23   PHOS  --  3.6 2.6   BUN 50* 49* 37*   CREATININE 3.2* 2.7* 2.1*     Recent Labs     04/04/24  1825 04/05/24  0840   AST 19 15   ALT 17 14   BILITOT 0.6 0.3   ALKPHOS 135* 122     No results for input(s): \"INR\" in the last 72 hours.  Recent Labs     04/05/24  0840   CKTOTAL 104     No results found for: \"LABA1C\"  CALCIUM:  7.4/23 (04/07 0117)  Lab Results   Component Value Date/Time    MG 1.9 03/25/2024 09:59 AM           Electronically signed by Nicki Cheema MD on 4/7/2024 at 9:07 AM

## 2024-04-08 LAB
ALBUMIN SERPL-MCNC: 2.6 GM/DL (ref 3.4–5)
ANION GAP SERPL CALCULATED.3IONS-SCNC: 9 MMOL/L (ref 7–16)
BACTERIA: NEGATIVE /HPF
BILIRUBIN URINE: NEGATIVE MG/DL
BLOOD, URINE: ABNORMAL
BUN SERPL-MCNC: 21 MG/DL (ref 6–23)
CALCIUM SERPL-MCNC: 7.7 MG/DL (ref 8.3–10.6)
CHLORIDE BLD-SCNC: 107 MMOL/L (ref 99–110)
CLARITY: CLEAR
CO2: 26 MMOL/L (ref 21–32)
COLOR: YELLOW
CREAT SERPL-MCNC: 1.6 MG/DL (ref 0.9–1.3)
CREATININE URINE: 36.3 MG/DL (ref 39–259)
GFR SERPL CREATININE-BSD FRML MDRD: 48 ML/MIN/1.73M2
GLUCOSE SERPL-MCNC: 114 MG/DL (ref 70–99)
GLUCOSE, URINE: NEGATIVE MG/DL
KETONES, URINE: NEGATIVE MG/DL
LEUKOCYTE ESTERASE, URINE: ABNORMAL
NITRITE URINE, QUANTITATIVE: NEGATIVE
PH, URINE: 6.5 (ref 5–8)
PHOSPHORUS: 2.8 MG/DL (ref 2.5–4.9)
POTASSIUM SERPL-SCNC: 3.8 MMOL/L (ref 3.5–5.1)
PROCALCITONIN SERPL-MCNC: 1.26 NG/ML
PROT/CREAT RATIO, UR: 1.3
PROTEIN UA: 30 MG/DL
RBC URINE: 35 /HPF (ref 0–3)
SODIUM BLD-SCNC: 142 MMOL/L (ref 135–145)
SPECIFIC GRAVITY UA: 1.01 (ref 1–1.03)
TRICHOMONAS: ABNORMAL /HPF
UNCLASSIFIED CRYSTAL: ABNORMAL /HPF
URINE TOTAL PROTEIN: 46 MG/DL
UROBILINOGEN, URINE: 0.2 MG/DL (ref 0.2–1)
WBC CLUMP: ABNORMAL /HPF
WBC UA: 34 /HPF (ref 0–2)
YEAST: ABNORMAL /HPF

## 2024-04-08 PROCEDURE — 2580000003 HC RX 258: Performed by: INTERNAL MEDICINE

## 2024-04-08 PROCEDURE — 1200000000 HC SEMI PRIVATE

## 2024-04-08 PROCEDURE — 84145 PROCALCITONIN (PCT): CPT

## 2024-04-08 PROCEDURE — 94761 N-INVAS EAR/PLS OXIMETRY MLT: CPT

## 2024-04-08 PROCEDURE — 6370000000 HC RX 637 (ALT 250 FOR IP): Performed by: HOSPITALIST

## 2024-04-08 PROCEDURE — 6370000000 HC RX 637 (ALT 250 FOR IP): Performed by: STUDENT IN AN ORGANIZED HEALTH CARE EDUCATION/TRAINING PROGRAM

## 2024-04-08 PROCEDURE — 87086 URINE CULTURE/COLONY COUNT: CPT

## 2024-04-08 PROCEDURE — 6360000002 HC RX W HCPCS: Performed by: STUDENT IN AN ORGANIZED HEALTH CARE EDUCATION/TRAINING PROGRAM

## 2024-04-08 PROCEDURE — 2580000003 HC RX 258: Performed by: STUDENT IN AN ORGANIZED HEALTH CARE EDUCATION/TRAINING PROGRAM

## 2024-04-08 PROCEDURE — 81001 URINALYSIS AUTO W/SCOPE: CPT

## 2024-04-08 PROCEDURE — 80069 RENAL FUNCTION PANEL: CPT

## 2024-04-08 RX ADMIN — SODIUM CHLORIDE, PRESERVATIVE FREE 10 ML: 5 INJECTION INTRAVENOUS at 20:32

## 2024-04-08 RX ADMIN — SODIUM CHLORIDE: 9 INJECTION, SOLUTION INTRAVENOUS at 07:16

## 2024-04-08 RX ADMIN — CEFTRIAXONE SODIUM 2000 MG: 2 INJECTION, POWDER, FOR SOLUTION INTRAMUSCULAR; INTRAVENOUS at 00:36

## 2024-04-08 RX ADMIN — OXYCODONE AND ACETAMINOPHEN 1 TABLET: 5; 325 TABLET ORAL at 10:06

## 2024-04-08 RX ADMIN — OXYCODONE AND ACETAMINOPHEN 1 TABLET: 5; 325 TABLET ORAL at 22:20

## 2024-04-08 RX ADMIN — ONDANSETRON 4 MG: 2 INJECTION INTRAMUSCULAR; INTRAVENOUS at 13:49

## 2024-04-08 RX ADMIN — VANCOMYCIN HYDROCHLORIDE 1000 MG: 1 INJECTION, POWDER, LYOPHILIZED, FOR SOLUTION INTRAVENOUS at 16:41

## 2024-04-08 RX ADMIN — SODIUM CHLORIDE: 9 INJECTION, SOLUTION INTRAVENOUS at 17:20

## 2024-04-08 RX ADMIN — OXYCODONE AND ACETAMINOPHEN 1 TABLET: 5; 325 TABLET ORAL at 02:49

## 2024-04-08 RX ADMIN — TAMSULOSIN HYDROCHLORIDE 0.4 MG: 0.4 CAPSULE ORAL at 09:52

## 2024-04-08 RX ADMIN — OXYCODONE AND ACETAMINOPHEN 1 TABLET: 5; 325 TABLET ORAL at 16:55

## 2024-04-08 ASSESSMENT — PAIN SCALES - WONG BAKER
WONGBAKER_NUMERICALRESPONSE: HURTS A LITTLE BIT

## 2024-04-08 ASSESSMENT — PAIN DESCRIPTION - DESCRIPTORS
DESCRIPTORS: ACHING;DISCOMFORT

## 2024-04-08 ASSESSMENT — PAIN - FUNCTIONAL ASSESSMENT
PAIN_FUNCTIONAL_ASSESSMENT: ACTIVITIES ARE NOT PREVENTED

## 2024-04-08 ASSESSMENT — PAIN DESCRIPTION - LOCATION
LOCATION: ABDOMEN

## 2024-04-08 ASSESSMENT — PAIN DESCRIPTION - FREQUENCY
FREQUENCY: INTERMITTENT

## 2024-04-08 ASSESSMENT — PAIN SCALES - GENERAL
PAINLEVEL_OUTOF10: 3
PAINLEVEL_OUTOF10: 4
PAINLEVEL_OUTOF10: 6
PAINLEVEL_OUTOF10: 6
PAINLEVEL_OUTOF10: 8
PAINLEVEL_OUTOF10: 7
PAINLEVEL_OUTOF10: 6
PAINLEVEL_OUTOF10: 8
PAINLEVEL_OUTOF10: 3
PAINLEVEL_OUTOF10: 7
PAINLEVEL_OUTOF10: 4

## 2024-04-08 ASSESSMENT — PAIN DESCRIPTION - PAIN TYPE: TYPE: CHRONIC PAIN

## 2024-04-08 ASSESSMENT — PAIN DESCRIPTION - ORIENTATION
ORIENTATION: MID;RIGHT
ORIENTATION: RIGHT;LOWER
ORIENTATION: MID;RIGHT
ORIENTATION: RIGHT;LOWER
ORIENTATION: MID;RIGHT
ORIENTATION: MID;RIGHT

## 2024-04-08 NOTE — PROGRESS NOTES
Nephrology Progress Note  4/8/2024 8:52 AM  Subjective:     Interval History: Jethro Vázquez is a 64 y.o. male feels better overall no distress today    Data:   Scheduled Meds:   vancomycin  1,000 mg IntraVENous Q24H    [Held by provider] amLODIPine  10 mg Oral Daily    [Held by provider] carvedilol  3.125 mg Oral BID WC    tamsulosin  0.4 mg Oral Daily    sodium chloride flush  5-40 mL IntraVENous BID    [Held by provider] heparin (porcine)  5,000 Units SubCUTAneous 3 times per day    cefTRIAXone (ROCEPHIN) IV  2,000 mg IntraVENous Q24H     Continuous Infusions:   sodium chloride 100 mL/hr at 04/08/24 0716    sodium chloride 50 mL/hr at 04/06/24 2353         CBC   Recent Labs     04/07/24  0117   WBC 4.3   HGB 8.5*   HCT 27.0*         BMP   Recent Labs     04/06/24  0350 04/07/24  0117 04/08/24  0530   * 137 142   K 4.0 4.1 3.8   CL 96* 102 107   CO2 23 23 26   PHOS 3.6 2.6 2.8   BUN 49* 37* 21   CREATININE 2.7* 2.1* 1.6*     Hepatic:   No results for input(s): \"AST\", \"ALT\", \"ALB\", \"BILITOT\", \"ALKPHOS\" in the last 72 hours.    Troponin: No results for input(s): \"TROPONINI\" in the last 72 hours.  BNP: No results for input(s): \"BNP\" in the last 72 hours.  Lipids: No results for input(s): \"CHOL\", \"HDL\" in the last 72 hours.    Invalid input(s): \"LDLCALCU\"  ABGs: No results found for: \"PHART\", \"PO2ART\", \"UDY9TEL\"  INR: No results for input(s): \"INR\" in the last 72 hours.  Renal Labs  Albumin:    Lab Results   Component Value Date/Time    LABALBU 2.6 04/08/2024 05:30 AM     Calcium:    Lab Results   Component Value Date/Time    CALCIUM 7.7 04/08/2024 05:30 AM     Phosphorus:    Lab Results   Component Value Date/Time    PHOS 2.8 04/08/2024 05:30 AM     U/A:    Lab Results   Component Value Date/Time    NITRU NEGATIVE 04/07/2024 11:26 AM    COLORU YELLOW 04/07/2024 11:26 AM    WBCUA 21 04/07/2024 11:26 AM    RBCUA 122 04/07/2024 11:26 AM    MUCUS RARE 04/07/2024 11:26 AM    TRICHOMONAS NONE SEEN  04/07/2024 11:26 AM    BACTERIA RARE 04/07/2024 11:26 AM    CLARITYU CLEAR 04/07/2024 11:26 AM    SPECGRAV 1.010 04/07/2024 11:26 AM    UROBILINOGEN 0.2 04/07/2024 11:26 AM    BILIRUBINUR NEGATIVE 04/07/2024 11:26 AM    BLOODU LARGE NUMBER OR AMOUNT OBSERVED 04/07/2024 11:26 AM    KETUA NEGATIVE 04/07/2024 11:26 AM     ABG:  No results found for: \"PHART\", \"ERJ7OLU\", \"PO2ART\", \"MLF0LJF\", \"BEART\", \"THGBART\", \"GAN4TSE\", \"M4OPIRNT\"  HgBA1c:  No results found for: \"LABA1C\"  Microalbumen/Creatinine ratio:  No components found for: \"RUCREAT\"  TSH:  No results found for: \"TSH\"  IRON:    Lab Results   Component Value Date/Time    IRON 19 01/01/2024 12:37 PM     Iron Saturation:  No components found for: \"PERCENTFE\"  TIBC:    Lab Results   Component Value Date/Time    TIBC 216 01/01/2024 12:37 PM     FERRITIN:    Lab Results   Component Value Date/Time    FERRITIN 336 01/01/2024 12:37 PM     RPR:  No results found for: \"RPR\"  WOOD:  No results found for: \"ANATITER\", \"WOOD\"  24 Hour Urine for Creatinine Clearance:  No components found for: \"CREAT4\", \"UHRS10\", \"UTV10\"      Objective:   I/O: 04/07 0701 - 04/08 0700  In: 480 [P.O.:480]  Out: 800 [Urine:800]  I/O last 3 completed shifts:  In: 730 [P.O.:720; I.V.:10]  Out: 1325 [Urine:1325]  I/O this shift:  In: -   Out: 175 [Urine:175]  Vitals: BP (!) 149/84   Pulse 86   Temp 98.2 °F (36.8 °C) (Oral)   Resp 16   Ht 1.778 m (5' 10\")   Wt 83.3 kg (183 lb 10.3 oz)   SpO2 98%   BMI 26.35 kg/m²  {  General appearance: awake weak  HEENT: Head: Normal, normocephalic, atraumatic.  Neck: supple, symmetrical, trachea midline  Lungs: diminished breath sounds bilaterally  Heart: S1, S2 normal  Abdomen: abnormal findings:  soft nt  Extremities: edema trace  Neurologic: Mental status: alertness: alert        Assessment and Plan:      IMP:  #1 acute renal failure from ATN/volume depletion versus obstruction  #2 muscle involvement bladder cancer  #3 obstructive uropathy with bilateral

## 2024-04-08 NOTE — PLAN OF CARE
Problem: Discharge Planning  Goal: Discharge to home or other facility with appropriate resources  4/8/2024 1152 by Janie Lozada LPN  Outcome: Progressing  4/7/2024 2208 by Yuriy aCt RN  Outcome: Progressing  4/7/2024 2207 by Yuriy Cat RN  Outcome: Progressing     Problem: Pain  Goal: Verbalizes/displays adequate comfort level or baseline comfort level  4/8/2024 1152 by Janie Lozada LPN  Outcome: Progressing  4/7/2024 2208 by Yuriy Cat RN  Outcome: Progressing  4/7/2024 2207 by Yuriy Cat RN  Outcome: Progressing     Problem: Safety - Adult  Goal: Free from fall injury  4/8/2024 1152 by Janie Lozada LPN  Outcome: Progressing  4/7/2024 2208 by Yuriy Cat RN  Outcome: Progressing  4/7/2024 2207 by Yuriy Cat RN  Outcome: Progressing     Problem: Neurosensory - Adult  Goal: Achieves stable or improved neurological status  4/8/2024 1152 by Janie Lozada LPN  Outcome: Progressing  4/7/2024 2208 by Yuriy Cat RN  Outcome: Progressing  4/7/2024 2207 by Yuriy Cat RN  Outcome: Progressing  Goal: Absence of seizures  4/8/2024 1152 by Janie Lozada LPN  Outcome: Progressing  4/7/2024 2208 by Yuriy Cat RN  Outcome: Progressing  4/7/2024 2207 by Yuriy Cat RN  Outcome: Progressing  Goal: Remains free of injury related to seizures activity  4/8/2024 1152 by Janie Lozada LPN  Outcome: Progressing  4/7/2024 2208 by Yuriy Cat RN  Outcome: Progressing  4/7/2024 2207 by Yuriy Cat RN  Outcome: Progressing  Goal: Achieves maximal functionality and self care  4/8/2024 1152 by Janie Lozada LPN  Outcome: Progressing  4/7/2024 2208 by Yuriy Cat RN  Outcome: Progressing     Problem: Respiratory - Adult  Goal: Achieves optimal ventilation and oxygenation  4/8/2024 1152 by Janie Lozada LPN  Outcome: Progressing  4/7/2024 2208 by Makori, Yuriy, RN  Outcome: Progressing     Problem: Gastrointestinal - Adult  Goal: Minimal or absence of nausea and vomiting  4/8/2024 1152 by Janie Lozada,

## 2024-04-08 NOTE — PROGRESS NOTES
PHARMACY VANCOMYCIN MONITORING SERVICE  Pharmacy consulted by Dr. Cherrie Brewster for monitoring and adjustment.    Indication for treatment: Sepsis likely secondary to complicated UTI  Goal trough: Trough Goal: 10-15 mcg/mL  AUC/OCTAVIO: <500    Risk Factors for MRSA Identified:   Hospitalization within the past 90 days, Received IV antibiotics within the past 90 days    Pertinent Laboratory Values:   Temp Readings from Last 3 Encounters:   04/08/24 98.3 °F (36.8 °C) (Oral)   04/04/24 99.8 °F (37.7 °C) (Oral)   03/28/24 97.9 °F (36.6 °C) (Oral)     Recent Labs     04/07/24  0117   WBC 4.3       Recent Labs     04/06/24  0350 04/07/24  0117 04/08/24  0530   BUN 49* 37* 21   CREATININE 2.7* 2.1* 1.6*       Estimated Creatinine Clearance: 49 mL/min (A) (based on SCr of 1.6 mg/dL (H)).    Intake/Output Summary (Last 24 hours) at 4/8/2024 1034  Last data filed at 4/8/2024 1018  Gross per 24 hour   Intake 358 ml   Output 1350 ml   Net -992 ml       Last Encounter Weight:  Wt Readings from Last 3 Encounters:   04/07/24 83.3 kg (183 lb 10.3 oz)   04/04/24 83.5 kg (184 lb)   03/28/24 85.2 kg (187 lb 12.8 oz)   VANCOMYCIN TROUGH:  No results for input(s): \"VANCOTROUGH\" in the last 72 hours.  VANCOMYCIN RANDOM:    Recent Labs     04/06/24  0350 04/07/24  0117   VANCORANDOM 8.1 12.2        Pertinent Cultures:   Date    Source    Results  04/04   Blood    No growth  04/04   Urine    In process    Assessment:  HPI: 64 y.o. male with history of HTN, muscle invasive high-grade bladder cancer (undergoing chemotherapy and had recent exchange of nephrostomy tubes for bilateral ureteral stents).  Patient presented to ED with fatigue, fever, chills, and abdominal pain.  ARF (ATN/volume depletion vs obstruction), SCR continues to improve, @ 1.6 today (baseline 0.8), UOP is good  Day(s) of therapy: 4 of 7  Vancomycin concentration:   04/06 - 8.1  4/7 - 12.2, 15 hour level post 1250mg dose, predicted trough of 16.9 on 1250mg q24h.  4/9 - level  due @ 0600    Plan:  Renal trends improved, SCR down to 1.6 today  Continue on vancomycin 1000mg ivpb q24h for a predicted trough of 14.1 at steady state.  Recheck the vanco level tomorrow AM  Pharmacy will continue to monitor patient and adjust therapy as indicated    VANCOMYCIN CONCENTRATION SCHEDULED FOR 4/9 @06:00    Thank you for the consult.  France Patterson RPH  4/8/2024 10:34 AM

## 2024-04-08 NOTE — PROGRESS NOTES
V2.0  [facility] Hospitalist Progress Note      Name:  Jethro Vázquez /Age/Sex: 1960  (64 y.o. male)   MRN & CSN:  [id number] & [id number] Encounter Date/Time: 2024 12:35 PM EDT    Location:  [address] [place] [address]-A PCP: Mono Schaffer MD       Hospital Day: 5    Assessment and Plan:   Jethro Vázquez is a 64 y.o. 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   -BCx : without growth.  UCx : Pending.  History of Enterococcus in previous urine cultures.  -Last fever .  No urology intervention at this time.  IV Vancomycin and IV Rocephin based on prior Cx  Follow up urine and blood Cx  Cannot use pyridium due to current kidney function     SHIRLEY-improving  Imaging negative for obstructive uropathy  UA with large blood and >300 protein  Infection related and poor intake vs developing glomerular disease due to malignancy  Renal ultrasound: no acute abnormalities  -On IVF.  CK:104.  UPC 3.1.  - Improved creatinine 2.7.  Renal US : No acute process.    Hyponatremia  - Received IVF.  -Sodium 131.     Chronic anemia of malignancy and chemotherapy  -Hemoglobin 9.1.    Thrombocytopenia, 59k seem subacute  Repeat CBC and if persistent may need further workup for low platelets  Hold chemical DVT PPx  No overt bleeding  -Improved platelets 69.     Hypertension  Hold home amlodipine and carvedilol  - Holding BP meds until blood pressure is improved.    Diet ADULT DIET; Regular; Low Sodium (2 gm)    DVT Prophylaxis [] Lovenox, [] Heparin, [] Eliquis, [] Xarelto  [x] SCDs     Code Status Full Code   Disposition Expected Disposition: Home  Estimated Date of Discharge: 1-2 days  Patient

## 2024-04-08 NOTE — CARE COORDINATION
Reviewed chart, discussed in IDR, and spoke with pt. He states , he has been up to bathroom independently. Still thinking about home, he gave PPO HC list to wife. CM will continue to follow.

## 2024-04-09 ENCOUNTER — TELEPHONE (OUTPATIENT)
Dept: INFECTIOUS DISEASES | Age: 64
End: 2024-04-09

## 2024-04-09 VITALS
WEIGHT: 183.64 LBS | OXYGEN SATURATION: 98 % | RESPIRATION RATE: 17 BRPM | TEMPERATURE: 98.1 F | BODY MASS INDEX: 26.29 KG/M2 | HEIGHT: 70 IN | HEART RATE: 74 BPM | DIASTOLIC BLOOD PRESSURE: 79 MMHG | SYSTOLIC BLOOD PRESSURE: 145 MMHG

## 2024-04-09 DIAGNOSIS — C67.8 MALIGNANT NEOPLASM OF OVERLAPPING SITES OF BLADDER (HCC): ICD-10-CM

## 2024-04-09 LAB
ANION GAP SERPL CALCULATED.3IONS-SCNC: 10 MMOL/L (ref 7–16)
BUN SERPL-MCNC: 11 MG/DL (ref 6–23)
CALCIUM SERPL-MCNC: 7.5 MG/DL (ref 8.3–10.6)
CHLORIDE BLD-SCNC: 105 MMOL/L (ref 99–110)
CO2: 26 MMOL/L (ref 21–32)
CREAT SERPL-MCNC: 1.4 MG/DL (ref 0.9–1.3)
CULTURE: ABNORMAL
CULTURE: NORMAL
DOSE AMOUNT: NORMAL
DOSE TIME: NORMAL
GFR SERPL CREATININE-BSD FRML MDRD: 56 ML/MIN/1.73M2
GLUCOSE SERPL-MCNC: 90 MG/DL (ref 70–99)
Lab: ABNORMAL
Lab: NORMAL
POTASSIUM SERPL-SCNC: 3.9 MMOL/L (ref 3.5–5.1)
SODIUM BLD-SCNC: 141 MMOL/L (ref 135–145)
SPECIMEN: ABNORMAL
SPECIMEN: NORMAL
VANCOMYCIN RANDOM: 9.5 UG/ML

## 2024-04-09 PROCEDURE — 6360000002 HC RX W HCPCS: Performed by: STUDENT IN AN ORGANIZED HEALTH CARE EDUCATION/TRAINING PROGRAM

## 2024-04-09 PROCEDURE — 94761 N-INVAS EAR/PLS OXIMETRY MLT: CPT

## 2024-04-09 PROCEDURE — 6370000000 HC RX 637 (ALT 250 FOR IP): Performed by: HOSPITALIST

## 2024-04-09 PROCEDURE — 2580000003 HC RX 258: Performed by: INTERNAL MEDICINE

## 2024-04-09 PROCEDURE — 6370000000 HC RX 637 (ALT 250 FOR IP): Performed by: STUDENT IN AN ORGANIZED HEALTH CARE EDUCATION/TRAINING PROGRAM

## 2024-04-09 PROCEDURE — 80048 BASIC METABOLIC PNL TOTAL CA: CPT

## 2024-04-09 PROCEDURE — 2580000003 HC RX 258: Performed by: STUDENT IN AN ORGANIZED HEALTH CARE EDUCATION/TRAINING PROGRAM

## 2024-04-09 PROCEDURE — 36415 COLL VENOUS BLD VENIPUNCTURE: CPT

## 2024-04-09 PROCEDURE — 6370000000 HC RX 637 (ALT 250 FOR IP): Performed by: INTERNAL MEDICINE

## 2024-04-09 PROCEDURE — 80202 ASSAY OF VANCOMYCIN: CPT

## 2024-04-09 RX ORDER — OXYCODONE HYDROCHLORIDE AND ACETAMINOPHEN 5; 325 MG/1; MG/1
1 TABLET ORAL EVERY 6 HOURS PRN
Qty: 20 TABLET | Refills: 0 | Status: SHIPPED | OUTPATIENT
Start: 2024-04-09 | End: 2024-04-14

## 2024-04-09 RX ORDER — OLANZAPINE 2.5 MG/1
2.5 TABLET, FILM COATED ORAL NIGHTLY
Qty: 72 TABLET | Refills: 1 | OUTPATIENT
Start: 2024-04-09

## 2024-04-09 RX ORDER — PHENAZOPYRIDINE HYDROCHLORIDE 200 MG/1
200 TABLET, FILM COATED ORAL
Qty: 15 TABLET | Refills: 0 | Status: SHIPPED | OUTPATIENT
Start: 2024-04-09 | End: 2024-04-14

## 2024-04-09 RX ORDER — ONDANSETRON 8 MG/1
4 TABLET, ORALLY DISINTEGRATING ORAL EVERY 8 HOURS PRN
Qty: 30 TABLET | Refills: 0 | Status: SHIPPED | OUTPATIENT
Start: 2024-04-09

## 2024-04-09 RX ORDER — TAMSULOSIN HYDROCHLORIDE 0.4 MG/1
CAPSULE ORAL DAILY
Qty: 90 CAPSULE | Refills: 1 | Status: SHIPPED | OUTPATIENT
Start: 2024-04-09

## 2024-04-09 RX ORDER — PHENAZOPYRIDINE HYDROCHLORIDE 100 MG/1
200 TABLET, FILM COATED ORAL
Status: DISCONTINUED | OUTPATIENT
Start: 2024-04-09 | End: 2024-04-09 | Stop reason: HOSPADM

## 2024-04-09 RX ORDER — CIPROFLOXACIN 500 MG/1
500 TABLET, FILM COATED ORAL EVERY 12 HOURS SCHEDULED
Qty: 19 TABLET | Refills: 0 | Status: SHIPPED | OUTPATIENT
Start: 2024-04-09 | End: 2024-04-19

## 2024-04-09 RX ORDER — CIPROFLOXACIN 500 MG/1
500 TABLET, FILM COATED ORAL EVERY 12 HOURS SCHEDULED
Status: DISCONTINUED | OUTPATIENT
Start: 2024-04-09 | End: 2024-04-09 | Stop reason: HOSPADM

## 2024-04-09 RX ADMIN — TAMSULOSIN HYDROCHLORIDE 0.4 MG: 0.4 CAPSULE ORAL at 09:36

## 2024-04-09 RX ADMIN — ONDANSETRON 4 MG: 2 INJECTION INTRAMUSCULAR; INTRAVENOUS at 13:20

## 2024-04-09 RX ADMIN — OXYCODONE AND ACETAMINOPHEN 1 TABLET: 5; 325 TABLET ORAL at 04:27

## 2024-04-09 RX ADMIN — CEFTRIAXONE SODIUM 2000 MG: 2 INJECTION, POWDER, FOR SOLUTION INTRAMUSCULAR; INTRAVENOUS at 00:05

## 2024-04-09 RX ADMIN — CIPROFLOXACIN HYDROCHLORIDE 500 MG: 500 TABLET, FILM COATED ORAL at 09:40

## 2024-04-09 RX ADMIN — PHENAZOPYRIDINE 200 MG: 100 TABLET ORAL at 12:14

## 2024-04-09 RX ADMIN — SODIUM CHLORIDE: 9 INJECTION, SOLUTION INTRAVENOUS at 03:28

## 2024-04-09 RX ADMIN — OXYCODONE AND ACETAMINOPHEN 1 TABLET: 5; 325 TABLET ORAL at 12:14

## 2024-04-09 ASSESSMENT — PAIN SCALES - WONG BAKER
WONGBAKER_NUMERICALRESPONSE: HURTS A LITTLE BIT

## 2024-04-09 ASSESSMENT — PAIN DESCRIPTION - ORIENTATION
ORIENTATION: RIGHT;LOWER

## 2024-04-09 ASSESSMENT — PAIN - FUNCTIONAL ASSESSMENT
PAIN_FUNCTIONAL_ASSESSMENT: ACTIVITIES ARE NOT PREVENTED

## 2024-04-09 ASSESSMENT — PAIN DESCRIPTION - DESCRIPTORS
DESCRIPTORS: ACHING;DISCOMFORT
DESCRIPTORS: ACHING
DESCRIPTORS: ACHING;DISCOMFORT
DESCRIPTORS: ACHING;DISCOMFORT

## 2024-04-09 ASSESSMENT — PAIN DESCRIPTION - LOCATION
LOCATION: ABDOMEN

## 2024-04-09 ASSESSMENT — PAIN DESCRIPTION - FREQUENCY
FREQUENCY: INTERMITTENT
FREQUENCY: INTERMITTENT

## 2024-04-09 ASSESSMENT — PAIN SCALES - GENERAL
PAINLEVEL_OUTOF10: 3
PAINLEVEL_OUTOF10: 3
PAINLEVEL_OUTOF10: 7
PAINLEVEL_OUTOF10: 0
PAINLEVEL_OUTOF10: 8
PAINLEVEL_OUTOF10: 4
PAINLEVEL_OUTOF10: 3

## 2024-04-09 NOTE — PROGRESS NOTES
thickening is appreciated, similar compared to previous exam with wall thickness of the bladder up to 13 mm Bowel: The small and large bowel are normal in caliber. No bowel wall thickening. Moderate stool noted throughout the colon Abdominal wall: Normal. Fat-containing inguinal hernias noted bilaterally, larger on the left but still small in size. Peritoneum/retroperitoneum: Some minimal stranding in the retroperitoneum. Retroaortic left renal vein noted Vasculature: No aneurysm. Lymph nodes: No significant lymphadenopathy is appreciated. Minimal haziness in the retroperitoneum is stable Osseous structures: Lytic changes noted in the right medial and inferior posterior acetabular margin which may represent lytic bone destruction or metastatic disease, uncertain etiology, stable. Normal alignment of the spine grossly     Persistent severe bladder wall thickening and stable appearing bilateral nephrostomy drainage tubes in place.  Moderate stool noted throughout the colon without signs of any obstruction. Mild fatty liver appreciated. Previous abdominal wall surgery noted. Gallbladder no longer visualized and may have been resected. Lytic bony expansion of the posterior medial acetabulum which may reflect bone metastasis or bone lesion. Electronically signed by Gallito Feng,     PET CT SKULL BASE TO MID THIGH    Result Date: 3/26/2024  EXAM: PET-CT skull to thigh. INDICATION: Malignant neoplasm of overlapping sites of bladder. Bladder cancer, treatment response. COMPARISON: CT abdomen and pelvis from 3/4/2024. TECHNIQUE: Injection while in fasting state. Radiopharmaceutical: 18.14 mCi IV F18 FDG Injection site: Right antecubital fossa Blood glucose prior to injection: 92 mg/dl Attenuation correction: Computed tomography scan Scan region: Skull to thigh. FDG uptake time: 54 minutes. FINDINGS: HEAD/NECK: No hypermetabolic mass or lymphadenopathy. CHEST: No enlarged or hypermetabolic noncalcified mediastinal  hilar or axillary lymph nodes. Calcified granuloma left upper lobe. Right port tip right atrium. ABDOMEN AND PELVIS: Physiologic activity liver spleen and renal collecting systems. Bilateral nephrostomy catheters. Lower pole right renal cyst  for which no follow-up is necessary. Retroperitoneal lymph nodes stable in size compared to 3/4/2024, decreased in size compared  to 9/20/2024, and without hypermetabolic activity. Generalized bladder wall thickening. BONES: Degenerative change spine. No suspicious hypermetabolic osseous lesions.     1. Retroperitoneal lymph nodes stable in size compared to 3/4/2024, decreased in size from 9/20/2024, and without hypermetabolic activity consistent with treated metastatic lymphadenopathy. 2. Generalized bladder wall thickening. Electronically signed by Garfield Leija MD      Assessment & Plan:      Jethro Vázquez is a 64 y.o. male with pmhx of kidney stones, HTN, bladder cancer, and bilateral hydronephrosis managed with ureteral stents admitted 4/4/2024 for SHIRLEY.     1) Bladder Cancer w Bilateral Hydronephrosis & SHIRLEY: S/p internalization of bilateral ureteral stents and nephrostomy tube removal with IR 4/1/24  S/p bilateral PCN placement with IR 1/12/24  S/p cystoscopy and transurethral resection of large bladder tumor 12/14/23. Ureteral orifices not visible. Pathology with invasive high grade urothelial carcinoma.  CT a/p wo contrast 4/4/24: Bilateral ureteral stents are identified without significant hydronephrosis. There is perinephric and periureteral fat stranding in the retroperitoneum.               Cr 1.6, improved from 3.2 4/5 but overall increased from 0.8 3/28              No acute  intervention indicated at this time. Continue ureteral stents and IV abx.              Last seen by oncology 3/25/24 Dr. Gambino & in the midst of outpatient cisplatin/gemcitabine     2) Complicated UTI/Pyelonephritis: UA w mod leuks, large blood; urine cx negative              Blood

## 2024-04-09 NOTE — PLAN OF CARE
Problem: Discharge Planning  Goal: Discharge to home or other facility with appropriate resources  4/8/2024 2256 by Yuriy Cat RN  Outcome: Progressing  4/8/2024 2256 by Yuriy Cat RN  Outcome: Progressing  4/8/2024 1152 by Janie Lozada LPN  Outcome: Progressing     Problem: Pain  Goal: Verbalizes/displays adequate comfort level or baseline comfort level  4/8/2024 2256 by Yuriy Cat RN  Outcome: Progressing  4/8/2024 2256 by Yuriy Cat RN  Outcome: Progressing  4/8/2024 1152 by Janie Lozada LPN  Outcome: Progressing     Problem: Safety - Adult  Goal: Free from fall injury  4/8/2024 2256 by Yuriy Cat RN  Outcome: Progressing  4/8/2024 1152 by Janie Lozada LPN  Outcome: Progressing     Problem: Neurosensory - Adult  Goal: Achieves stable or improved neurological status  4/8/2024 2256 by Yuriy Cat RN  Outcome: Progressing  4/8/2024 1152 by Janie Lozada LPN  Outcome: Progressing  Goal: Absence of seizures  4/8/2024 2256 by Yuriy Cat RN  Outcome: Progressing  4/8/2024 1152 by Janie Lozada LPN  Outcome: Progressing  Goal: Remains free of injury related to seizures activity  4/8/2024 2256 by Yuriy Cat RN  Outcome: Progressing  4/8/2024 1152 by Janie Lozada LPN  Outcome: Progressing  Goal: Achieves maximal functionality and self care  4/8/2024 2256 by Yuriy Cat RN  Outcome: Progressing  4/8/2024 1152 by Janie Lozada LPN  Outcome: Progressing     Problem: Respiratory - Adult  Goal: Achieves optimal ventilation and oxygenation  4/8/2024 2256 by Yuriy Cat RN  Outcome: Progressing  4/8/2024 1152 by Janie Lozada LPN  Outcome: Progressing     Problem: Gastrointestinal - Adult  Goal: Minimal or absence of nausea and vomiting  4/8/2024 2256 by Yuriy Cat RN  Outcome: Progressing  4/8/2024 1152 by Janie Lozada LPN  Outcome: Progressing  Goal: Maintains or returns to baseline bowel function  4/8/2024 2256 by Yuriy Cat, RN  Outcome: Progressing  4/8/2024 1152 by Janie Lozada,  LPN  Outcome: Progressing  Goal: Maintains adequate nutritional intake  4/8/2024 2256 by Yuriy Cat RN  Outcome: Progressing  4/8/2024 1152 by Janie Lozada LPN  Outcome: Progressing  Goal: Establish and maintain optimal ostomy function  4/8/2024 2256 by Yuriy Cat RN  Outcome: Progressing  4/8/2024 1152 by Janie Lozada LPN  Outcome: Progressing     Problem: Genitourinary - Adult  Goal: Absence of urinary retention  4/8/2024 2256 by Yuriy Cat RN  Outcome: Progressing  4/8/2024 1152 by Janie Lozada LPN  Outcome: Progressing     Problem: Infection - Adult  Goal: Absence of infection at discharge  4/8/2024 2256 by Yuriy Cat RN  Outcome: Progressing  4/8/2024 1152 by Janie Lozada LPN  Outcome: Progressing  Goal: Absence of infection during hospitalization  4/8/2024 2256 by Yuriy Cat RN  Outcome: Progressing  4/8/2024 1152 by Janie Lozada LPN  Outcome: Progressing  Goal: Absence of fever/infection during anticipated neutropenic period  4/8/2024 2256 by Yuriy Cat RN  Outcome: Progressing  4/8/2024 1152 by Janie Lozada LPN  Outcome: Progressing     Problem: Metabolic/Fluid and Electrolytes - Adult  Goal: Electrolytes maintained within normal limits  4/8/2024 2256 by Yuriy Cat RN  Outcome: Progressing  4/8/2024 1152 by Janie Lozada LPN  Outcome: Progressing  Goal: Hemodynamic stability and optimal renal function maintained  4/8/2024 2256 by Yuriy Cat RN  Outcome: Progressing  4/8/2024 1152 by Janie Lozada LPN  Outcome: Progressing     Problem: Hematologic - Adult  Goal: Maintains hematologic stability  4/8/2024 2256 by Yuriy Cat RN  Outcome: Progressing  4/8/2024 1152 by Janie Lozada LPN  Outcome: Progressing     Problem: Skin/Tissue Integrity - Adult  Goal: Skin integrity remains intact  4/8/2024 2256 by Yuriy Cat RN  Outcome: Progressing  4/8/2024 1152 by Lozada, Janie, LPN  Outcome: Progressing  Goal: Incisions, wounds, or drain sites healing without S/S of infection  4/8/2024

## 2024-04-09 NOTE — PROGRESS NOTES
Outpatient Pharmacy Progress Note for Meds-to-Beds    Total number of Prescriptions Filled: 3  The following medications were dispensed to the patient during the discharge process:  ciprofloxacin  ondansetron  oxyCODONE-acetaminophen    Additional Documentation:  Medication(s) were delivered to the patient's room prior to discharge      Thank you for letting us serve your patients.  [place] [id number]    Phone: [phone]    Fax: [phone]

## 2024-04-09 NOTE — CONSULTS
Clinical Pharmacy Progress Note    Vancomycin has been discontinued. Pharmacy will sign off. Please re-consult pharmacy if vancomycin dosing is wanted in the future.    Please call with questions.  Macirna Cadena, PharmD, RP, BCPS  Main Pharmacy: [id number]  4/9/2024 9:24 AM

## 2024-04-09 NOTE — TELEPHONE ENCOUNTER
Referral received.  Referral denied.  Dr. Saucedo is no longer accepting UTI patients.  Please refer them to Urology.

## 2024-04-09 NOTE — PROGRESS NOTES
Nephrology Progress Note  4/9/2024 7:54 AM  Subjective:     Interval History: Jethro Vázquez is a 64 y.o. male patient with doing well in general no distress resting in bed    Data:   Scheduled Meds:   vancomycin  1,000 mg IntraVENous Q24H    [Held by provider] amLODIPine  10 mg Oral Daily    [Held by provider] carvedilol  3.125 mg Oral BID WC    tamsulosin  0.4 mg Oral Daily    sodium chloride flush  5-40 mL IntraVENous BID    [Held by provider] heparin (porcine)  5,000 Units SubCUTAneous 3 times per day    cefTRIAXone (ROCEPHIN) IV  2,000 mg IntraVENous Q24H     Continuous Infusions:   sodium chloride 100 mL/hr at 04/09/24 0328    sodium chloride 50 mL/hr at 04/06/24 2353         CBC   Recent Labs     04/07/24  0117   WBC 4.3   HGB 8.5*   HCT 27.0*         BMP   Recent Labs     04/07/24  0117 04/08/24  0530    142   K 4.1 3.8    107   CO2 23 26   PHOS 2.6 2.8   BUN 37* 21   CREATININE 2.1* 1.6*     Hepatic:   No results for input(s): \"AST\", \"ALT\", \"ALB\", \"BILITOT\", \"ALKPHOS\" in the last 72 hours.    Troponin: No results for input(s): \"TROPONINI\" in the last 72 hours.  BNP: No results for input(s): \"BNP\" in the last 72 hours.  Lipids: No results for input(s): \"CHOL\", \"HDL\" in the last 72 hours.    Invalid input(s): \"LDLCALCU\"  ABGs: No results found for: \"PHART\", \"PO2ART\", \"BPH8SRN\"  INR: No results for input(s): \"INR\" in the last 72 hours.  Renal Labs  Albumin:    Lab Results   Component Value Date/Time    LABALBU 2.6 04/08/2024 05:30 AM     Calcium:    Lab Results   Component Value Date/Time    CALCIUM 7.7 04/08/2024 05:30 AM     Phosphorus:    Lab Results   Component Value Date/Time    PHOS 2.8 04/08/2024 05:30 AM     U/A:    Lab Results   Component Value Date/Time    NITRU NEGATIVE 04/08/2024 04:49 PM    COLORU YELLOW 04/08/2024 04:49 PM    WBCUA 34 04/08/2024 04:49 PM    RBCUA 35 04/08/2024 04:49 PM    MUCUS RARE 04/07/2024 11:26 AM    TRICHOMONAS NONE SEEN 04/08/2024 04:49 PM    YEAST

## 2024-04-10 LAB
CULTURE: NORMAL
CULTURE: NORMAL
Lab: NORMAL
Lab: NORMAL
SPECIMEN: NORMAL
SPECIMEN: NORMAL

## 2024-04-12 NOTE — PROGRESS NOTES
Physician Progress Note      PATIENT:               BRISSA BULLOCK  CSN #:                  [id number]  :                       1960  ADMIT DATE:       2024 11:09 PM  DISCH DATE:        2024 4:24 PM  RESPONDING  PROVIDER #:        Nicki Cheema MD          QUERY TEXT:    Pt admitted with UTI. Pt noted to have bilateral renal stents, and is s/p   bilateral nephrostomy tube removal. If possible, please document in the   progress notes and discharge summary if you are evaluating and/or treating any   of the following:    The medical record reflects the following:  Risk Factors: bilateral nephrostomy tubes, renal stents  Clinical Indicators: Urology consult: S/p internalization of bilateral   ureteral stents and nephrostomy tube removal with IR 24  Treatment: urology consult, IVF, IV Rocephin, IV Vancomycin    Hoa Tyson, NICHELLEN, RN, CRCR  Clinical   [phone]  Options provided:  -- UTI due to renal stents  -- UTI due to nephrostomy tubes  -- UTI not due to nephrostomy tube or renal stents  -- Other - I will add my own diagnosis  -- Disagree - Not applicable / Not valid  -- Disagree - Clinically unable to determine / Unknown  -- Refer to Clinical Documentation Reviewer    PROVIDER RESPONSE TEXT:    UTI is not due to nephrostomy tubes or renal stents.    Query created by: Hoa Tyson on 2024 4:25 PM      Electronically signed by:  Nicki Cheema MD 2024 9:09 AM

## 2024-04-13 DIAGNOSIS — C67.8 MALIGNANT NEOPLASM OF OVERLAPPING SITES OF BLADDER (HCC): ICD-10-CM

## 2024-04-13 NOTE — DISCHARGE SUMMARY
CK:104.  UPC 3.1.  - Improved creatinine 2.7.  Renal US 4/6: No acute process.  Follow up with nephro in 1 week     Hyponatremia-resolved  - Received IVF.  -Sodium 131.     Chronic anemia of malignancy and chemotherapy  -Hemoglobin 9.1.     Thrombocytopenia, 59k seem subacute  Repeat CBC and if persistent may need further workup for low platelets  Hold chemical DVT PPx  No overt bleeding  -Improved platelets 69.     Hypertension  Hold home amlodipine and carvedilol  - Holding BP meds until blood pressure is improved.      The patient expressed appropriate understanding of, and agreement with the discharge recommendations, medications, and plan.     Consults this admission:  PHARMACY TO DOSE VANCOMYCIN  IP CONSULT TO UROLOGY  IP CONSULT TO NEPHROLOGY    Discharge Diagnosis:   SHIRLEY (acute kidney injury) (HCC)    sepsis  complicated UTI  SHIRLEY  hyponatremia  thrombocytopenia    Discharge Instruction:   Follow up appointments: nephro, uro, PCP  Primary care physician: Mono Schaffer MD within 2 weeks  Diet: regular diet   Activity: activity as tolerated  Disposition: Discharged to:   [x]Home, [][facility], []SNF, []Acute Rehab, []Hospice   Condition on discharge: Stable  Labs and Tests to be Followed up as an outpatient by PCP or Specialist: none    Discharge Medications:        Medication List        START taking these medications      ciprofloxacin 500 MG tablet  Commonly known as: CIPRO  Take 1 tablet by mouth every 12 hours for 19 doses     ondansetron 8 MG Tbdp disintegrating tablet  Commonly known as: ZOFRAN-ODT  Place 0.5 tablets under the tongue every 8 hours as needed for Nausea or Vomiting     oxyCODONE-acetaminophen 5-325 MG per tablet  Commonly known as: PERCOCET  Take 1 tablet by mouth every 6 hours as needed for Pain for up to 5 days. Max Daily Amount: 4 tablets     phenazopyridine 200 MG tablet  Commonly known as: PYRIDIUM  Take 1 tablet by mouth 3 times daily (with meals) for 5 days            CONTINUE    General: NAD, laying flat in bed  Eyes: no discharge  HENT: NCAT  Respiratory: no respiratory distress  Gastrointestinal: Soft, non tender, nondistended, multiple surgical scars present on abdomen  Genitourinary: no suprapubic tenderness  Musculoskeletal: No edema, nontender  Skin: warm, dry, no gross lesions, has dressing on b/l cva area, nontender  Neuro: Alert. No gross deficits  Psych: Mood appropriate.  Labs and Imaging   No results found.    CBC: No results for input(s): \"WBC\", \"HGB\", \"PLT\" in the last 72 hours.  BMP:  No results for input(s): \"NA\", \"K\", \"CL\", \"CO2\", \"BUN\", \"CREATININE\", \"GLUCOSE\" in the last 72 hours.  Hepatic: No results for input(s): \"AST\", \"ALT\", \"ALB\", \"BILITOT\", \"ALKPHOS\" in the last 72 hours.  Lipids:   Lab Results   Component Value Date/Time    HDL 33 01/06/2023 10:29 AM     Hemoglobin A1C: No results found for: \"LABA1C\"  TSH: No results found for: \"TSH\"  Troponin: No results found for: \"TROPONINT\"  Lactic Acid: No results for input(s): \"LACTA\" in the last 72 hours.  BNP: No results for input(s): \"PROBNP\" in the last 72 hours.  UA:  Lab Results   Component Value Date/Time    NITRU NEGATIVE 04/08/2024 04:49 PM    COLORU YELLOW 04/08/2024 04:49 PM    WBCUA 34 04/08/2024 04:49 PM    RBCUA 35 04/08/2024 04:49 PM    MUCUS RARE 04/07/2024 11:26 AM    TRICHOMONAS NONE SEEN 04/08/2024 04:49 PM    YEAST OCCASIONAL 04/08/2024 04:49 PM    BACTERIA NEGATIVE 04/08/2024 04:49 PM    CLARITYU CLEAR 04/08/2024 04:49 PM    SPECGRAV 1.010 04/08/2024 04:49 PM    LEUKOCYTESUR SMALL NUMBER OR AMOUNT OBSERVED 04/08/2024 04:49 PM    UROBILINOGEN 0.2 04/08/2024 04:49 PM    BILIRUBINUR NEGATIVE 04/08/2024 04:49 PM    BLOODU MODERATE NUMBER OR AMOUNT OBSERVED 04/08/2024 04:49 PM    KETUA NEGATIVE 04/08/2024 04:49 PM     Urine Cultures: No results found for: \"LABURIN\"  Blood Cultures: No results found for: \"BC\"  No results found for: \"BLOODCULT2\"  Organism: No results found for: \"ORG\"    Time Spent Discharging

## 2024-04-15 ENCOUNTER — HOSPITAL ENCOUNTER (OUTPATIENT)
Dept: INFUSION THERAPY | Age: 64
Discharge: HOME | End: 2024-04-15
Payer: MEDICARE

## 2024-04-15 DIAGNOSIS — C67.8 MALIGNANT NEOPLASM OF OVERLAPPING SITES OF BLADDER (HCC): ICD-10-CM

## 2024-04-15 DIAGNOSIS — Z11.59 NEED FOR HEPATITIS B SCREENING TEST: ICD-10-CM

## 2024-04-15 LAB
ALBUMIN SERPL-MCNC: 3.3 GM/DL (ref 3.4–5)
ALP BLD-CCNC: 152 IU/L (ref 40–128)
ALT SERPL-CCNC: 10 U/L (ref 10–40)
ANION GAP SERPL CALCULATED.3IONS-SCNC: 13 MMOL/L (ref 7–16)
AST SERPL-CCNC: 12 IU/L (ref 15–37)
BASOPHILS ABSOLUTE: 0 K/CU MM
BASOPHILS RELATIVE PERCENT: 0.5 % (ref 0–1)
BILIRUB SERPL-MCNC: 0.2 MG/DL (ref 0–1)
BUN SERPL-MCNC: 13 MG/DL (ref 6–23)
CALCIUM SERPL-MCNC: 8.6 MG/DL (ref 8.3–10.6)
CHLORIDE BLD-SCNC: 100 MMOL/L (ref 99–110)
CO2: 28 MMOL/L (ref 21–32)
CREAT SERPL-MCNC: 1.4 MG/DL (ref 0.9–1.3)
DIFFERENTIAL TYPE: ABNORMAL
EOSINOPHILS ABSOLUTE: 0.1 K/CU MM
EOSINOPHILS RELATIVE PERCENT: 1 % (ref 0–3)
GFR SERPL CREATININE-BSD FRML MDRD: 56 ML/MIN/1.73M2
GLUCOSE SERPL-MCNC: 79 MG/DL (ref 70–99)
HCT VFR BLD CALC: 31.1 % (ref 42–52)
HEMOGLOBIN: 9.8 GM/DL (ref 13.5–18)
LYMPHOCYTES ABSOLUTE: 2.4 K/CU MM
LYMPHOCYTES RELATIVE PERCENT: 31.7 % (ref 24–44)
MAGNESIUM: 2 MG/DL (ref 1.8–2.4)
MCH RBC QN AUTO: 28 PG (ref 27–31)
MCHC RBC AUTO-ENTMCNC: 31.5 % (ref 32–36)
MCV RBC AUTO: 88.9 FL (ref 78–100)
MONOCYTES ABSOLUTE: 0.8 K/CU MM
MONOCYTES RELATIVE PERCENT: 10.5 % (ref 0–4)
NEUTROPHILS RELATIVE PERCENT: 56.3 % (ref 36–66)
PDW BLD-RTO: 17.6 % (ref 11.7–14.9)
PLATELET # BLD: 398 K/CU MM (ref 140–440)
PMV BLD AUTO: 8 FL (ref 7.5–11.1)
POTASSIUM SERPL-SCNC: 4.5 MMOL/L (ref 3.5–5.1)
RBC # BLD: 3.5 M/CU MM (ref 4.6–6.2)
SEGMENTED NEUTROPHILS ABSOLUTE COUNT: 4.3 K/CU MM
SODIUM BLD-SCNC: 141 MMOL/L (ref 135–145)
TOTAL PROTEIN: 7.1 GM/DL (ref 6.4–8.2)
WBC # BLD: 7.6 K/CU MM (ref 4–10.5)

## 2024-04-15 PROCEDURE — 85025 COMPLETE CBC W/AUTO DIFF WBC: CPT

## 2024-04-15 PROCEDURE — 83735 ASSAY OF MAGNESIUM: CPT

## 2024-04-15 PROCEDURE — 80053 COMPREHEN METABOLIC PANEL: CPT

## 2024-04-15 PROCEDURE — 36415 COLL VENOUS BLD VENIPUNCTURE: CPT

## 2024-04-15 RX ORDER — ALBUTEROL SULFATE 90 UG/1
4 AEROSOL, METERED RESPIRATORY (INHALATION) PRN
OUTPATIENT
Start: 2024-05-21

## 2024-04-15 RX ORDER — SODIUM CHLORIDE 9 MG/ML
5-250 INJECTION, SOLUTION INTRAVENOUS PRN
OUTPATIENT
Start: 2024-05-21

## 2024-04-15 RX ORDER — SODIUM CHLORIDE 9 MG/ML
INJECTION, SOLUTION INTRAVENOUS CONTINUOUS
OUTPATIENT
Start: 2024-05-21

## 2024-04-15 RX ORDER — ACETAMINOPHEN 325 MG/1
650 TABLET ORAL
OUTPATIENT
Start: 2024-05-21

## 2024-04-15 RX ORDER — ACETAMINOPHEN 325 MG/1
650 TABLET ORAL
OUTPATIENT
Start: 2024-05-14

## 2024-04-15 RX ORDER — SODIUM CHLORIDE 0.9 % (FLUSH) 0.9 %
5-40 SYRINGE (ML) INJECTION PRN
OUTPATIENT
Start: 2024-05-21

## 2024-04-15 RX ORDER — DIPHENHYDRAMINE HYDROCHLORIDE 50 MG/ML
50 INJECTION INTRAMUSCULAR; INTRAVENOUS
OUTPATIENT
Start: 2024-05-21

## 2024-04-15 RX ORDER — ONDANSETRON 2 MG/ML
8 INJECTION INTRAMUSCULAR; INTRAVENOUS
OUTPATIENT
Start: 2024-05-21

## 2024-04-15 RX ORDER — PALONOSETRON 0.05 MG/ML
0.25 INJECTION, SOLUTION INTRAVENOUS ONCE
OUTPATIENT
Start: 2024-05-21

## 2024-04-15 RX ORDER — OLANZAPINE 2.5 MG/1
2.5 TABLET, FILM COATED ORAL NIGHTLY
Qty: 24 TABLET | Refills: 0 | Status: SHIPPED | OUTPATIENT
Start: 2024-04-15

## 2024-04-15 RX ORDER — FAMOTIDINE 10 MG/ML
20 INJECTION, SOLUTION INTRAVENOUS
OUTPATIENT
Start: 2024-05-14

## 2024-04-15 RX ORDER — SODIUM CHLORIDE 0.9 % (FLUSH) 0.9 %
5-40 SYRINGE (ML) INJECTION PRN
OUTPATIENT
Start: 2024-05-14

## 2024-04-15 RX ORDER — DIPHENHYDRAMINE HYDROCHLORIDE 50 MG/ML
50 INJECTION INTRAMUSCULAR; INTRAVENOUS
OUTPATIENT
Start: 2024-05-14

## 2024-04-15 RX ORDER — FAMOTIDINE 10 MG/ML
20 INJECTION, SOLUTION INTRAVENOUS
OUTPATIENT
Start: 2024-05-21

## 2024-04-15 RX ORDER — SODIUM CHLORIDE 9 MG/ML
5-250 INJECTION, SOLUTION INTRAVENOUS PRN
OUTPATIENT
Start: 2024-05-14

## 2024-04-15 RX ORDER — MEPERIDINE HYDROCHLORIDE 50 MG/ML
12.5 INJECTION INTRAMUSCULAR; INTRAVENOUS; SUBCUTANEOUS PRN
OUTPATIENT
Start: 2024-05-21

## 2024-04-15 RX ORDER — SODIUM CHLORIDE 9 MG/ML
INJECTION, SOLUTION INTRAVENOUS CONTINUOUS
OUTPATIENT
Start: 2024-05-14

## 2024-04-15 RX ORDER — EPINEPHRINE 1 MG/ML
0.3 INJECTION, SOLUTION, CONCENTRATE INTRAVENOUS PRN
OUTPATIENT
Start: 2024-05-14

## 2024-04-15 RX ORDER — EPINEPHRINE 1 MG/ML
0.3 INJECTION, SOLUTION, CONCENTRATE INTRAVENOUS PRN
OUTPATIENT
Start: 2024-05-21

## 2024-04-15 RX ORDER — HEPARIN SODIUM (PORCINE) LOCK FLUSH IV SOLN 100 UNIT/ML 100 UNIT/ML
500 SOLUTION INTRAVENOUS PRN
OUTPATIENT
Start: 2024-05-21

## 2024-04-15 RX ORDER — ALBUTEROL SULFATE 90 UG/1
4 AEROSOL, METERED RESPIRATORY (INHALATION) PRN
OUTPATIENT
Start: 2024-05-14

## 2024-04-15 RX ORDER — HEPARIN SODIUM (PORCINE) LOCK FLUSH IV SOLN 100 UNIT/ML 100 UNIT/ML
500 SOLUTION INTRAVENOUS PRN
OUTPATIENT
Start: 2024-05-14

## 2024-04-15 RX ORDER — PALONOSETRON 0.05 MG/ML
0.25 INJECTION, SOLUTION INTRAVENOUS ONCE
OUTPATIENT
Start: 2024-05-14

## 2024-04-15 RX ORDER — ONDANSETRON 2 MG/ML
8 INJECTION INTRAMUSCULAR; INTRAVENOUS
OUTPATIENT
Start: 2024-05-14

## 2024-04-15 RX ORDER — MEPERIDINE HYDROCHLORIDE 50 MG/ML
12.5 INJECTION INTRAMUSCULAR; INTRAVENOUS; SUBCUTANEOUS PRN
OUTPATIENT
Start: 2024-05-14

## 2024-04-16 ENCOUNTER — OFFICE VISIT (OUTPATIENT)
Dept: ONCOLOGY | Age: 64
End: 2024-04-16
Payer: MEDICARE

## 2024-04-16 ENCOUNTER — HOSPITAL ENCOUNTER (OUTPATIENT)
Dept: INFUSION THERAPY | Age: 64
Discharge: HOME | End: 2024-04-16
Payer: MEDICARE

## 2024-04-16 VITALS
HEIGHT: 70 IN | WEIGHT: 183.2 LBS | HEART RATE: 104 BPM | SYSTOLIC BLOOD PRESSURE: 134 MMHG | BODY MASS INDEX: 26.23 KG/M2 | DIASTOLIC BLOOD PRESSURE: 81 MMHG | TEMPERATURE: 97.7 F | OXYGEN SATURATION: 97 %

## 2024-04-16 DIAGNOSIS — C67.8 MALIGNANT NEOPLASM OF OVERLAPPING SITES OF BLADDER (HCC): Primary | ICD-10-CM

## 2024-04-16 DIAGNOSIS — Z11.59 NEED FOR HEPATITIS B SCREENING TEST: ICD-10-CM

## 2024-04-16 PROCEDURE — 99214 OFFICE O/P EST MOD 30 MIN: CPT | Performed by: INTERNAL MEDICINE

## 2024-04-16 PROCEDURE — 96367 TX/PROPH/DG ADDL SEQ IV INF: CPT

## 2024-04-16 PROCEDURE — 6360000002 HC RX W HCPCS: Performed by: INTERNAL MEDICINE

## 2024-04-16 PROCEDURE — G8417 CALC BMI ABV UP PARAM F/U: HCPCS | Performed by: INTERNAL MEDICINE

## 2024-04-16 PROCEDURE — 1111F DSCHRG MED/CURRENT MED MERGE: CPT | Performed by: INTERNAL MEDICINE

## 2024-04-16 PROCEDURE — 96417 CHEMO IV INFUS EACH ADDL SEQ: CPT

## 2024-04-16 PROCEDURE — 3079F DIAST BP 80-89 MM HG: CPT | Performed by: INTERNAL MEDICINE

## 2024-04-16 PROCEDURE — 96361 HYDRATE IV INFUSION ADD-ON: CPT

## 2024-04-16 PROCEDURE — G8427 DOCREV CUR MEDS BY ELIG CLIN: HCPCS | Performed by: INTERNAL MEDICINE

## 2024-04-16 PROCEDURE — 96375 TX/PRO/DX INJ NEW DRUG ADDON: CPT

## 2024-04-16 PROCEDURE — 1036F TOBACCO NON-USER: CPT | Performed by: INTERNAL MEDICINE

## 2024-04-16 PROCEDURE — 2580000003 HC RX 258: Performed by: INTERNAL MEDICINE

## 2024-04-16 PROCEDURE — 3075F SYST BP GE 130 - 139MM HG: CPT | Performed by: INTERNAL MEDICINE

## 2024-04-16 PROCEDURE — 3017F COLORECTAL CA SCREEN DOC REV: CPT | Performed by: INTERNAL MEDICINE

## 2024-04-16 PROCEDURE — 96413 CHEMO IV INFUSION 1 HR: CPT

## 2024-04-16 RX ORDER — SODIUM CHLORIDE 0.9 % (FLUSH) 0.9 %
5-40 SYRINGE (ML) INJECTION PRN
Status: DISCONTINUED | OUTPATIENT
Start: 2024-04-16 | End: 2024-04-17 | Stop reason: HOSPADM

## 2024-04-16 RX ORDER — SODIUM CHLORIDE 9 MG/ML
5-250 INJECTION, SOLUTION INTRAVENOUS PRN
Status: DISCONTINUED | OUTPATIENT
Start: 2024-04-16 | End: 2024-04-17 | Stop reason: HOSPADM

## 2024-04-16 RX ORDER — HEPARIN 100 UNIT/ML
500 SYRINGE INTRAVENOUS PRN
Status: DISCONTINUED | OUTPATIENT
Start: 2024-04-16 | End: 2024-04-17 | Stop reason: HOSPADM

## 2024-04-16 RX ORDER — PALONOSETRON 0.05 MG/ML
0.25 INJECTION, SOLUTION INTRAVENOUS ONCE
Status: COMPLETED | OUTPATIENT
Start: 2024-04-16 | End: 2024-04-16

## 2024-04-16 RX ORDER — PHENAZOPYRIDINE HYDROCHLORIDE 100 MG/1
TABLET, FILM COATED ORAL
COMMUNITY
Start: 2024-04-02

## 2024-04-16 RX ORDER — OXYCODONE HYDROCHLORIDE AND ACETAMINOPHEN 5; 325 MG/1; MG/1
1 TABLET ORAL EVERY 4 HOURS PRN
COMMUNITY

## 2024-04-16 RX ORDER — NALOXONE HYDROCHLORIDE 4 MG/.1ML
SPRAY NASAL
COMMUNITY
Start: 2024-03-29

## 2024-04-16 RX ADMIN — SODIUM CHLORIDE 150 MG: 9 INJECTION, SOLUTION INTRAVENOUS at 10:43

## 2024-04-16 RX ADMIN — SODIUM CHLORIDE, PRESERVATIVE FREE 10 ML: 5 INJECTION INTRAVENOUS at 14:22

## 2024-04-16 RX ADMIN — POTASSIUM CHLORIDE: 2 INJECTION, SOLUTION, CONCENTRATE INTRAVENOUS at 09:28

## 2024-04-16 RX ADMIN — POTASSIUM CHLORIDE: 2 INJECTION, SOLUTION, CONCENTRATE INTRAVENOUS at 12:16

## 2024-04-16 RX ADMIN — GEMCITABINE 1900 MG: 38 INJECTION, SOLUTION INTRAVENOUS at 11:37

## 2024-04-16 RX ADMIN — HEPARIN 500 UNITS: 100 SYRINGE at 14:22

## 2024-04-16 RX ADMIN — DEXAMETHASONE SODIUM PHOSPHATE 12 MG: 4 INJECTION, SOLUTION INTRAMUSCULAR; INTRAVENOUS at 11:11

## 2024-04-16 RX ADMIN — CISPLATIN 67 MG: 1 INJECTION INTRAVENOUS at 12:16

## 2024-04-16 RX ADMIN — SODIUM CHLORIDE 20 ML/HR: 9 INJECTION, SOLUTION INTRAVENOUS at 10:42

## 2024-04-16 RX ADMIN — PALONOSETRON 0.25 MG: 0.25 INJECTION, SOLUTION INTRAVENOUS at 10:42

## 2024-04-16 NOTE — PROGRESS NOTES
MA Rooming Questions  Patient: Jethro Vázquez  MRN: [id number]    Date: 4/16/2024        1. Do you have any new issues?   no         2. Do you need any refills on medications?    yes - while in the hospital the  Prescribed the patient Oxycodone (Percocet) 5-325mg. Is wanting a refill on this.     3. Have you had any imaging done since your last visit?   yes - X-ray, U/S.     4. Have you been hospitalized or seen in the emergency room since your last visit here?   yes - Patient states was taken to the hospital from 4/4-4/9. Patient states was taken to the Er for having confusion, vomiting, fever.  Patient states had an allergic reaction to a medication that was green and white. Patient does not know the name of the medication.    5. Did the patient have a depression screening completed today? No    No data recorded     PHQ-9 Given to (if applicable):               PHQ-9 Score (if applicable):                     [] Positive     []  Negative              Does question #9 need addressed (if applicable)                     [] Yes    []  No               Mable Phoenix MA

## 2024-04-16 NOTE — PROGRESS NOTES
Patient Name: Jethro Vázquez  Patient : 1960  Patient MRN: [id number]     Primary Oncologist: Noemi Gambino MD  Referring Provider: Mono Schaffer MD     Date of Service: 2024      Chief Complaint:   Chief Complaint   Patient presents with    Follow-up    Chemotherapy     FU visit    Patient Active Problem List:     Non-recurrent bilateral inguinal hernia without obstruction or gangrene     Right inguinal hernia     Lipoma, spermatic cord     Benign essential microscopic hematuria     Chronic left-sided low back pain without sciatica     Overweight     Primary hypertension     Pyelonephritis     Malignant neoplasm of overlapping sites of bladder      Need for hepatitis B screening test     Bladder cancer      HPI:   Jethro Vázquez is a pleasant 64 y.o. male who was diagnosed with treatment naive urothelial carcinoma in 2023. He is s/p bilateral nephrostomy tube placement.  He has pelvic and retroperitoneal as well as paraesophageal LAD concerning for metastatic disease. Bone scan was negative in 2024. Plan was for outpatient PET however he has been hospitalized for pain.     CT A/P with contrast on 2023 was non-acute with no significant residual hydronephrosis however did redemonstrate significant LAD.    CT AP W/ Contrast 2024:  1.  Bilateral nephrostomy tubes are present as with the 01/15/2024 study. There is no new perinephric hematoma or hematoma along the course of the nephrostomy tubes.   2.  There is not a significant degree of residual hydronephrosis compared with the pre nephrostomy study of 01/10/2024.  Tiny focus of gas in the right renal collecting system may be secondary to the nephrostomy tube.   3.  There is delayed enhancement of the left kidney compared to the right side.  This could be vascular in nature with stenosis of the left renal artery and narrowing of the left renal vein.  The left renal vein is retroaortic and likely compressed with the severe

## 2024-04-16 NOTE — PROGRESS NOTES
Pt here for tx with pre and post hydration and OV. Port accessed with blood return noted. CBC CMP Mag reviewed from 4/15 and within defined limits. Pt recently in hospital due to nephrostomy tube issues. Pt states nephrostomy tubes removed and stents placed.     Patient's status assessed and documented appropriately.  All labs and required results were also reviewed today.  Treatment parameters have been reviewed.  Today's treatment has been approved by the provider.      Treatment orders and medication sequencing (when applicable) was verified by 2 registered nurses.  The treatment plan was confirmed with the patient prior to administration, and the patient understands the need to report any treatment-related symptoms.    Prior to administration, when applicable, the following 8 elements of medication administration were reviewed with 2nd Registered Nurse prior to dosing: drug name, drug dose, infusion volume when prepared in a syringe, rate of administration, expiration dates and/or times, appearance and integrity of drug(s), and rate of pump for infusion.  The 5 rights of medication administration have been verified.      Pt tolerated tx without incident left ambulatory discharge instructions given

## 2024-04-22 ENCOUNTER — HOSPITAL ENCOUNTER (OUTPATIENT)
Dept: INFUSION THERAPY | Age: 64
Discharge: HOME | End: 2024-04-22
Payer: MEDICARE

## 2024-04-22 ENCOUNTER — HOSPITAL ENCOUNTER (OUTPATIENT)
Dept: RADIATION ONCOLOGY | Age: 64
Discharge: HOME | End: 2024-04-22
Payer: MEDICARE

## 2024-04-22 VITALS
TEMPERATURE: 97.8 F | OXYGEN SATURATION: 95 % | BODY MASS INDEX: 25.51 KG/M2 | HEIGHT: 70 IN | HEART RATE: 107 BPM | SYSTOLIC BLOOD PRESSURE: 144 MMHG | WEIGHT: 178.2 LBS | DIASTOLIC BLOOD PRESSURE: 86 MMHG

## 2024-04-22 DIAGNOSIS — C67.8 MALIGNANT NEOPLASM OF OVERLAPPING SITES OF BLADDER (HCC): ICD-10-CM

## 2024-04-22 DIAGNOSIS — Z11.59 NEED FOR HEPATITIS B SCREENING TEST: ICD-10-CM

## 2024-04-22 LAB
ALBUMIN SERPL-MCNC: 4 GM/DL (ref 3.4–5)
ALP BLD-CCNC: 138 IU/L (ref 40–128)
ALT SERPL-CCNC: 9 U/L (ref 10–40)
ANION GAP SERPL CALCULATED.3IONS-SCNC: 14 MMOL/L (ref 7–16)
AST SERPL-CCNC: 10 IU/L (ref 15–37)
BASOPHILS ABSOLUTE: 0 K/CU MM
BASOPHILS RELATIVE PERCENT: 0.7 % (ref 0–1)
BILIRUB SERPL-MCNC: 0.3 MG/DL (ref 0–1)
BUN SERPL-MCNC: 23 MG/DL (ref 6–23)
CALCIUM SERPL-MCNC: 9 MG/DL (ref 8.3–10.6)
CHLORIDE BLD-SCNC: 101 MMOL/L (ref 99–110)
CO2: 25 MMOL/L (ref 21–32)
CREAT SERPL-MCNC: 1.2 MG/DL (ref 0.9–1.3)
DIFFERENTIAL TYPE: ABNORMAL
EOSINOPHILS ABSOLUTE: 0 K/CU MM
EOSINOPHILS RELATIVE PERCENT: 0.5 % (ref 0–3)
GFR SERPL CREATININE-BSD FRML MDRD: 68 ML/MIN/1.73M2
GLUCOSE SERPL-MCNC: 87 MG/DL (ref 70–99)
HCT VFR BLD CALC: 32.3 % (ref 42–52)
HEMOGLOBIN: 10.3 GM/DL (ref 13.5–18)
LYMPHOCYTES ABSOLUTE: 1.7 K/CU MM
LYMPHOCYTES RELATIVE PERCENT: 28.6 % (ref 24–44)
MAGNESIUM: 2 MG/DL (ref 1.8–2.4)
MCH RBC QN AUTO: 28.3 PG (ref 27–31)
MCHC RBC AUTO-ENTMCNC: 31.9 % (ref 32–36)
MCV RBC AUTO: 88.7 FL (ref 78–100)
MONOCYTES ABSOLUTE: 0.1 K/CU MM
MONOCYTES RELATIVE PERCENT: 1.5 % (ref 0–4)
NEUTROPHILS RELATIVE PERCENT: 68.7 % (ref 36–66)
PDW BLD-RTO: 16.6 % (ref 11.7–14.9)
PLATELET # BLD: 260 K/CU MM (ref 140–440)
PMV BLD AUTO: 8.5 FL (ref 7.5–11.1)
POTASSIUM SERPL-SCNC: 4.6 MMOL/L (ref 3.5–5.1)
RBC # BLD: 3.64 M/CU MM (ref 4.6–6.2)
SEGMENTED NEUTROPHILS ABSOLUTE COUNT: 4 K/CU MM
SODIUM BLD-SCNC: 140 MMOL/L (ref 135–145)
TOTAL PROTEIN: 7.5 GM/DL (ref 6.4–8.2)
WBC # BLD: 5.9 K/CU MM (ref 4–10.5)

## 2024-04-22 PROCEDURE — 83735 ASSAY OF MAGNESIUM: CPT

## 2024-04-22 PROCEDURE — 80053 COMPREHEN METABOLIC PANEL: CPT

## 2024-04-22 PROCEDURE — 99205 OFFICE O/P NEW HI 60 MIN: CPT | Performed by: RADIOLOGY

## 2024-04-22 PROCEDURE — 36415 COLL VENOUS BLD VENIPUNCTURE: CPT

## 2024-04-22 PROCEDURE — 99202 OFFICE O/P NEW SF 15 MIN: CPT | Performed by: RADIOLOGY

## 2024-04-22 PROCEDURE — 85025 COMPLETE CBC W/AUTO DIFF WBC: CPT

## 2024-04-22 ASSESSMENT — PAIN DESCRIPTION - LOCATION: LOCATION: OTHER (COMMENT)

## 2024-04-22 ASSESSMENT — PAIN SCALES - GENERAL: PAINLEVEL_OUTOF10: 7

## 2024-04-22 NOTE — PROGRESS NOTES
Jethro DOMINIK Vázquez  4/22/2024    CONSULT     Vitals:    04/22/24 1004   BP: (!) 144/86   Pulse: (!) 107   Temp: 97.8 °F (36.6 °C)   SpO2: 95%        Oxygen Therapy  SpO2: 95 %  Pulse Oximetry Type: Intermittent  Pulse Oximeter Device Location: Left  O2 Device: None (Room air)  Oxygen Therapy: None (Room air)    Wt Readings from Last 3 Encounters:   04/22/24 80.8 kg (178 lb 3.2 oz)   04/16/24 83.1 kg (183 lb 3.2 oz)   04/07/24 83.3 kg (183 lb 10.3 oz)        Pain Assessment  Pain Assessment: 0-10  Pain Level: 7  Pain Location: Other (Comment) (bladder)  Prescribed Narcotics patient states has not take this today.    Fall Risk:    Not currently a fall risk  Uses Cane    Nutritional Alteration:  Nausea- but takes anti-nausea medication.  No Difficulties Swallowing    Mediport: yes, right side.    Pacemaker/ICD: no    Implants: no    Previous XRT: no; but is currently taking chemotherapy.     Assessment:       Past Medical History:   Diagnosis Date    Acid reflux     History of kidney stones     \"had kidney stone 12/2017- passed it\"    Hypertension     \"on medication since age 51's\"    Malignant neoplasm of overlapping sites of bladder (HCC) 01/04/2024    No natural teeth        Past Surgical History:   Procedure Laterality Date    ANKLE SURGERY Left     age 17\"did have pins and then later removed\"    APPENDECTOMY  1987    CHOLECYSTECTOMY, LAPAROSCOPIC N/A 03/05/2024    CHOLECYSTECTOMY LAPAROSCOPIC ROBOTIC WITH EXTENSIVE LYSIS OF ADHESIONS performed by Rufus Mendes II, MD at [facility] OR    CYSTOSCOPY N/A 12/14/2023    CYSTOSCOPY TRANSURETHRAL RESECTION BLADDER TUMOR performed by Sanaz Ivan MD at [facility] OR    HERNIA REPAIR      IR NEPHROSTOMY CATHETER PLACEMENT  01/12/2024    IR NEPHROSTOMY CATHETER PLACEMENT 1/12/2024 [facility] PROCEDURES    IR NEPHROSTOMY CATHETER PLACEMENT  4/1/2024    IR NEPHROSTOMY CATHETER PLACEMENT 4/1/2024 [facility] SPECIAL PROCEDURES    IR PORT PLACEMENT EQUAL OR GREATER THAN 5 YEARS

## 2024-04-22 NOTE — PROGRESS NOTES
Radiation education and handouts given. Side effects and management reviewed with pt. All questions answered .This RN's direct contact info given to pt, advised to call after 6th cycle of chemo to schedule for CT/SIM for radiation planning. Will schedule planning for 2 weeks post chemo as ordered by Dr. Lr, pt voices understanding.

## 2024-04-22 NOTE — CONSULTS
extremities.    IMPRESSION/PLAN:  Jethro Vázquez is a 64 y.o. male who was recently found to have stage Gil bladder cancer with a good response to chemotherapy.  I have reviewed the patient's radiographic images and discussed his situation with Dr. Franklin.  The treatment options were discussed in detail with the patient.  I do recommend consolidative radiation therapy upon completion of his 6 cycle of chemotherapy.  The potential acute side effects and chronic complications of radiation therapy to the pelvis and pruritic regions were discussed in detail with the patient.  The patient voiced understanding, and the patient's questions were answered.  The patient has decided to proceed with radiation therapy.  I will schedule a simulation to occur approximately 2 weeks after chemo is completed and will plan on initiating radiation therapy shortly thereafter.      Additional physician time required for IMRT planning for a detailed contour and DVH evaluation to minimize acute and chronic toxicity of the bowel, rectum, and femoral heads.    Thank-you for allowing me to participate in the care of this very pleasant patient.          MEDICAL DECISION MAKING    Number/Complexity of Problems Addressed  [] 1 self-limited of minor problem (99202/99212)  [] >=2 self-limited or minor problems, 1 stable chronic illness, 1 acute/uncomplicated illness/injury (99203/99213)  [x]Chronic illnesses with exacerbation/progression/side effects of treatment, >=2 stable chronic illnesses, 1 undiagnosed new problem with uncertain prognosis, 1 acute illness with systemic symptoms, 1 acute complicated injury (99204/84921)  []Chronic illnesses with severe exacerbation/progression/treatment side effects, acute or chronic illness or injury that poses a threat to life or bodily function (99205/66001)    Amount and/or Complexity of Data Reviewed  [] Minimal or none (99202/99212)  [] Limited - meets 1/2 categories (99203/28281)  At least 2 of: review

## 2024-04-23 ENCOUNTER — HOSPITAL ENCOUNTER (OUTPATIENT)
Dept: INFUSION THERAPY | Age: 64
Discharge: HOME | End: 2024-04-23
Payer: MEDICARE

## 2024-04-23 VITALS
HEIGHT: 70 IN | HEART RATE: 98 BPM | SYSTOLIC BLOOD PRESSURE: 150 MMHG | TEMPERATURE: 97.6 F | OXYGEN SATURATION: 97 % | WEIGHT: 182.8 LBS | DIASTOLIC BLOOD PRESSURE: 80 MMHG | BODY MASS INDEX: 26.17 KG/M2

## 2024-04-23 DIAGNOSIS — C67.8 MALIGNANT NEOPLASM OF OVERLAPPING SITES OF BLADDER (HCC): Primary | ICD-10-CM

## 2024-04-23 DIAGNOSIS — Z11.59 NEED FOR HEPATITIS B SCREENING TEST: ICD-10-CM

## 2024-04-23 PROCEDURE — 2580000003 HC RX 258: Performed by: INTERNAL MEDICINE

## 2024-04-23 PROCEDURE — 96361 HYDRATE IV INFUSION ADD-ON: CPT

## 2024-04-23 PROCEDURE — 96417 CHEMO IV INFUS EACH ADDL SEQ: CPT

## 2024-04-23 PROCEDURE — 96367 TX/PROPH/DG ADDL SEQ IV INF: CPT

## 2024-04-23 PROCEDURE — 6360000002 HC RX W HCPCS: Performed by: INTERNAL MEDICINE

## 2024-04-23 PROCEDURE — 96375 TX/PRO/DX INJ NEW DRUG ADDON: CPT

## 2024-04-23 PROCEDURE — 96413 CHEMO IV INFUSION 1 HR: CPT

## 2024-04-23 RX ORDER — SODIUM CHLORIDE 0.9 % (FLUSH) 0.9 %
5-40 SYRINGE (ML) INJECTION PRN
Status: DISCONTINUED | OUTPATIENT
Start: 2024-04-23 | End: 2024-04-24 | Stop reason: HOSPADM

## 2024-04-23 RX ORDER — HEPARIN 100 UNIT/ML
500 SYRINGE INTRAVENOUS PRN
Status: DISCONTINUED | OUTPATIENT
Start: 2024-04-23 | End: 2024-04-24 | Stop reason: HOSPADM

## 2024-04-23 RX ORDER — PALONOSETRON 0.05 MG/ML
0.25 INJECTION, SOLUTION INTRAVENOUS ONCE
Status: COMPLETED | OUTPATIENT
Start: 2024-04-23 | End: 2024-04-23

## 2024-04-23 RX ORDER — SODIUM CHLORIDE 9 MG/ML
5-250 INJECTION, SOLUTION INTRAVENOUS PRN
Status: DISCONTINUED | OUTPATIENT
Start: 2024-04-23 | End: 2024-04-24 | Stop reason: HOSPADM

## 2024-04-23 RX ADMIN — DEXAMETHASONE SODIUM PHOSPHATE 12 MG: 4 INJECTION, SOLUTION INTRAMUSCULAR; INTRAVENOUS at 10:30

## 2024-04-23 RX ADMIN — CISPLATIN 67 MG: 1 INJECTION INTRAVENOUS at 11:42

## 2024-04-23 RX ADMIN — GEMCITABINE 1900 MG: 38 INJECTION, SOLUTION INTRAVENOUS at 10:54

## 2024-04-23 RX ADMIN — HEPARIN 500 UNITS: 100 SYRINGE at 13:42

## 2024-04-23 RX ADMIN — SODIUM CHLORIDE 150 MG: 9 INJECTION, SOLUTION INTRAVENOUS at 10:01

## 2024-04-23 RX ADMIN — SODIUM CHLORIDE 20 ML/HR: 9 INJECTION, SOLUTION INTRAVENOUS at 10:00

## 2024-04-23 RX ADMIN — POTASSIUM CHLORIDE: 2 INJECTION, SOLUTION, CONCENTRATE INTRAVENOUS at 08:49

## 2024-04-23 RX ADMIN — POTASSIUM CHLORIDE: 2 INJECTION, SOLUTION, CONCENTRATE INTRAVENOUS at 11:42

## 2024-04-23 RX ADMIN — PALONOSETRON 0.25 MG: 0.25 INJECTION, SOLUTION INTRAVENOUS at 09:56

## 2024-04-23 NOTE — PROGRESS NOTES
Status appropriately assessed and documented. All required labs and results reviewed. Treatment approved by provider. Treatment orders and medications verified by 2 Registered Nurses where applicable. Treatment plan was confirmed with patient prior to administration, and educated the need to report any treatment-related symptoms      Ambulated to infusion area, here today for chemotherapy. Pt has been having a decrease in appetite due to loss of taste. Right chest mediport accessed, positive blood return noted. Labs reviewed, Labs within defined limits.  Chemo administered as ordered. Call light within reach. Tolerated infusion without incident.  Discharge instructions declined.

## 2024-05-13 ENCOUNTER — HOSPITAL ENCOUNTER (OUTPATIENT)
Dept: INFUSION THERAPY | Age: 64
Discharge: HOME | End: 2024-05-13
Payer: MEDICARE

## 2024-05-13 DIAGNOSIS — C67.8 MALIGNANT NEOPLASM OF OVERLAPPING SITES OF BLADDER (HCC): ICD-10-CM

## 2024-05-13 DIAGNOSIS — Z11.59 NEED FOR HEPATITIS B SCREENING TEST: ICD-10-CM

## 2024-05-13 LAB
ALBUMIN SERPL-MCNC: 3.9 GM/DL (ref 3.4–5)
ALP BLD-CCNC: 97 IU/L (ref 40–128)
ALT SERPL-CCNC: 8 U/L (ref 10–40)
ANION GAP SERPL CALCULATED.3IONS-SCNC: 11 MMOL/L (ref 7–16)
AST SERPL-CCNC: 12 IU/L (ref 15–37)
BASOPHILS ABSOLUTE: 0 K/CU MM
BASOPHILS RELATIVE PERCENT: 0.9 % (ref 0–1)
BILIRUB SERPL-MCNC: 0.2 MG/DL (ref 0–1)
BUN SERPL-MCNC: 19 MG/DL (ref 6–23)
CALCIUM SERPL-MCNC: 8.8 MG/DL (ref 8.3–10.6)
CHLORIDE BLD-SCNC: 107 MMOL/L (ref 99–110)
CO2: 24 MMOL/L (ref 21–32)
CREAT SERPL-MCNC: 1.4 MG/DL (ref 0.9–1.3)
DIFFERENTIAL TYPE: ABNORMAL
EOSINOPHILS ABSOLUTE: 0.2 K/CU MM
EOSINOPHILS RELATIVE PERCENT: 5.1 % (ref 0–3)
GFR, ESTIMATED: 56 ML/MIN/1.73M2
GLUCOSE SERPL-MCNC: 78 MG/DL (ref 70–99)
HCT VFR BLD CALC: 33.2 % (ref 42–52)
HEMOGLOBIN: 10.3 GM/DL (ref 13.5–18)
LYMPHOCYTES ABSOLUTE: 2 K/CU MM
LYMPHOCYTES RELATIVE PERCENT: 59.8 % (ref 24–44)
MAGNESIUM: 2.2 MG/DL (ref 1.8–2.4)
MCH RBC QN AUTO: 29.4 PG (ref 27–31)
MCHC RBC AUTO-ENTMCNC: 31 % (ref 32–36)
MCV RBC AUTO: 94.9 FL (ref 78–100)
MONOCYTES ABSOLUTE: 0.7 K/CU MM
MONOCYTES RELATIVE PERCENT: 21.8 % (ref 0–4)
NEUTROPHILS ABSOLUTE: 0.4 K/CU MM
NEUTROPHILS RELATIVE PERCENT: 12.4 % (ref 36–66)
PDW BLD-RTO: 18.8 % (ref 11.7–14.9)
PLATELET # BLD: 330 K/CU MM (ref 140–440)
PMV BLD AUTO: 8.7 FL (ref 7.5–11.1)
POTASSIUM SERPL-SCNC: 5 MMOL/L (ref 3.5–5.1)
RBC # BLD: 3.5 M/CU MM (ref 4.6–6.2)
SODIUM BLD-SCNC: 142 MMOL/L (ref 135–145)
TOTAL PROTEIN: 6.9 GM/DL (ref 6.4–8.2)
WBC # BLD: 3.3 K/CU MM (ref 4–10.5)

## 2024-05-13 PROCEDURE — 85025 COMPLETE CBC W/AUTO DIFF WBC: CPT

## 2024-05-13 PROCEDURE — 83735 ASSAY OF MAGNESIUM: CPT

## 2024-05-13 PROCEDURE — 36415 COLL VENOUS BLD VENIPUNCTURE: CPT

## 2024-05-13 PROCEDURE — 80053 COMPREHEN METABOLIC PANEL: CPT

## 2024-05-14 ENCOUNTER — OFFICE VISIT (OUTPATIENT)
Dept: ONCOLOGY | Age: 64
End: 2024-05-14
Payer: MEDICARE

## 2024-05-14 ENCOUNTER — HOSPITAL ENCOUNTER (OUTPATIENT)
Dept: INFUSION THERAPY | Age: 64
Discharge: HOME | End: 2024-05-14
Payer: MEDICARE

## 2024-05-14 VITALS
HEIGHT: 70 IN | SYSTOLIC BLOOD PRESSURE: 173 MMHG | BODY MASS INDEX: 28.15 KG/M2 | OXYGEN SATURATION: 98 % | WEIGHT: 196.6 LBS | HEART RATE: 76 BPM | TEMPERATURE: 98 F | DIASTOLIC BLOOD PRESSURE: 88 MMHG

## 2024-05-14 VITALS
HEIGHT: 70 IN | DIASTOLIC BLOOD PRESSURE: 88 MMHG | RESPIRATION RATE: 16 BRPM | SYSTOLIC BLOOD PRESSURE: 173 MMHG | BODY MASS INDEX: 28.06 KG/M2 | HEART RATE: 76 BPM | OXYGEN SATURATION: 98 % | TEMPERATURE: 98 F | WEIGHT: 196 LBS

## 2024-05-14 DIAGNOSIS — C67.8 MALIGNANT NEOPLASM OF OVERLAPPING SITES OF BLADDER (HCC): ICD-10-CM

## 2024-05-14 DIAGNOSIS — C67.8 MALIGNANT NEOPLASM OF OVERLAPPING SITES OF BLADDER (HCC): Primary | ICD-10-CM

## 2024-05-14 DIAGNOSIS — Z11.59 NEED FOR HEPATITIS B SCREENING TEST: Primary | ICD-10-CM

## 2024-05-14 LAB
BASOPHILS ABSOLUTE: 0.1 K/CU MM
BASOPHILS RELATIVE PERCENT: 1.6 % (ref 0–1)
DIFFERENTIAL TYPE: ABNORMAL
EOSINOPHILS ABSOLUTE: 0.1 K/CU MM
EOSINOPHILS RELATIVE PERCENT: 4.5 % (ref 0–3)
HCT VFR BLD CALC: 31.4 % (ref 42–52)
HEMOGLOBIN: 10 GM/DL (ref 13.5–18)
LYMPHOCYTES ABSOLUTE: 1.8 K/CU MM
LYMPHOCYTES RELATIVE PERCENT: 56.9 % (ref 24–44)
MCH RBC QN AUTO: 29.9 PG (ref 27–31)
MCHC RBC AUTO-ENTMCNC: 31.8 % (ref 32–36)
MCV RBC AUTO: 94 FL (ref 78–100)
MONOCYTES ABSOLUTE: 0.6 K/CU MM
MONOCYTES RELATIVE PERCENT: 19.2 % (ref 0–4)
NEUTROPHILS ABSOLUTE: 0.6 K/CU MM
NEUTROPHILS RELATIVE PERCENT: 17.8 % (ref 36–66)
PDW BLD-RTO: 18.5 % (ref 11.7–14.9)
PLATELET # BLD: 310 K/CU MM (ref 140–440)
PMV BLD AUTO: 8.6 FL (ref 7.5–11.1)
RBC # BLD: 3.34 M/CU MM (ref 4.6–6.2)
WBC # BLD: 3.1 K/CU MM (ref 4–10.5)

## 2024-05-14 PROCEDURE — 96360 HYDRATION IV INFUSION INIT: CPT

## 2024-05-14 PROCEDURE — 3017F COLORECTAL CA SCREEN DOC REV: CPT | Performed by: INTERNAL MEDICINE

## 2024-05-14 PROCEDURE — 3079F DIAST BP 80-89 MM HG: CPT | Performed by: INTERNAL MEDICINE

## 2024-05-14 PROCEDURE — 85025 COMPLETE CBC W/AUTO DIFF WBC: CPT

## 2024-05-14 PROCEDURE — 1036F TOBACCO NON-USER: CPT | Performed by: INTERNAL MEDICINE

## 2024-05-14 PROCEDURE — 96361 HYDRATE IV INFUSION ADD-ON: CPT

## 2024-05-14 PROCEDURE — 3077F SYST BP >= 140 MM HG: CPT | Performed by: INTERNAL MEDICINE

## 2024-05-14 PROCEDURE — G8428 CUR MEDS NOT DOCUMENT: HCPCS | Performed by: INTERNAL MEDICINE

## 2024-05-14 PROCEDURE — 99214 OFFICE O/P EST MOD 30 MIN: CPT | Performed by: INTERNAL MEDICINE

## 2024-05-14 PROCEDURE — G8417 CALC BMI ABV UP PARAM F/U: HCPCS | Performed by: INTERNAL MEDICINE

## 2024-05-14 PROCEDURE — 2580000003 HC RX 258: Performed by: INTERNAL MEDICINE

## 2024-05-14 RX ORDER — 0.9 % SODIUM CHLORIDE 0.9 %
1000 INTRAVENOUS SOLUTION INTRAVENOUS ONCE
Status: COMPLETED | OUTPATIENT
Start: 2024-05-14 | End: 2024-05-14

## 2024-05-14 RX ORDER — OLANZAPINE 2.5 MG/1
2.5 TABLET, FILM COATED ORAL NIGHTLY
Qty: 72 TABLET | Refills: 1 | OUTPATIENT
Start: 2024-05-14

## 2024-05-14 RX ADMIN — SODIUM CHLORIDE 1000 ML: 9 INJECTION, SOLUTION INTRAVENOUS at 08:54

## 2024-05-14 NOTE — PROGRESS NOTES
Ambulated to infusion area, here today for chemotherapy and OV with Dr. Franklin. No concerns at this time. Right chest mediport accessed, positive blood return noted. Labs reviewed,  Segs 0.4, creat 1.4. Discussed with Dr. Franklin, will re-draw CBC today. CBC drawn and reviewed, Segs 0.6, Discussed with Dr. Franklin, will delay x1 week and give 1L fluids d/t elevated creat. Patient updated and verbalized understanding. 1L hydration administered as ordered. Call light within reach. Tolerated infusion without incident.  Discharge instructions provided. RTC 05/20 for labs. Reinforced that patient needs to wait for lab results to be reviewed by RN next week and he verbalized understanding.

## 2024-05-14 NOTE — PROGRESS NOTES
MA Rooming Questions  Patient: Jethro Vázquez  MRN: [id number]    Date: 5/14/2024        1. Do you have any new issues?   no         2. Do you need any refills on medications?    no    3. Have you had any imaging done since your last visit?   no    4. Have you been hospitalized or seen in the emergency room since your last visit here?   no    5. Did the patient have a depression screening completed today? No    No data recorded     PHQ-9 Given to (if applicable):               PHQ-9 Score (if applicable):                     [] Positive     []  Negative              Does question #9 need addressed (if applicable)                     [] Yes    []  No               Luz Marina Bright CMA

## 2024-05-20 ENCOUNTER — HOSPITAL ENCOUNTER (OUTPATIENT)
Dept: INFUSION THERAPY | Age: 64
Discharge: HOME | End: 2024-05-20
Payer: MEDICARE

## 2024-05-20 DIAGNOSIS — C67.8 MALIGNANT NEOPLASM OF OVERLAPPING SITES OF BLADDER (HCC): ICD-10-CM

## 2024-05-20 DIAGNOSIS — Z11.59 NEED FOR HEPATITIS B SCREENING TEST: ICD-10-CM

## 2024-05-20 LAB
ALBUMIN SERPL-MCNC: 4 GM/DL (ref 3.4–5)
ALP BLD-CCNC: 108 IU/L (ref 40–128)
ALT SERPL-CCNC: 9 U/L (ref 10–40)
ANION GAP SERPL CALCULATED.3IONS-SCNC: 12 MMOL/L (ref 7–16)
AST SERPL-CCNC: 14 IU/L (ref 15–37)
BASOPHILS ABSOLUTE: 0.1 K/CU MM
BASOPHILS RELATIVE PERCENT: 1.2 % (ref 0–1)
BILIRUB SERPL-MCNC: 0.3 MG/DL (ref 0–1)
BUN SERPL-MCNC: 18 MG/DL (ref 6–23)
CALCIUM SERPL-MCNC: 8.9 MG/DL (ref 8.3–10.6)
CHLORIDE BLD-SCNC: 103 MMOL/L (ref 99–110)
CO2: 26 MMOL/L (ref 21–32)
CREAT SERPL-MCNC: 1.4 MG/DL (ref 0.9–1.3)
DIFFERENTIAL TYPE: ABNORMAL
EOSINOPHILS ABSOLUTE: 0.2 K/CU MM
EOSINOPHILS RELATIVE PERCENT: 2.4 % (ref 0–3)
GFR, ESTIMATED: 56 ML/MIN/1.73M2
GLUCOSE SERPL-MCNC: 85 MG/DL (ref 70–99)
HCT VFR BLD CALC: 37.2 % (ref 42–52)
HEMOGLOBIN: 11.6 GM/DL (ref 13.5–18)
LYMPHOCYTES ABSOLUTE: 2.6 K/CU MM
LYMPHOCYTES RELATIVE PERCENT: 38.2 % (ref 24–44)
MAGNESIUM: 2 MG/DL (ref 1.8–2.4)
MCH RBC QN AUTO: 29.4 PG (ref 27–31)
MCHC RBC AUTO-ENTMCNC: 31.2 % (ref 32–36)
MCV RBC AUTO: 94.4 FL (ref 78–100)
MONOCYTES ABSOLUTE: 0.8 K/CU MM
MONOCYTES RELATIVE PERCENT: 11.4 % (ref 0–4)
NEUTROPHILS ABSOLUTE: 3.1 K/CU MM
NEUTROPHILS RELATIVE PERCENT: 46.8 % (ref 36–66)
PDW BLD-RTO: 16.6 % (ref 11.7–14.9)
PLATELET # BLD: 277 K/CU MM (ref 140–440)
PMV BLD AUTO: 8.7 FL (ref 7.5–11.1)
POTASSIUM SERPL-SCNC: 4.9 MMOL/L (ref 3.5–5.1)
RBC # BLD: 3.94 M/CU MM (ref 4.6–6.2)
SODIUM BLD-SCNC: 141 MMOL/L (ref 135–145)
TOTAL PROTEIN: 7.2 GM/DL (ref 6.4–8.2)
WBC # BLD: 6.7 K/CU MM (ref 4–10.5)

## 2024-05-20 PROCEDURE — 83735 ASSAY OF MAGNESIUM: CPT

## 2024-05-20 PROCEDURE — 36415 COLL VENOUS BLD VENIPUNCTURE: CPT

## 2024-05-20 PROCEDURE — 85025 COMPLETE CBC W/AUTO DIFF WBC: CPT

## 2024-05-20 PROCEDURE — 80053 COMPREHEN METABOLIC PANEL: CPT

## 2024-05-21 ENCOUNTER — HOSPITAL ENCOUNTER (OUTPATIENT)
Dept: INFUSION THERAPY | Age: 64
Discharge: HOME | End: 2024-05-21
Payer: MEDICARE

## 2024-05-21 VITALS
BODY MASS INDEX: 28.03 KG/M2 | OXYGEN SATURATION: 97 % | HEART RATE: 82 BPM | WEIGHT: 195.8 LBS | HEIGHT: 70 IN | TEMPERATURE: 97.1 F | SYSTOLIC BLOOD PRESSURE: 151 MMHG | DIASTOLIC BLOOD PRESSURE: 82 MMHG

## 2024-05-21 DIAGNOSIS — C67.8 MALIGNANT NEOPLASM OF OVERLAPPING SITES OF BLADDER (HCC): Primary | ICD-10-CM

## 2024-05-21 DIAGNOSIS — Z11.59 NEED FOR HEPATITIS B SCREENING TEST: ICD-10-CM

## 2024-05-21 PROCEDURE — 96413 CHEMO IV INFUSION 1 HR: CPT

## 2024-05-21 PROCEDURE — 6360000002 HC RX W HCPCS: Performed by: INTERNAL MEDICINE

## 2024-05-21 PROCEDURE — 96361 HYDRATE IV INFUSION ADD-ON: CPT

## 2024-05-21 PROCEDURE — 96367 TX/PROPH/DG ADDL SEQ IV INF: CPT

## 2024-05-21 PROCEDURE — 2580000003 HC RX 258: Performed by: INTERNAL MEDICINE

## 2024-05-21 PROCEDURE — 96417 CHEMO IV INFUS EACH ADDL SEQ: CPT

## 2024-05-21 PROCEDURE — 96375 TX/PRO/DX INJ NEW DRUG ADDON: CPT

## 2024-05-21 RX ORDER — SODIUM CHLORIDE 0.9 % (FLUSH) 0.9 %
5-40 SYRINGE (ML) INJECTION PRN
Status: DISCONTINUED | OUTPATIENT
Start: 2024-05-21 | End: 2024-05-22 | Stop reason: HOSPADM

## 2024-05-21 RX ORDER — PALONOSETRON 0.05 MG/ML
0.25 INJECTION, SOLUTION INTRAVENOUS ONCE
Status: COMPLETED | OUTPATIENT
Start: 2024-05-21 | End: 2024-05-21

## 2024-05-21 RX ORDER — SODIUM CHLORIDE 9 MG/ML
5-250 INJECTION, SOLUTION INTRAVENOUS PRN
Status: DISCONTINUED | OUTPATIENT
Start: 2024-05-21 | End: 2024-05-22 | Stop reason: HOSPADM

## 2024-05-21 RX ADMIN — DEXAMETHASONE SODIUM PHOSPHATE 12 MG: 4 INJECTION, SOLUTION INTRAMUSCULAR; INTRAVENOUS at 10:23

## 2024-05-21 RX ADMIN — GEMCITABINE HYDROCHLORIDE 1710 MG: 1 INJECTION, SOLUTION INTRAVENOUS at 11:07

## 2024-05-21 RX ADMIN — PALONOSETRON 0.25 MG: 0.25 INJECTION, SOLUTION INTRAVENOUS at 09:58

## 2024-05-21 RX ADMIN — CISPLATIN 67 MG: 1 INJECTION INTRAVENOUS at 11:48

## 2024-05-21 RX ADMIN — POTASSIUM CHLORIDE: 2 INJECTION, SOLUTION, CONCENTRATE INTRAVENOUS at 11:47

## 2024-05-21 RX ADMIN — POTASSIUM CHLORIDE: 2 INJECTION, SOLUTION, CONCENTRATE INTRAVENOUS at 08:50

## 2024-05-21 RX ADMIN — SODIUM CHLORIDE 150 MG: 9 INJECTION, SOLUTION INTRAVENOUS at 10:01

## 2024-05-21 RX ADMIN — SODIUM CHLORIDE 25 ML/HR: 9 INJECTION, SOLUTION INTRAVENOUS at 09:58

## 2024-05-21 NOTE — PROGRESS NOTES
Status appropriately assessed and documented. All required labs and results reviewed. Treatment approved by provider. Treatment orders and medications verified by 2 Registered Nurses where applicable. Treatment plan was confirmed with patient prior to administration, and educated the need to report any treatment-related symptoms     Ambulated to infusion area, here today for chemotherapy. Pt is feeling better than last week with No concerns at this time. Right chest mediport accessed, positive blood return noted. Labs reviewed, Labs within defined limits.  Chemo administered as ordered. Call light within reach. Tolerated infusion without incident.  Discharge instructions provided.

## 2024-05-24 ENCOUNTER — HOSPITAL ENCOUNTER (OUTPATIENT)
Dept: INFUSION THERAPY | Age: 64
Discharge: HOME | End: 2024-05-24
Payer: MEDICARE

## 2024-05-24 DIAGNOSIS — C67.8 MALIGNANT NEOPLASM OF OVERLAPPING SITES OF BLADDER (HCC): ICD-10-CM

## 2024-05-24 DIAGNOSIS — Z11.59 NEED FOR HEPATITIS B SCREENING TEST: ICD-10-CM

## 2024-05-24 LAB
ALBUMIN SERPL-MCNC: 3.9 GM/DL (ref 3.4–5)
ALP BLD-CCNC: 98 IU/L (ref 40–129)
ALT SERPL-CCNC: 37 U/L (ref 10–40)
ANION GAP SERPL CALCULATED.3IONS-SCNC: 10 MMOL/L (ref 7–16)
AST SERPL-CCNC: 21 IU/L (ref 15–37)
BASOPHILS ABSOLUTE: 0 K/CU MM
BASOPHILS RELATIVE PERCENT: 0.6 % (ref 0–1)
BILIRUB SERPL-MCNC: 0.3 MG/DL (ref 0–1)
BUN SERPL-MCNC: 28 MG/DL (ref 6–23)
CALCIUM SERPL-MCNC: 8.7 MG/DL (ref 8.3–10.6)
CHLORIDE BLD-SCNC: 106 MMOL/L (ref 99–110)
CO2: 26 MMOL/L (ref 21–32)
CREAT SERPL-MCNC: 1.2 MG/DL (ref 0.9–1.3)
DIFFERENTIAL TYPE: ABNORMAL
EOSINOPHILS ABSOLUTE: 0.1 K/CU MM
EOSINOPHILS RELATIVE PERCENT: 0.8 % (ref 0–3)
GFR, ESTIMATED: 68 ML/MIN/1.73M2
GLUCOSE SERPL-MCNC: 80 MG/DL (ref 70–99)
HCT VFR BLD CALC: 34.9 % (ref 42–52)
HEMOGLOBIN: 11 GM/DL (ref 13.5–18)
LYMPHOCYTES ABSOLUTE: 2.3 K/CU MM
LYMPHOCYTES RELATIVE PERCENT: 34.3 % (ref 24–44)
MAGNESIUM: 2 MG/DL (ref 1.8–2.4)
MCH RBC QN AUTO: 29.7 PG (ref 27–31)
MCHC RBC AUTO-ENTMCNC: 31.5 % (ref 32–36)
MCV RBC AUTO: 94.3 FL (ref 78–100)
MONOCYTES ABSOLUTE: 0.1 K/CU MM
MONOCYTES RELATIVE PERCENT: 2 % (ref 0–4)
NEUTROPHILS ABSOLUTE: 4.1 K/CU MM
NEUTROPHILS RELATIVE PERCENT: 62.3 % (ref 36–66)
PDW BLD-RTO: 16.3 % (ref 11.7–14.9)
PLATELET # BLD: 232 K/CU MM (ref 140–440)
PMV BLD AUTO: 9.1 FL (ref 7.5–11.1)
POTASSIUM SERPL-SCNC: 4.5 MMOL/L (ref 3.5–5.1)
RBC # BLD: 3.7 M/CU MM (ref 4.6–6.2)
SODIUM BLD-SCNC: 142 MMOL/L (ref 135–145)
TOTAL PROTEIN: 6.7 GM/DL (ref 6.4–8.2)
WBC # BLD: 6.6 K/CU MM (ref 4–10.5)

## 2024-05-24 PROCEDURE — 83735 ASSAY OF MAGNESIUM: CPT

## 2024-05-24 PROCEDURE — 80053 COMPREHEN METABOLIC PANEL: CPT

## 2024-05-24 PROCEDURE — 85025 COMPLETE CBC W/AUTO DIFF WBC: CPT

## 2024-05-24 PROCEDURE — 36415 COLL VENOUS BLD VENIPUNCTURE: CPT

## 2024-05-28 ENCOUNTER — HOSPITAL ENCOUNTER (OUTPATIENT)
Dept: INFUSION THERAPY | Age: 64
Discharge: HOME | End: 2024-05-28
Payer: COMMERCIAL

## 2024-05-28 VITALS
BODY MASS INDEX: 28.12 KG/M2 | TEMPERATURE: 98.2 F | HEIGHT: 70 IN | WEIGHT: 196.4 LBS | DIASTOLIC BLOOD PRESSURE: 87 MMHG | HEART RATE: 85 BPM | OXYGEN SATURATION: 97 % | SYSTOLIC BLOOD PRESSURE: 160 MMHG

## 2024-05-28 DIAGNOSIS — Z11.59 NEED FOR HEPATITIS B SCREENING TEST: ICD-10-CM

## 2024-05-28 DIAGNOSIS — C67.8 MALIGNANT NEOPLASM OF OVERLAPPING SITES OF BLADDER (HCC): Primary | ICD-10-CM

## 2024-05-28 PROCEDURE — 96361 HYDRATE IV INFUSION ADD-ON: CPT

## 2024-05-28 PROCEDURE — 96417 CHEMO IV INFUS EACH ADDL SEQ: CPT

## 2024-05-28 PROCEDURE — 2580000003 HC RX 258: Performed by: INTERNAL MEDICINE

## 2024-05-28 PROCEDURE — 6360000002 HC RX W HCPCS: Performed by: INTERNAL MEDICINE

## 2024-05-28 PROCEDURE — 96413 CHEMO IV INFUSION 1 HR: CPT

## 2024-05-28 PROCEDURE — 96368 THER/DIAG CONCURRENT INF: CPT

## 2024-05-28 PROCEDURE — 96375 TX/PRO/DX INJ NEW DRUG ADDON: CPT

## 2024-05-28 PROCEDURE — 96367 TX/PROPH/DG ADDL SEQ IV INF: CPT

## 2024-05-28 RX ORDER — SODIUM CHLORIDE 0.9 % (FLUSH) 0.9 %
5-40 SYRINGE (ML) INJECTION PRN
Status: DISCONTINUED | OUTPATIENT
Start: 2024-05-28 | End: 2024-05-29 | Stop reason: HOSPADM

## 2024-05-28 RX ORDER — PALONOSETRON 0.05 MG/ML
0.25 INJECTION, SOLUTION INTRAVENOUS ONCE
Status: COMPLETED | OUTPATIENT
Start: 2024-05-28 | End: 2024-05-28

## 2024-05-28 RX ORDER — SODIUM CHLORIDE 9 MG/ML
5-250 INJECTION, SOLUTION INTRAVENOUS PRN
Status: DISCONTINUED | OUTPATIENT
Start: 2024-05-28 | End: 2024-05-29 | Stop reason: HOSPADM

## 2024-05-28 RX ADMIN — POTASSIUM CHLORIDE: 2 INJECTION, SOLUTION, CONCENTRATE INTRAVENOUS at 08:53

## 2024-05-28 RX ADMIN — SODIUM CHLORIDE 20 ML/HR: 9 INJECTION, SOLUTION INTRAVENOUS at 09:58

## 2024-05-28 RX ADMIN — PALONOSETRON 0.25 MG: 0.25 INJECTION, SOLUTION INTRAVENOUS at 09:58

## 2024-05-28 RX ADMIN — CISPLATIN 67 MG: 1 INJECTION INTRAVENOUS at 11:24

## 2024-05-28 RX ADMIN — DEXAMETHASONE SODIUM PHOSPHATE 12 MG: 4 INJECTION, SOLUTION INTRAMUSCULAR; INTRAVENOUS at 10:24

## 2024-05-28 RX ADMIN — GEMCITABINE HYDROCHLORIDE 1710 MG: 1 INJECTION, SOLUTION INTRAVENOUS at 10:45

## 2024-05-28 RX ADMIN — SODIUM CHLORIDE 150 MG: 9 INJECTION, SOLUTION INTRAVENOUS at 10:01

## 2024-05-28 RX ADMIN — POTASSIUM CHLORIDE: 2 INJECTION, SOLUTION, CONCENTRATE INTRAVENOUS at 11:23

## 2024-05-28 ASSESSMENT — PAIN SCALES - GENERAL: PAINLEVEL_OUTOF10: 6

## 2024-05-28 ASSESSMENT — PAIN DESCRIPTION - LOCATION: LOCATION: OTHER (COMMENT)

## 2024-05-28 NOTE — PROGRESS NOTES
Patient arrived to treatment suite for pre-medications and chemotherapy infusion.  PIV started in right wrist area and blood drawn from site and sent to lab for processing.  Patient has no questions or concerns for the doctor at this time.  Treatment approved and given.  Patient tolerated well.  Left treatment suite ambulatory.  Discharge instructions provided.     Patient's status assessed and documented appropriately.  All labs and required results were also reviewed today.  Treatment parameters have been reviewed.  Today's treatment has been approved by the provider.  Treatment orders and medication sequencing (when applicable) was verified by 2 registered nurses.  The treatment plan was confirmed with the patient prior to administration, and the patient understands the need to report any treatment-related symptoms.     Prior to administration, when applicable, the following 8 elements of medication administration were reviewed with 2nd Registered Nurse prior to dosing: drug name, drug dose, infusion volume when prepared in a syringe, rate of administration, expiration dates and/or times, appearance and integrity of drug(s), and rate of pump for infusion.  The 5 rights of medication administration have been verified.

## 2024-06-06 ENCOUNTER — TELEPHONE (OUTPATIENT)
Dept: ONCOLOGY | Age: 64
End: 2024-06-06

## 2024-06-06 DIAGNOSIS — C67.8 MALIGNANT NEOPLASM OF OVERLAPPING SITES OF BLADDER (HCC): Primary | ICD-10-CM

## 2024-06-06 NOTE — TELEPHONE ENCOUNTER
Patient called and advised that he would like get surgery for bladder removal. Patient said Dr. Gambino said he would refer him to a doctor in [place] when he wanted to schedule.

## 2024-06-14 DIAGNOSIS — C67.8 MALIGNANT NEOPLASM OF OVERLAPPING SITES OF BLADDER (HCC): Primary | ICD-10-CM

## 2024-06-14 DIAGNOSIS — Z11.59 NEED FOR HEPATITIS B SCREENING TEST: ICD-10-CM

## 2024-06-17 ENCOUNTER — HOSPITAL ENCOUNTER (OUTPATIENT)
Dept: INFUSION THERAPY | Age: 64
Discharge: HOME | End: 2024-06-17
Payer: COMMERCIAL

## 2024-06-17 DIAGNOSIS — C67.8 MALIGNANT NEOPLASM OF OVERLAPPING SITES OF BLADDER (HCC): ICD-10-CM

## 2024-06-17 DIAGNOSIS — Z11.59 NEED FOR HEPATITIS B SCREENING TEST: ICD-10-CM

## 2024-06-17 LAB
ALBUMIN SERPL-MCNC: 3.9 GM/DL (ref 3.4–5)
ALP BLD-CCNC: 110 IU/L (ref 40–128)
ALT SERPL-CCNC: 8 U/L (ref 10–40)
ANION GAP SERPL CALCULATED.3IONS-SCNC: 11 MMOL/L (ref 7–16)
AST SERPL-CCNC: 10 IU/L (ref 15–37)
BASOPHILS ABSOLUTE: 0.1 K/CU MM
BASOPHILS RELATIVE PERCENT: 1 % (ref 0–1)
BILIRUB SERPL-MCNC: 0.2 MG/DL (ref 0–1)
BUN SERPL-MCNC: 15 MG/DL (ref 6–23)
CALCIUM SERPL-MCNC: 9.2 MG/DL (ref 8.3–10.6)
CHLORIDE BLD-SCNC: 105 MMOL/L (ref 99–110)
CO2: 26 MMOL/L (ref 21–32)
CREAT SERPL-MCNC: 1.6 MG/DL (ref 0.9–1.3)
DIFFERENTIAL TYPE: ABNORMAL
EOSINOPHILS ABSOLUTE: 0.2 K/CU MM
EOSINOPHILS RELATIVE PERCENT: 4 % (ref 0–3)
GFR, ESTIMATED: 48 ML/MIN/1.73M2
GLUCOSE SERPL-MCNC: 96 MG/DL (ref 70–99)
HCT VFR BLD CALC: 35.2 % (ref 42–52)
HEMOGLOBIN: 11.2 GM/DL (ref 13.5–18)
LYMPHOCYTES ABSOLUTE: 2.1 K/CU MM
LYMPHOCYTES RELATIVE PERCENT: 35.3 % (ref 24–44)
MAGNESIUM: 2.1 MG/DL (ref 1.8–2.4)
MCH RBC QN AUTO: 29.6 PG (ref 27–31)
MCHC RBC AUTO-ENTMCNC: 31.8 % (ref 32–36)
MCV RBC AUTO: 92.9 FL (ref 78–100)
MONOCYTES ABSOLUTE: 0.7 K/CU MM
MONOCYTES RELATIVE PERCENT: 11.9 % (ref 0–4)
NEUTROPHILS ABSOLUTE: 2.9 K/CU MM
NEUTROPHILS RELATIVE PERCENT: 47.8 % (ref 36–66)
PDW BLD-RTO: 14.8 % (ref 11.7–14.9)
PLATELET # BLD: 371 K/CU MM (ref 140–440)
PMV BLD AUTO: 7.9 FL (ref 7.5–11.1)
POTASSIUM SERPL-SCNC: 4.7 MMOL/L (ref 3.5–5.1)
RBC # BLD: 3.79 M/CU MM (ref 4.6–6.2)
SODIUM BLD-SCNC: 142 MMOL/L (ref 135–145)
TOTAL PROTEIN: 7.4 GM/DL (ref 6.4–8.2)
WBC # BLD: 6 K/CU MM (ref 4–10.5)

## 2024-06-17 PROCEDURE — 85025 COMPLETE CBC W/AUTO DIFF WBC: CPT

## 2024-06-17 PROCEDURE — 83735 ASSAY OF MAGNESIUM: CPT

## 2024-06-17 PROCEDURE — 36415 COLL VENOUS BLD VENIPUNCTURE: CPT

## 2024-06-17 PROCEDURE — 80053 COMPREHEN METABOLIC PANEL: CPT

## 2024-06-17 RX ORDER — ALBUTEROL SULFATE 90 UG/1
4 AEROSOL, METERED RESPIRATORY (INHALATION) PRN
OUTPATIENT
Start: 2024-06-25

## 2024-06-17 RX ORDER — SODIUM CHLORIDE 9 MG/ML
5-250 INJECTION, SOLUTION INTRAVENOUS PRN
OUTPATIENT
Start: 2024-06-25

## 2024-06-17 RX ORDER — SODIUM CHLORIDE 9 MG/ML
INJECTION, SOLUTION INTRAVENOUS CONTINUOUS
OUTPATIENT
Start: 2024-06-25

## 2024-06-17 RX ORDER — MEPERIDINE HYDROCHLORIDE 50 MG/ML
12.5 INJECTION INTRAMUSCULAR; INTRAVENOUS; SUBCUTANEOUS PRN
Status: CANCELLED | OUTPATIENT
Start: 2024-06-18

## 2024-06-17 RX ORDER — EPINEPHRINE 1 MG/ML
0.3 INJECTION, SOLUTION, CONCENTRATE INTRAVENOUS PRN
Status: CANCELLED | OUTPATIENT
Start: 2024-06-18

## 2024-06-17 RX ORDER — DIPHENHYDRAMINE HYDROCHLORIDE 50 MG/ML
50 INJECTION INTRAMUSCULAR; INTRAVENOUS
OUTPATIENT
Start: 2024-06-25

## 2024-06-17 RX ORDER — FAMOTIDINE 10 MG/ML
20 INJECTION, SOLUTION INTRAVENOUS
OUTPATIENT
Start: 2024-06-25

## 2024-06-17 RX ORDER — MEPERIDINE HYDROCHLORIDE 50 MG/ML
12.5 INJECTION INTRAMUSCULAR; INTRAVENOUS; SUBCUTANEOUS PRN
OUTPATIENT
Start: 2024-06-25

## 2024-06-17 RX ORDER — SODIUM CHLORIDE 9 MG/ML
INJECTION, SOLUTION INTRAVENOUS CONTINUOUS
Status: CANCELLED | OUTPATIENT
Start: 2024-06-18

## 2024-06-17 RX ORDER — DIPHENHYDRAMINE HYDROCHLORIDE 50 MG/ML
50 INJECTION INTRAMUSCULAR; INTRAVENOUS
Status: CANCELLED | OUTPATIENT
Start: 2024-06-18

## 2024-06-17 RX ORDER — SODIUM CHLORIDE 9 MG/ML
5-250 INJECTION, SOLUTION INTRAVENOUS PRN
Status: CANCELLED | OUTPATIENT
Start: 2024-06-18

## 2024-06-17 RX ORDER — PALONOSETRON 0.05 MG/ML
0.25 INJECTION, SOLUTION INTRAVENOUS ONCE
Status: CANCELLED | OUTPATIENT
Start: 2024-06-18

## 2024-06-17 RX ORDER — SODIUM CHLORIDE 0.9 % (FLUSH) 0.9 %
5-40 SYRINGE (ML) INJECTION PRN
Status: CANCELLED | OUTPATIENT
Start: 2024-06-18

## 2024-06-17 RX ORDER — HEPARIN SODIUM (PORCINE) LOCK FLUSH IV SOLN 100 UNIT/ML 100 UNIT/ML
500 SOLUTION INTRAVENOUS PRN
Status: CANCELLED | OUTPATIENT
Start: 2024-06-18

## 2024-06-17 RX ORDER — ONDANSETRON 2 MG/ML
8 INJECTION INTRAMUSCULAR; INTRAVENOUS
Status: CANCELLED | OUTPATIENT
Start: 2024-06-18

## 2024-06-17 RX ORDER — ONDANSETRON 2 MG/ML
8 INJECTION INTRAMUSCULAR; INTRAVENOUS
OUTPATIENT
Start: 2024-06-25

## 2024-06-17 RX ORDER — ACETAMINOPHEN 325 MG/1
650 TABLET ORAL
OUTPATIENT
Start: 2024-06-25

## 2024-06-17 RX ORDER — PALONOSETRON 0.05 MG/ML
0.25 INJECTION, SOLUTION INTRAVENOUS ONCE
OUTPATIENT
Start: 2024-06-25

## 2024-06-17 RX ORDER — ALBUTEROL SULFATE 90 UG/1
4 AEROSOL, METERED RESPIRATORY (INHALATION) PRN
Status: CANCELLED | OUTPATIENT
Start: 2024-06-18

## 2024-06-17 RX ORDER — SODIUM CHLORIDE 0.9 % (FLUSH) 0.9 %
5-40 SYRINGE (ML) INJECTION PRN
OUTPATIENT
Start: 2024-06-25

## 2024-06-17 RX ORDER — ACETAMINOPHEN 325 MG/1
650 TABLET ORAL
Status: CANCELLED | OUTPATIENT
Start: 2024-06-18

## 2024-06-17 RX ORDER — EPINEPHRINE 1 MG/ML
0.3 INJECTION, SOLUTION, CONCENTRATE INTRAVENOUS PRN
OUTPATIENT
Start: 2024-06-25

## 2024-06-17 RX ORDER — HEPARIN SODIUM (PORCINE) LOCK FLUSH IV SOLN 100 UNIT/ML 100 UNIT/ML
500 SOLUTION INTRAVENOUS PRN
OUTPATIENT
Start: 2024-06-25

## 2024-06-17 RX ORDER — FAMOTIDINE 10 MG/ML
20 INJECTION, SOLUTION INTRAVENOUS
Status: CANCELLED | OUTPATIENT
Start: 2024-06-18

## 2024-06-17 NOTE — PROGRESS NOTES
Patient Name: Jethro Vázquez  Patient : 1960  Patient MRN: [id number]     Primary Oncologist: Noemi Gambino MD  Referring Provider: Mono Schaffer MD     Date of Service: 2024      Chief Complaint:   Chief Complaint   Patient presents with    Follow-up    Chemotherapy     FU visit    Patient Active Problem List:     Non-recurrent bilateral inguinal hernia without obstruction or gangrene     Right inguinal hernia     Lipoma, spermatic cord     Benign essential microscopic hematuria     Chronic left-sided low back pain without sciatica     Overweight     Primary hypertension     Pyelonephritis     Malignant neoplasm of overlapping sites of bladder      Need for hepatitis B screening test     Bladder cancer      HPI:   Jethro Vázquez is a pleasant 64 y.o. male who was diagnosed with treatment naive urothelial carcinoma in 2023. He is s/p bilateral nephrostomy tube placement.  He has pelvic and retroperitoneal as well as paraesophageal LAD concerning for metastatic disease. Bone scan was negative in 2024. Plan was for outpatient PET however he has been hospitalized for pain.     CT A/P with contrast on 2023 was non-acute with no significant residual hydronephrosis however did redemonstrate significant LAD.    CT AP W/ Contrast 2024:  1.  Bilateral nephrostomy tubes are present as with the 01/15/2024 study. There is no new perinephric hematoma or hematoma along the course of the nephrostomy tubes.   2.  There is not a significant degree of residual hydronephrosis compared with the pre nephrostomy study of 01/10/2024.  Tiny focus of gas in the right renal collecting system may be secondary to the nephrostomy tube.   3.  There is delayed enhancement of the left kidney compared to the right side.  This could be vascular in nature with stenosis of the left renal artery and narrowing of the left renal vein.  The left renal vein is retroaortic and likely compressed with the severe

## 2024-06-18 ENCOUNTER — HOSPITAL ENCOUNTER (OUTPATIENT)
Dept: INFUSION THERAPY | Age: 64
Discharge: HOME | End: 2024-06-18
Payer: COMMERCIAL

## 2024-06-18 ENCOUNTER — OFFICE VISIT (OUTPATIENT)
Dept: ONCOLOGY | Age: 64
End: 2024-06-18
Payer: MEDICARE

## 2024-06-18 VITALS
OXYGEN SATURATION: 97 % | HEART RATE: 72 BPM | SYSTOLIC BLOOD PRESSURE: 149 MMHG | WEIGHT: 198.4 LBS | DIASTOLIC BLOOD PRESSURE: 90 MMHG | BODY MASS INDEX: 28.47 KG/M2 | TEMPERATURE: 97.2 F | RESPIRATION RATE: 16 BRPM

## 2024-06-18 VITALS
BODY MASS INDEX: 28.4 KG/M2 | WEIGHT: 198.4 LBS | HEART RATE: 72 BPM | HEIGHT: 70 IN | DIASTOLIC BLOOD PRESSURE: 90 MMHG | TEMPERATURE: 97.2 F | SYSTOLIC BLOOD PRESSURE: 149 MMHG | OXYGEN SATURATION: 97 %

## 2024-06-18 DIAGNOSIS — Z11.59 NEED FOR HEPATITIS B SCREENING TEST: ICD-10-CM

## 2024-06-18 DIAGNOSIS — C67.8 MALIGNANT NEOPLASM OF OVERLAPPING SITES OF BLADDER (HCC): Primary | ICD-10-CM

## 2024-06-18 PROCEDURE — 3077F SYST BP >= 140 MM HG: CPT | Performed by: INTERNAL MEDICINE

## 2024-06-18 PROCEDURE — 6360000002 HC RX W HCPCS: Performed by: INTERNAL MEDICINE

## 2024-06-18 PROCEDURE — 1036F TOBACCO NON-USER: CPT | Performed by: INTERNAL MEDICINE

## 2024-06-18 PROCEDURE — G8427 DOCREV CUR MEDS BY ELIG CLIN: HCPCS | Performed by: INTERNAL MEDICINE

## 2024-06-18 PROCEDURE — 96361 HYDRATE IV INFUSION ADD-ON: CPT

## 2024-06-18 PROCEDURE — 96375 TX/PRO/DX INJ NEW DRUG ADDON: CPT

## 2024-06-18 PROCEDURE — 2580000003 HC RX 258: Performed by: INTERNAL MEDICINE

## 2024-06-18 PROCEDURE — 96413 CHEMO IV INFUSION 1 HR: CPT

## 2024-06-18 PROCEDURE — 96417 CHEMO IV INFUS EACH ADDL SEQ: CPT

## 2024-06-18 PROCEDURE — 99214 OFFICE O/P EST MOD 30 MIN: CPT | Performed by: INTERNAL MEDICINE

## 2024-06-18 PROCEDURE — 3080F DIAST BP >= 90 MM HG: CPT | Performed by: INTERNAL MEDICINE

## 2024-06-18 PROCEDURE — 3017F COLORECTAL CA SCREEN DOC REV: CPT | Performed by: INTERNAL MEDICINE

## 2024-06-18 PROCEDURE — 96367 TX/PROPH/DG ADDL SEQ IV INF: CPT

## 2024-06-18 PROCEDURE — G8417 CALC BMI ABV UP PARAM F/U: HCPCS | Performed by: INTERNAL MEDICINE

## 2024-06-18 RX ORDER — PALONOSETRON 0.05 MG/ML
0.25 INJECTION, SOLUTION INTRAVENOUS ONCE
Status: COMPLETED | OUTPATIENT
Start: 2024-06-18 | End: 2024-06-18

## 2024-06-18 RX ORDER — SODIUM CHLORIDE 9 MG/ML
5-250 INJECTION, SOLUTION INTRAVENOUS PRN
Status: DISCONTINUED | OUTPATIENT
Start: 2024-06-18 | End: 2024-06-19 | Stop reason: HOSPADM

## 2024-06-18 RX ORDER — SODIUM CHLORIDE 0.9 % (FLUSH) 0.9 %
5-40 SYRINGE (ML) INJECTION PRN
Status: DISCONTINUED | OUTPATIENT
Start: 2024-06-18 | End: 2024-06-19 | Stop reason: HOSPADM

## 2024-06-18 RX ADMIN — SODIUM CHLORIDE 150 MG: 9 INJECTION, SOLUTION INTRAVENOUS at 09:38

## 2024-06-18 RX ADMIN — SODIUM CHLORIDE 20 ML/HR: 9 INJECTION, SOLUTION INTRAVENOUS at 09:38

## 2024-06-18 RX ADMIN — CISPLATIN 67 MG: 1 INJECTION INTRAVENOUS at 11:31

## 2024-06-18 RX ADMIN — POTASSIUM CHLORIDE: 2 INJECTION, SOLUTION, CONCENTRATE INTRAVENOUS at 11:30

## 2024-06-18 RX ADMIN — DEXAMETHASONE SODIUM PHOSPHATE 12 MG: 4 INJECTION, SOLUTION INTRAMUSCULAR; INTRAVENOUS at 10:13

## 2024-06-18 RX ADMIN — GEMCITABINE HYDROCHLORIDE 1710 MG: 1 INJECTION, SOLUTION INTRAVENOUS at 10:46

## 2024-06-18 RX ADMIN — PALONOSETRON 0.25 MG: 0.25 INJECTION, SOLUTION INTRAVENOUS at 09:41

## 2024-06-18 RX ADMIN — POTASSIUM CHLORIDE: 2 INJECTION, SOLUTION, CONCENTRATE INTRAVENOUS at 08:30

## 2024-06-18 ASSESSMENT — PATIENT HEALTH QUESTIONNAIRE - PHQ9
2. FEELING DOWN, DEPRESSED OR HOPELESS: NOT AT ALL
SUM OF ALL RESPONSES TO PHQ QUESTIONS 1-9: 0
SUM OF ALL RESPONSES TO PHQ9 QUESTIONS 1 & 2: 0
1. LITTLE INTEREST OR PLEASURE IN DOING THINGS: NOT AT ALL
SUM OF ALL RESPONSES TO PHQ QUESTIONS 1-9: 0

## 2024-06-18 ASSESSMENT — PAIN SCALES - GENERAL: PAINLEVEL_OUTOF10: 0

## 2024-06-18 NOTE — PROGRESS NOTES
MA Rooming Questions  Patient: Jethro Vázquez  MRN: [id number]    Date: 6/18/2024        1. Do you have any new issues?   no         2. Do you need any refills on medications?    no    3. Have you had any imaging done since your last visit?   no    4. Have you been hospitalized or seen in the emergency room since your last visit here?   no    5. Did the patient have a depression screening completed today? Yes    PHQ-9 Total Score: 0 (6/18/2024  8:53 AM)       PHQ-9 Given to (if applicable):               PHQ-9 Score (if applicable):                     [] Positive     [x]  Negative              Does question #9 need addressed (if applicable)                     [] Yes    []  No               Ashtyn Pepe CMA

## 2024-06-23 NOTE — PROGRESS NOTES
Patient Name: Jethro Vázquez  Patient : 1960  Patient MRN: [id number]     Primary Oncologist: Noemi Gambino MD  Referring Provider: Mono Schaffer MD     Date of Service: 7/10/2024      Chief Complaint:   No chief complaint on file.    FU visit    Patient Active Problem List:     Non-recurrent bilateral inguinal hernia without obstruction or gangrene     Right inguinal hernia     Lipoma, spermatic cord     Benign essential microscopic hematuria     Chronic left-sided low back pain without sciatica     Overweight     Primary hypertension     Pyelonephritis     Malignant neoplasm of overlapping sites of bladder      Need for hepatitis B screening test     Bladder cancer      HPI:   Jethro Vázquez is a pleasant 64 y.o. male who was diagnosed with treatment naive urothelial carcinoma in 2023. He is s/p bilateral nephrostomy tube placement.  He has pelvic and retroperitoneal as well as paraesophageal LAD concerning for metastatic disease. Bone scan was negative in 2024. Plan was for outpatient PET however he has been hospitalized for pain.     CT A/P with contrast on 2023 was non-acute with no significant residual hydronephrosis however did redemonstrate significant LAD.    CT AP W/ Contrast 2024:  1.  Bilateral nephrostomy tubes are present as with the 01/15/2024 study. There is no new perinephric hematoma or hematoma along the course of the nephrostomy tubes.   2.  There is not a significant degree of residual hydronephrosis compared with the pre nephrostomy study of 01/10/2024.  Tiny focus of gas in the right renal collecting system may be secondary to the nephrostomy tube.   3.  There is delayed enhancement of the left kidney compared to the right side.  This could be vascular in nature with stenosis of the left renal artery and narrowing of the left renal vein.  The left renal vein is retroaortic and likely compressed with the severe retroperitoneal lymphadenopathy.   4.  The

## 2024-06-24 ENCOUNTER — HOSPITAL ENCOUNTER (OUTPATIENT)
Dept: INFUSION THERAPY | Age: 64
Discharge: HOME | End: 2024-06-24
Payer: COMMERCIAL

## 2024-06-24 DIAGNOSIS — C67.8 MALIGNANT NEOPLASM OF OVERLAPPING SITES OF BLADDER (HCC): ICD-10-CM

## 2024-06-24 DIAGNOSIS — Z11.59 NEED FOR HEPATITIS B SCREENING TEST: ICD-10-CM

## 2024-06-24 LAB
ALBUMIN SERPL-MCNC: 4 GM/DL (ref 3.4–5)
ALP BLD-CCNC: 121 IU/L (ref 40–128)
ALT SERPL-CCNC: 12 U/L (ref 10–40)
ANION GAP SERPL CALCULATED.3IONS-SCNC: 14 MMOL/L (ref 7–16)
AST SERPL-CCNC: 12 IU/L (ref 15–37)
BASOPHILS ABSOLUTE: 0 K/CU MM
BASOPHILS RELATIVE PERCENT: 0.5 % (ref 0–1)
BILIRUB SERPL-MCNC: 0.2 MG/DL (ref 0–1)
BUN SERPL-MCNC: 29 MG/DL (ref 6–23)
CALCIUM SERPL-MCNC: 9.3 MG/DL (ref 8.3–10.6)
CHLORIDE BLD-SCNC: 104 MMOL/L (ref 99–110)
CO2: 24 MMOL/L (ref 21–32)
CREAT SERPL-MCNC: 1.4 MG/DL (ref 0.9–1.3)
DIFFERENTIAL TYPE: ABNORMAL
EOSINOPHILS ABSOLUTE: 0.1 K/CU MM
EOSINOPHILS RELATIVE PERCENT: 1.1 % (ref 0–3)
GFR, ESTIMATED: 56 ML/MIN/1.73M2
GLUCOSE SERPL-MCNC: 98 MG/DL (ref 70–99)
HCT VFR BLD CALC: 33.9 % (ref 42–52)
HEMOGLOBIN: 11.1 GM/DL (ref 13.5–18)
LYMPHOCYTES ABSOLUTE: 1.8 K/CU MM
LYMPHOCYTES RELATIVE PERCENT: 40.9 % (ref 24–44)
MAGNESIUM: 2.1 MG/DL (ref 1.8–2.4)
MCH RBC QN AUTO: 29.4 PG (ref 27–31)
MCHC RBC AUTO-ENTMCNC: 32.7 % (ref 32–36)
MCV RBC AUTO: 89.7 FL (ref 78–100)
MONOCYTES ABSOLUTE: 0.2 K/CU MM
MONOCYTES RELATIVE PERCENT: 5.3 % (ref 0–4)
NEUTROPHILS ABSOLUTE: 2.3 K/CU MM
NEUTROPHILS RELATIVE PERCENT: 52.2 % (ref 36–66)
PDW BLD-RTO: 14.4 % (ref 11.7–14.9)
PLATELET # BLD: 194 K/CU MM (ref 140–440)
PMV BLD AUTO: 8.6 FL (ref 7.5–11.1)
POTASSIUM SERPL-SCNC: 4.9 MMOL/L (ref 3.5–5.1)
RBC # BLD: 3.78 M/CU MM (ref 4.6–6.2)
SODIUM BLD-SCNC: 142 MMOL/L (ref 135–145)
TOTAL PROTEIN: 7.5 GM/DL (ref 6.4–8.2)
WBC # BLD: 4.4 K/CU MM (ref 4–10.5)

## 2024-06-24 PROCEDURE — 36415 COLL VENOUS BLD VENIPUNCTURE: CPT

## 2024-06-24 PROCEDURE — 85025 COMPLETE CBC W/AUTO DIFF WBC: CPT

## 2024-06-24 PROCEDURE — 83735 ASSAY OF MAGNESIUM: CPT

## 2024-06-24 PROCEDURE — 80053 COMPREHEN METABOLIC PANEL: CPT

## 2024-06-25 ENCOUNTER — TELEPHONE (OUTPATIENT)
Dept: ONCOLOGY | Age: 64
End: 2024-06-25

## 2024-06-25 ENCOUNTER — HOSPITAL ENCOUNTER (OUTPATIENT)
Dept: INFUSION THERAPY | Age: 64
Discharge: HOME | End: 2024-06-25
Payer: COMMERCIAL

## 2024-06-25 ENCOUNTER — CLINICAL DOCUMENTATION (OUTPATIENT)
Dept: RADIATION ONCOLOGY | Age: 64
End: 2024-06-25

## 2024-06-25 VITALS
HEIGHT: 70 IN | BODY MASS INDEX: 28.06 KG/M2 | RESPIRATION RATE: 16 BRPM | HEART RATE: 77 BPM | TEMPERATURE: 97.7 F | WEIGHT: 196 LBS | DIASTOLIC BLOOD PRESSURE: 81 MMHG | SYSTOLIC BLOOD PRESSURE: 139 MMHG | OXYGEN SATURATION: 98 %

## 2024-06-25 DIAGNOSIS — Z11.59 NEED FOR HEPATITIS B SCREENING TEST: ICD-10-CM

## 2024-06-25 DIAGNOSIS — C67.8 MALIGNANT NEOPLASM OF OVERLAPPING SITES OF BLADDER (HCC): Primary | ICD-10-CM

## 2024-06-25 PROCEDURE — 96367 TX/PROPH/DG ADDL SEQ IV INF: CPT

## 2024-06-25 PROCEDURE — 96361 HYDRATE IV INFUSION ADD-ON: CPT

## 2024-06-25 PROCEDURE — 96413 CHEMO IV INFUSION 1 HR: CPT

## 2024-06-25 PROCEDURE — 2580000003 HC RX 258: Performed by: INTERNAL MEDICINE

## 2024-06-25 PROCEDURE — 96417 CHEMO IV INFUS EACH ADDL SEQ: CPT

## 2024-06-25 PROCEDURE — 96375 TX/PRO/DX INJ NEW DRUG ADDON: CPT

## 2024-06-25 PROCEDURE — 6360000002 HC RX W HCPCS: Performed by: INTERNAL MEDICINE

## 2024-06-25 RX ORDER — SODIUM CHLORIDE 9 MG/ML
5-250 INJECTION, SOLUTION INTRAVENOUS PRN
Status: DISCONTINUED | OUTPATIENT
Start: 2024-06-25 | End: 2024-06-26 | Stop reason: HOSPADM

## 2024-06-25 RX ORDER — PALONOSETRON 0.05 MG/ML
0.25 INJECTION, SOLUTION INTRAVENOUS ONCE
Status: COMPLETED | OUTPATIENT
Start: 2024-06-25 | End: 2024-06-25

## 2024-06-25 RX ORDER — SODIUM CHLORIDE 0.9 % (FLUSH) 0.9 %
5-40 SYRINGE (ML) INJECTION PRN
Status: DISCONTINUED | OUTPATIENT
Start: 2024-06-25 | End: 2024-06-26 | Stop reason: HOSPADM

## 2024-06-25 RX ADMIN — PALONOSETRON 0.25 MG: 0.25 INJECTION, SOLUTION INTRAVENOUS at 09:49

## 2024-06-25 RX ADMIN — SODIUM CHLORIDE 150 MG: 9 INJECTION, SOLUTION INTRAVENOUS at 09:50

## 2024-06-25 RX ADMIN — GEMCITABINE 1710 MG: 38 INJECTION, SOLUTION INTRAVENOUS at 11:00

## 2024-06-25 RX ADMIN — POTASSIUM CHLORIDE: 2 INJECTION, SOLUTION, CONCENTRATE INTRAVENOUS at 11:41

## 2024-06-25 RX ADMIN — POTASSIUM CHLORIDE: 2 INJECTION, SOLUTION, CONCENTRATE INTRAVENOUS at 08:42

## 2024-06-25 RX ADMIN — SODIUM CHLORIDE 25 ML/HR: 9 INJECTION, SOLUTION INTRAVENOUS at 09:49

## 2024-06-25 RX ADMIN — CISPLATIN 67 MG: 1 INJECTION INTRAVENOUS at 11:41

## 2024-06-25 RX ADMIN — DEXAMETHASONE SODIUM PHOSPHATE 12 MG: 4 INJECTION, SOLUTION INTRAMUSCULAR; INTRAVENOUS at 10:16

## 2024-06-25 ASSESSMENT — PAIN DESCRIPTION - ONSET: ONSET: ON-GOING

## 2024-06-25 ASSESSMENT — PAIN DESCRIPTION - PAIN TYPE: TYPE: CHRONIC PAIN

## 2024-06-25 ASSESSMENT — PAIN SCALES - GENERAL: PAINLEVEL_OUTOF10: 7

## 2024-06-25 ASSESSMENT — PAIN DESCRIPTION - FREQUENCY: FREQUENCY: CONTINUOUS

## 2024-06-25 ASSESSMENT — PAIN DESCRIPTION - DESCRIPTORS: DESCRIPTORS: ACHING

## 2024-06-25 NOTE — PROGRESS NOTES
Status appropriately assessed and documented. All required labs and results reviewed. Treatment approved by provider. Treatment orders and medications verified by 2 Registered Nurses where applicable. Treatment plan was confirmed with patient prior to administration, and educated the need to report any treatment-related symptoms      Ambulated to infusion area, here today for chemotherapy. Had a day with nausea, but was outside in the high heat. Right chest mediport accessed, positive blood return noted. Labs reviewed, Labs within defined limits.  Chemo administered as ordered. Call light within reach. Tolerated infusion without incident.  Discharge instructions provided. RTC 7/10 for OV with Dr. Franklin.  Has procedure with Dr. Ivan on 7/1/24.

## 2024-06-25 NOTE — TELEPHONE ENCOUNTER
6/25/24 - spoke w/ pt for the 7/5/24 Pet scan at [facility] arrival time of 7:45 am and NPO 6 hours prior. Plain water only

## 2024-06-25 NOTE — PROGRESS NOTES
LSW spoke with Pt and Spouse while Pt was receiving chemotherapy. Pt lives in [place] and has been driving frequently to the Cancer Center. LSW informed Pt about [facility] and the financial assistance they provide including help with gasoline. A [facility] application was completed. An emergency gasoline card was provided.

## 2024-06-27 RX ORDER — M-VIT,TX,IRON,MINS/CALC/FOLIC 27MG-0.4MG
1 TABLET ORAL DAILY
COMMUNITY

## 2024-06-27 NOTE — PROGRESS NOTES
.Surgery @ [facility] on 7/8/24 you will be called 7/5/24 with times    NOTHING TO EAT OR DRINK AFTER MIDNIGHT DAY OF SURGERY    1. Enter thru the hospital main entrance on day of surgery, check in at the Information Desk. If you arrive prior to 6:00am, enter thru the ER entrance.    2. Follow the directions as prescribed by the doctor for your procedure and medications.         Morning of surgery take: Amlodipine, Coreg with sips of water          Stop vitamins, supplements and NSAIDS:  7/1/2024    3. Check with your Doctor regarding stopping blood thinners and follow their instructions.    4. Do not smoke, vape or use chewing tobacco morning of surgery. Do not drink any alcoholic beverages 24 hours prior to surgery.       This includes NA Beer. No street drugs 7 days prior to surgery.    5. If you have dentures, contacts of glasses they will be removed before going to the OR; please bring a case.    6. Please bring picture ID, insurance card, paperwork from the doctor’s office (H & P, Consent, & card for implantable devices).    7. Take a shower with an antibacterial soap the night before surgery and the morning of surgery. Do not put anything on your skin      After your morning shower.    8. You will need a responsible adult to drive you home and check on you after surgery.

## 2024-07-05 ENCOUNTER — ANESTHESIA EVENT (OUTPATIENT)
Dept: OPERATING ROOM | Age: 64
End: 2024-07-05
Payer: COMMERCIAL

## 2024-07-05 ENCOUNTER — HOSPITAL ENCOUNTER (OUTPATIENT)
Dept: PET IMAGING | Age: 64
Discharge: HOME | End: 2024-07-05
Attending: INTERNAL MEDICINE

## 2024-07-05 DIAGNOSIS — C67.8 MALIGNANT NEOPLASM OF OVERLAPPING SITES OF BLADDER (HCC): ICD-10-CM

## 2024-07-05 NOTE — PROGRESS NOTES
Left VM notifying patient of surgery time at [facility] 7/8/2024 1300 arrival 1130, NPO instructions and DOS meds reviewed

## 2024-07-08 ENCOUNTER — APPOINTMENT (OUTPATIENT)
Dept: GENERAL RADIOLOGY | Age: 64
End: 2024-07-08
Attending: SPECIALIST
Payer: COMMERCIAL

## 2024-07-08 ENCOUNTER — HOSPITAL ENCOUNTER (OUTPATIENT)
Age: 64
Setting detail: OUTPATIENT SURGERY
Discharge: HOME | End: 2024-07-08
Attending: SPECIALIST | Admitting: SPECIALIST
Payer: COMMERCIAL

## 2024-07-08 ENCOUNTER — ANESTHESIA (OUTPATIENT)
Dept: OPERATING ROOM | Age: 64
End: 2024-07-08
Payer: COMMERCIAL

## 2024-07-08 VITALS
SYSTOLIC BLOOD PRESSURE: 113 MMHG | DIASTOLIC BLOOD PRESSURE: 95 MMHG | BODY MASS INDEX: 28.06 KG/M2 | TEMPERATURE: 97.6 F | HEART RATE: 79 BPM | WEIGHT: 196 LBS | OXYGEN SATURATION: 97 % | RESPIRATION RATE: 18 BRPM | HEIGHT: 70 IN

## 2024-07-08 PROCEDURE — 2580000003 HC RX 258: Performed by: SPECIALIST

## 2024-07-08 PROCEDURE — 3600000003 HC SURGERY LEVEL 3 BASE: Performed by: SPECIALIST

## 2024-07-08 PROCEDURE — 6360000002 HC RX W HCPCS: Performed by: ANESTHESIOLOGY

## 2024-07-08 PROCEDURE — 7100000000 HC PACU RECOVERY - FIRST 15 MIN: Performed by: SPECIALIST

## 2024-07-08 PROCEDURE — 3600000013 HC SURGERY LEVEL 3 ADDTL 15MIN: Performed by: SPECIALIST

## 2024-07-08 PROCEDURE — 2709999900 HC NON-CHARGEABLE SUPPLY: Performed by: SPECIALIST

## 2024-07-08 PROCEDURE — 6360000002 HC RX W HCPCS: Performed by: SPECIALIST

## 2024-07-08 PROCEDURE — 7100000001 HC PACU RECOVERY - ADDTL 15 MIN: Performed by: SPECIALIST

## 2024-07-08 PROCEDURE — 3700000001 HC ADD 15 MINUTES (ANESTHESIA): Performed by: SPECIALIST

## 2024-07-08 PROCEDURE — C1758 CATHETER, URETERAL: HCPCS | Performed by: SPECIALIST

## 2024-07-08 PROCEDURE — C2617 STENT, NON-COR, TEM W/O DEL: HCPCS | Performed by: SPECIALIST

## 2024-07-08 PROCEDURE — 7100000011 HC PHASE II RECOVERY - ADDTL 15 MIN: Performed by: SPECIALIST

## 2024-07-08 PROCEDURE — 76000 FLUOROSCOPY <1 HR PHYS/QHP: CPT

## 2024-07-08 PROCEDURE — C1769 GUIDE WIRE: HCPCS | Performed by: SPECIALIST

## 2024-07-08 PROCEDURE — 6360000004 HC RX CONTRAST MEDICATION: Performed by: SPECIALIST

## 2024-07-08 PROCEDURE — 3700000000 HC ANESTHESIA ATTENDED CARE: Performed by: SPECIALIST

## 2024-07-08 PROCEDURE — 7100000010 HC PHASE II RECOVERY - FIRST 15 MIN: Performed by: SPECIALIST

## 2024-07-08 PROCEDURE — 6360000002 HC RX W HCPCS

## 2024-07-08 DEVICE — VARIABLE LENGTH URETERAL STENT
Type: IMPLANTABLE DEVICE | Site: URETER | Status: FUNCTIONAL
Brand: CONTOUR VL™

## 2024-07-08 RX ORDER — OXYCODONE HYDROCHLORIDE 5 MG/1
5 TABLET ORAL
Status: DISCONTINUED | OUTPATIENT
Start: 2024-07-08 | End: 2024-07-08 | Stop reason: HOSPADM

## 2024-07-08 RX ORDER — SODIUM CHLORIDE 9 MG/ML
INJECTION, SOLUTION INTRAVENOUS PRN
Status: DISCONTINUED | OUTPATIENT
Start: 2024-07-08 | End: 2024-07-08 | Stop reason: HOSPADM

## 2024-07-08 RX ORDER — HYDRALAZINE HYDROCHLORIDE 20 MG/ML
10 INJECTION INTRAMUSCULAR; INTRAVENOUS
Status: DISCONTINUED | OUTPATIENT
Start: 2024-07-08 | End: 2024-07-08 | Stop reason: HOSPADM

## 2024-07-08 RX ORDER — DEXAMETHASONE SODIUM PHOSPHATE 4 MG/ML
INJECTION, SOLUTION INTRA-ARTICULAR; INTRALESIONAL; INTRAMUSCULAR; INTRAVENOUS; SOFT TISSUE PRN
Status: DISCONTINUED | OUTPATIENT
Start: 2024-07-08 | End: 2024-07-08 | Stop reason: SDUPTHER

## 2024-07-08 RX ORDER — FENTANYL CITRATE 50 UG/ML
25 INJECTION, SOLUTION INTRAMUSCULAR; INTRAVENOUS EVERY 5 MIN PRN
Status: DISCONTINUED | OUTPATIENT
Start: 2024-07-08 | End: 2024-07-08 | Stop reason: HOSPADM

## 2024-07-08 RX ORDER — PROCHLORPERAZINE EDISYLATE 5 MG/ML
5 INJECTION INTRAMUSCULAR; INTRAVENOUS
Status: DISCONTINUED | OUTPATIENT
Start: 2024-07-08 | End: 2024-07-08 | Stop reason: HOSPADM

## 2024-07-08 RX ORDER — NALOXONE HYDROCHLORIDE 0.4 MG/ML
INJECTION, SOLUTION INTRAMUSCULAR; INTRAVENOUS; SUBCUTANEOUS PRN
Status: DISCONTINUED | OUTPATIENT
Start: 2024-07-08 | End: 2024-07-08 | Stop reason: HOSPADM

## 2024-07-08 RX ORDER — LABETALOL HYDROCHLORIDE 5 MG/ML
10 INJECTION, SOLUTION INTRAVENOUS
Status: DISCONTINUED | OUTPATIENT
Start: 2024-07-08 | End: 2024-07-08 | Stop reason: HOSPADM

## 2024-07-08 RX ORDER — HEPARIN 100 UNIT/ML
100 SYRINGE INTRAVENOUS PRN
Status: DISCONTINUED | OUTPATIENT
Start: 2024-07-08 | End: 2024-07-08

## 2024-07-08 RX ORDER — PROPOFOL 10 MG/ML
INJECTION, EMULSION INTRAVENOUS PRN
Status: DISCONTINUED | OUTPATIENT
Start: 2024-07-08 | End: 2024-07-08 | Stop reason: SDUPTHER

## 2024-07-08 RX ORDER — HEPARIN 100 UNIT/ML
500 SYRINGE INTRAVENOUS PRN
Status: DISCONTINUED | OUTPATIENT
Start: 2024-07-08 | End: 2024-07-08 | Stop reason: HOSPADM

## 2024-07-08 RX ORDER — SODIUM CHLORIDE 0.9 % (FLUSH) 0.9 %
5-40 SYRINGE (ML) INJECTION EVERY 12 HOURS SCHEDULED
Status: DISCONTINUED | OUTPATIENT
Start: 2024-07-08 | End: 2024-07-08 | Stop reason: HOSPADM

## 2024-07-08 RX ORDER — HEPARIN 100 UNIT/ML
500 SYRINGE INTRAVENOUS ONCE
Status: COMPLETED | OUTPATIENT
Start: 2024-07-08 | End: 2024-07-08

## 2024-07-08 RX ORDER — ONDANSETRON 2 MG/ML
4 INJECTION INTRAMUSCULAR; INTRAVENOUS
Status: DISCONTINUED | OUTPATIENT
Start: 2024-07-08 | End: 2024-07-08 | Stop reason: HOSPADM

## 2024-07-08 RX ORDER — DIPHENHYDRAMINE HYDROCHLORIDE 50 MG/ML
12.5 INJECTION INTRAMUSCULAR; INTRAVENOUS
Status: DISCONTINUED | OUTPATIENT
Start: 2024-07-08 | End: 2024-07-08 | Stop reason: HOSPADM

## 2024-07-08 RX ORDER — MEPERIDINE HYDROCHLORIDE 25 MG/ML
12.5 INJECTION INTRAMUSCULAR; INTRAVENOUS; SUBCUTANEOUS
Status: DISCONTINUED | OUTPATIENT
Start: 2024-07-08 | End: 2024-07-08 | Stop reason: HOSPADM

## 2024-07-08 RX ORDER — SODIUM CHLORIDE 0.9 % (FLUSH) 0.9 %
5-40 SYRINGE (ML) INJECTION PRN
Status: DISCONTINUED | OUTPATIENT
Start: 2024-07-08 | End: 2024-07-08 | Stop reason: HOSPADM

## 2024-07-08 RX ORDER — SODIUM CHLORIDE, SODIUM LACTATE, POTASSIUM CHLORIDE, CALCIUM CHLORIDE 600; 310; 30; 20 MG/100ML; MG/100ML; MG/100ML; MG/100ML
INJECTION, SOLUTION INTRAVENOUS CONTINUOUS
Status: DISCONTINUED | OUTPATIENT
Start: 2024-07-08 | End: 2024-07-08 | Stop reason: HOSPADM

## 2024-07-08 RX ORDER — LIDOCAINE HYDROCHLORIDE 20 MG/ML
INJECTION, SOLUTION INTRAVENOUS PRN
Status: DISCONTINUED | OUTPATIENT
Start: 2024-07-08 | End: 2024-07-08 | Stop reason: SDUPTHER

## 2024-07-08 RX ORDER — FENTANYL CITRATE 50 UG/ML
INJECTION, SOLUTION INTRAMUSCULAR; INTRAVENOUS PRN
Status: DISCONTINUED | OUTPATIENT
Start: 2024-07-08 | End: 2024-07-08 | Stop reason: SDUPTHER

## 2024-07-08 RX ORDER — ONDANSETRON 2 MG/ML
INJECTION INTRAMUSCULAR; INTRAVENOUS PRN
Status: DISCONTINUED | OUTPATIENT
Start: 2024-07-08 | End: 2024-07-08 | Stop reason: SDUPTHER

## 2024-07-08 RX ADMIN — DEXAMETHASONE SODIUM PHOSPHATE 4 MG: 4 INJECTION, SOLUTION INTRAMUSCULAR; INTRAVENOUS at 13:59

## 2024-07-08 RX ADMIN — FENTANYL CITRATE 50 MCG: 50 INJECTION, SOLUTION INTRAMUSCULAR; INTRAVENOUS at 13:50

## 2024-07-08 RX ADMIN — FENTANYL CITRATE 50 MCG: 50 INJECTION, SOLUTION INTRAMUSCULAR; INTRAVENOUS at 13:43

## 2024-07-08 RX ADMIN — ONDANSETRON 4 MG: 2 INJECTION INTRAMUSCULAR; INTRAVENOUS at 13:59

## 2024-07-08 RX ADMIN — LIDOCAINE HYDROCHLORIDE 60 MG: 20 INJECTION, SOLUTION INTRAVENOUS at 13:36

## 2024-07-08 RX ADMIN — HYDROMORPHONE HYDROCHLORIDE 0.5 MG: 1 INJECTION, SOLUTION INTRAMUSCULAR; INTRAVENOUS; SUBCUTANEOUS at 14:18

## 2024-07-08 RX ADMIN — PROPOFOL 50 MG: 10 INJECTION, EMULSION INTRAVENOUS at 13:39

## 2024-07-08 RX ADMIN — PROPOFOL 50 MG: 10 INJECTION, EMULSION INTRAVENOUS at 13:50

## 2024-07-08 RX ADMIN — CEFAZOLIN 2000 MG: 2 INJECTION, POWDER, FOR SOLUTION INTRAMUSCULAR; INTRAVENOUS at 13:41

## 2024-07-08 RX ADMIN — PROPOFOL 150 MG: 10 INJECTION, EMULSION INTRAVENOUS at 13:36

## 2024-07-08 RX ADMIN — Medication 500 UNITS: at 15:53

## 2024-07-08 RX ADMIN — SODIUM CHLORIDE, POTASSIUM CHLORIDE, SODIUM LACTATE AND CALCIUM CHLORIDE: 600; 310; 30; 20 INJECTION, SOLUTION INTRAVENOUS at 11:59

## 2024-07-08 RX ADMIN — PROPOFOL 50 MG: 10 INJECTION, EMULSION INTRAVENOUS at 13:54

## 2024-07-08 ASSESSMENT — PAIN - FUNCTIONAL ASSESSMENT
PAIN_FUNCTIONAL_ASSESSMENT: 0-10
PAIN_FUNCTIONAL_ASSESSMENT: 0-10

## 2024-07-08 ASSESSMENT — PAIN DESCRIPTION - DESCRIPTORS: DESCRIPTORS: BURNING

## 2024-07-08 ASSESSMENT — LIFESTYLE VARIABLES: SMOKING_STATUS: 0

## 2024-07-08 ASSESSMENT — PAIN SCALES - GENERAL
PAINLEVEL_OUTOF10: 9
PAINLEVEL_OUTOF10: 0

## 2024-07-08 ASSESSMENT — PAIN DESCRIPTION - LOCATION: LOCATION: PENIS

## 2024-07-08 NOTE — ANESTHESIA POSTPROCEDURE EVALUATION
Department of Anesthesiology  Postprocedure Note    Patient: Jethro Vázquez  MRN: [id number]  YOB: 1960  Date of evaluation: 7/8/2024    Procedure Summary       Date: 07/08/24 Room / Location: [address]    Anesthesia Start: 1336 Anesthesia Stop: 1410    Procedure: CYSTOSCOPY RETROGRADE RIGHT STENT REMOVAL LEFT STENT EXCHANGE (Bilateral) Diagnosis:       Hydronephrosis, unspecified hydronephrosis type      Malignant neoplasm of posterior wall of urinary bladder (HCC)      (Hydronephrosis, unspecified hydronephrosis type [N13.30])      (Malignant neoplasm of posterior wall of urinary bladder (HCC) [C67.4])    Surgeons: Sanaz Ivan MD Responsible Provider: Garfield Campos MD    Anesthesia Type: general, MAC ASA Status: 3            Anesthesia Type: No value filed.    Arthur Phase I: Arthur Score: 10    Arthur Phase II:      Anesthesia Post Evaluation    Patient location during evaluation: PACU  Patient participation: complete - patient participated  Level of consciousness: awake and alert  Airway patency: patent  Nausea & Vomiting: no nausea and no vomiting  Cardiovascular status: blood pressure returned to baseline  Respiratory status: acceptable  Hydration status: euvolemic  Multimodal analgesia pain management approach  Pain management: adequate    No notable events documented.

## 2024-07-08 NOTE — PROGRESS NOTES
1407 Patient arrived to PACU s/p cystoscopy retrograde right stent removal left sten exchange . Place patient on the monitor all alarms on. Alert and oriented follows commands. Bedside report received from Natalia PERALTA and Nino CAMARA.       1418 Given Dilaudid  for c/o pain      1430 Given ice chips; able to tolerate. Patient alert and oriented follows commands. Denies any pain at this time.     1451 Chante (wife) was updated on plan of care and that the patient is going back to same day surgery.     1455 Patient moved to same day surgery. Bed side report given to Carrol PERALTA.

## 2024-07-08 NOTE — PROGRESS NOTES
1505: patient arrives to [facility], VSS  1600: patient taken via wheelchair to DC to car with family

## 2024-07-08 NOTE — ANESTHESIA PRE PROCEDURE
Department of Anesthesiology  Preprocedure Note       Name:  Jethro Vázquez   Age:  64 y.o.  :  1960                                          MRN:  [id number]         Date:  2024      Surgeon: Surgeon(s):  Sanaz Ivan MD    Procedure: Procedure(s):  CYSTOSCOPY BILATERAL STENT EXCHANGE POSSIBLE TRANSURETHRAL RESECTION OF BLADDER TUMOR    Medications prior to admission:   Prior to Admission medications    Medication Sig Start Date End Date Taking? Authorizing Provider   Multiple Vitamins-Minerals (THERAPEUTIC MULTIVITAMIN-MINERALS) tablet Take 1 tablet by mouth daily   Yes Aram Wiley MD   phenazopyridine (PYRIDIUM) 100 MG tablet TAKE 1 TABLET BY MOUTH THREE TIMES A DAY AFTER MEALS AS NEEDED 24   Aram Wiley MD   naloxone 4 MG/0.1ML LIQD nasal spray AS DIRECTED NASALLY AS NEEDED FOR SUSPECTED OPIOID OVERDOSE 30 3/29/24   Aram Wiley MD   oxyCODONE-acetaminophen (PERCOCET) 5-325 MG per tablet Take 1 tablet by mouth every 4 hours as needed for Pain.    Aram Wiley MD   OLANZapine (ZYPREXA) 2.5 MG tablet TAKE 1 TABLET BY MOUTH NIGHTLY FOR 4 NIGHTS, BEGINNING THE NIGHT BEFORE EACH CISPLATIN INFUSION. 4/15/24   Noemi Gambino MD   tamsulosin (FLOMAX) 0.4 MG capsule TAKE 1 CAPSULE BY MOUTH EVERY DAY 24   Noemi Gambino MD   ondansetron (ZOFRAN-ODT) 8 MG TBDP disintegrating tablet Place 0.5 tablets under the tongue every 8 hours as needed for Nausea or Vomiting 24   Nicki Cheema MD   polyethylene glycol (GLYCOLAX) 17 GM/SCOOP powder Take 17 g by mouth daily 3/6/24 9/2/24  Haroon Whitehead MD   ondansetron (ZOFRAN) 8 MG tablet Take 1 tablet by mouth every 8 hours as needed for Nausea or Vomiting Take 1 tablet by mouth every 8 hours as needed for nausea or vomiting. 3/6/24   Haroon Whitehead MD   dexAMETHasone (DECADRON) 4 MG tablet Take 1 tablet by mouth daily (with breakfast) For 3 days after each Cisplatin infusion 24   Johann 
Department of Anesthesiology  Preprocedure Note       Name:  Jethro Vázquez   Age:  64 y.o.  :  1960                                          MRN:  [id number]         Date:  2024      Surgeon: Surgeon(s):  Sanaz Ivan MD    Procedure: Procedure(s):  CYSTOSCOPY BILATERAL STENT EXCHANGE POSSIBLE TRANSURETHRAL RESECTION OF BLADDER TUMOR    Medications prior to admission:   Prior to Admission medications    Medication Sig Start Date End Date Taking? Authorizing Provider   Multiple Vitamins-Minerals (THERAPEUTIC MULTIVITAMIN-MINERALS) tablet Take 1 tablet by mouth daily   Yes Aram Wiley MD   phenazopyridine (PYRIDIUM) 100 MG tablet TAKE 1 TABLET BY MOUTH THREE TIMES A DAY AFTER MEALS AS NEEDED 24   Aram Wiley MD   naloxone 4 MG/0.1ML LIQD nasal spray AS DIRECTED NASALLY AS NEEDED FOR SUSPECTED OPIOID OVERDOSE 30  Patient not taking: Reported on 2024 3/29/24   Aram Wiley MD   oxyCODONE-acetaminophen (PERCOCET) 5-325 MG per tablet Take 1 tablet by mouth every 4 hours as needed for Pain.    Aram Wiley MD   OLANZapine (ZYPREXA) 2.5 MG tablet TAKE 1 TABLET BY MOUTH NIGHTLY FOR 4 NIGHTS, BEGINNING THE NIGHT BEFORE EACH CISPLATIN INFUSION. 4/15/24   Noemi Gambino MD   tamsulosin (FLOMAX) 0.4 MG capsule TAKE 1 CAPSULE BY MOUTH EVERY DAY 24   Noemi Gambino MD   ondansetron (ZOFRAN-ODT) 8 MG TBDP disintegrating tablet Place 0.5 tablets under the tongue every 8 hours as needed for Nausea or Vomiting 24   Nicki Cheema MD   polyethylene glycol (GLYCOLAX) 17 GM/SCOOP powder Take 17 g by mouth daily 3/6/24 9/2/24  Haroon Whitehead MD   ondansetron (ZOFRAN) 8 MG tablet Take 1 tablet by mouth every 8 hours as needed for Nausea or Vomiting Take 1 tablet by mouth every 8 hours as needed for nausea or vomiting. 3/6/24   Haroon Whitehead MD   dexAMETHasone (DECADRON) 4 MG tablet Take 1 tablet by mouth daily (with breakfast) For 3 days after 
   PROTIME 14.5 03/07/2024 05:55 AM    PROTIME 10.5 02/08/2011 03:20 PM    INR 1.1 03/07/2024 05:55 AM    APTT 43.9 01/10/2024 04:00 PM       HCG (If Applicable): No results found for: \"PREGTESTUR\", \"PREGSERUM\", \"HCG\", \"HCGQUANT\"     ABGs: No results found for: \"PHART\", \"PO2ART\", \"DGQ6OUM\", \"GSQ1PGW\", \"BEART\", \"A6ZKGXFG\"     Type & Screen (If Applicable):  No results found for: \"LABABO\"    Drug/Infectious Status (If Applicable):  Lab Results   Component Value Date/Time    HEPCAB NON REACTIVE 01/03/2024 10:58 AM       COVID-19 Screening (If Applicable):   Lab Results   Component Value Date/Time    COVID19 NOT DETECTED 12/31/2023 08:30 AM           Anesthesia Evaluation  Patient summary reviewed and Nursing notes reviewed   no history of anesthetic complications:   Airway:           Dental:          Pulmonary:Negative Pulmonary ROS       (-) not a current smoker (former, quit 2005)                           Cardiovascular:    (+) hypertension:         Beta Blocker:  Dose within 24 Hrs      ROS comment: ECG 04/2024:  Sinus tachycardia   Possible Left atrial enlargement   Borderline ECG   When compared with ECG of 06-JAN-2024 22:27,   No significant change was found   Confirmed by DOMENIC BOYD ([id number]) on 4/5/2024 7:58:04 AM        Neuro/Psych:   Negative Neuro/Psych ROS              GI/Hepatic/Renal:   (+) GERD:, renal disease (SHIRLEY): kidney stones          Endo/Other:    (+) malignancy/cancer (Bladder).                  ROS comment: Lipoma, spermatic cord Abdominal:             Vascular: negative vascular ROS.         Other Findings:         Anesthesia Plan      ASA 3     (Chart review 07/05/2024)                            CHENG Tirado - HOA   7/5/2024

## 2024-07-08 NOTE — OP NOTE
Operative Note      Patient: Jethro Vázquez  YOB: 1960  MRN: [id number]    Date of Procedure: 7/8/2024    Pre-Op Diagnosis Codes:     * Hydronephrosis, unspecified hydronephrosis type [N13.30]     * Malignant neoplasm of posterior wall of urinary bladder (HCC) [C67.4]    Post-Op Diagnosis: Same       Procedure(s):  CYSTOSCOPY RETROGRADE RIGHT STENT REMOVAL LEFT STENT EXCHANGE    Surgeon(s):  Sanaz Ivan MD    Assistant:   * No surgical staff found *    Anesthesia: General    Estimated Blood Loss (mL): Minimal    Complications: None    Specimens:   * No specimens in log *    Implants:  Implant Name Type Inv. Item Serial No.  Lot No. LRB No. Used Action   STENT URET 4.8FR [id number] PERCFLX HYDR+ SFT PGTL TAPR TIP - [id number]  STENT URET 4.8FR [id number] PERCFLX HYDR+ SFT PGTL TAPR TIP  [employer] UROLOGY-WD [id number] Left 1 Implanted         Drains: * No LDAs found *    Findings:  Infection Present At Time Of Surgery (PATOS) (choose all levels that have infection present):  No infection present  Other Findings: patent right ureter; obstructing left distal ureter    Detailed Description of Procedure:   This is a 64-year-old male with advanced metastatic prostate bladder cancer.  He has had 2 percutaneous nephrostomy tubes placed and internalized he is here now for exchange of these and retrograde study to see if he needs them still and in the biopsy any residual tumor.    Description of procedure patient defined holding area taken to procedure room placed in dorsolithotomy position.  Patient generator was prepped and draped sterile fashion.  21 Tu[place]uan cystoscopy she was induced per urethra realization left the right ureter was stents were then removed    8 [place] Cook catheter to right UO orifice injected radiocontrast telemetry Norcal right ureter right kidney no dilatation filling defects prompt excretion of dye the left ureteral orifice cannula injection radiocontrast material

## 2024-07-08 NOTE — BRIEF OP NOTE
Brief Postoperative Note      Patient: Jethro Vázquez  YOB: 1960  MRN: [id number]    Date of Procedure: 7/8/2024    Pre-Op Diagnosis Codes:     * Hydronephrosis, unspecified hydronephrosis type [N13.30]     * Malignant neoplasm of posterior wall of urinary bladder (HCC) [C67.4]    Post-Op Diagnosis: Same       Procedure(s):  CYSTOSCOPY RETROGRADE RIGHT STENT REMOVAL LEFT STENT EXCHANGE    Surgeon(s):  Sanaz Ivan MD    Assistant:  * No surgical staff found *    Anesthesia: General    Estimated Blood Loss (mL): Minimal    Complications: None    Specimens:   * No specimens in log *    Implants:  Implant Name Type Inv. Item Serial No.  Lot No. LRB No. Used Action   STENT URET 4.8FR [id number] PERCFLX HYDR+ SFT PGTL TAPR TIP - [id number]  STENT URET 4.8FR [id number] PERCFLX HYDR+ SFT PGTL TAPR TIP  [employer] [facility] UROLOGY-WD [id number] Left 1 Implanted         Drains: * No LDAs found *    Findings:  Infection Present At Time Of Surgery (PATOS) (choose all levels that have infection present):  No infection present  Other Findings: patent right ureter; distal obstruction to left ureter    Electronically signed by Sanaz Ivan MD on 7/8/2024 at 2:00 PM

## 2024-07-08 NOTE — DISCHARGE INSTRUCTIONS
[facility]  [phone]    Do not drive, work around machines or use equipment.  Do not drink any alcoholic beverages.  Do not smoke while alone.  Avoid making important decisions.  Plan to spend a quiet, relaxed evening @ home.  Resume normal activities as you begin to feel better.  Eat lightly for your first meal, then gradually increase your diet to what is normal for you.  In case of nausea, avoid food and drink only clear liquids.  Resume food as nausea ceases.  Notify your surgeon if you experience fever, chills, large amount of bleeding, difficulty breathing, persistent nausea and vomiting or any other disturbing problem.  Call for a follow-up appointment with your surgeon.

## 2024-07-10 ENCOUNTER — OFFICE VISIT (OUTPATIENT)
Dept: ONCOLOGY | Age: 64
End: 2024-07-10
Payer: COMMERCIAL

## 2024-07-10 ENCOUNTER — HOSPITAL ENCOUNTER (OUTPATIENT)
Dept: INFUSION THERAPY | Age: 64
Discharge: HOME | End: 2024-07-10
Payer: COMMERCIAL

## 2024-07-10 VITALS
WEIGHT: 199.6 LBS | DIASTOLIC BLOOD PRESSURE: 90 MMHG | SYSTOLIC BLOOD PRESSURE: 161 MMHG | HEIGHT: 70 IN | HEART RATE: 90 BPM | OXYGEN SATURATION: 90 % | TEMPERATURE: 97.6 F | BODY MASS INDEX: 28.58 KG/M2

## 2024-07-10 DIAGNOSIS — C67.8 MALIGNANT NEOPLASM OF OVERLAPPING SITES OF BLADDER (HCC): Primary | ICD-10-CM

## 2024-07-10 PROCEDURE — 99211 OFF/OP EST MAY X REQ PHY/QHP: CPT

## 2024-07-10 PROCEDURE — 3080F DIAST BP >= 90 MM HG: CPT | Performed by: INTERNAL MEDICINE

## 2024-07-10 PROCEDURE — 3077F SYST BP >= 140 MM HG: CPT | Performed by: INTERNAL MEDICINE

## 2024-07-10 PROCEDURE — 99213 OFFICE O/P EST LOW 20 MIN: CPT | Performed by: INTERNAL MEDICINE

## 2024-07-10 NOTE — PROGRESS NOTES
MA Rooming Questions  Patient: Jethro Vázquez  MRN: [id number]    Date: 7/10/2024        1. Do you have any new issues?   no         2. Do you need any refills on medications?    no    3. Have you had any imaging done since your last visit?   No - Pet Scan rescheduled to 7/17    4. Have you been hospitalized or seen in the emergency room since your last visit here?   no    5. Did the patient have a depression screening completed today? No    No data recorded     PHQ-9 Given to (if applicable):               PHQ-9 Score (if applicable):                     [] Positive     []  Negative              Does question #9 need addressed (if applicable)                     [] Yes    []  No               Ashtyn Pepe CMA

## 2024-07-17 ENCOUNTER — HOSPITAL ENCOUNTER (OUTPATIENT)
Dept: PET IMAGING | Age: 64
Discharge: HOME | End: 2024-07-17
Attending: INTERNAL MEDICINE
Payer: COMMERCIAL

## 2024-07-17 PROCEDURE — A9609 HC RX DIAGNOSTIC RADIOPHARMACEUTICAL: HCPCS | Performed by: INTERNAL MEDICINE

## 2024-07-17 PROCEDURE — 78815 PET IMAGE W/CT SKULL-THIGH: CPT

## 2024-07-17 PROCEDURE — 3430000000 HC RX DIAGNOSTIC RADIOPHARMACEUTICAL: Performed by: INTERNAL MEDICINE

## 2024-07-17 PROCEDURE — 2580000003 HC RX 258: Performed by: INTERNAL MEDICINE

## 2024-07-17 RX ORDER — SODIUM CHLORIDE 0.9 % (FLUSH) 0.9 %
10 SYRINGE (ML) INJECTION PRN
Status: COMPLETED | OUTPATIENT
Start: 2024-07-17 | End: 2024-07-17

## 2024-07-17 RX ORDER — FLUDEOXYGLUCOSE F 18 200 MCI/ML
14.45 INJECTION, SOLUTION INTRAVENOUS
Status: COMPLETED | OUTPATIENT
Start: 2024-07-17 | End: 2024-07-17

## 2024-07-17 RX ADMIN — SODIUM CHLORIDE, PRESERVATIVE FREE 10 ML: 5 INJECTION INTRAVENOUS at 08:00

## 2024-07-17 RX ADMIN — FLUDEOXYGLUCOSE F 18 14.45 MILLICURIE: 200 INJECTION, SOLUTION INTRAVENOUS at 08:00

## 2024-07-30 ENCOUNTER — OFFICE VISIT (OUTPATIENT)
Dept: ONCOLOGY | Age: 64
End: 2024-07-30
Payer: COMMERCIAL

## 2024-07-30 ENCOUNTER — HOSPITAL ENCOUNTER (OUTPATIENT)
Dept: INFUSION THERAPY | Age: 64
Discharge: HOME | End: 2024-07-30

## 2024-07-30 ENCOUNTER — HOSPITAL ENCOUNTER (OUTPATIENT)
Dept: RADIATION ONCOLOGY | Age: 64
Discharge: HOME | End: 2024-07-30
Payer: COMMERCIAL

## 2024-07-30 VITALS
HEART RATE: 84 BPM | RESPIRATION RATE: 16 BRPM | TEMPERATURE: 97.9 F | HEIGHT: 70 IN | DIASTOLIC BLOOD PRESSURE: 75 MMHG | BODY MASS INDEX: 29.23 KG/M2 | OXYGEN SATURATION: 96 % | WEIGHT: 204.2 LBS | SYSTOLIC BLOOD PRESSURE: 122 MMHG

## 2024-07-30 VITALS
HEIGHT: 70 IN | HEART RATE: 84 BPM | SYSTOLIC BLOOD PRESSURE: 122 MMHG | WEIGHT: 204.2 LBS | RESPIRATION RATE: 16 BRPM | OXYGEN SATURATION: 96 % | BODY MASS INDEX: 29.23 KG/M2 | DIASTOLIC BLOOD PRESSURE: 75 MMHG | TEMPERATURE: 97.9 F

## 2024-07-30 DIAGNOSIS — C67.8 MALIGNANT NEOPLASM OF OVERLAPPING SITES OF BLADDER (HCC): Primary | ICD-10-CM

## 2024-07-30 PROCEDURE — 3078F DIAST BP <80 MM HG: CPT | Performed by: INTERNAL MEDICINE

## 2024-07-30 PROCEDURE — 99214 OFFICE O/P EST MOD 30 MIN: CPT | Performed by: INTERNAL MEDICINE

## 2024-07-30 PROCEDURE — 99213 OFFICE O/P EST LOW 20 MIN: CPT | Performed by: RADIOLOGY

## 2024-07-30 PROCEDURE — 3074F SYST BP LT 130 MM HG: CPT | Performed by: INTERNAL MEDICINE

## 2024-07-30 PROCEDURE — 99212 OFFICE O/P EST SF 10 MIN: CPT | Performed by: RADIOLOGY

## 2024-07-30 ASSESSMENT — PAIN SCALES - GENERAL: PAINLEVEL_OUTOF10: 0

## 2024-07-30 NOTE — PLAN OF CARE
Radiation education and handouts given. Side effects and management reviewed with pt. All questions answered and pt voices understanding .    Pt scheduled to return to [facility] tomorrow 7/31/2024 at 9:00 AM for CT/SIM for radiation planning.     Nursing Care Plan for Pelvic/Bladder Radiation  Pain related to cancer diagnosis and treatment.    Interventions   Pain assessment.   Monitor pharmacological pain medication.   Teach relaxation and repositioning techniques.     Expected Outcome   Maintain patient's acceptable pain level or <5 on pain scale.       Knowledge deficit related to diagnosis and treatment plan.    Interventions   Assess patient's ability to comprehend diagnosis and treatment plan.   Provide educational materials and teaching regarding plan of care.   Provide emotional support and continued education.   Refer to psychosocial coordinator if further assistance needed.     Expected Outcome   Patient voices understanding of diagnosis and treatment plan.       Altered skin integrity related to treatment.    Interventions   Evaluation of skin integrity at therapy site.   Advise patient on appropriate skin care.   Instruct patient on recommended lotions/ointments/creams/dressing changes to use on therapy site.     Expected Outcome   Minimize adverse skin reaction/infection at treatment site.       Altered genitourinary function.    Interventions   Evaluate for treatment side effects.   Evaluate treatment modalities for effectiveness.   Monitor I & O.     Expected Outcome   Patient with minimal urinary complications as evidenced by adequate urinary output.       Gastrointestinal disturbances due to treatment process.    Interventions   Evaluate for treatment side effects.   Evaluate treatment modalities for effectiveness.   Initiate and educate on appropriate bowel program if needed.     Expected Outcome   Patient with minimal bowel complications and controlled management of side effects.     Nurse to evaluate

## 2024-07-30 NOTE — PROGRESS NOTES
Jethro Vázquez  7/30/2024    Patient is seen today for follow up.     Vitals:    07/30/24 1304   BP: 122/75   Pulse: 84   Resp: 16   Temp: 97.9 °F (36.6 °C)   SpO2: 96%        Oxygen Therapy  SpO2: 96 %  Pulse Oximeter Device Mode: Continuous  Pulse Oximeter Device Location: Finger  O2 Device: None (Room air)    Wt Readings from Last 3 Encounters:   07/30/24 92.6 kg (204 lb 3.2 oz)   07/10/24 90.5 kg (199 lb 9.6 oz)   06/27/24 88.9 kg (196 lb)       Pain Assessment  Pain Assessment: None - Denies Pain  Pain Level: 0  Denies Need for Intervention     No Known Allergies     Current Outpatient Medications   Medication Sig Dispense Refill    Multiple Vitamins-Minerals (THERAPEUTIC MULTIVITAMIN-MINERALS) tablet Take 1 tablet by mouth daily      phenazopyridine (PYRIDIUM) 100 MG tablet TAKE 1 TABLET BY MOUTH THREE TIMES A DAY AFTER MEALS AS NEEDED      naloxone 4 MG/0.1ML LIQD nasal spray       oxyCODONE-acetaminophen (PERCOCET) 5-325 MG per tablet Take 1 tablet by mouth every 4 hours as needed for Pain.      OLANZapine (ZYPREXA) 2.5 MG tablet TAKE 1 TABLET BY MOUTH NIGHTLY FOR 4 NIGHTS, BEGINNING THE NIGHT BEFORE EACH CISPLATIN INFUSION. 24 tablet 0    tamsulosin (FLOMAX) 0.4 MG capsule TAKE 1 CAPSULE BY MOUTH EVERY DAY 90 capsule 1    ondansetron (ZOFRAN-ODT) 8 MG TBDP disintegrating tablet Place 0.5 tablets under the tongue every 8 hours as needed for Nausea or Vomiting 30 tablet 0    polyethylene glycol (GLYCOLAX) 17 GM/SCOOP powder Take 17 g by mouth daily 1530 g 1    ondansetron (ZOFRAN) 8 MG tablet Take 1 tablet by mouth every 8 hours as needed for Nausea or Vomiting Take 1 tablet by mouth every 8 hours as needed for nausea or vomiting. 30 tablet 0    dexAMETHasone (DECADRON) 4 MG tablet Take 1 tablet by mouth daily (with breakfast) For 3 days after each Cisplatin infusion 18 tablet 0    amLODIPine (NORVASC) 10 MG tablet Take 1 tablet by mouth daily 30 tablet 3    carvedilol (COREG) 6.25 MG tablet Take 1 tablet

## 2024-07-30 NOTE — PROGRESS NOTES
MA Rooming Questions  Patient: Jethro Vázquez  MRN: [id number]    Date: 7/30/2024        1. Do you have any new issues?   no         2. Do you need any refills on medications?    no    3. Have you had any imaging done since your last visit?   yes - PET scan, Cysto    4. Have you been hospitalized or seen in the emergency room since your last visit here?   no    5. Did the patient have a depression screening completed today? No    No data recorded     PHQ-9 Given to (if applicable):               PHQ-9 Score (if applicable):                     [] Positive     []  Negative              Does question #9 need addressed (if applicable)                     [] Yes    []  No               Luz Marina Bright CMA      
LYMPHSABS 1.8 06/24/2024    MONOSABS 0.2 06/24/2024    DIFFTYPE AUTOMATED DIFFERENTIAL 06/24/2024    WBCMORP OCCASIONAL 02/27/2024     Chemistry:  Lab Results   Component Value Date     06/24/2024    K 4.9 06/24/2024     06/24/2024    CO2 24 06/24/2024    BUN 29 (H) 06/24/2024    CREATININE 1.4 (H) 06/24/2024    GLUCOSE 98 06/24/2024    CALCIUM 9.3 06/24/2024    BILITOT 0.2 06/24/2024    ALKPHOS 121 06/24/2024    AST 12 (L) 06/24/2024    ALT 12 06/24/2024    LABGLOM 56 (L) 06/24/2024    GFRAA >60 01/19/2018    PHOS 2.8 04/08/2024    MG 2.1 06/24/2024    POCCA 1.20 02/27/2024    POCGLU 100 (H) 02/27/2024     Lab Results   Component Value Date    TSHHS 1.660 01/01/2024     Immunology:  No results found for: \"SPEP\", \"ALBUMINELP\", \"LABALPH\", \"LABBETA\", \"GAMGLOB\"    Coagulation Panel:  Lab Results   Component Value Date    PROTIME 14.5 03/07/2024    INR 1.1 03/07/2024    APTT 43.9 (H) 01/10/2024     Anemia Panel:  Lab Results   Component Value Date    XHKRRIPI28 793.3 01/01/2024    FOLATE 8.9 01/01/2024     Tumor Markers:  Lab Results   Component Value Date    PSA 4.46 (H) 11/14/2023      Observations:  No data recorded     01/01/24 Ferritin: 336 Iron: 19 (L) TIBC: 216 (L) Transferrin %: 9 (L) FOLATE: 8.9 Vitamin B-12: 793.3    Assessment & Plan:    1. Muscle Invasive High Grade Bladder Ca s/p TUR 12/14/2023  -treatment naive  -Noted extensive retroperitoneal and pelvic LAD concerning for metatatic disease on CT.  -CT Chest with paraesophageal LAD concerning for metastatic disease.  -Bone scan without metastatic disease  -Planned for outpatient PET-CT to further characterize LAD, especially in chest.  -D/w urology, at minimum pelvic and retroperitoneal LAD very likely metastatic making him poor surgical candidate. Noted that this LAD is persistent on subsequent imaging and quite advanced  -Plan for outpatient cisplatin/gemcitabine, if definitely not a surgical candidate will need to discuss radiation  Chemo

## 2024-07-31 ENCOUNTER — HOSPITAL ENCOUNTER (OUTPATIENT)
Dept: RADIATION ONCOLOGY | Age: 64
Discharge: HOME | End: 2024-07-31
Payer: COMMERCIAL

## 2024-07-31 PROCEDURE — 77334 RADIATION TREATMENT AID(S): CPT | Performed by: RADIOLOGY

## 2024-07-31 PROCEDURE — 77470 SPECIAL RADIATION TREATMENT: CPT | Performed by: RADIOLOGY

## 2024-08-08 DIAGNOSIS — C67.8 MALIGNANT NEOPLASM OF OVERLAPPING SITES OF BLADDER (HCC): Primary | ICD-10-CM

## 2024-08-08 DIAGNOSIS — Z11.59 NEED FOR HEPATITIS B SCREENING TEST: ICD-10-CM

## 2024-08-09 ENCOUNTER — HOSPITAL ENCOUNTER (OUTPATIENT)
Dept: INFUSION THERAPY | Age: 64
Discharge: HOME | End: 2024-08-09
Payer: COMMERCIAL

## 2024-08-09 DIAGNOSIS — C67.8 MALIGNANT NEOPLASM OF OVERLAPPING SITES OF BLADDER (HCC): ICD-10-CM

## 2024-08-09 DIAGNOSIS — Z11.59 NEED FOR HEPATITIS B SCREENING TEST: ICD-10-CM

## 2024-08-09 LAB
ALBUMIN SERPL-MCNC: 3.9 GM/DL (ref 3.4–5)
ALP BLD-CCNC: 83 IU/L (ref 40–129)
ALT SERPL-CCNC: 8 U/L (ref 10–40)
ANION GAP SERPL CALCULATED.3IONS-SCNC: 11 MMOL/L (ref 7–16)
AST SERPL-CCNC: 10 IU/L (ref 15–37)
BASOPHILS ABSOLUTE: 0 K/CU MM
BASOPHILS RELATIVE PERCENT: 0.7 % (ref 0–1)
BILIRUB SERPL-MCNC: 0.2 MG/DL (ref 0–1)
BUN SERPL-MCNC: 22 MG/DL (ref 6–23)
CALCIUM SERPL-MCNC: 9 MG/DL (ref 8.3–10.6)
CHLORIDE BLD-SCNC: 105 MMOL/L (ref 99–110)
CO2: 24 MMOL/L (ref 21–32)
CREAT SERPL-MCNC: 1.6 MG/DL (ref 0.9–1.3)
DIFFERENTIAL TYPE: ABNORMAL
EOSINOPHILS ABSOLUTE: 0.3 K/CU MM
EOSINOPHILS RELATIVE PERCENT: 5 % (ref 0–3)
GFR, ESTIMATED: 48 ML/MIN/1.73M2
GLUCOSE SERPL-MCNC: 92 MG/DL (ref 70–99)
HCT VFR BLD CALC: 36.1 % (ref 42–52)
HEMOGLOBIN: 11.8 GM/DL (ref 13.5–18)
LYMPHOCYTES ABSOLUTE: 1.9 K/CU MM
LYMPHOCYTES RELATIVE PERCENT: 31.7 % (ref 24–44)
MCH RBC QN AUTO: 30.2 PG (ref 27–31)
MCHC RBC AUTO-ENTMCNC: 32.7 % (ref 32–36)
MCV RBC AUTO: 92.3 FL (ref 78–100)
MONOCYTES ABSOLUTE: 0.4 K/CU MM
MONOCYTES RELATIVE PERCENT: 6.7 % (ref 0–4)
NEUTROPHILS ABSOLUTE: 3.3 K/CU MM
NEUTROPHILS RELATIVE PERCENT: 55.9 % (ref 36–66)
PDW BLD-RTO: 14.1 % (ref 11.7–14.9)
PLATELET # BLD: 235 K/CU MM (ref 140–440)
PMV BLD AUTO: 9.1 FL (ref 7.5–11.1)
POTASSIUM SERPL-SCNC: 4.4 MMOL/L (ref 3.5–5.1)
RBC # BLD: 3.91 M/CU MM (ref 4.6–6.2)
SODIUM BLD-SCNC: 140 MMOL/L (ref 135–145)
TOTAL PROTEIN: 7 GM/DL (ref 6.4–8.2)
WBC # BLD: 6 K/CU MM (ref 4–10.5)

## 2024-08-09 PROCEDURE — 85025 COMPLETE CBC W/AUTO DIFF WBC: CPT

## 2024-08-09 PROCEDURE — 80053 COMPREHEN METABOLIC PANEL: CPT

## 2024-08-09 PROCEDURE — 36415 COLL VENOUS BLD VENIPUNCTURE: CPT

## 2024-08-12 ENCOUNTER — HOSPITAL ENCOUNTER (OUTPATIENT)
Dept: INFUSION THERAPY | Age: 64
Discharge: HOME | End: 2024-08-12
Payer: MEDICARE

## 2024-08-12 ENCOUNTER — HOSPITAL ENCOUNTER (OUTPATIENT)
Dept: RADIATION ONCOLOGY | Age: 64
Discharge: HOME | End: 2024-08-12
Payer: MEDICARE

## 2024-08-12 VITALS
HEART RATE: 74 BPM | HEIGHT: 70 IN | WEIGHT: 205.1 LBS | SYSTOLIC BLOOD PRESSURE: 144 MMHG | BODY MASS INDEX: 29.36 KG/M2 | TEMPERATURE: 97.8 F | OXYGEN SATURATION: 96 % | DIASTOLIC BLOOD PRESSURE: 81 MMHG

## 2024-08-12 DIAGNOSIS — Z11.59 NEED FOR HEPATITIS B SCREENING TEST: ICD-10-CM

## 2024-08-12 DIAGNOSIS — C67.8 MALIGNANT NEOPLASM OF OVERLAPPING SITES OF BLADDER (HCC): Primary | ICD-10-CM

## 2024-08-12 PROCEDURE — 2580000003 HC RX 258: Performed by: INTERNAL MEDICINE

## 2024-08-12 PROCEDURE — 96375 TX/PRO/DX INJ NEW DRUG ADDON: CPT

## 2024-08-12 PROCEDURE — 77014 CHG CT GUIDANCE RADIATION THERAPY FLDS PLACEMENT: CPT | Performed by: RADIOLOGY

## 2024-08-12 PROCEDURE — 77386 HC NTSTY MODUL RAD TX DLVR CPLX: CPT | Performed by: RADIOLOGY

## 2024-08-12 PROCEDURE — 6360000002 HC RX W HCPCS: Performed by: INTERNAL MEDICINE

## 2024-08-12 PROCEDURE — 96413 CHEMO IV INFUSION 1 HR: CPT

## 2024-08-12 RX ORDER — SODIUM CHLORIDE 9 MG/ML
5-250 INJECTION, SOLUTION INTRAVENOUS PRN
Status: DISCONTINUED | OUTPATIENT
Start: 2024-08-12 | End: 2024-08-13 | Stop reason: HOSPADM

## 2024-08-12 RX ORDER — ONDANSETRON 2 MG/ML
8 INJECTION INTRAMUSCULAR; INTRAVENOUS
Status: CANCELLED | OUTPATIENT
Start: 2024-08-12

## 2024-08-12 RX ORDER — ALBUTEROL SULFATE 90 UG/1
4 AEROSOL, METERED RESPIRATORY (INHALATION) PRN
OUTPATIENT
Start: 2024-08-26

## 2024-08-12 RX ORDER — SODIUM CHLORIDE 9 MG/ML
5-250 INJECTION, SOLUTION INTRAVENOUS PRN
OUTPATIENT
Start: 2024-08-26

## 2024-08-12 RX ORDER — HEPARIN SODIUM (PORCINE) LOCK FLUSH IV SOLN 100 UNIT/ML 100 UNIT/ML
500 SOLUTION INTRAVENOUS PRN
Status: CANCELLED | OUTPATIENT
Start: 2024-08-12

## 2024-08-12 RX ORDER — MEPERIDINE HYDROCHLORIDE 50 MG/ML
12.5 INJECTION INTRAMUSCULAR; INTRAVENOUS; SUBCUTANEOUS PRN
Status: CANCELLED | OUTPATIENT
Start: 2024-08-19

## 2024-08-12 RX ORDER — SODIUM CHLORIDE 9 MG/ML
5-250 INJECTION, SOLUTION INTRAVENOUS PRN
Status: CANCELLED | OUTPATIENT
Start: 2024-08-19

## 2024-08-12 RX ORDER — ACETAMINOPHEN 325 MG/1
650 TABLET ORAL
OUTPATIENT
Start: 2024-08-26

## 2024-08-12 RX ORDER — EPINEPHRINE 1 MG/ML
0.3 INJECTION, SOLUTION, CONCENTRATE INTRAVENOUS PRN
Status: CANCELLED | OUTPATIENT
Start: 2024-08-19

## 2024-08-12 RX ORDER — ONDANSETRON 2 MG/ML
8 INJECTION INTRAMUSCULAR; INTRAVENOUS ONCE
Status: COMPLETED | OUTPATIENT
Start: 2024-08-12 | End: 2024-08-12

## 2024-08-12 RX ORDER — ACETAMINOPHEN 325 MG/1
650 TABLET ORAL
Status: CANCELLED | OUTPATIENT
Start: 2024-08-12

## 2024-08-12 RX ORDER — DIPHENHYDRAMINE HYDROCHLORIDE 50 MG/ML
50 INJECTION INTRAMUSCULAR; INTRAVENOUS
Status: CANCELLED | OUTPATIENT
Start: 2024-08-19

## 2024-08-12 RX ORDER — ONDANSETRON 2 MG/ML
8 INJECTION INTRAMUSCULAR; INTRAVENOUS
Status: CANCELLED | OUTPATIENT
Start: 2024-08-19

## 2024-08-12 RX ORDER — MEPERIDINE HYDROCHLORIDE 50 MG/ML
12.5 INJECTION INTRAMUSCULAR; INTRAVENOUS; SUBCUTANEOUS PRN
Status: CANCELLED | OUTPATIENT
Start: 2024-08-12

## 2024-08-12 RX ORDER — SODIUM CHLORIDE 9 MG/ML
INJECTION, SOLUTION INTRAVENOUS CONTINUOUS
Status: CANCELLED | OUTPATIENT
Start: 2024-08-19

## 2024-08-12 RX ORDER — DIPHENHYDRAMINE HYDROCHLORIDE 50 MG/ML
50 INJECTION INTRAMUSCULAR; INTRAVENOUS
OUTPATIENT
Start: 2024-08-26

## 2024-08-12 RX ORDER — ACETAMINOPHEN 325 MG/1
650 TABLET ORAL
Status: CANCELLED | OUTPATIENT
Start: 2024-08-19

## 2024-08-12 RX ORDER — FAMOTIDINE 10 MG/ML
20 INJECTION, SOLUTION INTRAVENOUS
Status: CANCELLED | OUTPATIENT
Start: 2024-08-19

## 2024-08-12 RX ORDER — ALBUTEROL SULFATE 90 UG/1
4 AEROSOL, METERED RESPIRATORY (INHALATION) PRN
Status: CANCELLED | OUTPATIENT
Start: 2024-08-12

## 2024-08-12 RX ORDER — EPINEPHRINE 1 MG/ML
0.3 INJECTION, SOLUTION, CONCENTRATE INTRAVENOUS PRN
Status: CANCELLED | OUTPATIENT
Start: 2024-08-12

## 2024-08-12 RX ORDER — SODIUM CHLORIDE 0.9 % (FLUSH) 0.9 %
5-40 SYRINGE (ML) INJECTION PRN
Status: CANCELLED | OUTPATIENT
Start: 2024-08-19

## 2024-08-12 RX ORDER — SODIUM CHLORIDE 9 MG/ML
INJECTION, SOLUTION INTRAVENOUS CONTINUOUS
Status: CANCELLED | OUTPATIENT
Start: 2024-08-12

## 2024-08-12 RX ORDER — ONDANSETRON 2 MG/ML
8 INJECTION INTRAMUSCULAR; INTRAVENOUS
OUTPATIENT
Start: 2024-08-26

## 2024-08-12 RX ORDER — MEPERIDINE HYDROCHLORIDE 50 MG/ML
12.5 INJECTION INTRAMUSCULAR; INTRAVENOUS; SUBCUTANEOUS PRN
OUTPATIENT
Start: 2024-08-26

## 2024-08-12 RX ORDER — SODIUM CHLORIDE 9 MG/ML
5-250 INJECTION, SOLUTION INTRAVENOUS PRN
Status: CANCELLED | OUTPATIENT
Start: 2024-08-12

## 2024-08-12 RX ORDER — SODIUM CHLORIDE 0.9 % (FLUSH) 0.9 %
5-40 SYRINGE (ML) INJECTION PRN
Status: CANCELLED | OUTPATIENT
Start: 2024-08-12

## 2024-08-12 RX ORDER — SODIUM CHLORIDE 0.9 % (FLUSH) 0.9 %
5-40 SYRINGE (ML) INJECTION PRN
Status: DISCONTINUED | OUTPATIENT
Start: 2024-08-12 | End: 2024-08-13 | Stop reason: HOSPADM

## 2024-08-12 RX ORDER — DIPHENHYDRAMINE HYDROCHLORIDE 50 MG/ML
50 INJECTION INTRAMUSCULAR; INTRAVENOUS
Status: CANCELLED | OUTPATIENT
Start: 2024-08-12

## 2024-08-12 RX ORDER — SODIUM CHLORIDE 9 MG/ML
INJECTION, SOLUTION INTRAVENOUS CONTINUOUS
OUTPATIENT
Start: 2024-08-26

## 2024-08-12 RX ORDER — FAMOTIDINE 10 MG/ML
20 INJECTION, SOLUTION INTRAVENOUS
Status: CANCELLED | OUTPATIENT
Start: 2024-08-12

## 2024-08-12 RX ORDER — ONDANSETRON 2 MG/ML
8 INJECTION INTRAMUSCULAR; INTRAVENOUS ONCE
Status: CANCELLED | OUTPATIENT
Start: 2024-08-19 | End: 2024-08-19

## 2024-08-12 RX ORDER — FAMOTIDINE 10 MG/ML
20 INJECTION, SOLUTION INTRAVENOUS
OUTPATIENT
Start: 2024-08-26

## 2024-08-12 RX ORDER — SODIUM CHLORIDE 0.9 % (FLUSH) 0.9 %
5-40 SYRINGE (ML) INJECTION PRN
OUTPATIENT
Start: 2024-08-26

## 2024-08-12 RX ORDER — HEPARIN SODIUM (PORCINE) LOCK FLUSH IV SOLN 100 UNIT/ML 100 UNIT/ML
500 SOLUTION INTRAVENOUS PRN
Status: CANCELLED | OUTPATIENT
Start: 2024-08-19

## 2024-08-12 RX ORDER — EPINEPHRINE 1 MG/ML
0.3 INJECTION, SOLUTION, CONCENTRATE INTRAVENOUS PRN
OUTPATIENT
Start: 2024-08-26

## 2024-08-12 RX ORDER — ONDANSETRON 2 MG/ML
8 INJECTION INTRAMUSCULAR; INTRAVENOUS ONCE
OUTPATIENT
Start: 2024-08-26 | End: 2024-08-26

## 2024-08-12 RX ORDER — ONDANSETRON 2 MG/ML
8 INJECTION INTRAMUSCULAR; INTRAVENOUS ONCE
Status: CANCELLED | OUTPATIENT
Start: 2024-08-12 | End: 2024-08-12

## 2024-08-12 RX ORDER — HEPARIN SODIUM (PORCINE) LOCK FLUSH IV SOLN 100 UNIT/ML 100 UNIT/ML
500 SOLUTION INTRAVENOUS PRN
OUTPATIENT
Start: 2024-08-26

## 2024-08-12 RX ORDER — ALBUTEROL SULFATE 90 UG/1
4 AEROSOL, METERED RESPIRATORY (INHALATION) PRN
Status: CANCELLED | OUTPATIENT
Start: 2024-08-19

## 2024-08-12 RX ADMIN — ONDANSETRON 8 MG: 2 INJECTION INTRAMUSCULAR; INTRAVENOUS at 09:27

## 2024-08-12 RX ADMIN — GEMCITABINE HYDROCHLORIDE 300 MG: 200 INJECTION, SOLUTION INTRAVENOUS at 09:32

## 2024-08-12 RX ADMIN — SODIUM CHLORIDE 20 ML/HR: 9 INJECTION, SOLUTION INTRAVENOUS at 09:34

## 2024-08-12 NOTE — PROGRESS NOTES
Here for treatment. States doing well. Elevated creatinine level reviewed with Dr. Franklin by Nikki PERALTA. Treatment approved. No new orders received. Patient instructed drink planty of fluids.     Gemzar administered as ordered. Call light within reach. Tolerated infusion without incident. AVS provided. Discharged in stable condition.

## 2024-08-13 ENCOUNTER — HOSPITAL ENCOUNTER (OUTPATIENT)
Dept: RADIATION ONCOLOGY | Age: 64
Discharge: HOME | End: 2024-08-13
Payer: MEDICARE

## 2024-08-13 VITALS — BODY MASS INDEX: 29.56 KG/M2 | WEIGHT: 206 LBS

## 2024-08-13 PROCEDURE — 77014 CHG CT GUIDANCE RADIATION THERAPY FLDS PLACEMENT: CPT | Performed by: RADIOLOGY

## 2024-08-13 PROCEDURE — 77386 HC NTSTY MODUL RAD TX DLVR CPLX: CPT | Performed by: RADIOLOGY

## 2024-08-13 ASSESSMENT — PAIN SCALES - GENERAL: PAINLEVEL_OUTOF10: 0

## 2024-08-13 NOTE — PROGRESS NOTES
Weekly Radiation Treatment Progress Note    DATE OF SERVICE: 8/13/2024     DIAGNOSIS:  Cancer Staging   Malignant neoplasm of overlapping sites of bladder (HCC)  Staging form: Urinary Bladder, AJCC 8th Edition  - Clinical: Stage SJ (cT3, cN3, cM1a) - Unsigned     TREATMENT COURSE:     With chemo      Site: bladder/pelvis   Current Total Radiation Dose: 400 cGy    Pt doing well. Energy fairly good.  Tolerating chemo. Just started no issues.       EXAM  Wt Readings from Last 3 Encounters:   08/12/24 93 kg (205 lb 1.6 oz)   07/30/24 92.6 kg (204 lb 3.2 oz)   07/30/24 92.6 kg (204 lb 3.2 oz)     NAD      Setup images, chart, plan reviewed    A/P:   Tolerating RT well  Continue RT as planned      Electronically signed by Frank Tatum MD on 8/13/2024 at 8:04 AM

## 2024-08-14 ENCOUNTER — HOSPITAL ENCOUNTER (OUTPATIENT)
Dept: RADIATION ONCOLOGY | Age: 64
Discharge: HOME | End: 2024-08-14
Payer: MEDICARE

## 2024-08-14 ENCOUNTER — CLINICAL DOCUMENTATION (OUTPATIENT)
Dept: RADIATION ONCOLOGY | Age: 64
End: 2024-08-14

## 2024-08-14 PROCEDURE — 77014 CHG CT GUIDANCE RADIATION THERAPY FLDS PLACEMENT: CPT | Performed by: RADIOLOGY

## 2024-08-14 PROCEDURE — 77386 HC NTSTY MODUL RAD TX DLVR CPLX: CPT | Performed by: RADIOLOGY

## 2024-08-14 NOTE — PROGRESS NOTES
LSW met with Pt today regarding household  financial challenges. Pt is on disability and his spouse works full time. They have six children.   Pt stated the family utilizes food pantries to supplement groceries. He has applied for [employer] food assistance but was determined to be over-income.  LSW encouraged Pt to look into utility assistance through HEAP/PIPP. Info was given on [facility] who administers the program.  Gasoline assistance was provided and Pt's radiation treatment schedule was forwarded to [facility] for additional help with gasoline.    LSW offered supportive counseling and Pt agreed to a follow-up call. A referral was made to [facility] Counselor Shelly Starr, NORMA.

## 2024-08-15 ENCOUNTER — HOSPITAL ENCOUNTER (OUTPATIENT)
Dept: RADIATION ONCOLOGY | Age: 64
Discharge: HOME | End: 2024-08-15
Payer: MEDICARE

## 2024-08-15 PROCEDURE — 77386 HC NTSTY MODUL RAD TX DLVR CPLX: CPT | Performed by: RADIOLOGY

## 2024-08-15 PROCEDURE — 77014 CHG CT GUIDANCE RADIATION THERAPY FLDS PLACEMENT: CPT | Performed by: RADIOLOGY

## 2024-08-15 RX ORDER — ONDANSETRON 8 MG/1
8 TABLET, ORALLY DISINTEGRATING ORAL EVERY 8 HOURS PRN
Qty: 30 TABLET | Refills: 3 | Status: SHIPPED | OUTPATIENT
Start: 2024-08-15

## 2024-08-16 ENCOUNTER — HOSPITAL ENCOUNTER (OUTPATIENT)
Dept: RADIATION ONCOLOGY | Age: 64
Discharge: HOME | End: 2024-08-16
Payer: MEDICARE

## 2024-08-16 ENCOUNTER — HOSPITAL ENCOUNTER (OUTPATIENT)
Dept: INFUSION THERAPY | Age: 64
Discharge: HOME | End: 2024-08-16
Payer: MEDICARE

## 2024-08-16 DIAGNOSIS — Z11.59 NEED FOR HEPATITIS B SCREENING TEST: ICD-10-CM

## 2024-08-16 DIAGNOSIS — C67.8 MALIGNANT NEOPLASM OF OVERLAPPING SITES OF BLADDER (HCC): ICD-10-CM

## 2024-08-16 LAB
ALBUMIN SERPL-MCNC: 3.9 GM/DL (ref 3.4–5)
ALP BLD-CCNC: 83 IU/L (ref 40–129)
ALT SERPL-CCNC: 9 U/L (ref 10–40)
ANION GAP SERPL CALCULATED.3IONS-SCNC: 7 MMOL/L (ref 7–16)
AST SERPL-CCNC: 11 IU/L (ref 15–37)
BASOPHILS ABSOLUTE: 0 K/CU MM
BASOPHILS RELATIVE PERCENT: 0.3 % (ref 0–1)
BILIRUB SERPL-MCNC: 0.5 MG/DL (ref 0–1)
BUN SERPL-MCNC: 19 MG/DL (ref 6–23)
CALCIUM SERPL-MCNC: 8.8 MG/DL (ref 8.3–10.6)
CHLORIDE BLD-SCNC: 107 MMOL/L (ref 99–110)
CO2: 26 MMOL/L (ref 21–32)
CREAT SERPL-MCNC: 1.4 MG/DL (ref 0.9–1.3)
DIFFERENTIAL TYPE: ABNORMAL
EOSINOPHILS ABSOLUTE: 0.2 K/CU MM
EOSINOPHILS RELATIVE PERCENT: 4.4 % (ref 0–3)
GFR, ESTIMATED: 56 ML/MIN/1.73M2
GLUCOSE SERPL-MCNC: 94 MG/DL (ref 70–99)
HCT VFR BLD CALC: 34.7 % (ref 42–52)
HEMOGLOBIN: 11.2 GM/DL (ref 13.5–18)
LYMPHOCYTES ABSOLUTE: 0.9 K/CU MM
LYMPHOCYTES RELATIVE PERCENT: 24.3 % (ref 24–44)
MCH RBC QN AUTO: 30 PG (ref 27–31)
MCHC RBC AUTO-ENTMCNC: 32.3 % (ref 32–36)
MCV RBC AUTO: 93 FL (ref 78–100)
MONOCYTES ABSOLUTE: 0 K/CU MM
MONOCYTES RELATIVE PERCENT: 1.1 % (ref 0–4)
NEUTROPHILS ABSOLUTE: 2.6 K/CU MM
NEUTROPHILS RELATIVE PERCENT: 69.9 % (ref 36–66)
PDW BLD-RTO: 13.6 % (ref 11.7–14.9)
PLATELET # BLD: 181 K/CU MM (ref 140–440)
PMV BLD AUTO: 9 FL (ref 7.5–11.1)
POTASSIUM SERPL-SCNC: 4.2 MMOL/L (ref 3.5–5.1)
RBC # BLD: 3.73 M/CU MM (ref 4.6–6.2)
SODIUM BLD-SCNC: 140 MMOL/L (ref 135–145)
TOTAL PROTEIN: 6.9 GM/DL (ref 6.4–8.2)
WBC # BLD: 3.7 K/CU MM (ref 4–10.5)

## 2024-08-16 PROCEDURE — 85025 COMPLETE CBC W/AUTO DIFF WBC: CPT

## 2024-08-16 PROCEDURE — 77386 HC NTSTY MODUL RAD TX DLVR CPLX: CPT | Performed by: RADIOLOGY

## 2024-08-16 PROCEDURE — 77336 RADIATION PHYSICS CONSULT: CPT | Performed by: RADIOLOGY

## 2024-08-16 PROCEDURE — 36415 COLL VENOUS BLD VENIPUNCTURE: CPT

## 2024-08-16 PROCEDURE — 80053 COMPREHEN METABOLIC PANEL: CPT

## 2024-08-19 ENCOUNTER — OFFICE VISIT (OUTPATIENT)
Dept: ONCOLOGY | Age: 64
End: 2024-08-19
Payer: MEDICARE

## 2024-08-19 ENCOUNTER — HOSPITAL ENCOUNTER (OUTPATIENT)
Dept: RADIATION ONCOLOGY | Age: 64
Discharge: HOME | End: 2024-08-19
Payer: MEDICARE

## 2024-08-19 ENCOUNTER — HOSPITAL ENCOUNTER (OUTPATIENT)
Dept: INFUSION THERAPY | Age: 64
Discharge: HOME | End: 2024-08-19
Payer: MEDICARE

## 2024-08-19 VITALS
HEIGHT: 70 IN | TEMPERATURE: 98 F | HEART RATE: 83 BPM | BODY MASS INDEX: 28.88 KG/M2 | OXYGEN SATURATION: 97 % | WEIGHT: 201.72 LBS | DIASTOLIC BLOOD PRESSURE: 83 MMHG | SYSTOLIC BLOOD PRESSURE: 141 MMHG

## 2024-08-19 VITALS
BODY MASS INDEX: 28.89 KG/M2 | HEIGHT: 70 IN | WEIGHT: 201.8 LBS | OXYGEN SATURATION: 97 % | TEMPERATURE: 98 F | DIASTOLIC BLOOD PRESSURE: 83 MMHG | HEART RATE: 83 BPM | SYSTOLIC BLOOD PRESSURE: 141 MMHG

## 2024-08-19 DIAGNOSIS — C67.8 MALIGNANT NEOPLASM OF OVERLAPPING SITES OF BLADDER (HCC): Primary | ICD-10-CM

## 2024-08-19 DIAGNOSIS — Z11.59 NEED FOR HEPATITIS B SCREENING TEST: ICD-10-CM

## 2024-08-19 PROCEDURE — 3079F DIAST BP 80-89 MM HG: CPT | Performed by: INTERNAL MEDICINE

## 2024-08-19 PROCEDURE — 96375 TX/PRO/DX INJ NEW DRUG ADDON: CPT

## 2024-08-19 PROCEDURE — 6360000002 HC RX W HCPCS: Performed by: INTERNAL MEDICINE

## 2024-08-19 PROCEDURE — G8417 CALC BMI ABV UP PARAM F/U: HCPCS | Performed by: INTERNAL MEDICINE

## 2024-08-19 PROCEDURE — 3077F SYST BP >= 140 MM HG: CPT | Performed by: INTERNAL MEDICINE

## 2024-08-19 PROCEDURE — 99214 OFFICE O/P EST MOD 30 MIN: CPT | Performed by: INTERNAL MEDICINE

## 2024-08-19 PROCEDURE — 96413 CHEMO IV INFUSION 1 HR: CPT

## 2024-08-19 PROCEDURE — G8427 DOCREV CUR MEDS BY ELIG CLIN: HCPCS | Performed by: INTERNAL MEDICINE

## 2024-08-19 PROCEDURE — 1036F TOBACCO NON-USER: CPT | Performed by: INTERNAL MEDICINE

## 2024-08-19 PROCEDURE — 3017F COLORECTAL CA SCREEN DOC REV: CPT | Performed by: INTERNAL MEDICINE

## 2024-08-19 PROCEDURE — 2580000003 HC RX 258: Performed by: INTERNAL MEDICINE

## 2024-08-19 PROCEDURE — 77386 HC NTSTY MODUL RAD TX DLVR CPLX: CPT | Performed by: RADIOLOGY

## 2024-08-19 RX ORDER — FAMOTIDINE 10 MG/ML
20 INJECTION, SOLUTION INTRAVENOUS
OUTPATIENT
Start: 2024-09-02

## 2024-08-19 RX ORDER — SODIUM CHLORIDE 9 MG/ML
5-250 INJECTION, SOLUTION INTRAVENOUS PRN
OUTPATIENT
Start: 2024-09-02

## 2024-08-19 RX ORDER — ONDANSETRON 2 MG/ML
8 INJECTION INTRAMUSCULAR; INTRAVENOUS
OUTPATIENT
Start: 2024-09-02

## 2024-08-19 RX ORDER — SODIUM CHLORIDE 0.9 % (FLUSH) 0.9 %
5-40 SYRINGE (ML) INJECTION PRN
Status: DISCONTINUED | OUTPATIENT
Start: 2024-08-19 | End: 2024-08-20 | Stop reason: HOSPADM

## 2024-08-19 RX ORDER — DIPHENHYDRAMINE HYDROCHLORIDE 50 MG/ML
50 INJECTION INTRAMUSCULAR; INTRAVENOUS
OUTPATIENT
Start: 2024-09-02

## 2024-08-19 RX ORDER — ONDANSETRON 2 MG/ML
8 INJECTION INTRAMUSCULAR; INTRAVENOUS ONCE
Status: COMPLETED | OUTPATIENT
Start: 2024-08-19 | End: 2024-08-19

## 2024-08-19 RX ORDER — MEPERIDINE HYDROCHLORIDE 50 MG/ML
12.5 INJECTION INTRAMUSCULAR; INTRAVENOUS; SUBCUTANEOUS PRN
OUTPATIENT
Start: 2024-09-02

## 2024-08-19 RX ORDER — ONDANSETRON 2 MG/ML
8 INJECTION INTRAMUSCULAR; INTRAVENOUS ONCE
OUTPATIENT
Start: 2024-09-02 | End: 2024-09-02

## 2024-08-19 RX ORDER — ALBUTEROL SULFATE 90 UG/1
4 AEROSOL, METERED RESPIRATORY (INHALATION) PRN
OUTPATIENT
Start: 2024-09-02

## 2024-08-19 RX ORDER — EPINEPHRINE 1 MG/ML
0.3 INJECTION, SOLUTION, CONCENTRATE INTRAVENOUS PRN
OUTPATIENT
Start: 2024-09-02

## 2024-08-19 RX ORDER — SODIUM CHLORIDE 9 MG/ML
INJECTION, SOLUTION INTRAVENOUS CONTINUOUS
OUTPATIENT
Start: 2024-09-02

## 2024-08-19 RX ORDER — ACETAMINOPHEN 325 MG/1
650 TABLET ORAL
OUTPATIENT
Start: 2024-09-02

## 2024-08-19 RX ORDER — SODIUM CHLORIDE 9 MG/ML
5-250 INJECTION, SOLUTION INTRAVENOUS PRN
Status: DISCONTINUED | OUTPATIENT
Start: 2024-08-19 | End: 2024-08-20 | Stop reason: HOSPADM

## 2024-08-19 RX ORDER — SODIUM CHLORIDE 0.9 % (FLUSH) 0.9 %
5-40 SYRINGE (ML) INJECTION PRN
OUTPATIENT
Start: 2024-09-02

## 2024-08-19 RX ORDER — HEPARIN SODIUM (PORCINE) LOCK FLUSH IV SOLN 100 UNIT/ML 100 UNIT/ML
500 SOLUTION INTRAVENOUS PRN
OUTPATIENT
Start: 2024-09-02

## 2024-08-19 RX ADMIN — GEMCITABINE HYDROCHLORIDE 300 MG: 200 INJECTION, SOLUTION INTRAVENOUS at 09:17

## 2024-08-19 RX ADMIN — SODIUM CHLORIDE 20 ML/HR: 9 INJECTION, SOLUTION INTRAVENOUS at 09:18

## 2024-08-19 RX ADMIN — ONDANSETRON 8 MG: 2 INJECTION INTRAMUSCULAR; INTRAVENOUS at 09:12

## 2024-08-19 ASSESSMENT — PAIN SCALES - GENERAL: PAINLEVEL_OUTOF10: 0

## 2024-08-19 NOTE — PLAN OF CARE
Care plan reviewed.     _ Pt reports some nausea, controlled with antiemetics.    _ Energy down a bit. Tolerating ADLs without difficulty.

## 2024-08-19 NOTE — PROGRESS NOTES
Patient Name: Jethro Vázquez  Patient : 1960  Patient MRN: [id number]     Primary Oncologist: Noemi Gambino MD  Referring Provider: Mono Schaffer MD     Date of Service: 2024      Chief Complaint:   Chief Complaint   Patient presents with    Follow-up    Results     FU visit    Patient Active Problem List:     Non-recurrent bilateral inguinal hernia without obstruction or gangrene     Right inguinal hernia     Lipoma, spermatic cord     Benign essential microscopic hematuria     Chronic left-sided low back pain without sciatica     Overweight     Primary hypertension     Pyelonephritis     Malignant neoplasm of overlapping sites of bladder      Need for hepatitis B screening test     Bladder cancer      HPI:   Jethro Vázquez is a pleasant 64 y.o. male who was diagnosed with treatment naive urothelial carcinoma in 2023. He is s/p bilateral nephrostomy tube placement.  He has pelvic and retroperitoneal as well as paraesophageal LAD concerning for metastatic disease. Bone scan was negative in 2024. Plan was for outpatient PET however he has been hospitalized for pain.   2023 CT AP was non-acute with no significant residual hydronephrosis however did redemonstrate significant LAD.    CT AP W/ Contrast 2024:  1.  Bilateral nephrostomy tubes are present as with the 01/15/2024 study. There is no new perinephric hematoma or hematoma along the course of the nephrostomy tubes.   2.  There is not a significant degree of residual hydronephrosis compared with the pre nephrostomy study of 01/10/2024.  Tiny focus of gas in the right renal collecting system may be secondary to the nephrostomy tube.   3.  There is delayed enhancement of the left kidney compared to the right side.  This could be vascular in nature with stenosis of the left renal artery and narrowing of the left renal vein.  The left renal vein is retroaortic and likely compressed with the severe retroperitoneal

## 2024-08-19 NOTE — PROGRESS NOTES
Ambulated to infusion area after radiation treatment. Here for treatment and office visit. States doing well. No concerns voiced.     Lab within defined parameters.     Gemzar administered as ordered. Call light within reach. Tolerated infusion without incident.     Patient escorted to front clinic for office visit.

## 2024-08-19 NOTE — PROGRESS NOTES
Weekly Radiation Treatment Progress Note    DATE OF SERVICE: 8/19/2024     DIAGNOSIS:   Cancer Staging   Malignant neoplasm of overlapping sites of bladder (HCC)  Staging form: Urinary Bladder, AJCC 8th Edition  - Clinical: Stage SJ (cT3, cN3, cM1a) - Unsigned       TREATMENT COURSE:   Oncology History   Malignant neoplasm of overlapping sites of bladder (HCC)   1/30/2024 - 6/25/2024 Chemotherapy    OP CISplatin 35 mg/m2 (split-dosing) + gemcitabine 1000 mg/m2 D1 and 8 Q28D  Plan Provider: Noemi Gambino MD  Treatment goal: Neoadjuvant  Line of treatment: Induction     8/12/2024 -  Chemotherapy    OP gemcitabine  Plan Provider: Noemi Gambino MD  Treatment goal: Curative  Line of treatment: Consolidation           Site: Bladder  Current Total Radiation Dose: 1200 cGy    Pt doing well. Energy good.  Had n/v last week 2d after chemo. Antiemetics controlling.  No diarrhea    EXAM  Wt Readings from Last 3 Encounters:   08/13/24 93.4 kg (206 lb)   08/12/24 93 kg (205 lb 1.6 oz)   07/30/24 92.6 kg (204 lb 3.2 oz)     NAD    Setup images, chart, plan reviewed    A/P:   Tolerating RT well  Continue RT as planned      Electronically signed by Cassie Lr MD on 8/19/2024 at 8:36 AM

## 2024-08-20 ENCOUNTER — HOSPITAL ENCOUNTER (OUTPATIENT)
Dept: RADIATION ONCOLOGY | Age: 64
Discharge: HOME | End: 2024-08-20
Payer: MEDICARE

## 2024-08-20 PROCEDURE — 77386 HC NTSTY MODUL RAD TX DLVR CPLX: CPT | Performed by: RADIOLOGY

## 2024-08-21 ENCOUNTER — HOSPITAL ENCOUNTER (OUTPATIENT)
Dept: RADIATION ONCOLOGY | Age: 64
Discharge: HOME | End: 2024-08-21
Payer: MEDICARE

## 2024-08-21 PROCEDURE — 77386 HC NTSTY MODUL RAD TX DLVR CPLX: CPT | Performed by: RADIOLOGY

## 2024-08-22 ENCOUNTER — HOSPITAL ENCOUNTER (OUTPATIENT)
Dept: RADIATION ONCOLOGY | Age: 64
Discharge: HOME | End: 2024-08-22
Payer: MEDICARE

## 2024-08-22 PROCEDURE — 77386 HC NTSTY MODUL RAD TX DLVR CPLX: CPT | Performed by: RADIOLOGY

## 2024-08-23 ENCOUNTER — HOSPITAL ENCOUNTER (OUTPATIENT)
Dept: INFUSION THERAPY | Age: 64
Discharge: HOME | End: 2024-08-23
Payer: MEDICARE

## 2024-08-23 ENCOUNTER — HOSPITAL ENCOUNTER (OUTPATIENT)
Dept: RADIATION ONCOLOGY | Age: 64
Discharge: HOME | End: 2024-08-23
Payer: MEDICARE

## 2024-08-23 DIAGNOSIS — Z11.59 NEED FOR HEPATITIS B SCREENING TEST: ICD-10-CM

## 2024-08-23 DIAGNOSIS — C67.8 MALIGNANT NEOPLASM OF OVERLAPPING SITES OF BLADDER (HCC): ICD-10-CM

## 2024-08-23 LAB
ALBUMIN SERPL-MCNC: 3.9 GM/DL (ref 3.4–5)
ALP BLD-CCNC: 84 IU/L (ref 40–128)
ALT SERPL-CCNC: 9 U/L (ref 10–40)
ANION GAP SERPL CALCULATED.3IONS-SCNC: 10 MMOL/L (ref 7–16)
AST SERPL-CCNC: 12 IU/L (ref 15–37)
BASOPHILS ABSOLUTE: 0 K/CU MM
BASOPHILS RELATIVE PERCENT: 0.9 % (ref 0–1)
BILIRUB SERPL-MCNC: 0.4 MG/DL (ref 0–1)
BUN SERPL-MCNC: 14 MG/DL (ref 6–23)
CALCIUM SERPL-MCNC: 8.8 MG/DL (ref 8.3–10.6)
CHLORIDE BLD-SCNC: 105 MMOL/L (ref 99–110)
CO2: 27 MMOL/L (ref 21–32)
CREAT SERPL-MCNC: 1.2 MG/DL (ref 0.9–1.3)
DIFFERENTIAL TYPE: ABNORMAL
EOSINOPHILS ABSOLUTE: 0.1 K/CU MM
EOSINOPHILS RELATIVE PERCENT: 5.2 % (ref 0–3)
GFR, ESTIMATED: 68 ML/MIN/1.73M2
GLUCOSE SERPL-MCNC: 95 MG/DL (ref 70–99)
HCT VFR BLD CALC: 32.4 % (ref 42–52)
HEMOGLOBIN: 10.6 GM/DL (ref 13.5–18)
LYMPHOCYTES ABSOLUTE: 0.5 K/CU MM
LYMPHOCYTES RELATIVE PERCENT: 23.3 % (ref 24–44)
MCH RBC QN AUTO: 30.1 PG (ref 27–31)
MCHC RBC AUTO-ENTMCNC: 32.7 % (ref 32–36)
MCV RBC AUTO: 92 FL (ref 78–100)
MONOCYTES ABSOLUTE: 0 K/CU MM
MONOCYTES RELATIVE PERCENT: 1.7 % (ref 0–4)
NEUTROPHILS ABSOLUTE: 1.6 K/CU MM
NEUTROPHILS RELATIVE PERCENT: 68.9 % (ref 36–66)
PDW BLD-RTO: 13.3 % (ref 11.7–14.9)
PLATELET # BLD: 85 K/CU MM (ref 140–440)
PMV BLD AUTO: 9 FL (ref 7.5–11.1)
POTASSIUM SERPL-SCNC: 4.3 MMOL/L (ref 3.5–5.1)
RBC # BLD: 3.52 M/CU MM (ref 4.6–6.2)
SODIUM BLD-SCNC: 142 MMOL/L (ref 135–145)
TOTAL PROTEIN: 7.3 GM/DL (ref 6.4–8.2)
WBC # BLD: 2.3 K/CU MM (ref 4–10.5)

## 2024-08-23 PROCEDURE — 77336 RADIATION PHYSICS CONSULT: CPT | Performed by: RADIOLOGY

## 2024-08-23 PROCEDURE — 85025 COMPLETE CBC W/AUTO DIFF WBC: CPT

## 2024-08-23 PROCEDURE — 36415 COLL VENOUS BLD VENIPUNCTURE: CPT

## 2024-08-23 PROCEDURE — 80053 COMPREHEN METABOLIC PANEL: CPT

## 2024-08-23 PROCEDURE — 77386 HC NTSTY MODUL RAD TX DLVR CPLX: CPT | Performed by: RADIOLOGY

## 2024-08-23 NOTE — FLOWSHEET NOTE
Pt here for CMP and CBC. This nurse reviewed CBC and plt 85. This nurse told pt we will re draw CBC Monday before treat. He stated his understanding.

## 2024-08-26 ENCOUNTER — HOSPITAL ENCOUNTER (OUTPATIENT)
Dept: INFUSION THERAPY | Age: 64
Discharge: HOME | End: 2024-08-26
Payer: MEDICARE

## 2024-08-26 ENCOUNTER — APPOINTMENT (OUTPATIENT)
Dept: RADIATION ONCOLOGY | Age: 64
End: 2024-08-26
Payer: MEDICARE

## 2024-08-26 VITALS
DIASTOLIC BLOOD PRESSURE: 79 MMHG | OXYGEN SATURATION: 98 % | TEMPERATURE: 97.8 F | SYSTOLIC BLOOD PRESSURE: 145 MMHG | BODY MASS INDEX: 28.83 KG/M2 | HEIGHT: 70 IN | WEIGHT: 201.4 LBS | HEART RATE: 83 BPM

## 2024-08-26 VITALS — WEIGHT: 201.8 LBS | BODY MASS INDEX: 28.96 KG/M2

## 2024-08-26 DIAGNOSIS — C67.8 MALIGNANT NEOPLASM OF OVERLAPPING SITES OF BLADDER (HCC): ICD-10-CM

## 2024-08-26 DIAGNOSIS — Z11.59 NEED FOR HEPATITIS B SCREENING TEST: Primary | ICD-10-CM

## 2024-08-26 LAB
BASOPHILS ABSOLUTE: 0 K/CU MM
BASOPHILS RELATIVE PERCENT: 0.4 % (ref 0–1)
DIFFERENTIAL TYPE: ABNORMAL
EOSINOPHILS ABSOLUTE: 0.2 K/CU MM
EOSINOPHILS RELATIVE PERCENT: 7.1 % (ref 0–3)
HCT VFR BLD CALC: 33 % (ref 42–52)
HEMOGLOBIN: 10.9 GM/DL (ref 13.5–18)
LYMPHOCYTES ABSOLUTE: 0.6 K/CU MM
LYMPHOCYTES RELATIVE PERCENT: 26.2 % (ref 24–44)
MCH RBC QN AUTO: 30.2 PG (ref 27–31)
MCHC RBC AUTO-ENTMCNC: 33 % (ref 32–36)
MCV RBC AUTO: 91.4 FL (ref 78–100)
MONOCYTES ABSOLUTE: 0.3 K/CU MM
MONOCYTES RELATIVE PERCENT: 12.9 % (ref 0–4)
NEUTROPHILS ABSOLUTE: 1.2 K/CU MM
NEUTROPHILS RELATIVE PERCENT: 53.4 % (ref 36–66)
PDW BLD-RTO: 13.6 % (ref 11.7–14.9)
PLATELET # BLD: 58 K/CU MM (ref 140–440)
PMV BLD AUTO: 8.7 FL (ref 7.5–11.1)
RBC # BLD: 3.61 M/CU MM (ref 4.6–6.2)
WBC # BLD: 2.3 K/CU MM (ref 4–10.5)

## 2024-08-26 PROCEDURE — 77014 CHG CT GUIDANCE RADIATION THERAPY FLDS PLACEMENT: CPT | Performed by: RADIOLOGY

## 2024-08-26 PROCEDURE — 6360000002 HC RX W HCPCS: Performed by: INTERNAL MEDICINE

## 2024-08-26 PROCEDURE — 96375 TX/PRO/DX INJ NEW DRUG ADDON: CPT

## 2024-08-26 PROCEDURE — 96413 CHEMO IV INFUSION 1 HR: CPT

## 2024-08-26 PROCEDURE — 2580000003 HC RX 258: Performed by: INTERNAL MEDICINE

## 2024-08-26 PROCEDURE — 77386 HC NTSTY MODUL RAD TX DLVR CPLX: CPT | Performed by: RADIOLOGY

## 2024-08-26 PROCEDURE — 80053 COMPREHEN METABOLIC PANEL: CPT

## 2024-08-26 PROCEDURE — 85025 COMPLETE CBC W/AUTO DIFF WBC: CPT

## 2024-08-26 RX ORDER — ONDANSETRON 2 MG/ML
8 INJECTION INTRAMUSCULAR; INTRAVENOUS ONCE
Status: COMPLETED | OUTPATIENT
Start: 2024-08-26 | End: 2024-08-26

## 2024-08-26 RX ORDER — SODIUM CHLORIDE 0.9 % (FLUSH) 0.9 %
5-40 SYRINGE (ML) INJECTION PRN
Status: DISCONTINUED | OUTPATIENT
Start: 2024-08-26 | End: 2024-08-27 | Stop reason: HOSPADM

## 2024-08-26 RX ORDER — SODIUM CHLORIDE 9 MG/ML
5-250 INJECTION, SOLUTION INTRAVENOUS PRN
Status: DISCONTINUED | OUTPATIENT
Start: 2024-08-26 | End: 2024-08-27 | Stop reason: HOSPADM

## 2024-08-26 RX ADMIN — ONDANSETRON 8 MG: 2 INJECTION INTRAMUSCULAR; INTRAVENOUS at 10:00

## 2024-08-26 RX ADMIN — GEMCITABINE HYDROCHLORIDE 300 MG: 1 INJECTION, SOLUTION INTRAVENOUS at 10:03

## 2024-08-26 RX ADMIN — SODIUM CHLORIDE 20 ML/HR: 9 INJECTION, SOLUTION INTRAVENOUS at 10:04

## 2024-08-26 ASSESSMENT — PAIN DESCRIPTION - FREQUENCY: FREQUENCY: CONTINUOUS

## 2024-08-26 ASSESSMENT — PAIN DESCRIPTION - LOCATION: LOCATION: KNEE;BACK

## 2024-08-26 ASSESSMENT — PAIN DESCRIPTION - PAIN TYPE: TYPE: CHRONIC PAIN

## 2024-08-26 ASSESSMENT — PAIN DESCRIPTION - ORIENTATION: ORIENTATION: LEFT;RIGHT

## 2024-08-26 ASSESSMENT — PAIN SCALES - GENERAL: PAINLEVEL_OUTOF10: 5

## 2024-08-26 ASSESSMENT — PAIN DESCRIPTION - DESCRIPTORS: DESCRIPTORS: ACHING

## 2024-08-26 ASSESSMENT — PAIN DESCRIPTION - ONSET: ONSET: ON-GOING

## 2024-08-26 ASSESSMENT — PAIN - FUNCTIONAL ASSESSMENT: PAIN_FUNCTIONAL_ASSESSMENT: ACTIVITIES ARE NOT PREVENTED

## 2024-08-26 NOTE — PROGRESS NOTES
Arrived to treatment suite from scheduled radiation and OTV.  Pt to receive Gemzar infusion today.  Assessment complete, denies concerns or questions at this time. Labs reviewed from 8/23/24, not within treatment parameters.  Mediport accessed per protocol, positive blood return noted, labs redrawn this morning, WBC 2.1, Plt 16, ANC 1.2.  Plan to redraw platelets per Dr. Franklin. Okay to proceed with treatment as scheduled. Labs redrawn, resulted.  Plt 58.  No new orders at this time.  Treatment completed as ordered.  Pt tolerated well.  Mediport de-accessed per protocol. AVS provided.  Discharge ambulatory in stable condition. Mary Chaudhry RN

## 2024-08-26 NOTE — PROGRESS NOTES
Weekly Radiation Treatment Progress Note    DATE OF SERVICE: 8/26/2024     DIAGNOSIS:   Cancer Staging   Malignant neoplasm of overlapping sites of bladder (HCC)  Staging form: Urinary Bladder, AJCC 8th Edition  - Clinical: Stage SJ (cT3, cN3, cM1a) - Unsigned       TREATMENT COURSE:   Oncology History   Malignant neoplasm of overlapping sites of bladder (HCC)   1/30/2024 - 6/25/2024 Chemotherapy    OP CISplatin 35 mg/m2 (split-dosing) + gemcitabine 1000 mg/m2 D1 and 8 Q28D  Plan Provider: Noemi Gambino MD  Treatment goal: Neoadjuvant  Line of treatment: Induction     8/12/2024 -  Chemotherapy    OP gemcitabine  Plan Provider: Noemi Gambino MD  Treatment goal: Curative  Line of treatment: Consolidation           Site: Bladder/ pelvis  Current Total Radiation Dose: 2200 cGy    Pt doing well. Energy good.  Mild dysuria. Diarrhea once over the weekend    EXAM  Wt Readings from Last 3 Encounters:   08/26/24 91.5 kg (201 lb 12.8 oz)   08/19/24 91.5 kg (201 lb 12.8 oz)   08/19/24 91.5 kg (201 lb 11.5 oz)     NAD    Setup images, chart, plan reviewed    A/P:   Tolerating RT well  Continue RT as planned  Chemo today      Electronically signed by Cassie Lr MD on 8/26/2024 at 8:36 AM

## 2024-08-27 ENCOUNTER — APPOINTMENT (OUTPATIENT)
Dept: RADIATION ONCOLOGY | Age: 64
End: 2024-08-27
Payer: MEDICARE

## 2024-08-27 LAB
BASOPHILS ABSOLUTE: 0 K/CU MM
BASOPHILS RELATIVE PERCENT: 0.5 % (ref 0–1)
DIFFERENTIAL TYPE: ABNORMAL
EOSINOPHILS ABSOLUTE: 0.2 K/CU MM
EOSINOPHILS RELATIVE PERCENT: 7.1 % (ref 0–3)
HCT VFR BLD CALC: 10.5 % (ref 42–52)
HEMOGLOBIN: 10.8 GM/DL (ref 13.5–18)
LYMPHOCYTES ABSOLUTE: 0.6 K/CU MM
LYMPHOCYTES RELATIVE PERCENT: 26.4 % (ref 24–44)
MCH RBC QN AUTO: 112.5 PG (ref 27–31)
MCHC RBC AUTO-ENTMCNC: 102.9 % (ref 32–36)
MCV RBC AUTO: 109.4 FL (ref 78–100)
MONOCYTES ABSOLUTE: 0.3 K/CU MM
MONOCYTES RELATIVE PERCENT: 11.8 % (ref 0–4)
NEUTROPHILS ABSOLUTE: 1.2 K/CU MM
NEUTROPHILS RELATIVE PERCENT: 54.2 % (ref 36–66)
PDW BLD-RTO: 26.8 % (ref 11.7–14.9)
PLATELET # BLD: 16 K/CU MM (ref 140–440)
PMV BLD AUTO: ABNORMAL FL (ref 7.5–11.1)
RBC # BLD: 0.96 M/CU MM (ref 4.6–6.2)
WBC # BLD: 2.1 K/CU MM (ref 4–10.5)

## 2024-08-27 PROCEDURE — 77014 CHG CT GUIDANCE RADIATION THERAPY FLDS PLACEMENT: CPT | Performed by: RADIOLOGY

## 2024-08-27 PROCEDURE — 77386 HC NTSTY MODUL RAD TX DLVR CPLX: CPT | Performed by: RADIOLOGY

## 2024-08-28 ENCOUNTER — APPOINTMENT (OUTPATIENT)
Dept: RADIATION ONCOLOGY | Age: 64
End: 2024-08-28
Payer: MEDICARE

## 2024-08-28 PROCEDURE — 77386 HC NTSTY MODUL RAD TX DLVR CPLX: CPT | Performed by: RADIOLOGY

## 2024-08-29 ENCOUNTER — APPOINTMENT (OUTPATIENT)
Dept: RADIATION ONCOLOGY | Age: 64
End: 2024-08-29
Payer: MEDICARE

## 2024-08-29 PROCEDURE — 77386 HC NTSTY MODUL RAD TX DLVR CPLX: CPT | Performed by: RADIOLOGY

## 2024-08-30 ENCOUNTER — HOSPITAL ENCOUNTER (OUTPATIENT)
Dept: INFUSION THERAPY | Age: 64
Discharge: HOME | End: 2024-08-30
Payer: MEDICARE

## 2024-08-30 ENCOUNTER — APPOINTMENT (OUTPATIENT)
Dept: RADIATION ONCOLOGY | Age: 64
End: 2024-08-30
Payer: MEDICARE

## 2024-08-30 DIAGNOSIS — C67.8 MALIGNANT NEOPLASM OF OVERLAPPING SITES OF BLADDER (HCC): Primary | ICD-10-CM

## 2024-08-30 LAB
ALBUMIN SERPL-MCNC: 3.6 GM/DL (ref 3.4–5)
ALP BLD-CCNC: 95 IU/L (ref 40–129)
ALT SERPL-CCNC: 6 U/L (ref 10–40)
ANION GAP SERPL CALCULATED.3IONS-SCNC: 6 MMOL/L (ref 7–16)
AST SERPL-CCNC: 8 IU/L (ref 15–37)
BASOPHILS ABSOLUTE: 0 K/CU MM
BASOPHILS RELATIVE PERCENT: 0.5 % (ref 0–1)
BILIRUB SERPL-MCNC: 0.2 MG/DL (ref 0–1)
BUN SERPL-MCNC: 18 MG/DL (ref 6–23)
CALCIUM SERPL-MCNC: 8.3 MG/DL (ref 8.3–10.6)
CHLORIDE BLD-SCNC: 110 MMOL/L (ref 99–110)
CO2: 28 MMOL/L (ref 21–32)
CREAT SERPL-MCNC: 1.5 MG/DL (ref 0.9–1.3)
DIFFERENTIAL TYPE: ABNORMAL
EOSINOPHILS ABSOLUTE: 0.1 K/CU MM
EOSINOPHILS RELATIVE PERCENT: 7.1 % (ref 0–3)
GFR, ESTIMATED: 52 ML/MIN/1.73M2
GLUCOSE SERPL-MCNC: 93 MG/DL (ref 70–99)
HCT VFR BLD CALC: 29.6 % (ref 42–52)
HEMOGLOBIN: 9.9 GM/DL (ref 13.5–18)
LYMPHOCYTES ABSOLUTE: 0.5 K/CU MM
LYMPHOCYTES RELATIVE PERCENT: 25.3 % (ref 24–44)
MCH RBC QN AUTO: 30.7 PG (ref 27–31)
MCHC RBC AUTO-ENTMCNC: 33.4 % (ref 32–36)
MCV RBC AUTO: 91.9 FL (ref 78–100)
MONOCYTES ABSOLUTE: 0.1 K/CU MM
MONOCYTES RELATIVE PERCENT: 3.5 % (ref 0–4)
NEUTROPHILS ABSOLUTE: 1.3 K/CU MM
NEUTROPHILS RELATIVE PERCENT: 63.6 % (ref 36–66)
PDW BLD-RTO: 13.4 % (ref 11.7–14.9)
PLATELET # BLD: 76 K/CU MM (ref 140–440)
PMV BLD AUTO: 8.3 FL (ref 7.5–11.1)
POTASSIUM SERPL-SCNC: 3.8 MMOL/L (ref 3.5–5.1)
RBC # BLD: 3.22 M/CU MM (ref 4.6–6.2)
SODIUM BLD-SCNC: 144 MMOL/L (ref 135–145)
TOTAL PROTEIN: 6.4 GM/DL (ref 6.4–8.2)
WBC # BLD: 2 K/CU MM (ref 4–10.5)

## 2024-08-30 PROCEDURE — 36415 COLL VENOUS BLD VENIPUNCTURE: CPT

## 2024-08-30 PROCEDURE — 77386 HC NTSTY MODUL RAD TX DLVR CPLX: CPT | Performed by: RADIOLOGY

## 2024-08-30 PROCEDURE — 77427 RADIATION TX MANAGEMENT X5: CPT | Performed by: RADIOLOGY

## 2024-08-30 PROCEDURE — 80053 COMPREHEN METABOLIC PANEL: CPT

## 2024-08-30 PROCEDURE — 77336 RADIATION PHYSICS CONSULT: CPT | Performed by: RADIOLOGY

## 2024-08-30 PROCEDURE — 85025 COMPLETE CBC W/AUTO DIFF WBC: CPT

## 2024-08-30 NOTE — PROGRESS NOTES
Pt here to review labs that were drawn, plt 76,WBC 2.0, ANC 1.3. This nurse told pt to come back Tuesday, treatment day and re draw CBC. This nurse also educated on cleanliness, wearing a mask and signs of infection. Pt stated understanding.

## 2024-09-03 ENCOUNTER — HOSPITAL ENCOUNTER (OUTPATIENT)
Dept: RADIATION ONCOLOGY | Age: 64
End: 2024-09-03
Payer: MEDICARE

## 2024-09-03 ENCOUNTER — APPOINTMENT (OUTPATIENT)
Dept: INFUSION THERAPY | Age: 64
End: 2024-09-03
Payer: MEDICARE

## 2024-09-04 ENCOUNTER — APPOINTMENT (OUTPATIENT)
Dept: INFUSION THERAPY | Age: 64
End: 2024-09-04
Payer: MEDICARE

## 2024-09-04 ENCOUNTER — HOSPITAL ENCOUNTER (OUTPATIENT)
Dept: RADIATION ONCOLOGY | Age: 64
Discharge: HOME | End: 2024-09-04
Payer: MEDICARE

## 2024-09-04 PROCEDURE — 77014 CHG CT GUIDANCE RADIATION THERAPY FLDS PLACEMENT: CPT | Performed by: RADIOLOGY

## 2024-09-04 PROCEDURE — 77386 HC NTSTY MODUL RAD TX DLVR CPLX: CPT | Performed by: RADIOLOGY

## 2024-09-05 ENCOUNTER — HOSPITAL ENCOUNTER (OUTPATIENT)
Dept: INFUSION THERAPY | Age: 64
Discharge: HOME | End: 2024-09-05
Payer: MEDICARE

## 2024-09-05 ENCOUNTER — HOSPITAL ENCOUNTER (OUTPATIENT)
Dept: RADIATION ONCOLOGY | Age: 64
Discharge: HOME | End: 2024-09-05
Payer: MEDICARE

## 2024-09-05 VITALS — SYSTOLIC BLOOD PRESSURE: 94 MMHG | HEART RATE: 93 BPM | DIASTOLIC BLOOD PRESSURE: 64 MMHG | TEMPERATURE: 98 F

## 2024-09-05 DIAGNOSIS — C67.8 MALIGNANT NEOPLASM OF OVERLAPPING SITES OF BLADDER (HCC): ICD-10-CM

## 2024-09-05 DIAGNOSIS — Z11.59 NEED FOR HEPATITIS B SCREENING TEST: Primary | ICD-10-CM

## 2024-09-05 DIAGNOSIS — Z11.59 NEED FOR HEPATITIS B SCREENING TEST: ICD-10-CM

## 2024-09-05 DIAGNOSIS — C67.8 MALIGNANT NEOPLASM OF OVERLAPPING SITES OF BLADDER (HCC): Primary | ICD-10-CM

## 2024-09-05 LAB
BASOPHILS ABSOLUTE: 0 K/CU MM
BASOPHILS RELATIVE PERCENT: 0.4 % (ref 0–1)
DIFFERENTIAL TYPE: ABNORMAL
EOSINOPHILS ABSOLUTE: 0.2 K/CU MM
EOSINOPHILS RELATIVE PERCENT: 8.7 % (ref 0–3)
HCT VFR BLD CALC: 28.1 % (ref 42–52)
HEMOGLOBIN: 9.3 GM/DL (ref 13.5–18)
LYMPHOCYTES ABSOLUTE: 0.4 K/CU MM
LYMPHOCYTES RELATIVE PERCENT: 14.9 % (ref 24–44)
MCH RBC QN AUTO: 30.5 PG (ref 27–31)
MCHC RBC AUTO-ENTMCNC: 33.1 % (ref 32–36)
MCV RBC AUTO: 92.1 FL (ref 78–100)
MONOCYTES ABSOLUTE: 0.5 K/CU MM
MONOCYTES RELATIVE PERCENT: 19.5 % (ref 0–4)
NEUTROPHILS ABSOLUTE: 1.4 K/CU MM
NEUTROPHILS RELATIVE PERCENT: 56.5 % (ref 36–66)
PDW BLD-RTO: 14.1 % (ref 11.7–14.9)
PLATELET # BLD: 119 K/CU MM (ref 140–440)
PMV BLD AUTO: 8.9 FL (ref 7.5–11.1)
RBC # BLD: 3.05 M/CU MM (ref 4.6–6.2)
WBC # BLD: 2.4 K/CU MM (ref 4–10.5)

## 2024-09-05 PROCEDURE — 2580000003 HC RX 258: Performed by: INTERNAL MEDICINE

## 2024-09-05 PROCEDURE — 77014 CHG CT GUIDANCE RADIATION THERAPY FLDS PLACEMENT: CPT | Performed by: RADIOLOGY

## 2024-09-05 PROCEDURE — 96375 TX/PRO/DX INJ NEW DRUG ADDON: CPT

## 2024-09-05 PROCEDURE — 6360000002 HC RX W HCPCS: Performed by: INTERNAL MEDICINE

## 2024-09-05 PROCEDURE — 85025 COMPLETE CBC W/AUTO DIFF WBC: CPT

## 2024-09-05 PROCEDURE — 77386 HC NTSTY MODUL RAD TX DLVR CPLX: CPT | Performed by: RADIOLOGY

## 2024-09-05 PROCEDURE — 96413 CHEMO IV INFUSION 1 HR: CPT

## 2024-09-05 RX ORDER — ONDANSETRON 2 MG/ML
8 INJECTION INTRAMUSCULAR; INTRAVENOUS ONCE
Status: COMPLETED | OUTPATIENT
Start: 2024-09-05 | End: 2024-09-05

## 2024-09-05 RX ORDER — SODIUM CHLORIDE 0.9 % (FLUSH) 0.9 %
5-40 SYRINGE (ML) INJECTION PRN
Status: DISCONTINUED | OUTPATIENT
Start: 2024-09-05 | End: 2024-09-06 | Stop reason: HOSPADM

## 2024-09-05 RX ORDER — SODIUM CHLORIDE 9 MG/ML
5-250 INJECTION, SOLUTION INTRAVENOUS PRN
Status: DISCONTINUED | OUTPATIENT
Start: 2024-09-05 | End: 2024-09-06 | Stop reason: HOSPADM

## 2024-09-05 RX ADMIN — ONDANSETRON 8 MG: 2 INJECTION INTRAMUSCULAR; INTRAVENOUS at 10:49

## 2024-09-05 RX ADMIN — GEMCITABINE HYDROCHLORIDE 300 MG: 1 INJECTION, SOLUTION INTRAVENOUS at 11:24

## 2024-09-05 ASSESSMENT — PAIN DESCRIPTION - LOCATION: LOCATION: GROIN

## 2024-09-05 ASSESSMENT — PAIN SCALES - GENERAL: PAINLEVEL_OUTOF10: 4

## 2024-09-05 ASSESSMENT — PAIN DESCRIPTION - PAIN TYPE: TYPE: CHRONIC PAIN

## 2024-09-05 NOTE — PROGRESS NOTES
Ambulated to infusion area after radiation treatment. Here for treatment. Feeling tired. Chest mediport accessed, labs drawn. WBC 2.4, ANC 1.4, CBC results reviewed with Dr. Franklin. Chemo approved.     Gemzar administered as ordered. Call light within reach. Tolerated infusion without incident. AVS provided. Discharged in stable condition.

## 2024-09-06 ENCOUNTER — HOSPITAL ENCOUNTER (OUTPATIENT)
Dept: RADIATION ONCOLOGY | Age: 64
Discharge: HOME | End: 2024-09-06
Payer: MEDICARE

## 2024-09-06 ENCOUNTER — HOSPITAL ENCOUNTER (OUTPATIENT)
Dept: INFUSION THERAPY | Age: 64
Discharge: HOME | End: 2024-09-06
Payer: MEDICARE

## 2024-09-06 DIAGNOSIS — C67.8 MALIGNANT NEOPLASM OF OVERLAPPING SITES OF BLADDER (HCC): ICD-10-CM

## 2024-09-06 DIAGNOSIS — Z11.59 NEED FOR HEPATITIS B SCREENING TEST: ICD-10-CM

## 2024-09-06 PROCEDURE — 77386 HC NTSTY MODUL RAD TX DLVR CPLX: CPT | Performed by: RADIOLOGY

## 2024-09-06 PROCEDURE — 80053 COMPREHEN METABOLIC PANEL: CPT

## 2024-09-06 PROCEDURE — 85025 COMPLETE CBC W/AUTO DIFF WBC: CPT

## 2024-09-09 ENCOUNTER — HOSPITAL ENCOUNTER (OUTPATIENT)
Dept: RADIATION ONCOLOGY | Age: 64
Discharge: HOME | End: 2024-09-09
Payer: MEDICARE

## 2024-09-09 ENCOUNTER — APPOINTMENT (OUTPATIENT)
Dept: INFUSION THERAPY | Age: 64
End: 2024-09-09
Payer: MEDICARE

## 2024-09-09 VITALS — BODY MASS INDEX: 28.64 KG/M2 | WEIGHT: 199.6 LBS

## 2024-09-09 PROCEDURE — 77386 HC NTSTY MODUL RAD TX DLVR CPLX: CPT | Performed by: RADIOLOGY

## 2024-09-09 ASSESSMENT — PAIN DESCRIPTION - PAIN TYPE: TYPE: ACUTE PAIN

## 2024-09-09 ASSESSMENT — PAIN DESCRIPTION - LOCATION: LOCATION: PELVIS;RECTUM

## 2024-09-09 ASSESSMENT — PAIN DESCRIPTION - DESCRIPTORS: DESCRIPTORS: SORE

## 2024-09-09 ASSESSMENT — PAIN DESCRIPTION - FREQUENCY: FREQUENCY: INTERMITTENT

## 2024-09-09 ASSESSMENT — PAIN - FUNCTIONAL ASSESSMENT: PAIN_FUNCTIONAL_ASSESSMENT: ACTIVITIES ARE NOT PREVENTED

## 2024-09-10 ENCOUNTER — HOSPITAL ENCOUNTER (OUTPATIENT)
Dept: RADIATION ONCOLOGY | Age: 64
Discharge: HOME | End: 2024-09-10
Payer: MEDICARE

## 2024-09-10 ENCOUNTER — HOSPITAL ENCOUNTER (OUTPATIENT)
Dept: INFUSION THERAPY | Age: 64
Discharge: HOME | End: 2024-09-10
Payer: MEDICARE

## 2024-09-10 DIAGNOSIS — Z11.59 NEED FOR HEPATITIS B SCREENING TEST: ICD-10-CM

## 2024-09-10 DIAGNOSIS — C67.8 MALIGNANT NEOPLASM OF OVERLAPPING SITES OF BLADDER (HCC): Primary | ICD-10-CM

## 2024-09-10 DIAGNOSIS — C67.8 MALIGNANT NEOPLASM OF OVERLAPPING SITES OF BLADDER (HCC): ICD-10-CM

## 2024-09-10 LAB
BASOPHILS # BLD: 0.02 K/UL
BASOPHILS NFR BLD: 1 % (ref 0–1)
EOSINOPHIL # BLD: 0.17 K/UL
EOSINOPHILS RELATIVE PERCENT: 8 % (ref 0–3)
ERYTHROCYTE [DISTWIDTH] IN BLOOD BY AUTOMATED COUNT: 13.6 % (ref 11.7–14.9)
HCT VFR BLD AUTO: 30.8 % (ref 42–52)
HGB BLD-MCNC: 10.2 G/DL (ref 13.5–18)
LYMPHOCYTES NFR BLD: 0.31 K/UL
LYMPHOCYTES RELATIVE PERCENT: 15 % (ref 24–44)
MCH RBC QN AUTO: 30.4 PG (ref 27–31)
MCHC RBC AUTO-ENTMCNC: 33.1 G/DL (ref 32–36)
MCV RBC AUTO: 91.7 FL (ref 78–100)
MONOCYTES NFR BLD: 0.12 K/UL
MONOCYTES NFR BLD: 6 % (ref 0–4)
NEUTROPHILS NFR BLD: 70 % (ref 36–66)
NEUTS SEG NFR BLD: 1.44 K/UL
PLATELET # BLD AUTO: 175 K/UL (ref 140–440)
PMV BLD AUTO: 8.1 FL (ref 7.5–11.1)
RBC # BLD AUTO: 3.36 M/UL (ref 4.6–6.2)
WBC OTHER # BLD: 2.1 K/UL (ref 4–10.5)

## 2024-09-10 PROCEDURE — 80053 COMPREHEN METABOLIC PANEL: CPT

## 2024-09-10 PROCEDURE — 77336 RADIATION PHYSICS CONSULT: CPT | Performed by: RADIOLOGY

## 2024-09-10 PROCEDURE — 85025 COMPLETE CBC W/AUTO DIFF WBC: CPT

## 2024-09-10 PROCEDURE — 36415 COLL VENOUS BLD VENIPUNCTURE: CPT

## 2024-09-10 PROCEDURE — 77386 HC NTSTY MODUL RAD TX DLVR CPLX: CPT | Performed by: RADIOLOGY

## 2024-09-11 ENCOUNTER — HOSPITAL ENCOUNTER (OUTPATIENT)
Dept: RADIATION ONCOLOGY | Age: 64
Discharge: HOME | End: 2024-09-11
Payer: MEDICARE

## 2024-09-11 LAB
ALBUMIN SERPL-MCNC: 3.8 G/DL (ref 3.4–5)
ALBUMIN/GLOB SERPL: 1.3 {RATIO} (ref 1.1–2.2)
ALP SERPL-CCNC: 141 U/L (ref 40–129)
ALT SERPL-CCNC: 13 U/L (ref 10–40)
ANION GAP SERPL CALCULATED.3IONS-SCNC: 15 MMOL/L (ref 9–17)
AST SERPL-CCNC: 20 U/L (ref 15–37)
BILIRUB SERPL-MCNC: 0.3 MG/DL (ref 0–1)
BUN SERPL-MCNC: 13 MG/DL (ref 7–20)
CALCIUM SERPL-MCNC: 8.9 MG/DL (ref 8.3–10.6)
CHLORIDE SERPL-SCNC: 105 MMOL/L (ref 99–110)
CO2 SERPL-SCNC: 22 MMOL/L (ref 21–32)
CREAT SERPL-MCNC: 1.4 MG/DL (ref 0.8–1.3)
GFR, ESTIMATED: 56 ML/MIN/1.73M2
GLUCOSE SERPL-MCNC: 102 MG/DL (ref 74–99)
POTASSIUM SERPL-SCNC: 3.6 MMOL/L (ref 3.5–5.1)
PROT SERPL-MCNC: 6.7 G/DL (ref 6.4–8.2)
SODIUM SERPL-SCNC: 142 MMOL/L (ref 136–145)

## 2024-09-11 PROCEDURE — 77386 HC NTSTY MODUL RAD TX DLVR CPLX: CPT | Performed by: RADIOLOGY

## 2024-09-12 ENCOUNTER — HOSPITAL ENCOUNTER (OUTPATIENT)
Dept: RADIATION ONCOLOGY | Age: 64
Discharge: HOME | End: 2024-09-12
Payer: MEDICARE

## 2024-09-12 ENCOUNTER — HOSPITAL ENCOUNTER (OUTPATIENT)
Dept: INFUSION THERAPY | Age: 64
Discharge: HOME | End: 2024-09-12
Payer: MEDICARE

## 2024-09-12 VITALS
TEMPERATURE: 97.7 F | BODY MASS INDEX: 28.21 KG/M2 | OXYGEN SATURATION: 95 % | HEART RATE: 77 BPM | WEIGHT: 196.6 LBS | RESPIRATION RATE: 18 BRPM | DIASTOLIC BLOOD PRESSURE: 66 MMHG | SYSTOLIC BLOOD PRESSURE: 105 MMHG

## 2024-09-12 DIAGNOSIS — C67.8 MALIGNANT NEOPLASM OF OVERLAPPING SITES OF BLADDER (HCC): Primary | ICD-10-CM

## 2024-09-12 DIAGNOSIS — Z11.59 NEED FOR HEPATITIS B SCREENING TEST: ICD-10-CM

## 2024-09-12 PROCEDURE — 2580000003 HC RX 258: Performed by: INTERNAL MEDICINE

## 2024-09-12 PROCEDURE — 6360000002 HC RX W HCPCS: Performed by: INTERNAL MEDICINE

## 2024-09-12 PROCEDURE — 77386 HC NTSTY MODUL RAD TX DLVR CPLX: CPT | Performed by: RADIOLOGY

## 2024-09-12 PROCEDURE — 96413 CHEMO IV INFUSION 1 HR: CPT

## 2024-09-12 RX ORDER — SODIUM CHLORIDE 9 MG/ML
5-250 INJECTION, SOLUTION INTRAVENOUS PRN
Status: CANCELLED | OUTPATIENT
Start: 2024-09-12

## 2024-09-12 RX ORDER — SODIUM CHLORIDE 9 MG/ML
5-250 INJECTION, SOLUTION INTRAVENOUS PRN
Status: DISCONTINUED | OUTPATIENT
Start: 2024-09-12 | End: 2024-09-13 | Stop reason: HOSPADM

## 2024-09-12 RX ORDER — ALBUTEROL SULFATE 90 UG/1
4 INHALANT RESPIRATORY (INHALATION) PRN
Status: CANCELLED | OUTPATIENT
Start: 2024-09-12

## 2024-09-12 RX ORDER — HEPARIN SODIUM (PORCINE) LOCK FLUSH IV SOLN 100 UNIT/ML 100 UNIT/ML
500 SOLUTION INTRAVENOUS PRN
Status: CANCELLED | OUTPATIENT
Start: 2024-09-12

## 2024-09-12 RX ORDER — ACETAMINOPHEN 325 MG/1
650 TABLET ORAL
Status: CANCELLED | OUTPATIENT
Start: 2024-09-12

## 2024-09-12 RX ORDER — ONDANSETRON 2 MG/ML
8 INJECTION INTRAMUSCULAR; INTRAVENOUS
Status: CANCELLED | OUTPATIENT
Start: 2024-09-12

## 2024-09-12 RX ORDER — ONDANSETRON 2 MG/ML
8 INJECTION INTRAMUSCULAR; INTRAVENOUS ONCE
Status: COMPLETED | OUTPATIENT
Start: 2024-09-12 | End: 2024-09-12

## 2024-09-12 RX ORDER — FAMOTIDINE 10 MG/ML
20 INJECTION, SOLUTION INTRAVENOUS
Status: CANCELLED | OUTPATIENT
Start: 2024-09-12

## 2024-09-12 RX ORDER — MEPERIDINE HYDROCHLORIDE 50 MG/ML
12.5 INJECTION INTRAMUSCULAR; INTRAVENOUS; SUBCUTANEOUS PRN
Status: CANCELLED | OUTPATIENT
Start: 2024-09-12

## 2024-09-12 RX ORDER — ONDANSETRON 2 MG/ML
8 INJECTION INTRAMUSCULAR; INTRAVENOUS ONCE
Status: CANCELLED | OUTPATIENT
Start: 2024-09-12 | End: 2024-09-12

## 2024-09-12 RX ORDER — EPINEPHRINE 1 MG/ML
0.3 INJECTION, SOLUTION, CONCENTRATE INTRAVENOUS PRN
Status: CANCELLED | OUTPATIENT
Start: 2024-09-12

## 2024-09-12 RX ORDER — DIPHENHYDRAMINE HYDROCHLORIDE 50 MG/ML
50 INJECTION INTRAMUSCULAR; INTRAVENOUS
Status: CANCELLED | OUTPATIENT
Start: 2024-09-12

## 2024-09-12 RX ORDER — SODIUM CHLORIDE 0.9 % (FLUSH) 0.9 %
5-40 SYRINGE (ML) INJECTION PRN
Status: DISCONTINUED | OUTPATIENT
Start: 2024-09-12 | End: 2024-09-13 | Stop reason: HOSPADM

## 2024-09-12 RX ORDER — SODIUM CHLORIDE 9 MG/ML
INJECTION, SOLUTION INTRAVENOUS CONTINUOUS
Status: CANCELLED | OUTPATIENT
Start: 2024-09-12

## 2024-09-12 RX ORDER — SODIUM CHLORIDE 0.9 % (FLUSH) 0.9 %
5-40 SYRINGE (ML) INJECTION PRN
Status: CANCELLED | OUTPATIENT
Start: 2024-09-12

## 2024-09-12 RX ADMIN — ONDANSETRON 8 MG: 2 INJECTION INTRAMUSCULAR; INTRAVENOUS at 10:34

## 2024-09-12 RX ADMIN — SODIUM CHLORIDE, PRESERVATIVE FREE 20 ML: 5 INJECTION INTRAVENOUS at 11:59

## 2024-09-12 RX ADMIN — SODIUM CHLORIDE 20 ML/HR: 9 INJECTION, SOLUTION INTRAVENOUS at 10:33

## 2024-09-12 RX ADMIN — GEMCITABINE HYDROCHLORIDE 300 MG: 200 INJECTION, SOLUTION INTRAVENOUS at 11:22

## 2024-09-13 ENCOUNTER — HOSPITAL ENCOUNTER (OUTPATIENT)
Dept: RADIATION ONCOLOGY | Age: 64
Discharge: HOME | End: 2024-09-13
Payer: MEDICARE

## 2024-09-13 PROCEDURE — 77386 HC NTSTY MODUL RAD TX DLVR CPLX: CPT | Performed by: RADIOLOGY

## 2024-09-13 RX ORDER — SODIUM CHLORIDE 9 MG/ML
5-250 INJECTION, SOLUTION INTRAVENOUS PRN
OUTPATIENT
Start: 2024-09-19

## 2024-09-13 RX ORDER — MEPERIDINE HYDROCHLORIDE 50 MG/ML
12.5 INJECTION INTRAMUSCULAR; INTRAVENOUS; SUBCUTANEOUS PRN
OUTPATIENT
Start: 2024-09-19

## 2024-09-13 RX ORDER — SODIUM CHLORIDE 9 MG/ML
INJECTION, SOLUTION INTRAVENOUS CONTINUOUS
OUTPATIENT
Start: 2024-09-19

## 2024-09-13 RX ORDER — FAMOTIDINE 10 MG/ML
20 INJECTION, SOLUTION INTRAVENOUS
OUTPATIENT
Start: 2024-09-19

## 2024-09-13 RX ORDER — ONDANSETRON 2 MG/ML
8 INJECTION INTRAMUSCULAR; INTRAVENOUS
OUTPATIENT
Start: 2024-09-19

## 2024-09-13 RX ORDER — SODIUM CHLORIDE 0.9 % (FLUSH) 0.9 %
5-40 SYRINGE (ML) INJECTION PRN
OUTPATIENT
Start: 2024-09-19

## 2024-09-13 RX ORDER — HEPARIN SODIUM (PORCINE) LOCK FLUSH IV SOLN 100 UNIT/ML 100 UNIT/ML
500 SOLUTION INTRAVENOUS PRN
OUTPATIENT
Start: 2024-09-19

## 2024-09-13 RX ORDER — ALBUTEROL SULFATE 90 UG/1
4 INHALANT RESPIRATORY (INHALATION) PRN
OUTPATIENT
Start: 2024-09-19

## 2024-09-13 RX ORDER — ONDANSETRON 2 MG/ML
8 INJECTION INTRAMUSCULAR; INTRAVENOUS ONCE
OUTPATIENT
Start: 2024-09-19 | End: 2024-09-19

## 2024-09-13 RX ORDER — ACETAMINOPHEN 325 MG/1
650 TABLET ORAL
OUTPATIENT
Start: 2024-09-19

## 2024-09-13 RX ORDER — EPINEPHRINE 1 MG/ML
0.3 INJECTION, SOLUTION, CONCENTRATE INTRAVENOUS PRN
OUTPATIENT
Start: 2024-09-19

## 2024-09-13 RX ORDER — DIPHENHYDRAMINE HYDROCHLORIDE 50 MG/ML
50 INJECTION INTRAMUSCULAR; INTRAVENOUS
OUTPATIENT
Start: 2024-09-19

## 2024-09-16 ENCOUNTER — APPOINTMENT (OUTPATIENT)
Dept: INFUSION THERAPY | Age: 64
End: 2024-09-16
Payer: MEDICARE

## 2024-09-16 ENCOUNTER — APPOINTMENT (OUTPATIENT)
Dept: RADIATION ONCOLOGY | Age: 64
End: 2024-09-16
Payer: MEDICARE

## 2024-09-16 ENCOUNTER — OFFICE VISIT (OUTPATIENT)
Dept: ONCOLOGY | Age: 64
End: 2024-09-16

## 2024-09-16 DIAGNOSIS — C67.8 MALIGNANT NEOPLASM OF OVERLAPPING SITES OF BLADDER (HCC): Primary | ICD-10-CM

## 2024-09-16 DIAGNOSIS — Z11.59 NEED FOR HEPATITIS B SCREENING TEST: ICD-10-CM

## 2024-09-17 ENCOUNTER — HOSPITAL ENCOUNTER (EMERGENCY)
Age: 64
Discharge: ANOTHER HOSPITAL | End: 2024-09-17
Attending: EMERGENCY MEDICINE
Payer: MEDICARE

## 2024-09-17 ENCOUNTER — APPOINTMENT (OUTPATIENT)
Dept: CT IMAGING | Age: 64
End: 2024-09-17
Payer: MEDICARE

## 2024-09-17 ENCOUNTER — APPOINTMENT (OUTPATIENT)
Dept: RADIATION ONCOLOGY | Age: 64
End: 2024-09-17
Payer: MEDICARE

## 2024-09-17 ENCOUNTER — HOSPITAL ENCOUNTER (INPATIENT)
Age: 64
LOS: 2 days | Discharge: HOME | DRG: 698 | End: 2024-09-19
Attending: STUDENT IN AN ORGANIZED HEALTH CARE EDUCATION/TRAINING PROGRAM | Admitting: HOSPITALIST
Payer: MEDICARE

## 2024-09-17 VITALS
HEIGHT: 70 IN | TEMPERATURE: 97.9 F | OXYGEN SATURATION: 93 % | HEART RATE: 74 BPM | DIASTOLIC BLOOD PRESSURE: 74 MMHG | BODY MASS INDEX: 28.06 KG/M2 | SYSTOLIC BLOOD PRESSURE: 120 MMHG | RESPIRATION RATE: 14 BRPM | WEIGHT: 196 LBS

## 2024-09-17 DIAGNOSIS — C67.8 MALIGNANT NEOPLASM OF OVERLAPPING SITES OF BLADDER (HCC): Primary | ICD-10-CM

## 2024-09-17 DIAGNOSIS — N30.01 ACUTE CYSTITIS WITH HEMATURIA: ICD-10-CM

## 2024-09-17 DIAGNOSIS — C67.9 MALIGNANT NEOPLASM OF URINARY BLADDER, UNSPECIFIED SITE (HCC): ICD-10-CM

## 2024-09-17 DIAGNOSIS — N10 ACUTE PYELONEPHRITIS: Primary | ICD-10-CM

## 2024-09-17 DIAGNOSIS — N12 PYELONEPHRITIS: ICD-10-CM

## 2024-09-17 LAB
ALBUMIN SERPL-MCNC: 3.5 G/DL (ref 3.4–5)
ALBUMIN/GLOB SERPL: 1 {RATIO} (ref 1.1–2.2)
ALP SERPL-CCNC: 120 U/L (ref 40–129)
ALT SERPL-CCNC: 10 U/L (ref 10–40)
ANION GAP SERPL CALCULATED.3IONS-SCNC: 14 MMOL/L (ref 4–16)
AST SERPL-CCNC: 13 U/L (ref 15–37)
BACTERIA URNS QL MICRO: ABNORMAL
BASOPHILS # BLD: 0.02 K/UL
BASOPHILS NFR BLD: 1 % (ref 0–1)
BILIRUB SERPL-MCNC: 0.2 MG/DL (ref 0–1)
BILIRUB UR QL STRIP: NEGATIVE
BUN SERPL-MCNC: 16 MG/DL (ref 6–23)
CALCIUM SERPL-MCNC: 8.7 MG/DL (ref 8.3–10.6)
CHLORIDE SERPL-SCNC: 105 MMOL/L (ref 99–110)
CLARITY UR: ABNORMAL
CO2 SERPL-SCNC: 23 MMOL/L (ref 21–32)
COLOR UR: YELLOW
CREAT SERPL-MCNC: 1.6 MG/DL (ref 0.9–1.3)
EOSINOPHIL # BLD: 0.21 K/UL
EOSINOPHILS RELATIVE PERCENT: 9 % (ref 0–3)
ERYTHROCYTE [DISTWIDTH] IN BLOOD BY AUTOMATED COUNT: 13.3 % (ref 11.7–14.9)
GFR, ESTIMATED: 48 ML/MIN/1.73M2
GLUCOSE SERPL-MCNC: 101 MG/DL (ref 70–99)
GLUCOSE UR STRIP-MCNC: NEGATIVE MG/DL
HCT VFR BLD AUTO: 29.6 % (ref 42–52)
HGB BLD-MCNC: 10 G/DL (ref 13.5–18)
HGB UR QL STRIP.AUTO: ABNORMAL
IMM GRANULOCYTES # BLD AUTO: 0.01 K/UL
IMM GRANULOCYTES NFR BLD: 0 %
KETONES UR STRIP-MCNC: NEGATIVE MG/DL
LEUKOCYTE ESTERASE UR QL STRIP: ABNORMAL
LIPASE SERPL-CCNC: 13 U/L (ref 13–60)
LYMPHOCYTES NFR BLD: 0.39 K/UL
LYMPHOCYTES RELATIVE PERCENT: 16 % (ref 24–44)
MCH RBC QN AUTO: 30.2 PG (ref 27–31)
MCHC RBC AUTO-ENTMCNC: 33.8 G/DL (ref 32–36)
MCV RBC AUTO: 89.4 FL (ref 78–100)
MONOCYTES NFR BLD: 0.12 K/UL
MONOCYTES NFR BLD: 5 % (ref 0–4)
NEUTROPHILS NFR BLD: 70 % (ref 36–66)
NEUTS SEG NFR BLD: 1.71 K/UL
NITRITE UR QL STRIP: NEGATIVE
PH UR STRIP: 6.5 [PH] (ref 5–8)
PLATELET # BLD AUTO: 123 K/UL (ref 140–440)
PMV BLD AUTO: 9.2 FL (ref 7.5–11.1)
POTASSIUM SERPL-SCNC: 3.5 MMOL/L (ref 3.5–5.1)
PROT SERPL-MCNC: 7.1 G/DL (ref 6.4–8.2)
PROT UR STRIP-MCNC: >=300 MG/DL
RBC # BLD AUTO: 3.31 M/UL (ref 4.6–6.2)
RBC #/AREA URNS HPF: ABNORMAL /HPF
SODIUM SERPL-SCNC: 142 MMOL/L (ref 135–145)
SP GR UR STRIP: 1.02 (ref 1–1.03)
UROBILINOGEN UR STRIP-ACNC: 0.2 EU/DL (ref 0–1)
WBC #/AREA URNS HPF: ABNORMAL /HPF
WBC OTHER # BLD: 2.5 K/UL (ref 4–10.5)

## 2024-09-17 PROCEDURE — 6360000002 HC RX W HCPCS: Performed by: NURSE PRACTITIONER

## 2024-09-17 PROCEDURE — 87040 BLOOD CULTURE FOR BACTERIA: CPT

## 2024-09-17 PROCEDURE — 2580000003 HC RX 258: Performed by: NURSE PRACTITIONER

## 2024-09-17 PROCEDURE — 81001 URINALYSIS AUTO W/SCOPE: CPT

## 2024-09-17 PROCEDURE — 6360000002 HC RX W HCPCS: Performed by: EMERGENCY MEDICINE

## 2024-09-17 PROCEDURE — 99285 EMERGENCY DEPT VISIT HI MDM: CPT

## 2024-09-17 PROCEDURE — 74176 CT ABD & PELVIS W/O CONTRAST: CPT

## 2024-09-17 PROCEDURE — 6370000000 HC RX 637 (ALT 250 FOR IP): Performed by: NURSE PRACTITIONER

## 2024-09-17 PROCEDURE — 87086 URINE CULTURE/COLONY COUNT: CPT

## 2024-09-17 PROCEDURE — 96375 TX/PRO/DX INJ NEW DRUG ADDON: CPT

## 2024-09-17 PROCEDURE — 80053 COMPREHEN METABOLIC PANEL: CPT

## 2024-09-17 PROCEDURE — 83690 ASSAY OF LIPASE: CPT

## 2024-09-17 PROCEDURE — 6370000000 HC RX 637 (ALT 250 FOR IP): Performed by: EMERGENCY MEDICINE

## 2024-09-17 PROCEDURE — 1200000000 HC SEMI PRIVATE

## 2024-09-17 PROCEDURE — 94761 N-INVAS EAR/PLS OXIMETRY MLT: CPT

## 2024-09-17 PROCEDURE — 96376 TX/PRO/DX INJ SAME DRUG ADON: CPT

## 2024-09-17 PROCEDURE — 36415 COLL VENOUS BLD VENIPUNCTURE: CPT

## 2024-09-17 PROCEDURE — 96374 THER/PROPH/DIAG INJ IV PUSH: CPT

## 2024-09-17 PROCEDURE — 85025 COMPLETE CBC W/AUTO DIFF WBC: CPT

## 2024-09-17 PROCEDURE — 2580000003 HC RX 258: Performed by: EMERGENCY MEDICINE

## 2024-09-17 RX ORDER — ENOXAPARIN SODIUM 100 MG/ML
40 INJECTION SUBCUTANEOUS DAILY
Status: DISCONTINUED | OUTPATIENT
Start: 2024-09-17 | End: 2024-09-19 | Stop reason: HOSPADM

## 2024-09-17 RX ORDER — PHENAZOPYRIDINE HYDROCHLORIDE 100 MG/1
100 TABLET, FILM COATED ORAL
Status: DISCONTINUED | OUTPATIENT
Start: 2024-09-17 | End: 2024-09-18

## 2024-09-17 RX ORDER — OLANZAPINE 2.5 MG/1
2.5 TABLET, FILM COATED ORAL NIGHTLY
Status: DISCONTINUED | OUTPATIENT
Start: 2024-09-17 | End: 2024-09-17

## 2024-09-17 RX ORDER — ONDANSETRON 4 MG/1
4 TABLET, ORALLY DISINTEGRATING ORAL EVERY 8 HOURS PRN
Status: DISCONTINUED | OUTPATIENT
Start: 2024-09-17 | End: 2024-09-19 | Stop reason: HOSPADM

## 2024-09-17 RX ORDER — POLYETHYLENE GLYCOL 3350 17 G/17G
17 POWDER, FOR SOLUTION ORAL DAILY PRN
Status: DISCONTINUED | OUTPATIENT
Start: 2024-09-17 | End: 2024-09-19 | Stop reason: HOSPADM

## 2024-09-17 RX ORDER — SODIUM CHLORIDE 9 MG/ML
INJECTION, SOLUTION INTRAVENOUS PRN
Status: DISCONTINUED | OUTPATIENT
Start: 2024-09-17 | End: 2024-09-19 | Stop reason: HOSPADM

## 2024-09-17 RX ORDER — MAGNESIUM SULFATE IN WATER 40 MG/ML
2000 INJECTION, SOLUTION INTRAVENOUS PRN
Status: DISCONTINUED | OUTPATIENT
Start: 2024-09-17 | End: 2024-09-19 | Stop reason: HOSPADM

## 2024-09-17 RX ORDER — OXYCODONE AND ACETAMINOPHEN 5; 325 MG/1; MG/1
1 TABLET ORAL EVERY 6 HOURS PRN
Status: DISCONTINUED | OUTPATIENT
Start: 2024-09-17 | End: 2024-09-19 | Stop reason: HOSPADM

## 2024-09-17 RX ORDER — MORPHINE SULFATE 4 MG/ML
4 INJECTION, SOLUTION INTRAMUSCULAR; INTRAVENOUS ONCE
Status: COMPLETED | OUTPATIENT
Start: 2024-09-17 | End: 2024-09-17

## 2024-09-17 RX ORDER — POLYETHYLENE GLYCOL 3350 17 G
2 POWDER IN PACKET (EA) ORAL
Status: DISCONTINUED | OUTPATIENT
Start: 2024-09-17 | End: 2024-09-19 | Stop reason: HOSPADM

## 2024-09-17 RX ORDER — TAMSULOSIN HYDROCHLORIDE 0.4 MG/1
0.4 CAPSULE ORAL DAILY
Status: DISCONTINUED | OUTPATIENT
Start: 2024-09-17 | End: 2024-09-19 | Stop reason: HOSPADM

## 2024-09-17 RX ORDER — HYDROCODONE BITARTRATE AND ACETAMINOPHEN 5; 325 MG/1; MG/1
1 TABLET ORAL EVERY 4 HOURS PRN
Status: DISCONTINUED | OUTPATIENT
Start: 2024-09-17 | End: 2024-09-17

## 2024-09-17 RX ORDER — DIPHENHYDRAMINE HYDROCHLORIDE 50 MG/ML
50 INJECTION INTRAMUSCULAR; INTRAVENOUS ONCE
Status: COMPLETED | OUTPATIENT
Start: 2024-09-17 | End: 2024-09-17

## 2024-09-17 RX ORDER — POTASSIUM CHLORIDE 7.45 MG/ML
10 INJECTION INTRAVENOUS PRN
Status: DISCONTINUED | OUTPATIENT
Start: 2024-09-17 | End: 2024-09-19 | Stop reason: HOSPADM

## 2024-09-17 RX ORDER — ONDANSETRON 2 MG/ML
4 INJECTION INTRAMUSCULAR; INTRAVENOUS ONCE
Status: COMPLETED | OUTPATIENT
Start: 2024-09-17 | End: 2024-09-17

## 2024-09-17 RX ORDER — ACETAMINOPHEN 325 MG/1
650 TABLET ORAL EVERY 6 HOURS PRN
Status: DISCONTINUED | OUTPATIENT
Start: 2024-09-17 | End: 2024-09-19 | Stop reason: HOSPADM

## 2024-09-17 RX ORDER — SODIUM CHLORIDE 0.9 % (FLUSH) 0.9 %
10 SYRINGE (ML) INJECTION PRN
Status: DISCONTINUED | OUTPATIENT
Start: 2024-09-17 | End: 2024-09-19 | Stop reason: HOSPADM

## 2024-09-17 RX ORDER — METOCLOPRAMIDE HYDROCHLORIDE 5 MG/ML
10 INJECTION INTRAMUSCULAR; INTRAVENOUS ONCE
Status: COMPLETED | OUTPATIENT
Start: 2024-09-17 | End: 2024-09-17

## 2024-09-17 RX ORDER — ONDANSETRON 2 MG/ML
4 INJECTION INTRAMUSCULAR; INTRAVENOUS EVERY 6 HOURS PRN
Status: DISCONTINUED | OUTPATIENT
Start: 2024-09-17 | End: 2024-09-19 | Stop reason: HOSPADM

## 2024-09-17 RX ORDER — AMLODIPINE BESYLATE 10 MG/1
10 TABLET ORAL DAILY
Status: DISCONTINUED | OUTPATIENT
Start: 2024-09-17 | End: 2024-09-19 | Stop reason: HOSPADM

## 2024-09-17 RX ORDER — ACETAMINOPHEN 650 MG/1
650 SUPPOSITORY RECTAL EVERY 6 HOURS PRN
Status: DISCONTINUED | OUTPATIENT
Start: 2024-09-17 | End: 2024-09-19 | Stop reason: HOSPADM

## 2024-09-17 RX ORDER — OLANZAPINE 2.5 MG/1
2.5 TABLET, FILM COATED ORAL NIGHTLY
Status: DISCONTINUED | OUTPATIENT
Start: 2024-09-17 | End: 2024-09-17 | Stop reason: ALTCHOICE

## 2024-09-17 RX ORDER — CARVEDILOL 6.25 MG/1
6.25 TABLET ORAL 2 TIMES DAILY WITH MEALS
Status: DISCONTINUED | OUTPATIENT
Start: 2024-09-17 | End: 2024-09-19 | Stop reason: HOSPADM

## 2024-09-17 RX ORDER — PHENAZOPYRIDINE HYDROCHLORIDE 100 MG/1
200 TABLET, FILM COATED ORAL ONCE
Status: COMPLETED | OUTPATIENT
Start: 2024-09-17 | End: 2024-09-17

## 2024-09-17 RX ORDER — SODIUM CHLORIDE 0.9 % (FLUSH) 0.9 %
5-40 SYRINGE (ML) INJECTION EVERY 12 HOURS SCHEDULED
Status: DISCONTINUED | OUTPATIENT
Start: 2024-09-17 | End: 2024-09-19 | Stop reason: HOSPADM

## 2024-09-17 RX ORDER — HYDROMORPHONE HYDROCHLORIDE 1 MG/ML
0.25 INJECTION, SOLUTION INTRAMUSCULAR; INTRAVENOUS; SUBCUTANEOUS EVERY 4 HOURS PRN
Status: DISCONTINUED | OUTPATIENT
Start: 2024-09-17 | End: 2024-09-18

## 2024-09-17 RX ORDER — POTASSIUM CHLORIDE 1500 MG/1
40 TABLET, EXTENDED RELEASE ORAL PRN
Status: DISCONTINUED | OUTPATIENT
Start: 2024-09-17 | End: 2024-09-19 | Stop reason: HOSPADM

## 2024-09-17 RX ADMIN — DIPHENHYDRAMINE HYDROCHLORIDE 50 MG: 50 INJECTION INTRAMUSCULAR; INTRAVENOUS at 02:57

## 2024-09-17 RX ADMIN — HYDROMORPHONE HYDROCHLORIDE 0.25 MG: 1 INJECTION, SOLUTION INTRAMUSCULAR; INTRAVENOUS; SUBCUTANEOUS at 12:22

## 2024-09-17 RX ADMIN — PIPERACILLIN AND TAZOBACTAM 4500 MG: 4; .5 INJECTION, POWDER, FOR SOLUTION INTRAVENOUS at 12:00

## 2024-09-17 RX ADMIN — HYDROMORPHONE HYDROCHLORIDE 0.25 MG: 1 INJECTION, SOLUTION INTRAMUSCULAR; INTRAVENOUS; SUBCUTANEOUS at 22:14

## 2024-09-17 RX ADMIN — PIPERACILLIN AND TAZOBACTAM 3375 MG: 3; .375 INJECTION, POWDER, LYOPHILIZED, FOR SOLUTION INTRAVENOUS at 18:22

## 2024-09-17 RX ADMIN — HYDROMORPHONE HYDROCHLORIDE 0.25 MG: 1 INJECTION, SOLUTION INTRAMUSCULAR; INTRAVENOUS; SUBCUTANEOUS at 18:17

## 2024-09-17 RX ADMIN — MORPHINE SULFATE 4 MG: 4 INJECTION, SOLUTION INTRAMUSCULAR; INTRAVENOUS at 02:26

## 2024-09-17 RX ADMIN — TAMSULOSIN HYDROCHLORIDE 0.4 MG: 0.4 CAPSULE ORAL at 11:51

## 2024-09-17 RX ADMIN — PHENAZOPYRIDINE 200 MG: 100 TABLET ORAL at 03:00

## 2024-09-17 RX ADMIN — AMLODIPINE BESYLATE 10 MG: 10 TABLET ORAL at 11:51

## 2024-09-17 RX ADMIN — OXYCODONE HYDROCHLORIDE AND ACETAMINOPHEN 1 TABLET: 5; 325 TABLET ORAL at 20:04

## 2024-09-17 RX ADMIN — SODIUM CHLORIDE, PRESERVATIVE FREE 10 ML: 5 INJECTION INTRAVENOUS at 19:48

## 2024-09-17 RX ADMIN — CARVEDILOL 6.25 MG: 6.25 TABLET, FILM COATED ORAL at 11:52

## 2024-09-17 RX ADMIN — ONDANSETRON 4 MG: 2 INJECTION INTRAMUSCULAR; INTRAVENOUS at 02:26

## 2024-09-17 RX ADMIN — WATER 1000 MG: 1 INJECTION INTRAMUSCULAR; INTRAVENOUS; SUBCUTANEOUS at 04:50

## 2024-09-17 RX ADMIN — SODIUM CHLORIDE 5 ML/HR: 9 INJECTION, SOLUTION INTRAVENOUS at 11:59

## 2024-09-17 RX ADMIN — ONDANSETRON 4 MG: 2 INJECTION INTRAMUSCULAR; INTRAVENOUS at 05:57

## 2024-09-17 RX ADMIN — ENOXAPARIN SODIUM 40 MG: 100 INJECTION SUBCUTANEOUS at 11:52

## 2024-09-17 RX ADMIN — METOCLOPRAMIDE 10 MG: 5 INJECTION, SOLUTION INTRAMUSCULAR; INTRAVENOUS at 02:58

## 2024-09-17 RX ADMIN — PHENAZOPYRIDINE 100 MG: 100 TABLET ORAL at 18:14

## 2024-09-17 RX ADMIN — PHENAZOPYRIDINE 100 MG: 100 TABLET ORAL at 11:51

## 2024-09-17 RX ADMIN — MORPHINE SULFATE 4 MG: 4 INJECTION, SOLUTION INTRAMUSCULAR; INTRAVENOUS at 05:56

## 2024-09-17 RX ADMIN — HYDROMORPHONE HYDROCHLORIDE 1 MG: 1 INJECTION, SOLUTION INTRAMUSCULAR; INTRAVENOUS; SUBCUTANEOUS at 02:56

## 2024-09-17 ASSESSMENT — PAIN SCALES - GENERAL
PAINLEVEL_OUTOF10: 10
PAINLEVEL_OUTOF10: 7
PAINLEVEL_OUTOF10: 2
PAINLEVEL_OUTOF10: 10
PAINLEVEL_OUTOF10: 3
PAINLEVEL_OUTOF10: 10
PAINLEVEL_OUTOF10: 3
PAINLEVEL_OUTOF10: 8
PAINLEVEL_OUTOF10: 9
PAINLEVEL_OUTOF10: 5
PAINLEVEL_OUTOF10: 7
PAINLEVEL_OUTOF10: 8
PAINLEVEL_OUTOF10: 3

## 2024-09-17 ASSESSMENT — PAIN DESCRIPTION - PAIN TYPE: TYPE: ACUTE PAIN

## 2024-09-17 ASSESSMENT — PAIN DESCRIPTION - DESCRIPTORS
DESCRIPTORS: DISCOMFORT
DESCRIPTORS: BURNING
DESCRIPTORS: BURNING;DISCOMFORT

## 2024-09-17 ASSESSMENT — PAIN DESCRIPTION - LOCATION
LOCATION: ABDOMEN
LOCATION: PENIS
LOCATION: PELVIS
LOCATION: PENIS
LOCATION: PENIS
LOCATION: GROIN
LOCATION: PENIS

## 2024-09-17 ASSESSMENT — PAIN DESCRIPTION - ORIENTATION
ORIENTATION: LOWER
ORIENTATION: MID

## 2024-09-17 ASSESSMENT — ENCOUNTER SYMPTOMS
RESPIRATORY NEGATIVE: 1
EYES NEGATIVE: 1
GASTROINTESTINAL NEGATIVE: 1

## 2024-09-17 ASSESSMENT — PAIN - FUNCTIONAL ASSESSMENT
PAIN_FUNCTIONAL_ASSESSMENT: ACTIVITIES ARE NOT PREVENTED
PAIN_FUNCTIONAL_ASSESSMENT: PREVENTS OR INTERFERES SOME ACTIVE ACTIVITIES AND ADLS
PAIN_FUNCTIONAL_ASSESSMENT: ACTIVITIES ARE NOT PREVENTED
PAIN_FUNCTIONAL_ASSESSMENT: PREVENTS OR INTERFERES SOME ACTIVE ACTIVITIES AND ADLS
PAIN_FUNCTIONAL_ASSESSMENT: 0-10
PAIN_FUNCTIONAL_ASSESSMENT: PREVENTS OR INTERFERES SOME ACTIVE ACTIVITIES AND ADLS
PAIN_FUNCTIONAL_ASSESSMENT: PREVENTS OR INTERFERES SOME ACTIVE ACTIVITIES AND ADLS
PAIN_FUNCTIONAL_ASSESSMENT: ACTIVITIES ARE NOT PREVENTED

## 2024-09-17 ASSESSMENT — PAIN SCALES - WONG BAKER: WONGBAKER_NUMERICALRESPONSE: HURTS A LITTLE BIT

## 2024-09-18 ENCOUNTER — APPOINTMENT (OUTPATIENT)
Dept: RADIATION ONCOLOGY | Age: 64
End: 2024-09-18
Payer: MEDICARE

## 2024-09-18 LAB
ANION GAP SERPL CALCULATED.3IONS-SCNC: 11 MMOL/L (ref 9–17)
BASOPHILS # BLD: 0.01 K/UL
BASOPHILS NFR BLD: 1 % (ref 0–1)
BUN SERPL-MCNC: 15 MG/DL (ref 7–20)
CALCIUM SERPL-MCNC: 8 MG/DL (ref 8.3–10.6)
CHLORIDE SERPL-SCNC: 107 MMOL/L (ref 99–110)
CO2 SERPL-SCNC: 23 MMOL/L (ref 21–32)
CREAT SERPL-MCNC: 1.7 MG/DL (ref 0.8–1.3)
EOSINOPHIL # BLD: 0.07 K/UL
EOSINOPHILS RELATIVE PERCENT: 5 % (ref 0–3)
ERYTHROCYTE [DISTWIDTH] IN BLOOD BY AUTOMATED COUNT: 13.3 % (ref 11.7–14.9)
GFR, ESTIMATED: 41 ML/MIN/1.73M2
GLUCOSE SERPL-MCNC: 91 MG/DL (ref 74–99)
HCT VFR BLD AUTO: 25.8 % (ref 42–52)
HGB BLD-MCNC: 8.7 G/DL (ref 13.5–18)
IMM GRANULOCYTES # BLD AUTO: 0.03 K/UL
IMM GRANULOCYTES NFR BLD: 2 %
LYMPHOCYTES NFR BLD: 0.26 K/UL
LYMPHOCYTES RELATIVE PERCENT: 17 % (ref 24–44)
MCH RBC QN AUTO: 30.3 PG (ref 27–31)
MCHC RBC AUTO-ENTMCNC: 33.7 G/DL (ref 32–36)
MCV RBC AUTO: 89.9 FL (ref 78–100)
MICROORGANISM SPEC CULT: NORMAL
MONOCYTES NFR BLD: 0.32 K/UL
MONOCYTES NFR BLD: 22 % (ref 0–4)
NEUTROPHILS NFR BLD: 54 % (ref 36–66)
NEUTS SEG NFR BLD: 0.8 K/UL
PLATELET CONFIRMATION: NORMAL
PLATELET, FLUORESCENCE: 97 K/UL (ref 140–440)
PMV BLD AUTO: 9.3 FL (ref 7.5–11.1)
POTASSIUM SERPL-SCNC: 3.5 MMOL/L (ref 3.5–5.1)
RBC # BLD AUTO: 2.87 M/UL (ref 4.6–6.2)
SERVICE CMNT-IMP: NORMAL
SODIUM SERPL-SCNC: 140 MMOL/L (ref 136–145)
SPECIMEN DESCRIPTION: NORMAL
WBC OTHER # BLD: 1.5 K/UL (ref 4–10.5)

## 2024-09-18 PROCEDURE — 6360000002 HC RX W HCPCS: Performed by: NURSE PRACTITIONER

## 2024-09-18 PROCEDURE — 6370000000 HC RX 637 (ALT 250 FOR IP): Performed by: NURSE PRACTITIONER

## 2024-09-18 PROCEDURE — 2580000003 HC RX 258: Performed by: NURSE PRACTITIONER

## 2024-09-18 PROCEDURE — 99223 1ST HOSP IP/OBS HIGH 75: CPT | Performed by: INTERNAL MEDICINE

## 2024-09-18 PROCEDURE — 1200000000 HC SEMI PRIVATE

## 2024-09-18 PROCEDURE — 94664 DEMO&/EVAL PT USE INHALER: CPT

## 2024-09-18 PROCEDURE — 85025 COMPLETE CBC W/AUTO DIFF WBC: CPT

## 2024-09-18 PROCEDURE — 94150 VITAL CAPACITY TEST: CPT

## 2024-09-18 PROCEDURE — 80048 BASIC METABOLIC PNL TOTAL CA: CPT

## 2024-09-18 PROCEDURE — 36415 COLL VENOUS BLD VENIPUNCTURE: CPT

## 2024-09-18 PROCEDURE — 94761 N-INVAS EAR/PLS OXIMETRY MLT: CPT

## 2024-09-18 RX ORDER — PHENAZOPYRIDINE HYDROCHLORIDE 100 MG/1
200 TABLET, FILM COATED ORAL
Status: DISCONTINUED | OUTPATIENT
Start: 2024-09-18 | End: 2024-09-19 | Stop reason: HOSPADM

## 2024-09-18 RX ORDER — PROCHLORPERAZINE EDISYLATE 5 MG/ML
10 INJECTION INTRAMUSCULAR; INTRAVENOUS EVERY 6 HOURS PRN
Status: DISCONTINUED | OUTPATIENT
Start: 2024-09-18 | End: 2024-09-19 | Stop reason: HOSPADM

## 2024-09-18 RX ORDER — HYDROMORPHONE HYDROCHLORIDE 1 MG/ML
0.5 INJECTION, SOLUTION INTRAMUSCULAR; INTRAVENOUS; SUBCUTANEOUS EVERY 4 HOURS PRN
Status: DISCONTINUED | OUTPATIENT
Start: 2024-09-18 | End: 2024-09-19 | Stop reason: HOSPADM

## 2024-09-18 RX ORDER — HYDROMORPHONE HYDROCHLORIDE 1 MG/ML
1 INJECTION, SOLUTION INTRAMUSCULAR; INTRAVENOUS; SUBCUTANEOUS EVERY 4 HOURS PRN
Status: DISCONTINUED | OUTPATIENT
Start: 2024-09-18 | End: 2024-09-19 | Stop reason: HOSPADM

## 2024-09-18 RX ADMIN — HYDROMORPHONE HYDROCHLORIDE 1 MG: 1 INJECTION, SOLUTION INTRAMUSCULAR; INTRAVENOUS; SUBCUTANEOUS at 12:17

## 2024-09-18 RX ADMIN — OXYCODONE HYDROCHLORIDE AND ACETAMINOPHEN 1 TABLET: 5; 325 TABLET ORAL at 09:25

## 2024-09-18 RX ADMIN — HYDROMORPHONE HYDROCHLORIDE 0.25 MG: 1 INJECTION, SOLUTION INTRAMUSCULAR; INTRAVENOUS; SUBCUTANEOUS at 03:28

## 2024-09-18 RX ADMIN — OXYCODONE HYDROCHLORIDE AND ACETAMINOPHEN 1 TABLET: 5; 325 TABLET ORAL at 01:39

## 2024-09-18 RX ADMIN — HYDROMORPHONE HYDROCHLORIDE 0.25 MG: 1 INJECTION, SOLUTION INTRAMUSCULAR; INTRAVENOUS; SUBCUTANEOUS at 07:28

## 2024-09-18 RX ADMIN — SODIUM CHLORIDE, PRESERVATIVE FREE 10 ML: 5 INJECTION INTRAVENOUS at 09:34

## 2024-09-18 RX ADMIN — HYDROMORPHONE HYDROCHLORIDE 1 MG: 1 INJECTION, SOLUTION INTRAMUSCULAR; INTRAVENOUS; SUBCUTANEOUS at 20:41

## 2024-09-18 RX ADMIN — PHENAZOPYRIDINE 100 MG: 100 TABLET ORAL at 09:26

## 2024-09-18 RX ADMIN — ONDANSETRON 4 MG: 4 TABLET, ORALLY DISINTEGRATING ORAL at 12:22

## 2024-09-18 RX ADMIN — PHENAZOPYRIDINE 200 MG: 100 TABLET ORAL at 13:01

## 2024-09-18 RX ADMIN — PIPERACILLIN AND TAZOBACTAM 3375 MG: 3; .375 INJECTION, POWDER, LYOPHILIZED, FOR SOLUTION INTRAVENOUS at 09:32

## 2024-09-18 RX ADMIN — PROCHLORPERAZINE EDISYLATE 10 MG: 5 INJECTION INTRAMUSCULAR; INTRAVENOUS at 17:35

## 2024-09-18 RX ADMIN — TAMSULOSIN HYDROCHLORIDE 0.4 MG: 0.4 CAPSULE ORAL at 09:26

## 2024-09-18 RX ADMIN — PIPERACILLIN AND TAZOBACTAM 3375 MG: 3; .375 INJECTION, POWDER, LYOPHILIZED, FOR SOLUTION INTRAVENOUS at 17:08

## 2024-09-18 RX ADMIN — HYDROMORPHONE HYDROCHLORIDE 0.5 MG: 1 INJECTION, SOLUTION INTRAMUSCULAR; INTRAVENOUS; SUBCUTANEOUS at 17:00

## 2024-09-18 RX ADMIN — PHENAZOPYRIDINE 200 MG: 100 TABLET ORAL at 17:01

## 2024-09-18 RX ADMIN — PIPERACILLIN AND TAZOBACTAM 3375 MG: 3; .375 INJECTION, POWDER, LYOPHILIZED, FOR SOLUTION INTRAVENOUS at 01:41

## 2024-09-18 RX ADMIN — ENOXAPARIN SODIUM 40 MG: 100 INJECTION SUBCUTANEOUS at 09:26

## 2024-09-18 ASSESSMENT — PAIN DESCRIPTION - DESCRIPTORS
DESCRIPTORS: ACHING;BURNING;DISCOMFORT
DESCRIPTORS: BURNING
DESCRIPTORS: BURNING
DESCRIPTORS: ACHING;CRAMPING;BURNING
DESCRIPTORS: BURNING

## 2024-09-18 ASSESSMENT — PAIN - FUNCTIONAL ASSESSMENT

## 2024-09-18 ASSESSMENT — PAIN SCALES - GENERAL
PAINLEVEL_OUTOF10: 7
PAINLEVEL_OUTOF10: 8
PAINLEVEL_OUTOF10: 8
PAINLEVEL_OUTOF10: 2
PAINLEVEL_OUTOF10: 6
PAINLEVEL_OUTOF10: 3
PAINLEVEL_OUTOF10: 3
PAINLEVEL_OUTOF10: 9
PAINLEVEL_OUTOF10: 0
PAINLEVEL_OUTOF10: 2

## 2024-09-18 ASSESSMENT — PAIN DESCRIPTION - LOCATION
LOCATION: PENIS
LOCATION: ABDOMEN
LOCATION: PENIS

## 2024-09-18 ASSESSMENT — PAIN DESCRIPTION - PAIN TYPE
TYPE: ACUTE PAIN

## 2024-09-18 ASSESSMENT — PAIN DESCRIPTION - ONSET
ONSET: ON-GOING

## 2024-09-18 ASSESSMENT — PAIN DESCRIPTION - ORIENTATION
ORIENTATION: MID

## 2024-09-18 ASSESSMENT — PAIN DESCRIPTION - FREQUENCY
FREQUENCY: CONTINUOUS
FREQUENCY: CONTINUOUS
FREQUENCY: INTERMITTENT

## 2024-09-19 ENCOUNTER — APPOINTMENT (OUTPATIENT)
Dept: RADIATION ONCOLOGY | Age: 64
End: 2024-09-19
Payer: MEDICARE

## 2024-09-19 VITALS
SYSTOLIC BLOOD PRESSURE: 101 MMHG | HEART RATE: 77 BPM | RESPIRATION RATE: 18 BRPM | DIASTOLIC BLOOD PRESSURE: 65 MMHG | BODY MASS INDEX: 27.92 KG/M2 | TEMPERATURE: 97.9 F | WEIGHT: 195 LBS | HEIGHT: 70 IN | OXYGEN SATURATION: 96 %

## 2024-09-19 LAB
ANION GAP SERPL CALCULATED.3IONS-SCNC: 11 MMOL/L (ref 9–17)
BASOPHILS # BLD: 0.02 K/UL
BASOPHILS NFR BLD: 1 % (ref 0–1)
BUN SERPL-MCNC: 14 MG/DL (ref 7–20)
CALCIUM SERPL-MCNC: 7.9 MG/DL (ref 8.3–10.6)
CHLORIDE SERPL-SCNC: 107 MMOL/L (ref 99–110)
CO2 SERPL-SCNC: 24 MMOL/L (ref 21–32)
CREAT SERPL-MCNC: 1.7 MG/DL (ref 0.8–1.3)
EOSINOPHIL # BLD: 0.08 K/UL
EOSINOPHILS RELATIVE PERCENT: 5 % (ref 0–3)
ERYTHROCYTE [DISTWIDTH] IN BLOOD BY AUTOMATED COUNT: 13.6 % (ref 11.7–14.9)
GFR, ESTIMATED: 40 ML/MIN/1.73M2
GLUCOSE SERPL-MCNC: 88 MG/DL (ref 74–99)
HCT VFR BLD AUTO: 26.7 % (ref 42–52)
HGB BLD-MCNC: 8.7 G/DL (ref 13.5–18)
IMM GRANULOCYTES # BLD AUTO: 0.06 K/UL
IMM GRANULOCYTES NFR BLD: 3 %
LYMPHOCYTES NFR BLD: 0.26 K/UL
LYMPHOCYTES RELATIVE PERCENT: 15 % (ref 24–44)
MCH RBC QN AUTO: 29.6 PG (ref 27–31)
MCHC RBC AUTO-ENTMCNC: 32.6 G/DL (ref 32–36)
MCV RBC AUTO: 90.8 FL (ref 78–100)
MONOCYTES NFR BLD: 0.44 K/UL
MONOCYTES NFR BLD: 25 % (ref 0–4)
NEUTROPHILS NFR BLD: 51 % (ref 36–66)
NEUTS SEG NFR BLD: 0.9 K/UL
PATH REV BLD -IMP: NORMAL
PLATELET, FLUORESCENCE: 105 K/UL (ref 140–440)
PMV BLD AUTO: 9.1 FL (ref 7.5–11.1)
POTASSIUM SERPL-SCNC: 3.5 MMOL/L (ref 3.5–5.1)
RBC # BLD AUTO: 2.94 M/UL (ref 4.6–6.2)
SODIUM SERPL-SCNC: 141 MMOL/L (ref 136–145)
WBC OTHER # BLD: 1.8 K/UL (ref 4–10.5)

## 2024-09-19 PROCEDURE — 6370000000 HC RX 637 (ALT 250 FOR IP): Performed by: NURSE PRACTITIONER

## 2024-09-19 PROCEDURE — 94761 N-INVAS EAR/PLS OXIMETRY MLT: CPT

## 2024-09-19 PROCEDURE — 2580000003 HC RX 258: Performed by: NURSE PRACTITIONER

## 2024-09-19 PROCEDURE — 80048 BASIC METABOLIC PNL TOTAL CA: CPT

## 2024-09-19 PROCEDURE — 6360000002 HC RX W HCPCS: Performed by: NURSE PRACTITIONER

## 2024-09-19 PROCEDURE — 36415 COLL VENOUS BLD VENIPUNCTURE: CPT

## 2024-09-19 PROCEDURE — 99232 SBSQ HOSP IP/OBS MODERATE 35: CPT | Performed by: INTERNAL MEDICINE

## 2024-09-19 PROCEDURE — 85025 COMPLETE CBC W/AUTO DIFF WBC: CPT

## 2024-09-19 RX ORDER — GABAPENTIN 100 MG/1
100 CAPSULE ORAL 3 TIMES DAILY
Status: DISCONTINUED | OUTPATIENT
Start: 2024-09-19 | End: 2024-09-19

## 2024-09-19 RX ORDER — OXYCODONE AND ACETAMINOPHEN 5; 325 MG/1; MG/1
2 TABLET ORAL EVERY 6 HOURS PRN
Qty: 24 TABLET | Refills: 0 | Status: SHIPPED | OUTPATIENT
Start: 2024-09-19 | End: 2024-09-22

## 2024-09-19 RX ORDER — GABAPENTIN 300 MG/1
300 CAPSULE ORAL 3 TIMES DAILY
Qty: 90 CAPSULE | Refills: 2 | Status: SHIPPED | OUTPATIENT
Start: 2024-09-19 | End: 2024-12-18

## 2024-09-19 RX ORDER — GABAPENTIN 300 MG/1
300 CAPSULE ORAL 3 TIMES DAILY
Status: DISCONTINUED | OUTPATIENT
Start: 2024-09-19 | End: 2024-09-19 | Stop reason: HOSPADM

## 2024-09-19 RX ORDER — CIPROFLOXACIN 500 MG/1
500 TABLET, FILM COATED ORAL 2 TIMES DAILY
Qty: 14 TABLET | Refills: 0 | Status: SHIPPED | OUTPATIENT
Start: 2024-09-19 | End: 2024-09-26

## 2024-09-19 RX ORDER — PHENAZOPYRIDINE HYDROCHLORIDE 200 MG/1
200 TABLET, FILM COATED ORAL
Qty: 84 TABLET | Refills: 0 | Status: SHIPPED | OUTPATIENT
Start: 2024-09-19 | End: 2024-10-17

## 2024-09-19 RX ADMIN — TAMSULOSIN HYDROCHLORIDE 0.4 MG: 0.4 CAPSULE ORAL at 08:45

## 2024-09-19 RX ADMIN — PHENAZOPYRIDINE 200 MG: 100 TABLET ORAL at 12:04

## 2024-09-19 RX ADMIN — HYDROMORPHONE HYDROCHLORIDE 1 MG: 1 INJECTION, SOLUTION INTRAMUSCULAR; INTRAVENOUS; SUBCUTANEOUS at 12:03

## 2024-09-19 RX ADMIN — GABAPENTIN 300 MG: 300 CAPSULE ORAL at 13:47

## 2024-09-19 RX ADMIN — PHENAZOPYRIDINE 200 MG: 100 TABLET ORAL at 08:45

## 2024-09-19 RX ADMIN — PIPERACILLIN AND TAZOBACTAM 3375 MG: 3; .375 INJECTION, POWDER, LYOPHILIZED, FOR SOLUTION INTRAVENOUS at 01:03

## 2024-09-19 RX ADMIN — HYDROMORPHONE HYDROCHLORIDE 1 MG: 1 INJECTION, SOLUTION INTRAMUSCULAR; INTRAVENOUS; SUBCUTANEOUS at 02:34

## 2024-09-19 RX ADMIN — ENOXAPARIN SODIUM 40 MG: 100 INJECTION SUBCUTANEOUS at 08:45

## 2024-09-19 RX ADMIN — HYDROMORPHONE HYDROCHLORIDE 1 MG: 1 INJECTION, SOLUTION INTRAMUSCULAR; INTRAVENOUS; SUBCUTANEOUS at 07:25

## 2024-09-19 RX ADMIN — PROCHLORPERAZINE EDISYLATE 10 MG: 5 INJECTION INTRAMUSCULAR; INTRAVENOUS at 02:46

## 2024-09-19 RX ADMIN — ONDANSETRON 4 MG: 4 TABLET, ORALLY DISINTEGRATING ORAL at 12:11

## 2024-09-19 RX ADMIN — OXYCODONE HYDROCHLORIDE AND ACETAMINOPHEN 1 TABLET: 5; 325 TABLET ORAL at 00:10

## 2024-09-19 RX ADMIN — PIPERACILLIN AND TAZOBACTAM 3375 MG: 3; .375 INJECTION, POWDER, LYOPHILIZED, FOR SOLUTION INTRAVENOUS at 08:46

## 2024-09-19 RX ADMIN — GABAPENTIN 100 MG: 100 CAPSULE ORAL at 08:47

## 2024-09-19 ASSESSMENT — PAIN DESCRIPTION - LOCATION
LOCATION: PENIS

## 2024-09-19 ASSESSMENT — PAIN SCALES - GENERAL
PAINLEVEL_OUTOF10: 4
PAINLEVEL_OUTOF10: 8
PAINLEVEL_OUTOF10: 7
PAINLEVEL_OUTOF10: 4
PAINLEVEL_OUTOF10: 9
PAINLEVEL_OUTOF10: 4

## 2024-09-19 ASSESSMENT — PAIN DESCRIPTION - DESCRIPTORS
DESCRIPTORS: BURNING

## 2024-09-20 ENCOUNTER — APPOINTMENT (OUTPATIENT)
Dept: RADIATION ONCOLOGY | Age: 64
End: 2024-09-20
Payer: MEDICARE

## 2024-09-22 LAB
MICROORGANISM SPEC CULT: NORMAL
MICROORGANISM SPEC CULT: NORMAL
SERVICE CMNT-IMP: NORMAL
SERVICE CMNT-IMP: NORMAL
SPECIMEN DESCRIPTION: NORMAL
SPECIMEN DESCRIPTION: NORMAL

## 2024-09-23 ENCOUNTER — APPOINTMENT (OUTPATIENT)
Dept: RADIATION ONCOLOGY | Age: 64
End: 2024-09-23
Payer: MEDICARE

## 2024-09-23 RX ORDER — LOPERAMIDE HYDROCHLORIDE 1 MG/5ML
1 SOLUTION ORAL 3 TIMES DAILY PRN
Qty: 90 ML | Refills: 0 | Status: SHIPPED | OUTPATIENT
Start: 2024-09-23 | End: 2024-09-30

## 2024-09-24 ENCOUNTER — APPOINTMENT (OUTPATIENT)
Dept: RADIATION ONCOLOGY | Age: 64
End: 2024-09-24
Payer: MEDICARE

## 2024-09-25 ENCOUNTER — HOSPITAL ENCOUNTER (OUTPATIENT)
Dept: RADIATION ONCOLOGY | Age: 64
Discharge: HOME | End: 2024-09-25
Payer: MEDICARE

## 2024-09-25 VITALS — WEIGHT: 195 LBS | BODY MASS INDEX: 27.98 KG/M2

## 2024-09-25 PROCEDURE — 77386 HC NTSTY MODUL RAD TX DLVR CPLX: CPT | Performed by: RADIOLOGY

## 2024-09-25 PROCEDURE — 77014 CHG CT GUIDANCE RADIATION THERAPY FLDS PLACEMENT: CPT | Performed by: RADIOLOGY

## 2024-09-25 RX ORDER — TAMSULOSIN HYDROCHLORIDE 0.4 MG/1
0.4 CAPSULE ORAL 2 TIMES DAILY
Qty: 60 CAPSULE | Refills: 1 | Status: SHIPPED | OUTPATIENT
Start: 2024-09-25

## 2024-09-25 ASSESSMENT — PAIN SCALES - GENERAL: PAINLEVEL_OUTOF10: 10

## 2024-09-25 ASSESSMENT — PAIN DESCRIPTION - PAIN TYPE: TYPE: ACUTE PAIN

## 2024-09-25 ASSESSMENT — PAIN - FUNCTIONAL ASSESSMENT: PAIN_FUNCTIONAL_ASSESSMENT: ACTIVITIES ARE NOT PREVENTED

## 2024-09-25 ASSESSMENT — PAIN DESCRIPTION - FREQUENCY: FREQUENCY: INTERMITTENT

## 2024-09-25 ASSESSMENT — PAIN DESCRIPTION - DESCRIPTORS: DESCRIPTORS: BURNING;SHOOTING

## 2024-09-25 ASSESSMENT — PAIN DESCRIPTION - LOCATION: LOCATION: PENIS

## 2024-09-26 ENCOUNTER — HOSPITAL ENCOUNTER (OUTPATIENT)
Dept: RADIATION ONCOLOGY | Age: 64
Discharge: HOME | End: 2024-09-26
Payer: MEDICARE

## 2024-09-26 DIAGNOSIS — R30.0 DYSURIA: ICD-10-CM

## 2024-09-26 DIAGNOSIS — C67.8 MALIGNANT NEOPLASM OF OVERLAPPING SITES OF BLADDER (HCC): Primary | ICD-10-CM

## 2024-09-26 PROCEDURE — 77336 RADIATION PHYSICS CONSULT: CPT | Performed by: RADIOLOGY

## 2024-09-26 PROCEDURE — 77386 HC NTSTY MODUL RAD TX DLVR CPLX: CPT | Performed by: RADIOLOGY

## 2024-09-26 PROCEDURE — 77014 CHG CT GUIDANCE RADIATION THERAPY FLDS PLACEMENT: CPT | Performed by: RADIOLOGY

## 2024-09-26 RX ORDER — METHYLPREDNISOLONE 4 MG
TABLET, DOSE PACK ORAL
Qty: 1 KIT | Refills: 0 | Status: SHIPPED | OUTPATIENT
Start: 2024-09-26

## 2024-09-30 ENCOUNTER — HOSPITAL ENCOUNTER (OUTPATIENT)
Dept: RADIATION ONCOLOGY | Age: 64
Discharge: HOME | End: 2024-09-30
Payer: MEDICARE

## 2024-09-30 PROCEDURE — 77386 HC NTSTY MODUL RAD TX DLVR CPLX: CPT | Performed by: RADIOLOGY

## 2024-10-01 ENCOUNTER — HOSPITAL ENCOUNTER (OUTPATIENT)
Dept: RADIATION ONCOLOGY | Age: 64
Discharge: HOME | End: 2024-10-01
Payer: MEDICARE

## 2024-10-01 VITALS — WEIGHT: 194.8 LBS | BODY MASS INDEX: 27.95 KG/M2

## 2024-10-01 PROCEDURE — 77386 HC NTSTY MODUL RAD TX DLVR CPLX: CPT | Performed by: RADIOLOGY

## 2024-10-01 ASSESSMENT — PAIN DESCRIPTION - FREQUENCY: FREQUENCY: INTERMITTENT

## 2024-10-01 ASSESSMENT — PAIN DESCRIPTION - PAIN TYPE: TYPE: ACUTE PAIN

## 2024-10-01 ASSESSMENT — PAIN SCALES - GENERAL: PAINLEVEL_OUTOF10: 9

## 2024-10-01 ASSESSMENT — PAIN DESCRIPTION - LOCATION: LOCATION: PENIS

## 2024-10-01 ASSESSMENT — PAIN - FUNCTIONAL ASSESSMENT: PAIN_FUNCTIONAL_ASSESSMENT: PREVENTS OR INTERFERES SOME ACTIVE ACTIVITIES AND ADLS

## 2024-10-01 ASSESSMENT — PAIN DESCRIPTION - DESCRIPTORS: DESCRIPTORS: SORE;BURNING

## 2024-10-01 NOTE — PLAN OF CARE
Care plan reviewed.      _ Pt reports pain/burning in penis, rates \"9\"/10 scale. Painful before and after urination. Taking Pain Medication per pain management.     _ Energy down, tolerating ADLs without difficulty.      _ Pt with some rectal soreness- reports Imodium has helped with frequent soft stools. Pt reports wearing depends due to stress incontinence, bowels move when urinating.       _ Pt eating and drinking without difficulty.

## 2024-10-01 NOTE — PROGRESS NOTES
Weekly Radiation Treatment Progress Note    DATE OF SERVICE: 10/1/2024     DIAGNOSIS:  Cancer Staging   Malignant neoplasm of overlapping sites of bladder (HCC)  Staging form: Urinary Bladder, AJCC 8th Edition  - Clinical: Stage SJ (cT3, cN3, cM1a) - Unsigned       TREATMENT COURSE:   Oncology History   Malignant neoplasm of overlapping sites of bladder (HCC)   1/30/2024 - 6/25/2024 Chemotherapy    OP CISplatin 35 mg/m2 (split-dosing) + gemcitabine 1000 mg/m2 D1 and 8 Q28D  Plan Provider: Noemi Gambino MD  Treatment goal: Neoadjuvant  Line of treatment: Induction     8/12/2024 -  Chemotherapy    OP gemcitabine  Plan Provider: Noemi Gambino MD  Treatment goal: Curative  Line of treatment: Consolidation       Site: pelvis   Current Total Radiation Dose: 5400 cGy    Pt doing well. Energy fairly good.  Still frequent urination. Pain in pelvis/penis unchanged. Pain management increased meds. On medrol pack as well.      EXAM  Wt Readings from Last 3 Encounters:   09/25/24 88.5 kg (195 lb)   09/18/24 88.5 kg (195 lb)   09/17/24 88.9 kg (196 lb)     NAD      Setup images, chart, plan reviewed    A/P:   Tolerating RT well  Continue RT as planned      Electronically signed by Frank Tatum MD on 10/1/2024 at 11:31 AM

## 2024-10-02 ENCOUNTER — HOSPITAL ENCOUNTER (OUTPATIENT)
Dept: RADIATION ONCOLOGY | Age: 64
Discharge: HOME | End: 2024-10-02
Payer: MEDICARE

## 2024-10-02 PROCEDURE — 77386 HC NTSTY MODUL RAD TX DLVR CPLX: CPT | Performed by: RADIOLOGY

## 2024-10-03 ENCOUNTER — HOSPITAL ENCOUNTER (OUTPATIENT)
Dept: RADIATION ONCOLOGY | Age: 64
Discharge: HOME | End: 2024-10-03
Payer: MEDICARE

## 2024-10-03 PROCEDURE — 77386 HC NTSTY MODUL RAD TX DLVR CPLX: CPT | Performed by: RADIOLOGY

## 2024-10-04 ENCOUNTER — HOSPITAL ENCOUNTER (INPATIENT)
Age: 64
LOS: 1 days | Discharge: ANOTHER HOSPITAL | End: 2024-10-05
Attending: STUDENT IN AN ORGANIZED HEALTH CARE EDUCATION/TRAINING PROGRAM | Admitting: HOSPITALIST
Payer: MEDICARE

## 2024-10-04 ENCOUNTER — APPOINTMENT (OUTPATIENT)
Dept: RADIATION ONCOLOGY | Age: 64
End: 2024-10-04
Payer: MEDICARE

## 2024-10-04 ENCOUNTER — APPOINTMENT (OUTPATIENT)
Dept: CT IMAGING | Age: 64
End: 2024-10-04
Payer: MEDICARE

## 2024-10-04 DIAGNOSIS — R82.90 ABNORMAL URINE: ICD-10-CM

## 2024-10-04 DIAGNOSIS — Z85.51 HISTORY OF BLADDER CANCER: ICD-10-CM

## 2024-10-04 DIAGNOSIS — D64.9 ANEMIA, UNSPECIFIED TYPE: ICD-10-CM

## 2024-10-04 DIAGNOSIS — D84.9 IMMUNOCOMPROMISED (HCC): ICD-10-CM

## 2024-10-04 DIAGNOSIS — R10.9 ABDOMINAL PAIN, UNSPECIFIED ABDOMINAL LOCATION: ICD-10-CM

## 2024-10-04 DIAGNOSIS — N17.9 AKI (ACUTE KIDNEY INJURY) (HCC): ICD-10-CM

## 2024-10-04 DIAGNOSIS — N39.0 COMPLICATED UTI (URINARY TRACT INFECTION): Primary | ICD-10-CM

## 2024-10-04 LAB
ALBUMIN SERPL-MCNC: 3 G/DL (ref 3.4–5)
ALBUMIN/GLOB SERPL: 0.9 {RATIO} (ref 1.1–2.2)
ALP SERPL-CCNC: 125 U/L (ref 40–129)
ALT SERPL-CCNC: 11 U/L (ref 10–40)
ANION GAP SERPL CALCULATED.3IONS-SCNC: 16 MMOL/L (ref 4–16)
AST SERPL-CCNC: 10 U/L (ref 15–37)
BACTERIA URNS QL MICRO: ABNORMAL
BASOPHILS # BLD: 0.01 K/UL
BASOPHILS NFR BLD: 0 % (ref 0–1)
BILIRUB SERPL-MCNC: 0.4 MG/DL (ref 0–1)
BILIRUB UR QL STRIP: NEGATIVE
BUN SERPL-MCNC: 26 MG/DL (ref 6–23)
CALCIUM SERPL-MCNC: 8.3 MG/DL (ref 8.3–10.6)
CHLORIDE SERPL-SCNC: 102 MMOL/L (ref 99–110)
CLARITY UR: ABNORMAL
CO2 SERPL-SCNC: 18 MMOL/L (ref 21–32)
COLOR UR: YELLOW
CREAT SERPL-MCNC: 2.3 MG/DL (ref 0.9–1.3)
EOSINOPHIL # BLD: 0.08 K/UL
EOSINOPHILS RELATIVE PERCENT: 1 % (ref 0–3)
EPI CELLS #/AREA URNS HPF: ABNORMAL /HPF
ERYTHROCYTE [DISTWIDTH] IN BLOOD BY AUTOMATED COUNT: 15.7 % (ref 11.7–14.9)
GFR, ESTIMATED: 31 ML/MIN/1.73M2
GLUCOSE SERPL-MCNC: 107 MG/DL (ref 70–99)
GLUCOSE UR STRIP-MCNC: NEGATIVE MG/DL
HCT VFR BLD AUTO: 30.3 % (ref 42–52)
HGB BLD-MCNC: 10 G/DL (ref 13.5–18)
HGB UR QL STRIP.AUTO: ABNORMAL
IMM GRANULOCYTES # BLD AUTO: 0.09 K/UL
IMM GRANULOCYTES NFR BLD: 1 %
KETONES UR STRIP-MCNC: ABNORMAL MG/DL
LEUKOCYTE ESTERASE UR QL STRIP: ABNORMAL
LIPASE SERPL-CCNC: 7 U/L (ref 13–60)
LYMPHOCYTES NFR BLD: 0.62 K/UL
LYMPHOCYTES RELATIVE PERCENT: 8 % (ref 24–44)
MCH RBC QN AUTO: 30.1 PG (ref 27–31)
MCHC RBC AUTO-ENTMCNC: 33 G/DL (ref 32–36)
MCV RBC AUTO: 91.3 FL (ref 78–100)
MONOCYTES NFR BLD: 0.74 K/UL
MONOCYTES NFR BLD: 10 % (ref 0–4)
NEUTROPHILS NFR BLD: 79 % (ref 36–66)
NEUTS SEG NFR BLD: 5.82 K/UL
NITRITE UR QL STRIP: NEGATIVE
PH UR STRIP: 6.5 [PH] (ref 5–8)
PLATELET # BLD AUTO: 250 K/UL (ref 140–440)
PMV BLD AUTO: 8.9 FL (ref 7.5–11.1)
POTASSIUM SERPL-SCNC: 3.8 MMOL/L (ref 3.5–5.1)
PROT SERPL-MCNC: 6.3 G/DL (ref 6.4–8.2)
PROT UR STRIP-MCNC: >=300 MG/DL
RBC # BLD AUTO: 3.32 M/UL (ref 4.6–6.2)
RBC #/AREA URNS HPF: ABNORMAL /HPF
SODIUM SERPL-SCNC: 136 MMOL/L (ref 135–145)
SP GR UR STRIP: 1.02 (ref 1–1.03)
UROBILINOGEN UR STRIP-ACNC: 0.2 EU/DL (ref 0–1)
WBC #/AREA URNS HPF: ABNORMAL /HPF
WBC OTHER # BLD: 7.4 K/UL (ref 4–10.5)

## 2024-10-04 PROCEDURE — 6360000004 HC RX CONTRAST MEDICATION: Performed by: STUDENT IN AN ORGANIZED HEALTH CARE EDUCATION/TRAINING PROGRAM

## 2024-10-04 PROCEDURE — 96375 TX/PRO/DX INJ NEW DRUG ADDON: CPT

## 2024-10-04 PROCEDURE — 87086 URINE CULTURE/COLONY COUNT: CPT

## 2024-10-04 PROCEDURE — 83605 ASSAY OF LACTIC ACID: CPT

## 2024-10-04 PROCEDURE — 96376 TX/PRO/DX INJ SAME DRUG ADON: CPT

## 2024-10-04 PROCEDURE — 99285 EMERGENCY DEPT VISIT HI MDM: CPT

## 2024-10-04 PROCEDURE — 85025 COMPLETE CBC W/AUTO DIFF WBC: CPT

## 2024-10-04 PROCEDURE — 74177 CT ABD & PELVIS W/CONTRAST: CPT

## 2024-10-04 PROCEDURE — 96374 THER/PROPH/DIAG INJ IV PUSH: CPT

## 2024-10-04 PROCEDURE — 83690 ASSAY OF LIPASE: CPT

## 2024-10-04 PROCEDURE — 6360000002 HC RX W HCPCS: Performed by: STUDENT IN AN ORGANIZED HEALTH CARE EDUCATION/TRAINING PROGRAM

## 2024-10-04 PROCEDURE — 81001 URINALYSIS AUTO W/SCOPE: CPT

## 2024-10-04 PROCEDURE — 80053 COMPREHEN METABOLIC PANEL: CPT

## 2024-10-04 PROCEDURE — 2580000003 HC RX 258: Performed by: STUDENT IN AN ORGANIZED HEALTH CARE EDUCATION/TRAINING PROGRAM

## 2024-10-04 RX ORDER — IOPAMIDOL 755 MG/ML
75 INJECTION, SOLUTION INTRAVASCULAR
Status: COMPLETED | OUTPATIENT
Start: 2024-10-04 | End: 2024-10-04

## 2024-10-04 RX ORDER — 0.9 % SODIUM CHLORIDE 0.9 %
1000 INTRAVENOUS SOLUTION INTRAVENOUS ONCE
Status: COMPLETED | OUTPATIENT
Start: 2024-10-04 | End: 2024-10-04

## 2024-10-04 RX ORDER — ONDANSETRON 2 MG/ML
4 INJECTION INTRAMUSCULAR; INTRAVENOUS ONCE
Status: COMPLETED | OUTPATIENT
Start: 2024-10-04 | End: 2024-10-04

## 2024-10-04 RX ADMIN — HYDROMORPHONE HYDROCHLORIDE 1 MG: 1 INJECTION, SOLUTION INTRAMUSCULAR; INTRAVENOUS; SUBCUTANEOUS at 22:35

## 2024-10-04 RX ADMIN — HYDROMORPHONE HYDROCHLORIDE 1 MG: 1 INJECTION, SOLUTION INTRAMUSCULAR; INTRAVENOUS; SUBCUTANEOUS at 20:13

## 2024-10-04 RX ADMIN — IOPAMIDOL 75 ML: 755 INJECTION, SOLUTION INTRAVENOUS at 21:12

## 2024-10-04 RX ADMIN — ONDANSETRON 4 MG: 2 INJECTION, SOLUTION INTRAMUSCULAR; INTRAVENOUS at 20:13

## 2024-10-04 RX ADMIN — SODIUM CHLORIDE 1000 ML: 9 INJECTION, SOLUTION INTRAVENOUS at 22:17

## 2024-10-04 ASSESSMENT — PAIN DESCRIPTION - FREQUENCY: FREQUENCY: CONTINUOUS

## 2024-10-04 ASSESSMENT — PAIN DESCRIPTION - ORIENTATION
ORIENTATION: MID;LOWER
ORIENTATION: LOWER
ORIENTATION: MID;LOWER

## 2024-10-04 ASSESSMENT — PAIN DESCRIPTION - LOCATION
LOCATION: ABDOMEN

## 2024-10-04 ASSESSMENT — PAIN DESCRIPTION - DESCRIPTORS
DESCRIPTORS: SHARP
DESCRIPTORS: BURNING;SPASM
DESCRIPTORS: SHARP

## 2024-10-04 ASSESSMENT — PAIN - FUNCTIONAL ASSESSMENT
PAIN_FUNCTIONAL_ASSESSMENT: PREVENTS OR INTERFERES SOME ACTIVE ACTIVITIES AND ADLS
PAIN_FUNCTIONAL_ASSESSMENT: PREVENTS OR INTERFERES SOME ACTIVE ACTIVITIES AND ADLS
PAIN_FUNCTIONAL_ASSESSMENT: 0-10

## 2024-10-04 ASSESSMENT — PAIN DESCRIPTION - PAIN TYPE: TYPE: ACUTE PAIN

## 2024-10-04 ASSESSMENT — PAIN SCALES - GENERAL
PAINLEVEL_OUTOF10: 10
PAINLEVEL_OUTOF10: 7
PAINLEVEL_OUTOF10: 10

## 2024-10-04 NOTE — ED PROVIDER NOTES
Lactate, Sepsis   Result Value Ref Range    Lactic Acid, Sepsis 1.3 0.4 - 2.0 mmol/L      Radiographs (if obtained):  [] The following radiograph was interpreted by myself in the absence of a radiologist:   [x] Radiologist's Report Reviewed:  CT ABDOMEN PELVIS W IV CONTRAST Additional Contrast? None   Final Result   1. Diffuse wall thickening urinary bladder with adjacent stranding and mild left   perinephric stranding without significant change from September 17, 2024,   possible urinary tract infection.   2. Left ureteral stent in good position. Mild right and mild to moderate left   hydronephrosis. No radiopaque urinary calculi.   3. Status post cholecystectomy    4. No lymphadenopathy.      Electronically signed by Edward Isabel MD        Radiologist's Report Reviewed:  No results found.    Procedures:  None        Chart review shows recent radiographs:  CT ABDOMEN PELVIS WO CONTRAST    Result Date: 9/17/2024  CT OF THE ABDOMEN AND PELVIS INDICATION: Abdominal pain TECHNIQUE: Multiple 2D axial images were obtained through the abdomen and pelvis without IV contrast.  Oral contrast was used for bowel opacification.  Radiation dose reduction techniques were used for this study:  All CT scans performed at this facility use one or all of the following: Automated exposure control, adjustment of the mA and/or kVp according to patient's size, iterative reconstruction. COMPARISON: None FINDINGS: - LUNG BASES: No infiltrates or masses. - LIVER: Normal in size and appearance.  - GALLBLADDER/BILE DUCTS: Status post cholecystectomy PANCREAS: Normal. - SPLEEN: Normal. - ADRENALS: Normal. - KIDNEYS/URETERS: Moderate hydronephrosis, left ureteric stent extending from the left renal pelvis to the urinary bladder, marked left perinephric and periureteric fat stranding. BLADDER: Mild thickening of the urinary bladder wall with surrounding fat stranding. REPRODUCTIVE ORGANS: No pelvic masses. - BOWEL: Normal caliber.  No  inflammatory changes. - LYMPH NODES: No significant retroperitoneal, mesenteric, or pelvic adenopathy. - BONES: No fracture or significant bone lesion. - VASCULATURE: Atherosclerotic disease of the aorta - OTHER: No ascites. Anterior abdominal wall staples     Marked thickening of the urinary bladder wall with surrounding fat stranding and marked left perinephric and periureteric fat stranding, left ureter stent in place, evaluate for superimposed infection. Electronically signed by Jeanette Arellano        Discussion with Other Professionals : Consultant urology and admitting team    Social Determinants: None    Records Reviewed : Other as noted in past medical history    Chronic conditions affecting care:  Bladder cancer as well as others listed in past medical history      Patient was given the following medications:  Medications   ceFEPIme (MAXIPIME) 2,000 mg in sodium chloride 0.9 % 100 mL IVPB (mini-bag) (has no administration in time range)   HYDROmorphone (DILAUDID) injection 0.5 mg (has no administration in time range)   HYDROmorphone (DILAUDID) injection 1 mg (1 mg IntraVENous Given 10/4/24 2013)   ondansetron (ZOFRAN) injection 4 mg (4 mg IntraVENous Given 10/4/24 2013)   iopamidol (ISOVUE-370) 76 % injection 75 mL (75 mLs IntraVENous Given 10/4/24 2112)   sodium chloride 0.9 % bolus 1,000 mL (0 mLs IntraVENous Stopped 10/4/24 2346)   HYDROmorphone (DILAUDID) injection 1 mg (1 mg IntraVENous Given 10/4/24 2235)         Is this patient to be included in the SEP-1 Core Measure due to severe sepsis or septic shock?   No   Exclusion criteria - the patient is NOT to be included for SEP-1 Core Measure due to:  2+ SIRS criteria are not met    Disposition: Hospitalized     I am the primary physician of record    Clinical Impression:  1. Complicated UTI (urinary tract infection)    2. Abnormal urine    3. History of bladder cancer    4. Immunocompromised (HCC)    5. SHIRLEY (acute kidney injury) (HCC)    6. Abdominal

## 2024-10-05 ENCOUNTER — HOSPITAL ENCOUNTER (INPATIENT)
Age: 64
LOS: 2 days | Discharge: HOME | End: 2024-10-07
Attending: HOSPITALIST | Admitting: HOSPITALIST
Payer: MEDICARE

## 2024-10-05 VITALS
WEIGHT: 186.5 LBS | HEIGHT: 70 IN | HEART RATE: 90 BPM | RESPIRATION RATE: 16 BRPM | TEMPERATURE: 98.4 F | OXYGEN SATURATION: 98 % | SYSTOLIC BLOOD PRESSURE: 118 MMHG | DIASTOLIC BLOOD PRESSURE: 82 MMHG | BODY MASS INDEX: 26.7 KG/M2

## 2024-10-05 DIAGNOSIS — D64.9 ANEMIA, UNSPECIFIED TYPE: ICD-10-CM

## 2024-10-05 DIAGNOSIS — N17.9 AKI (ACUTE KIDNEY INJURY) (HCC): Primary | ICD-10-CM

## 2024-10-05 PROBLEM — N39.0 UTI (URINARY TRACT INFECTION): Status: ACTIVE | Noted: 2024-10-05

## 2024-10-05 PROBLEM — A41.9 SEPSIS (HCC): Status: ACTIVE | Noted: 2024-10-05

## 2024-10-05 LAB
ANION GAP SERPL CALCULATED.3IONS-SCNC: 15 MMOL/L (ref 4–16)
BUN SERPL-MCNC: 26 MG/DL (ref 6–23)
CALCIUM SERPL-MCNC: 7.8 MG/DL (ref 8.3–10.6)
CHLORIDE SERPL-SCNC: 105 MMOL/L (ref 99–110)
CO2 SERPL-SCNC: 18 MMOL/L (ref 21–32)
CREAT SERPL-MCNC: 2 MG/DL (ref 0.9–1.3)
ERYTHROCYTE [DISTWIDTH] IN BLOOD BY AUTOMATED COUNT: 15.8 % (ref 11.7–14.9)
GFR, ESTIMATED: 37 ML/MIN/1.73M2
GLUCOSE SERPL-MCNC: 97 MG/DL (ref 70–99)
HCT VFR BLD AUTO: 30.5 % (ref 42–52)
HGB BLD-MCNC: 9.5 G/DL (ref 13.5–18)
LACTATE BLDV-SCNC: 1 MMOL/L (ref 0.4–2)
LACTATE BLDV-SCNC: 1.1 MMOL/L (ref 0.4–2)
LACTATE BLDV-SCNC: 1.3 MMOL/L (ref 0.4–2)
MCH RBC QN AUTO: 30.3 PG (ref 27–31)
MCHC RBC AUTO-ENTMCNC: 31.1 G/DL (ref 32–36)
MCV RBC AUTO: 97.1 FL (ref 78–100)
PLATELET # BLD AUTO: 209 K/UL (ref 140–440)
PMV BLD AUTO: 9.7 FL (ref 7.5–11.1)
POTASSIUM SERPL-SCNC: 3.9 MMOL/L (ref 3.5–5.1)
RBC # BLD AUTO: 3.14 M/UL (ref 4.6–6.2)
SODIUM SERPL-SCNC: 138 MMOL/L (ref 135–145)
WBC OTHER # BLD: 6.8 K/UL (ref 4–10.5)

## 2024-10-05 PROCEDURE — 6360000002 HC RX W HCPCS: Performed by: STUDENT IN AN ORGANIZED HEALTH CARE EDUCATION/TRAINING PROGRAM

## 2024-10-05 PROCEDURE — 6370000000 HC RX 637 (ALT 250 FOR IP): Performed by: NURSE PRACTITIONER

## 2024-10-05 PROCEDURE — 6370000000 HC RX 637 (ALT 250 FOR IP): Performed by: PHYSICIAN ASSISTANT

## 2024-10-05 PROCEDURE — 6360000002 HC RX W HCPCS: Performed by: NURSE PRACTITIONER

## 2024-10-05 PROCEDURE — 94761 N-INVAS EAR/PLS OXIMETRY MLT: CPT

## 2024-10-05 PROCEDURE — 1200000000 HC SEMI PRIVATE

## 2024-10-05 PROCEDURE — 2580000003 HC RX 258: Performed by: STUDENT IN AN ORGANIZED HEALTH CARE EDUCATION/TRAINING PROGRAM

## 2024-10-05 PROCEDURE — 2580000003 HC RX 258: Performed by: NURSE PRACTITIONER

## 2024-10-05 PROCEDURE — 87040 BLOOD CULTURE FOR BACTERIA: CPT

## 2024-10-05 PROCEDURE — 85027 COMPLETE CBC AUTOMATED: CPT

## 2024-10-05 PROCEDURE — 80048 BASIC METABOLIC PNL TOTAL CA: CPT

## 2024-10-05 PROCEDURE — 36415 COLL VENOUS BLD VENIPUNCTURE: CPT

## 2024-10-05 RX ORDER — POTASSIUM CHLORIDE 1500 MG/1
40 TABLET, EXTENDED RELEASE ORAL PRN
Status: DISCONTINUED | OUTPATIENT
Start: 2024-10-05 | End: 2024-10-07 | Stop reason: HOSPADM

## 2024-10-05 RX ORDER — ACETAMINOPHEN 650 MG/1
650 SUPPOSITORY RECTAL EVERY 6 HOURS PRN
Status: CANCELLED | OUTPATIENT
Start: 2024-10-05

## 2024-10-05 RX ORDER — POLYETHYLENE GLYCOL 3350 17 G/17G
17 POWDER, FOR SOLUTION ORAL DAILY PRN
Status: DISCONTINUED | OUTPATIENT
Start: 2024-10-05 | End: 2024-10-07 | Stop reason: HOSPADM

## 2024-10-05 RX ORDER — ONDANSETRON 4 MG/1
4 TABLET, ORALLY DISINTEGRATING ORAL EVERY 8 HOURS PRN
Status: DISCONTINUED | OUTPATIENT
Start: 2024-10-05 | End: 2024-10-07 | Stop reason: HOSPADM

## 2024-10-05 RX ORDER — SODIUM CHLORIDE 0.9 % (FLUSH) 0.9 %
10 SYRINGE (ML) INJECTION PRN
Status: DISCONTINUED | OUTPATIENT
Start: 2024-10-05 | End: 2024-10-07 | Stop reason: HOSPADM

## 2024-10-05 RX ORDER — POLYETHYLENE GLYCOL 3350 17 G
2 POWDER IN PACKET (EA) ORAL
Status: DISCONTINUED | OUTPATIENT
Start: 2024-10-05 | End: 2024-10-07 | Stop reason: HOSPADM

## 2024-10-05 RX ORDER — ACETAMINOPHEN 325 MG/1
650 TABLET ORAL EVERY 6 HOURS PRN
Status: DISCONTINUED | OUTPATIENT
Start: 2024-10-05 | End: 2024-10-07 | Stop reason: HOSPADM

## 2024-10-05 RX ORDER — MAGNESIUM SULFATE IN WATER 40 MG/ML
2000 INJECTION, SOLUTION INTRAVENOUS PRN
Status: DISCONTINUED | OUTPATIENT
Start: 2024-10-05 | End: 2024-10-07 | Stop reason: HOSPADM

## 2024-10-05 RX ORDER — SODIUM CHLORIDE 0.9 % (FLUSH) 0.9 %
5-40 SYRINGE (ML) INJECTION EVERY 12 HOURS SCHEDULED
Status: DISCONTINUED | OUTPATIENT
Start: 2024-10-05 | End: 2024-10-07 | Stop reason: HOSPADM

## 2024-10-05 RX ORDER — SODIUM CHLORIDE 0.9 % (FLUSH) 0.9 %
5-40 SYRINGE (ML) INJECTION EVERY 12 HOURS SCHEDULED
Status: DISCONTINUED | OUTPATIENT
Start: 2024-10-05 | End: 2024-10-05 | Stop reason: HOSPADM

## 2024-10-05 RX ORDER — PHENAZOPYRIDINE HYDROCHLORIDE 100 MG/1
200 TABLET, FILM COATED ORAL
Status: CANCELLED | OUTPATIENT
Start: 2024-10-05 | End: 2024-10-07

## 2024-10-05 RX ORDER — SODIUM CHLORIDE 0.9 % (FLUSH) 0.9 %
10 SYRINGE (ML) INJECTION PRN
Status: CANCELLED | OUTPATIENT
Start: 2024-10-05

## 2024-10-05 RX ORDER — ACETAMINOPHEN 650 MG/1
650 SUPPOSITORY RECTAL EVERY 6 HOURS PRN
Status: DISCONTINUED | OUTPATIENT
Start: 2024-10-05 | End: 2024-10-05 | Stop reason: HOSPADM

## 2024-10-05 RX ORDER — POTASSIUM CHLORIDE 7.45 MG/ML
10 INJECTION INTRAVENOUS PRN
Status: DISCONTINUED | OUTPATIENT
Start: 2024-10-05 | End: 2024-10-07 | Stop reason: HOSPADM

## 2024-10-05 RX ORDER — OXYCODONE AND ACETAMINOPHEN 5; 325 MG/1; MG/1
1 TABLET ORAL EVERY 8 HOURS PRN
Status: DISCONTINUED | OUTPATIENT
Start: 2024-10-05 | End: 2024-10-07 | Stop reason: HOSPADM

## 2024-10-05 RX ORDER — POLYETHYLENE GLYCOL 3350 17 G
2 POWDER IN PACKET (EA) ORAL
Status: DISCONTINUED | OUTPATIENT
Start: 2024-10-05 | End: 2024-10-05 | Stop reason: HOSPADM

## 2024-10-05 RX ORDER — MAGNESIUM SULFATE IN WATER 40 MG/ML
2000 INJECTION, SOLUTION INTRAVENOUS PRN
Status: CANCELLED | OUTPATIENT
Start: 2024-10-05

## 2024-10-05 RX ORDER — LEVOFLOXACIN 5 MG/ML
750 INJECTION, SOLUTION INTRAVENOUS
Status: DISCONTINUED | OUTPATIENT
Start: 2024-10-07 | End: 2024-10-07

## 2024-10-05 RX ORDER — ACETAMINOPHEN 325 MG/1
650 TABLET ORAL EVERY 6 HOURS PRN
Status: CANCELLED | OUTPATIENT
Start: 2024-10-05

## 2024-10-05 RX ORDER — PHENAZOPYRIDINE HYDROCHLORIDE 100 MG/1
200 TABLET, FILM COATED ORAL
Status: COMPLETED | OUTPATIENT
Start: 2024-10-05 | End: 2024-10-06

## 2024-10-05 RX ORDER — POTASSIUM CHLORIDE 7.45 MG/ML
10 INJECTION INTRAVENOUS PRN
Status: DISCONTINUED | OUTPATIENT
Start: 2024-10-05 | End: 2024-10-05 | Stop reason: HOSPADM

## 2024-10-05 RX ORDER — OXYBUTYNIN CHLORIDE 5 MG/1
5 TABLET ORAL 3 TIMES DAILY
Status: DISCONTINUED | OUTPATIENT
Start: 2024-10-05 | End: 2024-10-07 | Stop reason: HOSPADM

## 2024-10-05 RX ORDER — POLYETHYLENE GLYCOL 3350 17 G/17G
17 POWDER, FOR SOLUTION ORAL DAILY PRN
Status: CANCELLED | OUTPATIENT
Start: 2024-10-05

## 2024-10-05 RX ORDER — ENOXAPARIN SODIUM 100 MG/ML
40 INJECTION SUBCUTANEOUS DAILY
Status: DISCONTINUED | OUTPATIENT
Start: 2024-10-06 | End: 2024-10-07 | Stop reason: HOSPADM

## 2024-10-05 RX ORDER — POTASSIUM CHLORIDE 1500 MG/1
40 TABLET, EXTENDED RELEASE ORAL PRN
Status: CANCELLED | OUTPATIENT
Start: 2024-10-05

## 2024-10-05 RX ORDER — ACETAMINOPHEN 325 MG/1
650 TABLET ORAL EVERY 6 HOURS PRN
Status: DISCONTINUED | OUTPATIENT
Start: 2024-10-05 | End: 2024-10-05 | Stop reason: HOSPADM

## 2024-10-05 RX ORDER — OXYCODONE AND ACETAMINOPHEN 5; 325 MG/1; MG/1
1 TABLET ORAL EVERY 8 HOURS PRN
Status: CANCELLED | OUTPATIENT
Start: 2024-10-05

## 2024-10-05 RX ORDER — POTASSIUM CHLORIDE 1500 MG/1
40 TABLET, EXTENDED RELEASE ORAL PRN
Status: DISCONTINUED | OUTPATIENT
Start: 2024-10-05 | End: 2024-10-05 | Stop reason: HOSPADM

## 2024-10-05 RX ORDER — SODIUM CHLORIDE 9 MG/ML
INJECTION, SOLUTION INTRAVENOUS PRN
Status: DISCONTINUED | OUTPATIENT
Start: 2024-10-05 | End: 2024-10-07 | Stop reason: HOSPADM

## 2024-10-05 RX ORDER — HYDROMORPHONE HYDROCHLORIDE 1 MG/ML
0.5 INJECTION, SOLUTION INTRAMUSCULAR; INTRAVENOUS; SUBCUTANEOUS EVERY 4 HOURS PRN
Status: DISCONTINUED | OUTPATIENT
Start: 2024-10-05 | End: 2024-10-07 | Stop reason: HOSPADM

## 2024-10-05 RX ORDER — SODIUM CHLORIDE 9 MG/ML
INJECTION, SOLUTION INTRAVENOUS CONTINUOUS
Status: DISCONTINUED | OUTPATIENT
Start: 2024-10-05 | End: 2024-10-06

## 2024-10-05 RX ORDER — POLYETHYLENE GLYCOL 3350 17 G/17G
17 POWDER, FOR SOLUTION ORAL DAILY PRN
Status: DISCONTINUED | OUTPATIENT
Start: 2024-10-05 | End: 2024-10-05 | Stop reason: HOSPADM

## 2024-10-05 RX ORDER — SODIUM CHLORIDE 0.9 % (FLUSH) 0.9 %
5-40 SYRINGE (ML) INJECTION EVERY 12 HOURS SCHEDULED
Status: CANCELLED | OUTPATIENT
Start: 2024-10-05

## 2024-10-05 RX ORDER — ONDANSETRON 2 MG/ML
4 INJECTION INTRAMUSCULAR; INTRAVENOUS EVERY 6 HOURS PRN
Status: DISCONTINUED | OUTPATIENT
Start: 2024-10-05 | End: 2024-10-07 | Stop reason: HOSPADM

## 2024-10-05 RX ORDER — OXYCODONE AND ACETAMINOPHEN 5; 325 MG/1; MG/1
1 TABLET ORAL EVERY 8 HOURS PRN
Status: DISCONTINUED | OUTPATIENT
Start: 2024-10-05 | End: 2024-10-05 | Stop reason: HOSPADM

## 2024-10-05 RX ORDER — LEVOFLOXACIN 5 MG/ML
750 INJECTION, SOLUTION INTRAVENOUS
Status: DISCONTINUED | OUTPATIENT
Start: 2024-10-05 | End: 2024-10-05 | Stop reason: HOSPADM

## 2024-10-05 RX ORDER — ENOXAPARIN SODIUM 100 MG/ML
40 INJECTION SUBCUTANEOUS DAILY
Status: CANCELLED | OUTPATIENT
Start: 2024-10-06

## 2024-10-05 RX ORDER — ENOXAPARIN SODIUM 100 MG/ML
40 INJECTION SUBCUTANEOUS DAILY
Status: DISCONTINUED | OUTPATIENT
Start: 2024-10-05 | End: 2024-10-05 | Stop reason: HOSPADM

## 2024-10-05 RX ORDER — SODIUM CHLORIDE 9 MG/ML
INJECTION, SOLUTION INTRAVENOUS PRN
Status: CANCELLED | OUTPATIENT
Start: 2024-10-05

## 2024-10-05 RX ORDER — SODIUM CHLORIDE 0.9 % (FLUSH) 0.9 %
10 SYRINGE (ML) INJECTION PRN
Status: DISCONTINUED | OUTPATIENT
Start: 2024-10-05 | End: 2024-10-05 | Stop reason: HOSPADM

## 2024-10-05 RX ORDER — MAGNESIUM SULFATE IN WATER 40 MG/ML
2000 INJECTION, SOLUTION INTRAVENOUS PRN
Status: DISCONTINUED | OUTPATIENT
Start: 2024-10-05 | End: 2024-10-05 | Stop reason: HOSPADM

## 2024-10-05 RX ORDER — POLYETHYLENE GLYCOL 3350 17 G
2 POWDER IN PACKET (EA) ORAL
Status: CANCELLED | OUTPATIENT
Start: 2024-10-05

## 2024-10-05 RX ORDER — ACETAMINOPHEN 650 MG/1
650 SUPPOSITORY RECTAL EVERY 6 HOURS PRN
Status: DISCONTINUED | OUTPATIENT
Start: 2024-10-05 | End: 2024-10-07 | Stop reason: HOSPADM

## 2024-10-05 RX ORDER — PHENAZOPYRIDINE HYDROCHLORIDE 100 MG/1
200 TABLET, FILM COATED ORAL
Status: DISCONTINUED | OUTPATIENT
Start: 2024-10-05 | End: 2024-10-05 | Stop reason: HOSPADM

## 2024-10-05 RX ORDER — ONDANSETRON 4 MG/1
4 TABLET, ORALLY DISINTEGRATING ORAL EVERY 8 HOURS PRN
Status: DISCONTINUED | OUTPATIENT
Start: 2024-10-05 | End: 2024-10-05 | Stop reason: HOSPADM

## 2024-10-05 RX ORDER — SODIUM CHLORIDE 9 MG/ML
INJECTION, SOLUTION INTRAVENOUS CONTINUOUS
Status: CANCELLED | OUTPATIENT
Start: 2024-10-05

## 2024-10-05 RX ORDER — ONDANSETRON 2 MG/ML
4 INJECTION INTRAMUSCULAR; INTRAVENOUS EVERY 6 HOURS PRN
Status: CANCELLED | OUTPATIENT
Start: 2024-10-05

## 2024-10-05 RX ORDER — ONDANSETRON 4 MG/1
4 TABLET, ORALLY DISINTEGRATING ORAL EVERY 8 HOURS PRN
Status: CANCELLED | OUTPATIENT
Start: 2024-10-05

## 2024-10-05 RX ORDER — SODIUM CHLORIDE 9 MG/ML
INJECTION, SOLUTION INTRAVENOUS CONTINUOUS
Status: DISCONTINUED | OUTPATIENT
Start: 2024-10-05 | End: 2024-10-05 | Stop reason: HOSPADM

## 2024-10-05 RX ORDER — SODIUM CHLORIDE 9 MG/ML
INJECTION, SOLUTION INTRAVENOUS PRN
Status: DISCONTINUED | OUTPATIENT
Start: 2024-10-05 | End: 2024-10-05 | Stop reason: HOSPADM

## 2024-10-05 RX ORDER — OXYCODONE HYDROCHLORIDE 5 MG/1
5 TABLET ORAL EVERY 4 HOURS PRN
Status: DISCONTINUED | OUTPATIENT
Start: 2024-10-05 | End: 2024-10-07 | Stop reason: HOSPADM

## 2024-10-05 RX ORDER — POTASSIUM CHLORIDE 7.45 MG/ML
10 INJECTION INTRAVENOUS PRN
Status: CANCELLED | OUTPATIENT
Start: 2024-10-05

## 2024-10-05 RX ORDER — ONDANSETRON 2 MG/ML
4 INJECTION INTRAMUSCULAR; INTRAVENOUS EVERY 6 HOURS PRN
Status: DISCONTINUED | OUTPATIENT
Start: 2024-10-05 | End: 2024-10-05 | Stop reason: HOSPADM

## 2024-10-05 RX ORDER — LEVOFLOXACIN 5 MG/ML
750 INJECTION, SOLUTION INTRAVENOUS
Status: CANCELLED | OUTPATIENT
Start: 2024-10-07 | End: 2024-10-13

## 2024-10-05 RX ADMIN — HYDROMORPHONE HYDROCHLORIDE 0.5 MG: 0.5 INJECTION, SOLUTION INTRAMUSCULAR; INTRAVENOUS; SUBCUTANEOUS at 01:06

## 2024-10-05 RX ADMIN — SODIUM CHLORIDE, PRESERVATIVE FREE 10 ML: 5 INJECTION INTRAVENOUS at 22:16

## 2024-10-05 RX ADMIN — PHENAZOPYRIDINE 200 MG: 100 TABLET ORAL at 14:19

## 2024-10-05 RX ADMIN — CEFEPIME 2000 MG: 2 INJECTION, POWDER, FOR SOLUTION INTRAVENOUS at 02:28

## 2024-10-05 RX ADMIN — HYDROMORPHONE HYDROCHLORIDE 0.5 MG: 1 INJECTION, SOLUTION INTRAMUSCULAR; INTRAVENOUS; SUBCUTANEOUS at 18:00

## 2024-10-05 RX ADMIN — OXYBUTYNIN CHLORIDE 5 MG: 5 TABLET ORAL at 14:19

## 2024-10-05 RX ADMIN — LEVOFLOXACIN 750 MG: 5 INJECTION, SOLUTION INTRAVENOUS at 03:07

## 2024-10-05 RX ADMIN — SODIUM CHLORIDE, PRESERVATIVE FREE 10 ML: 5 INJECTION INTRAVENOUS at 08:31

## 2024-10-05 RX ADMIN — PHENAZOPYRIDINE 200 MG: 100 TABLET ORAL at 08:30

## 2024-10-05 RX ADMIN — CEFEPIME 2000 MG: 2 INJECTION, POWDER, FOR SOLUTION INTRAVENOUS at 14:22

## 2024-10-05 RX ADMIN — ONDANSETRON 4 MG: 2 INJECTION INTRAMUSCULAR; INTRAVENOUS at 06:17

## 2024-10-05 RX ADMIN — HYDROMORPHONE HYDROCHLORIDE 0.5 MG: 1 INJECTION, SOLUTION INTRAMUSCULAR; INTRAVENOUS; SUBCUTANEOUS at 09:04

## 2024-10-05 RX ADMIN — SODIUM CHLORIDE: 9 INJECTION, SOLUTION INTRAVENOUS at 03:07

## 2024-10-05 RX ADMIN — SODIUM CHLORIDE: 9 INJECTION, SOLUTION INTRAVENOUS at 02:58

## 2024-10-05 RX ADMIN — ONDANSETRON 4 MG: 2 INJECTION INTRAMUSCULAR; INTRAVENOUS at 22:12

## 2024-10-05 RX ADMIN — ENOXAPARIN SODIUM 40 MG: 100 INJECTION SUBCUTANEOUS at 08:31

## 2024-10-05 RX ADMIN — OXYCODONE HYDROCHLORIDE AND ACETAMINOPHEN 1 TABLET: 5; 325 TABLET ORAL at 15:48

## 2024-10-05 RX ADMIN — SODIUM CHLORIDE: 9 INJECTION, SOLUTION INTRAVENOUS at 14:10

## 2024-10-05 RX ADMIN — HYDROMORPHONE HYDROCHLORIDE 0.5 MG: 1 INJECTION, SOLUTION INTRAMUSCULAR; INTRAVENOUS; SUBCUTANEOUS at 22:10

## 2024-10-05 RX ADMIN — HYDROMORPHONE HYDROCHLORIDE 0.5 MG: 0.5 INJECTION, SOLUTION INTRAMUSCULAR; INTRAVENOUS; SUBCUTANEOUS at 03:34

## 2024-10-05 RX ADMIN — HYDROMORPHONE HYDROCHLORIDE 0.5 MG: 1 INJECTION, SOLUTION INTRAMUSCULAR; INTRAVENOUS; SUBCUTANEOUS at 13:55

## 2024-10-05 RX ADMIN — ONDANSETRON 4 MG: 2 INJECTION INTRAMUSCULAR; INTRAVENOUS at 13:52

## 2024-10-05 RX ADMIN — PHENAZOPYRIDINE 200 MG: 100 TABLET ORAL at 17:27

## 2024-10-05 RX ADMIN — OXYBUTYNIN CHLORIDE 5 MG: 5 TABLET ORAL at 22:15

## 2024-10-05 ASSESSMENT — PAIN DESCRIPTION - DESCRIPTORS
DESCRIPTORS: ACHING
DESCRIPTORS: BURNING
DESCRIPTORS: DISCOMFORT
DESCRIPTORS: BURNING
DESCRIPTORS: BURNING
DESCRIPTORS: DISCOMFORT;BURNING
DESCRIPTORS: BURNING

## 2024-10-05 ASSESSMENT — PAIN DESCRIPTION - PAIN TYPE
TYPE: ACUTE PAIN

## 2024-10-05 ASSESSMENT — PAIN DESCRIPTION - LOCATION
LOCATION: ABDOMEN
LOCATION: PENIS
LOCATION: GROIN
LOCATION: PENIS;PELVIS
LOCATION: ABDOMEN
LOCATION: ABDOMEN;PELVIS;PENIS
LOCATION: ABDOMEN;PENIS
LOCATION: PENIS
LOCATION: PENIS

## 2024-10-05 ASSESSMENT — PAIN SCALES - WONG BAKER
WONGBAKER_NUMERICALRESPONSE: HURTS A LITTLE BIT

## 2024-10-05 ASSESSMENT — PAIN SCALES - GENERAL
PAINLEVEL_OUTOF10: 10
PAINLEVEL_OUTOF10: 9
PAINLEVEL_OUTOF10: 10
PAINLEVEL_OUTOF10: 7
PAINLEVEL_OUTOF10: 9
PAINLEVEL_OUTOF10: 0
PAINLEVEL_OUTOF10: 10
PAINLEVEL_OUTOF10: 10
PAINLEVEL_OUTOF10: 8
PAINLEVEL_OUTOF10: 10
PAINLEVEL_OUTOF10: 10

## 2024-10-05 ASSESSMENT — PAIN - FUNCTIONAL ASSESSMENT
PAIN_FUNCTIONAL_ASSESSMENT: ACTIVITIES ARE NOT PREVENTED
PAIN_FUNCTIONAL_ASSESSMENT: ACTIVITIES ARE NOT PREVENTED
PAIN_FUNCTIONAL_ASSESSMENT: PREVENTS OR INTERFERES SOME ACTIVE ACTIVITIES AND ADLS
PAIN_FUNCTIONAL_ASSESSMENT: ACTIVITIES ARE NOT PREVENTED
PAIN_FUNCTIONAL_ASSESSMENT: ACTIVITIES ARE NOT PREVENTED
PAIN_FUNCTIONAL_ASSESSMENT: PREVENTS OR INTERFERES SOME ACTIVE ACTIVITIES AND ADLS
PAIN_FUNCTIONAL_ASSESSMENT: ACTIVITIES ARE NOT PREVENTED
PAIN_FUNCTIONAL_ASSESSMENT: PREVENTS OR INTERFERES WITH MANY ACTIVE NOT PASSIVE ACTIVITIES

## 2024-10-05 ASSESSMENT — PAIN DESCRIPTION - ONSET
ONSET: ON-GOING

## 2024-10-05 ASSESSMENT — PAIN DESCRIPTION - FREQUENCY
FREQUENCY: CONTINUOUS

## 2024-10-05 ASSESSMENT — PAIN DESCRIPTION - ORIENTATION
ORIENTATION: MID
ORIENTATION: LOWER
ORIENTATION: LOWER
ORIENTATION: MID

## 2024-10-05 NOTE — PROGRESS NOTES
4 Eyes Skin Assessment     NAME:  Jethro Vázquez  YOB: 1960  MEDICAL RECORD NUMBER:  [id number]    The patient is being assessed for  Admission    I agree that at least one RN has performed a thorough Head to Toe Skin Assessment on the patient. ALL assessment sites listed below have been assessed.      Areas assessed by both nurses:    Head, Face, Ears, Shoulders, Back, Chest, Arms, Elbows, Hands, Sacrum. Buttock, Coccyx, Ischium, and Legs. Feet and Heels        Does the Patient have a Wound? No noted wound(s)       Guilherme Prevention initiated by RN: No  Wound Care Orders initiated by RN: No    Pressure Injury (Stage 3,4, Unstageable, DTI, NWPT, and Complex wounds) if present, place Wound referral order by RN under : No    New Ostomies, if present place, Ostomy referral order under : No     Nurse 1 eSignature: Electronically signed by Edith Dickinson RN on 10/5/24 at 4:24 AM EDT    **SHARE this note so that the co-signing nurse can place an eSignature**    Nurse 2 eSignature: {Esignature:[id number]}

## 2024-10-05 NOTE — PROGRESS NOTES
4 Eyes Skin Assessment     NAME:  Jethro Vázquez  YOB: 1960  MEDICAL RECORD NUMBER:  [id number]    The patient is being assessed for  Admission    I agree that at least one RN has performed a thorough Head to Toe Skin Assessment on the patient. ALL assessment sites listed below have been assessed.      Areas assessed by both nurses:    Head, Face, Ears, Shoulders, Back, Chest, Arms, Elbows, Hands, Sacrum. Buttock, Coccyx, Ischium, and Legs. Feet and Heels        Does the Patient have a Wound? No noted wound(s).  Coccyx redness. Pt reported was incont of stool this am and that caused some irritation to the area.       Guilherme Prevention initiated by RN: No  Wound Care Orders initiated by RN: No    Pressure Injury (Stage 3,4, Unstageable, DTI, NWPT, and Complex wounds) if present, place Wound referral order by RN under : No    New Ostomies, if present place, Ostomy referral order under : No     Nurse 1 eSignature: Electronically signed by Mckenzie Aguiar RN on 10/5/24 at 2:12 PM EDT    **SHARE this note so that the co-signing nurse can place an eSignature**    Nurse 2 eSignature: Electronically signed by Riley Patel LPN on 10/5/24 at 2:58 PM EDT

## 2024-10-05 NOTE — H&P
hernia repair; other surgical history (Bilateral, 2018); Cystoscopy (N/A, 2023); IR GUIDED NEPHROSTOMY CATH PLACEMENT (2024); Cholecystectomy, laparoscopic (N/A, 2024); IR PORT PLACEMENT > 5 YEARS (2024); IR GUIDED URETERAL STENT PLACE THRU EXIST TRACT (Bilateral, 2024); IR GUIDED NEPHROSTOMY CATH PLACEMENT (2024); and Cystoscopy (Bilateral, 2024).  Allergies: No Known Allergies  Fam HX: family history includes Cancer in his father; High Blood Pressure in his brother and mother.  Soc HX:   Social History     Socioeconomic History    Marital status:      Spouse name: None    Number of children: None    Years of education: None    Highest education level: None   Tobacco Use    Smoking status: Former     Current packs/day: 0.00     Average packs/day: 0.5 packs/day for 34.0 years (17.0 ttl pk-yrs)     Types: Cigarettes     Start date: 1971     Quit date: 2005     Years since quittin.7     Passive exposure: Past    Smokeless tobacco: Never   Vaping Use    Vaping status: Never Used   Substance and Sexual Activity    Alcohol use: No    Drug use: Yes     Types: Marijuana (Weed)     Comment: \"use to use cocaine- last used 19 yrs ago, marijuana- last used  19 yrs ago\"     Social Determinants of Health     Food Insecurity: No Food Insecurity (2024)    Hunger Vital Sign     Worried About Running Out of Food in the Last Year: Never true     Ran Out of Food in the Last Year: Never true   Transportation Needs: No Transportation Needs (2024)    PRAPARE - Transportation     Lack of Transportation (Medical): No     Lack of Transportation (Non-Medical): No   Housing Stability: Low Risk  (2024)    Housing Stability Vital Sign     Unable to Pay for Housing in the Last Year: No     Number of Times Moved in the Last Year: 0     Homeless in the Last Year: No       Medications:   Medications:    levofloxacin  750 mg IntraVENous Q48H    cefepime  2,000 mg  was performed with automated exposure control. Findings:  Visualized lung bases: The visualized lung bases appear clear. Liver: The liver appears normal. Gallbladder: Status post cholecystectomy Spleen: The Spleen appears normal. Pancreas: The Pancreas appears normal. Adrenal glands: The adrenal glands appear normal. Kidneys: Left ureteral stent in good position extending from the left kidney into the urinary bladder. Mild left hydronephrosis. Mild to moderate left hydronephrosis. No urinary calculi. Nonenhancing benign bilateral renal cysts present. Mild left perinephric stranding. Diffuse thickening of the walls of the mildly decompressed urinary bladder with adjacent stranding. GI: There is no evidence of bowel obstruction. Moderate degree of stool. Retroperitoneum:No retroperitoneal hemorrhage. No aortic abdominal aneurysm. Pelvis: No free fluid. Small left inguinal hernia containing only fat. No lymphadenopathy. MUSCULOSKELETAL:Osseous structures appear intact. Mild degenerative changes of the spine. Additional findings: No fluid collection     1. Diffuse wall thickening urinary bladder with adjacent stranding and mild left perinephric stranding without significant change from September 17, 2024, possible urinary tract infection. 2. Left ureteral stent in good position. Mild right and mild to moderate left hydronephrosis. No radiopaque urinary calculi. 3. Status post cholecystectomy  4. No lymphadenopathy. Electronically signed by Edward Isabel MD      Personally reviewed Lab Studies, Imaging, and discussed case with dr. Martinez    Electronically signed by CHENG Warner CNP on 10/5/2024 at 1:19 AM

## 2024-10-05 NOTE — H&P
V2.0  History and Physical      Name:  Jethro Vázquez /Age/Sex: 1960  (64 y.o. male)   MRN & CSN:  [id number] & [id number] Encounter Date/Time: 10/5/2024 6:51 PM EDT   [facility] PCP: Mono Schaffer MD       Hospital Day: 1    Assessment and Plan:   Jethro Vázquez is a 64 y.o. male     Suprapubic pain   -Will consult urology for further evaluation    Bladder cancer, on chemotherapy  -Urothelial carcinoma, invasive, high-grade, muscularis propria invasion noted on path report 2023, currently on radiotherapy and has completed chemotherapy  -Status post TURBT of large bladder tumor 2023 by Dr. Ivan  -CT abdomen pelvis done in the ED showed decreasing retroperitoneal and pelvic adenopathy consistent with treatment response  -Gemcitabine and cisplatin chemotherapy were administered from approximately February until July     Obstructive uropathy - had bilateral nephrostomy tubes placed in IR 2024 and now only has 1 on the right left, the left one has been removed     Complicated UTI   -Diagnosed in [place] and being treated with IV cefepime and Levaquin     Status post port placement for chemotherapy     Former smoker - quit in      Hypertension  -On amlodipine     BPH  -Continue tamsulosin     BMI is 25.1     Recent admissions      to  -had TURBT of bladder tumor      to -had pyelonephritis     January 10 to -was here and had bilateral nephrostomy tubes     Thrombocytopenia  -Platelet count last month was 486, on admission it was 178-this was measured 7 days after receiving chemotherapy, and the day of discharge platelet count is 78  -I gave him a prescription to recheck a CBC as outpatient        Disposition:   Current Living situation: Lives in [place] with wife and kids  Expected Disposition: Home  Estimated D/C: In about 2 days    Diet ADULT DIET; Regular   DVT Prophylaxis [] Lovenox, []  Heparin, [] SCDs, []  10/05/24  0548    138   K 3.8 3.9    105   CO2 18* 18*   BUN 26* 26*   CREATININE 2.3* 2.0*   GLUCOSE 107* 97     Hepatic:   Recent Labs     10/04/24  2000   AST 10*   ALT 11   BILITOT 0.4   ALKPHOS 125     Lipids:   Lab Results   Component Value Date/Time    HDL 33 01/06/2023 10:29 AM     Hemoglobin A1C: No results found for: \"LABA1C\"  TSH: No results found for: \"TSH\"  Troponin: No results found for: \"TROPONINT\"  Lactic Acid: No results for input(s): \"LACTA\" in the last 72 hours.  BNP: No results for input(s): \"PROBNP\" in the last 72 hours.  UA:  Lab Results   Component Value Date/Time    NITRU NEGATIVE 10/04/2024 09:50 PM    COLORU Yellow 10/04/2024 09:50 PM    PHUR 6.5 10/04/2024 09:50 PM    WBCUA TOO NUMEROUS TO COUNT 10/04/2024 09:50 PM    RBCUA 21 TO 50 10/04/2024 09:50 PM    RBCUA 35 04/08/2024 04:49 PM    MUCUS RARE 04/07/2024 11:26 AM    TRICHOMONAS NONE SEEN 04/08/2024 04:49 PM    YEAST OCCASIONAL 04/08/2024 04:49 PM    BACTERIA FEW 10/04/2024 09:50 PM    CLARITYU CLEAR 04/08/2024 04:49 PM    LEUKOCYTESUR MODERATE 10/04/2024 09:50 PM    UROBILINOGEN 0.2 10/04/2024 09:50 PM    BILIRUBINUR NEGATIVE 10/04/2024 09:50 PM    BLOODU MODERATE NUMBER OR AMOUNT OBSERVED 04/08/2024 04:49 PM    GLUCOSEU NEGATIVE 10/04/2024 09:50 PM    KETUA TRACE 10/04/2024 09:50 PM     Urine Cultures: No results found for: \"LABURIN\"  Blood Cultures: No results found for: \"BC\"  No results found for: \"BLOODCULT2\"  Organism: No results found for: \"ORG\"    Imaging/Diagnostics Last 24 Hours   CT ABDOMEN PELVIS W IV CONTRAST Additional Contrast? None    Result Date: 10/4/2024  Indication: History of bladder cancer, suprapubic abdominal pain, dysuria Comparison: September 17, 2024 Technique: CT of the abdomen and pelvis with intravenous contrast. Individualized dose optimization was performed with automated exposure control. Findings:  Visualized lung bases: The visualized lung bases appear clear. Liver: The liver appears

## 2024-10-05 NOTE — PROGRESS NOTES
I personally saw the patient and have reviewed all lab and imaging. I discussed the patient with the ANDI and was available for questions and consultation as needed.     History:   Patient states he has been having fever, and chills at home. He also has been having right cva pain. He has worsening bladder pain for the last few days. He received radiation for bladder cancer. Had right ureter stent removed in July and had left ureter exchanged then. Patient is having hematuria.     Exam:   Pain is in pain, has trouble relaxing  S1s2 regular rate  Cta b/l bs+  Abd soft,  Patient has tenderness over his bladder, he also has right cva tenderness      MDM:   ***      Samson Lira MD  10/5/24

## 2024-10-05 NOTE — PROGRESS NOTES
Patient being transferred to [facility] via [facility] at 1100 to see Urology. Patient and family updated that transportation is coming to get him and that he will be going to room 1105.     Report called to Riley on 1N and all questions answered. Riley understands patient is coming with port accessed and peripheral IV.     1120: [facility] transport here

## 2024-10-05 NOTE — PROGRESS NOTES
Samson Lira MD  10/5/2024 11:15 AM  [place] Regjeff  Transfer from [facility]  Bladder cancer  Hematuria  UTI  Having intractable pain  Transferring to be seen by urology and hemonc  10/5/2024 11:16 AM  Got all three pending transfers. Confirmed.

## 2024-10-05 NOTE — DISCHARGE SUMMARY
recommendations, medications, and plan.     Consults this admission:  IP CONSULT TO UROLOGY  IP CONSULT TO UROLOGY    Discharge Diagnosis:   Sepsis (HCC)  Complicated UTI due to ureteral stent  Intractable pain  SHIRLEY on CKD  Hypertension  History of bladder cancer    Discharge Instruction:   Follow up appointments: Urology, oncology  Primary care physician: Mono Schaffer MD within 2 weeks  Diet: regular diet   Activity: activity as tolerated  Disposition: Discharged to:   []Home, []C, []SNF, []Acute Rehab, [x]Other Acute Care Facility, []Hospice   Condition on discharge: Stable  Labs and Tests to be Followed up as an outpatient by PCP or Specialist:     Discharge Medications:        Medication List        ASK your doctor about these medications      amLODIPine 10 MG tablet  Commonly known as: NORVASC  Take 1 tablet by mouth daily     dexAMETHasone 4 MG tablet  Commonly known as: DECADRON  Take 1 tablet by mouth daily (with breakfast) For 3 days after each Cisplatin infusion     gabapentin 300 MG capsule  Commonly known as: NEURONTIN  Take 1 capsule by mouth 3 times daily for 90 days.     methylPREDNISolone 4 MG tablet  Commonly known as: MEDROL DOSEPACK  Take by mouth per package directions.     naloxone 4 MG/0.1ML Liqd nasal spray     ondansetron 8 MG Tbdp disintegrating tablet  Commonly known as: ZOFRAN-ODT  Take 1 tablet by mouth every 8 hours as needed for Nausea or Vomiting     phenazopyridine 200 MG tablet  Commonly known as: PYRIDIUM  Take 1 tablet by mouth 3 times daily (with meals) for 28 days     tamsulosin 0.4 MG capsule  Commonly known as: FLOMAX  Take 1 capsule by mouth 2 times daily     therapeutic multivitamin-minerals tablet             Objective Findings at Discharge:   /82   Pulse 90   Temp 98.4 °F (36.9 °C)   Resp 16   Ht 1.778 m (5' 10\")   Wt 84.6 kg (186 lb 8 oz)   SpO2 98%   BMI 26.76 kg/m²       Physical Exam:   General: NAD  Eyes: EOMI  ENT: neck supple  Cardiovascular:

## 2024-10-05 NOTE — PLAN OF CARE
Problem: Discharge Planning  Goal: Discharge to home or other facility with appropriate resources  Outcome: Progressing     Problem: Safety - Adult  Goal: Free from fall injury  Outcome: Progressing     Problem: Pain  Goal: Verbalizes/displays adequate comfort level or baseline comfort level  Outcome: Progressing     Problem: Genitourinary - Adult  Goal: Absence of urinary retention  Outcome: Progressing  Goal: Urinary catheter remains patent  Outcome: Progressing

## 2024-10-06 LAB
ANION GAP SERPL CALCULATED.3IONS-SCNC: 11 MMOL/L (ref 9–17)
BUN SERPL-MCNC: 22 MG/DL (ref 7–20)
CALCIUM SERPL-MCNC: 8.2 MG/DL (ref 8.3–10.6)
CHLORIDE SERPL-SCNC: 107 MMOL/L (ref 99–110)
CO2 SERPL-SCNC: 19 MMOL/L (ref 21–32)
CREAT SERPL-MCNC: 2.1 MG/DL (ref 0.8–1.3)
ERYTHROCYTE [DISTWIDTH] IN BLOOD BY AUTOMATED COUNT: 15.5 % (ref 11.7–14.9)
GFR, ESTIMATED: 33 ML/MIN/1.73M2
GLUCOSE SERPL-MCNC: 93 MG/DL (ref 74–99)
HCT VFR BLD AUTO: 28.7 % (ref 42–52)
HGB BLD-MCNC: 9.3 G/DL (ref 13.5–18)
MCH RBC QN AUTO: 30.5 PG (ref 27–31)
MCHC RBC AUTO-ENTMCNC: 32.4 G/DL (ref 32–36)
MCV RBC AUTO: 94.1 FL (ref 78–100)
MICROORGANISM SPEC CULT: NORMAL
PLATELET # BLD AUTO: 200 K/UL (ref 140–440)
PMV BLD AUTO: 8.8 FL (ref 7.5–11.1)
POTASSIUM SERPL-SCNC: 3.8 MMOL/L (ref 3.5–5.1)
RBC # BLD AUTO: 3.05 M/UL (ref 4.6–6.2)
SERVICE CMNT-IMP: NORMAL
SODIUM SERPL-SCNC: 137 MMOL/L (ref 136–145)
SPECIMEN DESCRIPTION: NORMAL
WBC OTHER # BLD: 6.4 K/UL (ref 4–10.5)

## 2024-10-06 PROCEDURE — 6360000002 HC RX W HCPCS: Performed by: NURSE PRACTITIONER

## 2024-10-06 PROCEDURE — 6370000000 HC RX 637 (ALT 250 FOR IP): Performed by: PHYSICIAN ASSISTANT

## 2024-10-06 PROCEDURE — 6370000000 HC RX 637 (ALT 250 FOR IP): Performed by: STUDENT IN AN ORGANIZED HEALTH CARE EDUCATION/TRAINING PROGRAM

## 2024-10-06 PROCEDURE — 80048 BASIC METABOLIC PNL TOTAL CA: CPT

## 2024-10-06 PROCEDURE — 1200000000 HC SEMI PRIVATE

## 2024-10-06 PROCEDURE — 99223 1ST HOSP IP/OBS HIGH 75: CPT | Performed by: INTERNAL MEDICINE

## 2024-10-06 PROCEDURE — 36415 COLL VENOUS BLD VENIPUNCTURE: CPT

## 2024-10-06 PROCEDURE — 94761 N-INVAS EAR/PLS OXIMETRY MLT: CPT

## 2024-10-06 PROCEDURE — 2580000003 HC RX 258: Performed by: NURSE PRACTITIONER

## 2024-10-06 PROCEDURE — 6370000000 HC RX 637 (ALT 250 FOR IP): Performed by: INTERNAL MEDICINE

## 2024-10-06 PROCEDURE — 6370000000 HC RX 637 (ALT 250 FOR IP): Performed by: NURSE PRACTITIONER

## 2024-10-06 PROCEDURE — 85027 COMPLETE CBC AUTOMATED: CPT

## 2024-10-06 RX ORDER — TAMSULOSIN HYDROCHLORIDE 0.4 MG/1
0.4 CAPSULE ORAL 2 TIMES DAILY
Status: DISCONTINUED | OUTPATIENT
Start: 2024-10-06 | End: 2024-10-07 | Stop reason: HOSPADM

## 2024-10-06 RX ADMIN — TAMSULOSIN HYDROCHLORIDE 0.4 MG: 0.4 CAPSULE ORAL at 20:56

## 2024-10-06 RX ADMIN — SODIUM CHLORIDE: 9 INJECTION, SOLUTION INTRAVENOUS at 13:04

## 2024-10-06 RX ADMIN — PHENAZOPYRIDINE 200 MG: 100 TABLET ORAL at 17:14

## 2024-10-06 RX ADMIN — TAMSULOSIN HYDROCHLORIDE 0.4 MG: 0.4 CAPSULE ORAL at 10:39

## 2024-10-06 RX ADMIN — CEFEPIME 2000 MG: 2 INJECTION, POWDER, FOR SOLUTION INTRAVENOUS at 15:05

## 2024-10-06 RX ADMIN — SODIUM CHLORIDE 1000 ML: 9 INJECTION, SOLUTION INTRAVENOUS at 02:41

## 2024-10-06 RX ADMIN — HYDROMORPHONE HYDROCHLORIDE 0.5 MG: 1 INJECTION, SOLUTION INTRAMUSCULAR; INTRAVENOUS; SUBCUTANEOUS at 13:12

## 2024-10-06 RX ADMIN — PHENAZOPYRIDINE 200 MG: 100 TABLET ORAL at 13:05

## 2024-10-06 RX ADMIN — HYDROMORPHONE HYDROCHLORIDE 0.5 MG: 1 INJECTION, SOLUTION INTRAMUSCULAR; INTRAVENOUS; SUBCUTANEOUS at 05:59

## 2024-10-06 RX ADMIN — HYDROMORPHONE HYDROCHLORIDE 0.5 MG: 1 INJECTION, SOLUTION INTRAMUSCULAR; INTRAVENOUS; SUBCUTANEOUS at 02:30

## 2024-10-06 RX ADMIN — ENOXAPARIN SODIUM 40 MG: 100 INJECTION SUBCUTANEOUS at 08:18

## 2024-10-06 RX ADMIN — OXYBUTYNIN CHLORIDE 5 MG: 5 TABLET ORAL at 20:56

## 2024-10-06 RX ADMIN — OXYCODONE HYDROCHLORIDE 5 MG: 5 TABLET ORAL at 01:14

## 2024-10-06 RX ADMIN — OXYCODONE HYDROCHLORIDE 5 MG: 5 TABLET ORAL at 10:39

## 2024-10-06 RX ADMIN — ONDANSETRON 4 MG: 4 TABLET, ORALLY DISINTEGRATING ORAL at 17:14

## 2024-10-06 RX ADMIN — OXYBUTYNIN CHLORIDE 5 MG: 5 TABLET ORAL at 13:05

## 2024-10-06 RX ADMIN — SODIUM CHLORIDE, PRESERVATIVE FREE 10 ML: 5 INJECTION INTRAVENOUS at 20:58

## 2024-10-06 RX ADMIN — PHENAZOPYRIDINE 200 MG: 100 TABLET ORAL at 08:17

## 2024-10-06 RX ADMIN — HYDROMORPHONE HYDROCHLORIDE 0.5 MG: 1 INJECTION, SOLUTION INTRAMUSCULAR; INTRAVENOUS; SUBCUTANEOUS at 20:57

## 2024-10-06 RX ADMIN — OXYBUTYNIN CHLORIDE 5 MG: 5 TABLET ORAL at 08:17

## 2024-10-06 RX ADMIN — OXYCODONE HYDROCHLORIDE 5 MG: 5 TABLET ORAL at 17:16

## 2024-10-06 ASSESSMENT — PAIN SCALES - GENERAL
PAINLEVEL_OUTOF10: 9
PAINLEVEL_OUTOF10: 7
PAINLEVEL_OUTOF10: 9
PAINLEVEL_OUTOF10: 8
PAINLEVEL_OUTOF10: 9
PAINLEVEL_OUTOF10: 0
PAINLEVEL_OUTOF10: 8
PAINLEVEL_OUTOF10: 9

## 2024-10-06 ASSESSMENT — PAIN DESCRIPTION - ORIENTATION
ORIENTATION: LOWER
ORIENTATION: MID
ORIENTATION: LOWER

## 2024-10-06 ASSESSMENT — PAIN DESCRIPTION - LOCATION
LOCATION: ABDOMEN
LOCATION: ABDOMEN;PENIS
LOCATION: ABDOMEN

## 2024-10-06 ASSESSMENT — PAIN DESCRIPTION - DESCRIPTORS
DESCRIPTORS: ACHING
DESCRIPTORS: ACHING;BURNING
DESCRIPTORS: BURNING;DISCOMFORT
DESCRIPTORS: ACHING
DESCRIPTORS: ACHING;BURNING
DESCRIPTORS: ACHING;BURNING

## 2024-10-06 ASSESSMENT — PAIN SCALES - WONG BAKER
WONGBAKER_NUMERICALRESPONSE: NO HURT
WONGBAKER_NUMERICALRESPONSE: HURTS A LITTLE BIT

## 2024-10-06 NOTE — CONSULTS
Patient Name:  Jethro Vázquez  Patient :  1960  Patient MRN:  [id number]     Primary Oncologist: David Fuller MD  Referring Provider: Mono Schaffer MD     Date of Service: 10/5/2024      Reason for Consult: To evaluate the patient with bladder cancer.      Chief Complaint:  No chief complaint on file.    Patient Active Problem List:     Non-recurrent bilateral inguinal hernia without obstruction or gangrene     Right inguinal hernia     Lipoma, spermatic cord     Benign essential microscopic hematuria     Chronic left-sided low back pain without sciatica     Overweight     Acute cystitis with hematuria     Primary hypertension     Pyelonephritis     Malignant neoplasm of overlapping sites of bladder (HCC)     Need for hepatitis B screening test     Bladder cancer (HCC)     Acute cholecystitis     Poor intravenous access     Abdominal adhesions     SHIRLEY (acute kidney injury) (HCC)     Sepsis (HCC)     UTI (urinary tract infection)    HPI:   Jethro Vázquez is a 64 y.o. male with medical history significant for muscle invasive high grade bladder cancer, s/p TUBT on 23, noted extensive retroperitoneal and pelvis adenopathy, s/p 6 cycles of chemo with gemcitabine and cisplatin, very good response to chemo, declines surgery, currently on concurrent CRT, still has 3 RT left to complete it, presented on 10/5/24 with back pain and fever.     He was admitted for sepsis d/t complicated UTI related to ureteral stent. He has been on antibiotics since admission.     I was called to evaluate him. He is feeling better this morning.      Past Medical History:     Past Medical History:   Diagnosis Date    Acid reflux     History of kidney stones     \"had kidney stone 2017- passed it\"    Hypertension     \"on medication since age 50's\"    Malignant neoplasm of overlapping sites of bladder (HCC) 2024    No natural teeth       Past Surgery History:    Past Surgical History:   Procedure Laterality Date    ANKLE  No Transportation Needs (10/5/2024)    PRAPARE - Transportation     Lack of Transportation (Medical): No     Lack of Transportation (Non-Medical): No   Housing Stability: Low Risk  (10/5/2024)    Housing Stability Vital Sign     Unable to Pay for Housing in the Last Year: No     Number of Times Moved in the Last Year: 1     Homeless in the Last Year: No     Family History:    Family History   Problem Relation Age of Onset    High Blood Pressure Mother     Cancer Father         throat cancer    High Blood Pressure Brother       No Known Allergies    No current facility-administered medications on file prior to encounter.     Current Outpatient Medications on File Prior to Encounter   Medication Sig Dispense Refill    tamsulosin (FLOMAX) 0.4 MG capsule Take 1 capsule by mouth 2 times daily 60 capsule 1    gabapentin (NEURONTIN) 300 MG capsule Take 1 capsule by mouth 3 times daily for 90 days. 90 capsule 2    phenazopyridine (PYRIDIUM) 200 MG tablet Take 1 tablet by mouth 3 times daily (with meals) for 28 days 84 tablet 0    ondansetron (ZOFRAN-ODT) 8 MG TBDP disintegrating tablet Take 1 tablet by mouth every 8 hours as needed for Nausea or Vomiting 30 tablet 3    Multiple Vitamins-Minerals (THERAPEUTIC MULTIVITAMIN-MINERALS) tablet Take 1 tablet by mouth daily      amLODIPine (NORVASC) 10 MG tablet Take 1 tablet by mouth daily 30 tablet 3    methylPREDNISolone (MEDROL DOSEPACK) 4 MG tablet Take by mouth per package directions. (Patient not taking: Reported on 10/5/2024) 1 kit 0    naloxone 4 MG/0.1ML LIQD nasal spray         Review of Systems:  Constitutional:  No weight loss, Has fever on admission, No chills, No night sweats.  Energy level is fair.  Eyes:  No diplopia, No transient or permanent loss of vision, No scotomata.  ENT / Mouth:  No epistaxis, No dysphagia, No hoarseness, No oral ulcers, No gingival bleeding.  No sore throat, No postnasal drip, No nasal drip, No mouth pain, No sinus pain, No tinnitus,

## 2024-10-06 NOTE — PROGRESS NOTES
V2.0  [facility] [place] [facility] Hospitalist Progress Note      Name:  Jethro Vázquez /Age/Sex: 1960  (64 y.o. male)   MRN & CSN:  [id number] & [id number] Encounter Date/Time: 10/6/2024 9:41 AM EDT    Location:  [facility]-A PCP: Mono Schaffer MD       Hospital Day: 2      Subjective:     Chief Complaint:  dysuria     Pt is doing well today. Pain is under control. Still has hematuria.   Pt is making urine.   No n/v/d.  Pt has radiation session scheduled tomorrow and he doesn't want to miss it.      Assessment and Plan:   Sepsis 2/2 complicated UTI related to ureteral stent  P/w tachycardia, tachypnea, no fever, no leukocytosis.  Reported bilateral back pain associated with dysuria, associated with chills and subjective fever  CT of abdomen/pelvis indicated mild right and mild to moderate left hydronephrosis  UA too many WBC, moderate LE, negative nitrite  Prior urine cx 2024 grew enterobacter cloacae complex, and pseudomonas aeruginosa sensitivities available  -was started on cefepime and Levaquin; will keep as it covers the prior cultures  -Pain management  - urology consulted   - f/u urine cx from 10/4/2024    SHIRLEY on CKD stage II  Cr 2.3 on admission; baseline was 1.6  Likely post obstructive.   - continue hydration   - avoid nephrotoxins  - strict I/o  - plan as above  - if no improvement , will consult nephro      Hypertension  On home amlodipine  - hold and monitor BP     History of bladder cancer  Recently completed chemotherapy  -Currently on radiation therapy  -hem/oncology consulted; hopefully can resume radiation while inpatient      Personally reviewed Lab Studies and Imaging     Discussed management of the case with oncology who recommended radiation while in the hospital    Imaging that was interpreted personally includes CT abd and results as above    Drugs that require monitoring for toxicity include lovenox and the method of monitoring was cbc      Diet ADULT DIET; Regular   DVT Prophylaxis [x]

## 2024-10-06 NOTE — CONSULTS
thickening urinary bladder with adjacent stranding and mild left perinephric stranding without significant change from September 17, 2024, possible urinary tract infection. 2. Left ureteral stent in good position. Mild right and mild to moderate left hydronephrosis. No radiopaque urinary calculi. 3. Status post cholecystectomy  4. No lymphadenopathy. Electronically signed by Edward Isabel MD    CT ABDOMEN PELVIS WO CONTRAST    Result Date: 9/17/2024  CT OF THE ABDOMEN AND PELVIS INDICATION: Abdominal pain TECHNIQUE: Multiple 2D axial images were obtained through the abdomen and pelvis without IV contrast.  Oral contrast was used for bowel opacification.  Radiation dose reduction techniques were used for this study:  All CT scans performed at this facility use one or all of the following: Automated exposure control, adjustment of the mA and/or kVp according to patient's size, iterative reconstruction. COMPARISON: None FINDINGS: - LUNG BASES: No infiltrates or masses. - LIVER: Normal in size and appearance.  - GALLBLADDER/BILE DUCTS: Status post cholecystectomy PANCREAS: Normal. - SPLEEN: Normal. - ADRENALS: Normal. - KIDNEYS/URETERS: Moderate hydronephrosis, left ureteric stent extending from the left renal pelvis to the urinary bladder, marked left perinephric and periureteric fat stranding. BLADDER: Mild thickening of the urinary bladder wall with surrounding fat stranding. REPRODUCTIVE ORGANS: No pelvic masses. - BOWEL: Normal caliber.  No inflammatory changes. - LYMPH NODES: No significant retroperitoneal, mesenteric, or pelvic adenopathy. - BONES: No fracture or significant bone lesion. - VASCULATURE: Atherosclerotic disease of the aorta - OTHER: No ascites. Anterior abdominal wall staples     Marked thickening of the urinary bladder wall with surrounding fat stranding and marked left perinephric and periureteric fat stranding, left ureter stent in place, evaluate for superimposed infection. Electronically signed  by Jeanette Arellano    Assessment & Plan:      Jethro Vázquez is a 64 y.o. male with PMHx of kidney stones, HTN, bladder cancer, and bilateral hydronephrosis managed with left ureteral stent admitted 10/5/2024 for suprapubic pain and possible UTI.    1) Dysuria: Likely related to ongoing radiation therapy for bladder cancer versus indwelling right ureteral stent   Already on Pyridium 200 mg, oxybutynin 5 mg 3 times daily, and as needed pain meds    2) ?Complicated UTI: UA indeterminate due to ureteral stent in place and ongoing bladder radiation  Urine culture showed mixed pathogens   AF/VSS.  No leukocytosis.  On cefepime and Levaquin per prior C&S.    3) Bladder cancer:  S/p cystoscopy, right stent removal, and left stent exchange 7/8/24  S/p internalization of bilateral ureteral stents and nephrostomy tube removal with IR 4/1/24  S/p bilateral PCN placement with IR 1/12/24  S/p cystoscopy and transurethral resection of large bladder tumor 12/14/23. Ureteral orifices not visible. Pathology with invasive high grade urothelial carcinoma.  CT a/p 10/4/2024: Diffuse wall thickening urinary bladder with adjacent stranding and mild left perinephric stranding without significant change from prior. possible urinary tract infection. Left ureteral stent in good position. Mild right and mild to moderate left hydronephrosis.  PET 7/2024: There are no foci of abnormal FDG activity to suggest neoplastic disease.               Currently on chemotherapy and radiation therapy; follows with Dr. Gambino and Dr. Lr.    Patient seen and examined, chart reviewed.   Electronically signed by MOOK Marquis on 10/6/2024 at 12:38 PM

## 2024-10-06 NOTE — PLAN OF CARE
Problem: Discharge Planning  Goal: Discharge to home or other facility with appropriate resources  10/6/2024 0003 by Mireya Godwin, RN  Outcome: Progressing  10/5/2024 1634 by Mckenzie Aguiar RN  Outcome: Progressing     Problem: Safety - Adult  Goal: Free from fall injury  10/6/2024 0003 by Mireya Godwin, RN  Outcome: Progressing  10/5/2024 1634 by Mckenzie Aguiar RN  Outcome: Progressing     Problem: Pain  Goal: Verbalizes/displays adequate comfort level or baseline comfort level  10/6/2024 0003 by Mireya Godwin, RN  Outcome: Progressing  10/5/2024 1634 by Mckenzie Aguiar RN  Outcome: Progressing     Problem: Genitourinary - Adult  Goal: Absence of urinary retention  10/6/2024 0003 by Mireya Godwin, RN  Outcome: Progressing  10/5/2024 1634 by Mckenzie Aguiar RN  Outcome: Progressing  Goal: Urinary catheter remains patent  10/6/2024 0003 by Mireya Godwin, RN  Outcome: Progressing  10/5/2024 1634 by Mckenzie Aguiar RN  Outcome: Progressing

## 2024-10-07 ENCOUNTER — APPOINTMENT (OUTPATIENT)
Dept: RADIATION ONCOLOGY | Age: 64
End: 2024-10-07
Payer: MEDICARE

## 2024-10-07 VITALS
TEMPERATURE: 98.1 F | HEIGHT: 70 IN | BODY MASS INDEX: 26.83 KG/M2 | SYSTOLIC BLOOD PRESSURE: 120 MMHG | DIASTOLIC BLOOD PRESSURE: 75 MMHG | WEIGHT: 187.4 LBS | OXYGEN SATURATION: 96 % | HEART RATE: 96 BPM | RESPIRATION RATE: 18 BRPM

## 2024-10-07 LAB
ANION GAP SERPL CALCULATED.3IONS-SCNC: 10 MMOL/L (ref 9–17)
BUN SERPL-MCNC: 17 MG/DL (ref 7–20)
CALCIUM SERPL-MCNC: 8 MG/DL (ref 8.3–10.6)
CHLORIDE SERPL-SCNC: 108 MMOL/L (ref 99–110)
CO2 SERPL-SCNC: 21 MMOL/L (ref 21–32)
CREAT SERPL-MCNC: 1.7 MG/DL (ref 0.8–1.3)
ERYTHROCYTE [DISTWIDTH] IN BLOOD BY AUTOMATED COUNT: 15.4 % (ref 11.7–14.9)
ERYTHROCYTE [DISTWIDTH] IN BLOOD BY AUTOMATED COUNT: 15.5 % (ref 11.7–14.9)
FERRITIN SERPL-MCNC: 295 NG/ML (ref 30–400)
GFR, ESTIMATED: 42 ML/MIN/1.73M2
GLUCOSE SERPL-MCNC: 90 MG/DL (ref 74–99)
HCT VFR BLD AUTO: 17.4 % (ref 42–52)
HCT VFR BLD AUTO: 26.8 % (ref 42–52)
HGB BLD-MCNC: 5.4 G/DL (ref 13.5–18)
HGB BLD-MCNC: 8.6 G/DL (ref 13.5–18)
IRON SATN MFR SERPL: 26 % (ref 15–50)
IRON SERPL-MCNC: 47 UG/DL (ref 59–158)
MCH RBC QN AUTO: 30.4 PG (ref 27–31)
MCH RBC QN AUTO: 30.7 PG (ref 27–31)
MCHC RBC AUTO-ENTMCNC: 31 G/DL (ref 32–36)
MCHC RBC AUTO-ENTMCNC: 32.1 G/DL (ref 32–36)
MCV RBC AUTO: 94.7 FL (ref 78–100)
MCV RBC AUTO: 98.9 FL (ref 78–100)
PLATELET # BLD AUTO: 141 K/UL (ref 140–440)
PLATELET, FLUORESCENCE: 124 K/UL (ref 140–440)
PMV BLD AUTO: 9.2 FL (ref 7.5–11.1)
PMV BLD AUTO: 9.5 FL (ref 7.5–11.1)
POTASSIUM SERPL-SCNC: 3.9 MMOL/L (ref 3.5–5.1)
RBC # BLD AUTO: 1.76 M/UL (ref 4.6–6.2)
RBC # BLD AUTO: 2.83 M/UL (ref 4.6–6.2)
SODIUM SERPL-SCNC: 138 MMOL/L (ref 136–145)
TIBC SERPL-MCNC: 184 UG/DL (ref 260–445)
UNSATURATED IRON BINDING CAPACITY: 137 UG/DL (ref 110–370)
WBC OTHER # BLD: 2.7 K/UL (ref 4–10.5)
WBC OTHER # BLD: 3.8 K/UL (ref 4–10.5)

## 2024-10-07 PROCEDURE — 80048 BASIC METABOLIC PNL TOTAL CA: CPT

## 2024-10-07 PROCEDURE — 6360000002 HC RX W HCPCS: Performed by: NURSE PRACTITIONER

## 2024-10-07 PROCEDURE — 83550 IRON BINDING TEST: CPT

## 2024-10-07 PROCEDURE — 83540 ASSAY OF IRON: CPT

## 2024-10-07 PROCEDURE — 6370000000 HC RX 637 (ALT 250 FOR IP): Performed by: NURSE PRACTITIONER

## 2024-10-07 PROCEDURE — 82728 ASSAY OF FERRITIN: CPT

## 2024-10-07 PROCEDURE — 6370000000 HC RX 637 (ALT 250 FOR IP): Performed by: STUDENT IN AN ORGANIZED HEALTH CARE EDUCATION/TRAINING PROGRAM

## 2024-10-07 PROCEDURE — 51798 US URINE CAPACITY MEASURE: CPT

## 2024-10-07 PROCEDURE — 6370000000 HC RX 637 (ALT 250 FOR IP): Performed by: PHYSICIAN ASSISTANT

## 2024-10-07 PROCEDURE — 6360000002 HC RX W HCPCS: Performed by: STUDENT IN AN ORGANIZED HEALTH CARE EDUCATION/TRAINING PROGRAM

## 2024-10-07 PROCEDURE — 36415 COLL VENOUS BLD VENIPUNCTURE: CPT

## 2024-10-07 PROCEDURE — 85027 COMPLETE CBC AUTOMATED: CPT

## 2024-10-07 PROCEDURE — 99232 SBSQ HOSP IP/OBS MODERATE 35: CPT | Performed by: INTERNAL MEDICINE

## 2024-10-07 RX ORDER — OXYBUTYNIN CHLORIDE 5 MG/1
5 TABLET ORAL 3 TIMES DAILY
Qty: 90 TABLET | Refills: 3 | Status: SHIPPED | OUTPATIENT
Start: 2024-10-07

## 2024-10-07 RX ORDER — TAMSULOSIN HYDROCHLORIDE 0.4 MG/1
CAPSULE ORAL DAILY
Qty: 90 CAPSULE | Refills: 1 | OUTPATIENT
Start: 2024-10-07

## 2024-10-07 RX ORDER — HEPARIN 100 UNIT/ML
500 SYRINGE INTRAVENOUS PRN
Status: DISCONTINUED | OUTPATIENT
Start: 2024-10-07 | End: 2024-10-07 | Stop reason: HOSPADM

## 2024-10-07 RX ADMIN — LEVOFLOXACIN 750 MG: 5 INJECTION, SOLUTION INTRAVENOUS at 01:18

## 2024-10-07 RX ADMIN — OXYCODONE HYDROCHLORIDE AND ACETAMINOPHEN 1 TABLET: 5; 325 TABLET ORAL at 09:08

## 2024-10-07 RX ADMIN — HYDROMORPHONE HYDROCHLORIDE 0.5 MG: 1 INJECTION, SOLUTION INTRAMUSCULAR; INTRAVENOUS; SUBCUTANEOUS at 01:05

## 2024-10-07 RX ADMIN — TAMSULOSIN HYDROCHLORIDE 0.4 MG: 0.4 CAPSULE ORAL at 09:00

## 2024-10-07 RX ADMIN — Medication 500 UNITS: at 10:50

## 2024-10-07 RX ADMIN — HYDROMORPHONE HYDROCHLORIDE 0.5 MG: 1 INJECTION, SOLUTION INTRAMUSCULAR; INTRAVENOUS; SUBCUTANEOUS at 05:40

## 2024-10-07 RX ADMIN — OXYBUTYNIN CHLORIDE 5 MG: 5 TABLET ORAL at 09:00

## 2024-10-07 ASSESSMENT — PAIN - FUNCTIONAL ASSESSMENT
PAIN_FUNCTIONAL_ASSESSMENT: ACTIVITIES ARE NOT PREVENTED
PAIN_FUNCTIONAL_ASSESSMENT: PREVENTS OR INTERFERES SOME ACTIVE ACTIVITIES AND ADLS
PAIN_FUNCTIONAL_ASSESSMENT: PREVENTS OR INTERFERES SOME ACTIVE ACTIVITIES AND ADLS

## 2024-10-07 ASSESSMENT — PAIN DESCRIPTION - DESCRIPTORS
DESCRIPTORS: BURNING;DISCOMFORT
DESCRIPTORS: BURNING
DESCRIPTORS: BURNING

## 2024-10-07 ASSESSMENT — PAIN SCALES - GENERAL
PAINLEVEL_OUTOF10: 9
PAINLEVEL_OUTOF10: 9
PAINLEVEL_OUTOF10: 4
PAINLEVEL_OUTOF10: 8

## 2024-10-07 ASSESSMENT — PAIN DESCRIPTION - ONSET: ONSET: ON-GOING

## 2024-10-07 ASSESSMENT — PAIN DESCRIPTION - LOCATION
LOCATION: ABDOMEN
LOCATION: ABDOMEN;PENIS
LOCATION: ABDOMEN

## 2024-10-07 ASSESSMENT — PAIN DESCRIPTION - FREQUENCY: FREQUENCY: CONTINUOUS

## 2024-10-07 ASSESSMENT — PAIN DESCRIPTION - PAIN TYPE: TYPE: CHRONIC PAIN

## 2024-10-07 ASSESSMENT — PAIN DESCRIPTION - ORIENTATION
ORIENTATION: LOWER
ORIENTATION: LOWER

## 2024-10-07 NOTE — PROGRESS NOTES
Patient Name:  Jethro Vázquez  Patient :  1960  Patient MRN:  [id number]     Referring Provider: Mono Schaffer MD     Date of Service: 10/5/2024   Chief Complaint:  No chief complaint on file.    Patient Active Problem List:     Non-recurrent bilateral inguinal hernia without obstruction or gangrene     Right inguinal hernia     Lipoma, spermatic cord     Benign essential microscopic hematuria     Chronic left-sided low back pain without sciatica     Overweight     Acute cystitis with hematuria     Primary hypertension     Pyelonephritis     Malignant neoplasm of overlapping sites of bladder (HCC)     Need for hepatitis B screening test     Bladder cancer (HCC)     Acute cholecystitis     Poor intravenous access     Abdominal adhesions     SHIRLEY (acute kidney injury) (HCC)     Sepsis (HCC)     UTI (urinary tract infection)    HPI:   Jethro Vázquez is a pleasant  64 y.o. male with medical history significant for muscle invasive high grade bladder cancer, s/p TUBT on 23, noted extensive retroperitoneal and pelvis adenopathy, s/p 6 cycles of chemo with gemcitabine and cisplatin, very good response to chemo, declines surgery, currently on concurrent CRT, still has 3 RT left to complete it, presented on 10/5/24 with back pain and fever.     He was admitted for sepsis d/t complicated UTI related to ureteral stent. He has been on antibiotics since admission.     I was called to evaluate him. He is feeling better this morning.     10/7/2024 awake, alert, no acute distress.  Pain is better this morning.  Urologist is on bard.   10/4/2024 CT AP:   1. Diffuse wall thickening urinary bladder with adjacent stranding and mild left perinephric stranding without significant change from 2024, possible urinary tract infection.  2. Left ureteral stent in good position. Mild right and mild to moderate left  hydronephrosis. No radiopaque urinary calculi.  3. Status post cholecystectomy   4. No  per admitting team for sepsis.     Timo CHUNG.   I answered all the questions and concerns for today. Thank you for allowing me to participate in the care of this very pleasant patient.    Recent imaging and labs were reviewed and discussed with the patient.

## 2024-10-07 NOTE — DISCHARGE SUMMARY
Discharge Summary    Name:  Jethro Vázquez /Age/Sex: 1960  (64 y.o. male)   MRN & CSN:  [id number] & [id number] Admission Date/Time: 10/5/2024  1:06 PM   Attending:  Carmelo Davis MD Discharging Physician: Carmelo Davsi MD     Hospital Course:   Jethro Vázquez is a 64 y.o.  male  who presents with UTI (urinary tract infection)    HPI  \"Jethro Vázquez is a 64 y.o. male with pmh of bladder cancer, hypertension, CKD who presents with back pain and fever.  Patient recently was admitted to hospital in  for complicated UTI related to ureteral stent.  Patient was discharged home on  with antibiotics Cipro.  Patient returned to [place] ED today due to worsening back pain since morning.  Patient stated the pain level is 10/10 even after taking Percocet.  Patient reported intermittent chills and subjective fever.  No nausea or vomiting.  Patient is currently on radiation treatment.  He has appointment with Dr. Ivan on 10/28 for cystoscopy, left RG PG/ureteral stent exchange versus removal.  Patient was found slightly elevated white cell at 7.4 (recently baseline white cell is between 1.5-2.5).  Elevated creatinine at 2.3.  UA showed moderate leukocyte.  Lactic acid 1.3.  ED provider contacted urology who felt comfortable to keep patient in [place] until blood/urine culture come back. \"       The following problems have been addressed during this hospitalization.  Sepsis ruled out.  Came w concerns for sepsis 2/2 UTI but urine cx negative. Tachycardia, tachypnea likely 2/2 pain. No fever recorded while hospitalized, no leukocytosis.   Reported bilateral back pain associated with dysuria, associated with chills and subjective fever. Symptoms are likely 2/2 radiation no evidence of infection.   CT of abdomen/pelvis indicated mild right and mild to moderate left hydronephrosis; per urology no intervention planned inpatient.   UA too many WBC, moderate LE, negative nitrite. Urine cx this  admission mixed skin jaylan likely contamination  Prior urine cx 4/2024 grew enterobacter cloacae complex, and pseudomonas aeruginosa sensitivities available hence started on cefepime and Levaquin but d/ziggy on discharge   -Pain management     SHIRLEY on CKD stage II  Cr 2.3 on admission; baseline was 1.6  Improved to 1.7 on discharge. Received IVF  Likely 2/2 dehydration     Repeat BMP in one week     Hypertension  On home amlodipine  - resumed on discharge     History of bladder cancer  Recently completed chemotherapy  -Currently on radiation therapy  -hem/oncology consulted; initially planned to get radiation inpatient but radiation machine is down today and no indication to keep pt overnight, discussed w pt, he will continue radiation therapy outpatient tomorrow     Patient was seen and examined at bedside on day of discharge. This note can be used as the progress note for today's visit.   Still has pain and dysuria but controlled. Pt follows w pain clinic didn't need any prescribed opioids on discharge.     Physical Exam  Vitals:   Vitals:    10/07/24 0610   BP:    Pulse:    Resp: 18   Temp:    SpO2:        General: NAD  Eyes: EOMI  ENT: neck supple  Cardiovascular: Regular rate.  Respiratory: Clear to auscultation  Gastrointestinal: Soft, non tender  Genitourinary: no suprapubic tenderness  Musculoskeletal: No edema  Skin: warm, dry  Neuro: Alert.  Psych: Mood appropriate.     The patient expressed appropriate understanding of and agreement with the discharge recommendations, medications, and plan.     Consults this admission:  IP CONSULT TO UROLOGY      Discharge Instruction:   Handoff to PCP:   - monitor CBC, BMP    Follow up appointments: oncology, urology, radiation oncology  Primary care physician: Mono Schaffer MD      Diet:  regular diet   Activity: activity as tolerated  Disposition: Discharged to:    [x]Home, []C, []SNF, []Acute Rehab, []Hospice   Condition on discharge: Stable    Discharge

## 2024-10-07 NOTE — DISCHARGE INSTRUCTIONS
- please schedule an appointment to see your PCP  - please schedule an appointment to see oncology, and urology  - please go to lab in one week for blood work  - please take medications as prescribed

## 2024-10-07 NOTE — PLAN OF CARE
Problem: Discharge Planning  Goal: Discharge to home or other facility with appropriate resources  10/7/2024 0956 by Abhinav Pearson LPN  Outcome: Completed  10/7/2024 0337 by Mireya Godwin, RN  Outcome: Progressing     Problem: Safety - Adult  Goal: Free from fall injury  10/7/2024 0956 by Abhinav Pearson LPN  Outcome: Completed  10/7/2024 0337 by Mireya Godwin, RN  Outcome: Progressing     Problem: Pain  Goal: Verbalizes/displays adequate comfort level or baseline comfort level  10/7/2024 0956 by Abhinav Pearson LPN  Outcome: Completed  10/7/2024 0337 by Mireya Godwin, RN  Outcome: Progressing     Problem: Genitourinary - Adult  Goal: Absence of urinary retention  10/7/2024 0956 by Abhinav Pearson LPN  Outcome: Completed  10/7/2024 0337 by Mireya Godwin, RN  Outcome: Progressing  Goal: Urinary catheter remains patent  10/7/2024 0956 by Abhinav Pearson LPN  Outcome: Completed  10/7/2024 0337 by Mireya Godwin, RN  Outcome: Progressing

## 2024-10-07 NOTE — PROGRESS NOTES
Outpatient Pharmacy Progress Note for Meds-to-Beds    Total number of Prescriptions Filled: 1    Additional Documentation:  Patient picked-up the medication(s) in the OP Pharmacy      Thank you for letting us serve your patients.  [address] [address]    Phone: [phone]    Fax: [phone]

## 2024-10-08 ENCOUNTER — HOSPITAL ENCOUNTER (OUTPATIENT)
Dept: RADIATION ONCOLOGY | Age: 64
Discharge: HOME | End: 2024-10-08
Payer: MEDICARE

## 2024-10-08 PROCEDURE — 77386 HC NTSTY MODUL RAD TX DLVR CPLX: CPT | Performed by: RADIOLOGY

## 2024-10-08 PROCEDURE — 77336 RADIATION PHYSICS CONSULT: CPT | Performed by: RADIOLOGY

## 2024-10-09 ENCOUNTER — HOSPITAL ENCOUNTER (OUTPATIENT)
Dept: RADIATION ONCOLOGY | Age: 64
Discharge: HOME | End: 2024-10-09
Payer: MEDICARE

## 2024-10-09 PROCEDURE — 77386 HC NTSTY MODUL RAD TX DLVR CPLX: CPT | Performed by: RADIOLOGY

## 2024-10-10 ENCOUNTER — HOSPITAL ENCOUNTER (OUTPATIENT)
Dept: RADIATION ONCOLOGY | Age: 64
Discharge: HOME | End: 2024-10-10
Payer: MEDICARE

## 2024-10-10 PROCEDURE — 77386 HC NTSTY MODUL RAD TX DLVR CPLX: CPT | Performed by: RADIOLOGY

## 2024-10-22 ENCOUNTER — APPOINTMENT (OUTPATIENT)
Dept: CT IMAGING | Age: 64
End: 2024-10-22
Payer: MEDICARE

## 2024-10-22 ENCOUNTER — HOSPITAL ENCOUNTER (EMERGENCY)
Age: 64
Discharge: ANOTHER HOSPITAL | End: 2024-10-23
Attending: EMERGENCY MEDICINE
Payer: MEDICARE

## 2024-10-22 DIAGNOSIS — N39.0 COMPLICATED UTI (URINARY TRACT INFECTION): Primary | ICD-10-CM

## 2024-10-22 DIAGNOSIS — N18.9 ACUTE RENAL FAILURE SUPERIMPOSED ON CHRONIC KIDNEY DISEASE, UNSPECIFIED ACUTE RENAL FAILURE TYPE, UNSPECIFIED CKD STAGE (HCC): ICD-10-CM

## 2024-10-22 DIAGNOSIS — N17.9 ACUTE RENAL FAILURE SUPERIMPOSED ON CHRONIC KIDNEY DISEASE, UNSPECIFIED ACUTE RENAL FAILURE TYPE, UNSPECIFIED CKD STAGE (HCC): ICD-10-CM

## 2024-10-22 LAB
ALBUMIN SERPL-MCNC: 2.7 G/DL (ref 3.4–5)
ALBUMIN/GLOB SERPL: 0.6 {RATIO} (ref 1.1–2.2)
ALP SERPL-CCNC: 309 U/L (ref 40–129)
ALT SERPL-CCNC: 12 U/L (ref 10–40)
ANION GAP SERPL CALCULATED.3IONS-SCNC: 18 MMOL/L (ref 4–16)
AST SERPL-CCNC: 17 U/L (ref 15–37)
BACTERIA URNS QL MICRO: ABNORMAL
BASOPHILS # BLD: 0.01 K/UL
BASOPHILS NFR BLD: 0 % (ref 0–1)
BILIRUB SERPL-MCNC: 0.5 MG/DL (ref 0–1)
BILIRUB UR QL STRIP: NEGATIVE
BUN SERPL-MCNC: 33 MG/DL (ref 6–23)
CALCIUM SERPL-MCNC: 8.3 MG/DL (ref 8.3–10.6)
CHLORIDE SERPL-SCNC: 90 MMOL/L (ref 99–110)
CLARITY UR: ABNORMAL
CO2 SERPL-SCNC: 20 MMOL/L (ref 21–32)
COLOR UR: YELLOW
CREAT SERPL-MCNC: 3.3 MG/DL (ref 0.9–1.3)
EOSINOPHIL # BLD: 0.01 K/UL
EOSINOPHILS RELATIVE PERCENT: 0 % (ref 0–3)
ERYTHROCYTE [DISTWIDTH] IN BLOOD BY AUTOMATED COUNT: 14.7 % (ref 11.7–14.9)
GFR, ESTIMATED: 20 ML/MIN/1.73M2
GLUCOSE SERPL-MCNC: 102 MG/DL (ref 70–99)
GLUCOSE UR STRIP-MCNC: NEGATIVE MG/DL
HCT VFR BLD AUTO: 29.9 % (ref 42–52)
HGB BLD-MCNC: 9.7 G/DL (ref 13.5–18)
HGB UR QL STRIP.AUTO: ABNORMAL
IMM GRANULOCYTES # BLD AUTO: 0.04 K/UL
IMM GRANULOCYTES NFR BLD: 1 %
KETONES UR STRIP-MCNC: NEGATIVE MG/DL
LACTATE BLDV-SCNC: 0.6 MMOL/L (ref 0.4–2)
LEUKOCYTE ESTERASE UR QL STRIP: ABNORMAL
LYMPHOCYTES NFR BLD: 0.95 K/UL
LYMPHOCYTES RELATIVE PERCENT: 11 % (ref 24–44)
MCH RBC QN AUTO: 29.3 PG (ref 27–31)
MCHC RBC AUTO-ENTMCNC: 32.4 G/DL (ref 32–36)
MCV RBC AUTO: 90.3 FL (ref 78–100)
MONOCYTES NFR BLD: 0.49 K/UL
MONOCYTES NFR BLD: 6 % (ref 0–4)
NEUTROPHILS NFR BLD: 82 % (ref 36–66)
NEUTS SEG NFR BLD: 6.85 K/UL
NITRITE UR QL STRIP: NEGATIVE
PH UR STRIP: 7 [PH] (ref 5–8)
PLATELET # BLD AUTO: 130 K/UL (ref 140–440)
PMV BLD AUTO: 9.7 FL (ref 7.5–11.1)
POTASSIUM SERPL-SCNC: 4.2 MMOL/L (ref 3.5–5.1)
PROT SERPL-MCNC: 7 G/DL (ref 6.4–8.2)
PROT UR STRIP-MCNC: >=300 MG/DL
RBC # BLD AUTO: 3.31 M/UL (ref 4.6–6.2)
RBC #/AREA URNS HPF: ABNORMAL /HPF
SODIUM SERPL-SCNC: 128 MMOL/L (ref 135–145)
SP GR UR STRIP: 1.02 (ref 1–1.03)
UROBILINOGEN UR STRIP-ACNC: 0.2 EU/DL (ref 0–1)
WBC #/AREA URNS HPF: ABNORMAL /HPF
WBC OTHER # BLD: 8.4 K/UL (ref 4–10.5)

## 2024-10-22 PROCEDURE — 87040 BLOOD CULTURE FOR BACTERIA: CPT

## 2024-10-22 PROCEDURE — 85025 COMPLETE CBC W/AUTO DIFF WBC: CPT

## 2024-10-22 PROCEDURE — 6360000002 HC RX W HCPCS: Performed by: EMERGENCY MEDICINE

## 2024-10-22 PROCEDURE — 83605 ASSAY OF LACTIC ACID: CPT

## 2024-10-22 PROCEDURE — 96361 HYDRATE IV INFUSION ADD-ON: CPT

## 2024-10-22 PROCEDURE — 96375 TX/PRO/DX INJ NEW DRUG ADDON: CPT

## 2024-10-22 PROCEDURE — 99285 EMERGENCY DEPT VISIT HI MDM: CPT

## 2024-10-22 PROCEDURE — 96365 THER/PROPH/DIAG IV INF INIT: CPT

## 2024-10-22 PROCEDURE — 6370000000 HC RX 637 (ALT 250 FOR IP): Performed by: EMERGENCY MEDICINE

## 2024-10-22 PROCEDURE — 87086 URINE CULTURE/COLONY COUNT: CPT

## 2024-10-22 PROCEDURE — 81001 URINALYSIS AUTO W/SCOPE: CPT

## 2024-10-22 PROCEDURE — 87077 CULTURE AEROBIC IDENTIFY: CPT

## 2024-10-22 PROCEDURE — 74176 CT ABD & PELVIS W/O CONTRAST: CPT

## 2024-10-22 PROCEDURE — 87186 SC STD MICRODIL/AGAR DIL: CPT

## 2024-10-22 PROCEDURE — 80053 COMPREHEN METABOLIC PANEL: CPT

## 2024-10-22 PROCEDURE — 2580000003 HC RX 258: Performed by: EMERGENCY MEDICINE

## 2024-10-22 RX ORDER — ACETAMINOPHEN 500 MG
1000 TABLET ORAL ONCE
Status: COMPLETED | OUTPATIENT
Start: 2024-10-22 | End: 2024-10-22

## 2024-10-22 RX ORDER — 0.9 % SODIUM CHLORIDE 0.9 %
1000 INTRAVENOUS SOLUTION INTRAVENOUS ONCE
Status: COMPLETED | OUTPATIENT
Start: 2024-10-22 | End: 2024-10-23

## 2024-10-22 RX ORDER — KETOROLAC TROMETHAMINE 30 MG/ML
30 INJECTION, SOLUTION INTRAMUSCULAR; INTRAVENOUS ONCE
Status: COMPLETED | OUTPATIENT
Start: 2024-10-22 | End: 2024-10-22

## 2024-10-22 RX ORDER — MORPHINE SULFATE 4 MG/ML
4 INJECTION, SOLUTION INTRAMUSCULAR; INTRAVENOUS ONCE
Status: COMPLETED | OUTPATIENT
Start: 2024-10-22 | End: 2024-10-22

## 2024-10-22 RX ORDER — MORPHINE SULFATE 2 MG/ML
2 INJECTION, SOLUTION INTRAMUSCULAR; INTRAVENOUS ONCE
Status: COMPLETED | OUTPATIENT
Start: 2024-10-23 | End: 2024-10-23

## 2024-10-22 RX ADMIN — ACETAMINOPHEN 1000 MG: 500 TABLET ORAL at 20:32

## 2024-10-22 RX ADMIN — KETOROLAC TROMETHAMINE 30 MG: 30 INJECTION, SOLUTION INTRAMUSCULAR at 20:33

## 2024-10-22 RX ADMIN — MORPHINE SULFATE 4 MG: 4 INJECTION, SOLUTION INTRAMUSCULAR; INTRAVENOUS at 20:37

## 2024-10-22 RX ADMIN — CEFEPIME 2000 MG: 2 INJECTION, POWDER, FOR SOLUTION INTRAVENOUS at 22:31

## 2024-10-22 RX ADMIN — SODIUM CHLORIDE 1000 ML: 9 INJECTION, SOLUTION INTRAVENOUS at 23:13

## 2024-10-22 ASSESSMENT — PAIN DESCRIPTION - PAIN TYPE
TYPE: ACUTE PAIN
TYPE: ACUTE PAIN

## 2024-10-22 ASSESSMENT — PAIN SCALES - GENERAL
PAINLEVEL_OUTOF10: 6
PAINLEVEL_OUTOF10: 9
PAINLEVEL_OUTOF10: 5
PAINLEVEL_OUTOF10: 9

## 2024-10-22 ASSESSMENT — PAIN - FUNCTIONAL ASSESSMENT
PAIN_FUNCTIONAL_ASSESSMENT: PREVENTS OR INTERFERES WITH MANY ACTIVE NOT PASSIVE ACTIVITIES
PAIN_FUNCTIONAL_ASSESSMENT: 0-10
PAIN_FUNCTIONAL_ASSESSMENT: PREVENTS OR INTERFERES WITH MANY ACTIVE NOT PASSIVE ACTIVITIES

## 2024-10-22 ASSESSMENT — PAIN DESCRIPTION - DESCRIPTORS
DESCRIPTORS: BURNING
DESCRIPTORS: BURNING

## 2024-10-22 ASSESSMENT — PAIN DESCRIPTION - LOCATION
LOCATION: PENIS
LOCATION: PENIS

## 2024-10-22 ASSESSMENT — PAIN DESCRIPTION - ONSET: ONSET: PROGRESSIVE

## 2024-10-22 ASSESSMENT — PAIN DESCRIPTION - FREQUENCY
FREQUENCY: CONTINUOUS
FREQUENCY: CONTINUOUS

## 2024-10-22 ASSESSMENT — PAIN DESCRIPTION - ORIENTATION: ORIENTATION: OTHER (COMMENT)

## 2024-10-23 ENCOUNTER — HOSPITAL ENCOUNTER (INPATIENT)
Age: 64
LOS: 6 days | Discharge: HOME HEALTH | DRG: 660 | End: 2024-10-29
Attending: STUDENT IN AN ORGANIZED HEALTH CARE EDUCATION/TRAINING PROGRAM | Admitting: STUDENT IN AN ORGANIZED HEALTH CARE EDUCATION/TRAINING PROGRAM
Payer: MEDICARE

## 2024-10-23 VITALS
WEIGHT: 187 LBS | HEIGHT: 70 IN | SYSTOLIC BLOOD PRESSURE: 113 MMHG | TEMPERATURE: 99.4 F | DIASTOLIC BLOOD PRESSURE: 76 MMHG | HEART RATE: 61 BPM | OXYGEN SATURATION: 96 % | BODY MASS INDEX: 26.77 KG/M2 | RESPIRATION RATE: 11 BRPM

## 2024-10-23 DIAGNOSIS — R52 PAIN: ICD-10-CM

## 2024-10-23 DIAGNOSIS — R78.81 BACTEREMIA: Primary | ICD-10-CM

## 2024-10-23 PROBLEM — N39.0 COMPLICATED URINARY TRACT INFECTION: Status: ACTIVE | Noted: 2024-10-23

## 2024-10-23 LAB
ALBUMIN SERPL-MCNC: 2.7 G/DL (ref 3.4–5)
ALBUMIN/GLOB SERPL: 0.7 {RATIO} (ref 1.1–2.2)
ALP SERPL-CCNC: 238 U/L (ref 40–129)
ALT SERPL-CCNC: 12 U/L (ref 10–40)
ANION GAP SERPL CALCULATED.3IONS-SCNC: 13 MMOL/L (ref 9–17)
AST SERPL-CCNC: 20 U/L (ref 15–37)
BASOPHILS # BLD: 0.01 K/UL
BASOPHILS NFR BLD: 0 % (ref 0–1)
BILIRUB SERPL-MCNC: 0.4 MG/DL (ref 0–1)
BUN SERPL-MCNC: 44 MG/DL (ref 7–20)
CALCIUM SERPL-MCNC: 8.1 MG/DL (ref 8.3–10.6)
CHLORIDE SERPL-SCNC: 100 MMOL/L (ref 99–110)
CO2 SERPL-SCNC: 21 MMOL/L (ref 21–32)
CREAT SERPL-MCNC: 3.9 MG/DL (ref 0.8–1.3)
EOSINOPHIL # BLD: 0.15 K/UL
EOSINOPHILS RELATIVE PERCENT: 3 % (ref 0–3)
ERYTHROCYTE [DISTWIDTH] IN BLOOD BY AUTOMATED COUNT: 14.6 % (ref 11.7–14.9)
GFR, ESTIMATED: 16 ML/MIN/1.73M2
GLUCOSE SERPL-MCNC: 102 MG/DL (ref 74–99)
HCT VFR BLD AUTO: 26.5 % (ref 42–52)
HGB BLD-MCNC: 8.7 G/DL (ref 13.5–18)
IMM GRANULOCYTES # BLD AUTO: 0.08 K/UL
IMM GRANULOCYTES NFR BLD: 1 %
INR PPP: 1.6
LYMPHOCYTES NFR BLD: 0.56 K/UL
LYMPHOCYTES RELATIVE PERCENT: 9 % (ref 24–44)
MCH RBC QN AUTO: 29.8 PG (ref 27–31)
MCHC RBC AUTO-ENTMCNC: 32.8 G/DL (ref 32–36)
MCV RBC AUTO: 90.8 FL (ref 78–100)
MONOCYTES NFR BLD: 0.44 K/UL
MONOCYTES NFR BLD: 7 % (ref 0–4)
NEUTROPHILS NFR BLD: 80 % (ref 36–66)
NEUTS SEG NFR BLD: 4.82 K/UL
PLATELET # BLD AUTO: 111 K/UL (ref 140–440)
PMV BLD AUTO: 10.1 FL (ref 7.5–11.1)
POTASSIUM SERPL-SCNC: 4.2 MMOL/L (ref 3.5–5.1)
PROT SERPL-MCNC: 6.3 G/DL (ref 6.4–8.2)
PROTHROMBIN TIME: 18.8 SEC (ref 11.7–14.5)
RBC # BLD AUTO: 2.92 M/UL (ref 4.6–6.2)
SODIUM SERPL-SCNC: 134 MMOL/L (ref 136–145)
WBC OTHER # BLD: 6.1 K/UL (ref 4–10.5)

## 2024-10-23 PROCEDURE — 94761 N-INVAS EAR/PLS OXIMETRY MLT: CPT

## 2024-10-23 PROCEDURE — 2060000000 HC ICU INTERMEDIATE R&B

## 2024-10-23 PROCEDURE — 80053 COMPREHEN METABOLIC PANEL: CPT

## 2024-10-23 PROCEDURE — 6370000000 HC RX 637 (ALT 250 FOR IP): Performed by: FAMILY MEDICINE

## 2024-10-23 PROCEDURE — 85025 COMPLETE CBC W/AUTO DIFF WBC: CPT

## 2024-10-23 PROCEDURE — 6360000002 HC RX W HCPCS: Performed by: STUDENT IN AN ORGANIZED HEALTH CARE EDUCATION/TRAINING PROGRAM

## 2024-10-23 PROCEDURE — 6360000002 HC RX W HCPCS: Performed by: EMERGENCY MEDICINE

## 2024-10-23 PROCEDURE — 2580000003 HC RX 258: Performed by: STUDENT IN AN ORGANIZED HEALTH CARE EDUCATION/TRAINING PROGRAM

## 2024-10-23 PROCEDURE — 51798 US URINE CAPACITY MEASURE: CPT

## 2024-10-23 PROCEDURE — 85610 PROTHROMBIN TIME: CPT

## 2024-10-23 PROCEDURE — 6370000000 HC RX 637 (ALT 250 FOR IP): Performed by: STUDENT IN AN ORGANIZED HEALTH CARE EDUCATION/TRAINING PROGRAM

## 2024-10-23 PROCEDURE — 96376 TX/PRO/DX INJ SAME DRUG ADON: CPT

## 2024-10-23 RX ORDER — AMLODIPINE BESYLATE 10 MG/1
10 TABLET ORAL DAILY
Status: DISCONTINUED | OUTPATIENT
Start: 2024-10-23 | End: 2024-10-29 | Stop reason: HOSPADM

## 2024-10-23 RX ORDER — GABAPENTIN 100 MG/1
100 CAPSULE ORAL 2 TIMES DAILY
Status: DISCONTINUED | OUTPATIENT
Start: 2024-10-23 | End: 2024-10-29 | Stop reason: HOSPADM

## 2024-10-23 RX ORDER — ACETAMINOPHEN 650 MG/1
650 SUPPOSITORY RECTAL EVERY 6 HOURS PRN
Status: DISCONTINUED | OUTPATIENT
Start: 2024-10-23 | End: 2024-10-29 | Stop reason: HOSPADM

## 2024-10-23 RX ORDER — TAMSULOSIN HYDROCHLORIDE 0.4 MG/1
0.4 CAPSULE ORAL 2 TIMES DAILY
Status: DISCONTINUED | OUTPATIENT
Start: 2024-10-23 | End: 2024-10-29 | Stop reason: HOSPADM

## 2024-10-23 RX ORDER — ONDANSETRON 2 MG/ML
4 INJECTION INTRAMUSCULAR; INTRAVENOUS EVERY 6 HOURS PRN
Status: DISCONTINUED | OUTPATIENT
Start: 2024-10-23 | End: 2024-10-29 | Stop reason: HOSPADM

## 2024-10-23 RX ORDER — OXYBUTYNIN CHLORIDE 5 MG/1
5 TABLET ORAL 3 TIMES DAILY
Status: DISCONTINUED | OUTPATIENT
Start: 2024-10-23 | End: 2024-10-29 | Stop reason: HOSPADM

## 2024-10-23 RX ORDER — HEPARIN SODIUM 5000 [USP'U]/ML
5000 INJECTION, SOLUTION INTRAVENOUS; SUBCUTANEOUS EVERY 8 HOURS SCHEDULED
Status: DISCONTINUED | OUTPATIENT
Start: 2024-10-23 | End: 2024-10-29 | Stop reason: HOSPADM

## 2024-10-23 RX ORDER — ACETAMINOPHEN 325 MG/1
650 TABLET ORAL EVERY 6 HOURS PRN
Status: DISCONTINUED | OUTPATIENT
Start: 2024-10-23 | End: 2024-10-29 | Stop reason: HOSPADM

## 2024-10-23 RX ORDER — ONDANSETRON 4 MG/1
4 TABLET, ORALLY DISINTEGRATING ORAL EVERY 8 HOURS PRN
Status: DISCONTINUED | OUTPATIENT
Start: 2024-10-23 | End: 2024-10-29 | Stop reason: HOSPADM

## 2024-10-23 RX ORDER — LIDOCAINE HYDROCHLORIDE 20 MG/ML
JELLY TOPICAL PRN
Status: DISCONTINUED | OUTPATIENT
Start: 2024-10-23 | End: 2024-10-29 | Stop reason: HOSPADM

## 2024-10-23 RX ADMIN — HYDROMORPHONE HYDROCHLORIDE 0.5 MG: 1 INJECTION, SOLUTION INTRAMUSCULAR; INTRAVENOUS; SUBCUTANEOUS at 02:28

## 2024-10-23 RX ADMIN — CEFEPIME 1000 MG: 1 INJECTION, POWDER, FOR SOLUTION INTRAMUSCULAR; INTRAVENOUS at 20:20

## 2024-10-23 RX ADMIN — OXYBUTYNIN CHLORIDE 5 MG: 5 TABLET ORAL at 10:26

## 2024-10-23 RX ADMIN — GABAPENTIN 100 MG: 100 CAPSULE ORAL at 20:18

## 2024-10-23 RX ADMIN — TAMSULOSIN HYDROCHLORIDE 0.4 MG: 0.4 CAPSULE ORAL at 10:26

## 2024-10-23 RX ADMIN — HYDROMORPHONE HYDROCHLORIDE 0.5 MG: 1 INJECTION, SOLUTION INTRAMUSCULAR; INTRAVENOUS; SUBCUTANEOUS at 06:43

## 2024-10-23 RX ADMIN — HYDROMORPHONE HYDROCHLORIDE 0.5 MG: 1 INJECTION, SOLUTION INTRAMUSCULAR; INTRAVENOUS; SUBCUTANEOUS at 18:04

## 2024-10-23 RX ADMIN — HEPARIN SODIUM 5000 UNITS: 5000 INJECTION INTRAVENOUS; SUBCUTANEOUS at 14:14

## 2024-10-23 RX ADMIN — LIDOCAINE HYDROCHLORIDE: 20 JELLY TOPICAL at 04:49

## 2024-10-23 RX ADMIN — TAMSULOSIN HYDROCHLORIDE 0.4 MG: 0.4 CAPSULE ORAL at 20:18

## 2024-10-23 RX ADMIN — OXYBUTYNIN CHLORIDE 5 MG: 5 TABLET ORAL at 20:18

## 2024-10-23 RX ADMIN — GABAPENTIN 100 MG: 100 CAPSULE ORAL at 10:29

## 2024-10-23 RX ADMIN — HEPARIN SODIUM 5000 UNITS: 5000 INJECTION INTRAVENOUS; SUBCUTANEOUS at 20:17

## 2024-10-23 RX ADMIN — CEFEPIME 1000 MG: 1 INJECTION, POWDER, FOR SOLUTION INTRAMUSCULAR; INTRAVENOUS at 10:26

## 2024-10-23 RX ADMIN — ONDANSETRON 4 MG: 4 TABLET, ORALLY DISINTEGRATING ORAL at 14:15

## 2024-10-23 RX ADMIN — OXYBUTYNIN CHLORIDE 5 MG: 5 TABLET ORAL at 14:15

## 2024-10-23 RX ADMIN — MORPHINE SULFATE 2 MG: 2 INJECTION, SOLUTION INTRAMUSCULAR; INTRAVENOUS at 00:09

## 2024-10-23 RX ADMIN — HEPARIN SODIUM 5000 UNITS: 5000 INJECTION INTRAVENOUS; SUBCUTANEOUS at 06:15

## 2024-10-23 RX ADMIN — ACETAMINOPHEN 650 MG: 325 TABLET ORAL at 14:17

## 2024-10-23 RX ADMIN — HYDROMORPHONE HYDROCHLORIDE 0.5 MG: 1 INJECTION, SOLUTION INTRAMUSCULAR; INTRAVENOUS; SUBCUTANEOUS at 22:17

## 2024-10-23 ASSESSMENT — PAIN DESCRIPTION - DESCRIPTORS
DESCRIPTORS: BURNING
DESCRIPTORS: BURNING;ACHING
DESCRIPTORS: BURNING
DESCRIPTORS: BURNING
DESCRIPTORS: ACHING;BURNING
DESCRIPTORS: BURNING;ACHING
DESCRIPTORS: ACHING;BURNING

## 2024-10-23 ASSESSMENT — PAIN SCALES - GENERAL
PAINLEVEL_OUTOF10: 6
PAINLEVEL_OUTOF10: 8
PAINLEVEL_OUTOF10: 3
PAINLEVEL_OUTOF10: 9
PAINLEVEL_OUTOF10: 8
PAINLEVEL_OUTOF10: 7
PAINLEVEL_OUTOF10: 3
PAINLEVEL_OUTOF10: 2
PAINLEVEL_OUTOF10: 2
PAINLEVEL_OUTOF10: 3
PAINLEVEL_OUTOF10: 9
PAINLEVEL_OUTOF10: 6
PAINLEVEL_OUTOF10: 3
PAINLEVEL_OUTOF10: 3

## 2024-10-23 ASSESSMENT — PAIN DESCRIPTION - LOCATION
LOCATION: PENIS

## 2024-10-23 ASSESSMENT — PAIN DESCRIPTION - PAIN TYPE
TYPE: ACUTE PAIN

## 2024-10-23 ASSESSMENT — PAIN DESCRIPTION - ORIENTATION: ORIENTATION: ANTERIOR

## 2024-10-23 ASSESSMENT — PAIN DESCRIPTION - FREQUENCY
FREQUENCY: CONTINUOUS
FREQUENCY: CONTINUOUS

## 2024-10-23 ASSESSMENT — PAIN DESCRIPTION - ONSET
ONSET: PROGRESSIVE
ONSET: PROGRESSIVE

## 2024-10-23 ASSESSMENT — PAIN - FUNCTIONAL ASSESSMENT
PAIN_FUNCTIONAL_ASSESSMENT: ACTIVITIES ARE NOT PREVENTED
PAIN_FUNCTIONAL_ASSESSMENT: ACTIVITIES ARE NOT PREVENTED

## 2024-10-23 NOTE — PLAN OF CARE
Pt at this time is aware of the ongoing plan of care and is in agreement with treatment plan. Pt can also verbalize pain and accurately describe the pain. Pt safety remains priority with a bed alarm on and proper fall risk precautions in place.

## 2024-10-23 NOTE — H&P
History and Physical      Name:  Jethro Vázquez /Age/Sex: 1960  (64 y.o. male)   MRN & CSN:  [id number] & [id number] Encounter Date/Time: 10/23/2024 2:42 AM EDT   Location:  -A PCP: Mono Schaffer MD       Assessment and Plan:   Jethro Vázquez is a 64 y.o. 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    Complicated UTI  In the setting of muscle invasive high-grade bladder cancer status post TUR 2023.  CT abdomen pelvis reviewed moderate persistent left hydronephrosis and hydroureter with a left ureteral stent in place.  Increased right-sided hydronephrosis and hydroureter.  Bilateral perinephric stranding, moderate retained stool in the colon.  Start IV cefepime, follow-up urine and blood cultures  Given repeated hospitalization for complicated urinary tract infection believe is a good candidate for removal of left ureteral stent and placement of bilateral nephrostomy tubes  NPO for urology consultation in a.m.    SHIRLEY on CKD stage IIIa, baseline creatinine around 1.4  Creatinine on presentation 3.3, possible etiologies include obstructive uropathy and above-mentioned urinary tract infection  IV antibiotics as mentioned above  Continue home Flomax and Ditropan  Bladder scan every shift, straight cath if above 250 mL  Urology consultation for possible stent vs bilateral nephrostomy tubes    Hypertension  Continue home amlodipine    Anemia of chronic disease, stable Hb trend  Monitor CBC    Muscle invasive high-grade bladder cancer status post TUR 2023  S/p chemotherapy and radiation    Inpatient stepdown telemetry  Full code    Disposition:     Current Living situation: Home  Expected Disposition: Home  Estimated

## 2024-10-23 NOTE — PLAN OF CARE
Problem: Discharge Planning  Goal: Discharge to home or other facility with appropriate resources  Outcome: Progressing  Flowsheets (Taken 10/23/2024 0200)  Discharge to home or other facility with appropriate resources:   Identify barriers to discharge with patient and caregiver   Arrange for needed discharge resources and transportation as appropriate   Identify discharge learning needs (meds, wound care, etc)     Problem: Safety - Adult  Goal: Free from fall injury  Outcome: Progressing

## 2024-10-23 NOTE — CARE COORDINATION
10/23/24 1020   Service Assessment   Patient Orientation Alert and Oriented   Cognition Alert   History Provided By Patient;Medical Record   Primary Caregiver Self   Support Systems Spouse/Significant Other;Children;Family Members   Patient's Healthcare Decision Maker is: Named in Scanned ACP Document   PCP Verified by CM Yes  (Unsure of last visit; sees Urology)   Prior Functional Level Independent in ADLs/IADLs   Current Functional Level Assistance with the following:;Toileting;Mobility  (Hospital policy)   Can patient return to prior living arrangement Yes   Ability to make needs known: Good   Family able to assist with home care needs: Yes  (Requesting [facility] for med box fills)   Would you like for me to discuss the discharge plan with any other family members/significant others, and if so, who? Yes   Financial Resources Medicare   Community Resources None   Social/Functional History   Lives With Spouse   Type of Home House   Active  Yes   Mode of Transportation Car   Occupation On disability     Met with patient for discharge planning. He is awake and able to participate. Patient is from home with spouse (she works 10 hr days at [employer]) He is normally independent. Denies difficulty in obtaining medications. Discussed discharge needs. He would like [facility] to assist with medication box refilling as he feels that his vision has caused him to make some errors. Will reach out to Dr Cheema for order. Patient has no preference of provider. Claudia Chan RN     1130 Contacted [facility] ([facility] for return call. Claudia Chan RN     1130  Received return call from [facility]. She can accept the referral. Claudia Chan RN

## 2024-10-23 NOTE — PLAN OF CARE
Problem: Discharge Planning  Goal: Discharge to home or other facility with appropriate resources  10/23/2024 1713 by Raya Gloria, RN  Outcome: Progressing  10/23/2024 1100 by Phuc Jordan  Outcome: Progressing     Problem: Safety - Adult  Goal: Free from fall injury  10/23/2024 1713 by Raya Gloria, RN  Outcome: Progressing  10/23/2024 1100 by Phuc Jordan  Outcome: Progressing     Problem: Pain  Goal: Verbalizes/displays adequate comfort level or baseline comfort level  10/23/2024 1713 by Raya Gloria, RN  Outcome: Progressing  10/23/2024 1100 by Phuc Jordan  Outcome: Progressing     Problem: Nutrition Deficit:  Goal: Optimize nutritional status  Outcome: Progressing

## 2024-10-23 NOTE — ED PROVIDER NOTES
3.31 (L) 4.60 - 6.20 m/uL    Hemoglobin 9.7 (L) 13.5 - 18.0 g/dL    Hematocrit 29.9 (L) 42.0 - 52.0 %    MCV 90.3 78.0 - 100.0 fL    MCH 29.3 27.0 - 31.0 pg    MCHC 32.4 32.0 - 36.0 g/dL    RDW 14.7 11.7 - 14.9 %    Platelets 130 (L) 140 - 440 k/uL    MPV 9.7 7.5 - 11.1 fL    Neutrophils % 82 (H) 36 - 66 %    Lymphocytes % 11 (L) 24 - 44 %    Monocytes % 6 (H) 0 - 4 %    Eosinophils % 0 0 - 3 %    Basophils % 0 0 - 1 %    Immature Granulocytes % 1 (H) 0 %    Neutrophils Absolute 6.85 k/uL    Lymphocytes Absolute 0.95 k/uL    Monocytes Absolute 0.49 k/uL    Eosinophils Absolute 0.01 k/uL    Basophils Absolute 0.01 k/uL    Immature Granulocytes Absolute 0.04 k/uL   Comprehensive Metabolic Panel w/ Reflex to MG   Result Value Ref Range    Sodium 128 (L) 135 - 145 mmol/L    Potassium 4.2 3.5 - 5.1 mmol/L    Chloride 90 (L) 99 - 110 mmol/L    CO2 20 (L) 21 - 32 mmol/L    Anion Gap 18 (H) 4 - 16 mmol/L    Glucose 102 (H) 70 - 99 mg/dL    BUN 33 (H) 6 - 23 mg/dL    Creatinine 3.3 (H) 0.9 - 1.3 mg/dL    Est, Glom Filt Rate 20 (L) >60 mL/min/1.73m2    Calcium 8.3 8.3 - 10.6 mg/dL    Total Protein 7.0 6.4 - 8.2 g/dL    Albumin 2.7 (L) 3.4 - 5.0 g/dL    Albumin/Globulin Ratio 0.6 (L) 1.1 - 2.2    Total Bilirubin 0.5 0.0 - 1.0 mg/dL    Alkaline Phosphatase 309 (H) 40 - 129 U/L    ALT 12 10 - 40 U/L    AST 17 15 - 37 U/L   Lactate, Sepsis   Result Value Ref Range    Lactic Acid, Sepsis 0.6 0.4 - 2.0 mmol/L   Microscopic Urinalysis   Result Value Ref Range    WBC, UA 10 TO 20 (A) 0 TO 5 /HPF    RBC, UA 6 TO 9 (A) 0 TO 2 /HPF    Bacteria, UA MODERATE (A) None      Radiographs (if obtained):  [] The following radiograph was interpreted by myself in the absence of a radiologist:   [x] Radiologist's Report Reviewed:  CT ABDOMEN PELVIS WO CONTRAST Additional Contrast? None   Final Result   1. Redemonstration of a left ureteral stent which is unchanged. Again identified   is moderate left-sided hydronephrosis and hydroureter however,

## 2024-10-24 ENCOUNTER — APPOINTMENT (OUTPATIENT)
Dept: NON INVASIVE DIAGNOSTICS | Age: 64
DRG: 660 | End: 2024-10-24
Attending: INTERNAL MEDICINE
Payer: MEDICARE

## 2024-10-24 ENCOUNTER — ANESTHESIA EVENT (OUTPATIENT)
Dept: OPERATING ROOM | Age: 64
End: 2024-10-24
Payer: MEDICARE

## 2024-10-24 LAB
ALBUMIN SERPL-MCNC: 2.5 G/DL (ref 3.4–5)
ALBUMIN/GLOB SERPL: 0.7 {RATIO} (ref 1.1–2.2)
ALP SERPL-CCNC: 234 U/L (ref 40–129)
ALT SERPL-CCNC: 15 U/L (ref 10–40)
ANION GAP SERPL CALCULATED.3IONS-SCNC: 10 MMOL/L (ref 9–17)
AST SERPL-CCNC: 25 U/L (ref 15–37)
BILIRUB SERPL-MCNC: 0.2 MG/DL (ref 0–1)
BUN SERPL-MCNC: 40 MG/DL (ref 7–20)
CALCIUM SERPL-MCNC: 8.1 MG/DL (ref 8.3–10.6)
CHLORIDE SERPL-SCNC: 99 MMOL/L (ref 99–110)
CO2 SERPL-SCNC: 22 MMOL/L (ref 21–32)
CREAT SERPL-MCNC: 2.9 MG/DL (ref 0.8–1.3)
ECHO AO ROOT DIAM: 3.2 CM
ECHO AO ROOT INDEX: 1.62 CM/M2
ECHO AV AREA PEAK VELOCITY: 2.9 CM2
ECHO AV AREA VTI: 2.9 CM2
ECHO AV AREA/BSA PEAK VELOCITY: 1.5 CM2/M2
ECHO AV AREA/BSA VTI: 1.5 CM2/M2
ECHO AV MEAN GRADIENT: 4 MMHG
ECHO AV MEAN VELOCITY: 0.9 M/S
ECHO AV PEAK GRADIENT: 7 MMHG
ECHO AV PEAK VELOCITY: 1.3 M/S
ECHO AV VELOCITY RATIO: 0.85
ECHO AV VTI: 20.6 CM
ECHO BSA: 1.99 M2
ECHO EST RA PRESSURE: 3 MMHG
ECHO LA AREA 4C: 13.8 CM2
ECHO LA DIAMETER INDEX: 1.46 CM/M2
ECHO LA DIAMETER: 2.9 CM
ECHO LA MAJOR AXIS: 6 CM
ECHO LA TO AORTIC ROOT RATIO: 0.91
ECHO LA VOL MOD A4C: 27 ML (ref 18–58)
ECHO LA VOLUME INDEX MOD A4C: 14 ML/M2 (ref 16–34)
ECHO LV E' LATERAL VELOCITY: 11.3 CM/S
ECHO LV E' SEPTAL VELOCITY: 8.6 CM/S
ECHO LV EDV A4C: 50 ML
ECHO LV EDV INDEX A4C: 25 ML/M2
ECHO LV EF PHYSICIAN: 55 %
ECHO LV EJECTION FRACTION A4C: 74 %
ECHO LV ESV A4C: 13 ML
ECHO LV ESV INDEX A4C: 7 ML/M2
ECHO LV FRACTIONAL SHORTENING: 34 % (ref 28–44)
ECHO LV INTERNAL DIMENSION DIASTOLE INDEX: 2.07 CM/M2
ECHO LV INTERNAL DIMENSION DIASTOLIC: 4.1 CM (ref 4.2–5.9)
ECHO LV INTERNAL DIMENSION SYSTOLIC INDEX: 1.36 CM/M2
ECHO LV INTERNAL DIMENSION SYSTOLIC: 2.7 CM
ECHO LV IVSD: 1.4 CM (ref 0.6–1)
ECHO LV MASS 2D: 171.7 G (ref 88–224)
ECHO LV MASS INDEX 2D: 86.7 G/M2 (ref 49–115)
ECHO LV POSTERIOR WALL DIASTOLIC: 1 CM (ref 0.6–1)
ECHO LV RELATIVE WALL THICKNESS RATIO: 0.49
ECHO LVOT AREA: 3.5 CM2
ECHO LVOT AV VTI INDEX: 0.85
ECHO LVOT DIAM: 2.1 CM
ECHO LVOT MEAN GRADIENT: 2 MMHG
ECHO LVOT PEAK GRADIENT: 5 MMHG
ECHO LVOT PEAK VELOCITY: 1.1 M/S
ECHO LVOT STROKE VOLUME INDEX: 30.8 ML/M2
ECHO LVOT SV: 60.9 ML
ECHO LVOT VTI: 17.6 CM
ECHO MV A VELOCITY: 0.85 M/S
ECHO MV E DECELERATION TIME (DT): 190 MS
ECHO MV E VELOCITY: 0.6 M/S
ECHO MV E/A RATIO: 0.71
ECHO MV E/E' LATERAL: 5.31
ECHO MV E/E' RATIO (AVERAGED): 6.14
ECHO MV E/E' SEPTAL: 6.98
ECHO RIGHT VENTRICULAR SYSTOLIC PRESSURE (RVSP): 26 MMHG
ECHO RV MID DIMENSION: 3.3 CM
ECHO TV REGURGITANT MAX VELOCITY: 2.42 M/S
ECHO TV REGURGITANT PEAK GRADIENT: 23 MMHG
ERYTHROCYTE [DISTWIDTH] IN BLOOD BY AUTOMATED COUNT: 14.8 % (ref 11.7–14.9)
GFR, ESTIMATED: 23 ML/MIN/1.73M2
GLUCOSE SERPL-MCNC: 100 MG/DL (ref 74–99)
HCT VFR BLD AUTO: 24.9 % (ref 42–52)
HGB BLD-MCNC: 7.9 G/DL (ref 13.5–18)
MAGNESIUM SERPL-MCNC: 2.2 MG/DL (ref 1.8–2.4)
MCH RBC QN AUTO: 28.9 PG (ref 27–31)
MCHC RBC AUTO-ENTMCNC: 31.7 G/DL (ref 32–36)
MCV RBC AUTO: 91.2 FL (ref 78–100)
PHOSPHATE SERPL-MCNC: 4 MG/DL (ref 2.5–4.9)
PLATELET # BLD AUTO: 104 K/UL (ref 140–440)
PMV BLD AUTO: 10.2 FL (ref 7.5–11.1)
POTASSIUM SERPL-SCNC: 4.2 MMOL/L (ref 3.5–5.1)
PROT SERPL-MCNC: 6.1 G/DL (ref 6.4–8.2)
RBC # BLD AUTO: 2.73 M/UL (ref 4.6–6.2)
SODIUM SERPL-SCNC: 131 MMOL/L (ref 136–145)
WBC OTHER # BLD: 3.4 K/UL (ref 4–10.5)

## 2024-10-24 PROCEDURE — 94761 N-INVAS EAR/PLS OXIMETRY MLT: CPT

## 2024-10-24 PROCEDURE — 85027 COMPLETE CBC AUTOMATED: CPT

## 2024-10-24 PROCEDURE — 6370000000 HC RX 637 (ALT 250 FOR IP): Performed by: STUDENT IN AN ORGANIZED HEALTH CARE EDUCATION/TRAINING PROGRAM

## 2024-10-24 PROCEDURE — 2580000003 HC RX 258: Performed by: STUDENT IN AN ORGANIZED HEALTH CARE EDUCATION/TRAINING PROGRAM

## 2024-10-24 PROCEDURE — 84100 ASSAY OF PHOSPHORUS: CPT

## 2024-10-24 PROCEDURE — 2060000000 HC ICU INTERMEDIATE R&B

## 2024-10-24 PROCEDURE — 93306 TTE W/DOPPLER COMPLETE: CPT

## 2024-10-24 PROCEDURE — 6360000002 HC RX W HCPCS: Performed by: STUDENT IN AN ORGANIZED HEALTH CARE EDUCATION/TRAINING PROGRAM

## 2024-10-24 PROCEDURE — 93306 TTE W/DOPPLER COMPLETE: CPT | Performed by: INTERNAL MEDICINE

## 2024-10-24 PROCEDURE — 83735 ASSAY OF MAGNESIUM: CPT

## 2024-10-24 PROCEDURE — 80053 COMPREHEN METABOLIC PANEL: CPT

## 2024-10-24 RX ORDER — PHENAZOPYRIDINE HYDROCHLORIDE 100 MG/1
200 TABLET, FILM COATED ORAL
Status: DISCONTINUED | OUTPATIENT
Start: 2024-10-24 | End: 2024-10-24

## 2024-10-24 RX ADMIN — ONDANSETRON 4 MG: 2 INJECTION INTRAMUSCULAR; INTRAVENOUS at 22:07

## 2024-10-24 RX ADMIN — ONDANSETRON 4 MG: 2 INJECTION INTRAMUSCULAR; INTRAVENOUS at 07:03

## 2024-10-24 RX ADMIN — AMLODIPINE BESYLATE 10 MG: 10 TABLET ORAL at 08:17

## 2024-10-24 RX ADMIN — HEPARIN SODIUM 5000 UNITS: 5000 INJECTION INTRAVENOUS; SUBCUTANEOUS at 15:31

## 2024-10-24 RX ADMIN — CEFEPIME 1000 MG: 1 INJECTION, POWDER, FOR SOLUTION INTRAMUSCULAR; INTRAVENOUS at 08:24

## 2024-10-24 RX ADMIN — OXYBUTYNIN CHLORIDE 5 MG: 5 TABLET ORAL at 15:31

## 2024-10-24 RX ADMIN — HYDROMORPHONE HYDROCHLORIDE 0.5 MG: 1 INJECTION, SOLUTION INTRAMUSCULAR; INTRAVENOUS; SUBCUTANEOUS at 22:08

## 2024-10-24 RX ADMIN — HEPARIN SODIUM 5000 UNITS: 5000 INJECTION INTRAVENOUS; SUBCUTANEOUS at 22:07

## 2024-10-24 RX ADMIN — HYDROMORPHONE HYDROCHLORIDE 0.5 MG: 1 INJECTION, SOLUTION INTRAMUSCULAR; INTRAVENOUS; SUBCUTANEOUS at 02:20

## 2024-10-24 RX ADMIN — CEFEPIME 1000 MG: 1 INJECTION, POWDER, FOR SOLUTION INTRAMUSCULAR; INTRAVENOUS at 22:13

## 2024-10-24 RX ADMIN — HYDROMORPHONE HYDROCHLORIDE 0.5 MG: 1 INJECTION, SOLUTION INTRAMUSCULAR; INTRAVENOUS; SUBCUTANEOUS at 11:30

## 2024-10-24 RX ADMIN — TAMSULOSIN HYDROCHLORIDE 0.4 MG: 0.4 CAPSULE ORAL at 08:17

## 2024-10-24 RX ADMIN — HYDROMORPHONE HYDROCHLORIDE 0.5 MG: 1 INJECTION, SOLUTION INTRAMUSCULAR; INTRAVENOUS; SUBCUTANEOUS at 18:01

## 2024-10-24 RX ADMIN — GABAPENTIN 100 MG: 100 CAPSULE ORAL at 22:07

## 2024-10-24 RX ADMIN — TAMSULOSIN HYDROCHLORIDE 0.4 MG: 0.4 CAPSULE ORAL at 22:07

## 2024-10-24 RX ADMIN — OXYBUTYNIN CHLORIDE 5 MG: 5 TABLET ORAL at 08:17

## 2024-10-24 RX ADMIN — HEPARIN SODIUM 5000 UNITS: 5000 INJECTION INTRAVENOUS; SUBCUTANEOUS at 06:50

## 2024-10-24 RX ADMIN — OXYBUTYNIN CHLORIDE 5 MG: 5 TABLET ORAL at 22:07

## 2024-10-24 RX ADMIN — GABAPENTIN 100 MG: 100 CAPSULE ORAL at 08:17

## 2024-10-24 RX ADMIN — HYDROMORPHONE HYDROCHLORIDE 0.5 MG: 1 INJECTION, SOLUTION INTRAMUSCULAR; INTRAVENOUS; SUBCUTANEOUS at 06:50

## 2024-10-24 ASSESSMENT — PAIN DESCRIPTION - LOCATION
LOCATION: PENIS;SCROTUM
LOCATION: GENERALIZED;SCROTUM
LOCATION: PENIS

## 2024-10-24 ASSESSMENT — PAIN DESCRIPTION - ORIENTATION
ORIENTATION: ANTERIOR
ORIENTATION: MID

## 2024-10-24 ASSESSMENT — PAIN DESCRIPTION - ONSET: ONSET: PROGRESSIVE

## 2024-10-24 ASSESSMENT — PAIN SCALES - GENERAL
PAINLEVEL_OUTOF10: 3
PAINLEVEL_OUTOF10: 5
PAINLEVEL_OUTOF10: 10
PAINLEVEL_OUTOF10: 5
PAINLEVEL_OUTOF10: 9
PAINLEVEL_OUTOF10: 8

## 2024-10-24 ASSESSMENT — PAIN DESCRIPTION - DESCRIPTORS
DESCRIPTORS: BURNING
DESCRIPTORS: BURNING;CRAMPING

## 2024-10-24 ASSESSMENT — PAIN DESCRIPTION - PAIN TYPE: TYPE: ACUTE PAIN

## 2024-10-24 ASSESSMENT — PAIN DESCRIPTION - FREQUENCY: FREQUENCY: CONTINUOUS

## 2024-10-24 NOTE — CARE COORDINATION
Follow up visit with pt. Pt on phone with wife asking her to bring in his wallet so he can call [facility] to coordinate benefits as he states that his [employer] insurance will end at the end of the month. Pt states that he and his wife have 4 children still at home under the age of 18. Pt also states that he has 4 adult children. Pt states that he is having a urological procedure tomorrow and hopes for discharge home sherlyn in the day Fri or Sat. Plan home with [facility].

## 2024-10-24 NOTE — CARE COORDINATION
Spoke with [facility]. According to their office, the pt is not an active pt with [facility] as their primary insurance. [facility]uma/[facility] spoke with this CM and went to speak with the pt in their room to figure out insurance and payment needs for [facility].

## 2024-10-25 ENCOUNTER — APPOINTMENT (OUTPATIENT)
Dept: GENERAL RADIOLOGY | Age: 64
DRG: 660 | End: 2024-10-25
Attending: STUDENT IN AN ORGANIZED HEALTH CARE EDUCATION/TRAINING PROGRAM
Payer: MEDICARE

## 2024-10-25 ENCOUNTER — ANESTHESIA (OUTPATIENT)
Dept: OPERATING ROOM | Age: 64
End: 2024-10-25
Payer: MEDICARE

## 2024-10-25 PROBLEM — B96.5 BACTEREMIA DUE TO PSEUDOMONAS: Status: ACTIVE | Noted: 2024-10-25

## 2024-10-25 PROBLEM — R78.81 BACTEREMIA: Status: ACTIVE | Noted: 2024-10-25

## 2024-10-25 PROBLEM — N13.9 OBSTRUCTIVE UROPATHY: Status: ACTIVE | Noted: 2024-10-25

## 2024-10-25 PROBLEM — R78.81 BACTEREMIA DUE TO PSEUDOMONAS: Status: ACTIVE | Noted: 2024-10-25

## 2024-10-25 LAB
ALBUMIN SERPL-MCNC: 2.8 G/DL (ref 3.4–5)
ALBUMIN/GLOB SERPL: 0.7 {RATIO} (ref 1.1–2.2)
ALP SERPL-CCNC: 248 U/L (ref 40–129)
ALT SERPL-CCNC: 22 U/L (ref 10–40)
ANION GAP SERPL CALCULATED.3IONS-SCNC: 11 MMOL/L (ref 9–17)
AST SERPL-CCNC: 28 U/L (ref 15–37)
BASOPHILS # BLD: 0.01 K/UL
BASOPHILS NFR BLD: 0 % (ref 0–1)
BILIRUB SERPL-MCNC: 0.3 MG/DL (ref 0–1)
BUN SERPL-MCNC: 34 MG/DL (ref 7–20)
CALCIUM SERPL-MCNC: 8.6 MG/DL (ref 8.3–10.6)
CHLORIDE SERPL-SCNC: 102 MMOL/L (ref 99–110)
CO2 SERPL-SCNC: 22 MMOL/L (ref 21–32)
CREAT SERPL-MCNC: 2.5 MG/DL (ref 0.8–1.3)
EOSINOPHIL # BLD: 0.16 K/UL
EOSINOPHILS RELATIVE PERCENT: 6 % (ref 0–3)
ERYTHROCYTE [DISTWIDTH] IN BLOOD BY AUTOMATED COUNT: 14.6 % (ref 11.7–14.9)
GFR, ESTIMATED: 26 ML/MIN/1.73M2
GLUCOSE SERPL-MCNC: 102 MG/DL (ref 74–99)
HCT VFR BLD AUTO: 29 % (ref 42–52)
HGB BLD-MCNC: 9.2 G/DL (ref 13.5–18)
IMM GRANULOCYTES # BLD AUTO: 0.03 K/UL
IMM GRANULOCYTES NFR BLD: 1 %
LYMPHOCYTES NFR BLD: 0.67 K/UL
LYMPHOCYTES RELATIVE PERCENT: 23 % (ref 24–44)
MCH RBC QN AUTO: 29.2 PG (ref 27–31)
MCHC RBC AUTO-ENTMCNC: 31.7 G/DL (ref 32–36)
MCV RBC AUTO: 92.1 FL (ref 78–100)
MICROORGANISM SPEC CULT: ABNORMAL
MICROORGANISM SPEC CULT: ABNORMAL
MONOCYTES NFR BLD: 0.26 K/UL
MONOCYTES NFR BLD: 9 % (ref 0–4)
NEUTROPHILS NFR BLD: 61 % (ref 36–66)
NEUTS SEG NFR BLD: 1.77 K/UL
PLATELET # BLD AUTO: 115 K/UL (ref 140–440)
PMV BLD AUTO: 9.8 FL (ref 7.5–11.1)
POTASSIUM SERPL-SCNC: 4.7 MMOL/L (ref 3.5–5.1)
PROT SERPL-MCNC: 6.8 G/DL (ref 6.4–8.2)
RBC # BLD AUTO: 3.15 M/UL (ref 4.6–6.2)
SERVICE CMNT-IMP: ABNORMAL
SODIUM SERPL-SCNC: 134 MMOL/L (ref 136–145)
SPECIMEN DESCRIPTION: ABNORMAL
WBC OTHER # BLD: 2.9 K/UL (ref 4–10.5)

## 2024-10-25 PROCEDURE — C2617 STENT, NON-COR, TEM W/O DEL: HCPCS | Performed by: UROLOGY

## 2024-10-25 PROCEDURE — 36415 COLL VENOUS BLD VENIPUNCTURE: CPT

## 2024-10-25 PROCEDURE — 76000 FLUOROSCOPY <1 HR PHYS/QHP: CPT

## 2024-10-25 PROCEDURE — 6360000002 HC RX W HCPCS: Performed by: STUDENT IN AN ORGANIZED HEALTH CARE EDUCATION/TRAINING PROGRAM

## 2024-10-25 PROCEDURE — 0TP98DZ REMOVAL OF INTRALUMINAL DEVICE FROM URETER, VIA NATURAL OR ARTIFICIAL OPENING ENDOSCOPIC: ICD-10-PCS | Performed by: UROLOGY

## 2024-10-25 PROCEDURE — 80053 COMPREHEN METABOLIC PANEL: CPT

## 2024-10-25 PROCEDURE — 85025 COMPLETE CBC W/AUTO DIFF WBC: CPT

## 2024-10-25 PROCEDURE — 6370000000 HC RX 637 (ALT 250 FOR IP): Performed by: STUDENT IN AN ORGANIZED HEALTH CARE EDUCATION/TRAINING PROGRAM

## 2024-10-25 PROCEDURE — 3600000006 HC SURGERY LEVEL 6 BASE: Performed by: UROLOGY

## 2024-10-25 PROCEDURE — C1758 CATHETER, URETERAL: HCPCS | Performed by: UROLOGY

## 2024-10-25 PROCEDURE — 3700000000 HC ANESTHESIA ATTENDED CARE: Performed by: UROLOGY

## 2024-10-25 PROCEDURE — 6360000004 HC RX CONTRAST MEDICATION: Performed by: UROLOGY

## 2024-10-25 PROCEDURE — 2580000003 HC RX 258: Performed by: NURSE ANESTHETIST, CERTIFIED REGISTERED

## 2024-10-25 PROCEDURE — 6370000000 HC RX 637 (ALT 250 FOR IP): Performed by: INTERNAL MEDICINE

## 2024-10-25 PROCEDURE — BT141ZZ FLUOROSCOPY OF KIDNEYS, URETERS AND BLADDER USING LOW OSMOLAR CONTRAST: ICD-10-PCS | Performed by: UROLOGY

## 2024-10-25 PROCEDURE — APPSS60 APP SPLIT SHARED TIME 46-60 MINUTES: Performed by: NURSE PRACTITIONER

## 2024-10-25 PROCEDURE — 99223 1ST HOSP IP/OBS HIGH 75: CPT | Performed by: INTERNAL MEDICINE

## 2024-10-25 PROCEDURE — 6360000002 HC RX W HCPCS: Performed by: NURSE ANESTHETIST, CERTIFIED REGISTERED

## 2024-10-25 PROCEDURE — C1769 GUIDE WIRE: HCPCS | Performed by: UROLOGY

## 2024-10-25 PROCEDURE — 3700000001 HC ADD 15 MINUTES (ANESTHESIA): Performed by: UROLOGY

## 2024-10-25 PROCEDURE — 0T778DZ DILATION OF LEFT URETER WITH INTRALUMINAL DEVICE, VIA NATURAL OR ARTIFICIAL OPENING ENDOSCOPIC: ICD-10-PCS | Performed by: UROLOGY

## 2024-10-25 PROCEDURE — 3600000016 HC SURGERY LEVEL 6 ADDTL 15MIN: Performed by: UROLOGY

## 2024-10-25 PROCEDURE — C1894 INTRO/SHEATH, NON-LASER: HCPCS | Performed by: UROLOGY

## 2024-10-25 PROCEDURE — 2709999900 HC NON-CHARGEABLE SUPPLY: Performed by: UROLOGY

## 2024-10-25 PROCEDURE — 6370000000 HC RX 637 (ALT 250 FOR IP): Performed by: NURSE PRACTITIONER

## 2024-10-25 PROCEDURE — 94761 N-INVAS EAR/PLS OXIMETRY MLT: CPT

## 2024-10-25 PROCEDURE — 0T768DZ DILATION OF RIGHT URETER WITH INTRALUMINAL DEVICE, VIA NATURAL OR ARTIFICIAL OPENING ENDOSCOPIC: ICD-10-PCS | Performed by: UROLOGY

## 2024-10-25 PROCEDURE — 87040 BLOOD CULTURE FOR BACTERIA: CPT

## 2024-10-25 PROCEDURE — 2060000000 HC ICU INTERMEDIATE R&B

## 2024-10-25 DEVICE — VARIABLE LENGTH URETERAL STENT
Type: IMPLANTABLE DEVICE | Site: URETER | Status: FUNCTIONAL
Brand: CONTOUR VL™

## 2024-10-25 RX ORDER — LACTOBACILLUS RHAMNOSUS GG 10B CELL
1 CAPSULE ORAL DAILY
Status: DISCONTINUED | OUTPATIENT
Start: 2024-10-25 | End: 2024-10-29 | Stop reason: HOSPADM

## 2024-10-25 RX ORDER — SODIUM CHLORIDE, SODIUM LACTATE, POTASSIUM CHLORIDE, CALCIUM CHLORIDE 600; 310; 30; 20 MG/100ML; MG/100ML; MG/100ML; MG/100ML
INJECTION, SOLUTION INTRAVENOUS
Status: DISCONTINUED | OUTPATIENT
Start: 2024-10-25 | End: 2024-10-25 | Stop reason: SDUPTHER

## 2024-10-25 RX ORDER — FENTANYL CITRATE 50 UG/ML
INJECTION, SOLUTION INTRAMUSCULAR; INTRAVENOUS
Status: DISCONTINUED | OUTPATIENT
Start: 2024-10-25 | End: 2024-10-25 | Stop reason: SDUPTHER

## 2024-10-25 RX ORDER — CIPROFLOXACIN 500 MG/1
500 TABLET, FILM COATED ORAL
Status: DISCONTINUED | OUTPATIENT
Start: 2024-10-26 | End: 2024-10-27

## 2024-10-25 RX ORDER — CIPROFLOXACIN 500 MG/1
500 TABLET, FILM COATED ORAL
Status: DISCONTINUED | OUTPATIENT
Start: 2024-10-25 | End: 2024-10-25

## 2024-10-25 RX ORDER — PROPOFOL 10 MG/ML
INJECTION, EMULSION INTRAVENOUS
Status: DISCONTINUED | OUTPATIENT
Start: 2024-10-25 | End: 2024-10-25 | Stop reason: SDUPTHER

## 2024-10-25 RX ORDER — LIDOCAINE HYDROCHLORIDE 20 MG/ML
INJECTION, SOLUTION INTRAVENOUS
Status: DISCONTINUED | OUTPATIENT
Start: 2024-10-25 | End: 2024-10-25 | Stop reason: SDUPTHER

## 2024-10-25 RX ADMIN — CIPROFLOXACIN HYDROCHLORIDE 500 MG: 500 TABLET, FILM COATED ORAL at 11:34

## 2024-10-25 RX ADMIN — HEPARIN SODIUM 5000 UNITS: 5000 INJECTION INTRAVENOUS; SUBCUTANEOUS at 21:51

## 2024-10-25 RX ADMIN — TAMSULOSIN HYDROCHLORIDE 0.4 MG: 0.4 CAPSULE ORAL at 09:16

## 2024-10-25 RX ADMIN — PROPOFOL 150 MG: 10 INJECTION, EMULSION INTRAVENOUS at 12:45

## 2024-10-25 RX ADMIN — HEPARIN SODIUM 5000 UNITS: 5000 INJECTION INTRAVENOUS; SUBCUTANEOUS at 14:22

## 2024-10-25 RX ADMIN — ACETAMINOPHEN 650 MG: 325 TABLET ORAL at 23:53

## 2024-10-25 RX ADMIN — HYDROMORPHONE HYDROCHLORIDE 0.5 MG: 1 INJECTION, SOLUTION INTRAMUSCULAR; INTRAVENOUS; SUBCUTANEOUS at 03:25

## 2024-10-25 RX ADMIN — FENTANYL CITRATE 25 MCG: 50 INJECTION, SOLUTION INTRAMUSCULAR; INTRAVENOUS at 12:45

## 2024-10-25 RX ADMIN — HYDROMORPHONE HYDROCHLORIDE 0.5 MG: 1 INJECTION, SOLUTION INTRAMUSCULAR; INTRAVENOUS; SUBCUTANEOUS at 09:16

## 2024-10-25 RX ADMIN — LIDOCAINE HYDROCHLORIDE 80 MG: 20 INJECTION, SOLUTION INTRAVENOUS at 12:45

## 2024-10-25 RX ADMIN — OXYBUTYNIN CHLORIDE 5 MG: 5 TABLET ORAL at 09:16

## 2024-10-25 RX ADMIN — FENTANYL CITRATE 75 MCG: 50 INJECTION, SOLUTION INTRAMUSCULAR; INTRAVENOUS at 12:56

## 2024-10-25 RX ADMIN — SODIUM CHLORIDE, POTASSIUM CHLORIDE, SODIUM LACTATE AND CALCIUM CHLORIDE: 600; 310; 30; 20 INJECTION, SOLUTION INTRAVENOUS at 12:37

## 2024-10-25 RX ADMIN — HYDROMORPHONE HYDROCHLORIDE 0.5 MG: 1 INJECTION, SOLUTION INTRAMUSCULAR; INTRAVENOUS; SUBCUTANEOUS at 14:22

## 2024-10-25 RX ADMIN — TAMSULOSIN HYDROCHLORIDE 0.4 MG: 0.4 CAPSULE ORAL at 20:09

## 2024-10-25 RX ADMIN — OXYBUTYNIN CHLORIDE 5 MG: 5 TABLET ORAL at 20:09

## 2024-10-25 RX ADMIN — GABAPENTIN 100 MG: 100 CAPSULE ORAL at 09:16

## 2024-10-25 RX ADMIN — HYDROMORPHONE HYDROCHLORIDE 0.5 MG: 1 INJECTION, SOLUTION INTRAMUSCULAR; INTRAVENOUS; SUBCUTANEOUS at 20:07

## 2024-10-25 RX ADMIN — ACETAMINOPHEN 650 MG: 325 TABLET ORAL at 09:16

## 2024-10-25 RX ADMIN — HYDROMORPHONE HYDROCHLORIDE 0.5 MG: 1 INJECTION, SOLUTION INTRAMUSCULAR; INTRAVENOUS; SUBCUTANEOUS at 23:53

## 2024-10-25 RX ADMIN — GABAPENTIN 100 MG: 100 CAPSULE ORAL at 20:08

## 2024-10-25 RX ADMIN — OXYBUTYNIN CHLORIDE 5 MG: 5 TABLET ORAL at 14:22

## 2024-10-25 RX ADMIN — Medication 1 CAPSULE: at 14:51

## 2024-10-25 RX ADMIN — FENTANYL CITRATE 100 MCG: 50 INJECTION, SOLUTION INTRAMUSCULAR; INTRAVENOUS at 13:04

## 2024-10-25 RX ADMIN — AMLODIPINE BESYLATE 10 MG: 10 TABLET ORAL at 09:16

## 2024-10-25 RX ADMIN — PROPOFOL 40 MG: 10 INJECTION, EMULSION INTRAVENOUS at 12:48

## 2024-10-25 RX ADMIN — ACETAMINOPHEN 650 MG: 325 TABLET ORAL at 17:54

## 2024-10-25 ASSESSMENT — PAIN SCALES - GENERAL
PAINLEVEL_OUTOF10: 10
PAINLEVEL_OUTOF10: 8
PAINLEVEL_OUTOF10: 3
PAINLEVEL_OUTOF10: 2
PAINLEVEL_OUTOF10: 5
PAINLEVEL_OUTOF10: 8
PAINLEVEL_OUTOF10: 10
PAINLEVEL_OUTOF10: 4
PAINLEVEL_OUTOF10: 8
PAINLEVEL_OUTOF10: 5
PAINLEVEL_OUTOF10: 5
PAINLEVEL_OUTOF10: 10
PAINLEVEL_OUTOF10: 3
PAINLEVEL_OUTOF10: 10

## 2024-10-25 ASSESSMENT — PAIN DESCRIPTION - LOCATION
LOCATION: GROIN
LOCATION: PENIS
LOCATION: FLANK
LOCATION: FLANK
LOCATION: PENIS
LOCATION: PELVIS

## 2024-10-25 ASSESSMENT — PAIN DESCRIPTION - FREQUENCY: FREQUENCY: CONTINUOUS

## 2024-10-25 ASSESSMENT — PAIN DESCRIPTION - DESCRIPTORS
DESCRIPTORS: BURNING;SHARP;SHOOTING
DESCRIPTORS: BURNING;SHOOTING;SPASM

## 2024-10-25 ASSESSMENT — PAIN DESCRIPTION - PAIN TYPE: TYPE: ACUTE PAIN

## 2024-10-25 ASSESSMENT — PAIN DESCRIPTION - ORIENTATION
ORIENTATION: RIGHT
ORIENTATION: RIGHT

## 2024-10-25 ASSESSMENT — PAIN DESCRIPTION - ONSET: ONSET: ON-GOING

## 2024-10-25 ASSESSMENT — LIFESTYLE VARIABLES: SMOKING_STATUS: 0

## 2024-10-25 ASSESSMENT — PAIN - FUNCTIONAL ASSESSMENT: PAIN_FUNCTIONAL_ASSESSMENT: ACTIVITIES ARE NOT PREVENTED

## 2024-10-25 NOTE — ANESTHESIA PRE PROCEDURE
TempSrc:    Oral   SpO2:   97% 99%   Weight:       Height:                                                  BP Readings from Last 3 Encounters:   10/25/24 110/74   10/23/24 113/76   10/07/24 120/75       NPO Status:                                                                                 BMI:   Wt Readings from Last 3 Encounters:   10/24/24 79.8 kg (176 lb)   10/22/24 84.8 kg (187 lb)   10/06/24 85 kg (187 lb 6.4 oz)     Body mass index is 25.25 kg/m².    CBC:   Lab Results   Component Value Date/Time    WBC 2.9 10/25/2024 11:19 AM    RBC 3.15 10/25/2024 11:19 AM    HGB 9.2 10/25/2024 11:19 AM    HCT 29.0 10/25/2024 11:19 AM    MCV 92.1 10/25/2024 11:19 AM    RDW 14.6 10/25/2024 11:19 AM     10/25/2024 11:19 AM       CMP:   Lab Results   Component Value Date/Time     10/24/2024 05:21 AM    K 4.2 10/24/2024 05:21 AM    CL 99 10/24/2024 05:21 AM    CO2 22 10/24/2024 05:21 AM    BUN 40 10/24/2024 05:21 AM    CREATININE 2.9 10/24/2024 05:21 AM    GFRAA >60 01/19/2018 07:38 AM    LABGLOM 23 10/24/2024 05:21 AM    LABGLOM 68 04/22/2024 10:09 AM    GLUCOSE 100 10/24/2024 05:21 AM    CALCIUM 8.1 10/24/2024 05:21 AM    BILITOT 0.2 10/24/2024 05:21 AM    ALKPHOS 234 10/24/2024 05:21 AM    AST 25 10/24/2024 05:21 AM    ALT 15 10/24/2024 05:21 AM       POC Tests: No results for input(s): \"POCGLU\", \"POCNA\", \"POCK\", \"POCCL\", \"POCBUN\", \"POCHEMO\", \"POCHCT\" in the last 72 hours.    Coags:   Lab Results   Component Value Date/Time    PROTIME 18.8 10/23/2024 08:15 AM    PROTIME 10.5 02/08/2011 03:20 PM    INR 1.6 10/23/2024 08:15 AM    APTT 43.9 01/10/2024 04:00 PM       HCG (If Applicable): No results found for: \"PREGTESTUR\", \"PREGSERUM\", \"HCG\", \"HCGQUANT\"     ABGs: No results found for: \"PHART\", \"PO2ART\", \"YYW4ZSS\", \"GIJ9SKJ\", \"BEART\", \"H1LVIKNG\"     Type & Screen (If Applicable):  No results found for: \"LABABO\"    Drug/Infectious Status (If Applicable):  Lab Results   Component Value Date/Time    HEPCAB NON

## 2024-10-25 NOTE — OP NOTE
UROLOGY OPERATIVE NOTE  DATE: 10/25/2024    PATIENT: Jethro Vázquez  AGE: 64 y.o.  SEX: male  MRN: [id number]  YOB: 1960    PROCEDURE:  1. Cystoscopy, rigid (94794)  2. Left retrograde pyelogram (79979)  3. Left ureteral stent removal and placement placement (Stent size:4.8Fr variable) (18031)  4. Right retrograde pyelogram (58506)  5. Right ureteral stent placement placement placement (Stent size:4.8Fr variable) (06566)    SURGEON: Haider Cain MD    PREOPERATIVE DIAGNOSIS: Left urolithiasis, UTI    POSTOPERATIVE DIAGNOSIS: Same    FINDINGS: Normal urethra, moderately enlarged prostate, radiation changes in bladder, no bladder masses, bilateral ureteral stents placed with curls in kidney and bladder    ANESTHESIA: General    BLOOD LOSS: Minimal    COMPLICATIONS: None    DRAINS: Bilateral 4.8Fr variable stents    IMPLANTS:  Implant Name Type Inv. Item Serial No.  Lot No. LRB No. Used Action   STENT URET 4.8FR [id number] PERCFLX HYDR+ SFT PGTL TAPR TIP - [id number]  STENT URET 4.8FR [id number] PERCFLX HYDR+ SFT PGTL TAPR TIP  [email or web address]- [id number] Left 1 Implanted   STENT URET 4.8FR [id number] PERCFLX HYDR+ SFT PGTL TAPR TIP - [id number]  STENT URET 4.8FR [id number] PERCFLX HYDR+ SFT PGTL TAPR TIP  [email or web address]- [id number] Right 1 Implanted        SPECIMENS: * No specimens in log *    INDICATIONS FOR OPERATIVE PROCEDURE: Jethro Vázquez is a 64 y.o. year old male with pmhx of kidney stones, HTN, bladder cancer, and bilateral hydronephrosis managed with left ureteral stent admitted with bilateral hydronephrosis, SHIRLEY, and UTI. I recommended proceeding with cystoscopy with ureteral stent exchange and right ureteral stent placement due to the hydronephrosis and SHIRLEY. The risks discussed included, but were not limited to, worsening urinary tract infection/sepsis, bleeding, injury to urethra/bladder/ureter, urethra stricture, ureteral stricture, inability to treat stone, and death.

## 2024-10-25 NOTE — CONSULTS
RENAL DOSE ADJUSTMENT MADE PER P/T PROTOCOL    PREVIOUS ORDER:  Ciprofloxacin 500mg po q12h    INDICATION:  Bloodstream infection    Estimated Creatinine Clearance: 27 mL/min (A) (based on SCr of 2.9 mg/dL (H)).  Recent Labs     10/22/24  1950 10/23/24  0815 10/24/24  0521   BUN 33* 44* 40*   CREATININE 3.3* 3.9* 2.9*   * 111* 104*   INR  --  1.6  --      NEW RENALLY ADJUSTED ORDER:  Ciprofloxacin 500mg po q24h      Santy Mcfarlane [facility]  10/25/2024 9:35 AM    
or weakness   HEENT:  Denies swelling of neck glands  Respiratory:  Denies cough, shortness of breath or hemoptysis  Cardiovascular:  Denies chest pain, palpitations or swelling   GI:  Denies abdominal pain, nausea, vomiting, constipation or diarrhea   Musculoskeletal:  low back pain   Skin:  Denies rash   Neurologic:  Denies headache, focal weakness or sensory changes   All systems negative except as marked.     PHYSICAL EXAM    Vitals: /69   Pulse 78   Temp 98.1 °F (36.7 °C) (Oral)   Resp 16   Ht 1.778 m (5' 10\")   Wt 79.8 kg (176 lb)   SpO2 98%   BMI 25.25 kg/m²   CONSTITUTIONAL: awake, alert, cooperative, no apparent distress   EYES: pupils equal, round and reactive to light, sclera clear and + pallor  ENT: Normocephalic, without obvious abnormality, atraumatic  NECK: supple, symmetrical, no jugular venous distension and no carotid bruits   HEMATOLOGIC/LYMPHATIC: no cervical, supraclavicular or axillary lymphadenopathy   LUNGS: no increased work of breathing. CTA bilaterally.   CARDIOVASCULAR: regular rate and rhythm, normal S1 and S2, no murmur noted  ABDOMEN: normal bowel sounds x 4, soft, non-distended, non-tender, no masses palpated, no hepatosplenomegaly   MUSCULOSKELETAL: full range of motion noted, tone is normal  NEUROLOGIC: CN II - XII grossly intact. Motor skills grossly intact.   SKIN: Normal skin color, texture, turgor and no jaundice. Skin appears intact   EXTREMITIES: no LE edema, no cyanosis, no leg swelling, no clubbing    LABORATORY RESULTS  CBC:   Recent Labs     10/22/24  1950 10/23/24  0815 10/24/24  0521   WBC 8.4 6.1 3.4*   HGB 9.7* 8.7* 7.9*   * 111* 104*     BMP:    Recent Labs     10/22/24  1950 10/23/24  0815 10/24/24  0521   * 134* 131*   K 4.2 4.2 4.2   CL 90* 100 99   CO2 20* 21 22   BUN 33* 44* 40*   CREATININE 3.3* 3.9* 2.9*   GLUCOSE 102* 102* 100*     Hepatic:   Recent Labs     10/22/24  1950 10/23/24  0815 10/24/24  0521   AST 17 20 25   ALT 12 12 15 
PROCEDURES    IR PORT PLACEMENT EQUAL OR GREATER THAN 5 YEARS  2024    IR PORT PLACEMENT EQUAL OR GREATER THAN 5 YEARS 3/7/2024 [facility] SPECIAL PROCEDURES    IR URETERAL STENT PLACEMENT THRU EXIST TRACT Bilateral 2024    OTHER SURGICAL HISTORY Bilateral 2018    bilat inq hernia repair with mesh      Family History   Problem Relation Age of Onset    High Blood Pressure Mother     Cancer Father         throat cancer    High Blood Pressure Brother       Infectious disease related family history - not contibutory.   SOCIAL HISTORY  Social History     Tobacco Use    Smoking status: Former     Current packs/day: 0.00     Average packs/day: 0.5 packs/day for 34.0 years (17.0 ttl pk-yrs)     Types: Cigarettes     Start date: 1971     Quit date: 2005     Years since quittin.7     Passive exposure: Past    Smokeless tobacco: Never   Substance Use Topics    Alcohol use: No      Born:[place], [place]  Lives:[place], [place] with wife and kids  Occupation:Retired fork   No recent travel of significance.  No recent unusual exposures.  Pets: 2 cats, 6 dogs  ?  ALLERGIES  No Known Allergies   MEDICATIONS  Reviewed and are per the chart/EMR.   ?  Antibiotics:   Present:  Ciprofloxacin 10/25-  Past:   Cefepime 10/22-?  -------------------------------------------------------------------------------------------------------------------    Vital Signs:  Vitals:    10/25/24 0700   BP: 121/69   Pulse: 78   Resp: 16   Temp:    SpO2:          Exam:    VS: noted; wt 176 lb (79.8 kg), Height 5'10\"  Gen: alert and oriented X3, no distress  Skin: no stigmata of endocarditis  Wounds: C/D/I-NA  Mediport: intact and accessed right upper chest. Site with no erythema.    HEMT: AT/NC Oropharynx pink, moist, and without lesions or exudates; edentulous  Eyes: PERRLA, EOMI, conjunctiva pink, sclera anicteric.   Neck: Supple. Trachea midline. No LAD.  Chest: no distress and CTA. Good air movement. Room air.   Heart: NSR 
exposure control. Findings:  Visualized lung bases: The visualized lung bases appear clear. Liver: The liver appears normal. Gallbladder: Status post cholecystectomy Spleen: The Spleen appears normal. Pancreas: The Pancreas appears normal. Adrenal glands: The adrenal glands appear normal. Kidneys: Left ureteral stent in good position extending from the left kidney into the urinary bladder. Mild left hydronephrosis. Mild to moderate left hydronephrosis. No urinary calculi. Nonenhancing benign bilateral renal cysts present. Mild left perinephric stranding. Diffuse thickening of the walls of the mildly decompressed urinary bladder with adjacent stranding. GI: There is no evidence of bowel obstruction. Moderate degree of stool. Retroperitoneum:No retroperitoneal hemorrhage. No aortic abdominal aneurysm. Pelvis: No free fluid. Small left inguinal hernia containing only fat. No lymphadenopathy. MUSCULOSKELETAL:Osseous structures appear intact. Mild degenerative changes of the spine. Additional findings: No fluid collection     1. Diffuse wall thickening urinary bladder with adjacent stranding and mild left perinephric stranding without significant change from September 17, 2024, possible urinary tract infection. 2. Left ureteral stent in good position. Mild right and mild to moderate left hydronephrosis. No radiopaque urinary calculi. 3. Status post cholecystectomy  4. No lymphadenopathy. Electronically signed by Edward Isabel MD      Assessment & Plan:      Jethro Vázquez is a 64 y.o. male with pmhx of kidney stones, HTN, bladder cancer, and bilateral hydronephrosis managed with left ureteral stent admitted 10/23/2024 for complicated UTI.    1) Bladder Cancer w Bilateral Hydronephrosis and SHIRLEY on CKD3: S/p cystoscopy, right stent removal, and left stent exchange 7/8/24  S/p internalization of bilateral ureteral stents and nephrostomy tube removal with IR 4/1/24  S/p bilateral PCN placement with IR 1/12/24  S/p

## 2024-10-25 NOTE — ANESTHESIA POSTPROCEDURE EVALUATION
Department of Anesthesiology  Postprocedure Note    Patient: Jethro Vázquez  MRN: [id number]  YOB: 1960  Date of evaluation: 10/25/2024    Procedure Summary       Date: 10/25/24 Room / Location: [address]    Anesthesia Start: 1237 Anesthesia Stop: 1315    Procedure: CYSTOSCOPY, BILATERAL RETROGRADE PYELOGRAMS, LEFT URETERAL STENT EXCHANGE, RIGHT URETERAL STENT INSERTION (Bilateral: Ureter) Diagnosis:       Hydronephrosis, unspecified hydronephrosis type      (Hydronephrosis, unspecified hydronephrosis type [N13.30])    Surgeons: Haider Cain MD Responsible Provider: Garfield Campos MD    Anesthesia Type: general, MAC ASA Status: 3            Anesthesia Type: No value filed.    Arthur Phase I:      Arthur Phase II:      Anesthesia Post Evaluation    Patient location during evaluation: ICU  Patient participation: complete - patient participated  Level of consciousness: awake and alert  Pain score: 0  Airway patency: patent  Nausea & Vomiting: no nausea and no vomiting  Cardiovascular status: blood pressure returned to baseline  Respiratory status: acceptable, nasal cannula, spontaneous ventilation and nonlabored ventilation  Pain management: adequate    No notable events documented.

## 2024-10-25 NOTE — CARE COORDINATION
Follow up visit with pt, wife and dgt at bedside. Pt states that he spoke with [employer] and provided them with the name and ph# for the [facility] rep and was told they will reach out to her. Pt going for cysto/stent removal?exchange? Today. Plan remains for discharge to home with  [facility].

## 2024-10-26 LAB
ACB COMPLEX DNA BLD POS QL NAA+NON-PROBE: NOT DETECTED
ALBUMIN SERPL-MCNC: 2.7 G/DL (ref 3.4–5)
ALBUMIN/GLOB SERPL: 0.7 {RATIO} (ref 1.1–2.2)
ALP SERPL-CCNC: 265 U/L (ref 40–129)
ALT SERPL-CCNC: 20 U/L (ref 10–40)
ANION GAP SERPL CALCULATED.3IONS-SCNC: 9 MMOL/L (ref 9–17)
AST SERPL-CCNC: 26 U/L (ref 15–37)
B FRAGILIS DNA BLD POS QL NAA+NON-PROBE: NOT DETECTED
BASOPHILS # BLD: 0.01 K/UL
BASOPHILS NFR BLD: 0 % (ref 0–1)
BILIRUB SERPL-MCNC: 0.2 MG/DL (ref 0–1)
BLACTX-M ISLT/SPM QL: NOT DETECTED
BLAIMP ISLT/SPM QL: NOT DETECTED
BLAKPC ISLT/SPM QL: NOT DETECTED
BLAVIM ISLT/SPM QL: NOT DETECTED
BUN SERPL-MCNC: 31 MG/DL (ref 7–20)
C ALBICANS DNA BLD POS QL NAA+NON-PROBE: NOT DETECTED
C AURIS DNA BLD POS QL NAA+NON-PROBE: NOT DETECTED
C GATTII+NEOFOR DNA BLD POS QL NAA+N-PRB: NOT DETECTED
C GLABRATA DNA BLD POS QL NAA+NON-PROBE: NOT DETECTED
C KRUSEI DNA BLD POS QL NAA+NON-PROBE: NOT DETECTED
C PARAP DNA BLD POS QL NAA+NON-PROBE: NOT DETECTED
C TROPICLS DNA BLD POS QL NAA+NON-PROBE: NOT DETECTED
CALCIUM SERPL-MCNC: 8.4 MG/DL (ref 8.3–10.6)
CHLORIDE SERPL-SCNC: 105 MMOL/L (ref 99–110)
CO2 SERPL-SCNC: 23 MMOL/L (ref 21–32)
CREAT SERPL-MCNC: 2.4 MG/DL (ref 0.8–1.3)
E CLOAC COMP DNA BLD POS NAA+NON-PROBE: NOT DETECTED
E COLI DNA BLD POS QL NAA+NON-PROBE: NOT DETECTED
E FAECALIS DNA BLD POS QL NAA+NON-PROBE: NOT DETECTED
E FAECIUM DNA BLD POS QL NAA+NON-PROBE: NOT DETECTED
ENTEROBACTERALES DNA BLD POS NAA+N-PRB: NOT DETECTED
EOSINOPHIL # BLD: 0.14 K/UL
EOSINOPHILS RELATIVE PERCENT: 4 % (ref 0–3)
ERYTHROCYTE [DISTWIDTH] IN BLOOD BY AUTOMATED COUNT: 14.6 % (ref 11.7–14.9)
GFR, ESTIMATED: 27 ML/MIN/1.73M2
GLUCOSE SERPL-MCNC: 109 MG/DL (ref 74–99)
GP B STREP DNA BLD POS QL NAA+NON-PROBE: NOT DETECTED
HAEM INFLU DNA BLD POS QL NAA+NON-PROBE: NOT DETECTED
HCT VFR BLD AUTO: 27.5 % (ref 42–52)
HGB BLD-MCNC: 8.6 G/DL (ref 13.5–18)
IMM GRANULOCYTES # BLD AUTO: 0.05 K/UL
IMM GRANULOCYTES NFR BLD: 1 %
K OXYTOCA DNA BLD POS QL NAA+NON-PROBE: NOT DETECTED
KLEBSIELLA SP DNA BLD POS QL NAA+NON-PRB: NOT DETECTED
KLEBSIELLA SP DNA BLD POS QL NAA+NON-PRB: NOT DETECTED
L MONOCYTOG DNA BLD POS QL NAA+NON-PROBE: NOT DETECTED
LYMPHOCYTES NFR BLD: 0.67 K/UL
LYMPHOCYTES RELATIVE PERCENT: 18 % (ref 24–44)
MCH RBC QN AUTO: 29.5 PG (ref 27–31)
MCHC RBC AUTO-ENTMCNC: 31.3 G/DL (ref 32–36)
MCV RBC AUTO: 94.2 FL (ref 78–100)
MICROORGANISM SPEC CULT: ABNORMAL
MICROORGANISM SPEC CULT: ABNORMAL
MICROORGANISM/AGENT SPEC: ABNORMAL
MICROORGANISM/AGENT SPEC: ABNORMAL
MONOCYTES NFR BLD: 0.3 K/UL
MONOCYTES NFR BLD: 8 % (ref 0–4)
N MEN DNA BLD POS QL NAA+NON-PROBE: NOT DETECTED
NEUTROPHILS NFR BLD: 69 % (ref 36–66)
NEUTS SEG NFR BLD: 2.56 K/UL
P AERUGINOSA DNA BLD POS NAA+NON-PROBE: DETECTED
PLATELET # BLD AUTO: 121 K/UL (ref 140–440)
PMV BLD AUTO: 9.7 FL (ref 7.5–11.1)
POTASSIUM SERPL-SCNC: 5 MMOL/L (ref 3.5–5.1)
PROT SERPL-MCNC: 6.6 G/DL (ref 6.4–8.2)
PROTEUS SP DNA BLD POS QL NAA+NON-PROBE: NOT DETECTED
RBC # BLD AUTO: 2.92 M/UL (ref 4.6–6.2)
RESISTANT GENE NDM BY PCR: NOT DETECTED
S AUREUS DNA BLD POS QL NAA+NON-PROBE: NOT DETECTED
S AUREUS+CONS DNA BLD POS NAA+NON-PROBE: NOT DETECTED
S EPIDERMIDIS DNA BLD POS QL NAA+NON-PRB: NOT DETECTED
S LUGDUNENSIS DNA BLD POS QL NAA+NON-PRB: NOT DETECTED
S MALTOPHILIA DNA BLD POS QL NAA+NON-PRB: NOT DETECTED
S MARCESCENS DNA BLD POS NAA+NON-PROBE: NOT DETECTED
S PNEUM DNA BLD POS QL NAA+NON-PROBE: NOT DETECTED
S PYO DNA BLD POS QL NAA+NON-PROBE: ABNORMAL
SALMONELLA DNA BLD POS QL NAA+NON-PROBE: NOT DETECTED
SERVICE CMNT-IMP: ABNORMAL
SERVICE CMNT-IMP: ABNORMAL
SODIUM SERPL-SCNC: 137 MMOL/L (ref 136–145)
SPECIMEN DESCRIPTION: ABNORMAL
SPECIMEN DESCRIPTION: ABNORMAL
STREPTOCOCCUS DNA BLD POS NAA+NON-PROBE: NOT DETECTED
WBC OTHER # BLD: 3.7 K/UL (ref 4–10.5)

## 2024-10-26 PROCEDURE — 6360000002 HC RX W HCPCS: Performed by: INTERNAL MEDICINE

## 2024-10-26 PROCEDURE — 6370000000 HC RX 637 (ALT 250 FOR IP): Performed by: NURSE PRACTITIONER

## 2024-10-26 PROCEDURE — 2060000000 HC ICU INTERMEDIATE R&B

## 2024-10-26 PROCEDURE — 36415 COLL VENOUS BLD VENIPUNCTURE: CPT

## 2024-10-26 PROCEDURE — 6360000002 HC RX W HCPCS: Performed by: STUDENT IN AN ORGANIZED HEALTH CARE EDUCATION/TRAINING PROGRAM

## 2024-10-26 PROCEDURE — 99232 SBSQ HOSP IP/OBS MODERATE 35: CPT | Performed by: INTERNAL MEDICINE

## 2024-10-26 PROCEDURE — 80053 COMPREHEN METABOLIC PANEL: CPT

## 2024-10-26 PROCEDURE — 85025 COMPLETE CBC W/AUTO DIFF WBC: CPT

## 2024-10-26 PROCEDURE — 6370000000 HC RX 637 (ALT 250 FOR IP): Performed by: STUDENT IN AN ORGANIZED HEALTH CARE EDUCATION/TRAINING PROGRAM

## 2024-10-26 PROCEDURE — 94761 N-INVAS EAR/PLS OXIMETRY MLT: CPT

## 2024-10-26 RX ORDER — HYDROMORPHONE HYDROCHLORIDE 1 MG/ML
1 INJECTION, SOLUTION INTRAMUSCULAR; INTRAVENOUS; SUBCUTANEOUS EVERY 4 HOURS PRN
Status: DISCONTINUED | OUTPATIENT
Start: 2024-10-26 | End: 2024-10-27

## 2024-10-26 RX ADMIN — HEPARIN SODIUM 5000 UNITS: 5000 INJECTION INTRAVENOUS; SUBCUTANEOUS at 05:39

## 2024-10-26 RX ADMIN — HEPARIN SODIUM 5000 UNITS: 5000 INJECTION INTRAVENOUS; SUBCUTANEOUS at 21:44

## 2024-10-26 RX ADMIN — OXYBUTYNIN CHLORIDE 5 MG: 5 TABLET ORAL at 13:48

## 2024-10-26 RX ADMIN — ONDANSETRON 4 MG: 2 INJECTION INTRAMUSCULAR; INTRAVENOUS at 10:05

## 2024-10-26 RX ADMIN — HEPARIN SODIUM 5000 UNITS: 5000 INJECTION INTRAVENOUS; SUBCUTANEOUS at 13:48

## 2024-10-26 RX ADMIN — TAMSULOSIN HYDROCHLORIDE 0.4 MG: 0.4 CAPSULE ORAL at 11:16

## 2024-10-26 RX ADMIN — GABAPENTIN 100 MG: 100 CAPSULE ORAL at 11:17

## 2024-10-26 RX ADMIN — OXYBUTYNIN CHLORIDE 5 MG: 5 TABLET ORAL at 21:45

## 2024-10-26 RX ADMIN — GABAPENTIN 100 MG: 100 CAPSULE ORAL at 21:44

## 2024-10-26 RX ADMIN — ONDANSETRON 4 MG: 2 INJECTION INTRAMUSCULAR; INTRAVENOUS at 18:03

## 2024-10-26 RX ADMIN — TAMSULOSIN HYDROCHLORIDE 0.4 MG: 0.4 CAPSULE ORAL at 21:44

## 2024-10-26 RX ADMIN — CIPROFLOXACIN HYDROCHLORIDE 500 MG: 500 TABLET, FILM COATED ORAL at 11:17

## 2024-10-26 RX ADMIN — HYDROMORPHONE HYDROCHLORIDE 0.5 MG: 1 INJECTION, SOLUTION INTRAMUSCULAR; INTRAVENOUS; SUBCUTANEOUS at 10:01

## 2024-10-26 RX ADMIN — ACETAMINOPHEN 650 MG: 325 TABLET ORAL at 13:48

## 2024-10-26 RX ADMIN — HYDROMORPHONE HYDROCHLORIDE 0.5 MG: 1 INJECTION, SOLUTION INTRAMUSCULAR; INTRAVENOUS; SUBCUTANEOUS at 14:07

## 2024-10-26 RX ADMIN — HYDROMORPHONE HYDROCHLORIDE 1 MG: 1 INJECTION, SOLUTION INTRAMUSCULAR; INTRAVENOUS; SUBCUTANEOUS at 21:45

## 2024-10-26 RX ADMIN — ONDANSETRON 4 MG: 4 TABLET, ORALLY DISINTEGRATING ORAL at 21:48

## 2024-10-26 RX ADMIN — HYDROMORPHONE HYDROCHLORIDE 1 MG: 1 INJECTION, SOLUTION INTRAMUSCULAR; INTRAVENOUS; SUBCUTANEOUS at 18:03

## 2024-10-26 RX ADMIN — AMLODIPINE BESYLATE 10 MG: 10 TABLET ORAL at 11:17

## 2024-10-26 RX ADMIN — OXYBUTYNIN CHLORIDE 5 MG: 5 TABLET ORAL at 11:17

## 2024-10-26 RX ADMIN — Medication 1 CAPSULE: at 11:17

## 2024-10-26 RX ADMIN — HYDROMORPHONE HYDROCHLORIDE 0.5 MG: 1 INJECTION, SOLUTION INTRAMUSCULAR; INTRAVENOUS; SUBCUTANEOUS at 04:20

## 2024-10-26 ASSESSMENT — PAIN DESCRIPTION - DESCRIPTORS
DESCRIPTORS: SQUEEZING;POUNDING
DESCRIPTORS: ACHING
DESCRIPTORS: ACHING
DESCRIPTORS: SQUEEZING
DESCRIPTORS: ACHING;CRAMPING

## 2024-10-26 ASSESSMENT — PAIN DESCRIPTION - PAIN TYPE
TYPE: ACUTE PAIN

## 2024-10-26 ASSESSMENT — PAIN DESCRIPTION - FREQUENCY
FREQUENCY: CONTINUOUS

## 2024-10-26 ASSESSMENT — PAIN DESCRIPTION - LOCATION
LOCATION: PELVIS
LOCATION: PELVIS
LOCATION: PENIS
LOCATION: ABDOMEN
LOCATION: OTHER (COMMENT)
LOCATION: PENIS

## 2024-10-26 ASSESSMENT — PAIN DESCRIPTION - ORIENTATION
ORIENTATION: MID
ORIENTATION: LOWER

## 2024-10-26 ASSESSMENT — PAIN SCALES - GENERAL
PAINLEVEL_OUTOF10: 8
PAINLEVEL_OUTOF10: 8
PAINLEVEL_OUTOF10: 9
PAINLEVEL_OUTOF10: 8
PAINLEVEL_OUTOF10: 10
PAINLEVEL_OUTOF10: 5
PAINLEVEL_OUTOF10: 4
PAINLEVEL_OUTOF10: 6

## 2024-10-26 ASSESSMENT — PAIN DESCRIPTION - ONSET
ONSET: ON-GOING

## 2024-10-26 ASSESSMENT — PAIN - FUNCTIONAL ASSESSMENT
PAIN_FUNCTIONAL_ASSESSMENT: PREVENTS OR INTERFERES SOME ACTIVE ACTIVITIES AND ADLS
PAIN_FUNCTIONAL_ASSESSMENT: ACTIVITIES ARE NOT PREVENTED
PAIN_FUNCTIONAL_ASSESSMENT: PREVENTS OR INTERFERES SOME ACTIVE ACTIVITIES AND ADLS
PAIN_FUNCTIONAL_ASSESSMENT: PREVENTS OR INTERFERES SOME ACTIVE ACTIVITIES AND ADLS

## 2024-10-27 LAB
ALBUMIN SERPL-MCNC: 2.8 G/DL (ref 3.4–5)
ALBUMIN/GLOB SERPL: 0.7 {RATIO} (ref 1.1–2.2)
ALP SERPL-CCNC: 243 U/L (ref 40–129)
ALT SERPL-CCNC: 20 U/L (ref 10–40)
ANION GAP SERPL CALCULATED.3IONS-SCNC: 10 MMOL/L (ref 9–17)
AST SERPL-CCNC: 25 U/L (ref 15–37)
BASOPHILS # BLD: 0.02 K/UL
BASOPHILS NFR BLD: 1 % (ref 0–1)
BILIRUB SERPL-MCNC: 0.2 MG/DL (ref 0–1)
BUN SERPL-MCNC: 26 MG/DL (ref 7–20)
CALCIUM SERPL-MCNC: 8.5 MG/DL (ref 8.3–10.6)
CHLORIDE SERPL-SCNC: 104 MMOL/L (ref 99–110)
CO2 SERPL-SCNC: 23 MMOL/L (ref 21–32)
CREAT SERPL-MCNC: 2.2 MG/DL (ref 0.8–1.3)
EOSINOPHIL # BLD: 0.24 K/UL
EOSINOPHILS RELATIVE PERCENT: 6 % (ref 0–3)
ERYTHROCYTE [DISTWIDTH] IN BLOOD BY AUTOMATED COUNT: 14.6 % (ref 11.7–14.9)
GFR, ESTIMATED: 30 ML/MIN/1.73M2
GLUCOSE SERPL-MCNC: 109 MG/DL (ref 74–99)
HCT VFR BLD AUTO: 28.2 % (ref 42–52)
HGB BLD-MCNC: 8.7 G/DL (ref 13.5–18)
IMM GRANULOCYTES # BLD AUTO: 0.08 K/UL
IMM GRANULOCYTES NFR BLD: 2 %
LYMPHOCYTES NFR BLD: 0.83 K/UL
LYMPHOCYTES RELATIVE PERCENT: 19 % (ref 24–44)
MCH RBC QN AUTO: 28.7 PG (ref 27–31)
MCHC RBC AUTO-ENTMCNC: 30.9 G/DL (ref 32–36)
MCV RBC AUTO: 93.1 FL (ref 78–100)
MONOCYTES NFR BLD: 0.33 K/UL
MONOCYTES NFR BLD: 8 % (ref 0–4)
NEUTROPHILS NFR BLD: 66 % (ref 36–66)
NEUTS SEG NFR BLD: 2.85 K/UL
PLATELET # BLD AUTO: 123 K/UL (ref 140–440)
PMV BLD AUTO: 9.7 FL (ref 7.5–11.1)
POTASSIUM SERPL-SCNC: 4.6 MMOL/L (ref 3.5–5.1)
PROT SERPL-MCNC: 6.7 G/DL (ref 6.4–8.2)
RBC # BLD AUTO: 3.03 M/UL (ref 4.6–6.2)
SODIUM SERPL-SCNC: 136 MMOL/L (ref 136–145)
WBC OTHER # BLD: 4.4 K/UL (ref 4–10.5)

## 2024-10-27 PROCEDURE — 6360000002 HC RX W HCPCS: Performed by: STUDENT IN AN ORGANIZED HEALTH CARE EDUCATION/TRAINING PROGRAM

## 2024-10-27 PROCEDURE — 6370000000 HC RX 637 (ALT 250 FOR IP): Performed by: STUDENT IN AN ORGANIZED HEALTH CARE EDUCATION/TRAINING PROGRAM

## 2024-10-27 PROCEDURE — 94761 N-INVAS EAR/PLS OXIMETRY MLT: CPT

## 2024-10-27 PROCEDURE — 80053 COMPREHEN METABOLIC PANEL: CPT

## 2024-10-27 PROCEDURE — 6370000000 HC RX 637 (ALT 250 FOR IP): Performed by: NURSE PRACTITIONER

## 2024-10-27 PROCEDURE — 6370000000 HC RX 637 (ALT 250 FOR IP): Performed by: INTERNAL MEDICINE

## 2024-10-27 PROCEDURE — 2060000000 HC ICU INTERMEDIATE R&B

## 2024-10-27 PROCEDURE — 36415 COLL VENOUS BLD VENIPUNCTURE: CPT

## 2024-10-27 PROCEDURE — 85025 COMPLETE CBC W/AUTO DIFF WBC: CPT

## 2024-10-27 PROCEDURE — 6360000002 HC RX W HCPCS: Performed by: INTERNAL MEDICINE

## 2024-10-27 RX ORDER — SENNA AND DOCUSATE SODIUM 50; 8.6 MG/1; MG/1
2 TABLET, FILM COATED ORAL 2 TIMES DAILY
Status: DISCONTINUED | OUTPATIENT
Start: 2024-10-27 | End: 2024-10-29 | Stop reason: HOSPADM

## 2024-10-27 RX ORDER — HYDROMORPHONE HYDROCHLORIDE 1 MG/ML
1 INJECTION, SOLUTION INTRAMUSCULAR; INTRAVENOUS; SUBCUTANEOUS EVERY 6 HOURS PRN
Status: DISCONTINUED | OUTPATIENT
Start: 2024-10-27 | End: 2024-10-29 | Stop reason: HOSPADM

## 2024-10-27 RX ORDER — CIPROFLOXACIN 500 MG/1
500 TABLET, FILM COATED ORAL EVERY 12 HOURS SCHEDULED
Status: DISCONTINUED | OUTPATIENT
Start: 2024-10-27 | End: 2024-10-29 | Stop reason: HOSPADM

## 2024-10-27 RX ORDER — HYDROCODONE BITARTRATE AND ACETAMINOPHEN 5; 325 MG/1; MG/1
1 TABLET ORAL EVERY 6 HOURS PRN
Status: DISCONTINUED | OUTPATIENT
Start: 2024-10-27 | End: 2024-10-28

## 2024-10-27 RX ORDER — HYDROMORPHONE HYDROCHLORIDE 1 MG/ML
1 INJECTION, SOLUTION INTRAMUSCULAR; INTRAVENOUS; SUBCUTANEOUS EVERY 8 HOURS PRN
Status: DISCONTINUED | OUTPATIENT
Start: 2024-10-27 | End: 2024-10-27

## 2024-10-27 RX ORDER — POLYETHYLENE GLYCOL 3350 17 G/17G
17 POWDER, FOR SOLUTION ORAL 2 TIMES DAILY
Status: DISCONTINUED | OUTPATIENT
Start: 2024-10-27 | End: 2024-10-29 | Stop reason: HOSPADM

## 2024-10-27 RX ADMIN — ACETAMINOPHEN 650 MG: 325 TABLET ORAL at 15:48

## 2024-10-27 RX ADMIN — HEPARIN SODIUM 5000 UNITS: 5000 INJECTION INTRAVENOUS; SUBCUTANEOUS at 06:03

## 2024-10-27 RX ADMIN — HYDROMORPHONE HYDROCHLORIDE 1 MG: 1 INJECTION, SOLUTION INTRAMUSCULAR; INTRAVENOUS; SUBCUTANEOUS at 08:36

## 2024-10-27 RX ADMIN — OXYBUTYNIN CHLORIDE 5 MG: 5 TABLET ORAL at 21:10

## 2024-10-27 RX ADMIN — TAMSULOSIN HYDROCHLORIDE 0.4 MG: 0.4 CAPSULE ORAL at 21:10

## 2024-10-27 RX ADMIN — HYDROMORPHONE HYDROCHLORIDE 1 MG: 1 INJECTION, SOLUTION INTRAMUSCULAR; INTRAVENOUS; SUBCUTANEOUS at 03:06

## 2024-10-27 RX ADMIN — SENNOSIDES AND DOCUSATE SODIUM 2 TABLET: 50; 8.6 TABLET ORAL at 08:34

## 2024-10-27 RX ADMIN — CIPROFLOXACIN HYDROCHLORIDE 500 MG: 500 TABLET, FILM COATED ORAL at 08:33

## 2024-10-27 RX ADMIN — GABAPENTIN 100 MG: 100 CAPSULE ORAL at 08:34

## 2024-10-27 RX ADMIN — ACETAMINOPHEN 650 MG: 325 TABLET ORAL at 08:32

## 2024-10-27 RX ADMIN — POLYETHYLENE GLYCOL (3350) 17 G: 17 POWDER, FOR SOLUTION ORAL at 08:40

## 2024-10-27 RX ADMIN — Medication 1 CAPSULE: at 08:33

## 2024-10-27 RX ADMIN — HEPARIN SODIUM 5000 UNITS: 5000 INJECTION INTRAVENOUS; SUBCUTANEOUS at 21:10

## 2024-10-27 RX ADMIN — ONDANSETRON 4 MG: 2 INJECTION INTRAMUSCULAR; INTRAVENOUS at 08:36

## 2024-10-27 RX ADMIN — AMLODIPINE BESYLATE 10 MG: 10 TABLET ORAL at 08:35

## 2024-10-27 RX ADMIN — ACETAMINOPHEN 650 MG: 325 TABLET ORAL at 01:03

## 2024-10-27 RX ADMIN — CIPROFLOXACIN HYDROCHLORIDE 500 MG: 500 TABLET, FILM COATED ORAL at 21:10

## 2024-10-27 RX ADMIN — GABAPENTIN 100 MG: 100 CAPSULE ORAL at 21:10

## 2024-10-27 RX ADMIN — HYDROCODONE BITARTRATE AND ACETAMINOPHEN 1 TABLET: 5; 325 TABLET ORAL at 16:49

## 2024-10-27 RX ADMIN — OXYBUTYNIN CHLORIDE 5 MG: 5 TABLET ORAL at 12:42

## 2024-10-27 RX ADMIN — OXYBUTYNIN CHLORIDE 5 MG: 5 TABLET ORAL at 08:32

## 2024-10-27 RX ADMIN — HEPARIN SODIUM 5000 UNITS: 5000 INJECTION INTRAVENOUS; SUBCUTANEOUS at 13:41

## 2024-10-27 RX ADMIN — TAMSULOSIN HYDROCHLORIDE 0.4 MG: 0.4 CAPSULE ORAL at 08:42

## 2024-10-27 RX ADMIN — HYDROMORPHONE HYDROCHLORIDE 1 MG: 1 INJECTION, SOLUTION INTRAMUSCULAR; INTRAVENOUS; SUBCUTANEOUS at 19:04

## 2024-10-27 RX ADMIN — HYDROMORPHONE HYDROCHLORIDE 1 MG: 1 INJECTION, SOLUTION INTRAMUSCULAR; INTRAVENOUS; SUBCUTANEOUS at 12:42

## 2024-10-27 ASSESSMENT — PAIN DESCRIPTION - ONSET
ONSET: PROGRESSIVE

## 2024-10-27 ASSESSMENT — PAIN DESCRIPTION - PAIN TYPE
TYPE: SURGICAL PAIN

## 2024-10-27 ASSESSMENT — PAIN DESCRIPTION - ORIENTATION
ORIENTATION: LOWER
ORIENTATION: MID
ORIENTATION: RIGHT
ORIENTATION: LOWER

## 2024-10-27 ASSESSMENT — PAIN DESCRIPTION - DESCRIPTORS
DESCRIPTORS: ACHING;DISCOMFORT
DESCRIPTORS: ACHING
DESCRIPTORS: ACHING;DISCOMFORT
DESCRIPTORS: ACHING
DESCRIPTORS: ACHING;DISCOMFORT

## 2024-10-27 ASSESSMENT — PAIN SCALES - GENERAL
PAINLEVEL_OUTOF10: 9
PAINLEVEL_OUTOF10: 3
PAINLEVEL_OUTOF10: 8
PAINLEVEL_OUTOF10: 3
PAINLEVEL_OUTOF10: 5
PAINLEVEL_OUTOF10: 5
PAINLEVEL_OUTOF10: 9
PAINLEVEL_OUTOF10: 3
PAINLEVEL_OUTOF10: 5
PAINLEVEL_OUTOF10: 6
PAINLEVEL_OUTOF10: 2
PAINLEVEL_OUTOF10: 7
PAINLEVEL_OUTOF10: 10

## 2024-10-27 ASSESSMENT — PAIN DESCRIPTION - LOCATION
LOCATION: GROIN

## 2024-10-27 ASSESSMENT — PAIN - FUNCTIONAL ASSESSMENT
PAIN_FUNCTIONAL_ASSESSMENT: ACTIVITIES ARE NOT PREVENTED
PAIN_FUNCTIONAL_ASSESSMENT: PREVENTS OR INTERFERES SOME ACTIVE ACTIVITIES AND ADLS
PAIN_FUNCTIONAL_ASSESSMENT: ACTIVITIES ARE NOT PREVENTED
PAIN_FUNCTIONAL_ASSESSMENT: ACTIVITIES ARE NOT PREVENTED

## 2024-10-27 ASSESSMENT — PAIN DESCRIPTION - FREQUENCY
FREQUENCY: INTERMITTENT
FREQUENCY: CONTINUOUS
FREQUENCY: CONTINUOUS

## 2024-10-28 ENCOUNTER — APPOINTMENT (OUTPATIENT)
Dept: ULTRASOUND IMAGING | Age: 64
DRG: 660 | End: 2024-10-28
Attending: STUDENT IN AN ORGANIZED HEALTH CARE EDUCATION/TRAINING PROGRAM
Payer: MEDICARE

## 2024-10-28 LAB
ACB COMPLEX DNA BLD POS QL NAA+NON-PROBE: NOT DETECTED
ALBUMIN SERPL-MCNC: 2.8 G/DL (ref 3.4–5)
ALBUMIN/GLOB SERPL: 0.7 {RATIO} (ref 1.1–2.2)
ALP SERPL-CCNC: 232 U/L (ref 40–129)
ALT SERPL-CCNC: 17 U/L (ref 10–40)
ANION GAP SERPL CALCULATED.3IONS-SCNC: 8 MMOL/L (ref 9–17)
AST SERPL-CCNC: 21 U/L (ref 15–37)
B FRAGILIS DNA BLD POS QL NAA+NON-PROBE: NOT DETECTED
BASOPHILS # BLD: 0.02 K/UL
BASOPHILS NFR BLD: 1 % (ref 0–1)
BILIRUB SERPL-MCNC: 0.2 MG/DL (ref 0–1)
BUN SERPL-MCNC: 25 MG/DL (ref 7–20)
C ALBICANS DNA BLD POS QL NAA+NON-PROBE: NOT DETECTED
C AURIS DNA BLD POS QL NAA+NON-PROBE: NOT DETECTED
C GATTII+NEOFOR DNA BLD POS QL NAA+N-PRB: NOT DETECTED
C GLABRATA DNA BLD POS QL NAA+NON-PROBE: NOT DETECTED
C KRUSEI DNA BLD POS QL NAA+NON-PROBE: NOT DETECTED
C PARAP DNA BLD POS QL NAA+NON-PROBE: NOT DETECTED
C TROPICLS DNA BLD POS QL NAA+NON-PROBE: NOT DETECTED
CALCIUM SERPL-MCNC: 8.8 MG/DL (ref 8.3–10.6)
CHLORIDE SERPL-SCNC: 103 MMOL/L (ref 99–110)
CO2 SERPL-SCNC: 24 MMOL/L (ref 21–32)
CREAT SERPL-MCNC: 2.4 MG/DL (ref 0.8–1.3)
E CLOAC COMP DNA BLD POS NAA+NON-PROBE: NOT DETECTED
E COLI DNA BLD POS QL NAA+NON-PROBE: NOT DETECTED
E FAECALIS DNA BLD POS QL NAA+NON-PROBE: NOT DETECTED
E FAECIUM DNA BLD POS QL NAA+NON-PROBE: NOT DETECTED
ENTEROBACTERALES DNA BLD POS NAA+N-PRB: NOT DETECTED
EOSINOPHIL # BLD: 0.29 K/UL
EOSINOPHILS RELATIVE PERCENT: 7 % (ref 0–3)
ERYTHROCYTE [DISTWIDTH] IN BLOOD BY AUTOMATED COUNT: 14.6 % (ref 11.7–14.9)
GFR, ESTIMATED: 28 ML/MIN/1.73M2
GLUCOSE SERPL-MCNC: 104 MG/DL (ref 74–99)
GP B STREP DNA BLD POS QL NAA+NON-PROBE: NOT DETECTED
HAEM INFLU DNA BLD POS QL NAA+NON-PROBE: NOT DETECTED
HCT VFR BLD AUTO: 28.5 % (ref 42–52)
HGB BLD-MCNC: 8.9 G/DL (ref 13.5–18)
IMM GRANULOCYTES # BLD AUTO: 0.21 K/UL
IMM GRANULOCYTES NFR BLD: 5 %
K OXYTOCA DNA BLD POS QL NAA+NON-PROBE: NOT DETECTED
KLEBSIELLA SP DNA BLD POS QL NAA+NON-PRB: NOT DETECTED
KLEBSIELLA SP DNA BLD POS QL NAA+NON-PRB: NOT DETECTED
L MONOCYTOG DNA BLD POS QL NAA+NON-PROBE: NOT DETECTED
LYMPHOCYTES NFR BLD: 0.93 K/UL
LYMPHOCYTES RELATIVE PERCENT: 21 % (ref 24–44)
MCH RBC QN AUTO: 29.9 PG (ref 27–31)
MCHC RBC AUTO-ENTMCNC: 31.2 G/DL (ref 32–36)
MCV RBC AUTO: 95.6 FL (ref 78–100)
MICROORGANISM SPEC CULT: ABNORMAL
MICROORGANISM SPEC CULT: ABNORMAL
MICROORGANISM/AGENT SPEC: ABNORMAL
MONOCYTES NFR BLD: 0.31 K/UL
MONOCYTES NFR BLD: 7 % (ref 0–4)
N MEN DNA BLD POS QL NAA+NON-PROBE: NOT DETECTED
NEUTROPHILS NFR BLD: 60 % (ref 36–66)
NEUTS SEG NFR BLD: 2.6 K/UL
P AERUGINOSA DNA BLD POS NAA+NON-PROBE: NOT DETECTED
PLATELET # BLD AUTO: 125 K/UL (ref 140–440)
PMV BLD AUTO: 9.8 FL (ref 7.5–11.1)
POTASSIUM SERPL-SCNC: 5.1 MMOL/L (ref 3.5–5.1)
PROT SERPL-MCNC: 6.8 G/DL (ref 6.4–8.2)
PROTEUS SP DNA BLD POS QL NAA+NON-PROBE: NOT DETECTED
RBC # BLD AUTO: 2.98 M/UL (ref 4.6–6.2)
S AUREUS DNA BLD POS QL NAA+NON-PROBE: NOT DETECTED
S AUREUS+CONS DNA BLD POS NAA+NON-PROBE: DETECTED
S EPIDERMIDIS DNA BLD POS QL NAA+NON-PRB: NOT DETECTED
S LUGDUNENSIS DNA BLD POS QL NAA+NON-PRB: NOT DETECTED
S MALTOPHILIA DNA BLD POS QL NAA+NON-PRB: NOT DETECTED
S MARCESCENS DNA BLD POS NAA+NON-PROBE: NOT DETECTED
S PNEUM DNA BLD POS QL NAA+NON-PROBE: NOT DETECTED
S PYO DNA BLD POS QL NAA+NON-PROBE: ABNORMAL
SALMONELLA DNA BLD POS QL NAA+NON-PROBE: NOT DETECTED
SERVICE CMNT-IMP: ABNORMAL
SODIUM SERPL-SCNC: 135 MMOL/L (ref 136–145)
SPECIMEN DESCRIPTION: ABNORMAL
STREPTOCOCCUS DNA BLD POS NAA+NON-PROBE: NOT DETECTED
WBC OTHER # BLD: 4.4 K/UL (ref 4–10.5)

## 2024-10-28 PROCEDURE — 6370000000 HC RX 637 (ALT 250 FOR IP): Performed by: STUDENT IN AN ORGANIZED HEALTH CARE EDUCATION/TRAINING PROGRAM

## 2024-10-28 PROCEDURE — 76775 US EXAM ABDO BACK WALL LIM: CPT

## 2024-10-28 PROCEDURE — 6360000002 HC RX W HCPCS: Performed by: INTERNAL MEDICINE

## 2024-10-28 PROCEDURE — 2060000000 HC ICU INTERMEDIATE R&B

## 2024-10-28 PROCEDURE — 85025 COMPLETE CBC W/AUTO DIFF WBC: CPT

## 2024-10-28 PROCEDURE — 80053 COMPREHEN METABOLIC PANEL: CPT

## 2024-10-28 PROCEDURE — 94761 N-INVAS EAR/PLS OXIMETRY MLT: CPT

## 2024-10-28 PROCEDURE — 6370000000 HC RX 637 (ALT 250 FOR IP): Performed by: INTERNAL MEDICINE

## 2024-10-28 PROCEDURE — 6360000002 HC RX W HCPCS: Performed by: STUDENT IN AN ORGANIZED HEALTH CARE EDUCATION/TRAINING PROGRAM

## 2024-10-28 PROCEDURE — 6370000000 HC RX 637 (ALT 250 FOR IP): Performed by: NURSE PRACTITIONER

## 2024-10-28 PROCEDURE — 36415 COLL VENOUS BLD VENIPUNCTURE: CPT

## 2024-10-28 RX ORDER — HYDROCODONE BITARTRATE AND ACETAMINOPHEN 5; 325 MG/1; MG/1
1 TABLET ORAL EVERY 4 HOURS PRN
Status: DISCONTINUED | OUTPATIENT
Start: 2024-10-28 | End: 2024-10-29 | Stop reason: HOSPADM

## 2024-10-28 RX ORDER — HYDROMORPHONE HYDROCHLORIDE 1 MG/ML
0.5 INJECTION, SOLUTION INTRAMUSCULAR; INTRAVENOUS; SUBCUTANEOUS ONCE
Status: COMPLETED | OUTPATIENT
Start: 2024-10-28 | End: 2024-10-28

## 2024-10-28 RX ADMIN — HEPARIN SODIUM 5000 UNITS: 5000 INJECTION INTRAVENOUS; SUBCUTANEOUS at 13:44

## 2024-10-28 RX ADMIN — HEPARIN SODIUM 5000 UNITS: 5000 INJECTION INTRAVENOUS; SUBCUTANEOUS at 21:44

## 2024-10-28 RX ADMIN — CIPROFLOXACIN HYDROCHLORIDE 500 MG: 500 TABLET, FILM COATED ORAL at 08:48

## 2024-10-28 RX ADMIN — HYDROCODONE BITARTRATE AND ACETAMINOPHEN 1 TABLET: 5; 325 TABLET ORAL at 12:38

## 2024-10-28 RX ADMIN — HYDROMORPHONE HYDROCHLORIDE 1 MG: 1 INJECTION, SOLUTION INTRAMUSCULAR; INTRAVENOUS; SUBCUTANEOUS at 15:20

## 2024-10-28 RX ADMIN — HYDROCODONE BITARTRATE AND ACETAMINOPHEN 1 TABLET: 5; 325 TABLET ORAL at 18:56

## 2024-10-28 RX ADMIN — GABAPENTIN 100 MG: 100 CAPSULE ORAL at 10:13

## 2024-10-28 RX ADMIN — HYDROMORPHONE HYDROCHLORIDE 1 MG: 1 INJECTION, SOLUTION INTRAMUSCULAR; INTRAVENOUS; SUBCUTANEOUS at 07:38

## 2024-10-28 RX ADMIN — HYDROCODONE BITARTRATE AND ACETAMINOPHEN 1 TABLET: 5; 325 TABLET ORAL at 23:37

## 2024-10-28 RX ADMIN — OXYBUTYNIN CHLORIDE 5 MG: 5 TABLET ORAL at 08:48

## 2024-10-28 RX ADMIN — GABAPENTIN 100 MG: 100 CAPSULE ORAL at 21:43

## 2024-10-28 RX ADMIN — ONDANSETRON 4 MG: 2 INJECTION INTRAMUSCULAR; INTRAVENOUS at 07:38

## 2024-10-28 RX ADMIN — TAMSULOSIN HYDROCHLORIDE 0.4 MG: 0.4 CAPSULE ORAL at 08:48

## 2024-10-28 RX ADMIN — HYDROMORPHONE HYDROCHLORIDE 0.5 MG: 1 INJECTION, SOLUTION INTRAMUSCULAR; INTRAVENOUS; SUBCUTANEOUS at 11:32

## 2024-10-28 RX ADMIN — Medication 1 CAPSULE: at 08:48

## 2024-10-28 RX ADMIN — OXYBUTYNIN CHLORIDE 5 MG: 5 TABLET ORAL at 13:44

## 2024-10-28 RX ADMIN — AMLODIPINE BESYLATE 10 MG: 10 TABLET ORAL at 08:48

## 2024-10-28 RX ADMIN — OXYBUTYNIN CHLORIDE 5 MG: 5 TABLET ORAL at 21:43

## 2024-10-28 RX ADMIN — HYDROCODONE BITARTRATE AND ACETAMINOPHEN 1 TABLET: 5; 325 TABLET ORAL at 06:38

## 2024-10-28 RX ADMIN — HYDROMORPHONE HYDROCHLORIDE 1 MG: 1 INJECTION, SOLUTION INTRAMUSCULAR; INTRAVENOUS; SUBCUTANEOUS at 21:48

## 2024-10-28 RX ADMIN — HYDROMORPHONE HYDROCHLORIDE 1 MG: 1 INJECTION, SOLUTION INTRAMUSCULAR; INTRAVENOUS; SUBCUTANEOUS at 01:34

## 2024-10-28 RX ADMIN — HYDROCODONE BITARTRATE AND ACETAMINOPHEN 1 TABLET: 5; 325 TABLET ORAL at 00:03

## 2024-10-28 RX ADMIN — HEPARIN SODIUM 5000 UNITS: 5000 INJECTION INTRAVENOUS; SUBCUTANEOUS at 06:33

## 2024-10-28 RX ADMIN — CIPROFLOXACIN HYDROCHLORIDE 500 MG: 500 TABLET, FILM COATED ORAL at 21:43

## 2024-10-28 RX ADMIN — TAMSULOSIN HYDROCHLORIDE 0.4 MG: 0.4 CAPSULE ORAL at 21:43

## 2024-10-28 ASSESSMENT — PAIN DESCRIPTION - DESCRIPTORS
DESCRIPTORS: ACHING;SORE
DESCRIPTORS: ACHING
DESCRIPTORS: ACHING;SHARP
DESCRIPTORS: ACHING;SORE
DESCRIPTORS: SHARP;SORE
DESCRIPTORS: ACHING;DISCOMFORT
DESCRIPTORS: SHARP;SORE
DESCRIPTORS: ACHING;SORE
DESCRIPTORS: ACHING;SHARP
DESCRIPTORS: ACHING;SORE

## 2024-10-28 ASSESSMENT — PAIN DESCRIPTION - FREQUENCY
FREQUENCY: INTERMITTENT

## 2024-10-28 ASSESSMENT — PAIN DESCRIPTION - LOCATION
LOCATION: ABDOMEN;PELVIS
LOCATION: PELVIS
LOCATION: ABDOMEN;BACK
LOCATION: ABDOMEN
LOCATION: ABDOMEN;PELVIS
LOCATION: PELVIS
LOCATION: ABDOMEN;GROIN
LOCATION: PERINEUM
LOCATION: ABDOMEN
LOCATION: GROIN
LOCATION: ABDOMEN

## 2024-10-28 ASSESSMENT — PAIN SCALES - GENERAL
PAINLEVEL_OUTOF10: 10
PAINLEVEL_OUTOF10: 6
PAINLEVEL_OUTOF10: 7
PAINLEVEL_OUTOF10: 6
PAINLEVEL_OUTOF10: 7
PAINLEVEL_OUTOF10: 3
PAINLEVEL_OUTOF10: 3
PAINLEVEL_OUTOF10: 8
PAINLEVEL_OUTOF10: 3
PAINLEVEL_OUTOF10: 7
PAINLEVEL_OUTOF10: 4
PAINLEVEL_OUTOF10: 7
PAINLEVEL_OUTOF10: 7
PAINLEVEL_OUTOF10: 5
PAINLEVEL_OUTOF10: 7
PAINLEVEL_OUTOF10: 6

## 2024-10-28 ASSESSMENT — PAIN DESCRIPTION - ORIENTATION
ORIENTATION: MID;LOWER
ORIENTATION: MID
ORIENTATION: ANTERIOR;MID
ORIENTATION: MID;RIGHT
ORIENTATION: RIGHT;LEFT
ORIENTATION: MID;RIGHT
ORIENTATION: MID;LOWER
ORIENTATION: MID;RIGHT
ORIENTATION: RIGHT;LOWER
ORIENTATION: LEFT;RIGHT;LOWER
ORIENTATION: MID;RIGHT
ORIENTATION: RIGHT;LEFT
ORIENTATION: MID;RIGHT

## 2024-10-28 ASSESSMENT — PAIN DESCRIPTION - PAIN TYPE
TYPE: ACUTE PAIN
TYPE: SURGICAL PAIN
TYPE: ACUTE PAIN
TYPE: SURGICAL PAIN
TYPE: SURGICAL PAIN

## 2024-10-28 ASSESSMENT — PAIN - FUNCTIONAL ASSESSMENT
PAIN_FUNCTIONAL_ASSESSMENT: ACTIVITIES ARE NOT PREVENTED

## 2024-10-28 ASSESSMENT — PAIN SCALES - WONG BAKER
WONGBAKER_NUMERICALRESPONSE: NO HURT

## 2024-10-28 ASSESSMENT — PAIN DESCRIPTION - ONSET: ONSET: ON-GOING

## 2024-10-28 NOTE — PLAN OF CARE
Problem: Discharge Planning  Goal: Discharge to home or other facility with appropriate resources  Outcome: Progressing     Problem: Safety - Adult  Goal: Free from fall injury  10/28/2024 0252 by Juliana Day RN  Outcome: Progressing  10/27/2024 1510 by Shalom Esparza RN  Outcome: Progressing     Problem: Pain  Goal: Verbalizes/displays adequate comfort level or baseline comfort level  10/28/2024 0252 by Juliana Day RN  Outcome: Progressing  10/27/2024 1510 by Shalom Esparza RN  Outcome: Progressing     Problem: Nutrition Deficit:  Goal: Optimize nutritional status  Outcome: Progressing

## 2024-10-28 NOTE — CARE COORDINATION
CM reviewed pt's medical record and discussed in IDR. CM in to see pt to discuss discharge plans. Plan for discharge is Home with spouse and [facility].

## 2024-10-29 ENCOUNTER — APPOINTMENT (OUTPATIENT)
Dept: CT IMAGING | Age: 64
DRG: 660 | End: 2024-10-29
Attending: STUDENT IN AN ORGANIZED HEALTH CARE EDUCATION/TRAINING PROGRAM
Payer: MEDICARE

## 2024-10-29 VITALS
RESPIRATION RATE: 18 BRPM | BODY MASS INDEX: 25.31 KG/M2 | DIASTOLIC BLOOD PRESSURE: 81 MMHG | TEMPERATURE: 97.9 F | SYSTOLIC BLOOD PRESSURE: 112 MMHG | OXYGEN SATURATION: 95 % | HEART RATE: 103 BPM | HEIGHT: 70 IN | WEIGHT: 176.81 LBS

## 2024-10-29 LAB
ALBUMIN SERPL-MCNC: 3 G/DL (ref 3.4–5)
ALBUMIN/GLOB SERPL: 0.9 {RATIO} (ref 1.1–2.2)
ALP SERPL-CCNC: 235 U/L (ref 40–129)
ALT SERPL-CCNC: 22 U/L (ref 10–40)
ANION GAP SERPL CALCULATED.3IONS-SCNC: 10 MMOL/L (ref 9–17)
AST SERPL-CCNC: 22 U/L (ref 15–37)
BASOPHILS # BLD: 0.02 K/UL
BASOPHILS NFR BLD: 0 % (ref 0–1)
BILIRUB SERPL-MCNC: 0.2 MG/DL (ref 0–1)
BUN SERPL-MCNC: 26 MG/DL (ref 7–20)
CALCIUM SERPL-MCNC: 8.7 MG/DL (ref 8.3–10.6)
CHLORIDE SERPL-SCNC: 102 MMOL/L (ref 99–110)
CO2 SERPL-SCNC: 24 MMOL/L (ref 21–32)
CREAT SERPL-MCNC: 2.4 MG/DL (ref 0.8–1.3)
EOSINOPHIL # BLD: 0.25 K/UL
EOSINOPHILS RELATIVE PERCENT: 5 % (ref 0–3)
ERYTHROCYTE [DISTWIDTH] IN BLOOD BY AUTOMATED COUNT: 14.5 % (ref 11.7–14.9)
GFR, ESTIMATED: 28 ML/MIN/1.73M2
GLUCOSE SERPL-MCNC: 99 MG/DL (ref 74–99)
HCT VFR BLD AUTO: 30.4 % (ref 42–52)
HGB BLD-MCNC: 9.3 G/DL (ref 13.5–18)
IMM GRANULOCYTES # BLD AUTO: 0.21 K/UL
IMM GRANULOCYTES NFR BLD: 5 %
INR PPP: 1.2
LYMPHOCYTES NFR BLD: 1.06 K/UL
LYMPHOCYTES RELATIVE PERCENT: 23 % (ref 24–44)
MCH RBC QN AUTO: 29.2 PG (ref 27–31)
MCHC RBC AUTO-ENTMCNC: 30.6 G/DL (ref 32–36)
MCV RBC AUTO: 95.6 FL (ref 78–100)
MONOCYTES NFR BLD: 0.31 K/UL
MONOCYTES NFR BLD: 7 % (ref 0–4)
NEUTROPHILS NFR BLD: 60 % (ref 36–66)
NEUTS SEG NFR BLD: 2.77 K/UL
PARTIAL THROMBOPLASTIN TIME: 35.9 SEC (ref 25.1–37.1)
PLATELET # BLD AUTO: 153 K/UL (ref 140–440)
PMV BLD AUTO: 9.8 FL (ref 7.5–11.1)
POTASSIUM SERPL-SCNC: 4.9 MMOL/L (ref 3.5–5.1)
PROT SERPL-MCNC: 6.5 G/DL (ref 6.4–8.2)
PROTHROMBIN TIME: 15.4 SEC (ref 11.7–14.5)
RBC # BLD AUTO: 3.18 M/UL (ref 4.6–6.2)
SODIUM SERPL-SCNC: 135 MMOL/L (ref 136–145)
WBC OTHER # BLD: 4.6 K/UL (ref 4–10.5)

## 2024-10-29 PROCEDURE — 85730 THROMBOPLASTIN TIME PARTIAL: CPT

## 2024-10-29 PROCEDURE — 6370000000 HC RX 637 (ALT 250 FOR IP): Performed by: NURSE PRACTITIONER

## 2024-10-29 PROCEDURE — 94761 N-INVAS EAR/PLS OXIMETRY MLT: CPT

## 2024-10-29 PROCEDURE — 6360000002 HC RX W HCPCS: Performed by: INTERNAL MEDICINE

## 2024-10-29 PROCEDURE — 6370000000 HC RX 637 (ALT 250 FOR IP): Performed by: INTERNAL MEDICINE

## 2024-10-29 PROCEDURE — 74176 CT ABD & PELVIS W/O CONTRAST: CPT

## 2024-10-29 PROCEDURE — 6370000000 HC RX 637 (ALT 250 FOR IP): Performed by: SPECIALIST

## 2024-10-29 PROCEDURE — 85025 COMPLETE CBC W/AUTO DIFF WBC: CPT

## 2024-10-29 PROCEDURE — 6370000000 HC RX 637 (ALT 250 FOR IP): Performed by: STUDENT IN AN ORGANIZED HEALTH CARE EDUCATION/TRAINING PROGRAM

## 2024-10-29 PROCEDURE — 6360000002 HC RX W HCPCS: Performed by: STUDENT IN AN ORGANIZED HEALTH CARE EDUCATION/TRAINING PROGRAM

## 2024-10-29 PROCEDURE — 80053 COMPREHEN METABOLIC PANEL: CPT

## 2024-10-29 PROCEDURE — 85610 PROTHROMBIN TIME: CPT

## 2024-10-29 PROCEDURE — 36415 COLL VENOUS BLD VENIPUNCTURE: CPT

## 2024-10-29 RX ORDER — OXYCODONE AND ACETAMINOPHEN 5; 325 MG/1; MG/1
1 TABLET ORAL 2 TIMES DAILY PRN
COMMUNITY
Start: 2024-10-09

## 2024-10-29 RX ORDER — CIPROFLOXACIN 500 MG/1
500 TABLET, FILM COATED ORAL EVERY 12 HOURS SCHEDULED
Qty: 20 TABLET | Refills: 0 | Status: SHIPPED | OUTPATIENT
Start: 2024-10-29 | End: 2024-11-08

## 2024-10-29 RX ORDER — POLYETHYLENE GLYCOL 3350 17 G/17G
17 POWDER, FOR SOLUTION ORAL 2 TIMES DAILY
Qty: 527 G | Refills: 1 | Status: SHIPPED | OUTPATIENT
Start: 2024-10-29 | End: 2024-11-28

## 2024-10-29 RX ORDER — SENNA AND DOCUSATE SODIUM 50; 8.6 MG/1; MG/1
2 TABLET, FILM COATED ORAL 2 TIMES DAILY PRN
Qty: 60 TABLET | Refills: 0 | Status: SHIPPED | OUTPATIENT
Start: 2024-10-29

## 2024-10-29 RX ORDER — MORPHINE SULFATE 15 MG/1
15 TABLET, FILM COATED, EXTENDED RELEASE ORAL 2 TIMES DAILY
Status: ON HOLD | COMMUNITY
Start: 2024-10-11 | End: 2024-10-29

## 2024-10-29 RX ORDER — PHENAZOPYRIDINE HYDROCHLORIDE 100 MG/1
100 TABLET, FILM COATED ORAL EVERY 8 HOURS
Status: DISCONTINUED | OUTPATIENT
Start: 2024-10-29 | End: 2024-10-29 | Stop reason: HOSPADM

## 2024-10-29 RX ORDER — ONDANSETRON 8 MG/1
8 TABLET, ORALLY DISINTEGRATING ORAL EVERY 8 HOURS PRN
Qty: 30 TABLET | Refills: 0 | Status: SHIPPED | OUTPATIENT
Start: 2024-10-29

## 2024-10-29 RX ORDER — MORPHINE SULFATE 15 MG/1
15 TABLET, FILM COATED, EXTENDED RELEASE ORAL DAILY
Qty: 30 TABLET | Refills: 0
Start: 2024-10-29 | End: 2024-11-28

## 2024-10-29 RX ORDER — LACTOBACILLUS RHAMNOSUS GG 10B CELL
1 CAPSULE ORAL DAILY
Qty: 25 CAPSULE | Refills: 0 | Status: SHIPPED | OUTPATIENT
Start: 2024-10-30 | End: 2024-11-24

## 2024-10-29 RX ADMIN — HYDROMORPHONE HYDROCHLORIDE 1 MG: 1 INJECTION, SOLUTION INTRAMUSCULAR; INTRAVENOUS; SUBCUTANEOUS at 10:32

## 2024-10-29 RX ADMIN — TAMSULOSIN HYDROCHLORIDE 0.4 MG: 0.4 CAPSULE ORAL at 10:43

## 2024-10-29 RX ADMIN — PHENAZOPYRIDINE 100 MG: 100 TABLET ORAL at 14:14

## 2024-10-29 RX ADMIN — ACETAMINOPHEN 650 MG: 325 TABLET ORAL at 02:01

## 2024-10-29 RX ADMIN — AMLODIPINE BESYLATE 10 MG: 10 TABLET ORAL at 10:43

## 2024-10-29 RX ADMIN — HYDROMORPHONE HYDROCHLORIDE 1 MG: 1 INJECTION, SOLUTION INTRAMUSCULAR; INTRAVENOUS; SUBCUTANEOUS at 16:29

## 2024-10-29 RX ADMIN — OXYBUTYNIN CHLORIDE 5 MG: 5 TABLET ORAL at 10:43

## 2024-10-29 RX ADMIN — HYDROMORPHONE HYDROCHLORIDE 1 MG: 1 INJECTION, SOLUTION INTRAMUSCULAR; INTRAVENOUS; SUBCUTANEOUS at 04:20

## 2024-10-29 RX ADMIN — HYDROCODONE BITARTRATE AND ACETAMINOPHEN 1 TABLET: 5; 325 TABLET ORAL at 03:42

## 2024-10-29 RX ADMIN — HEPARIN SODIUM 5000 UNITS: 5000 INJECTION INTRAVENOUS; SUBCUTANEOUS at 14:14

## 2024-10-29 RX ADMIN — POLYETHYLENE GLYCOL (3350) 17 G: 17 POWDER, FOR SOLUTION ORAL at 10:42

## 2024-10-29 RX ADMIN — OXYBUTYNIN CHLORIDE 5 MG: 5 TABLET ORAL at 14:14

## 2024-10-29 RX ADMIN — SENNOSIDES AND DOCUSATE SODIUM 2 TABLET: 50; 8.6 TABLET ORAL at 10:43

## 2024-10-29 RX ADMIN — HYDROCODONE BITARTRATE AND ACETAMINOPHEN 1 TABLET: 5; 325 TABLET ORAL at 08:19

## 2024-10-29 RX ADMIN — HEPARIN SODIUM 5000 UNITS: 5000 INJECTION INTRAVENOUS; SUBCUTANEOUS at 05:57

## 2024-10-29 RX ADMIN — CIPROFLOXACIN HYDROCHLORIDE 500 MG: 500 TABLET, FILM COATED ORAL at 10:43

## 2024-10-29 RX ADMIN — HYDROCODONE BITARTRATE AND ACETAMINOPHEN 1 TABLET: 5; 325 TABLET ORAL at 14:45

## 2024-10-29 RX ADMIN — GABAPENTIN 100 MG: 100 CAPSULE ORAL at 10:43

## 2024-10-29 RX ADMIN — Medication 1 CAPSULE: at 10:43

## 2024-10-29 ASSESSMENT — PAIN DESCRIPTION - LOCATION
LOCATION: PENIS;PERINEUM
LOCATION: PERINEUM;PENIS
LOCATION: GROIN
LOCATION: PENIS;PERINEUM
LOCATION: GROIN;PELVIS
LOCATION: GROIN
LOCATION: PENIS
LOCATION: GROIN
LOCATION: GROIN

## 2024-10-29 ASSESSMENT — PAIN DESCRIPTION - ONSET
ONSET: ON-GOING
ONSET: OTHER (COMMENT)

## 2024-10-29 ASSESSMENT — PAIN SCALES - GENERAL
PAINLEVEL_OUTOF10: 9
PAINLEVEL_OUTOF10: 8
PAINLEVEL_OUTOF10: 9
PAINLEVEL_OUTOF10: 9
PAINLEVEL_OUTOF10: 0
PAINLEVEL_OUTOF10: 7
PAINLEVEL_OUTOF10: 0
PAINLEVEL_OUTOF10: 9
PAINLEVEL_OUTOF10: 0
PAINLEVEL_OUTOF10: 8
PAINLEVEL_OUTOF10: 6
PAINLEVEL_OUTOF10: 0
PAINLEVEL_OUTOF10: 9
PAINLEVEL_OUTOF10: 6
PAINLEVEL_OUTOF10: 9
PAINLEVEL_OUTOF10: 0

## 2024-10-29 ASSESSMENT — PAIN DESCRIPTION - FREQUENCY
FREQUENCY: CONTINUOUS
FREQUENCY: INTERMITTENT
FREQUENCY: INTERMITTENT
FREQUENCY: CONTINUOUS
FREQUENCY: CONTINUOUS
FREQUENCY: INTERMITTENT

## 2024-10-29 ASSESSMENT — PAIN DESCRIPTION - ORIENTATION
ORIENTATION: MID
ORIENTATION: MID;ANTERIOR
ORIENTATION: RIGHT
ORIENTATION: MID;ANTERIOR
ORIENTATION: MID
ORIENTATION: RIGHT

## 2024-10-29 ASSESSMENT — PAIN DESCRIPTION - PAIN TYPE
TYPE: ACUTE PAIN
TYPE: ACUTE PAIN
TYPE: ACUTE PAIN;SURGICAL PAIN
TYPE: ACUTE PAIN
TYPE: SURGICAL PAIN
TYPE: ACUTE PAIN

## 2024-10-29 ASSESSMENT — PAIN SCALES - WONG BAKER
WONGBAKER_NUMERICALRESPONSE: NO HURT
WONGBAKER_NUMERICALRESPONSE: HURTS A LITTLE BIT
WONGBAKER_NUMERICALRESPONSE: NO HURT

## 2024-10-29 ASSESSMENT — PAIN - FUNCTIONAL ASSESSMENT
PAIN_FUNCTIONAL_ASSESSMENT: ACTIVITIES ARE NOT PREVENTED
PAIN_FUNCTIONAL_ASSESSMENT: PREVENTS OR INTERFERES SOME ACTIVE ACTIVITIES AND ADLS
PAIN_FUNCTIONAL_ASSESSMENT: ACTIVITIES ARE NOT PREVENTED
PAIN_FUNCTIONAL_ASSESSMENT: PREVENTS OR INTERFERES SOME ACTIVE ACTIVITIES AND ADLS

## 2024-10-29 ASSESSMENT — PAIN DESCRIPTION - DESCRIPTORS
DESCRIPTORS: ACHING;DISCOMFORT;SORE;TENDER
DESCRIPTORS: BURNING
DESCRIPTORS: ACHING;DISCOMFORT;SORE
DESCRIPTORS: SHARP
DESCRIPTORS: ACHING;DISCOMFORT
DESCRIPTORS: ACHING;DISCOMFORT
DESCRIPTORS: ACHING

## 2024-10-29 NOTE — PROGRESS NOTES
[address] [address]   Progress Note  [facility] 0 1 2  Date: 10/24/2024   Patient: Jethro Vázquez   : 1960   DOA: 10/23/2024   MRN: [id number]   ROOM#: 0-A   Collaborating Urologist on Call at time of admission: Dr. Cain    CC: Dysuria    Subjective:     Pain: mild-moderate, no nausea and no vomiting  Bowel Movement/Flatus:   Yes   Voiding:  With dysuria    Pt resting comfortably, endorses ongoing lower abd/penile pain    Objective:      Vitals:    BP (!) 107/57   Pulse 74   Temp 99 °F (37.2 °C) (Rectal)   Resp 17   Ht 1.778 m (5' 10\")   Wt 80 kg (176 lb 5.9 oz)   SpO2 97%   BMI 25.31 kg/m²    Temp  Av.6 °F (37.6 °C)  Min: 99 °F (37.2 °C)  Max: 100.4 °F (38 °C)     Intake/Output Summary (Last 24 hours) at 10/24/2024 1040  Last data filed at 10/24/2024 0600  Gross per 24 hour   Intake --   Output 1300 ml   Net -1300 ml       Physical Exam:   General appearance: alert, appears stated age, cooperative, and no distress  Head: Normocephalic, without obvious abnormality, atraumatic  Back:  No CVAT  Lungs: Breathing unlabored  Abdomen: Soft, nondistended, suprapubic tenderness  : Voiding spontaneously  Extremities: No BLE edema  Neurologic: Grossly normal    Labs:   WBC:    Lab Results   Component Value Date/Time    WBC 3.4 10/24/2024 05:21 AM      Hemoglobin/Hematocrit:    Lab Results   Component Value Date/Time    HGB 7.9 10/24/2024 05:21 AM    HCT 24.9 10/24/2024 05:21 AM      BMP:   Lab Results   Component Value Date/Time     10/24/2024 05:21 AM    K 4.2 10/24/2024 05:21 AM    CL 99 10/24/2024 05:21 AM    CO2 22 10/24/2024 05:21 AM    BUN 40 10/24/2024 05:21 AM    CREATININE 2.9 10/24/2024 05:21 AM    CALCIUM 8.1 10/24/2024 05:21 AM    GFRAA >60 2018 07:38 AM    LABGLOM 23 10/24/2024 05:21 AM    LABGLOM 68 2024 10:09 AM      PT/INR:    Lab Results   Component Value Date/Time    PROTIME 18.8 10/23/2024 08:15 AM    PROTIME 10.5 2011 03:20 PM 
    [address] [address]   Progress Note  [facility] 0 1 2      Date: 10/29/2024   Patient: Jethro Vázquez   : 1960   DOA: 10/23/2024   MRN: [id number]   ROOM#: 2015/2015-[id number]     Admit Date: 10/23/2024     Collaborating Urologist on Call at time of admission: Dr. Cain                      CC: Dysuria    Subjective:     Pain: mild-moderate, no nausea and no vomiting,   Bowel Movement/Flatus: Yes  Voiding: easily with dysuria    Pt resting in bed, reports suprapubic discomfort today.    Objective:    Vitals:   /83   Pulse (!) 112   Temp 97.6 °F (36.4 °C) (Oral)   Resp 26   Ht 1.778 m (5' 10\")   Wt 80.2 kg (176 lb 12.9 oz)   SpO2 98%   BMI 25.37 kg/m²   Temp  Av.1 °F (36.7 °C)  Min: 97.6 °F (36.4 °C)  Max: 98.3 °F (36.8 °C)    Intake/Output Summary (Last 24 hours) at 10/29/2024 0807  Last data filed at 10/29/2024 0600  Gross per 24 hour   Intake --   Output 1380 ml   Net -1380 ml       Physical Exam:   General appearance: alert, appears stated age, cooperative, and no distress  Head: Normocephalic, without obvious abnormality, atraumatic  Back:  No CVAT  Lungs: Breathing unlabored  Abdomen: Soft, nondistended, suprapubic tenderness  : Voiding spontaneously  Extremities: No BLE edema  Neurologic: Grossly normal      Labs:  WBC:    Lab Results   Component Value Date/Time    WBC 4.6 10/29/2024 03:45 AM     Hemoglobin/Hematocrit:    Lab Results   Component Value Date/Time    HGB 9.3 10/29/2024 03:45 AM    HCT 30.4 10/29/2024 03:45 AM     BMP:   Lab Results   Component Value Date/Time     10/29/2024 02:01 AM    K 4.9 10/29/2024 02:01 AM     10/29/2024 02:01 AM    CO2 24 10/29/2024 02:01 AM    BUN 26 10/29/2024 02:01 AM    CREATININE 2.4 10/29/2024 02:01 AM    CALCIUM 8.7 10/29/2024 02:01 AM    GFRAA >60 2018 07:38 AM    LABGLOM 28 10/29/2024 02:01 AM    LABGLOM 68 2024 10:09 AM     Blood Culture:  GNR (Pseudomonas detected)     Urine Culture 10/22/24: 
    [address] [address]   Progress Note  [facility] 0 1 2  Date: 10/25/2024   Patient: Jethro Vázquez   : 1960   DOA: 10/23/2024   MRN: [id number]   ROOM#: 00-[id number]   Collaborating Urologist on Call at time of admission: Dr. Cain    CC: Dysuria    Subjective:     Pain: mild-moderate, no nausea and no vomiting  Bowel Movement/Flatus:   Yes   Voiding:  With dysuria    Pt resting comfortably, endorses ongoing lower abd/penile pain***    Objective:      Vitals:    /74   Pulse 78   Temp 98.2 °F (36.8 °C) (Oral)   Resp 18   Ht 1.778 m (5' 10\")   Wt 79.8 kg (176 lb)   SpO2 97%   BMI 25.25 kg/m²    Temp  Av.1 °F (36.7 °C)  Min: 97.8 °F (36.6 °C)  Max: 98.2 °F (36.8 °C)     Intake/Output Summary (Last 24 hours) at 10/25/2024 1128  Last data filed at 10/25/2024 0600  Gross per 24 hour   Intake --   Output 650 ml   Net -650 ml       Physical Exam:   General appearance: alert, appears stated age, cooperative, and no distress  Head: Normocephalic, without obvious abnormality, atraumatic  Back:  No CVAT  Lungs: Breathing unlabored  Abdomen: Soft, nondistended, suprapubic tenderness***  : Voiding spontaneously  Extremities: No BLE edema  Neurologic: Grossly normal    Labs:   WBC:    Lab Results   Component Value Date/Time    WBC 3.4 10/24/2024 05:21 AM      Hemoglobin/Hematocrit:    Lab Results   Component Value Date/Time    HGB 7.9 10/24/2024 05:21 AM    HCT 24.9 10/24/2024 05:21 AM      BMP:   Lab Results   Component Value Date/Time     10/24/2024 05:21 AM    K 4.2 10/24/2024 05:21 AM    CL 99 10/24/2024 05:21 AM    CO2 22 10/24/2024 05:21 AM    BUN 40 10/24/2024 05:21 AM    CREATININE 2.9 10/24/2024 05:21 AM    CALCIUM 8.1 10/24/2024 05:21 AM    GFRAA >60 2018 07:38 AM    LABGLOM 23 10/24/2024 05:21 AM    LABGLOM 68 2024 10:09 AM      PT/INR:    Lab Results   Component Value Date/Time    PROTIME 18.8 10/23/2024 08:15 AM    PROTIME 10.5 2011 03:20 PM    
    V2.0  [facility] [place] Hospitalist Progress Note      Name:  Jethro Vázquez /Age/Sex: 1960  (64 y.o. male)   MRN & CSN:  [id number] & [id number] Encounter Date/Time: 10/23/2024 7:42 AM EDT    Location:  -A PCP: Mono Schaffer MD       Hospital Day: 1    Assessment and Plan:   Jethro Vázquez is a 64 y.o. 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  CT abdomen pelvis reviewed moderate persistent left hydronephrosis and hydroureter with a left ureteral stent in place.  Increased right-sided hydronephrosis and hydroureter.  Bilateral perinephric stranding, moderate retained stool in the colon.  Start IV cefepime, follow-up urine and blood cultures  Given repeated hospitalization for complicated urinary tract infection believe is a good candidate for removal of left ureteral stent and placement of bilateral nephrostomy tubes  NPO for urology consultation in a.m.     SHIRLEY on CKD stage IIIa, baseline creatinine around 1.4  Creatinine on presentation 3.3, possible etiologies include obstructive uropathy and above-mentioned urinary tract infection  IV antibiotics as mentioned above  Continue home Flomax and Ditropan  Bladder scan every shift, straight cath if above 250 mL  Urology consultation for possible stent vs bilateral nephrostomy tubes     Hypertension  Continue home amlodipine     Anemia of chronic disease, stable Hb trend  Monitor CBC     Muscle invasive high-grade bladder cancer status post TUR 2023  S/p chemotherapy and radiation      Diet Diet NPO   DVT Prophylaxis [] Lovenox, [x]  Heparin, [] SCDs, [] Ambulation,  [] Eliquis, [] Xarelto  [] Coumadin   Code Status Full Code   Disposition From: home  Expected Disposition: home  Estimated Date of Discharge: 2-3 days  Patient requires continued admission due to complicated UTI   Surrogate Decision Maker/ POA wife 
    V2.0  [facility] [place] Hospitalist Progress Note      Name:  Jethro Vázquez /Age/Sex: 1960  (64 y.o. male)   MRN & CSN:  [id number] & [id number] Encounter Date/Time: 10/27/2024 7:42 AM EDT    Location:  -A PCP: Mono Schaffer MD       Hospital Day: 5    Assessment and Plan:   Jethro Vázquez is a 64 y.o. 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  CT abdomen pelvis reviewed moderate persistent left hydronephrosis and hydroureter with a left ureteral stent in place.  Increased right-sided hydronephrosis and hydroureter.  Bilateral perinephric stranding, moderate retained stool in the colon.  -Blood cultures growing pseudomonas aeruginosa  -Urine culture growing pseudomonas aeruginosa  -Echo: with EF 55-60%, mild TR, no obvious endocarditis  -Given repeated hospitalization for complicated urinary tract infection believe is a good candidate for removal of left ureteral stent and placement of bilateral nephrostomy tubes  -s/p cystoscopy, L retrograde pyelogram, L ureteral stent removal and placement, R retrograde pyelogram and R ureteral stent placement  -10/25: Cefepime switched to cipro based on sensitivities  -Blood cultures 10/25 showing 1 blood culture with 2/4 bottles growing staph, likely contamination, other blood culture sample without growth to date  Urology following, appreciate recs  Repeat blood cultures ordered  ID consulted for bacteremia, appreciate recs  -->continue cipro 500 mg BID x 14d from urology procedure (EOT 2024)  Per urology, trend renal function, if no improvement in 48 hours, obtain renal US, if hydronephrosis is present, will need bilateral nephrostomy tubes--> patient has been having improvement in kidney function however patient did mention that he does not want nephrostomy tubes     SHIRLEY on CKD stage 
    V2.0  [place] Hospitalist Progress Note      Name:  Jethro Vázquez /Age/Sex: 1960  (64 y.o. male)   MRN & CSN:  [id number] & [id number] Encounter Date/Time: 10/25/2024 7:42 AM EDT    Location:  -A PCP: Mono Schaffer MD       Hospital Day: 3    Assessment and Plan:   Jethro Vázquez is a 64 y.o. 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  CT abdomen pelvis reviewed moderate persistent left hydronephrosis and hydroureter with a left ureteral stent in place.  Increased right-sided hydronephrosis and hydroureter.  Bilateral perinephric stranding, moderate retained stool in the colon.  -Blood cultures growing pseudomonas aeruginosa  -Urine culture growing pseudomonas aeruginosa  -Echo: with EF 55-60%, mild TR, no obvious endocarditis  Given repeated hospitalization for complicated urinary tract infection believe is a good candidate for removal of left ureteral stent and placement of bilateral nephrostomy tubes  Per urology, plan for cysto with possible R PGPG/stent placement and L stent exchange vs removal today  Repeat blood cultures ordered  ID consulted for bacteremia, appreciate recs  Cefepime switched to cipro based on sensitivities     SHIRLEY on CKD stage IIIa-improving  -baseline creatinine around 1.4  Creatinine on presentation 3.3, possible etiologies include obstructive uropathy and above-mentioned urinary tract infection  IV antibiotics as mentioned above  Continue home Flomax and Ditropan  Bladder scan every shift, straight cath if above 250 mL  Urology consultation for possible stent vs bilateral nephrostomy tubes     Hypertension  Continue home amlodipine     Anemia of chronic disease  -trending down  Monitor CBC     Muscle invasive high-grade bladder cancer status post TUR 2023  S/p chemotherapy and radiation      Diet Diet NPO 
    [place]2.[place]  [facility] [place]      Name:  Jethro Vázquez /Age/Sex: 1960  (64 y.o. male)   MRN & CSN:  [id number] & [id number] Encounter Date/Time: 10/24/2024 7:42 AM EDT    Location:  -A PCP: Mono Schaffer MD       Hospital Day: 2    Assessment and Plan:   Jethro Vázquez is a 64 y.o. 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  CT abdomen pelvis reviewed moderate persistent left hydronephrosis and hydroureter with a left ureteral stent in place.  Increased right-sided hydronephrosis and hydroureter.  Bilateral perinephric stranding, moderate retained stool in the colon.  -Blood cultures growing pseudomonas aeruginosa  -Urine culture growing pseudomonas aeruginosa  Start IV cefepime, follow-up urine and blood cultures  Given repeated hospitalization for complicated urinary tract infection believe is a good candidate for removal of left ureteral stent and placement of bilateral nephrostomy tubes  Per urology, plan for cysto with possible R PGPG/stent placement and L stent exchange vs removal tomorrow  NPO at midnight  Repeat blood cultures ordered  ID consulted for bacteremia, appreciate recs  Echo ordered     SHIRLEY on CKD stage IIIa-improving  -baseline creatinine around 1.4  Creatinine on presentation 3.3, possible etiologies include obstructive uropathy and above-mentioned urinary tract infection  IV antibiotics as mentioned above  Continue home Flomax and Ditropan  Bladder scan every shift, straight cath if above 250 mL  Urology consultation for possible stent vs bilateral nephrostomy tubes     Hypertension  Continue home amlodipine     Anemia of chronic disease  -trending down  Monitor CBC     Muscle invasive high-grade bladder cancer status post TUR 2023  S/p chemotherapy and radiation      Diet ADULT ORAL NUTRITION SUPPLEMENT; 
  Physician Progress Note      PATIENT:               BRISSA BULLOCK  CSN #:                  [id number]  :                       1960  ADMIT DATE:       10/23/2024 1:46 AM  DISCH DATE:  RESPONDING  PROVIDER #:        Nicki Cheema MD          QUERY TEXT:    Pt admitted with UTI.  Pt noted to have left ureteral stent. If possible,   please document in the progress notes and discharge summary if you are   evaluating and/or treating any of the following:    The medical record reflects the following:  Risk Factors: Bladder cancer, SHIRLEY, CKD  Clinical Indicators: Per progress note \"Complicated UTI. persistent left   hydronephrosis and hydroureter with a left ureteral stent in place.  Increased   right-sided hydronephrosis and hydroureter\".  Treatment: Urology consult, IV Cefepime, urine culture, serial labs,   supportive care  Options provided:  -- UTI due to ureteral stent  -- UTI not due to ureteral stent  -- Other - I will add my own diagnosis  -- Disagree - Not applicable / Not valid  -- Disagree - Clinically unable to determine / Unknown  -- Refer to Clinical Documentation Reviewer    PROVIDER RESPONSE TEXT:    UTI is due to ureteral stent.    Query created by: France Krishnan on 10/23/2024 3:36 PM      Electronically signed by:  Nicki Cheema MD 10/23/2024 5:34 PM          
4 Eyes Skin Assessment     NAME:  Jethro Vázquez  YOB: 1960  MEDICAL RECORD NUMBER:  [id number]    The patient is being assessed for  Transfer to New Unit    I agree that at least one RN has performed a thorough Head to Toe Skin Assessment on the patient. ALL assessment sites listed below have been assessed.      Areas assessed by both nurses:    Head, Face, Ears, Shoulders, Back, Chest, Arms, Elbows, Hands, Sacrum. Buttock, Coccyx, Ischium, Legs. Feet and Heels, and Under Medical Devices         Does the Patient have a Wound? No noted wound(s)       Guilherme Prevention initiated by RN: No  Wound Care Orders initiated by RN: No    Pressure Injury (Stage 3,4, Unstageable, DTI, NWPT, and Complex wounds) if present, place Wound referral order by RN under : No    New Ostomies, if present place, Ostomy referral order under : No     Nurse 1 eSignature: Electronically signed by Shalom Esparza RN on 10/27/24 at 3:16 PM EDT    **SHARE this note so that the co-signing nurse can place an eSignature**    Nurse 2 eSignature: Electronically signed by LAURA FLEMING RN on 10/27/24 at 3:41 PM EDT   
4 Eyes Skin Assessment     NAME:  Jethro Vázquez  YOB: 1960  MEDICAL RECORD NUMBER:  [id number]    The patient is being assessed for  Admission    I agree that at least one RN has performed a thorough Head to Toe Skin Assessment on the patient. ALL assessment sites listed below have been assessed.      Areas assessed by both nurses:    Head, Face, Ears, Shoulders, Back, Chest, Arms, Elbows, Hands, Sacrum. Buttock, Coccyx, Ischium, Legs. Feet and Heels, and Under Medical Devices         Does the Patient have a Wound? No noted wound(s)       Guilherme Prevention initiated by RN: Yes  Wound Care Orders initiated by RN: No    Pressure Injury (Stage 3,4, Unstageable, DTI, NWPT, and Complex wounds) if present, place Wound referral order by RN under : No    New Ostomies, if present place, Ostomy referral order under : No     Nurse 1 eSignature: Electronically signed by Hector Byers RN on 10/23/24 at 2:41 AM EDT    **SHARE this note so that the co-signing nurse can place an eSignature**    Nurse 2 eSignature: Electronically signed by Carla Rodriguez RN on 10/23/24 at 5:01 AM EDT   
Comprehensive Nutrition Assessment    Type and Reason for Visit:  Initial, Positive Nutrition Screen (poor appetite, wt loss 2-13#)    Nutrition Recommendations/Plan:   Please obtain updated, measured bodyweight with source  Continue regular diet  Trial clear oral nutrition supplement BID, berry flavor  Monitor weights, po intakes, renal fx, POC     Malnutrition Assessment:  Malnutrition Status:  Insufficient data (10/23/24 1042)    Context:  Acute Illness       Nutrition Assessment:    Admitted w/ complicated UTI, with past medical history of hypertension, bilateral inguinal hernia, muscle invasive high-grade bladder cancer status post TUR 12/2023, CKD stage 3a- baseline Cr 1.4, anemia of chronic disease. Pt reports decreased appetite for a few days, generally not feeling well. Reports mild wt loss, however noted a significant loss of 5.6% x2 weeks and 13.2% x3mo if CBW is accurate (does not have noted source). Pt dislikes most oral supplements but is willing to try clear oral supplement daily in berry flavor. Pt tearful at visit discussing his friedman w/ cancer, the death of his parents, and is concerned about his children at home. Provided extensive emotional support. Has been in touch with the . Will follow at high nutrition risk at this time.    Nutrition Related Findings:    +GFR 16, Cr 3.9, BUN 44 Wound Type: None       Current Nutrition Intake & Therapies:    Average Meal Intake: 51-75%  Average Supplements Intake: None Ordered  ADULT DIET; Regular    Anthropometric Measures:  Height: 177.8 cm (5' 10\")  Ideal Body Weight (IBW): 166 lbs (75 kg)    Admission Body Weight: 80.2 kg (176 lb 12.9 oz)  Current Body Weight: 80.2 kg (176 lb 12.9 oz),   IBW. Weight Source: Not Specified  Current BMI (kg/m2): 25.4  Usual Body Weight: 92.6 kg (204 lb 2.3 oz)  % Weight Change (Calculated): -13.4  Weight Adjustment For: No Adjustment                 BMI Categories: Overweight (BMI 25.0-29.9)    Estimated Daily 
Discussed in detail patient's options for management including stents and PCNs as he had previously. Patient adamantly refusing PCN placement. Noted higher likelihood of stent failure with bladder cancer and possibility of stent irritation contributing to his ongoing dysuria and suprapubic pain. He verbalizes understanding of this and elects to proceed tomorrow with cystoscopy, left ureteral stent exchange vs removal, and possible right ureteral stent insertion. NPO at midnight.  
HEMATOLOGY ONCOLOGY  PROGRESS NOTE    Patient Name:  Jethro Vázquez  Patient :  1960  Patient MRN:  [id number]    Referring Provider: Mono Schaffer MD     Date of Service: 10/28/2024      Chief Complaint:  No chief complaint on file.    Patient Active Problem List:     Non-recurrent bilateral inguinal hernia without obstruction or gangrene     Right inguinal hernia     Lipoma, spermatic cord     Benign essential microscopic hematuria     Chronic left-sided low back pain without sciatica     Overweight     Acute cystitis with hematuria     Primary hypertension     Pyelonephritis     Malignant neoplasm of overlapping sites of bladder (HCC)     Need for hepatitis B screening test     Bladder cancer (HCC)     Acute cholecystitis     Poor intravenous access     Abdominal adhesions     SHIRLEY (acute kidney injury) (HCC)     Sepsis (HCC)     UTI (urinary tract infection)     Complicated urinary tract infection    HPI:   Jethro Vázquez is a pleasant 64 y.o. male who presented to ED on 10/23/2024 due to complicated UTI.   Consulted for pancytopenia.  He had neoadjuvant chemo followed by RT + chemo to the bladder in 2024. He refused bladder surgery.   Urology onboard.   Plan for cystoscopy, left ureteral stent exchange vs removal, and possible right ureteral stent insertion     I reviewed his labs records. He has intermittent leukopenia and thrombocytopenia.   10/07/24 Ferritin: 295 Iron: 47 (L) TIBC: 184 (L) Iron % Saturation: 26  He is on IV antibiotic.   Pancytopenia is multifactorial. I will continue to monitor CBC for now.    10/26/2024 awake and alert. No events overnight.   Denies acute complaint.   10/25/2024 s/p cystoscopy with Dr Cain.  FINDINGS: Normal urethra, moderately enlarged prostate, radiation changes in bladder, no bladder masses, bilateral ureteral stents placed with curls in kidney and bladder   He recommend to trend renal function. If no improvement to closer to baseline renal 
Here for complicated UTI, had cystoscopy with stents were placed, still has hydronephrosis and worsening kidney function, plan for bilateral PCN tomorrow (patient told me he does not want it but urology still made him n.p.o. at midnight)    10/28 at 2:17 PM Braden Cheema  
Infectious Disease Progress Note  10/28/2024   Patient Name: Jethro Vázquez : 1960   Impression:  Pseudomonas aeruginosa Bacteremia Secondary to Bilateral Pyelonephritis due to Obstructive Uropathy from Muscle Invasive High-Grade Bladder Cancer:   P.aeruginosa bacteremia secondary to pyelonephritis.   No reported allergies to ABX. CrCl 27. T-max 100.2 to afebrile since 10/24. Leukopenia.  Hyponatremia.   10/22-UA WBC, RBC, urine culture: P.aeruginosa (pan sensi)  10/22-BC  Pseudomonas aeruginosa   10/25-BC  CoNS (considered contaminants)  10/22-CT AP wo Contrast: IMPRESSION: 1. Redemonstration of a left ureteral stent which is unchanged. Again identified is moderate left-sided hydronephrosis and hydroureter however, there is moderate right-sided hydronephrosis and hydroureter which is increased in the interim. Bilateral perinephric stranding is present as well as periureteral stranding which may be on an infectious inflammatory basis. Additionally, there is diffuse urinary bladder wall thickening with perivesicular stranding. Correlate for cystitis. No hyperdense stones identified. 2. Mild to moderate retained stool in the colon without obstruction. 3. Additional findings as above.  Dr. Cain, urology, onboard, imp of bladder cancer with bilateral HN and SHIRLEY on CKD3 with pyelonephritis. Pt has refused PCN placement but does agree to proceed with a cystoscopy, left ureteral stent exchange vs removal and possible right ureteral stent insertion.   Bladder Cancer s/p TURP 2023/ [facility] Placed :  Dr. Gambino, oncology, s/p neoadjuvant chemo followed by chemo and RT to the bladder (both completed 2024). Intermittent leukopenia and thrombocytopenia.   SHIRLEY on CKD3a:  Cr 2.9 on baseline of 1.7  Marijuana Use:  HTN:  Multi-morbidity: per PMHx: muscle invasive high-grade bladder cancer s/p TURP 2023, HTN, GERD, kidney stones, bilateral inguinal hernia, CKD3a and anemia of chronic 
Patient is stating that he needs an increase in the amount of dilaudid because he feels its not helping.  That he is at a 10 when he is urinating, not every time he urinates but sometimes. Dr. Cheema notified  
Per Dr Alvarado discussion with Juan DELVALLE after discussion with the attending urologist we will defer nephrostomy placement at this time.    Updated the pt nurse Clarissa PERALTA  
Pt discussed in IDR. CM in to see pt to discuss HHC. Per CM Courtney's Handoff note the pt's referral was placed to [facility]. CM attempted to contact [facility] for updates.  CM awaiting return call.     Armida Gregory, MSSW, LSW    
RENAL DOSE ADJUSTMENT MADE PER P/T PROTOCOL    PREVIOUS ORDER:  Ciprofloxacin 500 mg PO q24h  Originally ordered as 500 mg q12h and was renally dose adjusted for SHIRLEY    Estimated Creatinine Clearance: 35 mL/min (A) (based on SCr of 2.2 mg/dL (H)).  Recent Labs     10/25/24  1119 10/26/24  0244 10/27/24  0434   BUN 34* 31* 26*   CREATININE 2.5* 2.4* 2.2*   * 121* 123*     NEW RENALLY ADJUSTED ORDER:  Ciprofloxacin 500 mg PO q12h      Thank you,  Leeann Sewell [facility], PharmD.  10/27/2024 2:48 PM        
Raya STEWARDN RN clinical  for [facility] RN students 07-19:00 this date.   
Care: Continue to monitor while inpatient, Coordination of Care  Plan of Care discussed with: pt    Goals:     Goals: PO intake 75% or greater, by next RD assessment       Nutrition Monitoring and Evaluation:   Behavioral-Environmental Outcomes: None Identified  Food/Nutrient Intake Outcomes: Food and Nutrient Intake, Supplement Intake  Physical Signs/Symptoms Outcomes: Weight, Nutrition Focused Physical Findings, Biochemical Data, Meal Time Behavior    Discharge Planning:    Continue current diet, Continue Oral Nutrition Supplement     Kallie Gabriel RD, LD  Contact: [phone]      
Education/Counseling: Education not appropriate  Coordination of Nutrition Care: Continue to monitor while inpatient  Plan of Care discussed with: patient    Goals:  Previous Goal Met: Progressing toward Goal(s)  Goals: PO intake 75% or greater, by next RD assessment       Nutrition Monitoring and Evaluation:   Behavioral-Environmental Outcomes: None Identified  Food/Nutrient Intake Outcomes: Diet Advancement/Tolerance, Food and Nutrient Intake  Physical Signs/Symptoms Outcomes: Biochemical Data, Skin, Weight, Meal Time Behavior    Discharge Planning:    Too soon to determine     Heena Josue RD, LD  Contact: [phone]    
followed by chemo and RT to the bladder.   10/25/2024 s/p cystoscopy with Dr Cain.  FINDINGS: Normal urethra, moderately enlarged prostate, radiation changes in bladder, no bladder masses, bilateral ureteral stents placed with curls in kidney and bladder     2. Complicated UTI/Pyelonephritis:  Urine and blood cultures with Pseudomonas aeruginosa. On Abx.     3. He has intermittent leukopenia and thrombocytopenia.   10/07/24 Ferritin: 295 Iron: 47 (L) TIBC: 184 (L) Iron % Saturation: 26  Pancytopenia is multifactorial.   10/26/2024 creat 2.4. Alb 2.7. WBC 3.7, Hg 8.6, plat 121.  Will monitor CBC.    We will follow the patient. Thank you for allowing me to participate in the care of this very pleasant patient.  
   MCV 94.2 78.0 - 100.0 fL    MCH 29.5 27.0 - 31.0 pg    MCHC 31.3 (L) 32.0 - 36.0 g/dL    RDW 14.6 11.7 - 14.9 %    Platelets 121 (L) 140 - 440 k/uL    MPV 9.7 7.5 - 11.1 fL    Neutrophils % 69 (H) 36 - 66 %    Lymphocytes % 18 (L) 24 - 44 %    Monocytes % 8 (H) 0 - 4 %    Eosinophils % 4 (H) 0.0 - 3.0 %    Basophils % 0 0 - 1 %    Immature Granulocytes % 1 (H) 0 %    Neutrophils Absolute 2.56 k/uL    Lymphocytes Absolute 0.67 k/uL    Monocytes Absolute 0.30 k/uL    Eosinophils Absolute 0.14 k/uL    Basophils Absolute 0.01 k/uL    Immature Granulocytes Absolute 0.05 k/uL   Comprehensive Metabolic Panel    Collection Time: 10/26/24  2:44 AM   Result Value Ref Range    Sodium 137 136 - 145 mmol/L    Potassium 5.0 3.5 - 5.1 mmol/L    Chloride 105 99 - 110 mmol/L    CO2 23 21 - 32 mmol/L    Anion Gap 9 9 - 17 mmol/L    Glucose 109 (H) 74 - 99 mg/dL    BUN 31 (H) 7 - 20 mg/dL    Creatinine 2.4 (H) 0.8 - 1.3 mg/dL    Est, Glom Filt Rate 27 (L) >60 mL/min/1.73m2    Calcium 8.4 8.3 - 10.6 mg/dL    Total Protein 6.6 6.4 - 8.2 g/dL    Albumin 2.7 (L) 3.4 - 5.0 g/dL    Albumin/Globulin Ratio 0.7 (L) 1.1 - 2.2    Total Bilirubin 0.2 0.0 - 1.0 mg/dL    Alkaline Phosphatase 265 (H) 40 - 129 U/L    ALT 20 10 - 40 U/L    AST 26 15 - 37 U/L        Imaging/Diagnostics Last 24 Hours   CT ABDOMEN PELVIS WO CONTRAST Additional Contrast? None    Result Date: 10/22/2024  CT OF THE ABDOMEN AND PELVIS INDICATION: Ureteral stent. Dysuria. Right flank pain. TECHNIQUE: Multiple 2D axial images were obtained through the abdomen and pelvis without IV contrast.  Radiation dose reduction techniques were used for this study:  All CT scans performed at this facility use one or all of the following: Automated exposure control, adjustment of the mA and/or kVp according to patient's size, iterative reconstruction. COMPARISON: CT abdomen/pelvis from 10/4/2024. FINDINGS: Lung bases are clear. The nonenhanced liver, spleen, and adrenal glands are 
Additional findings: No fluid collection     1. Diffuse wall thickening urinary bladder with adjacent stranding and mild left perinephric stranding without significant change from September 17, 2024, possible urinary tract infection. 2. Left ureteral stent in good position. Mild right and mild to moderate left hydronephrosis. No radiopaque urinary calculi. 3. Status post cholecystectomy  4. No lymphadenopathy. Electronically signed by Edward Isabel MD    Assessment & Plan:      Jethro Vázquez is a 64 y.o. male  with pmhx of kidney stones, HTN, bladder cancer, and bilateral hydronephrosis managed with left ureteral stent admitted 10/23/2024 for complicated UTI.     1) Bladder Cancer w Bilateral Hydronephrosis and SHIRLEY on CKD3: S/p cystoscopy, right stent removal, and left stent exchange 7/8/24  S/p internalization of bilateral ureteral stents and nephrostomy tube removal with IR 4/1/24  S/p bilateral PCN placement with IR 1/12/24  S/p cystoscopy and transurethral resection of large bladder tumor 12/14/23. Ureteral orifices not visible. Pathology with invasive high grade urothelial carcinoma.  CT a/p 9/17/24: Marked thickening of the urinary bladder wall with surrounding fat stranding and marked left perinephric and periureteric fat stranding. Left ureteral stent in place with moderate hydronephrosis.  PET 7/2024: There are no foci of abnormal FDG activity to suggest neoplastic disease.              Cr 2.9 from 3.9, baseline of ~1.4              S/p chemotherapy and radiation therapy 9/2024; follows with Dr. Gambino and Dr. Lr.  Plan for cystoscopy, possible right RGPG/stent placement, and left stent exchange vs removal today     2) Complicated UTI/Pyelonephritis:  Urine culture pending. Blood cultures grew GNB (detecting Pseudomonas aeruginosa).              WBC 3.4, AF/VSS.  On IV cefepime; abx per primary    Patient seen and examined, chart reviewed.   Electronically signed by Haider Cain MD on 10/25/2024 
degenerative changes of the spine. Additional findings: No fluid collection     1. Diffuse wall thickening urinary bladder with adjacent stranding and mild left perinephric stranding without significant change from September 17, 2024, possible urinary tract infection. 2. Left ureteral stent in good position. Mild right and mild to moderate left hydronephrosis. No radiopaque urinary calculi. 3. Status post cholecystectomy  4. No lymphadenopathy. Electronically signed by Edward Isabel MD    Assessment & Plan:      Jethro Vázquez is a 64 y.o. male  with pmhx of kidney stones, HTN, bladder cancer, and bilateral hydronephrosis managed with left ureteral stent admitted 10/23/2024 for complicated UTI. Now s/p left ureteral stent exchange and right ureteral stent placement 10/25/24     1) Bladder Cancer w Bilateral Hydronephrosis and SHIRLEY on CKD3: S/p cystoscopy, right stent removal, and left stent exchange 7/8/24  S/p internalization of bilateral ureteral stents and nephrostomy tube removal with IR 4/1/24  S/p bilateral PCN placement with IR 1/12/24  S/p cystoscopy and transurethral resection of large bladder tumor 12/14/23. Ureteral orifices not visible. Pathology with invasive high grade urothelial carcinoma.  CT a/p 9/17/24: Marked thickening of the urinary bladder wall with surrounding fat stranding and marked left perinephric and periureteric fat stranding. Left ureteral stent in place with moderate hydronephrosis.  PET 7/2024: There are no foci of abnormal FDG activity to suggest neoplastic disease.              S/p chemotherapy and radiation therapy 9/2024; follows with Dr. Gambino and Dr. Lr.   Now s/p left ureteral stent exchange and right ureteral stent placement 10/25/24   Trend renal function. Obtain JANA tomorrow. If persistent hydronephrosis present on JANA, would then need nephrostomy tubes placed.    Oxybutynin 5mg q8h prn for stent colic, flomax 0.4mg daily for stent colic     2) Complicated 
9/17/24: Marked thickening of the urinary bladder wall with surrounding fat stranding and marked left perinephric and periureteric fat stranding. Left ureteral stent in place with moderate hydronephrosis.  PET 7/2024: There are no foci of abnormal FDG activity to suggest neoplastic disease.              S/p chemotherapy and radiation therapy 9/2024; follows with Dr. Gambino and Dr. Lr.   Trend renal function. JANA 10/28/24 pending. If persistent hydronephrosis present on JANA, would then need nephrostomy tubes placed. NPO at MN for possible bilateral PCN placement tomorrow.   Oxybutynin 5mg q8h prn for stent colic, flomax 0.4mg daily for stent colic.     2) Complicated UTI/Pyelonephritis:  Urine and blood cultures with Pseudomonas aeruginosa              Abx per primary    Patient seen and examined, chart reviewed.   Electronically signed by Juan Zaidi PA-C on 10/28/2024 at 10:36 AM    
UTI/Pyelonephritis:  Urine and blood cultures with Pseudomonas aeruginosa              Abx per primary    Patient seen and examined, chart reviewed.   Electronically signed by Haider Cain MD on 10/26/2024 at 6:10 AM    
the colon without obstruction or pericolonic inflammatory changes. Small bowel demonstrates a normal caliber. No free air or free fluid is identified. There is suture material along the anterior abdominal wall adjacent to the right rectus abdominis muscle. There is an intramuscular lipoma involving the lateral aspect of the right gluteus medius muscle. Evaluation of the osseous structures demonstrates degenerative changes.     1. Redemonstration of a left ureteral stent which is unchanged. Again identified is moderate left-sided hydronephrosis and hydroureter however, there is moderate right-sided hydronephrosis and hydroureter which is increased in the interim. Bilateral perinephric stranding is present as well as periureteral stranding which may be on an infectious inflammatory basis. Additionally, there is diffuse urinary bladder wall thickening with perivesicular stranding. Correlate for cystitis. No hyperdense stones identified. 2. Mild to moderate retained stool in the colon without obstruction. 3. Additional findings as above. Electronically signed by Filiberto Brewster      Electronically signed by Nicki Cheema MD on 10/28/2024 at 8:29 AM

## 2024-10-29 NOTE — PLAN OF CARE
Problem: Discharge Planning  Goal: Discharge to home or other facility with appropriate resources  Outcome: Progressing  Flowsheets (Taken 10/29/2024 0800)  Discharge to home or other facility with appropriate resources:   Identify barriers to discharge with patient and caregiver   Arrange for needed discharge resources and transportation as appropriate   Identify discharge learning needs (meds, wound care, etc)   Arrange for interpreters to assist at discharge as needed   Refer to discharge planning if patient needs post-hospital services based on physician order or complex needs related to functional status, cognitive ability or social support system     Problem: Safety - Adult  Goal: Free from fall injury  Outcome: Progressing     Problem: Pain  Goal: Verbalizes/displays adequate comfort level or baseline comfort level  Outcome: Progressing  Flowsheets (Taken 10/29/2024 1559 by Clarissa Ch, RN)  Verbalizes/displays adequate comfort level or baseline comfort level: Assess pain using appropriate pain scale     Problem: Nutrition Deficit:  Goal: Optimize nutritional status  Outcome: Progressing  Flowsheets (Taken 10/29/2024 1452 by Heena Josue, RD, LD)  Nutrient intake appropriate for improving, restoring, or maintaining nutritional needs:   Assess nutritional status and recommend course of action   Monitor oral intake, labs, and treatment plans   Recommend appropriate diets, oral nutritional supplements, and vitamin/mineral supplements     Problem: Neurosensory - Adult  Goal: Achieves stable or improved neurological status  Outcome: Progressing  Flowsheets (Taken 10/29/2024 0800)  Achieves stable or improved neurological status:   Assess for and report changes in neurological status   Initiate measures to prevent increased intracranial pressure   Maintain blood pressure and fluid volume within ordered parameters to optimize cerebral perfusion and minimize risk of hemorrhage   Monitor temperature,

## 2024-10-29 NOTE — DISCHARGE SUMMARY
V2.0  Discharge Summary    Name:  Jethro Vázquez /Age/Sex: 1960 (64 y.o. male)   Admit Date: 10/23/2024  Discharge Date: 10/29/24    MRN & CSN:  [id number] & [id number] Encounter Date and Time 10/29/24 6:05 PM EDT    Attending:  James Garay MD Discharging Provider: JAMES GARAY MD       Hospital Course:     Brief HPI: Jethro Vázquez is a 64 y.o. male who presented with ***    Brief Problem Based Course:   ***      The patient expressed appropriate understanding of, and agreement with the discharge recommendations, medications, and plan.     Consults this admission:  IP CONSULT TO UROLOGY  IP CONSULT TO HOME CARE NEEDS  IP CONSULT TO ONCOLOGY  IP CONSULT TO INFECTIOUS DISEASES  IP CONSULT TO INTERVENTIONAL RADIOLOGY  IP CONSULT TO HOME CARE NEEDS    Discharge Diagnosis:   Complicated urinary tract infection    ***    Discharge Instruction:   Follow up appointments: ***  Primary care physician: Mono Schaffer MD within 1 week  Diet: {diet:92475}   Activity: {discharge activity:12824}  Disposition: Discharged to:   []Home, []C, []SNF, []Acute Rehab, []Hospice ***  Condition on discharge: Stable  Labs and Tests to be Followed up as an outpatient by PCP or Specialist: ***    Discharge Medications:        Medication List        START taking these medications      ciprofloxacin 500 MG tablet  Commonly known as: CIPRO  Take 1 tablet by mouth every 12 hours for 20 doses     lactobacillus capsule  Take 1 capsule by mouth daily for 25 doses  Start taking on: 2024     polyethylene glycol 17 g packet  Commonly known as: GLYCOLAX  Take 1 packet by mouth 2 times daily     sennosides-docusate sodium 8.6-50 MG tablet  Commonly known as: SENOKOT-S  Take 2 tablets by mouth 2 times daily as needed for Constipation            CHANGE how you take these medications      morphine 15 MG extended release tablet  Commonly known as: MS CONTIN  Take 1 tablet by mouth daily for 30 days. Max

## 2024-10-30 LAB
MICROORGANISM SPEC CULT: NORMAL
SERVICE CMNT-IMP: NORMAL
SPECIMEN DESCRIPTION: NORMAL

## 2024-10-30 NOTE — TELEPHONE ENCOUNTER
Received VM from [facility] stating that a referral was received from hospital prior to discharge. Patient is now active with hospice services as of today 10/30/2024. Intake requesting clinicals d/t conflicting statements from patient stating he has not received tx for cancer recently and is in remission. Clinicals faxed to [phone] as requested.

## 2024-10-30 NOTE — CARE COORDINATION
CM -Room # 2015 pt to be discharged -yesterday there was a notification from the CM that pt will be discharged home with [facility] office . CM called to verify [facility] at the [facility] after hours -spoke tobin Wilson-CM confirmed the [facility] hospice has delivered equipment opening today and will be admitting to services.

## 2024-11-04 PROBLEM — N39.0 UTI (URINARY TRACT INFECTION): Status: RESOLVED | Noted: 2024-10-05 | Resolved: 2024-11-04

## 2024-11-19 ENCOUNTER — HOSPITAL ENCOUNTER (OUTPATIENT)
Dept: RADIATION ONCOLOGY | Age: 64
Discharge: HOME | End: 2024-11-19

## 2024-11-19 VITALS
DIASTOLIC BLOOD PRESSURE: 67 MMHG | TEMPERATURE: 97.4 F | BODY MASS INDEX: 26.17 KG/M2 | WEIGHT: 182.8 LBS | HEART RATE: 122 BPM | HEIGHT: 70 IN | OXYGEN SATURATION: 99 % | SYSTOLIC BLOOD PRESSURE: 102 MMHG

## 2024-11-19 NOTE — PROGRESS NOTES
[facility]   Radiation Oncology Center  [address] [id number]  Phone: [phone]  Fax: [phone]    RADIATION ONCOLOGY FOLLOW UP REPORT    PATIENT NAME:  Jethro Vázquez              : 1960  MEDICAL RECORD NO: [id number]    [facility] NO: [id number]        PROVIDER: Cassie Lr MD      DATE OF SERVICE: 2024     Other Physicians:  Noemi Gambino M.D.  [address] [place] [address]    Sanaz Ivan M.D.   [address] [place] [address]          DIAGNOSIS:  Cancer Staging   Malignant neoplasm of overlapping sites of bladder (HCC)  Staging form: Urinary Bladder, AJCC 8th Edition  - Clinical: Stage SJ (cT3, cN3, cM1a) - Unsigned       TREATMENT COURSE:   Oncology History   Malignant neoplasm of overlapping sites of bladder (HCC)   2024 - 2024 Chemotherapy    OP CISplatin 35 mg/m2 (split-dosing) + gemcitabine 1000 mg/m2 D1 and 8 Q28D  Plan Provider: Noemi Gambino MD  Treatment goal: Neoadjuvant  Line of treatment: Induction     2024 -  Chemotherapy    OP gemcitabine  Plan Provider: Noemi Gambino MD  Treatment goal: Curative  Line of treatment: Consolidation     2024 - 10/10/2024 Radiation    Bladder: 6400 cGy;   pelvic lymph nodes: 4800 cGy VMAT             HPI:   Jethro Vázquez is a 64 y.o. male who has a history as above who returns today for his first posttreatment visit.  Currently, he reports he is doing well.   He was recently hospitalized with a complicated UTI and Pseudomonas bacteremia with admission being discharged on 2024.  CT of the abdomen pelvis from 10/29/2024 shows decreasing peritoneal and pelvic adenopathy consistent with treatment response.      He does report some minimal residual hematuria.  His energy level remains quite low pro but is continuing to improve.  He continues to have ureteral stents which he reports are functioning well and are not painful.  He reports he 
for f/u and states he is still having a little bit of blood in urine daily.     Electronically signed by Ashtyn Pepe CMA on 11/19/2024 at 10:06 AM

## 2025-03-22 ENCOUNTER — HOSPITAL ENCOUNTER (EMERGENCY)
Age: 65
Discharge: ANOTHER HOSPITAL | End: 2025-03-22
Attending: EMERGENCY MEDICINE
Payer: MEDICARE

## 2025-03-22 ENCOUNTER — APPOINTMENT (OUTPATIENT)
Dept: CT IMAGING | Age: 65
End: 2025-03-22
Payer: MEDICARE

## 2025-03-22 ENCOUNTER — APPOINTMENT (OUTPATIENT)
Dept: GENERAL RADIOLOGY | Age: 65
End: 2025-03-22
Payer: MEDICARE

## 2025-03-22 ENCOUNTER — HOSPITAL ENCOUNTER (INPATIENT)
Age: 65
LOS: 3 days | Discharge: HOME | DRG: 660 | End: 2025-03-25
Attending: STUDENT IN AN ORGANIZED HEALTH CARE EDUCATION/TRAINING PROGRAM | Admitting: STUDENT IN AN ORGANIZED HEALTH CARE EDUCATION/TRAINING PROGRAM
Payer: MEDICARE

## 2025-03-22 VITALS
WEIGHT: 206.6 LBS | HEIGHT: 70 IN | DIASTOLIC BLOOD PRESSURE: 63 MMHG | BODY MASS INDEX: 29.58 KG/M2 | HEART RATE: 90 BPM | SYSTOLIC BLOOD PRESSURE: 164 MMHG | RESPIRATION RATE: 20 BRPM | TEMPERATURE: 98.1 F | OXYGEN SATURATION: 100 %

## 2025-03-22 DIAGNOSIS — N17.9 ACUTE KIDNEY INJURY SUPERIMPOSED ON CHRONIC KIDNEY DISEASE: ICD-10-CM

## 2025-03-22 DIAGNOSIS — A41.9 SEPTICEMIA (HCC): Primary | ICD-10-CM

## 2025-03-22 DIAGNOSIS — N17.9 AKI (ACUTE KIDNEY INJURY): Primary | ICD-10-CM

## 2025-03-22 DIAGNOSIS — N39.0 ACUTE URINARY TRACT INFECTION: ICD-10-CM

## 2025-03-22 DIAGNOSIS — N18.9 ACUTE KIDNEY INJURY SUPERIMPOSED ON CHRONIC KIDNEY DISEASE: ICD-10-CM

## 2025-03-22 DIAGNOSIS — D64.9 ANEMIA, UNSPECIFIED TYPE: ICD-10-CM

## 2025-03-22 LAB
ABSOLUTE BANDS: 0.2 K/UL
ALBUMIN SERPL-MCNC: 3.9 G/DL (ref 3.4–5)
ALBUMIN/GLOB SERPL: 1.3 {RATIO}
ALP SERPL-CCNC: 94 U/L (ref 40–129)
ALT SERPL-CCNC: 15 U/L (ref 10–40)
ANION GAP SERPL CALCULATED.3IONS-SCNC: 24 MMOL/L (ref 9–17)
AST SERPL-CCNC: 23 U/L (ref 15–37)
BACTERIA URNS QL MICRO: ABNORMAL
BANDS: 2 %
BASOPHILS # BLD: 0 K/UL
BASOPHILS NFR BLD: 0 % (ref 0–1)
BILIRUB SERPL-MCNC: <0.2 MG/DL (ref 0–1)
BILIRUB UR QL STRIP: ABNORMAL
BUN SERPL-MCNC: 60 MG/DL (ref 7–20)
CALCIUM SERPL-MCNC: 8 MG/DL (ref 8.3–10.6)
CHLORIDE SERPL-SCNC: 96 MMOL/L (ref 99–110)
CLARITY UR: ABNORMAL
CO2 SERPL-SCNC: 17 MMOL/L (ref 21–32)
COLOR UR: ABNORMAL
CREAT SERPL-MCNC: 6.1 MG/DL (ref 0.8–1.3)
EOSINOPHIL # BLD: 0 K/UL
EOSINOPHILS RELATIVE PERCENT: 0 % (ref 0–3)
EPI CELLS #/AREA URNS HPF: ABNORMAL /HPF
ERYTHROCYTE [DISTWIDTH] IN BLOOD BY AUTOMATED COUNT: 13.5 % (ref 11.7–14.9)
GFR, ESTIMATED: 10 ML/MIN/1.73M2
GLUCOSE SERPL-MCNC: 177 MG/DL (ref 74–99)
GLUCOSE UR STRIP-MCNC: NEGATIVE MG/DL
HCT VFR BLD AUTO: 30.6 % (ref 42–52)
HGB BLD-MCNC: 9.5 G/DL (ref 13.5–18)
HGB UR QL STRIP.AUTO: ABNORMAL
KETONES UR STRIP-MCNC: NEGATIVE MG/DL
LACTATE BLDV-SCNC: 1 MMOL/L (ref 0.4–2)
LACTATE BLDV-SCNC: 4.6 MMOL/L (ref 0.4–2)
LEUKOCYTE ESTERASE UR QL STRIP: ABNORMAL
LIPASE SERPL-CCNC: 12 U/L (ref 13–60)
LYMPHOCYTES NFR BLD: 0.1 K/UL
LYMPHOCYTES RELATIVE PERCENT: 1 % (ref 24–44)
MCH RBC QN AUTO: 28.2 PG (ref 27–31)
MCHC RBC AUTO-ENTMCNC: 31 G/DL (ref 32–36)
MCV RBC AUTO: 90.8 FL (ref 78–100)
MONOCYTES NFR BLD: 0.1 K/UL
MONOCYTES NFR BLD: 1 % (ref 0–4)
NEUTROPHILS NFR BLD: 96 % (ref 36–66)
NEUTS SEG NFR BLD: 9.79 K/UL
NITRITE UR QL STRIP: POSITIVE
PH UR STRIP: 5.5 [PH] (ref 5–8)
PLATELET # BLD AUTO: 177 K/UL (ref 140–440)
PLATELET ESTIMATE: NORMAL
PMV BLD AUTO: 9 FL (ref 7.5–11.1)
POTASSIUM SERPL-SCNC: 4.8 MMOL/L (ref 3.5–5.1)
PROCALCITONIN SERPL-MCNC: 0.63 NG/ML
PROT SERPL-MCNC: 7 G/DL (ref 6.4–8.2)
PROT UR STRIP-MCNC: >=300 MG/DL
RBC # BLD AUTO: 3.37 M/UL (ref 4.6–6.2)
RBC # BLD: ABNORMAL 10*6/UL
RBC # BLD: ABNORMAL 10*6/UL
RBC #/AREA URNS HPF: ABNORMAL /HPF
SODIUM SERPL-SCNC: 138 MMOL/L (ref 136–145)
SP GR UR STRIP: >1.03 (ref 1–1.03)
UROBILINOGEN UR STRIP-ACNC: 1 EU/DL (ref 0–1)
WBC # BLD: NORMAL 10*3/UL
WBC #/AREA URNS HPF: ABNORMAL /HPF
WBC OTHER # BLD: 10.2 K/UL (ref 4–10.5)

## 2025-03-22 PROCEDURE — 2580000003 HC RX 258: Performed by: STUDENT IN AN ORGANIZED HEALTH CARE EDUCATION/TRAINING PROGRAM

## 2025-03-22 PROCEDURE — 2580000003 HC RX 258: Performed by: EMERGENCY MEDICINE

## 2025-03-22 PROCEDURE — 71045 X-RAY EXAM CHEST 1 VIEW: CPT

## 2025-03-22 PROCEDURE — 84145 PROCALCITONIN (PCT): CPT

## 2025-03-22 PROCEDURE — 81001 URINALYSIS AUTO W/SCOPE: CPT

## 2025-03-22 PROCEDURE — 96361 HYDRATE IV INFUSION ADD-ON: CPT

## 2025-03-22 PROCEDURE — 74176 CT ABD & PELVIS W/O CONTRAST: CPT

## 2025-03-22 PROCEDURE — 83605 ASSAY OF LACTIC ACID: CPT

## 2025-03-22 PROCEDURE — 99285 EMERGENCY DEPT VISIT HI MDM: CPT

## 2025-03-22 PROCEDURE — 83690 ASSAY OF LIPASE: CPT

## 2025-03-22 PROCEDURE — 80053 COMPREHEN METABOLIC PANEL: CPT

## 2025-03-22 PROCEDURE — 6370000000 HC RX 637 (ALT 250 FOR IP): Performed by: STUDENT IN AN ORGANIZED HEALTH CARE EDUCATION/TRAINING PROGRAM

## 2025-03-22 PROCEDURE — 96375 TX/PRO/DX INJ NEW DRUG ADDON: CPT

## 2025-03-22 PROCEDURE — 6360000002 HC RX W HCPCS: Performed by: EMERGENCY MEDICINE

## 2025-03-22 PROCEDURE — 87086 URINE CULTURE/COLONY COUNT: CPT

## 2025-03-22 PROCEDURE — 93005 ELECTROCARDIOGRAM TRACING: CPT | Performed by: EMERGENCY MEDICINE

## 2025-03-22 PROCEDURE — 96376 TX/PRO/DX INJ SAME DRUG ADON: CPT

## 2025-03-22 PROCEDURE — 1200000000 HC SEMI PRIVATE

## 2025-03-22 PROCEDURE — 87040 BLOOD CULTURE FOR BACTERIA: CPT

## 2025-03-22 PROCEDURE — 96365 THER/PROPH/DIAG IV INF INIT: CPT

## 2025-03-22 PROCEDURE — 85025 COMPLETE CBC W/AUTO DIFF WBC: CPT

## 2025-03-22 RX ORDER — FENTANYL CITRATE 50 UG/ML
50 INJECTION, SOLUTION INTRAMUSCULAR; INTRAVENOUS ONCE
Status: COMPLETED | OUTPATIENT
Start: 2025-03-22 | End: 2025-03-22

## 2025-03-22 RX ORDER — SENNA AND DOCUSATE SODIUM 50; 8.6 MG/1; MG/1
2 TABLET, FILM COATED ORAL 2 TIMES DAILY
Status: DISCONTINUED | OUTPATIENT
Start: 2025-03-22 | End: 2025-03-25 | Stop reason: HOSPADM

## 2025-03-22 RX ORDER — 0.9 % SODIUM CHLORIDE 0.9 %
30 INTRAVENOUS SOLUTION INTRAVENOUS ONCE
Status: COMPLETED | OUTPATIENT
Start: 2025-03-22 | End: 2025-03-22

## 2025-03-22 RX ORDER — GABAPENTIN 300 MG/1
300 CAPSULE ORAL NIGHTLY
Status: DISCONTINUED | OUTPATIENT
Start: 2025-03-22 | End: 2025-03-25 | Stop reason: HOSPADM

## 2025-03-22 RX ORDER — TAMSULOSIN HYDROCHLORIDE 0.4 MG/1
0.4 CAPSULE ORAL 2 TIMES DAILY
Status: DISCONTINUED | OUTPATIENT
Start: 2025-03-22 | End: 2025-03-25 | Stop reason: HOSPADM

## 2025-03-22 RX ORDER — SODIUM CHLORIDE 0.9 % (FLUSH) 0.9 %
5-40 SYRINGE (ML) INJECTION EVERY 12 HOURS SCHEDULED
Status: DISCONTINUED | OUTPATIENT
Start: 2025-03-22 | End: 2025-03-25 | Stop reason: HOSPADM

## 2025-03-22 RX ORDER — ONDANSETRON 2 MG/ML
4 INJECTION INTRAMUSCULAR; INTRAVENOUS ONCE
Status: COMPLETED | OUTPATIENT
Start: 2025-03-22 | End: 2025-03-22

## 2025-03-22 RX ORDER — ONDANSETRON 2 MG/ML
4 INJECTION INTRAMUSCULAR; INTRAVENOUS EVERY 6 HOURS PRN
Status: DISCONTINUED | OUTPATIENT
Start: 2025-03-22 | End: 2025-03-25

## 2025-03-22 RX ORDER — SODIUM CHLORIDE, SODIUM LACTATE, POTASSIUM CHLORIDE, CALCIUM CHLORIDE 600; 310; 30; 20 MG/100ML; MG/100ML; MG/100ML; MG/100ML
INJECTION, SOLUTION INTRAVENOUS CONTINUOUS
Status: DISPENSED | OUTPATIENT
Start: 2025-03-22 | End: 2025-03-23

## 2025-03-22 RX ORDER — ACETAMINOPHEN 650 MG/1
650 SUPPOSITORY RECTAL EVERY 6 HOURS PRN
Status: DISCONTINUED | OUTPATIENT
Start: 2025-03-22 | End: 2025-03-25 | Stop reason: HOSPADM

## 2025-03-22 RX ORDER — POLYETHYLENE GLYCOL 3350 17 G/17G
17 POWDER, FOR SOLUTION ORAL DAILY PRN
Status: DISCONTINUED | OUTPATIENT
Start: 2025-03-22 | End: 2025-03-25 | Stop reason: HOSPADM

## 2025-03-22 RX ORDER — HEPARIN SODIUM 5000 [USP'U]/ML
5000 INJECTION, SOLUTION INTRAVENOUS; SUBCUTANEOUS EVERY 8 HOURS SCHEDULED
Status: DISCONTINUED | OUTPATIENT
Start: 2025-03-22 | End: 2025-03-25 | Stop reason: HOSPADM

## 2025-03-22 RX ORDER — OXYCODONE AND ACETAMINOPHEN 5; 325 MG/1; MG/1
1 TABLET ORAL EVERY 12 HOURS PRN
Status: DISCONTINUED | OUTPATIENT
Start: 2025-03-22 | End: 2025-03-25 | Stop reason: HOSPADM

## 2025-03-22 RX ORDER — SODIUM CHLORIDE 9 MG/ML
INJECTION, SOLUTION INTRAVENOUS PRN
Status: DISCONTINUED | OUTPATIENT
Start: 2025-03-22 | End: 2025-03-25 | Stop reason: HOSPADM

## 2025-03-22 RX ORDER — SODIUM CHLORIDE 9 MG/ML
1000 INJECTION, SOLUTION INTRAVENOUS ONCE
Status: COMPLETED | OUTPATIENT
Start: 2025-03-22 | End: 2025-03-22

## 2025-03-22 RX ORDER — ONDANSETRON 4 MG/1
4 TABLET, ORALLY DISINTEGRATING ORAL EVERY 8 HOURS PRN
Status: DISCONTINUED | OUTPATIENT
Start: 2025-03-22 | End: 2025-03-25

## 2025-03-22 RX ORDER — SODIUM CHLORIDE 0.9 % (FLUSH) 0.9 %
5-40 SYRINGE (ML) INJECTION PRN
Status: DISCONTINUED | OUTPATIENT
Start: 2025-03-22 | End: 2025-03-25 | Stop reason: HOSPADM

## 2025-03-22 RX ORDER — ACETAMINOPHEN 325 MG/1
650 TABLET ORAL EVERY 6 HOURS PRN
Status: DISCONTINUED | OUTPATIENT
Start: 2025-03-22 | End: 2025-03-25 | Stop reason: HOSPADM

## 2025-03-22 RX ORDER — AMLODIPINE BESYLATE 10 MG/1
10 TABLET ORAL DAILY
Status: DISCONTINUED | OUTPATIENT
Start: 2025-03-23 | End: 2025-03-25 | Stop reason: HOSPADM

## 2025-03-22 RX ADMIN — GABAPENTIN 300 MG: 300 CAPSULE ORAL at 22:36

## 2025-03-22 RX ADMIN — FENTANYL CITRATE 50 MCG: 50 INJECTION, SOLUTION INTRAMUSCULAR; INTRAVENOUS at 18:24

## 2025-03-22 RX ADMIN — CEFEPIME 2000 MG: 2 INJECTION, POWDER, FOR SOLUTION INTRAVENOUS at 14:43

## 2025-03-22 RX ADMIN — MELATONIN TAB 3 MG 3 MG: 3 TAB at 22:37

## 2025-03-22 RX ADMIN — ONDANSETRON 4 MG: 2 INJECTION, SOLUTION INTRAMUSCULAR; INTRAVENOUS at 20:23

## 2025-03-22 RX ADMIN — SODIUM CHLORIDE, SODIUM LACTATE, POTASSIUM CHLORIDE, AND CALCIUM CHLORIDE: .6; .31; .03; .02 INJECTION, SOLUTION INTRAVENOUS at 22:46

## 2025-03-22 RX ADMIN — FENTANYL CITRATE 50 MCG: 50 INJECTION, SOLUTION INTRAMUSCULAR; INTRAVENOUS at 15:42

## 2025-03-22 RX ADMIN — SODIUM CHLORIDE 2811 ML: 0.9 INJECTION, SOLUTION INTRAVENOUS at 16:00

## 2025-03-22 RX ADMIN — SENNOSIDES AND DOCUSATE SODIUM 2 TABLET: 50; 8.6 TABLET ORAL at 22:36

## 2025-03-22 RX ADMIN — OXYCODONE HYDROCHLORIDE AND ACETAMINOPHEN 1 TABLET: 5; 325 TABLET ORAL at 22:36

## 2025-03-22 RX ADMIN — TAMSULOSIN HYDROCHLORIDE 0.4 MG: 0.4 CAPSULE ORAL at 22:36

## 2025-03-22 RX ADMIN — SODIUM CHLORIDE 1000 ML: 0.9 INJECTION, SOLUTION INTRAVENOUS at 14:43

## 2025-03-22 ASSESSMENT — PAIN DESCRIPTION - LOCATION
LOCATION: ABDOMEN
LOCATION: LEG
LOCATION: ABDOMEN
LOCATION: PENIS

## 2025-03-22 ASSESSMENT — PAIN SCALES - GENERAL
PAINLEVEL_OUTOF10: 8
PAINLEVEL_OUTOF10: 10
PAINLEVEL_OUTOF10: 9
PAINLEVEL_OUTOF10: 9

## 2025-03-22 ASSESSMENT — PAIN DESCRIPTION - ORIENTATION
ORIENTATION: RIGHT;LOWER
ORIENTATION: RIGHT;LOWER
ORIENTATION: RIGHT

## 2025-03-22 ASSESSMENT — PAIN DESCRIPTION - DESCRIPTORS
DESCRIPTORS: SHARP
DESCRIPTORS: SHARP
DESCRIPTORS: BURNING
DESCRIPTORS: SHARP

## 2025-03-22 ASSESSMENT — PAIN SCALES - WONG BAKER: WONGBAKER_NUMERICALRESPONSE: HURTS EVEN MORE

## 2025-03-22 ASSESSMENT — PAIN - FUNCTIONAL ASSESSMENT: PAIN_FUNCTIONAL_ASSESSMENT: 0-10

## 2025-03-22 ASSESSMENT — PAIN DESCRIPTION - PAIN TYPE: TYPE: ACUTE PAIN

## 2025-03-22 NOTE — ED PROVIDER NOTES
Emergency Department Encounter    Patient: Jethro Vázquez  MRN: [id number]  : 1960  Date of Evaluation: 3/22/2025  ED Provider:  Clarissa Mcfarlane DO    Triage Chief Complaint:   Abdominal Pain and Altered Mental Status (Pt arrives per ems from home, family states pt was altered yesterday and today nurse aide states pt vomited x3 and continued to be altered)    [place]:  Jethro Vázquez is a 65 y.o. male with history of bladder cancer on chemotherapy, port to the right anterior chest, hypertension, kidney stones with history of stent placements, complicated UTIs, that presents to the emergency department from home via EMS for lower abdominal pain and altered mental status.  Per EMS family states patient has had altered mental status since yesterday.  They state the nurse ate today said he vomited least 3 times and is still altered.  Patient sent to the ER for evaluation.  Upon arrival to the emergency department.  Patient complained of having to urinate and did give a urine which was brown in color.  Patient states her lower abdominal pain.  He denied any fevers or chills he does admit to being on chronic pain medication Percocet.  Patient denies any headache no falls or injuries.  Patient here for evaluation.    ROS - see HPI, below listed is current ROS at time of my eval:  10 systems reviewed and negative except as above.     Past Medical History:   Diagnosis Date    Acid reflux     History of kidney stones     \"had kidney stone 2017- passed it\"    Hypertension     \"on medication since age 51's\"    Malignant neoplasm of overlapping sites of bladder (HCC) 2024    No natural teeth      Past Surgical History:   Procedure Laterality Date    ANKLE SURGERY Left     age 17\"did have pins and then later removed\"    APPENDECTOMY      CHOLECYSTECTOMY, LAPAROSCOPIC N/A 2024    CHOLECYSTECTOMY LAPAROSCOPIC ROBOTIC WITH EXTENSIVE LYSIS OF ADHESIONS performed by Rufus Mendes II, MD at [facility] OR     tamsulosin (FLOMAX) 0.4 MG capsule Take 1 capsule by mouth 2 times daily 60 capsule 1    gabapentin (NEURONTIN) 300 MG capsule Take 1 capsule by mouth 3 times daily for 90 days. 90 capsule 2    Multiple Vitamins-Minerals (THERAPEUTIC MULTIVITAMIN-MINERALS) tablet Take 1 tablet by mouth daily      naloxone 4 MG/0.1ML LIQD nasal spray       amLODIPine (NORVASC) 10 MG tablet Take 1 tablet by mouth daily 30 tablet 3     No Known Allergies    Nursing Notes Reviewed    Physical Exam:  Triage VS:    ED Triage Vitals [03/22/25 1316]   Encounter Vitals Group      /82      Systolic BP Percentile       Diastolic BP Percentile       Pulse (!) 102      Respirations 20      Temp       Temp src       SpO2 100 %      Weight - Scale 93.7 kg (206 lb 9.6 oz)      Height 1.778 m (5' 10\")      Head Circumference       Peak Flow       Pain Score       Pain Loc       Pain Education       Exclude from Growth Chart    BP (!) 141/72   Pulse 93   Temp 98.1 °F (36.7 °C)   Resp 14   Ht 1.778 m (5' 10\")   Wt 93.7 kg (206 lb 9.6 oz)   SpO2 (!) 88%   BMI 29.64 kg/m²           My pulse ox interpretation is - normal    General appearance:  No acute distress.   Skin:  Warm. Dry.   Eye:  Extraocular movements intact.     Ears, nose, mouth and throat:  Oral mucosa slightly dry  Neck:  Trachea midline.   Extremity:  No swelling.  Normal ROM     Heart: Sinus tachycardia, normal S1 & S2, no extra heart sounds.    Perfusion:  intact  Respiratory:  Lungs clear to auscultation bilaterally.  Respirations nonlabored.     Abdominal:  Normal bowel sounds.  Soft.  Lower abdominal suprapubic abdominal pain tender to palpation, non distended.  Back:  No CVA tenderness to palpation     Neurological:  Alert and oriented times 3.  No focal neuro deficits.             Psychiatric:  Appropriate    I have reviewed and interpreted all of the currently available lab results from this visit (if applicable):  Results for orders placed or performed during the

## 2025-03-23 LAB
ALBUMIN SERPL-MCNC: 3.3 G/DL (ref 3.4–5)
ALBUMIN/GLOB SERPL: 1.2 {RATIO} (ref 1.1–2.2)
ALP SERPL-CCNC: 80 U/L (ref 40–129)
ALT SERPL-CCNC: 11 U/L (ref 10–40)
ANION GAP SERPL CALCULATED.3IONS-SCNC: 12 MMOL/L (ref 9–17)
AST SERPL-CCNC: 16 U/L (ref 15–37)
BASOPHILS # BLD: 0.01 K/UL
BASOPHILS NFR BLD: 0 % (ref 0–1)
BILIRUB SERPL-MCNC: <0.2 MG/DL (ref 0–1)
BUN SERPL-MCNC: 62 MG/DL (ref 7–20)
CALCIUM SERPL-MCNC: 7.7 MG/DL (ref 8.3–10.6)
CHLORIDE SERPL-SCNC: 106 MMOL/L (ref 99–110)
CK SERPL-CCNC: 346 U/L (ref 26–192)
CO2 SERPL-SCNC: 21 MMOL/L (ref 21–32)
CREAT SERPL-MCNC: 5.7 MG/DL (ref 0.8–1.3)
EKG ATRIAL RATE: 99 BPM
EKG DIAGNOSIS: NORMAL
EKG P AXIS: 63 DEGREES
EKG P-R INTERVAL: 172 MS
EKG Q-T INTERVAL: 366 MS
EKG QRS DURATION: 98 MS
EKG QTC CALCULATION (BAZETT): 469 MS
EKG R AXIS: 0 DEGREES
EKG T AXIS: 50 DEGREES
EKG VENTRICULAR RATE: 99 BPM
EOSINOPHIL # BLD: 0.02 K/UL
EOSINOPHILS RELATIVE PERCENT: 0 % (ref 0–3)
ERYTHROCYTE [DISTWIDTH] IN BLOOD BY AUTOMATED COUNT: 13.6 % (ref 11.7–14.9)
GFR, ESTIMATED: 10 ML/MIN/1.73M2
GLUCOSE SERPL-MCNC: 97 MG/DL (ref 74–99)
HCT VFR BLD AUTO: 27.5 % (ref 42–52)
HGB BLD-MCNC: 8.5 G/DL (ref 13.5–18)
IMM GRANULOCYTES # BLD AUTO: 0.05 K/UL
IMM GRANULOCYTES NFR BLD: 1 %
INR PPP: 1.2
LYMPHOCYTES NFR BLD: 0.81 K/UL
LYMPHOCYTES RELATIVE PERCENT: 12 % (ref 24–44)
MCH RBC QN AUTO: 28.2 PG (ref 27–31)
MCHC RBC AUTO-ENTMCNC: 30.9 G/DL (ref 32–36)
MCV RBC AUTO: 91.4 FL (ref 78–100)
MICROORGANISM SPEC CULT: NORMAL
MONOCYTES NFR BLD: 0.34 K/UL
MONOCYTES NFR BLD: 5 % (ref 0–4)
NEUTROPHILS NFR BLD: 81 % (ref 36–66)
NEUTS SEG NFR BLD: 5.37 K/UL
PLATELET # BLD AUTO: 139 K/UL (ref 140–440)
PMV BLD AUTO: 8.9 FL (ref 7.5–11.1)
POTASSIUM SERPL-SCNC: 4.4 MMOL/L (ref 3.5–5.1)
PROT SERPL-MCNC: 6 G/DL (ref 6.4–8.2)
PROTHROMBIN TIME: 15.6 SEC (ref 11.7–14.5)
RBC # BLD AUTO: 3.01 M/UL (ref 4.6–6.2)
SERVICE CMNT-IMP: NORMAL
SODIUM SERPL-SCNC: 138 MMOL/L (ref 136–145)
SPECIMEN DESCRIPTION: NORMAL
WBC OTHER # BLD: 6.6 K/UL (ref 4–10.5)

## 2025-03-23 PROCEDURE — 2700000000 HC OXYGEN THERAPY PER DAY

## 2025-03-23 PROCEDURE — 85025 COMPLETE CBC W/AUTO DIFF WBC: CPT

## 2025-03-23 PROCEDURE — 85610 PROTHROMBIN TIME: CPT

## 2025-03-23 PROCEDURE — 6370000000 HC RX 637 (ALT 250 FOR IP): Performed by: STUDENT IN AN ORGANIZED HEALTH CARE EDUCATION/TRAINING PROGRAM

## 2025-03-23 PROCEDURE — 82550 ASSAY OF CK (CPK): CPT

## 2025-03-23 PROCEDURE — 6360000002 HC RX W HCPCS: Performed by: NURSE PRACTITIONER

## 2025-03-23 PROCEDURE — 1200000000 HC SEMI PRIVATE

## 2025-03-23 PROCEDURE — 94761 N-INVAS EAR/PLS OXIMETRY MLT: CPT

## 2025-03-23 PROCEDURE — 36415 COLL VENOUS BLD VENIPUNCTURE: CPT

## 2025-03-23 PROCEDURE — 2500000003 HC RX 250 WO HCPCS: Performed by: STUDENT IN AN ORGANIZED HEALTH CARE EDUCATION/TRAINING PROGRAM

## 2025-03-23 PROCEDURE — 6360000002 HC RX W HCPCS: Performed by: STUDENT IN AN ORGANIZED HEALTH CARE EDUCATION/TRAINING PROGRAM

## 2025-03-23 PROCEDURE — 2580000003 HC RX 258: Performed by: STUDENT IN AN ORGANIZED HEALTH CARE EDUCATION/TRAINING PROGRAM

## 2025-03-23 PROCEDURE — 93010 ELECTROCARDIOGRAM REPORT: CPT | Performed by: INTERNAL MEDICINE

## 2025-03-23 PROCEDURE — 80053 COMPREHEN METABOLIC PANEL: CPT

## 2025-03-23 RX ORDER — PROCHLORPERAZINE EDISYLATE 5 MG/ML
10 INJECTION INTRAMUSCULAR; INTRAVENOUS EVERY 6 HOURS PRN
Status: DISCONTINUED | OUTPATIENT
Start: 2025-03-23 | End: 2025-03-25 | Stop reason: HOSPADM

## 2025-03-23 RX ADMIN — SODIUM CHLORIDE, PRESERVATIVE FREE 10 ML: 5 INJECTION INTRAVENOUS at 22:19

## 2025-03-23 RX ADMIN — MELATONIN TAB 3 MG 3 MG: 3 TAB at 21:29

## 2025-03-23 RX ADMIN — ONDANSETRON 4 MG: 2 INJECTION INTRAMUSCULAR; INTRAVENOUS at 04:07

## 2025-03-23 RX ADMIN — GABAPENTIN 300 MG: 300 CAPSULE ORAL at 21:29

## 2025-03-23 RX ADMIN — AMLODIPINE BESYLATE 10 MG: 10 TABLET ORAL at 09:36

## 2025-03-23 RX ADMIN — TAMSULOSIN HYDROCHLORIDE 0.4 MG: 0.4 CAPSULE ORAL at 09:36

## 2025-03-23 RX ADMIN — TAMSULOSIN HYDROCHLORIDE 0.4 MG: 0.4 CAPSULE ORAL at 21:29

## 2025-03-23 RX ADMIN — SENNOSIDES AND DOCUSATE SODIUM 2 TABLET: 50; 8.6 TABLET ORAL at 21:29

## 2025-03-23 RX ADMIN — SENNOSIDES AND DOCUSATE SODIUM 2 TABLET: 50; 8.6 TABLET ORAL at 09:36

## 2025-03-23 RX ADMIN — ONDANSETRON 4 MG: 2 INJECTION INTRAMUSCULAR; INTRAVENOUS at 16:52

## 2025-03-23 RX ADMIN — PROCHLORPERAZINE EDISYLATE 10 MG: 5 INJECTION INTRAMUSCULAR; INTRAVENOUS at 21:40

## 2025-03-23 RX ADMIN — CEFEPIME 1000 MG: 1 INJECTION, POWDER, FOR SOLUTION INTRAMUSCULAR; INTRAVENOUS at 21:26

## 2025-03-23 RX ADMIN — SODIUM CHLORIDE, PRESERVATIVE FREE 10 ML: 5 INJECTION INTRAVENOUS at 09:37

## 2025-03-23 ASSESSMENT — PAIN SCALES - GENERAL
PAINLEVEL_OUTOF10: 0
PAINLEVEL_OUTOF10: 0

## 2025-03-23 ASSESSMENT — PAIN SCALES - WONG BAKER
WONGBAKER_NUMERICALRESPONSE: NO HURT
WONGBAKER_NUMERICALRESPONSE: NO HURT

## 2025-03-23 NOTE — H&P
History and Physical      Name:  Jethro Vázquez /Age/Sex: 1960  (65 y.o. male)   MRN & CSN:  [id number] & [id number] Encounter Date/Time: 3/22/2025 9:42 PM   Location:  [address] [place] [address]- PCP: Mono Schaffer MD       Hospital Day: 1    Assessment and Plan:     Patient is a 65 y.o. male who presented with abdominal pain. Transferred from [place] ED.    # SHIRLEY on CKD3b  # Invasive, high-grade urethral carcinoma s/p TURBT in 2023  - Reported worsening lower abdominal pain over past few days. No fevers or flank pain. Followed by urology, diagnosed in 2023, had chemotherapy with gemcitabine and cisplatin completed in 2023 and radiotherapy completed in 2024.    - Clinically hypovolemic. Cr 6.1, baseline ~ 2. UA elevated SG. CT showed bilateral ureteral stents with bilateral HUN.   - Insert Huang for obstruction and strict I/O's, urology consulted, appreciate assistance. Continue Flomax, will challenge with IVF.      # Acute cystitis with hematuria  - Last UCx in 10/2024 with Pseudomonas aeruginosa, complicated by bacteremia then as well, completed 2-week course of antibiotics with Cipro.  - Afebrile, no leukocytosis (1/4 SIRS criteria), UA positive for infection.  - Started on renally-dosed cefepime, follow-up cultures.    # Lactic acidosis  - Initially LA elevated, repeat normal after IVF.   - Likely from decreased clearance in setting of SHIRLEY. Has 1/4 SIRS criteria. on antibiotics as above.     # Essential hypertension  - Continue Norvasc.    # Neuropathy  - Continue gabapentin, renally-dosed.    # Opioid-induced constipation  - Last BM unknown.  - Started scheduled bowel regimen.    Checklist:  Advanced care planning: full  Diet: NPO past midnight pending urology evaluation  VTE ppx: heparin (held pending urology evaluation)      Disposition: admit to inpatient.  Estimated discharge: 3-4 day(s).  Current living situation: home.  Expected disposition: home.    Spoke with ED provider who recommended       MCV 90.8   RDW 13.5   LYMPHOPCT 1*   MONOPCT 1   EOSPCT 0   BASOPCT 0   MONOSABS 0.10   LYMPHSABS 0.10   EOSABS 0.00   BASOSABS 0.00     CMP:    Recent Labs     03/22/25  1330      K 4.8   CL 96*   CO2 17*   BUN 60*   CREATININE 6.1*   GLUCOSE 177*   CALCIUM 8.0*   BILITOT <0.2   ALKPHOS 94   AST 23   ALT 15     Lipids:   Lab Results   Component Value Date/Time    HDL 33 01/06/2023 10:29 AM     Hemoglobin A1C: No results found for: \"LABA1C\"  TSH: No results found for: \"TSH\"  Troponin: No results found for: \"TROPONINT\"  BNP: No results for input(s): \"PROBNP\" in the last 72 hours.  Lactic Acid: No results for input(s): \"LACTA\" in the last 72 hours.  UA:  Lab Results   Component Value Date/Time    NITRU POSITIVE 03/22/2025 01:15 PM    COLORU Brown 03/22/2025 01:15 PM    PHUR 5.5 03/22/2025 01:15 PM    WBCUA TOO NUMEROUS TO COUNT 03/22/2025 01:15 PM    RBCUA TOO NUMEROUS TO COUNT 03/22/2025 01:15 PM    RBCUA 35 04/08/2024 04:49 PM    MUCUS RARE 04/07/2024 11:26 AM    TRICHOMONAS NONE SEEN 04/08/2024 04:49 PM    YEAST OCCASIONAL 04/08/2024 04:49 PM    BACTERIA MANY 03/22/2025 01:15 PM    CLARITYU CLEAR 04/08/2024 04:49 PM    LEUKOCYTESUR MODERATE 03/22/2025 01:15 PM    UROBILINOGEN 1.0 03/22/2025 01:15 PM    BILIRUBINUR MODERATE 03/22/2025 01:15 PM    BLOODU MODERATE NUMBER OR AMOUNT OBSERVED 04/08/2024 04:49 PM    GLUCOSEU NEGATIVE 03/22/2025 01:15 PM    KETUA NEGATIVE 03/22/2025 01:15 PM     Urine Cultures: No results found for: \"LABURIN\"  Blood Cultures: No results found for: \"BC\"  No results found for: \"BLOODCULT2\"  Organism: No results found for: \"ORG\"    Radiology results:  No orders to display       Parminder Cedeño MD  03/22/25 9:42 PM

## 2025-03-23 NOTE — PLAN OF CARE
Problem: Discharge Planning  Goal: Discharge to home or other facility with appropriate resources  Flowsheets (Taken 3/22/2025 2259)  Discharge to home or other facility with appropriate resources:   Identify barriers to discharge with patient and caregiver   Identify discharge learning needs (meds, wound care, etc)   Refer to discharge planning if patient needs post-hospital services based on physician order or complex needs related to functional status, cognitive ability or social support system   Arrange for needed discharge resources and transportation as appropriate   Arrange for interpreters to assist at discharge as needed     Problem: Discharge Planning  Goal: Discharge to home or other facility with appropriate resources  Flowsheets (Taken 3/22/2025 2259)  Discharge to home or other facility with appropriate resources:   Identify barriers to discharge with patient and caregiver   Identify discharge learning needs (meds, wound care, etc)   Refer to discharge planning if patient needs post-hospital services based on physician order or complex needs related to functional status, cognitive ability or social support system   Arrange for needed discharge resources and transportation as appropriate   Arrange for interpreters to assist at discharge as needed

## 2025-03-23 NOTE — PROGRESS NOTES
CLINICAL PHARMACY SERVICES  Renal dose adjustment made per [facility] protocol     Estimated Creatinine Clearance: 14 mL/min (A) (based on SCr of 6.1 mg/dL (H)).  Recent Labs     03/22/25  1330   BUN 60*   CREATININE 6.1*     INDICATION:  UTI    PREVIOUS ORDER:  Cefepime 2 g Iv q24hr      NEW RENALLY ADJUSTED ORDER:    Cefepime 1 g IV q24hr        Donovan Stuart [facility]  3/22/2025 9:54 PM

## 2025-03-23 NOTE — PROGRESS NOTES
4 Eyes Skin Assessment     NAME:  Jethro Vázquez  YOB: 1960  MEDICAL RECORD NUMBER:  [id number]    The patient is being assessed for  Admission    I agree that at least one RN has performed a thorough Head to Toe Skin Assessment on the patient. ALL assessment sites listed below have been assessed.      Areas assessed by both nurses:    Head, Face, Ears, Shoulders, Back, Chest, Arms, Elbows, Hands, Sacrum. Buttock, Coccyx, Ischium, Legs. Feet and Heels, and Under Medical Devices         Does the Patient have a Wound? No noted wound(s)       Guilherme Prevention initiated by RN: No  Wound Care Orders initiated by RN: No    Pressure Injury (Stage 3,4, Unstageable, DTI, NWPT, and Complex wounds) if present, place Wound referral order by RN under : Yes    New Ostomies, if present place, Ostomy referral order under : No     Nurse 1 eSignature: Electronically signed by Lauryn Rodriguez RN on 3/22/25 at 11:55 PM EDT    **SHARE this note so that the co-signing nurse can place an eSignature**    Nurse 2 eSignature: Electronically signed by Juanita Nick RN on 3/23/25 at 1:02 AM EDT

## 2025-03-23 NOTE — CARE COORDINATION
03/23/25 1018   Service Assessment   Patient Orientation Alert and Oriented   Cognition Alert   History Provided By Medical Record   Primary Caregiver Self   Support Systems Spouse/Significant Other   Patient's Healthcare Decision Maker is: Named in Scanned ACP Document   PCP Verified by CM Yes   Last Visit to PCP Within last 6 months   Prior Functional Level Independent in ADLs/IADLs   Current Functional Level Independent in ADLs/IADLs   Can patient return to prior living arrangement Yes   Ability to make needs known: Good   Family able to assist with home care needs: Yes   Would you like for me to discuss the discharge plan with any other family members/significant others, and if so, who? No   Financial Resources Medicare   Community Resources None     Discharge planning initiated. From home, independent prior to admission. PCP and insurance. No needs identified at this time. CM following for any new needs that may arise.

## 2025-03-23 NOTE — PROGRESS NOTES
In-Patient Progress Note    Patient:  Jethro Vázquez 65 y.o. male MRN: [id number]     Date of Service: 3/23/2025    Hospital Day: 2      Chief complaint: Lower abdominal pain X few days      Subjective   Patient seen and examined in the morning. Patient states he feels tired but otherwise, no other complaints.       See ROS    I have reviewed all pertinent PMHx, PSHx, FamHx, SocialHx, medications, and allergies and updated history as appropriate. I have reviewed images as appropriate.     Assessment and Plan   Jethro Vázquez, a 65 y.o. male, with a history of CKD IIIb, HTN, Neuropathy, chronic opioids, advanced bladder cancer with B/L stents,   was admitted on 3/22/2025 with complaints of had no chief complaint listed for this encounter.    Assessment and Plan:    # Obstructive SHIRLEY on CKD3b with B/L Hydronephrosis   # Invasive, high-grade urethral carcinoma with B/L Stents s/p TURBT in 12/2023  - Reported worsening lower abdominal pain over past few days. No fevers or flank pain. Followed by urology, diagnosed in 12/2023, had chemotherapy with gemcitabine and cisplatin completed in 7/2023 and radiotherapy completed in 9/2024.    - Clinically hypovolemic. Cr 6.1 on admission with slightly improvement to 5.7, baseline ~ 2. UA elevated SG. CT showed bilateral ureteral stents with bilateral HUN.   - Urology and Nephro on board   - Will need B/L stent exchanged this admission - possibly planned for it tomorrow.       # Acute cystitis with hematuria  - Last UCx in 10/2024 with Pseudomonas aeruginosa, complicated by bacteremia then as well, completed 2-week course of antibiotics with Cipro.  - Afebrile, no leukocytosis (1/4 SIRS criteria), UA positive for infection.  - Continue renally-dosed cefepime, follow-up final cultures.     # Lactic acidosis  - Initially LA elevated, repeat normal after IVF.   - Likely from decreased clearance in setting of SHIRLEY. Has 1/4 SIRS criteria. on antibiotics as above.     # Anemia  misinterpreted by the technology leading to omissions or inappropriate outcomes.  Dictated and Electronically Signed By: Justin Chamorro MD 3/22/2025 15:58        XR CHEST PORTABLE  Result Date: 3/22/2025  EXAMINATION: XR CHEST PORTABLE DATE OF EXAM:  3/22/2025 13:56 DEMOGRAPHICS: 65 years old Male INDICATION: Sepsis COMPARISON: No existing relevant imaging study corresponding to the same anatomical region is available. TECHNIQUE: Single AP portable chest radiograph was obtained. FINDINGS: Right chest wall [id number] with the tip projecting over the right atrium. Cardiomediastinal silhouette is within normal limits. The lungs are clear. No pleural effusion or pneumothorax. No acute osseous abnormality. IMPRESSION: 1.  No acute cardiopulmonary process.  Dictated and Electronically Signed By: Donovan Manzanares MD 3/22/2025 14:35          Recent Results (from the past 24 hours)   Urinalysis    Collection Time: 03/22/25  1:15 PM   Result Value Ref Range    Color, UA Brown (A) Yellow    Turbidity UA Cloudy (A) Clear    Glucose, Ur NEGATIVE NEGATIVE mg/dL    Bilirubin, Urine MODERATE (A) NEGATIVE    Ketones, Urine NEGATIVE NEGATIVE mg/dL    Specific Gravity, UA >1.030 (H) 1.005 - 1.030    Urine Hgb LARGE (A) NEGATIVE    pH, Urine 5.5 5.0 - 8.0    Protein, UA >=300 (A) NEGATIVE mg/dL    Urobilinogen, Urine 1.0 0.0 - 1.0 EU/dL    Nitrite, Urine POSITIVE (A) NEGATIVE    Leukocyte Esterase, Urine MODERATE (A) NEGATIVE   Microscopic Urinalysis    Collection Time: 03/22/25  1:15 PM   Result Value Ref Range    WBC, UA TOO NUMEROUS TO COUNT (A) 0 TO 5 /HPF    RBC, UA TOO NUMEROUS TO COUNT (A) 0 TO 2 /HPF    Epithelial Cells, UA 6 TO 9 (A) 0 TO 5 /HPF    Bacteria, UA MANY (A) None   EKG 12 Lead    Collection Time: 03/22/25  1:15 PM   Result Value Ref Range    Ventricular Rate 99 BPM    Atrial Rate 99 BPM    P-R Interval 172 ms    QRS Duration 98 ms    Q-T Interval 366 ms    QTc Calculation (Bazett) 469 ms    P Axis 63 degrees

## 2025-03-23 NOTE — CONSULTS
[address] [address]   Consult Note  [facility] 1 2 3 4 5    Date: 3/23/2025   Patient: Jethro Vázquez   : 1960   DOA: 3/22/2025   MRN: [id number]   ROOM#: [id number]     Reason for Consult:  h/o advanced bladder cancer  Requesting Physician:  Dr. Montero  Collaborating Urologist on Call at time of admission: Moncho    CHIEF COMPLAINT:  \"Feel weak\"    History Obtained From:  patient    HISTORY OF PRESENT ILLNESS:                The patient is a 65 y.o. male with significant past medical history of metastatic bladder cancer who presented with weakness - has been on chemo and radiation for his bladder cancer - s/p stent exchange bilateral end of Oct '24 - nunez in place with clear urine    Past Medical History:        Diagnosis Date    Acid reflux     History of kidney stones     \"had kidney stone 2017- passed it\"    Hypertension     \"on medication since age 50's\"    Malignant neoplasm of overlapping sites of bladder (HCC) 2024    No natural teeth      Past Surgical History:        Procedure Laterality Date    ANKLE SURGERY Left     age 17\"did have pins and then later removed\"    APPENDECTOMY      CHOLECYSTECTOMY, LAPAROSCOPIC N/A 2024    CHOLECYSTECTOMY LAPAROSCOPIC ROBOTIC WITH EXTENSIVE LYSIS OF ADHESIONS performed by Rufus Mendes II, MD at [facility] OR    CYSTOSCOPY N/A 2023    CYSTOSCOPY TRANSURETHRAL RESECTION BLADDER TUMOR performed by Sanaz Ivan MD at [facility] OR    CYSTOSCOPY Bilateral 2024    CYSTOSCOPY RETROGRADE RIGHT STENT REMOVAL LEFT STENT EXCHANGE performed by Sanaz Ivan MD at [facility] OR    CYSTOSCOPY Bilateral 10/25/2024    CYSTOSCOPY, BILATERAL RETROGRADE PYELOGRAMS, LEFT URETERAL STENT EXCHANGE, RIGHT URETERAL STENT INSERTION performed by Haider Cain MD at [facility] OR    HERNIA REPAIR      IR GUIDED NEPHROSTOMY CATH PLACEMENT  2024    IR NEPHROSTOMY CATHETER PLACEMENT 2024 [facility] SPECIAL PROCEDURES    IR GUIDED

## 2025-03-23 NOTE — ED NOTES
ED TO INPATIENT SBAR HANDOFF    Patient Name: Jethro Vázquez   :  1960  65 y.o.   Preferred Name  Jethro  Family/Caregiver Present no   Restraints no   C-SSRS: Risk of Suicide: No Risk  Sitter no   Sepsis Risk Score        Situation  Chief Complaint   Patient presents with    Abdominal Pain    Altered Mental Status     Pt arrives per ems from home, family states pt was altered yesterday and today nurse aide states pt vomited x3 and continued to be altered     Brief Description of Patient's Condition: Patient having altered mental status and abdominal pain. Dx with UTI  Mental Status: oriented and alert  Arrived from: home    Imaging:   CT ABDOMEN PELVIS WO CONTRAST Additional Contrast? None   Final Result      XR CHEST PORTABLE   Final Result        Abnormal labs:   Abnormal Labs Reviewed   CBC WITH AUTO DIFFERENTIAL - Abnormal; Notable for the following components:       Result Value    RBC 3.37 (*)     Hemoglobin 9.5 (*)     Hematocrit 30.6 (*)     MCHC 31.0 (*)     Neutrophils % 96 (*)     Lymphocytes % 1 (*)     All other components within normal limits   COMPREHENSIVE METABOLIC PANEL - Abnormal; Notable for the following components:    Chloride 96 (*)     CO2 17 (*)     Anion Gap 24 (*)     Glucose 177 (*)     BUN 60 (*)     Creatinine 6.1 (*)     Est, Glom Filt Rate 10 (*)     Calcium 8.0 (*)     All other components within normal limits   LIPASE - Abnormal; Notable for the following components:    Lipase 12 (*)     All other components within normal limits   URINALYSIS - Abnormal; Notable for the following components:    Color, UA Brown (*)     Turbidity UA Cloudy (*)     Bilirubin, Urine MODERATE (*)     Specific Gravity, UA >1.030 (*)     Urine Hgb LARGE (*)     Protein, UA >=300 (*)     Nitrite, Urine POSITIVE (*)     Leukocyte Esterase, Urine MODERATE (*)     All other components within normal limits   MICROSCOPIC URINALYSIS - Abnormal; Notable for the following components:    WBC, UA TOO

## 2025-03-23 NOTE — CONSULTS
Patient:   Jethro Vázquez    Date:  25  :  1960, 65 y.o.   Nephrologist: Edwin Durán MD  Provider: Mono Schaffer MD    Reason for Consult: Stage III acute kidney injury with underlying probable chronic kidney stage G4    Consult requested by : Parminder Elizondo MD     Chief Complaint:   Confusion and abdominal pain    HISTORY OF PRESENT ILLNESS/Brief hospital course  AND  brief background history    Mr. Vázquez , is an unfortunate 65-year young male, who was brought to the emergency department in [place] via emergency medical service apparently with confusion and abdominal pain.  When I saw him this morning, he is alert awake but confused and unable to give me detailed history hence most of the history is taken from epic and other healthcare provider.  I have not had a chance to call the family members.    In any event on arrival in our emergency department, he was afebrile and hemodynamic stable and oxygen saturation reported 99% while breathing on 2 L of supplemental oxygen.  She underwent several diagnostic test including imaging and biochemical as well as electrographic.  Electrocardiogram showed normal sinus rhythm.  Imaging study mainly computed tomography of abdomen and pelvis without administration of intravenous contrast material showed bilateral ureteral stent with hydronephrosis, and thickened bladder wall as well as bilateral hydroureter.  Additionally x-ray of the chest was unremarkable.    Biochemical testing showed low serum bicarbonate of 17, increased bloody urine antigen and creatinine of 60 and 6.1 mg/dL respectively.  Other pertinent abnormal lab include hemoglobin of 9.5, plain urinalysis showed significant albuminuria, too numerous to count WBC and RBC.  Appropriate bodily fluid culture were drawn, he was given 3.8 L of normal saline, cefepime along with pain medication are administered and he was subsequently transferred here to our  Hydroureter is apparent on either   side. No calculi seen in either kidney. There is a 36 mm cyst along the lower   pole of the right kidney.    Past medical history :       Probable chronic kidney disease stage G4 A3  History of bladder cancer apparently underwent neoadjuvant chemo and radiation therapy-in September 2024.  Also had transurethral tumor resection.  History of obstructive nephropathy and has bilateral ureteral stent  History of high blood pressure  History of kidney stone  Presumed gastroesophageal reflux disorder         Past surgical history :    Cholecystectomy  Hernia repair  Cystoscopy and ureteral stent placement  Transurethral resection of bladder cancer  Orthopedic surgery  Appendectomy          Habits :     Apparently former smoker has roughly 30-pack-year history, other details is unknown to me    Social history :   He is from [facility] apparently , other details is unknown     Family medical history  :       Also unknown        REVIEW OF SYSTEMS:     All pertinent ROS neg except   Confusion abdominal pain    Medication :     Reviewed, see in epic    BP (!) 144/68   Pulse 83   Temp 97.7 °F (36.5 °C) (Oral)   Resp 18   Ht 1.778 m (5' 10\")   Wt 91.4 kg (201 lb 8 oz)   SpO2 95%   BMI 28.91 kg/m²     PHYSICAL EXAM:  General appearance: Alert, awake but seems delirious  HEENT: At least 2+ conjunctival pallor no scleral icterus  Neck: Supple  Heart: Seemed regular rate and rhythm  LUNGS: Few rhonchi no gross crackles  Abdomen: Soft, nontender on palpation  Extremities: Bilateral trace leg edema and he does have a Huang catheter    LABS:  Reviewed, see in epic            IMPRESSION:  Stage III acute kidney injury with underlying likely chronic kidney stage G4 A3-potential etiology infection, does not look like new obstruction, with underlying bladder cancer and prior genitourinary infection  Underlying bladder cancer hypertension and several aftermath    PLAN:    Looks like he is making

## 2025-03-24 ENCOUNTER — ANESTHESIA EVENT (OUTPATIENT)
Dept: OPERATING ROOM | Age: 65
DRG: 660 | End: 2025-03-24
Payer: MEDICARE

## 2025-03-24 ENCOUNTER — APPOINTMENT (OUTPATIENT)
Dept: GENERAL RADIOLOGY | Age: 65
DRG: 660 | End: 2025-03-24
Attending: STUDENT IN AN ORGANIZED HEALTH CARE EDUCATION/TRAINING PROGRAM
Payer: MEDICARE

## 2025-03-24 ENCOUNTER — ANESTHESIA (OUTPATIENT)
Dept: OPERATING ROOM | Age: 65
DRG: 660 | End: 2025-03-24
Payer: MEDICARE

## 2025-03-24 ENCOUNTER — RESULTS FOLLOW-UP (OUTPATIENT)
Dept: EMERGENCY DEPT | Age: 65
End: 2025-03-24

## 2025-03-24 LAB
ANION GAP SERPL CALCULATED.3IONS-SCNC: 16 MMOL/L (ref 9–17)
BUN SERPL-MCNC: 42 MG/DL (ref 7–20)
CALCIUM SERPL-MCNC: 8.9 MG/DL (ref 8.3–10.6)
CHLORIDE SERPL-SCNC: 107 MMOL/L (ref 99–110)
CO2 SERPL-SCNC: 20 MMOL/L (ref 21–32)
CREAT SERPL-MCNC: 2.9 MG/DL (ref 0.8–1.3)
ERYTHROCYTE [DISTWIDTH] IN BLOOD BY AUTOMATED COUNT: 13.7 % (ref 11.7–14.9)
GFR, ESTIMATED: 22 ML/MIN/1.73M2
GLUCOSE SERPL-MCNC: 92 MG/DL (ref 74–99)
HCT VFR BLD AUTO: 32.7 % (ref 42–52)
HGB BLD-MCNC: 10.4 G/DL (ref 13.5–18)
MAGNESIUM SERPL-MCNC: 2.3 MG/DL (ref 1.8–2.4)
MCH RBC QN AUTO: 28.4 PG (ref 27–31)
MCHC RBC AUTO-ENTMCNC: 31.8 G/DL (ref 32–36)
MCV RBC AUTO: 89.3 FL (ref 78–100)
PHOSPHATE SERPL-MCNC: 3.2 MG/DL (ref 2.5–4.9)
PLATELET # BLD AUTO: 182 K/UL (ref 140–440)
PMV BLD AUTO: 9.1 FL (ref 7.5–11.1)
POTASSIUM SERPL-SCNC: 4.1 MMOL/L (ref 3.5–5.1)
RBC # BLD AUTO: 3.66 M/UL (ref 4.6–6.2)
SODIUM SERPL-SCNC: 143 MMOL/L (ref 136–145)
WBC OTHER # BLD: 5.4 K/UL (ref 4–10.5)

## 2025-03-24 PROCEDURE — 2500000003 HC RX 250 WO HCPCS

## 2025-03-24 PROCEDURE — 2500000003 HC RX 250 WO HCPCS: Performed by: SPECIALIST

## 2025-03-24 PROCEDURE — 0T788DZ DILATION OF BILATERAL URETERS WITH INTRALUMINAL DEVICE, VIA NATURAL OR ARTIFICIAL OPENING ENDOSCOPIC: ICD-10-PCS | Performed by: SPECIALIST

## 2025-03-24 PROCEDURE — C1758 CATHETER, URETERAL: HCPCS | Performed by: SPECIALIST

## 2025-03-24 PROCEDURE — 84100 ASSAY OF PHOSPHORUS: CPT

## 2025-03-24 PROCEDURE — 7100000000 HC PACU RECOVERY - FIRST 15 MIN: Performed by: SPECIALIST

## 2025-03-24 PROCEDURE — 2500000003 HC RX 250 WO HCPCS: Performed by: STUDENT IN AN ORGANIZED HEALTH CARE EDUCATION/TRAINING PROGRAM

## 2025-03-24 PROCEDURE — 7100000001 HC PACU RECOVERY - ADDTL 15 MIN: Performed by: SPECIALIST

## 2025-03-24 PROCEDURE — 6360000002 HC RX W HCPCS: Performed by: INTERNAL MEDICINE

## 2025-03-24 PROCEDURE — C2617 STENT, NON-COR, TEM W/O DEL: HCPCS | Performed by: SPECIALIST

## 2025-03-24 PROCEDURE — 3600000003 HC SURGERY LEVEL 3 BASE: Performed by: SPECIALIST

## 2025-03-24 PROCEDURE — 6370000000 HC RX 637 (ALT 250 FOR IP): Performed by: STUDENT IN AN ORGANIZED HEALTH CARE EDUCATION/TRAINING PROGRAM

## 2025-03-24 PROCEDURE — 2580000003 HC RX 258

## 2025-03-24 PROCEDURE — 6360000002 HC RX W HCPCS: Performed by: SPECIALIST

## 2025-03-24 PROCEDURE — C1769 GUIDE WIRE: HCPCS | Performed by: SPECIALIST

## 2025-03-24 PROCEDURE — 3700000000 HC ANESTHESIA ATTENDED CARE: Performed by: SPECIALIST

## 2025-03-24 PROCEDURE — 2709999900 HC NON-CHARGEABLE SUPPLY: Performed by: SPECIALIST

## 2025-03-24 PROCEDURE — 85027 COMPLETE CBC AUTOMATED: CPT

## 2025-03-24 PROCEDURE — 83735 ASSAY OF MAGNESIUM: CPT

## 2025-03-24 PROCEDURE — 1200000000 HC SEMI PRIVATE

## 2025-03-24 PROCEDURE — 6360000002 HC RX W HCPCS: Performed by: STUDENT IN AN ORGANIZED HEALTH CARE EDUCATION/TRAINING PROGRAM

## 2025-03-24 PROCEDURE — 3700000001 HC ADD 15 MINUTES (ANESTHESIA): Performed by: SPECIALIST

## 2025-03-24 PROCEDURE — 6370000000 HC RX 637 (ALT 250 FOR IP): Performed by: SPECIALIST

## 2025-03-24 PROCEDURE — 2700000000 HC OXYGEN THERAPY PER DAY

## 2025-03-24 PROCEDURE — 0TP98DZ REMOVAL OF INTRALUMINAL DEVICE FROM URETER, VIA NATURAL OR ARTIFICIAL OPENING ENDOSCOPIC: ICD-10-PCS | Performed by: SPECIALIST

## 2025-03-24 PROCEDURE — 76000 FLUOROSCOPY <1 HR PHYS/QHP: CPT

## 2025-03-24 PROCEDURE — 2580000003 HC RX 258: Performed by: SPECIALIST

## 2025-03-24 PROCEDURE — 80048 BASIC METABOLIC PNL TOTAL CA: CPT

## 2025-03-24 PROCEDURE — 94761 N-INVAS EAR/PLS OXIMETRY MLT: CPT

## 2025-03-24 PROCEDURE — 6360000002 HC RX W HCPCS

## 2025-03-24 PROCEDURE — 3600000013 HC SURGERY LEVEL 3 ADDTL 15MIN: Performed by: SPECIALIST

## 2025-03-24 PROCEDURE — 36415 COLL VENOUS BLD VENIPUNCTURE: CPT

## 2025-03-24 DEVICE — VARIABLE LENGTH URETERAL STENT
Type: IMPLANTABLE DEVICE | Site: URETER | Status: FUNCTIONAL
Brand: CONTOUR VL™

## 2025-03-24 RX ORDER — ACETAMINOPHEN 325 MG/1
650 TABLET ORAL
Status: DISCONTINUED | OUTPATIENT
Start: 2025-03-24 | End: 2025-03-24 | Stop reason: HOSPADM

## 2025-03-24 RX ORDER — ESMOLOL HYDROCHLORIDE 10 MG/ML
INJECTION INTRAVENOUS
Status: DISCONTINUED | OUTPATIENT
Start: 2025-03-24 | End: 2025-03-24 | Stop reason: SDUPTHER

## 2025-03-24 RX ORDER — DEXAMETHASONE SODIUM PHOSPHATE 4 MG/ML
INJECTION, SOLUTION INTRA-ARTICULAR; INTRALESIONAL; INTRAMUSCULAR; INTRAVENOUS; SOFT TISSUE
Status: DISCONTINUED | OUTPATIENT
Start: 2025-03-24 | End: 2025-03-24 | Stop reason: SDUPTHER

## 2025-03-24 RX ORDER — SODIUM CHLORIDE 9 MG/ML
INJECTION, SOLUTION INTRAVENOUS PRN
Status: DISCONTINUED | OUTPATIENT
Start: 2025-03-24 | End: 2025-03-24 | Stop reason: HOSPADM

## 2025-03-24 RX ORDER — ONDANSETRON 2 MG/ML
INJECTION INTRAMUSCULAR; INTRAVENOUS
Status: DISCONTINUED | OUTPATIENT
Start: 2025-03-24 | End: 2025-03-24 | Stop reason: SDUPTHER

## 2025-03-24 RX ORDER — ONDANSETRON 2 MG/ML
4 INJECTION INTRAMUSCULAR; INTRAVENOUS
Status: DISCONTINUED | OUTPATIENT
Start: 2025-03-24 | End: 2025-03-24 | Stop reason: HOSPADM

## 2025-03-24 RX ORDER — HYDRALAZINE HYDROCHLORIDE 20 MG/ML
10 INJECTION INTRAMUSCULAR; INTRAVENOUS
Status: DISCONTINUED | OUTPATIENT
Start: 2025-03-24 | End: 2025-03-24

## 2025-03-24 RX ORDER — SODIUM CHLORIDE 0.9 % (FLUSH) 0.9 %
5-40 SYRINGE (ML) INJECTION PRN
Status: DISCONTINUED | OUTPATIENT
Start: 2025-03-24 | End: 2025-03-24 | Stop reason: HOSPADM

## 2025-03-24 RX ORDER — SODIUM CHLORIDE 0.9 % (FLUSH) 0.9 %
5-40 SYRINGE (ML) INJECTION EVERY 12 HOURS SCHEDULED
Status: DISCONTINUED | OUTPATIENT
Start: 2025-03-24 | End: 2025-03-24 | Stop reason: HOSPADM

## 2025-03-24 RX ORDER — DEXMEDETOMIDINE HYDROCHLORIDE 100 UG/ML
INJECTION, SOLUTION INTRAVENOUS
Status: DISCONTINUED | OUTPATIENT
Start: 2025-03-24 | End: 2025-03-24 | Stop reason: SDUPTHER

## 2025-03-24 RX ORDER — NALOXONE HYDROCHLORIDE 0.4 MG/ML
INJECTION, SOLUTION INTRAMUSCULAR; INTRAVENOUS; SUBCUTANEOUS PRN
Status: DISCONTINUED | OUTPATIENT
Start: 2025-03-24 | End: 2025-03-24 | Stop reason: HOSPADM

## 2025-03-24 RX ORDER — FENTANYL CITRATE 50 UG/ML
25 INJECTION, SOLUTION INTRAMUSCULAR; INTRAVENOUS EVERY 5 MIN PRN
Status: DISCONTINUED | OUTPATIENT
Start: 2025-03-24 | End: 2025-03-24 | Stop reason: HOSPADM

## 2025-03-24 RX ORDER — HYDROMORPHONE HYDROCHLORIDE 1 MG/ML
0.5 INJECTION, SOLUTION INTRAMUSCULAR; INTRAVENOUS; SUBCUTANEOUS EVERY 4 HOURS PRN
Status: COMPLETED | OUTPATIENT
Start: 2025-03-24 | End: 2025-03-25

## 2025-03-24 RX ORDER — ROCURONIUM BROMIDE 10 MG/ML
INJECTION, SOLUTION INTRAVENOUS
Status: DISCONTINUED | OUTPATIENT
Start: 2025-03-24 | End: 2025-03-24 | Stop reason: SDUPTHER

## 2025-03-24 RX ORDER — PROPOFOL 10 MG/ML
INJECTION, EMULSION INTRAVENOUS
Status: DISCONTINUED | OUTPATIENT
Start: 2025-03-24 | End: 2025-03-24 | Stop reason: SDUPTHER

## 2025-03-24 RX ORDER — PROCHLORPERAZINE EDISYLATE 5 MG/ML
5 INJECTION INTRAMUSCULAR; INTRAVENOUS
Status: DISCONTINUED | OUTPATIENT
Start: 2025-03-24 | End: 2025-03-24 | Stop reason: HOSPADM

## 2025-03-24 RX ORDER — LIDOCAINE HYDROCHLORIDE 20 MG/ML
INJECTION, SOLUTION EPIDURAL; INFILTRATION; INTRACAUDAL; PERINEURAL
Status: DISCONTINUED | OUTPATIENT
Start: 2025-03-24 | End: 2025-03-24 | Stop reason: SDUPTHER

## 2025-03-24 RX ORDER — SODIUM CHLORIDE, SODIUM LACTATE, POTASSIUM CHLORIDE, CALCIUM CHLORIDE 600; 310; 30; 20 MG/100ML; MG/100ML; MG/100ML; MG/100ML
INJECTION, SOLUTION INTRAVENOUS
Status: DISCONTINUED | OUTPATIENT
Start: 2025-03-24 | End: 2025-03-24 | Stop reason: SDUPTHER

## 2025-03-24 RX ORDER — FENTANYL CITRATE 50 UG/ML
INJECTION, SOLUTION INTRAMUSCULAR; INTRAVENOUS
Status: DISCONTINUED | OUTPATIENT
Start: 2025-03-24 | End: 2025-03-24 | Stop reason: SDUPTHER

## 2025-03-24 RX ADMIN — GABAPENTIN 300 MG: 300 CAPSULE ORAL at 21:32

## 2025-03-24 RX ADMIN — OXYCODONE HYDROCHLORIDE AND ACETAMINOPHEN 1 TABLET: 5; 325 TABLET ORAL at 00:05

## 2025-03-24 RX ADMIN — SENNOSIDES AND DOCUSATE SODIUM 2 TABLET: 50; 8.6 TABLET ORAL at 09:44

## 2025-03-24 RX ADMIN — CEFEPIME 1000 MG: 1 INJECTION, POWDER, FOR SOLUTION INTRAMUSCULAR; INTRAVENOUS at 17:00

## 2025-03-24 RX ADMIN — TAMSULOSIN HYDROCHLORIDE 0.4 MG: 0.4 CAPSULE ORAL at 09:44

## 2025-03-24 RX ADMIN — PROPOFOL 100 MG: 10 INJECTION, EMULSION INTRAVENOUS at 15:43

## 2025-03-24 RX ADMIN — ROCURONIUM BROMIDE 50 MG: 10 INJECTION, SOLUTION INTRAVENOUS at 15:13

## 2025-03-24 RX ADMIN — FENTANYL CITRATE 50 MCG: 50 INJECTION, SOLUTION INTRAMUSCULAR; INTRAVENOUS at 15:18

## 2025-03-24 RX ADMIN — ONDANSETRON 4 MG: 2 INJECTION INTRAMUSCULAR; INTRAVENOUS at 15:20

## 2025-03-24 RX ADMIN — HYDROMORPHONE HYDROCHLORIDE 0.5 MG: 1 INJECTION, SOLUTION INTRAMUSCULAR; INTRAVENOUS; SUBCUTANEOUS at 21:39

## 2025-03-24 RX ADMIN — AMLODIPINE BESYLATE 10 MG: 10 TABLET ORAL at 09:44

## 2025-03-24 RX ADMIN — SODIUM CHLORIDE, PRESERVATIVE FREE 10 ML: 5 INJECTION INTRAVENOUS at 09:45

## 2025-03-24 RX ADMIN — ONDANSETRON 4 MG: 2 INJECTION INTRAMUSCULAR; INTRAVENOUS at 05:14

## 2025-03-24 RX ADMIN — FENTANYL CITRATE 50 MCG: 50 INJECTION, SOLUTION INTRAMUSCULAR; INTRAVENOUS at 15:08

## 2025-03-24 RX ADMIN — LIDOCAINE HYDROCHLORIDE 100 MG: 20 INJECTION, SOLUTION EPIDURAL; INFILTRATION; INTRACAUDAL; PERINEURAL at 15:13

## 2025-03-24 RX ADMIN — HYDROMORPHONE HYDROCHLORIDE 0.5 MG: 1 INJECTION, SOLUTION INTRAMUSCULAR; INTRAVENOUS; SUBCUTANEOUS at 15:33

## 2025-03-24 RX ADMIN — DEXAMETHASONE SODIUM PHOSPHATE 8 MG: 4 INJECTION, SOLUTION INTRAMUSCULAR; INTRAVENOUS at 15:20

## 2025-03-24 RX ADMIN — MELATONIN TAB 3 MG 3 MG: 3 TAB at 21:32

## 2025-03-24 RX ADMIN — ACETAMINOPHEN 650 MG: 325 TABLET ORAL at 03:46

## 2025-03-24 RX ADMIN — OXYCODONE HYDROCHLORIDE AND ACETAMINOPHEN 1 TABLET: 5; 325 TABLET ORAL at 16:56

## 2025-03-24 RX ADMIN — DEXMEDETOMIDINE 10 MCG: 100 INJECTION, SOLUTION INTRAVENOUS at 15:34

## 2025-03-24 RX ADMIN — TAMSULOSIN HYDROCHLORIDE 0.4 MG: 0.4 CAPSULE ORAL at 21:32

## 2025-03-24 RX ADMIN — SODIUM CHLORIDE, POTASSIUM CHLORIDE, SODIUM LACTATE AND CALCIUM CHLORIDE: 600; 310; 30; 20 INJECTION, SOLUTION INTRAVENOUS at 15:07

## 2025-03-24 RX ADMIN — SENNOSIDES AND DOCUSATE SODIUM 2 TABLET: 50; 8.6 TABLET ORAL at 21:32

## 2025-03-24 RX ADMIN — CEFAZOLIN 2000 MG: 2 INJECTION, POWDER, FOR SOLUTION INTRAMUSCULAR; INTRAVENOUS at 15:21

## 2025-03-24 RX ADMIN — PROPOFOL 200 MG: 10 INJECTION, EMULSION INTRAVENOUS at 15:13

## 2025-03-24 RX ADMIN — SUGAMMADEX 200 MG: 100 INJECTION, SOLUTION INTRAVENOUS at 15:41

## 2025-03-24 RX ADMIN — ESMOLOL HYDROCHLORIDE 20 MG: 10 INJECTION, SOLUTION INTRAVENOUS at 15:30

## 2025-03-24 RX ADMIN — SODIUM CHLORIDE, PRESERVATIVE FREE 10 ML: 5 INJECTION INTRAVENOUS at 21:33

## 2025-03-24 RX ADMIN — DEXMEDETOMIDINE 10 MCG: 100 INJECTION, SOLUTION INTRAVENOUS at 15:36

## 2025-03-24 ASSESSMENT — PAIN SCALES - WONG BAKER
WONGBAKER_NUMERICALRESPONSE: HURTS EVEN MORE
WONGBAKER_NUMERICALRESPONSE: HURTS EVEN MORE

## 2025-03-24 ASSESSMENT — PAIN - FUNCTIONAL ASSESSMENT
PAIN_FUNCTIONAL_ASSESSMENT: 0-10
PAIN_FUNCTIONAL_ASSESSMENT: ACTIVITIES ARE NOT PREVENTED

## 2025-03-24 ASSESSMENT — PAIN DESCRIPTION - DESCRIPTORS
DESCRIPTORS: ACHING;SORE
DESCRIPTORS: SORE;ACHING
DESCRIPTORS: ACHING;DISCOMFORT
DESCRIPTORS: BURNING

## 2025-03-24 ASSESSMENT — PAIN DESCRIPTION - LOCATION
LOCATION: ABDOMEN
LOCATION: PENIS
LOCATION: PENIS
LOCATION: ABDOMEN

## 2025-03-24 ASSESSMENT — PAIN DESCRIPTION - PAIN TYPE: TYPE: ACUTE PAIN

## 2025-03-24 ASSESSMENT — LIFESTYLE VARIABLES: SMOKING_STATUS: 0

## 2025-03-24 ASSESSMENT — PAIN DESCRIPTION - ORIENTATION
ORIENTATION: LOWER;RIGHT;LEFT
ORIENTATION: RIGHT;LEFT
ORIENTATION: MID
ORIENTATION: MID

## 2025-03-24 ASSESSMENT — PAIN SCALES - GENERAL
PAINLEVEL_OUTOF10: 6
PAINLEVEL_OUTOF10: 0
PAINLEVEL_OUTOF10: 7
PAINLEVEL_OUTOF10: 9

## 2025-03-24 NOTE — ANESTHESIA POSTPROCEDURE EVALUATION
Department of Anesthesiology  Postprocedure Note    Patient: Jethro Vázquez  MRN: [id number]  YOB: 1960  Date of evaluation: 3/24/2025    Procedure Summary       Date: 03/24/25 Room / Location: [address]    Anesthesia Start: 1507 Anesthesia Stop: 1601    Procedure: CYSTOSCOPY URETERAL STENT EXCHANGE (Bilateral) Diagnosis:       Kidney stone      (Kidney stone [N20.0])    Surgeons: Sanaz Ivan MD Responsible Provider: Tommy Booth MD    Anesthesia Type: General ASA Status: 3            Anesthesia Type: General    Arthur Phase I:      Arthur Phase II:      Anesthesia Post Evaluation    Patient location during evaluation: PACU  Patient participation: complete - patient participated  Level of consciousness: sleepy but conscious  Airway patency: patent  Nausea & Vomiting: no nausea and no vomiting  Cardiovascular status: hemodynamically stable  Respiratory status: acceptable, nasal cannula and spontaneous ventilation  Hydration status: stable  Pain management: adequate    No notable events documented.

## 2025-03-24 NOTE — PROGRESS NOTES
Nephrology Progress Note  3/24/2025 4:00 PM        Subjective:   Admit Date: 3/22/2025  PCP: Mono Schaffer MD    Interval History: Patient seen in early morning, this is a late entry  He was much more alert awake and oriented but he was very hungry and thirsty wanted to eat  We kept him nothing by mouth for stent exchange    Diet: Nothing by mouth    ROS: No confusion or shortness of breath, no fever and acceptable blood pressure.  Urine output recorded 4.25 L for the last 24 hours.    Data:     Current meds:    sodium chloride flush  5-40 mL IntraVENous 2 times per day    amLODIPine  10 mg Oral Daily    gabapentin  300 mg Oral Nightly    sennosides-docusate sodium  2 tablet Oral BID    tamsulosin  0.4 mg Oral BID    cefepime  1,000 mg IntraVENous Q24H    sodium chloride flush  5-40 mL IntraVENous 2 times per day    [Held by provider] heparin (porcine)  5,000 Units SubCUTAneous 3 times per day      sodium chloride      sodium chloride           I/O last 3 completed shifts:  In: 1177.8 [P.O.:120; I.V.:1024.1; IV Piggyback:33.7]  Out: 5250 [Urine:5250]    CBC:   Recent Labs     03/22/25  1330 03/23/25  0121 03/24/25  1225   WBC 10.2 6.6 5.4   HGB 9.5* 8.5* 10.4*    139* 182          Recent Labs     03/22/25  1330 03/23/25  0121 03/24/25  1225    138 143   K 4.8 4.4 4.1   CL 96* 106 107   CO2 17* 21 20*   BUN 60* 62* 42*   CREATININE 6.1* 5.7* 2.9*   GLUCOSE 177* 97 92       Lab Results   Component Value Date    CALCIUM 8.9 03/24/2025    PHOS 3.2 03/24/2025       Objective:     Vitals: BP (!) 163/85   Pulse (!) 105   Temp 97.4 °F (36.3 °C) (Temporal)   Resp 18   Ht 1.778 m (5' 10\")   Wt 91.4 kg (201 lb 8 oz)   SpO2 98%   BMI 28.91 kg/m² ,    General appearance: He was alert, awake and oriented  HEENT: No gross conjunctival pallor or scleral icterus yet  Neck: Supple with supplemental oxygen  Lungs: Coarse breath sound few expiratory rhonchi-he does have mid reported chest wall  Heart:  Tachycardia during my examination  Abdomen: Soft, nontender on deep palpation  Extremities: No gross edema yet      Problem List :         Impression :     Stage III acute kidney injury with underlying likely chronic kidney stage G4 A3-he might have transient obstruction and has postobstructive polyuria-he also may have infected stent-creatinine is downgoing  Underlying bladder cancer and bilateral ureteral stent as well as history of high blood pressure    Recommendation/Plan  :     He will undergo stent exchange today, hopefully that will help get rid of some infection and we will see what it looks like.  He is on broad-spectrum antimicrobial agent.  Follow clinically and biochemically and watch for kidney recovery      Edwin Durán MD MD

## 2025-03-24 NOTE — PROGRESS NOTES
[address] [address]   Progress Note  [facility] [address]      Date: 3/24/2025   Patient: Jethro Vázquez   : 1960   DOA: 3/22/2025   MRN: [id number]   ROOM#: 1104/1104-A     Admit Date: 3/22/2025     Collaborating Urologist on Call at time of admission: Dr. Ivan  CC: \"Feel weak\"   Reason for Consult: H/o advanced bladder cancer     Subjective:     Pain: mild, no nausea and no vomiting,   Bowel Movement/Flatus: Yes  Voiding: easily,     Pt resting in bed, reports feeling OK today. Discussed with him recommendation for cystoscopy, bilateral ureteral stent exchange today with associated risks/benefits and he is agreeable to proceed.    Objective:    Vitals:   BP (!) 158/82   Pulse 98   Temp 98.5 °F (36.9 °C) (Oral)   Resp 19   Ht 1.778 m (5' 10\")   Wt 91.4 kg (201 lb 8 oz)   SpO2 95%   BMI 28.91 kg/m²   Temp  Av.4 °F (36.9 °C)  Min: 98.1 °F (36.7 °C)  Max: 98.9 °F (37.2 °C)    Intake/Output Summary (Last 24 hours) at 3/24/2025 0913  Last data filed at 3/24/2025 0515  Gross per 24 hour   Intake 1177.8 ml   Output 4250 ml   Net -3072.2 ml       Physical Exam:   Gen: Pleasant male, in NAD  Neuro: Non-focal  Resp: Unlabored breathing  Abd: Soft, non-distended, non-tender to palpation, no rebound  Back:   No CVAT  Ext: No edema of bilateral LEs    Labs:  WBC:    Lab Results   Component Value Date/Time    WBC 6.6 2025 01:21 AM     Hemoglobin/Hematocrit:    Lab Results   Component Value Date/Time    HGB 8.5 2025 01:21 AM    HCT 27.5 2025 01:21 AM     BMP:   Lab Results   Component Value Date/Time     2025 01:21 AM    K 4.4 2025 01:21 AM     2025 01:21 AM    CO2 21 2025 01:21 AM    BUN 62 2025 01:21 AM    CREATININE 5.7 2025 01:21 AM    CALCIUM 7.7 2025 01:21 AM    GFRAA >60 2018 07:38 AM    LABGLOM 10 2025 01:21 AM    LABGLOM 68 2024 10:09 AM     Blood Culture:  NGTD  Urine Culture:  No

## 2025-03-24 NOTE — PROGRESS NOTES
1557 Pt arrived from OR to PACU. Monitors applied and alarms in place. Report rec'd from Yoana PERALTA and Joaquin CAMARA. Pt alert responds appropriately. Resp even and unlabored  1605 Pt repositioned in bed  1610 Pt tolerating ice chips appropriately   1615 Pt denies pain or nausea. Pt repositioned in bed  1630 Report called to Sharri BOWIE  1636 Transport at bedside

## 2025-03-24 NOTE — ANESTHESIA PRE PROCEDURE
Department of Anesthesiology  Preprocedure Note       Name:  Jethro Vázquez   Age:  65 y.o.  :  1960                                          MRN:  [id number]         Date:  3/24/2025      Surgeon: Surgeon(s):  Sanaz Ivan MD    Procedure: Procedure(s):  CYSTOSCOPY URETERAL STENT EXCHANGE    Medications prior to admission:   Prior to Admission medications    Medication Sig Start Date End Date Taking? Authorizing Provider   oxyCODONE-acetaminophen (PERCOCET) 5-325 MG per tablet Take 1 tablet by mouth 2 times daily as needed for Pain. 10/9/24   Aram Wiley MD   sennosides-docusate sodium (SENOKOT-S) 8.6-50 MG tablet Take 2 tablets by mouth 2 times daily as needed for Constipation 10/29/24   Haroon Whitehead MD   ondansetron (ZOFRAN-ODT) 8 MG TBDP disintegrating tablet Take 1 tablet by mouth every 8 hours as needed for Nausea or Vomiting 10/29/24   Haroon Whitehead MD   oxyBUTYnin (DITROPAN) 5 MG tablet Take 1 tablet by mouth 3 times daily 10/7/24   Carmelo Davis MD   tamsulosin (FLOMAX) 0.4 MG capsule Take 1 capsule by mouth 2 times daily 24   Cassie Lr MD   gabapentin (NEURONTIN) 300 MG capsule Take 1 capsule by mouth 3 times daily for 90 days. 24  Christie Sal APRN - CNP   Multiple Vitamins-Minerals (THERAPEUTIC MULTIVITAMIN-MINERALS) tablet Take 1 tablet by mouth daily    Aram Wiley MD   naloxone 4 MG/0.1ML LIQD nasal spray  3/29/24   Aram Wiley MD   amLODIPine (NORVASC) 10 MG tablet Take 1 tablet by mouth daily 24   Vladislav Brewster MD       Current medications:    Current Facility-Administered Medications   Medication Dose Route Frequency Provider Last Rate Last Admin    prochlorperazine (COMPAZINE) injection 10 mg  10 mg IntraVENous Q6H PRN Marcelina Morris APRN - CNP   10 mg at 25 2140    amLODIPine (NORVASC) tablet 10 mg  10 mg Oral Daily Parminder Cedeño MD   10 mg at 25 0981    gabapentin

## 2025-03-24 NOTE — CARE COORDINATION
Patient is active with [facility]. Fatuma Monreal with [facility] aware patient is here. Hospice services to resume at discharge.

## 2025-03-24 NOTE — OP NOTE
Operative Note      Patient: Jethro Vázquez  YOB: 1960  MRN: [id number]    Date of Procedure: 3/24/2025    Pre-Op Diagnosis Codes:      * Kidney stone [N20.0]    Post-Op Diagnosis: Same       Procedure(s):  CYSTOSCOPY URETERAL STENT EXCHANGE    Surgeon(s):  Sanaz Ivan MD    Assistant:   * No surgical staff found *    Anesthesia: General    Estimated Blood Loss (mL): Minimal    Complications: None    Specimens:   * No specimens in log *    Implants:  Implant Name Type Inv. Item Serial No.  Lot No. LRB No. Used Action   STENT URET 4.8FR H12-72RP PERCFLX HYDR+ SFT PGTL TAPR TIP - [id number]  STENT URET 4.8FR G30-31KM PERCFLX HYDR+ SFT PGTL TAPR TIP  [facility] [id number] Left 1 Implanted   STENT URET 4.8FR X88-10TR PERCFLX HYDR+ SFT PGTL TAPR TIP - [id number]  STENT URET 4.8FR J36-04TA PERCFLX HYDR+ SFT PGTL TAPR TIP  [facility] [id number] Right 1 Implanted         Drains:   Urinary Catheter 03/24/25 Huang (Active)       [REMOVED] Urinary Catheter 03/22/25 Huang (Removed)   Catheter Indications Urinary retention (acute or chronic), continuous bladder irrigation or bladder outlet obstruction 03/24/25 1400   Site Assessment [place] 03/24/25 1400   Urine Color Suri 03/24/25 1400   Urine Appearance Cloudy 03/24/25 1400   Urine Odor Malodorous 03/24/25 1400   Collection Container Standard 03/24/25 1400   Securement Method Leg strap 03/24/25 1400   Catheter Care  Chlorhexidine Wipes 03/24/25 0202   Catheter Best Practices  Drainage tube clipped to bed 03/24/25 1400   Status Draining 03/24/25 1400   Output (mL) 825 mL 03/24/25 0950       Findings:  Infection Present At Time Of Surgery (PATOS) (choose all levels that have infection present):  No infection present  Other Findings: bilateral hydro; metastatic bladder cancer    Detailed Description of Procedure:   This is a 61-year-old male who initially diagnosed with metastatic bladder cancer underwent chemotherapy then

## 2025-03-24 NOTE — BRIEF OP NOTE
Brief Postoperative Note      Patient: Jethro Vázquez  YOB: 1960  MRN: [id number]    Date of Procedure: 3/24/2025    Pre-Op Diagnosis Codes:      * Kidney stone [N20.0]    Post-Op Diagnosis: Same       Procedure(s):  CYSTOSCOPY URETERAL STENT EXCHANGE    Surgeon(s):  Sanaz Ivan MD    Assistant:  * No surgical staff found *    Anesthesia: General    Estimated Blood Loss (mL): Minimal    Complications: None    Specimens:   * No specimens in log *    Implants:  Implant Name Type Inv. Item Serial No.  Lot No. LRB No. Used Action   STENT URET 4.8FR H79-90KP PERCFLX HYDR+ SFT PGTL TAPR TIP - [id number]  STENT URET 4.8FR B52-32DU PERCFLX HYDR+ SFT PGTL TAPR TIP  [facility] [id number] Left 1 Implanted   STENT URET 4.8FR I37-01WH PERCFLX HYDR+ SFT PGTL TAPR TIP - [id number]  STENT URET 4.8FR N49-92LD PERCFLX HYDR+ SFT PGTL TAPR TIP  [facility] [id number] Right 1 Implanted         Drains:   Urinary Catheter 03/24/25 Huang (Active)       [REMOVED] Urinary Catheter 03/22/25 Huang (Removed)   Catheter Indications Urinary retention (acute or chronic), continuous bladder irrigation or bladder outlet obstruction 03/24/25 1400   Site Assessment [place] 03/24/25 1400   Urine Color Suri 03/24/25 1400   Urine Appearance Cloudy 03/24/25 1400   Urine Odor Malodorous 03/24/25 1400   Collection Container Standard 03/24/25 1400   Securement Method Leg strap 03/24/25 1400   Catheter Care  Chlorhexidine Wipes 03/24/25 0202   Catheter Best Practices  Drainage tube clipped to bed 03/24/25 1400   Status Draining 03/24/25 1400   Output (mL) 825 mL 03/24/25 0950       Findings:  Infection Present At Time Of Surgery (PATOS) (choose all levels that have infection present):  No infection present  Other Findings: bialteral hydro; metastatic bladder cancer    Electronically signed by Sanaz Ivan MD on 3/24/2025 at 3:42 PM

## 2025-03-24 NOTE — PROGRESS NOTES
radiation dose optimization techniques (automated exposure control, and use of iterative reconstruction techniques, or adjustment of the mA and/or kV according to patient size) were used to limit patient radiation dose. Technique:  Axial and coronal images were performed. Comparison: None. Findings:  CT ABDOMEN: Liver appears unremarkable. The spleen, adrenals, pancreas   are unremarkable. Patient is status post cholecystectomy. Common bile duct is not particularly distended. There is moderate fullness of both renal collecting systems bilateral ureteric stents are seen. The upper end of the stent is in the intrarenal collecting system on either side in a satisfactory location, the lower end is placed inside  the urinary bladder in satisfactory location. Hydroureter is apparent on either  side. No calculi seen in either kidney. There is a 36 mm cyst along the lower pole of the right kidney. Abdominal aorta is of normal caliber.  Atherosclerotic vascular calcifications are noted. No significant dilated loops of bowel are seen. No significant para-aortic nor mesenteric adenopathy are seen. CT PELVIS: CT images of the pelvis demonstrate No free pelvic fluid.  Prostate is not particularly enlarged. Seminal vesicles are bilaterally symmetric. Urinary bladder is somewhat thick walled Likely due to incompletely distended. Bone windows demonstrate Moderate discogenic disease and facet arthropathy in the lower lumbar region.  No destructive bony changes are seen. There is a right paramidline staple line apparent. Incidental 33 mm lipoma of the anterior aspect of the right gluteus medius. Lung bases demonstrate No significant abnormalities.  IMPRESSION:  Bilateral ureteral stents that were subsequently placed. Bilateral hydronephrosis including hydroureters. Urinary bladder is somewhat thick walled likely due to incomplete distention. If there is persistent clinical concern, appropriate additional investigations maybe  performed. This dictation was created with voice recognition software. Although every effort is made to review the dictation as it is transcribed, on occasion spoken words, phrases, names, numbers, punctations etc. can be misinterpreted by the technology leading to omissions or inappropriate outcomes.  Dictated and Electronically Signed By: Justin Chamorro MD 3/22/2025 15:58        XR CHEST PORTABLE  Result Date: 3/22/2025  EXAMINATION: XR CHEST PORTABLE DATE OF EXAM:  3/22/2025 13:56 DEMOGRAPHICS: 65 years old Male INDICATION: Sepsis COMPARISON: No existing relevant imaging study corresponding to the same anatomical region is available. TECHNIQUE: Single AP portable chest radiograph was obtained. FINDINGS: Right chest wall Qayfa-S-Mdxt with the tip projecting over the right atrium. Cardiomediastinal silhouette is within normal limits. The lungs are clear. No pleural effusion or pneumothorax. No acute osseous abnormality. IMPRESSION: 1.  No acute cardiopulmonary process.  Dictated and Electronically Signed By: Donovan Manzanares MD 3/22/2025 14:35            No results found for this or any previous visit (from the past 24 hours).      Results       Procedure Component Value Units Date/Time    Blood Culture 2 [[id number]] Collected: 03/22/25 1458    Order Status: Completed Specimen: Blood Updated: 03/23/25 2034     Specimen Description .BLOOD     Special Requests          Culture NO GROWTH 1 DAY    Blood Culture 1 [[id number]] Collected: 03/22/25 1330    Order Status: Completed Specimen: Blood Updated: 03/23/25 2034     Specimen Description .BLOOD     Special Requests          Culture NO GROWTH 1 DAY    Culture, Urine [[id number]] Collected: 03/22/25 1315    Order Status: Completed Specimen: Urine, clean catch Updated: 03/23/25 1511     Specimen Description .CLEAN CATCH URINE     Special Requests Site: Urine     Culture No growth at 18 to 36 hours                Electronically signed by Josh Montero MD on

## 2025-03-25 VITALS
SYSTOLIC BLOOD PRESSURE: 154 MMHG | OXYGEN SATURATION: 96 % | TEMPERATURE: 99.2 F | HEART RATE: 108 BPM | HEIGHT: 70 IN | DIASTOLIC BLOOD PRESSURE: 90 MMHG | RESPIRATION RATE: 18 BRPM | WEIGHT: 201.5 LBS | BODY MASS INDEX: 28.85 KG/M2

## 2025-03-25 LAB
ANION GAP SERPL CALCULATED.3IONS-SCNC: 12 MMOL/L (ref 9–17)
BUN SERPL-MCNC: 39 MG/DL (ref 7–20)
CALCIUM SERPL-MCNC: 9 MG/DL (ref 8.3–10.6)
CHLORIDE SERPL-SCNC: 105 MMOL/L (ref 99–110)
CO2 SERPL-SCNC: 22 MMOL/L (ref 21–32)
CREAT SERPL-MCNC: 2.4 MG/DL (ref 0.8–1.3)
ERYTHROCYTE [DISTWIDTH] IN BLOOD BY AUTOMATED COUNT: 13.7 % (ref 11.7–14.9)
GFR, ESTIMATED: 27 ML/MIN/1.73M2
GLUCOSE SERPL-MCNC: 135 MG/DL (ref 74–99)
HCT VFR BLD AUTO: 31.4 % (ref 42–52)
HGB BLD-MCNC: 10.3 G/DL (ref 13.5–18)
MAGNESIUM SERPL-MCNC: 2 MG/DL (ref 1.8–2.4)
MCH RBC QN AUTO: 29 PG (ref 27–31)
MCHC RBC AUTO-ENTMCNC: 32.8 G/DL (ref 32–36)
MCV RBC AUTO: 88.5 FL (ref 78–100)
PHOSPHATE SERPL-MCNC: 2.7 MG/DL (ref 2.5–4.9)
PLATELET # BLD AUTO: 198 K/UL (ref 140–440)
PMV BLD AUTO: 9 FL (ref 7.5–11.1)
POTASSIUM SERPL-SCNC: 3.9 MMOL/L (ref 3.5–5.1)
RBC # BLD AUTO: 3.55 M/UL (ref 4.6–6.2)
SODIUM SERPL-SCNC: 139 MMOL/L (ref 136–145)
WBC OTHER # BLD: 4.6 K/UL (ref 4–10.5)

## 2025-03-25 PROCEDURE — 83735 ASSAY OF MAGNESIUM: CPT

## 2025-03-25 PROCEDURE — 2580000003 HC RX 258: Performed by: SPECIALIST

## 2025-03-25 PROCEDURE — 6370000000 HC RX 637 (ALT 250 FOR IP): Performed by: SPECIALIST

## 2025-03-25 PROCEDURE — 6360000002 HC RX W HCPCS: Performed by: SPECIALIST

## 2025-03-25 PROCEDURE — 6360000002 HC RX W HCPCS: Performed by: INTERNAL MEDICINE

## 2025-03-25 PROCEDURE — 80048 BASIC METABOLIC PNL TOTAL CA: CPT

## 2025-03-25 PROCEDURE — 94761 N-INVAS EAR/PLS OXIMETRY MLT: CPT

## 2025-03-25 PROCEDURE — 2500000003 HC RX 250 WO HCPCS: Performed by: SPECIALIST

## 2025-03-25 PROCEDURE — 6370000000 HC RX 637 (ALT 250 FOR IP): Performed by: INTERNAL MEDICINE

## 2025-03-25 PROCEDURE — 6360000002 HC RX W HCPCS: Performed by: NURSE PRACTITIONER

## 2025-03-25 PROCEDURE — 84100 ASSAY OF PHOSPHORUS: CPT

## 2025-03-25 PROCEDURE — 85027 COMPLETE CBC AUTOMATED: CPT

## 2025-03-25 PROCEDURE — 36415 COLL VENOUS BLD VENIPUNCTURE: CPT

## 2025-03-25 RX ORDER — OXYBUTYNIN CHLORIDE 5 MG/1
5 TABLET ORAL 3 TIMES DAILY
Qty: 90 TABLET | Refills: 0 | Status: SHIPPED | OUTPATIENT
Start: 2025-03-25

## 2025-03-25 RX ORDER — SCOPOLAMINE 1 MG/3D
1 PATCH, EXTENDED RELEASE TRANSDERMAL
Qty: 2 PATCH | Refills: 0 | Status: SHIPPED | OUTPATIENT
Start: 2025-03-25 | End: 2025-03-31

## 2025-03-25 RX ORDER — OXYBUTYNIN CHLORIDE 5 MG/1
5 TABLET ORAL 3 TIMES DAILY
Status: DISCONTINUED | OUTPATIENT
Start: 2025-03-25 | End: 2025-03-25 | Stop reason: HOSPADM

## 2025-03-25 RX ORDER — GABAPENTIN 300 MG/1
300 CAPSULE ORAL NIGHTLY
Qty: 30 CAPSULE | Refills: 0 | Status: SHIPPED | OUTPATIENT
Start: 2025-03-25 | End: 2025-04-24

## 2025-03-25 RX ORDER — SCOPOLAMINE 1 MG/3D
1 PATCH, EXTENDED RELEASE TRANSDERMAL
Status: DISCONTINUED | OUTPATIENT
Start: 2025-03-25 | End: 2025-03-25 | Stop reason: HOSPADM

## 2025-03-25 RX ORDER — LABETALOL HYDROCHLORIDE 5 MG/ML
10 INJECTION, SOLUTION INTRAVENOUS
Status: DISCONTINUED | OUTPATIENT
Start: 2025-03-25 | End: 2025-03-25

## 2025-03-25 RX ORDER — HYDRALAZINE HYDROCHLORIDE 20 MG/ML
10 INJECTION INTRAMUSCULAR; INTRAVENOUS
Status: DISCONTINUED | OUTPATIENT
Start: 2025-03-25 | End: 2025-03-25

## 2025-03-25 RX ORDER — ONDANSETRON 4 MG/1
4 TABLET, ORALLY DISINTEGRATING ORAL EVERY 8 HOURS
Status: DISCONTINUED | OUTPATIENT
Start: 2025-03-25 | End: 2025-03-25 | Stop reason: HOSPADM

## 2025-03-25 RX ORDER — HYDROCODONE BITARTRATE AND ACETAMINOPHEN 5; 325 MG/1; MG/1
1 TABLET ORAL ONCE
Refills: 0 | Status: COMPLETED | OUTPATIENT
Start: 2025-03-25 | End: 2025-03-25

## 2025-03-25 RX ORDER — ONDANSETRON 4 MG/1
4 TABLET, ORALLY DISINTEGRATING ORAL EVERY 8 HOURS
Qty: 8 TABLET | Refills: 0 | Status: SHIPPED | OUTPATIENT
Start: 2025-03-25 | End: 2025-03-28

## 2025-03-25 RX ADMIN — SENNOSIDES AND DOCUSATE SODIUM 2 TABLET: 50; 8.6 TABLET ORAL at 08:27

## 2025-03-25 RX ADMIN — PROCHLORPERAZINE EDISYLATE 10 MG: 5 INJECTION INTRAMUSCULAR; INTRAVENOUS at 08:25

## 2025-03-25 RX ADMIN — OXYCODONE HYDROCHLORIDE AND ACETAMINOPHEN 1 TABLET: 5; 325 TABLET ORAL at 04:20

## 2025-03-25 RX ADMIN — HYDROCODONE BITARTRATE AND ACETAMINOPHEN 1 TABLET: 5; 325 TABLET ORAL at 13:24

## 2025-03-25 RX ADMIN — AMLODIPINE BESYLATE 10 MG: 10 TABLET ORAL at 08:27

## 2025-03-25 RX ADMIN — OXYBUTYNIN CHLORIDE 5 MG: 5 TABLET ORAL at 08:39

## 2025-03-25 RX ADMIN — TAMSULOSIN HYDROCHLORIDE 0.4 MG: 0.4 CAPSULE ORAL at 08:28

## 2025-03-25 RX ADMIN — SODIUM CHLORIDE, PRESERVATIVE FREE 10 ML: 5 INJECTION INTRAVENOUS at 08:26

## 2025-03-25 RX ADMIN — HYDROMORPHONE HYDROCHLORIDE 0.5 MG: 1 INJECTION, SOLUTION INTRAMUSCULAR; INTRAVENOUS; SUBCUTANEOUS at 08:23

## 2025-03-25 RX ADMIN — HYDRALAZINE HYDROCHLORIDE 10 MG: 20 INJECTION INTRAMUSCULAR; INTRAVENOUS at 04:10

## 2025-03-25 RX ADMIN — CEFEPIME 1000 MG: 1 INJECTION, POWDER, FOR SOLUTION INTRAMUSCULAR; INTRAVENOUS at 13:31

## 2025-03-25 RX ADMIN — OXYBUTYNIN CHLORIDE 5 MG: 5 TABLET ORAL at 13:24

## 2025-03-25 ASSESSMENT — PAIN DESCRIPTION - ORIENTATION
ORIENTATION: LOWER;RIGHT
ORIENTATION: LOWER
ORIENTATION: LOWER;RIGHT

## 2025-03-25 ASSESSMENT — PAIN DESCRIPTION - FREQUENCY: FREQUENCY: INTERMITTENT

## 2025-03-25 ASSESSMENT — PAIN DESCRIPTION - ONSET
ONSET: PROGRESSIVE
ONSET: ON-GOING

## 2025-03-25 ASSESSMENT — PAIN DESCRIPTION - DESCRIPTORS
DESCRIPTORS: THROBBING
DESCRIPTORS: ACHING
DESCRIPTORS: THROBBING

## 2025-03-25 ASSESSMENT — PAIN SCALES - GENERAL
PAINLEVEL_OUTOF10: 8
PAINLEVEL_OUTOF10: 10
PAINLEVEL_OUTOF10: 10

## 2025-03-25 ASSESSMENT — PAIN DESCRIPTION - LOCATION
LOCATION: ABDOMEN
LOCATION: PENIS
LOCATION: ABDOMEN

## 2025-03-25 ASSESSMENT — PAIN SCALES - WONG BAKER
WONGBAKER_NUMERICALRESPONSE: HURTS LITTLE MORE
WONGBAKER_NUMERICALRESPONSE: HURTS WORST

## 2025-03-25 ASSESSMENT — PAIN - FUNCTIONAL ASSESSMENT
PAIN_FUNCTIONAL_ASSESSMENT: ACTIVITIES ARE NOT PREVENTED

## 2025-03-25 ASSESSMENT — PAIN DESCRIPTION - PAIN TYPE
TYPE: ACUTE PAIN
TYPE: CHRONIC PAIN

## 2025-03-25 NOTE — PLAN OF CARE
Problem: Discharge Planning  Goal: Discharge to home or other facility with appropriate resources  Outcome: Progressing     Problem: Safety - Adult  Goal: Free from fall injury  Outcome: Progressing     Problem: Pain  Goal: Verbalizes/displays adequate comfort level or baseline comfort level  Outcome: Progressing  Flowsheets (Taken 3/24/2025 1630 by Mable Cruz RN)  Verbalizes/displays adequate comfort level or baseline comfort level: Encourage patient to monitor pain and request assistance

## 2025-03-25 NOTE — PROGRESS NOTES
Nephrology Progress Note  3/25/2025 8:39 AM        Subjective:   Admit Date: 3/22/2025  PCP: Mono Schaffer MD    Interval History:  underwent bilateral ureteral stent exchange    Diet:  unclear how much she is eating    ROS:   he was alert and awake but not feeling very well   no fever and acceptable blood pressure, urine output recorded 3.275 L for the last 24 hours    Data:     Current meds:    oxyBUTYnin  5 mg Oral TID    scopolamine  1 patch TransDERmal Q72H    ondansetron  4 mg Oral q8h    amLODIPine  10 mg Oral Daily    gabapentin  300 mg Oral Nightly    sennosides-docusate sodium  2 tablet Oral BID    tamsulosin  0.4 mg Oral BID    cefepime  1,000 mg IntraVENous Q24H    sodium chloride flush  5-40 mL IntraVENous 2 times per day    [Held by provider] heparin (porcine)  5,000 Units SubCUTAneous 3 times per day      sodium chloride           I/O last 3 completed shifts:  In: 1644.2 [P.O.:120; I.V.:1424.1; IV Piggyback:100]  Out: 6075 [Urine:6075]    CBC:   Recent Labs     03/23/25  0121 03/24/25  1225 03/25/25  0120   WBC 6.6 5.4 4.6   HGB 8.5* 10.4* 10.3*   * 182 198          Recent Labs     03/23/25  0121 03/24/25  1225 03/25/25  0120    143 139   K 4.4 4.1 3.9    107 105   CO2 21 20* 22   BUN 62* 42* 39*   CREATININE 5.7* 2.9* 2.4*   GLUCOSE 97 92 135*       Lab Results   Component Value Date    CALCIUM 9.0 03/25/2025    PHOS 2.7 03/25/2025       Objective:     Vitals: BP (!) 158/88   Pulse 99   Temp 98.9 °F (37.2 °C) (Oral)   Resp 18   Ht 1.778 m (5' 10\")   Wt 91.4 kg (201 lb 8 oz)   SpO2 97%   BMI 28.91 kg/m² ,    General appearance:   he was alert and awake but apparently not feeling very well not interested in talking complain of feeling cold  HEENT:   no gross conjunctival pallor, no gross scleral icterus  Neck:   supple  Lungs:   no gross crackles  Heart:   seemed regular rate and rhythm  Abdomen:  soft, nontender  Extremities:   trace edema, still has Huang

## 2025-03-25 NOTE — DISCHARGE INSTRUCTIONS
Internal Medicine Discharge Instruction    Discharge to:  Home  Diet: ADULT DIET; Regular  Activity: As tolerated       Be compliant with medications  Please take medications as prescribed    Please get blood work done in a few days        Electronically signed by Josh Montero MD on 3/25/2025 at 7:46 AM

## 2025-03-25 NOTE — PROGRESS NOTES
[address] [address]   Progress Note  [facility] [address]      Date: 3/25/2025   Patient: Jethro Vázquez   : 1960   DOA: 3/22/2025   MRN: [id number]   ROOM#: 1104/1104-A     Admit Date: 3/22/2025     Collaborating Urologist on Call at time of admission: Dr. Ivan  CC: \"Feel weak\"   Reason for Consult: H/o advanced bladder cancer  S/p cystoscopy, bilateral ureteral stent exchange 3/24/25    Subjective:     Pain: moderate, nausea and vomiting,   Bowel Movement/Flatus: Yes  Voiding: Indwelling catheter with clear urine    Pt resting in bed, reports lower abdominal pain and nausea today. Huang removed without difficulty.    Objective:    Vitals:   BP (!) 151/94   Pulse 85   Temp 98.7 °F (37.1 °C) (Oral)   Resp 18   Ht 1.778 m (5' 10\")   Wt 91.4 kg (201 lb 8 oz)   SpO2 96%   BMI 28.91 kg/m²   Temp  Av.3 °F (36.8 °C)  Min: 97.4 °F (36.3 °C)  Max: 99.3 °F (37.4 °C)    Intake/Output Summary (Last 24 hours) at 3/25/2025 0736  Last data filed at 3/25/2025 0331  Gross per 24 hour   Intake 466.38 ml   Output 3275 ml   Net -2808.62 ml       Physical Exam:   Gen: Pleasant male, in NAD  Neuro: Non-focal  Resp: Unlabored breathing  Abd: Soft, non-distended, lower abdominal TTP, no rebound  Back:   No CVAT  : Indwelling catheter with clear urine  Ext: No edema of bilateral LEs    Labs:  WBC:    Lab Results   Component Value Date/Time    WBC 4.6 2025 01:20 AM     Hemoglobin/Hematocrit:    Lab Results   Component Value Date/Time    HGB 10.3 2025 01:20 AM    HCT 31.4 2025 01:20 AM     BMP:   Lab Results   Component Value Date/Time     2025 01:20 AM    K 3.9 2025 01:20 AM     2025 01:20 AM    CO2 22 2025 01:20 AM    BUN 39 2025 01:20 AM    CREATININE 2.4 2025 01:20 AM    CALCIUM 9.0 2025 01:20 AM    GFRAA >60 2018 07:38 AM    LABGLOM 27 2025 01:20 AM    LABGLOM 68 2024 10:09 AM     Blood Culture:

## 2025-03-25 NOTE — PROGRESS NOTES
Outpatient Pharmacy Progress Note for Meds-to-Beds    Total number of Prescriptions Filled: 4    Additional Documentation:  Patient picked-up the medication(s) in the OP Pharmacy      Thank you for letting us serve your patients.  [address] [address]    Phone: [phone]    Fax: [phone]

## 2025-04-01 NOTE — PROGRESS NOTES
Physician Progress Note      PATIENT:               BRISSA BULLOCK  [facility] #:                  [id number]  :                       1960  ADMIT DATE:       3/22/2025 9:39 PM  DISCH DATE:        3/25/2025 5:02 PM  RESPONDING  PROVIDER #:        Josh Montero MD          QUERY TEXT:    Patient admitted with SHIRLEY on CKD3b with B/L Hydronephrosis. Noted   documentation of Acute cystitis with hematuria in Internal medicine H&P on   , As per lab findings- Urine culture negative. In order to support the   diagnosis of Acute cystitis with hematuria, please include additional clinical   indicators in your documentation.  Or please document if the diagnosis of   Acute cystitis with hematuria has been ruled out after further study.    The medical record reflects the following:    Risk Factors: SHIRLEY, CKD,B/L Hydronephrosis,65yr old male    Clinical Indicators:    H&P - SHIRLEY on CKD3b ,Invasive, high-grade urethral carcinoma s/p TURBT in   2023 , Reported worsening lower abdominal pain over past few days.  Acute cystitis with hematuria, Last UCx in 10/2024 with Pseudomonas   aeruginosa, complicated by bacteremia then as well, completed 2-week course of   antibiotics with Cipro, Afebrile, no leukocytosis (1/4 SIRS criteria), UA   positive for infection.    DS -Acute cystitis with hematuria,  Afebrile, no leukocytosis (1/4 SIRS   criteria), UA positive for infection but culture negative. D/C cefepime    Urine culture-Negative  UA on -  WBC UA- Too numerous to count  Epithelial cells- 6-9  Bacteria- Many  Urine HGB- Large  Leukocyte Esterase- Moderate  Nitrate- Positive    Treatment: cefepime IV, UC, UA, Urology consult    Thank you,  Tania WEEMS CDA.  Options provided:  -- Acute cystitis with hematuria presents as evidenced by, Please document   evidence.  -- Acute cystitis with hematuria was ruled out  -- Other - I will add my own diagnosis  -- Disagree - Not applicable / Not valid  -- Disagree

## 2025-04-27 ENCOUNTER — APPOINTMENT (OUTPATIENT)
Dept: GENERAL RADIOLOGY | Age: 65
End: 2025-04-27
Payer: MEDICARE

## 2025-04-27 ENCOUNTER — HOSPITAL ENCOUNTER (EMERGENCY)
Age: 65
Discharge: HOME | End: 2025-04-27
Attending: EMERGENCY MEDICINE
Payer: MEDICARE

## 2025-04-27 VITALS
HEART RATE: 90 BPM | OXYGEN SATURATION: 96 % | SYSTOLIC BLOOD PRESSURE: 143 MMHG | WEIGHT: 181 LBS | RESPIRATION RATE: 18 BRPM | HEIGHT: 70 IN | BODY MASS INDEX: 25.91 KG/M2 | DIASTOLIC BLOOD PRESSURE: 89 MMHG | TEMPERATURE: 98.2 F

## 2025-04-27 DIAGNOSIS — S60.221A CONTUSION OF RIGHT HAND, INITIAL ENCOUNTER: Primary | ICD-10-CM

## 2025-04-27 PROCEDURE — 6370000000 HC RX 637 (ALT 250 FOR IP): Performed by: EMERGENCY MEDICINE

## 2025-04-27 PROCEDURE — 99283 EMERGENCY DEPT VISIT LOW MDM: CPT

## 2025-04-27 PROCEDURE — 73130 X-RAY EXAM OF HAND: CPT

## 2025-04-27 RX ORDER — OXYCODONE AND ACETAMINOPHEN 5; 325 MG/1; MG/1
2 TABLET ORAL ONCE
Refills: 0 | Status: COMPLETED | OUTPATIENT
Start: 2025-04-27 | End: 2025-04-27

## 2025-04-27 RX ADMIN — OXYCODONE AND ACETAMINOPHEN 2 TABLET: 5; 325 TABLET ORAL at 13:01

## 2025-04-27 ASSESSMENT — PAIN DESCRIPTION - ORIENTATION
ORIENTATION: RIGHT
ORIENTATION: RIGHT;LEFT;LOWER

## 2025-04-27 ASSESSMENT — PAIN DESCRIPTION - ONSET: ONSET: SUDDEN

## 2025-04-27 ASSESSMENT — PAIN DESCRIPTION - DESCRIPTORS
DESCRIPTORS: ACHING;DISCOMFORT
DESCRIPTORS: DISCOMFORT

## 2025-04-27 ASSESSMENT — PAIN DESCRIPTION - FREQUENCY: FREQUENCY: CONTINUOUS

## 2025-04-27 ASSESSMENT — PAIN SCALES - GENERAL
PAINLEVEL_OUTOF10: 10
PAINLEVEL_OUTOF10: 10

## 2025-04-27 ASSESSMENT — PAIN - FUNCTIONAL ASSESSMENT
PAIN_FUNCTIONAL_ASSESSMENT: PREVENTS OR INTERFERES WITH MANY ACTIVE NOT PASSIVE ACTIVITIES
PAIN_FUNCTIONAL_ASSESSMENT: 0-10
PAIN_FUNCTIONAL_ASSESSMENT: PREVENTS OR INTERFERES SOME ACTIVE ACTIVITIES AND ADLS

## 2025-04-27 ASSESSMENT — PAIN DESCRIPTION - PAIN TYPE: TYPE: ACUTE PAIN

## 2025-04-27 ASSESSMENT — PAIN DESCRIPTION - LOCATION
LOCATION: HAND
LOCATION: HAND;BACK

## 2025-04-27 NOTE — DISCHARGE INSTRUCTIONS
Use ice to your hand for 20 minutes 2-3 times daily.  Keep it elevated above the level of your heart.  Follow-up with your primary caregiver for recheck.  Return to the ER for any problems or concerns   non ortho

## 2025-05-10 ENCOUNTER — HOSPITAL ENCOUNTER (EMERGENCY)
Age: 65
Discharge: HOME | End: 2025-05-10
Attending: STUDENT IN AN ORGANIZED HEALTH CARE EDUCATION/TRAINING PROGRAM
Payer: MEDICARE

## 2025-05-10 VITALS
BODY MASS INDEX: 26.2 KG/M2 | RESPIRATION RATE: 16 BRPM | TEMPERATURE: 98.6 F | DIASTOLIC BLOOD PRESSURE: 75 MMHG | SYSTOLIC BLOOD PRESSURE: 120 MMHG | OXYGEN SATURATION: 95 % | HEIGHT: 70 IN | HEART RATE: 87 BPM | WEIGHT: 183 LBS

## 2025-05-10 DIAGNOSIS — N30.01 ACUTE CYSTITIS WITH HEMATURIA: Primary | ICD-10-CM

## 2025-05-10 LAB
ALBUMIN SERPL-MCNC: 3.6 G/DL (ref 3.4–5)
ALBUMIN/GLOB SERPL: 1.4 {RATIO}
ALP SERPL-CCNC: 90 U/L (ref 40–129)
ALT SERPL-CCNC: 7 U/L (ref 10–40)
ANION GAP SERPL CALCULATED.3IONS-SCNC: 15 MMOL/L (ref 9–17)
AST SERPL-CCNC: 20 U/L (ref 15–37)
BACTERIA URNS QL MICRO: ABNORMAL
BASOPHILS # BLD: 0.03 K/UL
BASOPHILS NFR BLD: 1 % (ref 0–1)
BILIRUB SERPL-MCNC: <0.2 MG/DL (ref 0–1)
BILIRUB UR QL STRIP: ABNORMAL
BUN SERPL-MCNC: 37 MG/DL (ref 7–20)
CALCIUM SERPL-MCNC: 8.4 MG/DL (ref 8.3–10.6)
CHARACTER UR: ABNORMAL
CHLORIDE SERPL-SCNC: 103 MMOL/L (ref 99–110)
CLARITY UR: CLEAR
CO2 SERPL-SCNC: 22 MMOL/L (ref 21–32)
COLOR UR: YELLOW
CREAT SERPL-MCNC: 3.1 MG/DL (ref 0.8–1.3)
EKG ATRIAL RATE: 70 BPM
EKG DIAGNOSIS: NORMAL
EKG P AXIS: 42 DEGREES
EKG P-R INTERVAL: 172 MS
EKG Q-T INTERVAL: 424 MS
EKG QRS DURATION: 102 MS
EKG QTC CALCULATION (BAZETT): 457 MS
EKG R AXIS: -21 DEGREES
EKG T AXIS: 13 DEGREES
EKG VENTRICULAR RATE: 70 BPM
EOSINOPHIL # BLD: 0.27 K/UL
EOSINOPHILS RELATIVE PERCENT: 6 % (ref 0–3)
EPI CELLS #/AREA URNS HPF: ABNORMAL /HPF
ERYTHROCYTE [DISTWIDTH] IN BLOOD BY AUTOMATED COUNT: 13.1 % (ref 11.7–14.9)
GFR, ESTIMATED: 22 ML/MIN/1.73M2
GLUCOSE SERPL-MCNC: 112 MG/DL (ref 74–99)
GLUCOSE UR STRIP-MCNC: NEGATIVE MG/DL
HCT VFR BLD AUTO: 30 % (ref 42–52)
HGB BLD-MCNC: 9.6 G/DL (ref 13.5–18)
HGB UR QL STRIP.AUTO: ABNORMAL
IMM GRANULOCYTES # BLD AUTO: 0.01 K/UL
IMM GRANULOCYTES NFR BLD: 0 %
KETONES UR STRIP-MCNC: NEGATIVE MG/DL
LACTATE BLDV-SCNC: 1 MMOL/L (ref 0.4–2)
LEUKOCYTE ESTERASE UR QL STRIP: ABNORMAL
LYMPHOCYTES NFR BLD: 0.85 K/UL
LYMPHOCYTES RELATIVE PERCENT: 19 % (ref 24–44)
MCH RBC QN AUTO: 27.4 PG (ref 27–31)
MCHC RBC AUTO-ENTMCNC: 32 G/DL (ref 32–36)
MCV RBC AUTO: 85.7 FL (ref 78–100)
MICROORGANISM SPEC CULT: NORMAL
MONOCYTES NFR BLD: 0.29 K/UL
MONOCYTES NFR BLD: 6 % (ref 0–5)
NEUTROPHILS NFR BLD: 68 % (ref 36–66)
NEUTS SEG NFR BLD: 3.1 K/UL
NITRITE UR QL STRIP: POSITIVE
PH UR STRIP: 6 [PH] (ref 5–8)
PLATELET # BLD AUTO: 213 K/UL (ref 140–440)
PMV BLD AUTO: 8.7 FL (ref 7.5–11.1)
POTASSIUM SERPL-SCNC: 4.1 MMOL/L (ref 3.5–5.1)
PROT SERPL-MCNC: 6.3 G/DL (ref 6.4–8.2)
PROT UR STRIP-MCNC: >=300 MG/DL
RBC # BLD AUTO: 3.5 M/UL (ref 4.6–6.2)
RBC #/AREA URNS HPF: ABNORMAL /HPF
SODIUM SERPL-SCNC: 140 MMOL/L (ref 136–145)
SP GR UR STRIP: 1.02 (ref 1–1.03)
SPECIMEN DESCRIPTION: NORMAL
TROPONIN I SERPL HS-MCNC: 39 NG/L (ref 0–22)
UROBILINOGEN UR STRIP-ACNC: 0.2 EU/DL (ref 0–1)
WBC #/AREA URNS HPF: ABNORMAL /HPF
WBC OTHER # BLD: 4.6 K/UL (ref 4–10.5)

## 2025-05-10 PROCEDURE — 6370000000 HC RX 637 (ALT 250 FOR IP): Performed by: EMERGENCY MEDICINE

## 2025-05-10 PROCEDURE — 84484 ASSAY OF TROPONIN QUANT: CPT

## 2025-05-10 PROCEDURE — 2580000003 HC RX 258: Performed by: STUDENT IN AN ORGANIZED HEALTH CARE EDUCATION/TRAINING PROGRAM

## 2025-05-10 PROCEDURE — 85025 COMPLETE CBC W/AUTO DIFF WBC: CPT

## 2025-05-10 PROCEDURE — 81001 URINALYSIS AUTO W/SCOPE: CPT

## 2025-05-10 PROCEDURE — 80053 COMPREHEN METABOLIC PANEL: CPT

## 2025-05-10 PROCEDURE — 99284 EMERGENCY DEPT VISIT MOD MDM: CPT

## 2025-05-10 PROCEDURE — 83605 ASSAY OF LACTIC ACID: CPT

## 2025-05-10 PROCEDURE — 93005 ELECTROCARDIOGRAM TRACING: CPT | Performed by: STUDENT IN AN ORGANIZED HEALTH CARE EDUCATION/TRAINING PROGRAM

## 2025-05-10 PROCEDURE — 87086 URINE CULTURE/COLONY COUNT: CPT

## 2025-05-10 RX ORDER — CIPROFLOXACIN 500 MG/1
500 TABLET, FILM COATED ORAL 2 TIMES DAILY
Qty: 10 TABLET | Refills: 0 | Status: SHIPPED | OUTPATIENT
Start: 2025-05-10 | End: 2025-05-15

## 2025-05-10 RX ORDER — CIPROFLOXACIN 500 MG/1
500 TABLET, FILM COATED ORAL 2 TIMES DAILY
Qty: 14 TABLET | Refills: 0 | Status: SHIPPED | OUTPATIENT
Start: 2025-05-10 | End: 2025-05-10

## 2025-05-10 RX ORDER — 0.9 % SODIUM CHLORIDE 0.9 %
1000 INTRAVENOUS SOLUTION INTRAVENOUS ONCE
Status: COMPLETED | OUTPATIENT
Start: 2025-05-10 | End: 2025-05-10

## 2025-05-10 RX ORDER — METHOCARBAMOL 750 MG/1
750 TABLET, FILM COATED ORAL ONCE
Status: COMPLETED | OUTPATIENT
Start: 2025-05-10 | End: 2025-05-10

## 2025-05-10 RX ORDER — CIPROFLOXACIN 500 MG/1
500 TABLET, FILM COATED ORAL ONCE
Status: COMPLETED | OUTPATIENT
Start: 2025-05-10 | End: 2025-05-10

## 2025-05-10 RX ADMIN — SODIUM CHLORIDE 1000 ML: 0.9 INJECTION, SOLUTION INTRAVENOUS at 18:23

## 2025-05-10 RX ADMIN — CIPROFLOXACIN HYDROCHLORIDE 500 MG: 500 TABLET, FILM COATED ORAL at 19:29

## 2025-05-10 RX ADMIN — METHOCARBAMOL 750 MG: 750 TABLET ORAL at 19:29

## 2025-05-10 ASSESSMENT — PAIN SCALES - GENERAL
PAINLEVEL_OUTOF10: 9
PAINLEVEL_OUTOF10: 10

## 2025-05-10 ASSESSMENT — PAIN - FUNCTIONAL ASSESSMENT: PAIN_FUNCTIONAL_ASSESSMENT: 0-10

## 2025-05-10 NOTE — ED PROVIDER NOTES
Emergency Department Encounter    Patient: Jethro Vázquez  MRN: [id number]  : 1960  Date of Evaluation: 5/10/2025  ED Provider:  Melvin Tovar MD    Triage Chief Complaint:   Hypotension (Pt states his wife and son was \" getting in to it \" and he jumped up fast to break it up and got weak. Pt states they know about his b/p dropping and is on hospice. Pt reports no current symtpoms)    [place]:  Jethro Vázquez is a 65 y.o. male with history significant for hypertension, malignant neoplasm of the bladder on hospice, GERD, that presents for a syncope.  The patient endorses the sudden onset of his symptoms about half an hour before presentation, when he was at home, arguing with his son.  He mentions that he stood up rapidly from sitting down, and had a sudden onset of lightheadedness, blurry vision, and generalized fatigue.  This was followed by a brief episode of loss of consciousness that lasted a couple seconds, was followed by complete recovery of the mental status, and has been followed by persistent weakness.  He denies any chest pain, palpitations, profuse diaphoresis, shortness of breath, focal numbness, tingling or weakness, headache.  No recent fevers or chills.  Endorses decreased appetite but denies any nausea or emesis.  No diarrhea or constipation.  Endorses discrete suprapubic pain, and bilateral flank pain, present for the last 3 days, similar to prior episodes of urinary tract infection.  No lower extremity edema, rashes or wounds.    ROS - see HPI, below listed is current ROS at time of my eval:  Systems reviewed and negative except as above.     Past Medical History:   Diagnosis Date    Acid reflux     History of kidney stones     \"had kidney stone 2017- passed it\"    Hypertension     \"on medication since age 50's\"    Malignant neoplasm of overlapping sites of bladder (HCC) 2024    No natural teeth      Past Surgical History:   Procedure Laterality Date    ANKLE SURGERY  capsule Take 1 capsule by mouth 2 times daily 60 capsule 1    Multiple Vitamins-Minerals (THERAPEUTIC MULTIVITAMIN-MINERALS) tablet Take 1 tablet by mouth daily      naloxone 4 MG/0.1ML LIQD nasal spray       amLODIPine (NORVASC) 10 MG tablet Take 1 tablet by mouth daily 30 tablet 3     No Known Allergies    Nursing Notes Reviewed    Physical Exam:  Triage VS:    ED Triage Vitals [05/10/25 1730]   Encounter Vitals Group      /78      Systolic BP Percentile       Diastolic BP Percentile       Pulse 87      Respirations 16      Temp 98.6 °F (37 °C)      Temp Source Oral      SpO2 96 %      Weight - Scale 83 kg (183 lb)      Height 1.778 m (5' 10\")      Head Circumference       Peak Flow       Pain Score       Pain Loc       Pain Education       Exclude from Growth Chart        My pulse ox interpretation is - normal    General appearance: Good clinical conditions.  Chronically ill.  Pleasant, attended encounter appropriately.  No acute distress.   Skin: Pale.  Warm. Dry.   Eye:  Extraocular movements intact.     Ears, nose, mouth and throat:  Oral mucosa moist   Neck:  Supple. Trachea midline.   Extremity: Symmetric. No lower extremity edema.  Normal ROM     Heart:  Regular rate and rhythm.    Perfusion:  Cap refill < 2 sec. 2+ PT, radial and DP pulses.  Respiratory:  Lungs clear to auscultation bilaterally.  Respirations nonlabored.     Abdominal:  Soft.  Nontender.  Non distended. No rebound or guarding.   Neurological:  Alert and oriented in time, person and place.  No focal neuro deficits.             Psychiatric:  Appropriate    I have reviewed and interpreted all of the currently available lab results from this visit (if applicable):  Results for orders placed or performed during the hospital encounter of 05/10/25   CBC with Auto Differential   Result Value Ref Range    WBC 4.6 4.0 - 10.5 k/uL    RBC 3.50 (L) 4.60 - 6.20 m/uL    Hemoglobin 9.6 (L) 13.5 - 18.0 g/dL    Hematocrit 30.0 (L) 42.0 - 52.0 %    MCV

## 2025-05-11 NOTE — ED PROVIDER NOTES
Jethro Vázquez was checked out to me by Dr. Jed Tovar. Please see his/her initial documentation for details of the patient's ED presentation, physical exam and completed studies.    In brief, Jethro Vázquez is a 65 y.o. male that presents with fatigue and transient low blood pressure.    Labs  Results for orders placed or performed during the hospital encounter of 05/10/25   CBC with Auto Differential   Result Value Ref Range    WBC 4.6 4.0 - 10.5 k/uL    RBC 3.50 (L) 4.60 - 6.20 m/uL    Hemoglobin 9.6 (L) 13.5 - 18.0 g/dL    Hematocrit 30.0 (L) 42.0 - 52.0 %    MCV 85.7 78.0 - 100.0 fL    MCH 27.4 27.0 - 31.0 pg    MCHC 32.0 32.0 - 36.0 g/dL    RDW 13.1 11.7 - 14.9 %    Platelets 213 140 - 440 k/uL    MPV 8.7 7.5 - 11.1 fL    Neutrophils % 68 (H) 36 - 66 %    Lymphocytes % 19 (L) 24 - 44 %    Monocytes % 6 (H) 0 - 5 %    Eosinophils % 6 (H) 0 - 3 %    Basophils % 1 0 - 1 %    Immature Granulocytes % 0 0 %    Neutrophils Absolute 3.10 k/uL    Lymphocytes Absolute 0.85 k/uL    Monocytes Absolute 0.29 k/uL    Eosinophils Absolute 0.27 k/uL    Basophils Absolute 0.03 k/uL    Immature Granulocytes Absolute 0.01 k/uL   Comprehensive Metabolic Panel w/ Reflex to MG   Result Value Ref Range    Sodium 140 136 - 145 mmol/L    Potassium 4.1 3.5 - 5.1 mmol/L    Chloride 103 99 - 110 mmol/L    CO2 22 21 - 32 mmol/L    Anion Gap 15 9 - 17 mmol/L    Glucose 112 (H) 74 - 99 mg/dL    BUN 37 (H) 7 - 20 mg/dL    Creatinine 3.1 (H) 0.8 - 1.3 mg/dL    Est, Glom Filt Rate 22 (L) >60 mL/min/1.73m2    Calcium 8.4 8.3 - 10.6 mg/dL    Total Protein 6.3 (L) 6.4 - 8.2 g/dL    Albumin 3.6 3.4 - 5.0 g/dL    Albumin/Globulin Ratio 1.4     Total Bilirubin <0.2 0.0 - 1.0 mg/dL    Alkaline Phosphatase 90 40 - 129 U/L    ALT 7 (L) 10 - 40 U/L    AST 20 15 - 37 U/L   Lactic Acid   Result Value Ref Range    Lactic Acid 1.0 0.4 - 2.0 mmol/L   Troponin   Result Value Ref Range    Troponin, High Sensitivity 39 (H) 0 - 22 ng/L   Urinalysis

## 2025-05-12 ENCOUNTER — RESULTS FOLLOW-UP (OUTPATIENT)
Dept: EMERGENCY DEPT | Age: 65
End: 2025-05-12

## 2025-05-12 LAB
EKG ATRIAL RATE: 70 BPM
EKG DIAGNOSIS: NORMAL
EKG P AXIS: 42 DEGREES
EKG P-R INTERVAL: 172 MS
EKG Q-T INTERVAL: 424 MS
EKG QRS DURATION: 102 MS
EKG QTC CALCULATION (BAZETT): 457 MS
EKG R AXIS: -21 DEGREES
EKG T AXIS: 13 DEGREES
EKG VENTRICULAR RATE: 70 BPM
MICROORGANISM SPEC CULT: NORMAL
SPECIMEN DESCRIPTION: NORMAL

## 2025-05-12 PROCEDURE — 93010 ELECTROCARDIOGRAM REPORT: CPT | Performed by: INTERNAL MEDICINE

## 2025-06-11 ENCOUNTER — HOSPITAL ENCOUNTER (INPATIENT)
Age: 65
LOS: 3 days | Discharge: HOSPICE | DRG: 689 | End: 2025-06-14
Attending: FAMILY MEDICINE | Admitting: FAMILY MEDICINE
Payer: MEDICARE

## 2025-06-11 ENCOUNTER — APPOINTMENT (OUTPATIENT)
Dept: GENERAL RADIOLOGY | Age: 65
End: 2025-06-11
Payer: MEDICARE

## 2025-06-11 ENCOUNTER — APPOINTMENT (OUTPATIENT)
Dept: CT IMAGING | Age: 65
DRG: 689 | End: 2025-06-11
Attending: FAMILY MEDICINE
Payer: MEDICARE

## 2025-06-11 ENCOUNTER — APPOINTMENT (OUTPATIENT)
Dept: CT IMAGING | Age: 65
End: 2025-06-11
Payer: MEDICARE

## 2025-06-11 ENCOUNTER — HOSPITAL ENCOUNTER (EMERGENCY)
Age: 65
Discharge: ANOTHER HOSPITAL | End: 2025-06-11
Attending: EMERGENCY MEDICINE
Payer: MEDICARE

## 2025-06-11 VITALS
DIASTOLIC BLOOD PRESSURE: 97 MMHG | RESPIRATION RATE: 16 BRPM | HEART RATE: 110 BPM | SYSTOLIC BLOOD PRESSURE: 173 MMHG | OXYGEN SATURATION: 100 % | WEIGHT: 156.9 LBS | TEMPERATURE: 98.7 F | BODY MASS INDEX: 22.51 KG/M2

## 2025-06-11 DIAGNOSIS — E87.6 HYPOKALEMIA: ICD-10-CM

## 2025-06-11 DIAGNOSIS — N17.9 AKI (ACUTE KIDNEY INJURY): Primary | ICD-10-CM

## 2025-06-11 DIAGNOSIS — R65.20 SEVERE SEPSIS (HCC): ICD-10-CM

## 2025-06-11 DIAGNOSIS — A41.9 SEVERE SEPSIS (HCC): ICD-10-CM

## 2025-06-11 PROBLEM — N39.0 COMPLICATED UTI (URINARY TRACT INFECTION): Status: ACTIVE | Noted: 2025-06-11

## 2025-06-11 LAB
ALBUMIN SERPL-MCNC: 3.6 G/DL (ref 3.4–5)
ALBUMIN/GLOB SERPL: 1 {RATIO}
ALP SERPL-CCNC: 135 U/L (ref 40–129)
ALT SERPL-CCNC: <5 U/L (ref 10–40)
AMORPH SED URNS QL MICRO: PRESENT
ANION GAP SERPL CALCULATED.3IONS-SCNC: 15 MMOL/L (ref 9–17)
ANION GAP SERPL CALCULATED.3IONS-SCNC: 23 MMOL/L (ref 9–17)
AST SERPL-CCNC: 16 U/L (ref 15–37)
BASOPHILS # BLD: 0.03 K/UL
BASOPHILS NFR BLD: 0 % (ref 0–1)
BILIRUB SERPL-MCNC: 0.4 MG/DL (ref 0–1)
BILIRUB UR QL STRIP: NEGATIVE
BILIRUB UR QL STRIP: NEGATIVE
BUN SERPL-MCNC: 40 MG/DL (ref 7–20)
BUN SERPL-MCNC: 41 MG/DL (ref 7–20)
CALCIUM SERPL-MCNC: 11.3 MG/DL (ref 8.3–10.6)
CALCIUM SERPL-MCNC: 12.2 MG/DL (ref 8.3–10.6)
CHLORIDE SERPL-SCNC: 106 MMOL/L (ref 99–110)
CHLORIDE SERPL-SCNC: 99 MMOL/L (ref 99–110)
CHLORIDE UR-SCNC: 81 MMOL/L (ref 110–250)
CLARITY UR: ABNORMAL
CLARITY UR: CLEAR
CO2 SERPL-SCNC: 16 MMOL/L (ref 21–32)
CO2 SERPL-SCNC: 22 MMOL/L (ref 21–32)
COLOR UR: YELLOW
COLOR UR: YELLOW
CREAT SERPL-MCNC: 3.2 MG/DL (ref 0.8–1.3)
CREAT SERPL-MCNC: 3.2 MG/DL (ref 0.8–1.3)
CREAT UR-MCNC: 57.5 MG/DL (ref 39–259)
EKG ATRIAL RATE: 104 BPM
EKG DIAGNOSIS: NORMAL
EKG Q-T INTERVAL: 526 MS
EKG QRS DURATION: 90 MS
EKG QTC CALCULATION (BAZETT): 691 MS
EKG R AXIS: -41 DEGREES
EKG T AXIS: 16 DEGREES
EKG VENTRICULAR RATE: 104 BPM
EOSINOPHIL # BLD: 0.04 K/UL
EOSINOPHILS RELATIVE PERCENT: 0 % (ref 0–3)
ERYTHROCYTE [DISTWIDTH] IN BLOOD BY AUTOMATED COUNT: 12.7 % (ref 11.7–14.9)
GFR, ESTIMATED: 20 ML/MIN/1.73M2
GFR, ESTIMATED: 21 ML/MIN/1.73M2
GLUCOSE SERPL-MCNC: 106 MG/DL (ref 74–99)
GLUCOSE SERPL-MCNC: 111 MG/DL (ref 74–99)
GLUCOSE UR STRIP-MCNC: NEGATIVE MG/DL
GLUCOSE UR STRIP-MCNC: NEGATIVE MG/DL
HCT VFR BLD AUTO: 30.9 % (ref 42–52)
HGB BLD-MCNC: 10.1 G/DL (ref 13.5–18)
HGB UR QL STRIP.AUTO: ABNORMAL
HGB UR QL STRIP.AUTO: ABNORMAL
IMM GRANULOCYTES # BLD AUTO: 0.07 K/UL
IMM GRANULOCYTES NFR BLD: 1 %
KETONES UR STRIP-MCNC: 15 MG/DL
KETONES UR STRIP-MCNC: NEGATIVE MG/DL
LACTATE BLDV-SCNC: 1.6 MMOL/L (ref 0.4–2)
LACTATE BLDV-SCNC: 2.3 MMOL/L (ref 0.4–2)
LEUKOCYTE ESTERASE UR QL STRIP: ABNORMAL
LEUKOCYTE ESTERASE UR QL STRIP: ABNORMAL
LIPASE SERPL-CCNC: 13 U/L (ref 13–60)
LYMPHOCYTES NFR BLD: 0.89 K/UL
LYMPHOCYTES RELATIVE PERCENT: 10 % (ref 24–44)
MAGNESIUM SERPL-MCNC: 1.7 MG/DL (ref 1.8–2.4)
MCH RBC QN AUTO: 26.9 PG (ref 27–31)
MCHC RBC AUTO-ENTMCNC: 32.7 G/DL (ref 32–36)
MCV RBC AUTO: 82.4 FL (ref 78–100)
MONOCYTES NFR BLD: 0.31 K/UL
MONOCYTES NFR BLD: 3 % (ref 0–5)
MUCOUS THREADS URNS QL MICRO: ABNORMAL
MUCOUS THREADS URNS QL MICRO: PRESENT
NEUTROPHILS NFR BLD: 85 % (ref 36–66)
NEUTS SEG NFR BLD: 7.72 K/UL
NITRITE UR QL STRIP: NEGATIVE
NITRITE UR QL STRIP: POSITIVE
PH UR STRIP: 6.5 [PH] (ref 5–8)
PH UR STRIP: 6.5 [PH] (ref 5–8)
PLATELET # BLD AUTO: 305 K/UL (ref 140–440)
PLATELET CONFIRMATION: NORMAL
PMV BLD AUTO: 9.8 FL (ref 7.5–11.1)
POTASSIUM SERPL-SCNC: 2.7 MMOL/L (ref 3.5–5.1)
POTASSIUM SERPL-SCNC: 3.2 MMOL/L (ref 3.5–5.1)
POTASSIUM, UR: 30.7 MMOL/L (ref 25–150)
PROCALCITONIN SERPL-MCNC: 0.21 NG/ML
PROT SERPL-MCNC: 7 G/DL (ref 6.4–8.2)
PROT UR STRIP-MCNC: 100 MG/DL
PROT UR STRIP-MCNC: 100 MG/DL
RBC # BLD AUTO: 3.75 M/UL (ref 4.6–6.2)
RBC #/AREA URNS HPF: 884 /HPF (ref 0–2)
RBC #/AREA URNS HPF: ABNORMAL /HPF
RENAL EPITHELIAL, UA: PRESENT /HPF
SODIUM SERPL-SCNC: 138 MMOL/L (ref 136–145)
SODIUM SERPL-SCNC: 142 MMOL/L (ref 136–145)
SODIUM UR-SCNC: 85 MMOL/L (ref 40–220)
SP GR UR STRIP: 1.01 (ref 1–1.03)
SP GR UR STRIP: 1.01 (ref 1–1.03)
TOTAL PROTEIN, URINE: 81 MG/DL
URINE TOTAL PROTEIN CREATININE RATIO: 1.41 (ref 0–0.2)
UROBILINOGEN UR STRIP-ACNC: 0.2 EU/DL (ref 0–1)
UROBILINOGEN UR STRIP-ACNC: 0.2 EU/DL (ref 0–1)
WBC #/AREA URNS HPF: 0 /HPF (ref 0–5)
WBC #/AREA URNS HPF: ABNORMAL /HPF
WBC OTHER # BLD: 9.1 K/UL (ref 4–10.5)

## 2025-06-11 PROCEDURE — 87040 BLOOD CULTURE FOR BACTERIA: CPT

## 2025-06-11 PROCEDURE — 82570 ASSAY OF URINE CREATININE: CPT

## 2025-06-11 PROCEDURE — 6360000002 HC RX W HCPCS: Performed by: EMERGENCY MEDICINE

## 2025-06-11 PROCEDURE — 87086 URINE CULTURE/COLONY COUNT: CPT

## 2025-06-11 PROCEDURE — 96375 TX/PRO/DX INJ NEW DRUG ADDON: CPT

## 2025-06-11 PROCEDURE — 99285 EMERGENCY DEPT VISIT HI MDM: CPT

## 2025-06-11 PROCEDURE — 70450 CT HEAD/BRAIN W/O DYE: CPT

## 2025-06-11 PROCEDURE — 81001 URINALYSIS AUTO W/SCOPE: CPT

## 2025-06-11 PROCEDURE — 2500000003 HC RX 250 WO HCPCS: Performed by: INTERNAL MEDICINE

## 2025-06-11 PROCEDURE — 96368 THER/DIAG CONCURRENT INF: CPT

## 2025-06-11 PROCEDURE — 6360000002 HC RX W HCPCS: Performed by: INTERNAL MEDICINE

## 2025-06-11 PROCEDURE — 80053 COMPREHEN METABOLIC PANEL: CPT

## 2025-06-11 PROCEDURE — 6370000000 HC RX 637 (ALT 250 FOR IP): Performed by: PHYSICIAN ASSISTANT

## 2025-06-11 PROCEDURE — 6370000000 HC RX 637 (ALT 250 FOR IP): Performed by: EMERGENCY MEDICINE

## 2025-06-11 PROCEDURE — 36415 COLL VENOUS BLD VENIPUNCTURE: CPT

## 2025-06-11 PROCEDURE — 96365 THER/PROPH/DIAG IV INF INIT: CPT

## 2025-06-11 PROCEDURE — 80048 BASIC METABOLIC PNL TOTAL CA: CPT

## 2025-06-11 PROCEDURE — 6370000000 HC RX 637 (ALT 250 FOR IP): Performed by: INTERNAL MEDICINE

## 2025-06-11 PROCEDURE — 84133 ASSAY OF URINE POTASSIUM: CPT

## 2025-06-11 PROCEDURE — 71045 X-RAY EXAM CHEST 1 VIEW: CPT

## 2025-06-11 PROCEDURE — 83605 ASSAY OF LACTIC ACID: CPT

## 2025-06-11 PROCEDURE — 1200000000 HC SEMI PRIVATE

## 2025-06-11 PROCEDURE — 84156 ASSAY OF PROTEIN URINE: CPT

## 2025-06-11 PROCEDURE — 96376 TX/PRO/DX INJ SAME DRUG ADON: CPT

## 2025-06-11 PROCEDURE — 93005 ELECTROCARDIOGRAM TRACING: CPT | Performed by: EMERGENCY MEDICINE

## 2025-06-11 PROCEDURE — 2580000003 HC RX 258: Performed by: EMERGENCY MEDICINE

## 2025-06-11 PROCEDURE — 84300 ASSAY OF URINE SODIUM: CPT

## 2025-06-11 PROCEDURE — 83690 ASSAY OF LIPASE: CPT

## 2025-06-11 PROCEDURE — 83735 ASSAY OF MAGNESIUM: CPT

## 2025-06-11 PROCEDURE — 94761 N-INVAS EAR/PLS OXIMETRY MLT: CPT

## 2025-06-11 PROCEDURE — 84145 PROCALCITONIN (PCT): CPT

## 2025-06-11 PROCEDURE — 85025 COMPLETE CBC W/AUTO DIFF WBC: CPT

## 2025-06-11 PROCEDURE — 74176 CT ABD & PELVIS W/O CONTRAST: CPT

## 2025-06-11 PROCEDURE — 93010 ELECTROCARDIOGRAM REPORT: CPT | Performed by: INTERNAL MEDICINE

## 2025-06-11 PROCEDURE — 82436 ASSAY OF URINE CHLORIDE: CPT

## 2025-06-11 PROCEDURE — 2580000003 HC RX 258: Performed by: INTERNAL MEDICINE

## 2025-06-11 RX ORDER — M-VIT,TX,IRON,MINS/CALC/FOLIC 27MG-0.4MG
1 TABLET ORAL DAILY
Status: DISCONTINUED | OUTPATIENT
Start: 2025-06-11 | End: 2025-06-14 | Stop reason: HOSPADM

## 2025-06-11 RX ORDER — POTASSIUM CHLORIDE 7.45 MG/ML
10 INJECTION INTRAVENOUS ONCE
Status: COMPLETED | OUTPATIENT
Start: 2025-06-11 | End: 2025-06-11

## 2025-06-11 RX ORDER — OXYBUTYNIN CHLORIDE 5 MG/1
5 TABLET ORAL 3 TIMES DAILY
Status: DISCONTINUED | OUTPATIENT
Start: 2025-06-11 | End: 2025-06-14 | Stop reason: HOSPADM

## 2025-06-11 RX ORDER — POLYETHYLENE GLYCOL 3350 17 G/17G
17 POWDER, FOR SOLUTION ORAL DAILY PRN
Status: DISCONTINUED | OUTPATIENT
Start: 2025-06-11 | End: 2025-06-14 | Stop reason: HOSPADM

## 2025-06-11 RX ORDER — 0.9 % SODIUM CHLORIDE 0.9 %
1000 INTRAVENOUS SOLUTION INTRAVENOUS ONCE
Status: COMPLETED | OUTPATIENT
Start: 2025-06-11 | End: 2025-06-11

## 2025-06-11 RX ORDER — CLONIDINE HYDROCHLORIDE 0.1 MG/1
0.1 TABLET ORAL 2 TIMES DAILY PRN
Status: DISCONTINUED | OUTPATIENT
Start: 2025-06-11 | End: 2025-06-14 | Stop reason: HOSPADM

## 2025-06-11 RX ORDER — SODIUM CHLORIDE 0.9 % (FLUSH) 0.9 %
5-40 SYRINGE (ML) INJECTION EVERY 12 HOURS SCHEDULED
Status: DISCONTINUED | OUTPATIENT
Start: 2025-06-11 | End: 2025-06-14 | Stop reason: HOSPADM

## 2025-06-11 RX ORDER — MORPHINE SULFATE 15 MG/1
15 TABLET, FILM COATED, EXTENDED RELEASE ORAL 2 TIMES DAILY
Refills: 0 | Status: DISCONTINUED | OUTPATIENT
Start: 2025-06-11 | End: 2025-06-14 | Stop reason: HOSPADM

## 2025-06-11 RX ORDER — MORPHINE SULFATE 15 MG/1
15 TABLET, FILM COATED, EXTENDED RELEASE ORAL 2 TIMES DAILY
COMMUNITY
Start: 2025-05-27

## 2025-06-11 RX ORDER — ONDANSETRON 4 MG/1
4 TABLET, ORALLY DISINTEGRATING ORAL EVERY 8 HOURS PRN
Status: DISCONTINUED | OUTPATIENT
Start: 2025-06-11 | End: 2025-06-11

## 2025-06-11 RX ORDER — OXYCODONE AND ACETAMINOPHEN 5; 325 MG/1; MG/1
1 TABLET ORAL EVERY 4 HOURS PRN
Refills: 0 | Status: DISCONTINUED | OUTPATIENT
Start: 2025-06-11 | End: 2025-06-14 | Stop reason: HOSPADM

## 2025-06-11 RX ORDER — ONDANSETRON 2 MG/ML
4 INJECTION INTRAMUSCULAR; INTRAVENOUS ONCE
Status: DISCONTINUED | OUTPATIENT
Start: 2025-06-11 | End: 2025-06-11 | Stop reason: HOSPADM

## 2025-06-11 RX ORDER — SODIUM CHLORIDE 0.9 % (FLUSH) 0.9 %
5-40 SYRINGE (ML) INJECTION PRN
Status: DISCONTINUED | OUTPATIENT
Start: 2025-06-11 | End: 2025-06-14 | Stop reason: HOSPADM

## 2025-06-11 RX ORDER — SODIUM CHLORIDE 9 MG/ML
INJECTION, SOLUTION INTRAVENOUS PRN
Status: DISCONTINUED | OUTPATIENT
Start: 2025-06-11 | End: 2025-06-14 | Stop reason: HOSPADM

## 2025-06-11 RX ORDER — DICYCLOMINE HCL 20 MG
20 TABLET ORAL 4 TIMES DAILY
COMMUNITY
Start: 2025-06-08

## 2025-06-11 RX ORDER — HYDROXYZINE HYDROCHLORIDE 25 MG/1
25 TABLET, FILM COATED ORAL
COMMUNITY

## 2025-06-11 RX ORDER — ONDANSETRON 2 MG/ML
4 INJECTION INTRAMUSCULAR; INTRAVENOUS ONCE
Status: COMPLETED | OUTPATIENT
Start: 2025-06-11 | End: 2025-06-11

## 2025-06-11 RX ORDER — MIDAZOLAM HYDROCHLORIDE 1 MG/ML
5 INJECTION, SOLUTION INTRAMUSCULAR; INTRAVENOUS ONCE
Status: COMPLETED | OUTPATIENT
Start: 2025-06-11 | End: 2025-06-11

## 2025-06-11 RX ORDER — OXYCODONE AND ACETAMINOPHEN 5; 325 MG/1; MG/1
1 TABLET ORAL 2 TIMES DAILY PRN
Refills: 0 | Status: DISCONTINUED | OUTPATIENT
Start: 2025-06-11 | End: 2025-06-11

## 2025-06-11 RX ORDER — CARVEDILOL 6.25 MG/1
6.25 TABLET ORAL 2 TIMES DAILY WITH MEALS
Status: DISCONTINUED | OUTPATIENT
Start: 2025-06-11 | End: 2025-06-12

## 2025-06-11 RX ORDER — MORPHINE SULFATE 4 MG/ML
4 INJECTION, SOLUTION INTRAMUSCULAR; INTRAVENOUS ONCE
Status: COMPLETED | OUTPATIENT
Start: 2025-06-11 | End: 2025-06-11

## 2025-06-11 RX ORDER — ACETAMINOPHEN 325 MG/1
650 TABLET ORAL EVERY 6 HOURS PRN
Status: DISCONTINUED | OUTPATIENT
Start: 2025-06-11 | End: 2025-06-14 | Stop reason: HOSPADM

## 2025-06-11 RX ORDER — HYOSCYAMINE SULFATE 0.12 MG/1
0.12 TABLET ORAL EVERY 4 HOURS PRN
COMMUNITY

## 2025-06-11 RX ORDER — MORPHINE SULFATE 15 MG/1
15 TABLET ORAL ONCE
Refills: 0 | Status: COMPLETED | OUTPATIENT
Start: 2025-06-11 | End: 2025-06-11

## 2025-06-11 RX ORDER — AMLODIPINE BESYLATE 10 MG/1
10 TABLET ORAL DAILY
Status: DISCONTINUED | OUTPATIENT
Start: 2025-06-11 | End: 2025-06-12

## 2025-06-11 RX ORDER — HYDROMORPHONE HYDROCHLORIDE 1 MG/ML
0.5 INJECTION, SOLUTION INTRAMUSCULAR; INTRAVENOUS; SUBCUTANEOUS
Status: COMPLETED | OUTPATIENT
Start: 2025-06-11 | End: 2025-06-13

## 2025-06-11 RX ORDER — GABAPENTIN 300 MG/1
300 CAPSULE ORAL NIGHTLY
Status: DISCONTINUED | OUTPATIENT
Start: 2025-06-11 | End: 2025-06-14

## 2025-06-11 RX ORDER — SODIUM CHLORIDE 9 MG/ML
INJECTION, SOLUTION INTRAVENOUS CONTINUOUS
Status: DISPENSED | OUTPATIENT
Start: 2025-06-11 | End: 2025-06-13

## 2025-06-11 RX ORDER — HYDROMORPHONE HYDROCHLORIDE 1 MG/ML
0.25 INJECTION, SOLUTION INTRAMUSCULAR; INTRAVENOUS; SUBCUTANEOUS
Status: COMPLETED | OUTPATIENT
Start: 2025-06-11 | End: 2025-06-13

## 2025-06-11 RX ORDER — TAMSULOSIN HYDROCHLORIDE 0.4 MG/1
0.4 CAPSULE ORAL 2 TIMES DAILY
Status: DISCONTINUED | OUTPATIENT
Start: 2025-06-11 | End: 2025-06-14 | Stop reason: HOSPADM

## 2025-06-11 RX ORDER — HYDROMORPHONE HYDROCHLORIDE 1 MG/ML
1 INJECTION, SOLUTION INTRAMUSCULAR; INTRAVENOUS; SUBCUTANEOUS ONCE
Status: COMPLETED | OUTPATIENT
Start: 2025-06-11 | End: 2025-06-11

## 2025-06-11 RX ORDER — MAGNESIUM SULFATE 1 G/100ML
1000 INJECTION INTRAVENOUS ONCE
Status: COMPLETED | OUTPATIENT
Start: 2025-06-11 | End: 2025-06-11

## 2025-06-11 RX ORDER — NAPROXEN 500 MG/1
500 TABLET ORAL 2 TIMES DAILY WITH MEALS
COMMUNITY

## 2025-06-11 RX ORDER — ONDANSETRON 4 MG/1
TABLET, FILM COATED ORAL
COMMUNITY
Start: 2025-05-21

## 2025-06-11 RX ORDER — ONDANSETRON 2 MG/ML
4 INJECTION INTRAMUSCULAR; INTRAVENOUS EVERY 6 HOURS PRN
Status: DISCONTINUED | OUTPATIENT
Start: 2025-06-11 | End: 2025-06-11

## 2025-06-11 RX ORDER — PHENAZOPYRIDINE HYDROCHLORIDE 200 MG/1
200 TABLET, FILM COATED ORAL 3 TIMES DAILY
COMMUNITY

## 2025-06-11 RX ORDER — POLYETHYLENE GLYCOL 3350 17 G/17G
POWDER, FOR SOLUTION ORAL
COMMUNITY
Start: 2025-06-07

## 2025-06-11 RX ADMIN — ACETAMINOPHEN 650 MG: 325 TABLET ORAL at 16:03

## 2025-06-11 RX ADMIN — MIDAZOLAM 5 MG: 1 INJECTION INTRAMUSCULAR; INTRAVENOUS at 06:20

## 2025-06-11 RX ADMIN — SODIUM CHLORIDE: 0.9 INJECTION, SOLUTION INTRAVENOUS at 14:56

## 2025-06-11 RX ADMIN — POTASSIUM CHLORIDE 10 MEQ: 7.46 INJECTION, SOLUTION INTRAVENOUS at 03:02

## 2025-06-11 RX ADMIN — AMLODIPINE BESYLATE 10 MG: 10 TABLET ORAL at 14:06

## 2025-06-11 RX ADMIN — OXYBUTYNIN CHLORIDE 5 MG: 5 TABLET ORAL at 16:03

## 2025-06-11 RX ADMIN — CEFEPIME 2000 MG: 2 INJECTION, POWDER, FOR SOLUTION INTRAVENOUS at 03:46

## 2025-06-11 RX ADMIN — SODIUM CHLORIDE: 9 INJECTION, SOLUTION INTRAVENOUS at 11:56

## 2025-06-11 RX ADMIN — TAMSULOSIN HYDROCHLORIDE 0.4 MG: 0.4 CAPSULE ORAL at 22:26

## 2025-06-11 RX ADMIN — TAMSULOSIN HYDROCHLORIDE 0.4 MG: 0.4 CAPSULE ORAL at 14:06

## 2025-06-11 RX ADMIN — ONDANSETRON 4 MG: 2 INJECTION, SOLUTION INTRAMUSCULAR; INTRAVENOUS at 01:45

## 2025-06-11 RX ADMIN — HYDROMORPHONE HYDROCHLORIDE 1 MG: 1 INJECTION, SOLUTION INTRAMUSCULAR; INTRAVENOUS; SUBCUTANEOUS at 22:26

## 2025-06-11 RX ADMIN — MEROPENEM 1000 MG: 1 INJECTION, POWDER, FOR SOLUTION INTRAVENOUS at 14:58

## 2025-06-11 RX ADMIN — SODIUM CHLORIDE 1000 ML: 0.9 INJECTION, SOLUTION INTRAVENOUS at 03:03

## 2025-06-11 RX ADMIN — GABAPENTIN 300 MG: 300 CAPSULE ORAL at 22:26

## 2025-06-11 RX ADMIN — SODIUM CHLORIDE 1000 ML: 0.9 INJECTION, SOLUTION INTRAVENOUS at 01:58

## 2025-06-11 RX ADMIN — OXYBUTYNIN CHLORIDE 5 MG: 5 TABLET ORAL at 22:26

## 2025-06-11 RX ADMIN — MORPHINE SULFATE 4 MG: 4 INJECTION INTRAVENOUS at 01:45

## 2025-06-11 RX ADMIN — MAGNESIUM SULFATE HEPTAHYDRATE 1000 MG: 1 INJECTION, SOLUTION INTRAVENOUS at 15:02

## 2025-06-11 RX ADMIN — CARVEDILOL 6.25 MG: 6.25 TABLET, FILM COATED ORAL at 16:03

## 2025-06-11 RX ADMIN — MORPHINE SULFATE 15 MG: 15 TABLET, FILM COATED, EXTENDED RELEASE ORAL at 23:55

## 2025-06-11 RX ADMIN — MORPHINE SULFATE 4 MG: 4 INJECTION INTRAVENOUS at 09:25

## 2025-06-11 RX ADMIN — Medication 1 TABLET: at 14:06

## 2025-06-11 RX ADMIN — SODIUM CHLORIDE 250 MG: 9 INJECTION, SOLUTION INTRAVENOUS at 19:15

## 2025-06-11 RX ADMIN — MORPHINE SULFATE 15 MG: 15 TABLET ORAL at 05:41

## 2025-06-11 RX ADMIN — OXYCODONE AND ACETAMINOPHEN 1 TABLET: 5; 325 TABLET ORAL at 14:06

## 2025-06-11 RX ADMIN — HYDROMORPHONE HYDROCHLORIDE 0.5 MG: 1 INJECTION, SOLUTION INTRAMUSCULAR; INTRAVENOUS; SUBCUTANEOUS at 02:58

## 2025-06-11 ASSESSMENT — PAIN DESCRIPTION - DESCRIPTORS
DESCRIPTORS: DISCOMFORT
DESCRIPTORS: OTHER (COMMENT)
DESCRIPTORS: SHARP
DESCRIPTORS: PATIENT UNABLE TO DESCRIBE
DESCRIPTORS: ACHING
DESCRIPTORS: DISCOMFORT;HYPERSENSITIVITY

## 2025-06-11 ASSESSMENT — PAIN DESCRIPTION - ORIENTATION
ORIENTATION: LOWER
ORIENTATION: MID
ORIENTATION: OTHER (COMMENT)
ORIENTATION: LOWER

## 2025-06-11 ASSESSMENT — PAIN DESCRIPTION - LOCATION
LOCATION: GROIN
LOCATION: ABDOMEN
LOCATION: ABDOMEN
LOCATION: OTHER (COMMENT)
LOCATION: OTHER (COMMENT)
LOCATION: ABDOMEN
LOCATION: ABDOMEN

## 2025-06-11 ASSESSMENT — PAIN SCALES - GENERAL
PAINLEVEL_OUTOF10: 9
PAINLEVEL_OUTOF10: 7
PAINLEVEL_OUTOF10: 7
PAINLEVEL_OUTOF10: 8
PAINLEVEL_OUTOF10: 0
PAINLEVEL_OUTOF10: 3
PAINLEVEL_OUTOF10: 6
PAINLEVEL_OUTOF10: 4
PAINLEVEL_OUTOF10: 8

## 2025-06-11 ASSESSMENT — PAIN - FUNCTIONAL ASSESSMENT
PAIN_FUNCTIONAL_ASSESSMENT: 0-10
PAIN_FUNCTIONAL_ASSESSMENT: PREVENTS OR INTERFERES SOME ACTIVE ACTIVITIES AND ADLS
PAIN_FUNCTIONAL_ASSESSMENT: PREVENTS OR INTERFERES WITH ALL ACTIVE AND SOME PASSIVE ACTIVITIES
PAIN_FUNCTIONAL_ASSESSMENT: PREVENTS OR INTERFERES WITH MANY ACTIVE NOT PASSIVE ACTIVITIES
PAIN_FUNCTIONAL_ASSESSMENT: PREVENTS OR INTERFERES WITH MANY ACTIVE NOT PASSIVE ACTIVITIES
PAIN_FUNCTIONAL_ASSESSMENT: PREVENTS OR INTERFERES SOME ACTIVE ACTIVITIES AND ADLS
PAIN_FUNCTIONAL_ASSESSMENT: ADULT NONVERBAL PAIN SCALE (NPVS)
PAIN_FUNCTIONAL_ASSESSMENT: PREVENTS OR INTERFERES SOME ACTIVE ACTIVITIES AND ADLS

## 2025-06-11 ASSESSMENT — PAIN DESCRIPTION - PAIN TYPE
TYPE: CHRONIC PAIN

## 2025-06-11 ASSESSMENT — PAIN SCALES - PAIN ASSESSMENT IN ADVANCED DEMENTIA (PAINAD)
BREATHING: OCCASIONAL LABORED BREATHING, SHORT PERIOD OF HYPERVENTILATION
BREATHING: NORMAL

## 2025-06-11 ASSESSMENT — PAIN SCALES - WONG BAKER
WONGBAKER_NUMERICALRESPONSE: NO HURT
WONGBAKER_NUMERICALRESPONSE: HURTS LITTLE MORE
WONGBAKER_NUMERICALRESPONSE: NO HURT
WONGBAKER_NUMERICALRESPONSE: NO HURT

## 2025-06-11 ASSESSMENT — PAIN DESCRIPTION - FREQUENCY
FREQUENCY: INTERMITTENT
FREQUENCY: CONTINUOUS
FREQUENCY: CONTINUOUS

## 2025-06-11 ASSESSMENT — PAIN DESCRIPTION - ONSET
ONSET: PROGRESSIVE
ONSET: PROGRESSIVE

## 2025-06-11 NOTE — CONSULTS
ADVANCED NEPHROLOGY CONSULT NOTE  Dr. Bartolo Harp and Dr Casimiro Durán                Assessment    1.  History of metastatic bladder cancer with bilateral hydronephrosis  #2 bilateral ureteral stents with hydronephrosis  #3 history of kidney stones  #4 CKD 3 with acute renal failure  #5 hypertension  #6 metabolic encephalopathy        Plan   1.  Follow-up treatment plan with oncology  #2 urology on the case about planning for stent change if needed follow-up for urinary tract infection  #3 monitor renal function acute renal failure and above setting gentle hydration and fluid and for now hypokalemia replete  #4 blood pressure elevated but also little anxious adjust meds  #5 monitor mood and affect somewhat disoriented and confused to    Date:6/11/2025        Patient Name:Jethro Vázquez     YOB: 1960     Age:65 y.o.        Chief Complaint     Reason for Consult: Acute renal failure on CKD    History Obtained From   patient, electronic medical record    History of Present Illness   The patient is a 65 y.o. male who presents with known history of kidney stones hypertension stage III kidney disease metastatic bladder cancer bilateral hydronephrosis prior stent placement seen back in March with acute renal failure now presents after not feeling good possible sepsis UTI stents are still in appropriate place urology evaluated in the above setting patient just feeling somewhat agitated somewhat confused and now admitted for acute on chronic kidney failure and wanted nephrology to evaluate as well patient unfortunate unable to give more detailed history most information per chart patient is a full code        Patient Active Problem List   Diagnosis Code    Non-recurrent bilateral inguinal hernia without obstruction or gangrene K40.20    Right inguinal hernia K40.90    Lipoma, 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

## 2025-06-11 NOTE — ED PROVIDER NOTES
normal limits   MICROSCOPIC URINALYSIS - Abnormal; Notable for the following components:    WBC, UA 10 TO 20 (*)     RBC, UA 21 TO 50 (*)     Renal Epithelial, UA PRESENT (*)     Mucus, UA PRESENT (*)     Amorphous, UA PRESENT (*)     All other components within normal limits   CULTURE, BLOOD 1   CULTURE, BLOOD 1   CULTURE, URINE   LACTATE, SEPSIS   PLATELET CONFIRMATION   LIPASE   PROCALCITONIN   SPECIMEN REJECTION          EKG: All EKG's are interpreted by the Emergency Department Physician who either signs or Co-signs this chart in the absence of a cardiologist.       EKG Interpretation    Interpreted by emergency department physician    EKG Interpretation    Interpreted by emergency department physician      EKG is interpreted by me.  There is a fair amount of artifact making a bit difficult to interpret, has a rate of 104 bpm, mild left axis deviation, no remarkable ST segment lesions or depressions noted, T waves overall appear to be unremarkable, potentially flattened, QRS duration of 90, QTc of 691.  Final impression, tachycardia, nonspecific EKG.    Cristina Ayala MD       RADIOLOGY:     Non-plain film images such as CT, Ultrasound and MRI are read by the radiologist. Plain radiographic images are visualized and preliminarily interpreted by the emergency physician.       Interpretation per the Radiologist below, if available at the time of this note:    CT ABDOMEN PELVIS WO CONTRAST Additional Contrast? None   Final Result      XR CHEST PORTABLE   Final Result            ED BEDSIDE ULTRASOUND:   Performed by ED Physician Cristina Ayala MD       LABS:  Labs Reviewed   CBC WITH AUTO DIFFERENTIAL - Abnormal; Notable for the following components:       Result Value    RBC 3.75 (*)     Hemoglobin 10.1 (*)     Hematocrit 30.9 (*)     MCH 26.9 (*)     Neutrophils % 85 (*)     Lymphocytes % 10 (*)     Immature Granulocytes % 1 (*)     All other components within normal limits   LACTATE, SEPSIS -

## 2025-06-11 NOTE — PROGRESS NOTES
4 Eyes Skin Assessment     NAME:  Jethro Vázquez  YOB: 1960  MEDICAL RECORD NUMBER:  [id number]    The patient is being assessed for  Admission    I agree that at least one RN has performed a thorough Head to Toe Skin Assessment on the patient. ALL assessment sites listed below have been assessed.      Areas assessed by both nurses:    Head, Face, Ears, Shoulders, Back, Chest, Arms, Elbows, Hands, Sacrum. Buttock, Coccyx, Ischium, Legs. Feet and Heels, and Under Medical Devices         Does the Patient have a Wound? No noted wound(s)       Guilherme Prevention initiated by RN: No  Wound Care Orders initiated by RN: No    Pressure Injury (Stage 3,4, Unstageable, DTI, NWPT, and Complex wounds) if present, place Wound referral order by RN under : No    New Ostomies, if present place, Ostomy referral order under : No     Nurse 1 eSignature: Electronically signed by Camille Dietrich RN on 6/11/25 at 12:23 PM EDT    **SHARE this note so that the co-signing nurse can place an eSignature**    Nurse 2 eSignature: Electronically signed by Carolina Valenzuela LPN on 6/11/25 at 12:29 PM EDT

## 2025-06-11 NOTE — PROGRESS NOTES
Latest Reference Range & Units 06/11/25 01:42 06/11/25 01:55 06/11/25 02:00 06/11/25 02:08 06/11/25 02:31 06/11/25 02:45 06/11/25 04:25 06/11/25 13:47 06/11/25 14:36 06/11/25 16:47   Sodium 136 - 145 mmol/L    138    142     Potassium 3.5 - 5.1 mmol/L    2.7 (LL)    3.2 (L)     Chloride 99 - 110 mmol/L    99    106     CARBON DIOXIDE 21 - 32 mmol/L    16 (L)    22     BUN,BUNPL 7 - 20 mg/dL    40 (H)    41 (H)     Creatinine 0.8 - 1.3 mg/dL    3.2 (H)    3.2 (H)     Anion Gap 9 - 17 mmol/L    23 (H)    15     Est, Glom Filt Rate >60 mL/min/1.73m2    21 (L)    20 (L)     Magnesium 1.8 - 2.4 mg/dL   1.7 (L)          Lactic Acid, Sepsis 0.4 - 2.0 mmol/L       1.6      Glucose 74 - 99 mg/dL    111 (H)    106 (H)     Calcium 8.3 - 10.6 mg/dL    12.2 (H)    11.3 (H)     Albumin/Globulin Ratio     1.0         Total Protein 6.4 - 8.2 g/dL    7.0         Procalcitonin ng/mL   0.21          Albumin 3.4 - 5.0 g/dL    3.6         Alkaline Phosphatase 40 - 129 U/L    135 (H)         ALT 10 - 40 U/L    <5 (L)         AST 15 - 37 U/L    16         Total Bilirubin 0.0 - 1.0 mg/dL    0.4         Lipase 13 - 60 U/L   13          pH, Urine 5.0 - 8.0       6.5    6.5   CULTURE, BLOOD 1   Rpt (P)           CULTURE, URINE       Rpt (IP)       Color, UA Yellow       Yellow    Yellow   Turbidity UA Clear       Clear    SLIGHTLY CLOUDY !   Glucose, Ur NEGATIVE mg/dL      NEGATIVE    NEGATIVE   Bilirubin, Urine NEGATIVE       NEGATIVE    NEGATIVE   Ketones, Urine NEGATIVE mg/dL      15 !    NEGATIVE   Specific Gravity, UA 1.005 - 1.030       1.010    1.015   Protein, UA NEGATIVE mg/dL      100 !    100 !   Urobilinogen 0.0 - 1.0 EU/dL      0.2    0.2   Nitrite, Urine NEGATIVE       POSITIVE !    NEGATIVE   Leukocyte Esterase, Urine NEGATIVE       LARGE !    SMALL !   Casts UA /LPF          PENDING   Mucus, UA None       PRESENT !    PENDING   WBC, UA /HPF      10 TO 20 !    PENDING   RBC, UA /HPF      21 TO 50 !    PENDING   Epithelial  Cells, UA /HPF          PENDING   Renal Epithelial, UA 0 /HPF      PRESENT !    PENDING   Bacteria, UA None           PENDING   Amorphous, UA None       PRESENT !    PENDING   Yeast, Urine None           PENDING   Urine Hgb NEGATIVE       LARGE !    LARGE !   Trichomonas None           PENDING   Other observations NOT REQ.           PENDING   Chloride, Ur 110 - 250 mmol/L          81 (L)   Potassium, Ur 25 - 150 mmol/L          30.7   Sodium, Ur 40 - 220 mmol/L          85   CT HEAD WO CONTRAST          Rpt    CT ABDOMEN PELVIS WO CONTRAST      Rpt        XR CHEST PORTABLE  Rpt            Special Requests        (P)           Sperm, Urine None seen           PENDING   (LL): Data is critically low  (L): Data is abnormally low  (H): Data is abnormally high  !: Data is abnormal  (P): Preliminary  (IP): In Process  Rpt: View report in Results Review for more information

## 2025-06-11 NOTE — CONSULTS
[address] [address]   Consult Note  [facility] [address]    Date: 2025   Patient: Jethro Vázquez   : 1960   DOA: 2025   MRN: [id number]   ROOM#: [id number]     Reason for Consult: Bilateral hydronephrosis  Requesting Physician: Dr. Whitehead  Collaborating Urologist on Call at time of admission: Dr. Jc  CHIEF COMPLAINT: AMS    History Obtained From: patient, electronic medical record    HISTORY OF PRESENT ILLNESS:                The patient is a 65 y.o. male with significant past medical history of kidney stones, HTN, CKD3, metastatic bladder cancer, and bilateral hydronephrosis managed with bilateral ureteral stent who presented with AMS and agitation. Work-up in the ED revealed concern for sepsis secondary to a UTI and SHIRLEY (Cr 3.2) for which he was admitted. CT a/p showed stents in appropriate position with mild bilateral hydronephrosis and bladder wall thickening.  On exam, the pt states he does not know why he was admitted but does having urinary urgency/frequency and mild dysuria. He is mildly agitated and wants to get out of his bed. Otherwise reports feeling well with no pain.    ED Provider's HPI 25: 65 yom with h/o invasive, high-grade urethral carcinoma s/p TURBT in 2023, complicated uti presents from home with concern for altered mental status. He is on hospice but we were told by collateral sources that they do not have active DNR paperwork. He has been admitted previously for complicated UTI and sepsis. He had an admission in March. This also appears to be the timeframe in which she was placed on hospice. We do not initially have any clot restraints at bedside. He is confused in the emergency department. He gives some appropriate answers. Were told by EMS that he was a bit combative on scene but he is overall calm and cooperative in the emergency department. He presents mildly tachycardic. Presents with dry mucous membranes. Peers to be moving all  resection of large bladder tumor 12/14/23. Ureteral orifices not visible. Pathology with invasive high grade urothelial carcinoma.              S/p chemotherapy and radiation therapy 9/2024; follows with Dr. Gambino and Dr. Lr.              Recommend Oxybutynin 5mg q8h prn for stent colic, flomax 0.4mg daily for stent colic.              Plan for next stent exchange in the next 1-2 months as outpatient.    2) Sepsis with Complicated UTI: UA w pos nit, large leuks; urine and blood cultures pending   WBC 9.1, afebrile   On IV Merrem; abx per primary    Will follow.  Patient seen and examined, chart reviewed.     Electronically signed by Juan Zaidi PA-C on 6/11/2025 at 2:32 PM

## 2025-06-11 NOTE — H&P
V2.0  History and Physical      Name:  Jethro Vázquez /Age/Sex: 1960  (65 y.o. male)   MRN & CSN:  [id number] & [id number] Encounter Date/Time: 2025 11:06 AM EDT   Location:  [address] [place] [address]- PCP: Mono Schaffer MD       Hospital Day: 1    Assessment and Plan:   Jethro Vázquez is a 65 y.o. male     Patient was sent to ED because of confusion    Acute kidney injury with chronic kidney disease stage III  - Creatinine 3.2 in ED compared to 3.1 on May 10, and 2.4 on   - Metabolic acidosis noted in the ED with bicarb level 16 and anion gap 23  - Normal saline at the 100 mL/h, appreciate nephrology consult  - Wife read the list of his home medications which include Naprosyn-stop Naprosyn    Hypercalcemia with calcium level 12.2 in ED  - Consult oncology regarding if he may benefit from a bisphosphonate as he does have a mental status change    Severe hypokalemia-potassium level 2.7 in the ED  - Will replace    Bilateral hydronephrosis, with bilateral ureteral stents present  - CT in the ED showed mild bilateral hydronephrosis and that the stents are in appropriate position  - Urology consult appreciated    Complicated UTI  - Enterobacter cloacae complex MDRO noted in 2020 for which was not very sensitive to cefepime  - Started meropenem IV  - Follow-up urine culture sent from [facility] ED, and de-escalate if indicated    Severe sepsis  - Lactic acid level 2.3 in the ED and has since normalized  - Was diagnosed with above in [facility] ED  - Fluids and antibiotics as above    Abnormal CT of the pancreas with stranding and fullness around the pancreas concerning for pancreatitis  - Consider GI consult  - His wife did indicate that his stomach was hurting and his back was hurting at home-monitor    Metastatic bladder cancer  - Had a cystoscopy and bilateral ureteral stent exchange on 3/4/2025 because of hydronephrosis  - Had bilateral nephrostomies placed in IR in 2024  - Had TURBT of large  [employer], this is a 15-day supply, implying that he can take 6 a day.  His wife does not seem to be aware of this medication.     Review of Systems:      Pertinent positives and negatives discussed in HPI     Objective:   No intake or output data in the 24 hours ending 06/11/25 1106   Vitals:   Vitals:    06/11/25 1045   BP: (!) 158/100   Pulse: (!) 105   Resp: 16   Temp: 98 °F (36.7 °C)   TempSrc: Axillary   SpO2: 96%   Weight: 71.7 kg (158 lb 0.8 oz)       Personally Reviewed Medications Prior to Admission     Prior to Admission medications    Medication Sig Start Date End Date Taking? Authorizing Provider   gabapentin (NEURONTIN) 300 MG capsule Take 1 capsule by mouth nightly for 30 days. 3/25/25 4/24/25  Josh Montero MD   oxyBUTYnin (DITROPAN) 5 MG tablet Take 1 tablet by mouth 3 times daily 3/25/25   Josh Montero MD   oxyCODONE-acetaminophen (PERCOCET) 5-325 MG per tablet Take 1 tablet by mouth 2 times daily as needed for Pain. 10/9/24   Aram Wiley MD   sennosides-docusate sodium (SENOKOT-S) 8.6-50 MG tablet Take 2 tablets by mouth 2 times daily as needed for Constipation 10/29/24   Haroon Whitehead MD   tamsulosin (FLOMAX) 0.4 MG capsule Take 1 capsule by mouth 2 times daily 9/25/24   Cassie Lr MD   Multiple Vitamins-Minerals (THERAPEUTIC MULTIVITAMIN-MINERALS) tablet Take 1 tablet by mouth daily    Aram Wiley MD   naloxone 4 MG/0.1ML LIQD nasal spray  3/29/24   Aram Wiley MD   amLODIPine (NORVASC) 10 MG tablet Take 1 tablet by mouth daily 1/19/24   Vladislav Brewster MD       Physical Exam:    Physical Exam     General: Very ill-appearing  Eyes: EOMI  ENT: neck supple  Cardiovascular: Regular rate.  Respiratory: Clear to auscultation  Gastrointestinal: Nondistended  Skin: warm, dry  Neuro: Confused  Psych: Mood appropriate.       Past Medical History:   PMHx   Past Medical History:   Diagnosis Date    Acid reflux     History of kidney stones

## 2025-06-11 NOTE — ED NOTES
Access center called , pt will be going to room 4129 via [facility] which will be here at 0915   
Green and red tubes recollected  
Patient asked to have a bowel movement. Assisted patient to the bedside commode. Patient unable to have a bowel movement.  
Patient given urinal.  
Patient having increased agitation. Found standing by the bed urinating on the floor. Sat patient on bedside commode to attempt a bowel movement. Unable to have a bowel movement.  
Provider notified of increased agitation. Provider stated it would be appropriate to give the Versed.  
Update given to KATHRYN Wilson from hospice.  
PRESENT (*)     Mucus, UA PRESENT (*)     Amorphous, UA PRESENT (*)     All other components within normal limits        Background  History:   Past Medical History:   Diagnosis Date    Acid reflux     History of kidney stones     \"had kidney stone 12/2017- passed it\"    Hypertension     \"on medication since age 50's\"    Malignant neoplasm of overlapping sites of bladder (HCC) 01/04/2024    No natural teeth        Assessment    Vitals: MEWS Score: 3  Level of Consciousness: Alert (0)   Vitals:    06/11/25 0626 06/11/25 0628 06/11/25 0631 06/11/25 0632   BP:    (!) 168/108   Pulse:   (!) 108 (!) 106   Resp:   19 19   Temp:       TempSrc:       SpO2: (!) 84% 93% 98% 100%   Weight:           PO Status: Regular    O2 Flow Rate: O2 Device: Nasal cannula (Placed) O2 Flow Rate (L/min): 2 L/min    Cardiac Rhythm: NS    Last documented pain medication administered: 0541am    NIH Score: NIH       Active LDA's:   Peripheral IV 06/11/25 Right Antecubital (Active)   Site Assessment Clean, dry & intact 06/11/25 0143   Line Status Blood return noted;Brisk blood return;Flushed 06/11/25 0143       Pertinent or High Risk Medications/Drips: no   If Yes, please provide details: na      Blood Product Administration: no  If Yes, please provide details: na    Recommendation    Incomplete orders NA  Additional Comments: Patient has been agitated and confused.   If any further questions, please call Sending RN at [phone]    Electronically signed by: Electronically signed by Dolores Tapia RN on 6/11/2025 at 6:45 AM

## 2025-06-12 ENCOUNTER — RESULTS FOLLOW-UP (OUTPATIENT)
Dept: EMERGENCY DEPT | Age: 65
End: 2025-06-12

## 2025-06-12 ENCOUNTER — APPOINTMENT (OUTPATIENT)
Dept: CT IMAGING | Age: 65
DRG: 689 | End: 2025-06-12
Attending: FAMILY MEDICINE
Payer: MEDICARE

## 2025-06-12 ENCOUNTER — APPOINTMENT (OUTPATIENT)
Dept: GENERAL RADIOLOGY | Age: 65
DRG: 689 | End: 2025-06-12
Attending: FAMILY MEDICINE
Payer: MEDICARE

## 2025-06-12 LAB
ALBUMIN SERPL-MCNC: 3.4 G/DL (ref 3.4–5)
ALBUMIN/GLOB SERPL: 1 {RATIO} (ref 1.1–2.2)
ALP SERPL-CCNC: 145 U/L (ref 40–129)
ALT SERPL-CCNC: <5 U/L (ref 10–40)
ANION GAP SERPL CALCULATED.3IONS-SCNC: 15 MMOL/L (ref 9–17)
AST SERPL-CCNC: 16 U/L (ref 15–37)
BASOPHILS # BLD: 0.02 K/UL
BASOPHILS NFR BLD: 0 % (ref 0–1)
BILIRUB SERPL-MCNC: 0.3 MG/DL (ref 0–1)
BUN SERPL-MCNC: 36 MG/DL (ref 7–20)
CALCIUM SERPL-MCNC: 11.8 MG/DL (ref 8.3–10.6)
CHLORIDE SERPL-SCNC: 107 MMOL/L (ref 99–110)
CO2 SERPL-SCNC: 22 MMOL/L (ref 21–32)
CREAT SERPL-MCNC: 3 MG/DL (ref 0.8–1.3)
EOSINOPHIL # BLD: 0.18 K/UL
EOSINOPHILS RELATIVE PERCENT: 2 % (ref 0–3)
ERYTHROCYTE [DISTWIDTH] IN BLOOD BY AUTOMATED COUNT: 13.1 % (ref 11.7–14.9)
GFR, ESTIMATED: 21 ML/MIN/1.73M2
GLUCOSE SERPL-MCNC: 112 MG/DL (ref 74–99)
HAPTOGLOB SERPL-MCNC: 290 MG/DL (ref 30–200)
HCT VFR BLD AUTO: 27.9 % (ref 42–52)
HGB BLD-MCNC: 9 G/DL (ref 13.5–18)
IMM GRANULOCYTES # BLD AUTO: 0.05 K/UL
IMM GRANULOCYTES NFR BLD: 1 %
INR PPP: 1.4
LACTATE BLDV-SCNC: 0.9 MMOL/L (ref 0.4–2)
LDH SERPL-CCNC: 265 U/L (ref 100–190)
LYMPHOCYTES NFR BLD: 0.85 K/UL
LYMPHOCYTES RELATIVE PERCENT: 10 % (ref 24–44)
MAGNESIUM SERPL-MCNC: 2.2 MG/DL (ref 1.8–2.4)
MCH RBC QN AUTO: 27.2 PG (ref 27–31)
MCHC RBC AUTO-ENTMCNC: 32.3 G/DL (ref 32–36)
MCV RBC AUTO: 84.3 FL (ref 78–100)
MICROORGANISM SPEC CULT: NORMAL
MONOCYTES NFR BLD: 0.39 K/UL
MONOCYTES NFR BLD: 5 % (ref 0–5)
NEUTROPHILS NFR BLD: 83 % (ref 36–66)
NEUTS SEG NFR BLD: 7.12 K/UL
PLATELET # BLD AUTO: 263 K/UL (ref 140–440)
PMV BLD AUTO: 9.3 FL (ref 7.5–11.1)
POTASSIUM SERPL-SCNC: 3 MMOL/L (ref 3.5–5.1)
PROT SERPL-MCNC: 6.6 G/DL (ref 6.4–8.2)
PROTHROMBIN TIME: 17 SEC (ref 11.7–14.5)
RBC # BLD AUTO: 3.31 M/UL (ref 4.6–6.2)
RETICS # AUTO: 0.05 M/UL
RETICS/RBC NFR AUTO: 1.7 % (ref 0.2–2)
SERVICE CMNT-IMP: NORMAL
SODIUM SERPL-SCNC: 144 MMOL/L (ref 136–145)
SPECIMEN DESCRIPTION: NORMAL
TSH SERPL DL<=0.05 MIU/L-ACNC: 1.93 UIU/ML (ref 0.27–4.2)
WBC OTHER # BLD: 8.6 K/UL (ref 4–10.5)

## 2025-06-12 PROCEDURE — 80053 COMPREHEN METABOLIC PANEL: CPT

## 2025-06-12 PROCEDURE — 2580000003 HC RX 258: Performed by: INTERNAL MEDICINE

## 2025-06-12 PROCEDURE — 6370000000 HC RX 637 (ALT 250 FOR IP): Performed by: PHYSICIAN ASSISTANT

## 2025-06-12 PROCEDURE — 6370000000 HC RX 637 (ALT 250 FOR IP): Performed by: INTERNAL MEDICINE

## 2025-06-12 PROCEDURE — 36415 COLL VENOUS BLD VENIPUNCTURE: CPT

## 2025-06-12 PROCEDURE — 83615 LACTATE (LD) (LDH) ENZYME: CPT

## 2025-06-12 PROCEDURE — 6360000002 HC RX W HCPCS: Performed by: INTERNAL MEDICINE

## 2025-06-12 PROCEDURE — APPNB45 APP NON BILLABLE 31-45 MINUTES: Performed by: PHYSICIAN ASSISTANT

## 2025-06-12 PROCEDURE — 6360000002 HC RX W HCPCS

## 2025-06-12 PROCEDURE — 6370000000 HC RX 637 (ALT 250 FOR IP)

## 2025-06-12 PROCEDURE — 94761 N-INVAS EAR/PLS OXIMETRY MLT: CPT

## 2025-06-12 PROCEDURE — 71045 X-RAY EXAM CHEST 1 VIEW: CPT

## 2025-06-12 PROCEDURE — 85025 COMPLETE CBC W/AUTO DIFF WBC: CPT

## 2025-06-12 PROCEDURE — 84443 ASSAY THYROID STIM HORMONE: CPT

## 2025-06-12 PROCEDURE — 83010 ASSAY OF HAPTOGLOBIN QUANT: CPT

## 2025-06-12 PROCEDURE — 83735 ASSAY OF MAGNESIUM: CPT

## 2025-06-12 PROCEDURE — 51798 US URINE CAPACITY MEASURE: CPT

## 2025-06-12 PROCEDURE — 1200000000 HC SEMI PRIVATE

## 2025-06-12 PROCEDURE — 85045 AUTOMATED RETICULOCYTE COUNT: CPT

## 2025-06-12 PROCEDURE — 99223 1ST HOSP IP/OBS HIGH 75: CPT | Performed by: INTERNAL MEDICINE

## 2025-06-12 PROCEDURE — 83605 ASSAY OF LACTIC ACID: CPT

## 2025-06-12 PROCEDURE — 71250 CT THORAX DX C-: CPT

## 2025-06-12 PROCEDURE — 85610 PROTHROMBIN TIME: CPT

## 2025-06-12 PROCEDURE — 2500000003 HC RX 250 WO HCPCS: Performed by: INTERNAL MEDICINE

## 2025-06-12 RX ORDER — ONDANSETRON 2 MG/ML
4 INJECTION INTRAMUSCULAR; INTRAVENOUS EVERY 6 HOURS PRN
Status: DISCONTINUED | OUTPATIENT
Start: 2025-06-12 | End: 2025-06-14 | Stop reason: HOSPADM

## 2025-06-12 RX ORDER — HYDROXYZINE HYDROCHLORIDE 10 MG/1
10 TABLET, FILM COATED ORAL ONCE
Status: COMPLETED | OUTPATIENT
Start: 2025-06-12 | End: 2025-06-12

## 2025-06-12 RX ORDER — CARVEDILOL 6.25 MG/1
12.5 TABLET ORAL 2 TIMES DAILY WITH MEALS
Status: DISCONTINUED | OUTPATIENT
Start: 2025-06-12 | End: 2025-06-14 | Stop reason: HOSPADM

## 2025-06-12 RX ORDER — CALCITONIN SALMON 200 [USP'U]/ML
100 INJECTION, SOLUTION INTRAMUSCULAR; SUBCUTANEOUS ONCE
Status: COMPLETED | OUTPATIENT
Start: 2025-06-12 | End: 2025-06-12

## 2025-06-12 RX ORDER — POTASSIUM CHLORIDE 7.45 MG/ML
10 INJECTION INTRAVENOUS
Status: DISPENSED | OUTPATIENT
Start: 2025-06-12 | End: 2025-06-12

## 2025-06-12 RX ORDER — POTASSIUM CHLORIDE 7.45 MG/ML
10 INJECTION INTRAVENOUS
Status: COMPLETED | OUTPATIENT
Start: 2025-06-12 | End: 2025-06-12

## 2025-06-12 RX ORDER — CLONIDINE 0.1 MG/24H
1 PATCH, EXTENDED RELEASE TRANSDERMAL WEEKLY
Status: DISCONTINUED | OUTPATIENT
Start: 2025-06-12 | End: 2025-06-14 | Stop reason: HOSPADM

## 2025-06-12 RX ADMIN — CARVEDILOL 12.5 MG: 6.25 TABLET, FILM COATED ORAL at 16:40

## 2025-06-12 RX ADMIN — SODIUM CHLORIDE: 9 INJECTION, SOLUTION INTRAVENOUS at 16:00

## 2025-06-12 RX ADMIN — MEROPENEM 1000 MG: 1 INJECTION, POWDER, FOR SOLUTION INTRAVENOUS at 02:51

## 2025-06-12 RX ADMIN — HYDROMORPHONE HYDROCHLORIDE 0.5 MG: 1 INJECTION, SOLUTION INTRAMUSCULAR; INTRAVENOUS; SUBCUTANEOUS at 06:52

## 2025-06-12 RX ADMIN — TAMSULOSIN HYDROCHLORIDE 0.4 MG: 0.4 CAPSULE ORAL at 21:48

## 2025-06-12 RX ADMIN — Medication 1 TABLET: at 11:20

## 2025-06-12 RX ADMIN — CARVEDILOL 12.5 MG: 6.25 TABLET, FILM COATED ORAL at 11:21

## 2025-06-12 RX ADMIN — GABAPENTIN 300 MG: 300 CAPSULE ORAL at 21:48

## 2025-06-12 RX ADMIN — HYDROMORPHONE HYDROCHLORIDE 0.5 MG: 1 INJECTION, SOLUTION INTRAMUSCULAR; INTRAVENOUS; SUBCUTANEOUS at 21:02

## 2025-06-12 RX ADMIN — OXYBUTYNIN CHLORIDE 5 MG: 5 TABLET ORAL at 11:21

## 2025-06-12 RX ADMIN — OXYCODONE AND ACETAMINOPHEN 1 TABLET: 325; 5 TABLET ORAL at 05:33

## 2025-06-12 RX ADMIN — OXYBUTYNIN CHLORIDE 5 MG: 5 TABLET ORAL at 21:48

## 2025-06-12 RX ADMIN — TAMSULOSIN HYDROCHLORIDE 0.4 MG: 0.4 CAPSULE ORAL at 11:21

## 2025-06-12 RX ADMIN — ONDANSETRON 4 MG: 2 INJECTION INTRAMUSCULAR; INTRAVENOUS at 09:32

## 2025-06-12 RX ADMIN — OXYCODONE AND ACETAMINOPHEN 1 TABLET: 325; 5 TABLET ORAL at 20:10

## 2025-06-12 RX ADMIN — CALCITONIN SALMON 100 UNITS: 200 INJECTION, SOLUTION INTRAMUSCULAR; SUBCUTANEOUS at 11:25

## 2025-06-12 RX ADMIN — HYDROMORPHONE HYDROCHLORIDE 0.5 MG: 1 INJECTION, SOLUTION INTRAMUSCULAR; INTRAVENOUS; SUBCUTANEOUS at 17:35

## 2025-06-12 RX ADMIN — SODIUM CHLORIDE: 0.9 INJECTION, SOLUTION INTRAVENOUS at 16:02

## 2025-06-12 RX ADMIN — POTASSIUM CHLORIDE 10 MEQ: 7.45 INJECTION INTRAVENOUS at 18:22

## 2025-06-12 RX ADMIN — SODIUM CHLORIDE, PRESERVATIVE FREE 10 ML: 5 INJECTION INTRAVENOUS at 11:18

## 2025-06-12 RX ADMIN — POTASSIUM CHLORIDE 10 MEQ: 7.45 INJECTION INTRAVENOUS at 22:43

## 2025-06-12 RX ADMIN — POTASSIUM CHLORIDE 10 MEQ: 7.45 INJECTION INTRAVENOUS at 17:07

## 2025-06-12 RX ADMIN — OXYCODONE AND ACETAMINOPHEN 1 TABLET: 325; 5 TABLET ORAL at 00:55

## 2025-06-12 RX ADMIN — POTASSIUM CHLORIDE 10 MEQ: 7.45 INJECTION INTRAVENOUS at 16:01

## 2025-06-12 RX ADMIN — HYDROXYZINE HYDROCHLORIDE 10 MG: 10 TABLET, FILM COATED ORAL at 23:25

## 2025-06-12 RX ADMIN — MEROPENEM 1000 MG: 1 INJECTION, POWDER, FOR SOLUTION INTRAVENOUS at 16:02

## 2025-06-12 RX ADMIN — OXYBUTYNIN CHLORIDE 5 MG: 5 TABLET ORAL at 16:40

## 2025-06-12 ASSESSMENT — PAIN SCALES - GENERAL
PAINLEVEL_OUTOF10: 9
PAINLEVEL_OUTOF10: 10
PAINLEVEL_OUTOF10: 10

## 2025-06-12 ASSESSMENT — PAIN DESCRIPTION - DESCRIPTORS: DESCRIPTORS: DISCOMFORT;ACHING

## 2025-06-12 ASSESSMENT — PAIN DESCRIPTION - LOCATION: LOCATION: GENERALIZED

## 2025-06-12 ASSESSMENT — PAIN - FUNCTIONAL ASSESSMENT: PAIN_FUNCTIONAL_ASSESSMENT: PREVENTS OR INTERFERES WITH MANY ACTIVE NOT PASSIVE ACTIVITIES

## 2025-06-12 ASSESSMENT — PAIN DESCRIPTION - ORIENTATION: ORIENTATION: RIGHT;LEFT

## 2025-06-12 NOTE — PROGRESS NOTES
Nephrology Progress Note  6/12/2025 8:40 AM  Subjective:     Interval History: Jethro Vázquez is a 65 y.o. male with doing a little better overall but had some nausea vomiting after pain meds this morning we will order some Zofran and see outpatient response he is otherwise verbalizing fairly well to me        Data:   Scheduled Meds:   carvedilol  12.5 mg Oral BID WC    sodium chloride flush  5-40 mL IntraVENous 2 times per day    meropenem  1,000 mg IntraVENous Q12H    amLODIPine  10 mg Oral Daily    gabapentin  300 mg Oral Nightly    therapeutic multivitamin-minerals  1 tablet Oral Daily    tamsulosin  0.4 mg Oral BID    oxyBUTYnin  5 mg Oral TID    morphine  15 mg Oral BID     Continuous Infusions:   sodium chloride 5 mL/hr at 06/11/25 1456    sodium chloride Stopped (06/12/25 0449)         CBC   Recent Labs     06/11/25  0130 06/12/25  0230   WBC 9.1 8.6   HGB 10.1* 9.0*   HCT 30.9* 27.9*    263      BMP   Recent Labs     06/11/25  0208 06/11/25  1347 06/12/25  0230    142 144   K 2.7* 3.2* 3.0*   CL 99 106 107   CO2 16* 22 22   BUN 40* 41* 36*   CREATININE 3.2* 3.2* 3.0*     Hepatic:   Recent Labs     06/11/25  0208 06/12/25  0230   AST 16 16   ALT <5* <5*   BILITOT 0.4 0.3   ALKPHOS 135* 145*     Troponin: No results for input(s): \"TROPONINI\" in the last 72 hours.  BNP: No results for input(s): \"BNP\" in the last 72 hours.  Lipids: No results for input(s): \"CHOL\", \"HDL\" in the last 72 hours.    Invalid input(s): \"LDLCALCU\"  ABGs: No results found for: \"PHART\", \"PO2ART\", \"[id number]\"  INR:   Recent Labs     06/12/25  0230   INR 1.4     Renal Labs  Albumin:  No results found for: \"LABALBU\"  Calcium:    Lab Results   Component Value Date/Time    CALCIUM 11.8 06/12/2025 02:30 AM     Phosphorus:    Lab Results   Component Value Date/Time    PHOS 2.7 03/25/2025 01:20 AM     U/A:    Lab Results   Component Value Date/Time    NITRU NEGATIVE 06/11/2025 04:47 PM    COLORU Yellow 06/11/2025 04:47 PM    PHUR

## 2025-06-12 NOTE — PROGRESS NOTES
[facility] [address]   Progress Note  [facility] [address]      Date: 2025   Patient: Jethro Vázquez   : 1960   DOA: 2025   MRN: [id number]   ROOM#: [id number]     Admit Date: 2025     Collaborating Urologist on Call at time of admission: Dr. Jc   CC: AMS   Reason for Consult: Bilateral hydronephrosis     Subjective:     Pain: mild, no nausea and no vomiting,   Bowel Movement/Flatus: Yes  Voiding: easily    Pt resting in bed, reports feeling well today. Still slightly confused.    Objective:    Vitals:   BP (!) 180/100   Pulse 97   Temp 97.5 °F (36.4 °C) (Oral)   Resp 16   Ht 1.77 m (5' 9.69\")   Wt 71.6 kg (157 lb 13.6 oz)   SpO2 95%   BMI 22.85 kg/m²   Temp  Av.9 °F (36.6 °C)  Min: 97.5 °F (36.4 °C)  Max: 98.1 °F (36.7 °C)    Intake/Output Summary (Last 24 hours) at 2025 0938  Last data filed at 2025 0730  Gross per 24 hour   Intake --   Output 2100 ml   Net -2100 ml       Physical Exam:  Gen: Pleasant male, in NAD. Mildly confused.  Neuro: Non-focal  Resp: Unlabored breathing  CV: Regular rate and rhythm  Abd: Soft, non-distended, mild lower abdominal TTP, no rebound  Back:   No CVAT  Ext: No edema of bilateral LEs    Labs:  WBC:    Lab Results   Component Value Date/Time    WBC 8.6 2025 02:30 AM     Hemoglobin/Hematocrit:    Lab Results   Component Value Date/Time    HGB 9.0 2025 02:30 AM    HCT 27.9 2025 02:30 AM     BMP:   Lab Results   Component Value Date/Time     2025 02:30 AM    K 3.0 2025 02:30 AM     2025 02:30 AM    CO2 22 2025 02:30 AM    BUN 36 2025 02:30 AM    CREATININE 3.0 2025 02:30 AM    CALCIUM 11.8 2025 02:30 AM    GFRAA >60 2018 07:38 AM    LABGLOM 21 2025 02:30 AM    LABGLOM 68 2024 10:09 AM     Blood Culture: NGTD  Urine Culture: pending    Imaging:  XR CHEST PORTABLE  Result Date: 2025   XR CHEST PORTABLE [id number]

## 2025-06-12 NOTE — CONSULTS
Hematology Oncology Inpatient Consult    Patient Name:  Jethro Vázquez  Patient :  1960  Patient MRN:  [id number]  Room: [address] [place], [place] [address]   Admitted: 2025 10:42 AM   Hospital Attending Provider: Catherine Parish MD    Primary Oncologist: Dr. Noemi Gambino MD  PCP:  Mono Schaffer MD     Date of Service: 25       Reason for Consult: Hypercalcemia     Chief Complaint:  No chief complaint on file.    Principal Problem:    Complicated UTI (urinary tract infection)  Resolved Problems:    * No resolved hospital problems. *      HPI:   Jethro Vázquez is a 65 y.o. male who was admitted for acute metabolic encephalopathy, acute SHIRLEY with CKD, hypercalcemia, severe hypokalemia, complicated UTI/sepsis, along with other various findings.  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  Patient just completed chemo/radiation in 10/2024.  Per chart, patient is currently on hospice.       Today, patient is lying in bed in no acute distress.  He is awake and alert and smiling but is oriented only to self.  He does not answer questions appropriately and has very marbled speech.      WBC 8.6, Hg 9.0, Plt 263    CT AP WO CONTRAST, 2025  IMPRESSION:  1. Stranding and fullness around the pancreas concerning for pancreatitis   correlation with serology is advised.  2. Bilateral hydronephrosis with ureteral stents. Possible blood in the right   lower pole calyx. Thickening of the bladder wall with perivesicular inflammatory   changes findings concerning for cystitis/before meals sending infection   correlation with urinalysis is advised. Suboptimal evaluation of the renal   parenchyma without intravenous contrast.  3. Small left pleural effusion with adjacent atelectasis.    CT HEAD WO CONTRAST, 2025  IMPRESSION:  No acute intracranial findings.  There is no evidence for hemorrhage, mass   effect, or acute large territory infarct.  3.7, Hg 11.2, plat 181.  8/19/2024 due for weekly gemzar.  No issue related to gemzar.  Likely he will complete RT on 9/25/2024.  FU with Dr Ivan in 10/2024 for potential cystoscopy.    Past Medical History:   Diagnosis Date    Acid reflux     History of kidney stones     \"had kidney stone 12/2017- passed it\"    Hypertension     \"on medication since age 51's\"    Malignant neoplasm of overlapping sites of bladder (HCC) 01/04/2024    No natural teeth        Past Surgical History:   Procedure Laterality Date    ANKLE SURGERY Left     age 17\"did have pins and then later removed\"    APPENDECTOMY  1987    CHOLECYSTECTOMY, LAPAROSCOPIC N/A 03/05/2024    CHOLECYSTECTOMY LAPAROSCOPIC ROBOTIC WITH EXTENSIVE LYSIS OF ADHESIONS performed by Rufus Mendes II, MD at [facility] OR    CYSTOSCOPY N/A 12/14/2023    CYSTOSCOPY TRANSURETHRAL RESECTION BLADDER TUMOR performed by Sanaz Ivan MD at [facility] OR    CYSTOSCOPY Bilateral 7/8/2024    CYSTOSCOPY RETROGRADE RIGHT STENT REMOVAL LEFT STENT EXCHANGE performed by Sanaz Ivan MD at [facility] OR    CYSTOSCOPY Bilateral 10/25/2024    CYSTOSCOPY, BILATERAL RETROGRADE PYELOGRAMS, LEFT URETERAL STENT EXCHANGE, RIGHT URETERAL STENT INSERTION performed by Haider Cain MD at [facility] OR    CYSTOSCOPY Bilateral 3/24/2025    CYSTOSCOPY URETERAL STENT EXCHANGE performed by Sanaz Ivan MD at [facility] OR    HERNIA REPAIR      IR GUIDED NEPHROSTOMY CATH PLACEMENT  01/12/2024    IR NEPHROSTOMY CATHETER PLACEMENT 1/12/2024 [facility] PROCEDURES    IR GUIDED NEPHROSTOMY CATH PLACEMENT  4/1/2024    IR NEPHROSTOMY CATHETER PLACEMENT 4/1/2024 [facility] PROCEDURES    IR GUIDED URETERAL STENT PLACE THRU EXIST TRACT Bilateral 04/01/2024    IR PORT PLACEMENT > 5 YEARS  03/07/2024    IR PORT PLACEMENT EQUAL OR GREATER THAN 5 YEARS 3/7/2024 [facility] PROCEDURES    OTHER SURGICAL HISTORY Bilateral 01/19/2018    bilat inq hernia repair with mesh

## 2025-06-12 NOTE — CONSULTS
Consult completed. Orders verified to access patient's Mediport verified. A Deltec Gripper Plus POWER PAC Non-Coring 0.75\" Vo needle for Power Injection was inserted into patient's Mediport per protocol. Brisk blood return noted, flushes without difficulty, labs collected. Patient tolerated procedure well. Primary nurse  notified.     Consult the Vascular Access Team for questions, concerns, or change in patient's condition.

## 2025-06-12 NOTE — PROGRESS NOTES
In-Patient Progress Note    Patient:  Jethro Vázquez 65 y.o. male MRN: [id number]     Date of Service: 6/12/2025    Hospital Day: 2      Chief complaint: had no chief complaint listed for this encounter.      Assessment and Plan     Acute kidney injury with chronic kidney disease stage III  - Creatinine 3.2 in ED compared to 3.1 on May 10, and 2.4 on March 25  - Metabolic acidosis noted in the ED with bicarb level 16 and anion gap 23  - Normal saline at the 100 mL/h, appreciate nephrology consult  - Wife read the list of his home medications which include Naprosyn-stop Naprosyn     Hypercalcemia with calcium level 12.2 in ED  - Consult oncology regarding if he may benefit from a bisphosphonate as he does have a mental status change     Severe hypokalemia-potassium level 2.7 in the ED  - Will replace     Bilateral hydronephrosis, with bilateral ureteral stents present  - CT in the ED showed mild bilateral hydronephrosis and that the stents are in appropriate position  - Urology consult appreciated     Complicated UTI  - Enterobacter cloacae complex MDRO noted in April 2020 for which was not very sensitive to cefepime  - Started meropenem IV  - Follow-up urine culture sent from [place] ED, and de-escalate if indicated     Severe sepsis  - Lactic acid level 2.3 in the ED and has since normalized  - Was diagnosed with above in [place] ED  - Fluids and antibiotics as above     Abnormal CT of the pancreas with stranding and fullness around the pancreas concerning for pancreatitis  - Consider GI consult  - His wife did indicate that his stomach was hurting and his back was hurting at home-monitor     Metastatic bladder cancer  - Had a cystoscopy and bilateral ureteral stent exchange on 3/4/2025 because of hydronephrosis  - Had bilateral nephrostomies placed in IR in January 2024  - Had TURBT of large bladder tumor in December 2023 and refused further surgery per oncology clinic notes  - Had chemotherapy and  Exam  Constitutional:       General: He is not in acute distress.     Appearance: Normal appearance. He is normal weight. He is not ill-appearing, toxic-appearing or diaphoretic.   HENT:      Head: Normocephalic and atraumatic.      Right Ear: Tympanic membrane, ear canal and external ear normal. There is no impacted cerumen.      Left Ear: Tympanic membrane, ear canal and external ear normal. There is no impacted cerumen.      Nose: Nose normal. No congestion or rhinorrhea.      Mouth/Throat:      Mouth: Mucous membranes are moist.      Pharynx: No oropharyngeal exudate or posterior oropharyngeal erythema.   Eyes:      General: No scleral icterus.        Right eye: No discharge.         Left eye: No discharge.      Extraocular Movements: Extraocular movements intact.      Conjunctiva/sclera: Conjunctivae normal.      Pupils: Pupils are equal, round, and reactive to light.   Neck:      Vascular: No carotid bruit.   Cardiovascular:      Rate and Rhythm: Normal rate and regular rhythm.      Pulses: Normal pulses.      Heart sounds: Normal heart sounds. No murmur heard.     No friction rub. No gallop.   Pulmonary:      Effort: Pulmonary effort is normal. No respiratory distress.      Breath sounds: Normal breath sounds. No stridor. No wheezing or rhonchi.   Abdominal:      General: Abdomen is flat. Bowel sounds are normal. There is no distension.      Palpations: Abdomen is soft.      Tenderness: There is no abdominal tenderness.   Musculoskeletal:         General: No swelling, tenderness, deformity or signs of injury. Normal range of motion.      Cervical back: Normal range of motion and neck supple. No rigidity or tenderness.      Right lower leg: No edema.      Left lower leg: No edema.   Lymphadenopathy:      Cervical: No cervical adenopathy.   Skin:     General: Skin is warm.      Capillary Refill: Capillary refill takes less than 2 seconds.      Coloration: Skin is not jaundiced or pale.      Findings: No

## 2025-06-12 NOTE — PLAN OF CARE
Problem: Discharge Planning  Goal: Discharge to home or other facility with appropriate resources  Outcome: Progressing     Problem: Skin/Tissue Integrity  Goal: Skin integrity remains intact  Description: 1.  Monitor for areas of redness and/or skin breakdown2.  Assess vascular access sites hourly3.  Every 4-6 hours minimum:  Change oxygen saturation probe site4.  Every 4-6 hours:  If on nasal continuous positive airway pressure, respiratory therapy assess nares and determine need for appliance change or resting period  Outcome: Progressing     Problem: Safety - Adult  Goal: Free from fall injury  Outcome: Progressing     Problem: Pain  Goal: Verbalizes/displays adequate comfort level or baseline comfort level  Outcome: Progressing

## 2025-06-12 NOTE — CARE COORDINATION
Chart reviewed and call to patients wife (Chante) as per charting, patient has altered mental status. CM introduced self and explained role. Chante confirmed that patient is from home and was active w/ [facility] PTA. Chante stated that plan will be for patient to return home w/ previous [facility] services. Chante plans to pick patient up at discharge to transport home. CM will continue to follow.     Patient will need new hospice consult sent to [facility] at discharge.        06/12/25 1413   Service Assessment   Cognition Alert   History Provided By Spouse  (wife Chante)   Support Systems Spouse/Significant Other   Patient's Healthcare Decision Maker is: Named in Scanned ACP Document   Can patient return to prior living arrangement Yes   Family able to assist with home care needs: Yes   Financial Resources Medicare   Community Resources   (Hospice)   Social/Functional History   Lives With Spouse   Type of Home House   Discharge Planning   Type of Residence House   Living Arrangements Spouse/Significant Other   Current Services Prior To Admission Hospice Services   DME Ordered? No   Potential Assistance Purchasing Medications No   Type of Home Care Services Hospice   Patient expects to be discharged to: House   Services At/After Discharge   Services At/After Discharge Hospice   Condition of Participation: Discharge Planning   The Patient and/or Patient Representative was provided with a Choice of Provider? Patient Representative   Name of the Patient Representative who was provided with the Choice of Provider and agrees with the Discharge Plan?  wife Chante   The Patient and/Or Patient Representative agree with the Discharge Plan? Yes   Freedom of Choice list was provided with basic dialogue that supports the patient's individualized plan of care/goals, treatment preferences, and shares the quality data associated with the providers?  No  (Active w/ [facility] PTA and plans to

## 2025-06-13 ENCOUNTER — APPOINTMENT (OUTPATIENT)
Dept: GENERAL RADIOLOGY | Age: 65
DRG: 689 | End: 2025-06-13
Attending: STUDENT IN AN ORGANIZED HEALTH CARE EDUCATION/TRAINING PROGRAM
Payer: MEDICARE

## 2025-06-13 LAB
ALBUMIN: 3.5 G/DL (ref 3.4–5)
ANION GAP SERPL CALCULATED.3IONS-SCNC: 16 MMOL/L (ref 9–17)
BUN SERPL-MCNC: 44 MG/DL (ref 7–20)
CALCIUM SERPL-MCNC: 11.4 MG/DL (ref 8.3–10.6)
CHLORIDE SERPL-SCNC: 111 MMOL/L (ref 99–110)
CO2 SERPL-SCNC: 19 MMOL/L (ref 21–32)
CREAT SERPL-MCNC: 3.2 MG/DL (ref 0.8–1.3)
ERYTHROCYTE [DISTWIDTH] IN BLOOD BY AUTOMATED COUNT: 13.3 % (ref 11.7–14.9)
GFR, ESTIMATED: 19 ML/MIN/1.73M2
GLUCOSE BLD-MCNC: 174 MG/DL (ref 74–99)
GLUCOSE SERPL-MCNC: 126 MG/DL (ref 74–99)
HCT VFR BLD AUTO: 28.4 % (ref 42–52)
HGB BLD-MCNC: 8.7 G/DL (ref 13.5–18)
INR PPP: 1.6
MCH RBC QN AUTO: 27.4 PG (ref 27–31)
MCHC RBC AUTO-ENTMCNC: 30.6 G/DL (ref 32–36)
MCV RBC AUTO: 89.6 FL (ref 78–100)
MICROORGANISM SPEC CULT: NORMAL
PHOSPHATE SERPL-MCNC: 4 MG/DL (ref 2.5–4.9)
PLATELET # BLD AUTO: 301 K/UL (ref 140–440)
PMV BLD AUTO: 9.3 FL (ref 7.5–11.1)
POTASSIUM SERPL-SCNC: 3.1 MMOL/L (ref 3.5–5.1)
PROCALCITONIN SERPL-MCNC: 0.23 NG/ML
PROTHROMBIN TIME: 19.1 SEC (ref 11.7–14.5)
PTH-INTACT SERPL-MCNC: 9.8 PG/ML (ref 14–72)
RBC # BLD AUTO: 3.17 M/UL (ref 4.6–6.2)
SERVICE CMNT-IMP: NORMAL
SODIUM SERPL-SCNC: 146 MMOL/L (ref 136–145)
SPECIMEN DESCRIPTION: NORMAL
WBC OTHER # BLD: 17.4 K/UL (ref 4–10.5)

## 2025-06-13 PROCEDURE — 2500000003 HC RX 250 WO HCPCS: Performed by: INTERNAL MEDICINE

## 2025-06-13 PROCEDURE — 6370000000 HC RX 637 (ALT 250 FOR IP): Performed by: PHYSICIAN ASSISTANT

## 2025-06-13 PROCEDURE — 84155 ASSAY OF PROTEIN SERUM: CPT

## 2025-06-13 PROCEDURE — 84484 ASSAY OF TROPONIN QUANT: CPT

## 2025-06-13 PROCEDURE — 6370000000 HC RX 637 (ALT 250 FOR IP): Performed by: INTERNAL MEDICINE

## 2025-06-13 PROCEDURE — 71045 X-RAY EXAM CHEST 1 VIEW: CPT

## 2025-06-13 PROCEDURE — 85610 PROTHROMBIN TIME: CPT

## 2025-06-13 PROCEDURE — 6360000002 HC RX W HCPCS

## 2025-06-13 PROCEDURE — 0BH17EZ INSERTION OF ENDOTRACHEAL AIRWAY INTO TRACHEA, VIA NATURAL OR ARTIFICIAL OPENING: ICD-10-PCS | Performed by: INTERNAL MEDICINE

## 2025-06-13 PROCEDURE — 6360000002 HC RX W HCPCS: Performed by: INTERNAL MEDICINE

## 2025-06-13 PROCEDURE — 83735 ASSAY OF MAGNESIUM: CPT

## 2025-06-13 PROCEDURE — 2580000003 HC RX 258: Performed by: STUDENT IN AN ORGANIZED HEALTH CARE EDUCATION/TRAINING PROGRAM

## 2025-06-13 PROCEDURE — 84145 PROCALCITONIN (PCT): CPT

## 2025-06-13 PROCEDURE — 84100 ASSAY OF PHOSPHORUS: CPT

## 2025-06-13 PROCEDURE — 94761 N-INVAS EAR/PLS OXIMETRY MLT: CPT

## 2025-06-13 PROCEDURE — 80069 RENAL FUNCTION PANEL: CPT

## 2025-06-13 PROCEDURE — 85027 COMPLETE CBC AUTOMATED: CPT

## 2025-06-13 PROCEDURE — 80053 COMPREHEN METABOLIC PANEL: CPT

## 2025-06-13 PROCEDURE — 99232 SBSQ HOSP IP/OBS MODERATE 35: CPT | Performed by: INTERNAL MEDICINE

## 2025-06-13 PROCEDURE — 83970 ASSAY OF PARATHORMONE: CPT

## 2025-06-13 PROCEDURE — 6360000002 HC RX W HCPCS: Performed by: STUDENT IN AN ORGANIZED HEALTH CARE EDUCATION/TRAINING PROGRAM

## 2025-06-13 PROCEDURE — 94002 VENT MGMT INPAT INIT DAY: CPT

## 2025-06-13 PROCEDURE — 2580000003 HC RX 258: Performed by: INTERNAL MEDICINE

## 2025-06-13 PROCEDURE — 83605 ASSAY OF LACTIC ACID: CPT

## 2025-06-13 PROCEDURE — 82962 GLUCOSE BLOOD TEST: CPT

## 2025-06-13 PROCEDURE — 36415 COLL VENOUS BLD VENIPUNCTURE: CPT

## 2025-06-13 PROCEDURE — 2500000003 HC RX 250 WO HCPCS: Performed by: NURSE PRACTITIONER

## 2025-06-13 PROCEDURE — 5A1935Z RESPIRATORY VENTILATION, LESS THAN 24 CONSECUTIVE HOURS: ICD-10-PCS | Performed by: INTERNAL MEDICINE

## 2025-06-13 PROCEDURE — 2580000003 HC RX 258: Performed by: NURSE PRACTITIONER

## 2025-06-13 PROCEDURE — 36593 DECLOT VASCULAR DEVICE: CPT

## 2025-06-13 PROCEDURE — 2000000000 HC ICU R&B

## 2025-06-13 RX ORDER — SODIUM CHLORIDE 9 MG/ML
INJECTION, SOLUTION INTRAVENOUS CONTINUOUS
Status: DISCONTINUED | OUTPATIENT
Start: 2025-06-13 | End: 2025-06-13

## 2025-06-13 RX ORDER — ZOLPIDEM TARTRATE 5 MG/1
5 TABLET ORAL NIGHTLY
Status: DISCONTINUED | OUTPATIENT
Start: 2025-06-13 | End: 2025-06-14

## 2025-06-13 RX ORDER — SENNA AND DOCUSATE SODIUM 50; 8.6 MG/1; MG/1
2 TABLET, FILM COATED ORAL 2 TIMES DAILY
Status: DISCONTINUED | OUTPATIENT
Start: 2025-06-13 | End: 2025-06-14 | Stop reason: HOSPADM

## 2025-06-13 RX ORDER — POLYVINYL ALCOHOL 14 MG/ML
1 SOLUTION/ DROPS OPHTHALMIC EVERY 4 HOURS
Status: DISCONTINUED | OUTPATIENT
Start: 2025-06-14 | End: 2025-06-13 | Stop reason: SDUPTHER

## 2025-06-13 RX ORDER — POLYETHYLENE GLYCOL 3350 17 G/17G
17 POWDER, FOR SOLUTION ORAL 2 TIMES DAILY
Status: DISCONTINUED | OUTPATIENT
Start: 2025-06-13 | End: 2025-06-14 | Stop reason: HOSPADM

## 2025-06-13 RX ORDER — CARBOXYMETHYLCELLULOSE SODIUM 10 MG/ML
1 GEL OPHTHALMIC 4 TIMES DAILY
Status: DISCONTINUED | OUTPATIENT
Start: 2025-06-14 | End: 2025-06-14 | Stop reason: HOSPADM

## 2025-06-13 RX ORDER — TRAZODONE HYDROCHLORIDE 50 MG/1
50 TABLET ORAL NIGHTLY
Status: DISCONTINUED | OUTPATIENT
Start: 2025-06-13 | End: 2025-06-14 | Stop reason: HOSPADM

## 2025-06-13 RX ORDER — MAGNESIUM SULFATE IN WATER 40 MG/ML
2000 INJECTION, SOLUTION INTRAVENOUS ONCE
Status: COMPLETED | OUTPATIENT
Start: 2025-06-13 | End: 2025-06-14

## 2025-06-13 RX ORDER — MINERAL OIL AND WHITE PETROLATUM 150; 830 MG/G; MG/G
OINTMENT OPHTHALMIC EVERY 4 HOURS
Status: DISCONTINUED | OUTPATIENT
Start: 2025-06-14 | End: 2025-06-14 | Stop reason: HOSPADM

## 2025-06-13 RX ORDER — CHLORHEXIDINE GLUCONATE ORAL RINSE 1.2 MG/ML
15 SOLUTION DENTAL 2 TIMES DAILY
Status: DISCONTINUED | OUTPATIENT
Start: 2025-06-13 | End: 2025-06-14 | Stop reason: HOSPADM

## 2025-06-13 RX ORDER — MIDAZOLAM HYDROCHLORIDE 2 MG/2ML
2 INJECTION, SOLUTION INTRAMUSCULAR; INTRAVENOUS ONCE
Status: COMPLETED | OUTPATIENT
Start: 2025-06-13 | End: 2025-06-13

## 2025-06-13 RX ORDER — PANTOPRAZOLE SODIUM 40 MG/10ML
40 INJECTION, POWDER, LYOPHILIZED, FOR SOLUTION INTRAVENOUS DAILY
Status: DISCONTINUED | OUTPATIENT
Start: 2025-06-14 | End: 2025-06-14 | Stop reason: HOSPADM

## 2025-06-13 RX ORDER — LEVETIRACETAM 500 MG/5ML
500 INJECTION, SOLUTION, CONCENTRATE INTRAVENOUS EVERY 12 HOURS
Status: DISCONTINUED | OUTPATIENT
Start: 2025-06-14 | End: 2025-06-14 | Stop reason: HOSPADM

## 2025-06-13 RX ORDER — QUETIAPINE FUMARATE 25 MG/1
50 TABLET, FILM COATED ORAL ONCE
Status: COMPLETED | OUTPATIENT
Start: 2025-06-13 | End: 2025-06-13

## 2025-06-13 RX ORDER — FENTANYL CITRATE-0.9 % NACL/PF 10 MCG/ML
25-200 PLASTIC BAG, INJECTION (ML) INTRAVENOUS CONTINUOUS
Refills: 0 | Status: DISCONTINUED | OUTPATIENT
Start: 2025-06-14 | End: 2025-06-14 | Stop reason: HOSPADM

## 2025-06-13 RX ORDER — MIDAZOLAM HYDROCHLORIDE 1 MG/ML
INJECTION, SOLUTION INTRAMUSCULAR; INTRAVENOUS
Status: COMPLETED
Start: 2025-06-13 | End: 2025-06-13

## 2025-06-13 RX ORDER — PROPOFOL 10 MG/ML
5-50 INJECTION, EMULSION INTRAVENOUS CONTINUOUS
Status: DISCONTINUED | OUTPATIENT
Start: 2025-06-13 | End: 2025-06-14 | Stop reason: HOSPADM

## 2025-06-13 RX ORDER — HYDROMORPHONE HYDROCHLORIDE 1 MG/ML
1 INJECTION, SOLUTION INTRAMUSCULAR; INTRAVENOUS; SUBCUTANEOUS ONCE
Status: COMPLETED | OUTPATIENT
Start: 2025-06-13 | End: 2025-06-13

## 2025-06-13 RX ORDER — DIAZEPAM 10 MG/2ML
2.5 INJECTION, SOLUTION INTRAMUSCULAR; INTRAVENOUS ONCE
Status: COMPLETED | OUTPATIENT
Start: 2025-06-13 | End: 2025-06-13

## 2025-06-13 RX ORDER — DIVALPROEX SODIUM 125 MG/1
125 CAPSULE, COATED PELLETS ORAL EVERY 8 HOURS SCHEDULED
Status: DISCONTINUED | OUTPATIENT
Start: 2025-06-13 | End: 2025-06-14 | Stop reason: HOSPADM

## 2025-06-13 RX ORDER — SODIUM CHLORIDE, SODIUM LACTATE, POTASSIUM CHLORIDE, CALCIUM CHLORIDE 600; 310; 30; 20 MG/100ML; MG/100ML; MG/100ML; MG/100ML
INJECTION, SOLUTION INTRAVENOUS CONTINUOUS
Status: DISCONTINUED | OUTPATIENT
Start: 2025-06-13 | End: 2025-06-13

## 2025-06-13 RX ADMIN — TAMSULOSIN HYDROCHLORIDE 0.4 MG: 0.4 CAPSULE ORAL at 19:59

## 2025-06-13 RX ADMIN — OXYCODONE AND ACETAMINOPHEN 1 TABLET: 325; 5 TABLET ORAL at 01:07

## 2025-06-13 RX ADMIN — TAMSULOSIN HYDROCHLORIDE 0.4 MG: 0.4 CAPSULE ORAL at 09:05

## 2025-06-13 RX ADMIN — QUETIAPINE FUMARATE 50 MG: 25 TABLET ORAL at 05:47

## 2025-06-13 RX ADMIN — CARVEDILOL 12.5 MG: 6.25 TABLET, FILM COATED ORAL at 09:05

## 2025-06-13 RX ADMIN — MIDAZOLAM HYDROCHLORIDE 2 MG: 1 INJECTION, SOLUTION INTRAMUSCULAR; INTRAVENOUS at 23:20

## 2025-06-13 RX ADMIN — MEROPENEM 1000 MG: 1 INJECTION, POWDER, FOR SOLUTION INTRAVENOUS at 14:04

## 2025-06-13 RX ADMIN — DIAZEPAM 2.5 MG: 5 INJECTION, SOLUTION INTRAMUSCULAR; INTRAVENOUS at 02:22

## 2025-06-13 RX ADMIN — Medication 50 MCG/HR: at 23:51

## 2025-06-13 RX ADMIN — VALPROATE SODIUM 125 MG: 100 INJECTION, SOLUTION INTRAVENOUS at 22:03

## 2025-06-13 RX ADMIN — OXYBUTYNIN CHLORIDE 5 MG: 5 TABLET ORAL at 09:05

## 2025-06-13 RX ADMIN — MAGNESIUM SULFATE HEPTAHYDRATE 2000 MG: 40 INJECTION, SOLUTION INTRAVENOUS at 23:59

## 2025-06-13 RX ADMIN — SODIUM CHLORIDE, PRESERVATIVE FREE 10 ML: 5 INJECTION INTRAVENOUS at 09:08

## 2025-06-13 RX ADMIN — OXYCODONE AND ACETAMINOPHEN 1 TABLET: 325; 5 TABLET ORAL at 14:20

## 2025-06-13 RX ADMIN — GABAPENTIN 300 MG: 300 CAPSULE ORAL at 19:59

## 2025-06-13 RX ADMIN — PROPOFOL 20 MCG/KG/MIN: 10 INJECTION, EMULSION INTRAVENOUS at 23:54

## 2025-06-13 RX ADMIN — Medication 1 TABLET: at 09:05

## 2025-06-13 RX ADMIN — DIVALPROEX SODIUM 125 MG: 125 CAPSULE ORAL at 16:02

## 2025-06-13 RX ADMIN — MIDAZOLAM HYDROCHLORIDE 2 MG: 1 INJECTION, SOLUTION INTRAMUSCULAR; INTRAVENOUS at 23:23

## 2025-06-13 RX ADMIN — HYDROMORPHONE HYDROCHLORIDE 0.5 MG: 1 INJECTION, SOLUTION INTRAMUSCULAR; INTRAVENOUS; SUBCUTANEOUS at 15:27

## 2025-06-13 RX ADMIN — POLYETHYLENE GLYCOL (3350) 17 G: 17 POWDER, FOR SOLUTION ORAL at 13:59

## 2025-06-13 RX ADMIN — ALTEPLASE 2 MG: 2.2 INJECTION, POWDER, LYOPHILIZED, FOR SOLUTION INTRAVENOUS at 04:21

## 2025-06-13 RX ADMIN — ZOLPIDEM TARTRATE 5 MG: 5 TABLET ORAL at 19:59

## 2025-06-13 RX ADMIN — HYDROMORPHONE HYDROCHLORIDE 0.5 MG: 1 INJECTION, SOLUTION INTRAMUSCULAR; INTRAVENOUS; SUBCUTANEOUS at 00:03

## 2025-06-13 RX ADMIN — WATER: 100 IRRIGANT IRRIGATION at 15:31

## 2025-06-13 RX ADMIN — SODIUM CHLORIDE, SODIUM LACTATE, POTASSIUM CHLORIDE, AND CALCIUM CHLORIDE: .6; .31; .03; .02 INJECTION, SOLUTION INTRAVENOUS at 15:51

## 2025-06-13 RX ADMIN — OXYBUTYNIN CHLORIDE 5 MG: 5 TABLET ORAL at 19:59

## 2025-06-13 RX ADMIN — OXYCODONE AND ACETAMINOPHEN 1 TABLET: 325; 5 TABLET ORAL at 09:05

## 2025-06-13 RX ADMIN — DOCUSATE SODIUM 50MG AND SENNOSIDES 8.6MG 2 TABLET: 8.6; 5 TABLET, FILM COATED ORAL at 16:02

## 2025-06-13 RX ADMIN — SODIUM CHLORIDE, PRESERVATIVE FREE 10 ML: 5 INJECTION INTRAVENOUS at 20:00

## 2025-06-13 RX ADMIN — HYDROMORPHONE HYDROCHLORIDE 1 MG: 1 INJECTION, SOLUTION INTRAMUSCULAR; INTRAVENOUS; SUBCUTANEOUS at 04:15

## 2025-06-13 RX ADMIN — HYDROMORPHONE HYDROCHLORIDE 0.5 MG: 1 INJECTION, SOLUTION INTRAMUSCULAR; INTRAVENOUS; SUBCUTANEOUS at 10:22

## 2025-06-13 RX ADMIN — CARVEDILOL 12.5 MG: 6.25 TABLET, FILM COATED ORAL at 17:24

## 2025-06-13 RX ADMIN — MEROPENEM 1000 MG: 1 INJECTION, POWDER, FOR SOLUTION INTRAVENOUS at 02:41

## 2025-06-13 RX ADMIN — OXYCODONE AND ACETAMINOPHEN 1 TABLET: 325; 5 TABLET ORAL at 19:59

## 2025-06-13 RX ADMIN — OXYBUTYNIN CHLORIDE 5 MG: 5 TABLET ORAL at 13:59

## 2025-06-13 ASSESSMENT — PAIN SCALES - GENERAL
PAINLEVEL_OUTOF10: 10
PAINLEVEL_OUTOF10: 8
PAINLEVEL_OUTOF10: 8
PAINLEVEL_OUTOF10: 10

## 2025-06-13 ASSESSMENT — PAIN DESCRIPTION - LOCATION
LOCATION: ABDOMEN
LOCATION: ABDOMEN;GENERALIZED

## 2025-06-13 ASSESSMENT — PULMONARY FUNCTION TESTS: PIF_VALUE: 17

## 2025-06-13 ASSESSMENT — PAIN SCALES - WONG BAKER: WONGBAKER_NUMERICALRESPONSE: HURTS EVEN MORE

## 2025-06-13 NOTE — CARE COORDINATION
CM reviewed pt’s medical record and discussed pt in IDR. Per IDR discussion, pt will need 2-3 days prior to discharge for IVAB and fluids. Plan for discharge is to return home with spouse and [facility]. Pt will need a new hospice consult order at discharge. Transportation home via spouse.

## 2025-06-13 NOTE — PROGRESS NOTES
At 1600 Lactulose enema attempted without success. Several nurses and sitter present. Pt very agitated and combative.  Unable to get vitals, manual blood pressure obtained after several attempts. Unable to complete EKG at this time. Pt took medications after several attempts by this nurse pt only alert to self. Wife states this is not his normal mental status. MD aware

## 2025-06-13 NOTE — PLAN OF CARE
Problem: Skin/Tissue Integrity  Goal: Skin integrity remains intact  Description: 1.  Monitor for areas of redness and/or skin breakdown2.  Assess vascular access sites hourly3.  Every 4-6 hours minimum:  Change oxygen saturation probe site4.  Every 4-6 hours:  If on nasal continuous positive airway pressure, respiratory therapy assess nares and determine need for appliance change or resting period  6/13/2025 1028 by Kiley Hitchcock LPN  Outcome: Progressing  6/13/2025 0654 by Val Bustos RN  Outcome: Progressing     Problem: Safety - Adult  Goal: Free from fall injury  6/13/2025 1028 by Kiley Hitchcock LPN  Outcome: Progressing  6/13/2025 0654 by Val Bustos, RN  Outcome: Progressing

## 2025-06-13 NOTE — CONSULTS
24 hour central line rounding completed. No dressing change indicated. Dressing is clean, dry, and intact.     Consult the Vascular Access Team for questions, concerns, or change in patient's condition.

## 2025-06-13 NOTE — PROGRESS NOTES
In-Patient Progress Note    Patient:  Jethro Vázquez 65 y.o. male MRN: [id number]     Date of Service: 6/13/2025    Hospital Day: 3      Chief complaint: had no chief complaint listed for this encounter.      Assessment and Plan     Acute kidney injury with chronic kidney disease stage III  - Creatinine 3.2 in ED compared to 3.1 on May 10, and 2.4 on March 25  - Metabolic acidosis noted in the ED with bicarb level 16 and anion gap 23  - Wife read the list of his home medications which include Naprosyn-stop Naprosyn  -more hyperchloremic today - change to LR      Hypercalcemia with calcium level 12.2 in ED  - Consult oncology regarding if he may benefit from a bisphosphonate as he does have a mental status change     Severe hypokalemia-potassium level 2.7 in the ED  - Will replace     Bilateral hydronephrosis, with bilateral ureteral stents present  - CT in the ED showed mild bilateral hydronephrosis and that the stents are in appropriate position  - Urology consult appreciated     Complicated UTI  - Enterobacter cloacae complex MDRO noted in April 2020 for which was not very sensitive to cefepime  - continued on meropenem IV  - Follow-up urine culture sent from [place] ED, and de-escalate if indicated     Severe sepsis  - Lactic acid level 2.3 in the ED and has since normalized  - Was diagnosed with above in [place] ED  - Fluids and antibiotics as above     Abnormal CT of the pancreas with stranding and fullness around the pancreas concerning for pancreatitis  - Consider GI consult  - His wife did indicate that his stomach was hurting and his back was hurting at home-monitor     Metastatic bladder cancer  - Had a cystoscopy and bilateral ureteral stent exchange on 3/4/2025 because of hydronephrosis  - Had bilateral nephrostomies placed in IR in January 2024  - Had TURBT of large bladder tumor in December 2023 and refused further surgery per oncology clinic notes  - Had chemotherapy and radiation  - Chemo started  Focal hyperdensity in the right lower pole calyces possible small amount of blood. Lower pole exophytic cystic lesion. Correlation with urinalysis is advised. Thickening of the bladder wall and perivesicular stranding. Correlate for underlying cystitis/ascending infection. Moderate stool throughout the colon. No bowel wall thickening or obstruction. Correlate for constipation. Hysterectomy. Small fat-containing inguinal hernias. Degenerative changes lumbar spine and pelvis. No acute osseous findings. IMPRESSION: 1. Stranding and fullness around the pancreas concerning for pancreatitis correlation with serology is advised. 2. Bilateral hydronephrosis with ureteral stents. Possible blood in the right lower pole calyx. Thickening of the bladder wall with perivesicular inflammatory  changes findings concerning for cystitis/before meals sending infection correlation with urinalysis is advised. Suboptimal evaluation of the renal parenchyma without intravenous contrast. 3. Small left pleural effusion with adjacent atelectasis.  Dictated and Electronically Signed By: Tonya Headley MD [facility] 6/11/2025 2:51        XR CHEST PORTABLE  Result Date: 6/11/2025  EXAM DESCRIPTION: XR CHEST PORTABLE CLINICAL HISTORY: 65 years  Male;  tachycardia, sirs/sepsis protocol COMPARISON: 3/22/2025 FINDINGS: Lungs: No lobar consolidation. Calcified granuloma left midlung. Ectatic thoracic aorta. Costophrenic recesses are preserved. No cardiomegaly. Bones: No acute osseous findings. Right chest wall Port-A-Cath with tip in the SVC/RA junction. IMPRESSION: 1.  No acute findings.  Dictated and Electronically Signed By: Tonya Headley MD [facility] 6/11/2025 1:46          Recent Results (from the past 24 hours)   Haptoglobin    Collection Time: 06/12/25  2:44 PM   Result Value Ref Range    Haptoglobin 290 (H) 30 - 200 mg/dL   Lactate Dehydrogenase    Collection Time: 06/12/25  2:44 PM   Result Value Ref  Range     (H) 100 - 190 U/L   Reticulocytes    Collection Time: 06/12/25  3:30 PM   Result Value Ref Range    Retic Ct Pct 1.7 0.2 - 2.0 %    Absolute Retic # 0.050 M/uL   Renal Function Panel    Collection Time: 06/13/25  9:19 AM   Result Value Ref Range    Glucose 126 (H) 74 - 99 mg/dL    BUN 44 (H) 7 - 20 mg/dL    Creatinine 3.2 (H) 0.8 - 1.3 mg/dL    Est, Glom Filt Rate 19 (L) >60 mL/min/1.73m2    Calcium 11.4 (H) 8.3 - 10.6 mg/dL    Albumin 3.5 3.4 - 5.0 g/dL    Phosphorus 4.0 2.5 - 4.9 mg/dL    Sodium 146 (H) 136 - 145 mmol/L    Potassium 3.1 (L) 3.5 - 5.1 mmol/L    Chloride 111 (H) 99 - 110 mmol/L    CO2 19 (L) 21 - 32 mmol/L    Anion Gap 16 9 - 17 mmol/L   Procalcitonin    Collection Time: 06/13/25  9:19 AM   Result Value Ref Range    Procalcitonin 0.23 ng/mL   PTH, Intact    Collection Time: 06/13/25  9:19 AM   Result Value Ref Range    Pth Intact 9.8 (L) 14 - 72 pg/mL           Electronically signed by Catherine Parish MD on 6/13/2025 at 2:43 PM      Comment: Please note this report has been produced using speech recognition software and may contain errors related to that system including errors in grammar, punctuation, and spelling, as well as words and phrases that may be inappropriate. If there are any questions or concerns please feel free to contact the dictating provider for clarification.

## 2025-06-13 NOTE — PROGRESS NOTES
Pt issue pain and behavior with jose wise nurse, add seroquel and rec billie family on goals of care

## 2025-06-13 NOTE — CONSULTS
This nurse was unsuccessful accessing the port. Patient is agitated and confused. Resource nurse, Brandon called @ [phone] for assistance. KATHRYN Phillip notified.     Consult the Vascular Access Team for questions, concerns, or change in patient's condition.

## 2025-06-13 NOTE — PROGRESS NOTES
Nephrology Progress Note  6/13/2025 8:33 AM  Subjective:     Interval History: Jethro Vázquez is a 65 y.o. male  seen this morning with sitter in the room somewhat agitated anxious confused still in severe pain in the abdomen        Data:   Scheduled Meds:   carvedilol  12.5 mg Oral BID WC    cloNIDine  1 patch TransDERmal Weekly    sodium chloride flush  5-40 mL IntraVENous 2 times per day    meropenem  1,000 mg IntraVENous Q12H    gabapentin  300 mg Oral Nightly    therapeutic multivitamin-minerals  1 tablet Oral Daily    tamsulosin  0.4 mg Oral BID    oxyBUTYnin  5 mg Oral TID    [Held by provider] morphine  15 mg Oral BID     Continuous Infusions:   sodium chloride 25 mL/hr at 06/12/25 1602    sodium chloride 100 mL/hr at 06/12/25 1600         CBC   Recent Labs     06/11/25  0130 06/12/25  0230   WBC 9.1 8.6   HGB 10.1* 9.0*   HCT 30.9* 27.9*    263      BMP   Recent Labs     06/11/25  0208 06/11/25  1347 06/12/25  0230    142 144   K 2.7* 3.2* 3.0*   CL 99 106 107   CO2 16* 22 22   BUN 40* 41* 36*   CREATININE 3.2* 3.2* 3.0*     Hepatic:   Recent Labs     06/11/25  0208 06/12/25  0230   AST 16 16   ALT <5* <5*   BILITOT 0.4 0.3   ALKPHOS 135* 145*     Troponin: No results for input(s): \"TROPONINI\" in the last 72 hours.  BNP: No results for input(s): \"BNP\" in the last 72 hours.  Lipids: No results for input(s): \"CHOL\", \"HDL\" in the last 72 hours.    Invalid input(s): \"LDLCALCU\"  ABGs: No results found for: \"PHART\", \"PO2ART\", \"[id number]\"  INR:   Recent Labs     06/12/25  0230   INR 1.4     Renal Labs  Albumin:  No results found for: \"LABALBU\"  Calcium:    Lab Results   Component Value Date/Time    CALCIUM 11.8 06/12/2025 02:30 AM     Phosphorus:    Lab Results   Component Value Date/Time    PHOS 2.7 03/25/2025 01:20 AM     U/A:    Lab Results   Component Value Date/Time    NITRU NEGATIVE 06/11/2025 04:47 PM    COLORU Yellow 06/11/2025 04:47 PM    PHUR 6.5 06/11/2025 04:47 PM    WBCUA 0 06/11/2025

## 2025-06-13 NOTE — CONSULTS
Consult in progress. Patient's port will not draw back blood even after multiple troubleshooting attempts. Activase ordered per protocol. Educated patient on medications risks and benefits. Instilled Activase 2 mg, in port lumen. Lumen locked and labeled \"Do Not Use\" on catheters. Primary nurse notified. IV Team will continue to monitor.     @ 0620 Cathflo removed from port with success. KATHRYN Phillip notified line is ok to use.     Consult the Vascular Access Team for questions, concerns, or change in patient's condition.

## 2025-06-13 NOTE — PROGRESS NOTES
Hematology Oncology Progress Note    Patient Name:  Jethro Vázquez  Patient :  1960  Patient MRN:  [id number]  Room: [address] [place], [place] [address]   Admitted: 2025 10:42 AM   Hospital Attending Provider: Catherine Parish MD    Primary Oncologist: Dr. Noemi aGmbino MD  PCP:  Mono Schaffer MD     Date of Service: 25        Chief Complaint:  No chief complaint on file.    Principal Problem:    Complicated UTI (urinary tract infection)  Resolved Problems:    * No resolved hospital problems. *      HPI:   Jethro Vázquez is a 65 y.o. male who was sent by hospice due to AMS on 25/  Found to have  acute SHIRLEY with CKD, hypercalcemia, severe hypokalemia, complicated UTI/sepsis. Consulted for hypercalcemia.    He has h/o muscle Invasive High Grade Bladder Ca s/p TUR 2023.    S/p neoadjuvant chemo with good response. Patient refused bladder surgery and received chemo + RT.  He completed chemo/radiation in 10/2024.    Per chart, patient is currently on hospice.       This morning patient is lying in bed in no acute distress.  He is awake and alert and smiling but is oriented only to self.  He does not answer questions appropriately and has very marbled speech.      WBC 8.6, Hg 9.0, Plt 263  2025 CT AP  1. Stranding and fullness around the pancreas concerning for pancreatitis   correlation with serology is advised.  2. Bilateral hydronephrosis with ureteral stents. Possible blood in the right   lower pole calyx. Thickening of the bladder wall with perivesicular inflammatory   changes findings concerning for cystitis/before meals sending infection   correlation with urinalysis is advised. Suboptimal evaluation of the renal   parenchyma without intravenous contrast.  3. Small left pleural effusion with adjacent atelectasis.    2025 CT HEAD WO CONTRAST:  No acute intracranial findings.  There is no evidence for hemorrhage, mass   effect, or acute large territory infarct.     25 no acute distress,  patient in depth and all questions answered as able.    Noemi Gambino MD.

## 2025-06-14 ENCOUNTER — HOSPITAL ENCOUNTER (INPATIENT)
Age: 65
LOS: 1 days | Discharge: HOSPICE | DRG: 951 | End: 2025-06-15
Attending: STUDENT IN AN ORGANIZED HEALTH CARE EDUCATION/TRAINING PROGRAM
Payer: COMMERCIAL

## 2025-06-14 ENCOUNTER — HOSPITAL ENCOUNTER (INPATIENT)
Age: 65
End: 2025-06-14
Attending: STUDENT IN AN ORGANIZED HEALTH CARE EDUCATION/TRAINING PROGRAM | Admitting: STUDENT IN AN ORGANIZED HEALTH CARE EDUCATION/TRAINING PROGRAM
Payer: MEDICARE

## 2025-06-14 ENCOUNTER — APPOINTMENT (OUTPATIENT)
Dept: CT IMAGING | Age: 65
DRG: 689 | End: 2025-06-14
Attending: FAMILY MEDICINE
Payer: MEDICARE

## 2025-06-14 VITALS
DIASTOLIC BLOOD PRESSURE: 94 MMHG | BODY MASS INDEX: 22.41 KG/M2 | SYSTOLIC BLOOD PRESSURE: 157 MMHG | TEMPERATURE: 98.1 F | RESPIRATION RATE: 16 BRPM | OXYGEN SATURATION: 99 % | HEIGHT: 70 IN | WEIGHT: 156.53 LBS | HEART RATE: 62 BPM

## 2025-06-14 LAB
ALBUMIN SERPL-MCNC: 3.4 G/DL (ref 3.4–5)
ALBUMIN SERPL-MCNC: 3.7 G/DL (ref 3.4–5)
ALBUMIN/GLOB SERPL: 1.1 {RATIO} (ref 1.1–2.2)
ALBUMIN/GLOB SERPL: 1.1 {RATIO} (ref 1.1–2.2)
ALP SERPL-CCNC: 151 U/L (ref 40–129)
ALP SERPL-CCNC: 156 U/L (ref 40–129)
ALT SERPL-CCNC: 10 U/L (ref 10–40)
ALT SERPL-CCNC: 10 U/L (ref 10–40)
ANION GAP SERPL CALCULATED.3IONS-SCNC: 17 MMOL/L (ref 9–17)
ANION GAP SERPL CALCULATED.3IONS-SCNC: 26 MMOL/L (ref 9–17)
AST SERPL-CCNC: 24 U/L (ref 15–37)
AST SERPL-CCNC: 33 U/L (ref 15–37)
BILIRUB SERPL-MCNC: 0.2 MG/DL (ref 0–1)
BILIRUB SERPL-MCNC: 0.4 MG/DL (ref 0–1)
BUN SERPL-MCNC: 49 MG/DL (ref 7–20)
BUN SERPL-MCNC: 53 MG/DL (ref 7–20)
CALCIUM SERPL-MCNC: 11.4 MG/DL (ref 8.3–10.6)
CALCIUM SERPL-MCNC: 11.6 MG/DL (ref 8.3–10.6)
CHLORIDE SERPL-SCNC: 108 MMOL/L (ref 99–110)
CHLORIDE SERPL-SCNC: 110 MMOL/L (ref 99–110)
CO2 SERPL-SCNC: 12 MMOL/L (ref 21–32)
CO2 SERPL-SCNC: 21 MMOL/L (ref 21–32)
CREAT SERPL-MCNC: 3.4 MG/DL (ref 0.8–1.3)
CREAT SERPL-MCNC: 3.7 MG/DL (ref 0.8–1.3)
GFR, ESTIMATED: 17 ML/MIN/1.73M2
GFR, ESTIMATED: 18 ML/MIN/1.73M2
GLUCOSE SERPL-MCNC: 111 MG/DL (ref 74–99)
GLUCOSE SERPL-MCNC: 156 MG/DL (ref 74–99)
LACTATE BLDV-SCNC: 8.5 MMOL/L (ref 0.4–2)
MAGNESIUM SERPL-MCNC: 2.2 MG/DL (ref 1.8–2.4)
PHOSPHATE SERPL-MCNC: 5.1 MG/DL (ref 2.5–4.9)
POTASSIUM SERPL-SCNC: 2.8 MMOL/L (ref 3.5–5.1)
POTASSIUM SERPL-SCNC: 3.4 MMOL/L (ref 3.5–5.1)
PROT SERPL-MCNC: 6.3 G/DL (ref 6.4–8.2)
PROT SERPL-MCNC: 7 G/DL (ref 6.4–8.2)
SODIUM SERPL-SCNC: 147 MMOL/L (ref 136–145)
SODIUM SERPL-SCNC: 147 MMOL/L (ref 136–145)
TRIGL SERPL-MCNC: 394 MG/DL
TROPONIN I SERPL HS-MCNC: 40 NG/L (ref 0–22)

## 2025-06-14 PROCEDURE — 94761 N-INVAS EAR/PLS OXIMETRY MLT: CPT

## 2025-06-14 PROCEDURE — 6360000002 HC RX W HCPCS: Performed by: INTERNAL MEDICINE

## 2025-06-14 PROCEDURE — 99223 1ST HOSP IP/OBS HIGH 75: CPT | Performed by: CLINICAL NURSE SPECIALIST

## 2025-06-14 PROCEDURE — 36415 COLL VENOUS BLD VENIPUNCTURE: CPT

## 2025-06-14 PROCEDURE — 6370000000 HC RX 637 (ALT 250 FOR IP): Performed by: INTERNAL MEDICINE

## 2025-06-14 PROCEDURE — 6370000000 HC RX 637 (ALT 250 FOR IP): Performed by: PHYSICIAN ASSISTANT

## 2025-06-14 PROCEDURE — 2580000003 HC RX 258: Performed by: STUDENT IN AN ORGANIZED HEALTH CARE EDUCATION/TRAINING PROGRAM

## 2025-06-14 PROCEDURE — 2580000003 HC RX 258: Performed by: INTERNAL MEDICINE

## 2025-06-14 PROCEDURE — 70450 CT HEAD/BRAIN W/O DYE: CPT

## 2025-06-14 PROCEDURE — 94003 VENT MGMT INPAT SUBQ DAY: CPT

## 2025-06-14 PROCEDURE — 95717 EEG PHYS/QHP 2-12 HR W/O VID: CPT | Performed by: STUDENT IN AN ORGANIZED HEALTH CARE EDUCATION/TRAINING PROGRAM

## 2025-06-14 PROCEDURE — 89220 SPUTUM SPECIMEN COLLECTION: CPT

## 2025-06-14 PROCEDURE — 2500000003 HC RX 250 WO HCPCS: Performed by: INTERNAL MEDICINE

## 2025-06-14 PROCEDURE — 2500000003 HC RX 250 WO HCPCS

## 2025-06-14 PROCEDURE — 6360000002 HC RX W HCPCS: Performed by: PHYSICIAN ASSISTANT

## 2025-06-14 PROCEDURE — 6370000000 HC RX 637 (ALT 250 FOR IP): Performed by: STUDENT IN AN ORGANIZED HEALTH CARE EDUCATION/TRAINING PROGRAM

## 2025-06-14 PROCEDURE — 84478 ASSAY OF TRIGLYCERIDES: CPT

## 2025-06-14 PROCEDURE — 6360000002 HC RX W HCPCS: Performed by: STUDENT IN AN ORGANIZED HEALTH CARE EDUCATION/TRAINING PROGRAM

## 2025-06-14 PROCEDURE — 1250000000 HC SEMI PRIVATE HOSPICE R&B

## 2025-06-14 PROCEDURE — 95705 EEG W/O VID 2-12 HR UNMNTR: CPT

## 2025-06-14 RX ORDER — MORPHINE SULFATE 2 MG/ML
2 INJECTION, SOLUTION INTRAMUSCULAR; INTRAVENOUS
Status: DISCONTINUED | OUTPATIENT
Start: 2025-06-14 | End: 2025-06-15 | Stop reason: HOSPADM

## 2025-06-14 RX ORDER — MORPHINE SULFATE 4 MG/ML
4 INJECTION, SOLUTION INTRAMUSCULAR; INTRAVENOUS
Status: DISCONTINUED | OUTPATIENT
Start: 2025-06-14 | End: 2025-06-15 | Stop reason: HOSPADM

## 2025-06-14 RX ORDER — FENTANYL CITRATE 50 UG/ML
50 INJECTION, SOLUTION INTRAMUSCULAR; INTRAVENOUS ONCE
Status: COMPLETED | OUTPATIENT
Start: 2025-06-14 | End: 2025-06-14

## 2025-06-14 RX ORDER — MIDAZOLAM HYDROCHLORIDE 2 MG/2ML
1 INJECTION, SOLUTION INTRAMUSCULAR; INTRAVENOUS
Status: DISCONTINUED | OUTPATIENT
Start: 2025-06-14 | End: 2025-06-14

## 2025-06-14 RX ORDER — GLYCOPYRROLATE 0.2 MG/ML
0.2 INJECTION INTRAMUSCULAR; INTRAVENOUS EVERY 4 HOURS PRN
Status: DISCONTINUED | OUTPATIENT
Start: 2025-06-14 | End: 2025-06-15 | Stop reason: HOSPADM

## 2025-06-14 RX ORDER — MIDAZOLAM HYDROCHLORIDE 2 MG/2ML
1 INJECTION, SOLUTION INTRAMUSCULAR; INTRAVENOUS
Status: CANCELLED | OUTPATIENT
Start: 2025-06-14

## 2025-06-14 RX ORDER — SODIUM CHLORIDE 9 MG/ML
INJECTION, SOLUTION INTRAVENOUS CONTINUOUS
Status: DISCONTINUED | OUTPATIENT
Start: 2025-06-14 | End: 2025-06-14

## 2025-06-14 RX ORDER — MORPHINE SULFATE 2 MG/ML
2 INJECTION, SOLUTION INTRAMUSCULAR; INTRAVENOUS
Refills: 0 | Status: CANCELLED | OUTPATIENT
Start: 2025-06-14

## 2025-06-14 RX ORDER — POTASSIUM CHLORIDE 29.8 MG/ML
20 INJECTION INTRAVENOUS ONCE
Status: COMPLETED | OUTPATIENT
Start: 2025-06-14 | End: 2025-06-14

## 2025-06-14 RX ORDER — CALCITONIN SALMON 200 [USP'U]/ML
100 INJECTION, SOLUTION INTRAMUSCULAR; SUBCUTANEOUS ONCE
Status: COMPLETED | OUTPATIENT
Start: 2025-06-14 | End: 2025-06-14

## 2025-06-14 RX ORDER — GLYCOPYRROLATE 0.2 MG/ML
0.2 INJECTION INTRAMUSCULAR; INTRAVENOUS EVERY 4 HOURS PRN
Status: CANCELLED | OUTPATIENT
Start: 2025-06-14

## 2025-06-14 RX ORDER — DIAZEPAM 10 MG/2ML
5 INJECTION, SOLUTION INTRAMUSCULAR; INTRAVENOUS EVERY 4 HOURS PRN
Status: DISCONTINUED | OUTPATIENT
Start: 2025-06-14 | End: 2025-06-14

## 2025-06-14 RX ORDER — DIAZEPAM 10 MG/2ML
5 INJECTION, SOLUTION INTRAMUSCULAR; INTRAVENOUS
Status: DISCONTINUED | OUTPATIENT
Start: 2025-06-14 | End: 2025-06-15 | Stop reason: HOSPADM

## 2025-06-14 RX ORDER — MORPHINE SULFATE 2 MG/ML
2 INJECTION, SOLUTION INTRAMUSCULAR; INTRAVENOUS
Refills: 0 | Status: DISCONTINUED | OUTPATIENT
Start: 2025-06-14 | End: 2025-06-14

## 2025-06-14 RX ADMIN — DIAZEPAM 5 MG: 5 INJECTION, SOLUTION INTRAMUSCULAR; INTRAVENOUS at 16:24

## 2025-06-14 RX ADMIN — PROPOFOL 50 MCG/KG/MIN: 10 INJECTION, EMULSION INTRAVENOUS at 08:47

## 2025-06-14 RX ADMIN — Medication 1 TABLET: at 08:39

## 2025-06-14 RX ADMIN — CARBOXYMETHYLCELLULOSE SODIUM 1 DROP: 10 GEL OPHTHALMIC at 00:36

## 2025-06-14 RX ADMIN — MORPHINE SULFATE 2 MG: 2 INJECTION, SOLUTION INTRAMUSCULAR; INTRAVENOUS at 13:21

## 2025-06-14 RX ADMIN — FENTANYL CITRATE 50 MCG: 50 INJECTION, SOLUTION INTRAMUSCULAR; INTRAVENOUS at 11:48

## 2025-06-14 RX ADMIN — CARBOXYMETHYLCELLULOSE SODIUM 1 DROP: 10 GEL OPHTHALMIC at 08:41

## 2025-06-14 RX ADMIN — MORPHINE SULFATE 2 MG: 2 INJECTION, SOLUTION INTRAMUSCULAR; INTRAVENOUS at 15:21

## 2025-06-14 RX ADMIN — DIAZEPAM 5 MG: 5 INJECTION, SOLUTION INTRAMUSCULAR; INTRAVENOUS at 21:44

## 2025-06-14 RX ADMIN — MIDAZOLAM HYDROCHLORIDE 1 MG: 2 INJECTION, SOLUTION INTRAMUSCULAR; INTRAVENOUS at 15:21

## 2025-06-14 RX ADMIN — MEROPENEM 1000 MG: 1 INJECTION, POWDER, FOR SOLUTION INTRAVENOUS at 02:47

## 2025-06-14 RX ADMIN — DOCUSATE SODIUM 50MG AND SENNOSIDES 8.6MG 2 TABLET: 8.6; 5 TABLET, FILM COATED ORAL at 08:38

## 2025-06-14 RX ADMIN — MORPHINE SULFATE 4 MG: 4 INJECTION, SOLUTION INTRAMUSCULAR; INTRAVENOUS at 18:32

## 2025-06-14 RX ADMIN — POLYETHYLENE GLYCOL (3350) 17 G: 17 POWDER, FOR SOLUTION ORAL at 08:38

## 2025-06-14 RX ADMIN — MORPHINE SULFATE 4 MG: 4 INJECTION, SOLUTION INTRAMUSCULAR; INTRAVENOUS at 17:26

## 2025-06-14 RX ADMIN — CARVEDILOL 12.5 MG: 6.25 TABLET, FILM COATED ORAL at 08:37

## 2025-06-14 RX ADMIN — MIDAZOLAM HYDROCHLORIDE 1 MG: 2 INJECTION, SOLUTION INTRAMUSCULAR; INTRAVENOUS at 13:21

## 2025-06-14 RX ADMIN — CHLORHEXIDINE GLUCONATE 0.12% ORAL RINSE 15 ML: 1.2 LIQUID ORAL at 00:36

## 2025-06-14 RX ADMIN — MORPHINE SULFATE 2 MG: 2 INJECTION, SOLUTION INTRAMUSCULAR; INTRAVENOUS at 16:24

## 2025-06-14 RX ADMIN — SODIUM CHLORIDE: 0.9 INJECTION, SOLUTION INTRAVENOUS at 08:25

## 2025-06-14 RX ADMIN — CHLORHEXIDINE GLUCONATE 0.12% ORAL RINSE 15 ML: 1.2 LIQUID ORAL at 08:37

## 2025-06-14 RX ADMIN — GLYCOPYRROLATE 0.2 MG: 0.2 INJECTION INTRAMUSCULAR; INTRAVENOUS at 13:05

## 2025-06-14 RX ADMIN — MIDAZOLAM HYDROCHLORIDE 2 MG/HR: 5 INJECTION, SOLUTION INTRAMUSCULAR; INTRAVENOUS at 02:10

## 2025-06-14 RX ADMIN — GLYCOPYRROLATE 0.2 MG: 0.2 INJECTION INTRAMUSCULAR; INTRAVENOUS at 21:44

## 2025-06-14 RX ADMIN — OXYBUTYNIN CHLORIDE 5 MG: 5 TABLET ORAL at 08:38

## 2025-06-14 RX ADMIN — POTASSIUM BICARBONATE 40 MEQ: 782 TABLET, EFFERVESCENT ORAL at 08:37

## 2025-06-14 RX ADMIN — PANTOPRAZOLE SODIUM 40 MG: 40 INJECTION, POWDER, FOR SOLUTION INTRAVENOUS at 08:38

## 2025-06-14 RX ADMIN — MORPHINE SULFATE 4 MG: 4 INJECTION, SOLUTION INTRAMUSCULAR; INTRAVENOUS at 21:44

## 2025-06-14 RX ADMIN — LEVETIRACETAM 4500 MG: 100 INJECTION INTRAVENOUS at 00:23

## 2025-06-14 RX ADMIN — CALCITONIN SALMON 100 UNITS: 200 INJECTION, SOLUTION INTRAMUSCULAR; SUBCUTANEOUS at 08:39

## 2025-06-14 RX ADMIN — DIAZEPAM 5 MG: 5 INJECTION, SOLUTION INTRAMUSCULAR; INTRAVENOUS at 18:33

## 2025-06-14 RX ADMIN — Medication 200 MCG/HR: at 05:01

## 2025-06-14 RX ADMIN — TAMSULOSIN HYDROCHLORIDE 0.4 MG: 0.4 CAPSULE ORAL at 08:37

## 2025-06-14 RX ADMIN — MINERAL OIL AND WHITE PETROLATUM: 30; 940 OINTMENT OPHTHALMIC at 08:41

## 2025-06-14 RX ADMIN — POTASSIUM CHLORIDE 20 MEQ: 29.8 INJECTION, SOLUTION INTRAVENOUS at 08:36

## 2025-06-14 RX ADMIN — MORPHINE SULFATE 4 MG: 4 INJECTION, SOLUTION INTRAMUSCULAR; INTRAVENOUS at 19:47

## 2025-06-14 RX ADMIN — SODIUM CHLORIDE, PRESERVATIVE FREE 10 ML: 5 INJECTION INTRAVENOUS at 08:40

## 2025-06-14 RX ADMIN — PROPOFOL 50 MCG/KG/MIN: 10 INJECTION, EMULSION INTRAVENOUS at 03:54

## 2025-06-14 RX ADMIN — MINERAL OIL AND WHITE PETROLATUM: 30; 940 OINTMENT OPHTHALMIC at 05:53

## 2025-06-14 ASSESSMENT — PULMONARY FUNCTION TESTS: PIF_VALUE: 19

## 2025-06-14 NOTE — CONSULTS
History and Physical 25        NAME: Jethro Vázquez  : 1960  MRN: [id number]      Assessment/Plan:  Jethro Vázquez is a 65 y.o. male with a history of hypertension, hyperlipidemia, CKD, muscle invasive high-grade bladder cancer status post her, chemo and radiation therapy who presented to [facility] 2025 with confusion, admitted to the floor for severe sepsis SHIRLEY and hypercalcemia treatment.  RRT called for concern for worsening encephalopathy, poor mentation, inability to protect his airway, and intubation.  Patient transferred to the ICU for further evaluation and treatment      Problem list  Acute encephalopathy  Seizure  Acute respiratory failure  SHIRLEY on CKD stage 3  Hypercalcemia  B/L hydronephrosis with bilateral urethral stents present  Complicated UTI  Severe sepsis  Metastatic bladder cancer   Cancer pain  Hospice  HTN    Neuro: intubated, sedated, titrate to RASS goal 0 to -1. Prior to intubation patient was noted to have had rhythmic lateral eye gaze deviation to the left that then transitioned to a rhythmic tonic clonic seizure like episode that aborted with adminsitration of versed. Patient loaded with Keppra 4.5 gm and started on 500 mg BID. Rapid EEG ordered with noted 60% seizure burden, awaiting official read. cEGG ordered for AM. Neurology consulted recs appreciated. No prior hx of seizures per chart review. CT head negative for acute findings.   Cardio:  HDS at this time, monitor vitals and adjust as needed  Resp: Respiratory failure, intubated for airway protection. Adjust vent to optimize acid base status. Continue inhalers, nebs and aggressive pulm toileting. Radiographic imaging of the chest negative for acute findings.   GI: NPO. NGT. TF. GI ppx. Cachectic, protein calorie malnutrition, severe, watch for refeeding syndrome.   : SHIRLEY on CKD, known to have Metastatic bladder cancer with bilateral hydronephrosis with bilateral urethral stent placement. Has had multiple  procedures including cystoscopies, stent exchange, TURBT of large bladder tumor in 12/23. He continue to have chemo and radiation, last chemo in 08/2024. Huang in place, monitor uop. Monitor electrolytes and replenish as needed. Noted hypercalcemia concern for mets to bone. Continue hydration. Work up for further evaluation  Heme:  Hgb 8.7, normocytic, hypochromic anemia, plts 301. DVT ppx: Heparin. Known to have invasive high grade blaader CA s/p TUR neoadjuvant chemo with good response which he completed in 10/2024  ID: WBC 17.4, on broad spectrum antibiotics. F/u cultures and sensitivities and de-escalate as able. Concern for severe sepsis from complex UTI.   Endo: POB BG ISS PRN. Maintain euglycemia, avoid hypoglycemia.   MSK: DTI prevention.     Tubes/Lines/Drains:  ETT, NGT. Port, Huang    Code Status: Full? Patient was on hospice at home, called during RRT stated to put him on life support.     Chief Complaint:        History of Present Illness:    65-year-old male with past medical history of hypertension, CKD, muscle invasive high-grade bladder cancer status post TURP, chemo and radiation who presented with altered mental status confusion found to have concern for severe sepsis from complicated UTI with newfound acute kidney injury, hypercalcemia.  Rapid response called earlier today for worsening mentation, concern for respiratory failure.  Discussion with wife over the phone stated that she would like patient to be placed on life support to allow her time to come to the hospital.    Upon arrival patient was noted to be agonal he breathing, being vented with BVM.  This was witnessed to endotracheally intubate the patient for airway protection given the above discussion with the wife.  While awaiting supplies patient was noted to have a significant left gaze deviation with repetitive twitching of the eyelids and evidence of lateral nystagmus.  This was followed by a episode of generalized tonic-clonic

## 2025-06-14 NOTE — PROGRESS NOTES
Patient transferred to ICU for acute worsening of encephalopathy and seizure like episode. Intubated for airway protection. EEG showed focal status epilepticus. Loaded with keppra. Has history of malignant bladder cancer and was in hospice prior to admission. Spouse was agreeable to escalation of care overnight but is planning for compassionate withdrawal. Patient will not be seen in person today unless situation changes.    ADDENDUM: patient seen after terminal extubation, see progress note.

## 2025-06-14 NOTE — PROGRESS NOTES
This tech planned to initiate a continuous EEG ordered by Yecenia Nuno, Neurology. However I was informed that today's plan is to withdraw care, Yecenia Nuno informed.

## 2025-06-14 NOTE — PROGRESS NOTES
Received call from patients home hospice RN Fatuma Monreal. If we need anything from hospice care, she can be reached at [phone].

## 2025-06-14 NOTE — PROGRESS NOTES
Rapid response called at approx 2305.     Code called at approx 2307. Strong pulse palpable    2113 narcan given    2115 patient intubated    Strong pulse palpable    2318- 2 gram mag ordered    2319 versed  2 g given    2320 versed 2 g given     2321 atomidate given, martinez given    2322 good color change, bilateral breath sounds    2325-Patient transferred to ICU

## 2025-06-14 NOTE — PLAN OF CARE
Problem: Discharge Planning  Goal: Discharge to home or other facility with appropriate resources  Outcome: Not Progressing  Flowsheets (Taken 6/14/2025 0000)  Discharge to home or other facility with appropriate resources:   Identify barriers to discharge with patient and caregiver   Arrange for needed discharge resources and transportation as appropriate   Identify discharge learning needs (meds, wound care, etc)     Problem: Skin/Tissue Integrity  Goal: Skin integrity remains intact  Description: 1.  Monitor for areas of redness and/or skin breakdown2.  Assess vascular access sites hourly3.  Every 4-6 hours minimum:  Change oxygen saturation probe site4.  Every 4-6 hours:  If on nasal continuous positive airway pressure, respiratory therapy assess nares and determine need for appliance change or resting period  Outcome: Progressing  Flowsheets (Taken 6/14/2025 0000)  Skin Integrity Remains Intact:   Monitor for areas of redness and/or skin breakdown   Assess vascular access sites hourly   Every 4-6 hours minimum:  Change oxygen saturation probe site   Every 4-6 hours:  If on nasal continuous positive airway pressure, assess nares and determine need for appliance change or resting period   Turn and reposition as indicated   Assess need for specialty bed   Positioning devices   Pressure redistribution bed/mattress (bed type)   Check visual cues for pain   Monitor skin under medical devices     Problem: Safety - Adult  Goal: Free from fall injury  Outcome: Progressing     Problem: Pain  Goal: Verbalizes/displays adequate comfort level or baseline comfort level  Outcome: Progressing  Flowsheets (Taken 6/14/2025 0000)  Verbalizes/displays adequate comfort level or baseline comfort level:   Encourage patient to monitor pain and request assistance   Assess pain using appropriate pain scale   Administer analgesics based on type and severity of pain and evaluate response   Implement non-pharmacological measures as  appropriate and evaluate response   Consider cultural and social influences on pain and pain management   Notify Licensed Independent Practitioner if interventions unsuccessful or patient reports new pain     Problem: Discharge Planning  Goal: Discharge to home or other facility with appropriate resources  Outcome: Not Progressing  Flowsheets (Taken 6/14/2025 0000)  Discharge to home or other facility with appropriate resources:   Identify barriers to discharge with patient and caregiver   Arrange for needed discharge resources and transportation as appropriate   Identify discharge learning needs (meds, wound care, etc)

## 2025-06-14 NOTE — PROGRESS NOTES
4 Eyes Skin Assessment     NAME:  Jethro Vázquez  YOB: 1960  MEDICAL RECORD NUMBER:  [id number]    The patient is being assessed for  Transfer to New Unit    I agree that at least one RN has performed a thorough Head to Toe Skin Assessment on the patient. ALL assessment sites listed below have been assessed.      Areas assessed by both nurses:    Head, Face, Ears, Shoulders, Back, Chest, Arms, Elbows, Hands, Sacrum. Buttock, Coccyx, Ischium, Legs. Feet and Heels, and Under Medical Devices         Does the Patient have a Wound? No noted wound(s)       Guilherme Prevention initiated by RN: Yes  Wound Care Orders initiated by RN: No    Pressure Injury (Stage 3,4, Unstageable, DTI, NWPT, and Complex wounds) if present, place Wound referral order by RN under : No    New Ostomies, if present place, Ostomy referral order under : No     Nurse 1 eSignature: Electronically signed by Hector Byers RN on 6/14/25 at 12:25 AM EDT    **SHARE this note so that the co-signing nurse can place an eSignature**    Nurse 2 eSignature: Electronically signed by Chante Stone RN on 6/14/25 at 1:58 AM EDT

## 2025-06-14 NOTE — PROGRESS NOTES
In-Patient Progress Note    Patient:  Jethro Vázquez 65 y.o. male MRN: [id number]     Date of Service: 2025    Hospital Day: 1      Chief complaint: had no chief complaint listed for this encounter.      Assessment and Plan       Comfort care  Patient was compassionately extubated today.  Comfort care measures pursued.  Hospice team consulted.  Patient transferred out of the ICU.  -Morphine, diazepam as needed for comfort  -Glycopyrrolate as needed for secretions  -consult hospice    Acute kidney injury with chronic kidney disease stage III, worsening renal function  Hypercalcemia with calcium level 12.2 in ED  Severe hypokalemia-potassium level 2.7 in the ED  Bilateral hydronephrosis, with bilateral ureteral stents present  Complicated UTI  Severe sepsis  Metastatic bladder cancer  Delirium, agitated/acute metabolic encephalopathy  Cancer related pain  Concern for morphine toxicity  Opioid-induced constipation  Mood disorder  Prolonged QTc    Hospice patient- was revoke for admission  Essential hypertension        Diet No diet orders on file   DVT Prophylaxis [] Lovenox, []  Heparin, [] SCDs, [] Ambulation,  [] Eliquis, [] Xarelto  [] Coumadin   Peptic ulcer prophylaxis -   Code Status DNR-CC   Disposition From / Current living situation : home  Expected Disposition: TBD  Estimated Date of Discharge: TBD  Patient requires continued admission due to terminally extubated today, for comfort measures   Surrogate Decision Maker/ POA -       Personally reviewed Lab Studies and Imaging       Subjective:     Chief Complaint: No chief complaint on file.       -       Review of System     Review of Systems  negative    I have reviewed all pertinent PMHx, PSHx, FamHx, SocialHx, medications, and allergies and updated history as appropriate.    Physical Exam   VITAL SIGNS:  BP (!) 183/102   Pulse 98   Temp 99 °F (37.2 °C) (Rectal)   Resp 24   SpO2 95%   Tmax over 24 hours:  Temp (24hrs), Av.8 °F (37.1 °C),  findings. Support lines and tubes as reported above.  Dictated and Electronically Signed By: Tonya Headley MD [facility] 6/13/2025 23:57          Recent Results (from the past 24 hours)   POCT Glucose    Collection Time: 06/13/25 11:08 PM   Result Value Ref Range    POC Glucose 174 (H) 74 - 99 mg/dL   CBC    Collection Time: 06/13/25 11:11 PM   Result Value Ref Range    WBC 17.4 (H) 4.0 - 10.5 k/uL    RBC 3.17 (L) 4.60 - 6.20 m/uL    Hemoglobin 8.7 (L) 13.5 - 18.0 g/dL    Hematocrit 28.4 (L) 42.0 - 52.0 %    MCV 89.6 78.0 - 100.0 fL    MCH 27.4 27.0 - 31.0 pg    MCHC 30.6 (L) 32.0 - 36.0 g/dL    RDW 13.3 11.7 - 14.9 %    Platelets 301 140 - 440 k/uL    MPV 9.3 7.5 - 11.1 fL   Lactic Acid    Collection Time: 06/13/25 11:11 PM   Result Value Ref Range    Lactic Acid 8.5 (HH) 0.4 - 2.0 mmol/L   Protime-INR    Collection Time: 06/13/25 11:11 PM   Result Value Ref Range    Protime 19.1 (H) 11.7 - 14.5 sec    INR 1.6    Troponin    Collection Time: 06/13/25 11:11 PM   Result Value Ref Range    Troponin, High Sensitivity 40 (H) 0 - 22 ng/L   Comprehensive Metabolic Panel w/ Reflex to MG    Collection Time: 06/13/25 11:11 PM   Result Value Ref Range    Sodium 147 (H) 136 - 145 mmol/L    Potassium 3.4 (L) 3.5 - 5.1 mmol/L    Chloride 108 99 - 110 mmol/L    CO2 12 (LL) 21 - 32 mmol/L    Anion Gap 26 (H) 9 - 17 mmol/L    Glucose 156 (H) 74 - 99 mg/dL    BUN 49 (H) 7 - 20 mg/dL    Creatinine 3.4 (H) 0.8 - 1.3 mg/dL    Est, Glom Filt Rate 18 (L) >60 mL/min/1.73m2    Calcium 11.4 (H) 8.3 - 10.6 mg/dL    Total Protein 7.0 6.4 - 8.2 g/dL    Albumin 3.7 3.4 - 5.0 g/dL    Albumin/Globulin Ratio 1.1 1.1 - 2.2    Total Bilirubin 0.4 0.0 - 1.0 mg/dL    Alkaline Phosphatase 156 (H) 40 - 129 U/L    ALT 10 10 - 40 U/L    AST 33 15 - 37 U/L   Phosphorus    Collection Time: 06/13/25 11:11 PM   Result Value Ref Range    Phosphorus 5.1 (H) 2.5 - 4.9 mg/dL   Magnesium    Collection Time: 06/13/25 11:11 PM   Result Value Ref

## 2025-06-14 NOTE — CONSULTS
Neurology Service Consult Note  [facility]   Patient Name: Jethro Vázquez  : 1960        Subjective:   Reason for consult: seizure  65 y.o. - male with history of bladder cancer and HTN presenting to [facility] for AMS. History obtained from chart. Wife had reported patient had been confused for several days prior to admission. He finished chemotherapy and radiation in 2024. He has had urinary frequency with abdominal and back pain. He was under hospice care at home prior to admission. He was found to have Juan with bilateral hydronephrosis. Yesterday he was reported to be agitated and combative. Rapid response was called last night for worsening mental status and inability to protect airway. He required intubation. Prior to intubation patient was noted to have left gaze deviation with rhythmic tonic clonic activity. He received Versed and loaded with Keppra 4500 mg IV. Ceribel EEG was placed with 60% seizure burden. Lactic acid was 8.5.     Patient seen and examined. He remains intubated. Sedated with propofol, versed, and fentanyl. He is unresponsive at this time. Pupils are pinpoint. He has been hypertensive. No family at bedside.       Past Medical History:   Diagnosis Date    Acid reflux     History of kidney stones     \"had kidney stone 2017- passed it\"    Hypertension     \"on medication since age 51's\"    Malignant neoplasm of overlapping sites of bladder (HCC) 2024    No natural teeth     :   Past Surgical History:   Procedure Laterality Date    ANKLE SURGERY Left     age 17\"did have pins and then later removed\"    APPENDECTOMY      CHOLECYSTECTOMY, LAPAROSCOPIC N/A 2024    CHOLECYSTECTOMY LAPAROSCOPIC ROBOTIC WITH EXTENSIVE LYSIS OF ADHESIONS performed by Rufus Mendes II, MD at [facility] OR    CYSTOSCOPY N/A 2023    CYSTOSCOPY TRANSURETHRAL RESECTION BLADDER TUMOR performed by Sanaz Ivan MD at [facility] OR    CYSTOSCOPY  maintained on Keppra 500 mg BID. Given his SHIRLEY, Keppra may not be the best choice in AED. Would refrain from using Vimpat given prolonged QTC. Will check Ammonia level and likely transition to Depacon  Ceribel EEG as above. Status epilepticus ending at 0217. We will plan on placing patient on continuous EEG monitoring.  Will keep sedation as is for another 24 hours and recommend slowly weaning tomorrow  We will order MRI of brain  Further recommendations pending testing    > 80 minutes of time spent included chart review, obtaining history, patient examination, developing plan of care, and documentation.      Thank you for allowing us to participate in the care of your patient.  If there are any questions regarding evaluation please feel free to contact us.     Yecenia Nuno, CHENG - CNS, 6/14/2025

## 2025-06-14 NOTE — PROCEDURES
[facility] ELECTROENCEPHALOGRAM REPORT    Identifying Information:  Name: Jethro Vázquez  MRN: [id number]  : 1960  Interpreting Physician: Renae Gandhi DO  Referring Provider: Heidy Pennington MD       Clinical History:  Jethro Vázquez is an 65 y.o. male with concerns for seizure like activity.      Past Medical History:  Past Medical History:   Diagnosis Date    Acid reflux     History of kidney stones     \"had kidney stone 2017- passed it\"    Hypertension     \"on medication since age 51's\"    Malignant neoplasm of overlapping sites of bladder (HCC) 2024    No natural teeth         Current Medications:    potassium alternative oral replacement  40 mEq Oral Q4H    polyethylene glycol  17 g Oral BID    sennosides-docusate sodium  2 tablet Oral BID    divalproex  125 mg Oral 3 times per day    [Held by provider] traZODone  50 mg Oral Nightly    chlorhexidine  15 mL Mouth/Throat BID    artificial tears   Both Eyes Q4H    pantoprazole  40 mg IntraVENous Daily    levETIRAcetam  500 mg IntraVENous Q12H    carboxymethylcellulose  1 drop Both Eyes 4x Daily    carvedilol  12.5 mg Oral BID WC    cloNIDine  1 patch TransDERmal Weekly    sodium chloride flush  5-40 mL IntraVENous 2 times per day    meropenem  1,000 mg IntraVENous Q12H    therapeutic multivitamin-minerals  1 tablet Oral Daily    tamsulosin  0.4 mg Oral BID    oxyBUTYnin  5 mg Oral TID    [Held by provider] morphine  15 mg Oral BID        EEG start date & time: 25 at 0102  EEG end date & time: 25 at 0851     Indication:  EEG was initiated for monitoring and localization of seizures as the etiology of the encephalopathy/altered mental status. It was available for review at the bedside as well as via remote access for the entire period of monitoring. No video was available during this recording.      Technical Summary:  8 channels of continuous EEG over the bilateral frontopolar, temporal and occipital head regions were recorded in a  entire monitoring period.      Of note, this EEG is limited due to the number of electrodes and lack of parasaggital brain region coverage. If clinical suspicions persist for seizure as an etiology for the patient's symptoms, or if suspicion remains high for an underlying seizure disorder, additional EEG testing utilizing the standard 10-20 system of electrode placement for full cerebral coverage should be considered.        Clinical correlation recommended.     Renae Gandhi DO   Epileptologist  6/14/2025 8:55 AM

## 2025-06-14 NOTE — PROGRESS NOTES
RRT called overhead. Upon arrival patient agonally breathing with oxygen saturation 100% and /90  NRB mask in place. BS checked 178 and patient stopped breathing. Code Blue initiated for respiratory arrest and Bag valve mask then used for breathing and narcan given via chest port. Patient began agonally breathing once again but s/s of seizure noted with rapid eye movement and whit arms and generalized myoclonic movements. Versed given per order from intensivist as well as 2g MgS04. RSI initiated and patient intubated and transferred to ICU rm 2125 where ICU staff assumed care. Please don't hesitate to call or utilize [employer] messaging for Rapid Resource RN should any further needs or concerns arise.      Samson Campbell   Rapid Resource RN   [facility] [phone]

## 2025-06-14 NOTE — PROGRESS NOTES
Chart reviewed. Will fu later today. Moved icu. On vent. Acidosis high calcium and renal failure in setting malignancy and pain. Work up seizure. Rec dw family goals of care.  May require dialysis if not improve. Give additional calcitonin today and if calcium worse may need try zometa. Will dw Intensivist if role for dialysis if worse.

## 2025-06-14 NOTE — DISCHARGE SUMMARY
V2.0  Discharge Summary    Name:  Jethro Vázquez /Age/Sex: 1960 (65 y.o. male)   Admit Date: 2025  Discharge Date: 25    MRN & CSN:  [id number] & [id number] Encounter Date and Time 25 2:17 PM EDT    Attending:  No att. providers found Discharging Provider: Kacy Quintero PA-C       Hospital Course:     Brief HPI:    65-year-old male with past medical history of hypertension, CKD, muscle invasive high-grade bladder cancer status post TURP, chemo and radiation who presented with altered mental status confusion found to have concern for severe sepsis from complicated UTI with newfound acute kidney injury, hypercalcemia.  Rapid response called earlier today for worsening mentation, concern for respiratory failure.  Discussion with wife over the phone stated that she would like patient to be placed on life support to allow her time to come to the hospital.   Upon arrival patient was noted to be agonal he breathing, being vented with BVM.  This was witnessed to endotracheally intubate the patient for airway protection given the above discussion with the wife.  While awaiting supplies patient was noted to have a significant left gaze deviation with repetitive twitching of the eyelids and evidence of lateral nystagmus.  This was followed by a episode of generalized tonic-clonic seizure that resolved after the administration of 4 mg of Versed.  Patient was subsequently intubated for airway to the ICU for further management and care.  He was started on sedatives, cerebellar EEG was placed showing persistent evidence of rhythmic contracture concerning for seizure, device showing a seizure burden of 65%.  Patient's wife requested to change the CODE STATUS to DNR comfort care and hospice consult this am.  Changed CODE STATUS and consulted hospice. Pt discharged to hospice.    Brief Problem Based Course:     Acute encephalopathy  Seizure  Acute respiratory failure  SHIRLEY on CKD stage  Value Date/Time    TSH 1.93 06/12/2025 02:30 AM     Troponin: No results found for: \"TROPONINT\"  Lactic Acid:   Recent Labs     06/12/25  0236 06/13/25  2311   LACTA 0.9 8.5*     BNP: No results for input(s): \"PROBNP\" in the last 72 hours.  UA:  Lab Results   Component Value Date/Time    NITRU NEGATIVE 06/11/2025 04:47 PM    COLORU Yellow 06/11/2025 04:47 PM    PHUR 6.5 06/11/2025 04:47 PM    WBCUA 0 06/11/2025 04:47 PM    RBCUA 884 06/11/2025 04:47 PM    RBCUA 35 04/08/2024 04:49 PM    MUCUS RARE 06/11/2025 04:47 PM    TRICHOMONAS NONE SEEN 04/08/2024 04:49 PM    YEAST OCCASIONAL 04/08/2024 04:49 PM    BACTERIA MODERATE 05/10/2025 06:50 PM    CLARITYU CLEAR 04/08/2024 04:49 PM    LEUKOCYTESUR SMALL 06/11/2025 04:47 PM    UROBILINOGEN 0.2 06/11/2025 04:47 PM    BILIRUBINUR NEGATIVE 06/11/2025 04:47 PM    BLOODU MODERATE NUMBER OR AMOUNT OBSERVED 04/08/2024 04:49 PM    GLUCOSEU NEGATIVE 06/11/2025 04:47 PM    KETUA NEGATIVE 06/11/2025 04:47 PM    AMORPHOUS PRESENT 06/11/2025 02:45 AM     Urine Cultures: No results found for: \"LABURIN\"  Blood Cultures: No results found for: \"BC\"  No results found for: \"BLOODCULT2\"  Organism: No results found for: \"ORG\"    Time Spent Discharging patient 35 minutes    Electronically signed by Kacy Quintero PA-C on 6/14/2025 at 2:17 PM

## 2025-06-14 NOTE — PROCEDURES
PROCEDURE NOTE  Date: 6/14/2025   Name: Jethro Vázquez  YOB: 1960    Critical Care Intubation Note   Pre-Intubation     Time Code Paged: [id number]   Time of Arrival: 230    Reason for consultation: Acute respiratory failure     Interventions prior to Anesthesiologist's arrival: IV access, AmbuBag     Procedure: Endotracheal intubation   [X] Airway equipment was checked.   [X] Suction available.   [X] Monitors including pulse oximetry, NIBP, and ECG monitoring in place or applied.   [X] The patient was preoxygenated.     Ventilation   Ventilation: Easy   Cricoid Pressure: Yes   Rapid sequence induction: Yes   Cervical collar present: No   In line stabilization or cervical collar left in place: No     Induction   Induction was performed with 10 mg of Etomidate, and 50 mg of Rocuronium for paralysis.   Induction was smooth with minimal changes in vital signs     Endotracheal Intubation   Intubation: was successful on 1 attempt.   Route: Oral   Blade: Mac 3   Adjuncts used: None   View of cords: Grade 1 view   ETT Size: 7.5 cuffed   Depth: 24 cm at the teeth   Confirmation: Colormetric change on ETCO2 detector x6, quantitative ETCO2   Secured with: ETT Kirk   Complications: None   Intubation performed by: Heidy Pennington MD, Critical Care Attending

## 2025-06-14 NOTE — CONSULTS
Nutrition Note    Received consult to order/manage tube feeds for mechanical vent pt; however plan is to withdrawal care today. Please re-consult RD if plans change.     Electronically signed by Sheri Sabillon RD on 6/14/25 at 11:21 AM EDT    Contact: [phone]

## 2025-06-14 NOTE — PROGRESS NOTES
Patients wife Chante at bedside. Verbalizes wishes to \"honor his wishes\" and withdrawal care on patient today. States family will be here at 11 and wishes to move forward that that time. Belkys APNP updated.

## 2025-06-14 NOTE — PROGRESS NOTES
Headband: removed  Date/Time: 6/14/25 0858  Recorder: recording stopped  Skin: intact  Highest Seizure Murphy Percentage past hour: 0%      General info regarding Seizure Murphy %:  Minimum duration of study is 2 hours. If Seizure Murphy has remained 0% throughout the entire 2-hour duration, communicate with provider to stop the recording.     Seizure Murphy 0-10% - Continue to monitor and complete 2-hour study.  Seizure Murphy 11-89% - Epileptiform activity present. Notify provider for next steps.  Seizure Murphy >/= 90% - Epileptiform activity consistent with Status Epilepticus. Immediately notify provider.     *Patients with Seizure Murphy above 10% that persists may require a study longer than 2 hours. Maximum recording duration is 24 hours. Please update provider with a persistent increase in Seizure Murphy above 10%.

## 2025-06-14 NOTE — PROGRESS NOTES
06/13/25 2343   Patient Observation   Pulse (!) 110   Respirations 16   SpO2 100 %   Vent Information   $Ventilation $Initial Day   Ventilator Settings   Vt (Set, mL) 500 mL   Resp Rate (Set) 16 bpm   PEEP/CPAP (cmH2O) 5   FiO2  100 %   Vent Patient Data (Readings)   Vt (Measured) 516 mL   Peak Inspiratory Pressure (cmH2O) 17 cmH2O   Rate Measured 16 br/min   Minute Volume (L/min) 8.23 Liters   Mean Airway Pressure (cmH2O) 8.5 cmH20   Plateau Pressure (cm H2O) 0 cm H2O   Driving Pressure -5   I:E Ratio 1:2.40   Backup Apnea On   Backup Rate 20 Breaths Per Minute   Vent Alarm Settings   High Pressure (cmH2O) 40 cmH2O   Low Minute Volume (lpm) 2.5 L/min   High Minute Volume (lpm) 25 L/min   Low Exhaled Vt (ml) 250 mL   High Exhaled Vt (ml) 1000 mL   RR High (bpm) 40 br/min   Apnea (secs) 20 secs   Additional Respiratoray Assessments   Humidification Source HME   Ambu Bag With Mask At Bedside Yes   Ventilator Associated Pneumonia Bundle   Elevation of Head of Bed to 30-45 Degrees  Yes

## 2025-06-15 ENCOUNTER — HOSPITAL ENCOUNTER (INPATIENT)
Age: 65
LOS: 2 days | DRG: 951 | End: 2025-06-17
Attending: GENERAL PRACTICE | Admitting: GENERAL PRACTICE

## 2025-06-15 VITALS
HEART RATE: 88 BPM | OXYGEN SATURATION: 96 % | DIASTOLIC BLOOD PRESSURE: 77 MMHG | RESPIRATION RATE: 20 BRPM | TEMPERATURE: 98.4 F | SYSTOLIC BLOOD PRESSURE: 89 MMHG

## 2025-06-15 PROBLEM — G40.901 STATUS EPILEPTICUS (HCC): Status: ACTIVE | Noted: 2025-06-15

## 2025-06-15 PROBLEM — G93.41 METABOLIC ENCEPHALOPATHY: Status: ACTIVE | Noted: 2025-06-15

## 2025-06-15 PROCEDURE — 6360000002 HC RX W HCPCS: Performed by: GENERAL PRACTICE

## 2025-06-15 PROCEDURE — 6360000002 HC RX W HCPCS: Performed by: INTERNAL MEDICINE

## 2025-06-15 PROCEDURE — 1250000000 HC SEMI PRIVATE HOSPICE R&B

## 2025-06-15 PROCEDURE — 94761 N-INVAS EAR/PLS OXIMETRY MLT: CPT

## 2025-06-15 RX ORDER — ACETAMINOPHEN 325 MG/1
650 TABLET ORAL EVERY 4 HOURS PRN
Status: DISCONTINUED | OUTPATIENT
Start: 2025-06-15 | End: 2025-06-17 | Stop reason: HOSPADM

## 2025-06-15 RX ORDER — SODIUM CHLORIDE 0.9 % (FLUSH) 0.9 %
5-40 SYRINGE (ML) INJECTION PRN
Status: CANCELLED | OUTPATIENT
Start: 2025-06-15

## 2025-06-15 RX ORDER — SODIUM CHLORIDE 0.9 % (FLUSH) 0.9 %
5-40 SYRINGE (ML) INJECTION EVERY 12 HOURS SCHEDULED
Status: DISCONTINUED | OUTPATIENT
Start: 2025-06-15 | End: 2025-06-17 | Stop reason: HOSPADM

## 2025-06-15 RX ORDER — MORPHINE SULFATE 2 MG/ML
2 INJECTION, SOLUTION INTRAMUSCULAR; INTRAVENOUS
Refills: 0 | Status: CANCELLED | OUTPATIENT
Start: 2025-06-15

## 2025-06-15 RX ORDER — MORPHINE SULFATE 4 MG/ML
4 INJECTION, SOLUTION INTRAMUSCULAR; INTRAVENOUS
Refills: 0 | Status: CANCELLED | OUTPATIENT
Start: 2025-06-15

## 2025-06-15 RX ORDER — MORPHINE SULFATE 4 MG/ML
4 INJECTION, SOLUTION INTRAMUSCULAR; INTRAVENOUS
Refills: 0 | Status: DISCONTINUED | OUTPATIENT
Start: 2025-06-15 | End: 2025-06-17 | Stop reason: HOSPADM

## 2025-06-15 RX ORDER — DIAZEPAM 10 MG/2ML
5 INJECTION, SOLUTION INTRAMUSCULAR; INTRAVENOUS
Status: DISCONTINUED | OUTPATIENT
Start: 2025-06-15 | End: 2025-06-17 | Stop reason: HOSPADM

## 2025-06-15 RX ORDER — MORPHINE SULFATE 2 MG/ML
2 INJECTION, SOLUTION INTRAMUSCULAR; INTRAVENOUS
Refills: 0 | Status: DISCONTINUED | OUTPATIENT
Start: 2025-06-15 | End: 2025-06-17 | Stop reason: HOSPADM

## 2025-06-15 RX ORDER — GLYCOPYRROLATE 0.2 MG/ML
0.2 INJECTION INTRAMUSCULAR; INTRAVENOUS
Status: CANCELLED | OUTPATIENT
Start: 2025-06-15

## 2025-06-15 RX ORDER — DIAZEPAM 10 MG/2ML
5 INJECTION, SOLUTION INTRAMUSCULAR; INTRAVENOUS
Status: CANCELLED | OUTPATIENT
Start: 2025-06-15

## 2025-06-15 RX ORDER — SODIUM CHLORIDE 0.9 % (FLUSH) 0.9 %
5-40 SYRINGE (ML) INJECTION PRN
Status: DISCONTINUED | OUTPATIENT
Start: 2025-06-15 | End: 2025-06-17 | Stop reason: HOSPADM

## 2025-06-15 RX ORDER — ACETAMINOPHEN 325 MG/1
650 TABLET ORAL EVERY 4 HOURS PRN
Status: CANCELLED | OUTPATIENT
Start: 2025-06-15

## 2025-06-15 RX ORDER — SODIUM CHLORIDE 0.9 % (FLUSH) 0.9 %
5-40 SYRINGE (ML) INJECTION EVERY 12 HOURS SCHEDULED
Status: CANCELLED | OUTPATIENT
Start: 2025-06-15

## 2025-06-15 RX ORDER — GLYCOPYRROLATE 0.2 MG/ML
0.2 INJECTION INTRAMUSCULAR; INTRAVENOUS
Status: DISCONTINUED | OUTPATIENT
Start: 2025-06-15 | End: 2025-06-17 | Stop reason: HOSPADM

## 2025-06-15 RX ORDER — ACETAMINOPHEN 650 MG/1
650 SUPPOSITORY RECTAL EVERY 4 HOURS PRN
Status: CANCELLED | OUTPATIENT
Start: 2025-06-15

## 2025-06-15 RX ORDER — ACETAMINOPHEN 650 MG/1
650 SUPPOSITORY RECTAL EVERY 4 HOURS PRN
Status: DISCONTINUED | OUTPATIENT
Start: 2025-06-15 | End: 2025-06-17 | Stop reason: HOSPADM

## 2025-06-15 RX ADMIN — MORPHINE SULFATE 4 MG: 4 INJECTION, SOLUTION INTRAMUSCULAR; INTRAVENOUS at 00:32

## 2025-06-15 RX ADMIN — DIAZEPAM 5 MG: 5 INJECTION, SOLUTION INTRAMUSCULAR; INTRAVENOUS at 18:11

## 2025-06-15 RX ADMIN — MORPHINE SULFATE 4 MG: 4 INJECTION, SOLUTION INTRAMUSCULAR; INTRAVENOUS at 06:41

## 2025-06-15 RX ADMIN — DIAZEPAM 5 MG: 5 INJECTION, SOLUTION INTRAMUSCULAR; INTRAVENOUS at 00:32

## 2025-06-15 RX ADMIN — MORPHINE SULFATE 4 MG: 4 INJECTION, SOLUTION INTRAMUSCULAR; INTRAVENOUS at 10:46

## 2025-06-15 RX ADMIN — DIAZEPAM 5 MG: 5 INJECTION, SOLUTION INTRAMUSCULAR; INTRAVENOUS at 06:41

## 2025-06-15 RX ADMIN — DIAZEPAM 5 MG: 5 INJECTION, SOLUTION INTRAMUSCULAR; INTRAVENOUS at 23:37

## 2025-06-15 RX ADMIN — MORPHINE SULFATE 4 MG: 4 INJECTION, SOLUTION INTRAMUSCULAR; INTRAVENOUS at 07:53

## 2025-06-15 RX ADMIN — MORPHINE SULFATE 4 MG: 4 INJECTION, SOLUTION INTRAMUSCULAR; INTRAVENOUS at 17:18

## 2025-06-15 RX ADMIN — MORPHINE SULFATE 4 MG: 4 INJECTION, SOLUTION INTRAMUSCULAR; INTRAVENOUS at 15:36

## 2025-06-15 RX ADMIN — MORPHINE SULFATE 2 MG: 2 INJECTION, SOLUTION INTRAMUSCULAR; INTRAVENOUS at 23:37

## 2025-06-15 RX ADMIN — MORPHINE SULFATE 4 MG: 4 INJECTION, SOLUTION INTRAMUSCULAR; INTRAVENOUS at 18:30

## 2025-06-15 RX ADMIN — DIAZEPAM 5 MG: 5 INJECTION, SOLUTION INTRAMUSCULAR; INTRAVENOUS at 15:36

## 2025-06-15 RX ADMIN — DIAZEPAM 5 MG: 5 INJECTION, SOLUTION INTRAMUSCULAR; INTRAVENOUS at 10:54

## 2025-06-15 ASSESSMENT — PAIN SCALES - GENERAL
PAINLEVEL_OUTOF10: 4
PAINLEVEL_OUTOF10: 0
PAINLEVEL_OUTOF10: 8

## 2025-06-15 NOTE — DISCHARGE SUMMARY
V2.0  Discharge Summary    Name:  Jethro Vázquez /Age/Sex: 1960 (65 y.o. male)   Admit Date: 2025  Discharge Date: 6/15/25    MRN & CSN:  [id number] & [id number] Encounter Date and Time 6/15/25 1:38 PM EDT    Attending:  Mia Orozco MD Discharging Provider: Mia Orozco MD       Hospital Course:     Brief HPI: Jethro Vázquez is a 65 y.o. male who presented with confusion.  Patient was on hospice at home.  On arrival found to have SHIRLEY on CKD with electrolyte abnormalities.  Initial workup notable for severe sepsis likely secondary to complicated UTI.  Hospital course notable for worsening encephalopathy requiring ICU admission.  As per documentation patient's spouse revoked his DNRCC status and wanted all aggressive measures.  Patient intubated.  Subsequent discussions with family were done by intensivist and it was decided for the patient to be comfort care.  Terminal extubation performed on  and hospice services consulted for possible inpatient hospice.  Patient was seen by hospice and admitted under their care.    Brief Problem Based Course:   Comfort care  Patient was compassionately extubated today.  Comfort care measures pursued.  Hospice team consulted.  Patient transferred out of the ICU.  -Morphine, diazepam as needed for comfort  -Glycopyrrolate as needed for secretions  -consult hospice, accepted for IPU     Acute kidney injury with chronic kidney disease stage III, worsening renal function  Hypercalcemia with calcium level 12.2 in ED  Severe hypokalemia-potassium level 2.7 in the ED  Bilateral hydronephrosis, with bilateral ureteral stents present  Complicated UTI  Severe sepsis  Metastatic bladder cancer  Delirium, agitated/acute metabolic encephalopathy  Cancer related pain  Concern for morphine toxicity  Opioid-induced constipation  Mood disorder  Prolonged QTc    Hospice patient- was revoke for admission  Essential hypertension      The patient expressed appropriate understanding  ill-appearing.   HENT:      Head: Normocephalic and atraumatic.   Cardiovascular:      Rate and Rhythm: Regular rhythm.   Pulmonary:      Effort: Pulmonary effort is normal.      Breath sounds: Normal breath sounds.   Abdominal:      Palpations: Abdomen is soft.   Skin:     Capillary Refill: Capillary refill takes less than 2 seconds.              Labs and Imaging   CT HEAD WO CONTRAST  Result Date: 6/14/2025  Head CT without contrast CLINICAL HISTORY: Seizure TECHNIQUE: 5mm slice thickness axial images through the brain were performed in bone and soft tissue windows in the absence of intravenous contrast.  This exam was performed according to our departmental dose-optimization program which includes  use of Automated Exposure Control, adjustment of the mA and/or kV according to patient size and/or use of iterative reconstruction technique. COMPARISON: 6/11/2025 FINDINGS: Grey white differentiation is within normal limits. No acute hemorrhage or midline shift. Ventricles are within normal limits for age. Paranasal sinuses are clear. Mastoid air cells are patent. Calvarium is intact. Scalp soft tissues are within normal limits. IMPRESSION: No acute intracranial findings.  Dictated and Electronically Signed By: Tonya Headley MD [facility] 6/14/2025 1:50           CBC:   Recent Labs     06/13/25 2311   WBC 17.4*   HGB 8.7*        BMP:    Recent Labs     06/13/25  0919 06/13/25 2311 06/14/25  0642   * 147* 147*   K 3.1* 3.4* 2.8*   * 108 110   CO2 19* 12* 21   BUN 44* 49* 53*   CREATININE 3.2* 3.4* 3.7*   GLUCOSE 126* 156* 111*     Hepatic:   Recent Labs     06/13/25  2311 06/14/25  0642   AST 33 24   ALT 10 10   BILITOT 0.4 0.2   ALKPHOS 156* 151*     Lipids:   Lab Results   Component Value Date/Time    HDL 33 01/06/2023 10:29 AM    TRIG 394 06/14/2025 06:42 AM     Hemoglobin A1C: No results found for: \"LABA1C\"  TSH:   Lab Results   Component Value Date/Time    TSH 1.93

## 2025-06-15 NOTE — PROGRESS NOTES
In-Patient Progress Note    Patient:  Jethro Vázquez 65 y.o. male MRN: [id number]     Date of Service: 6/15/2025    Hospital Day: 2      Chief complaint: had no chief complaint listed for this encounter.      Assessment and Plan       Comfort care  Patient was compassionately extubated today.  Comfort care measures pursued.  Hospice team consulted.  Patient transferred out of the ICU.  -Morphine, diazepam as needed for comfort  -Glycopyrrolate as needed for secretions  -consult hospice, awaiting eval    Acute kidney injury with chronic kidney disease stage III, worsening renal function  Hypercalcemia with calcium level 12.2 in ED  Severe hypokalemia-potassium level 2.7 in the ED  Bilateral hydronephrosis, with bilateral ureteral stents present  Complicated UTI  Severe sepsis  Metastatic bladder cancer  Delirium, agitated/acute metabolic encephalopathy  Cancer related pain  Concern for morphine toxicity  Opioid-induced constipation  Mood disorder  Prolonged QTc    Hospice patient- was revoke for admission  Essential hypertension        Diet No diet orders on file   DVT Prophylaxis [] Lovenox, []  Heparin, [] SCDs, [] Ambulation,  [] Eliquis, [] Xarelto  [] Coumadin   Peptic ulcer prophylaxis -   Code Status DNR-CC   Disposition From / Current living situation : home  Expected Disposition: TBD  Estimated Date of Discharge: TBD  Patient requires continued admission due to terminally extubated today, for comfort measures   Surrogate Decision Maker/ POA -       Personally reviewed Lab Studies and Imaging       Subjective:     Chief Complaint: No chief complaint on file.     Patient seen and evaluated at bedside.  Family at bedside as well.  Patient appears comfortable.    Review of System     Review of Systems   Unable to perform ROS: Acuity of condition     negative    I have reviewed all pertinent PMHx, PSHx, FamHx, SocialHx, medications, and allergies and updated history as appropriate.    Physical Exam   VITAL  SIGNS:  BP (!) 89/77   Pulse 88   Temp 98.4 °F (36.9 °C) (Axillary)   Resp 20   SpO2 96%   Tmax over 24 hours:  Temp (24hrs), Av.5 °F (36.9 °C), Min:98.2 °F (36.8 °C), Max:99 °F (37.2 °C)      Patient Vitals for the past 6 hrs:   BP Temp Temp src Pulse Resp SpO2   06/15/25 0753 (!) 89/77 98.4 °F (36.9 °C) Axillary 88 20 96 %       No intake or output data in the 24 hours ending 06/15/25 1213  Wt Readings from Last 2 Encounters:   25 71 kg (156 lb 8.4 oz)   25 71.2 kg (156 lb 14.4 oz)     There is no height or weight on file to calculate BMI.      Physical Exam  Constitutional:       General: He is not in acute distress.     Appearance: He is ill-appearing.   HENT:      Head: Normocephalic and atraumatic.      Nose: Nose normal.      Mouth/Throat:      Mouth: Mucous membranes are dry.   Cardiovascular:      Rate and Rhythm: Normal rate.      Pulses: Normal pulses.      Heart sounds: Normal heart sounds.   Pulmonary:      Breath sounds: No stridor. Rhonchi present.   Abdominal:      General: Abdomen is flat. Bowel sounds are normal.      Palpations: Abdomen is soft.   Musculoskeletal:         General: Normal range of motion.   Skin:     General: Skin is warm.      Capillary Refill: Capillary refill takes less than 2 seconds.   Neurological:      Mental Status: He is confused and unresponsive.      Motor: No weakness.      Comments: Patient sedated           Current Medications           Labs and Imaging Studies   Laboratory findings:  CT HEAD WO CONTRAST  Result Date: 2025  Head CT without contrast CLINICAL HISTORY: Seizure TECHNIQUE: 5mm slice thickness axial images through the brain were performed in bone and soft tissue windows in the absence of intravenous contrast.  This exam was performed according to our departmental dose-optimization program which includes  use of Automated Exposure Control, adjustment of the mA and/or kV according to patient size and/or use of iterative reconstruction

## 2025-06-15 NOTE — CARE COORDINATION
Call to [facility] and spoke with the RN on call.      made the referral to [facility]. The RN on call stated that she would be at the hospital in about an hour to meet with the family.

## 2025-06-15 NOTE — H&P
iterative reconstruction techniques, or adjustment of the mA and/or kV according to patient size) were used to limit patient radiation dose. FINDINGS: The ventricles and sulci are normal in size and symmetric.  The gray/white matter differentiation is preserved. There is no CT evidence of a large acute or  recent subacute territorial infarct.  The basal cisterns are not effaced. There  is no mass effect or midline shift. There is no acute intracranial hemorrhage or extra-axial fluid collections. The visualized paranasal sinuses and mastoid air cells are clear. Regional soft tissues and bony calvarium are unremarkable. IMPRESSION: No acute intracranial findings.  There is no evidence for hemorrhage, mass effect, or acute large territory infarct. This dictation was created with voice recognition software.  While attempts have  been made to review the dictation as it is transcribed, on occasion the spoken word can be misinterpreted by the technology leading to omissions or inappropriate words, phrases or sentences.  Dictated and Electronically Signed By: Donovan Potter DO [facility] 6/11/2025 14:47        CT ABDOMEN PELVIS WO CONTRAST Additional Contrast? None  Result Date: 6/11/2025  PROCEDURE: CT ABDOMEN PELVIS WO CONTRAST CLINICAL INFORMATION: lower abd pain; back pain; has h/o urothelia ca, reportedly has ureteral stents in place; concern for abnormality . COMPARISON: 3/22/2025 TECHNIQUE: 5 mm axial CT images were obtained through the abdomen after the administration of intravenous and oral contrast. Coronal and sagittal reconstructions were obtained All CT scans at this facility use dose modulation, iterative reconstruction, and/or weight-based dosing when appropriate to reduce radiation dose to as low as reasonably achievable. FINDINGS: Marked motion artifact limiting evaluation. Left pleural effusion with adjacent atelectasis. Stranding and fullness around the pancreas concerning for

## 2025-06-15 NOTE — CARE COORDINATION
Spoke with Dr Ray , pt needs to be inpt hospice.  Spoke with wife Chante, pt active with [facility] but she is agreeable to [facility] hospice to make pt inpt hospice.

## 2025-06-16 PROCEDURE — 2500000003 HC RX 250 WO HCPCS: Performed by: GENERAL PRACTICE

## 2025-06-16 PROCEDURE — 6360000002 HC RX W HCPCS: Performed by: GENERAL PRACTICE

## 2025-06-16 PROCEDURE — 1250000000 HC SEMI PRIVATE HOSPICE R&B

## 2025-06-16 RX ADMIN — DIAZEPAM 5 MG: 5 INJECTION, SOLUTION INTRAMUSCULAR; INTRAVENOUS at 04:38

## 2025-06-16 RX ADMIN — SODIUM CHLORIDE, PRESERVATIVE FREE 10 ML: 5 INJECTION INTRAVENOUS at 21:18

## 2025-06-16 RX ADMIN — MORPHINE SULFATE 4 MG: 4 INJECTION, SOLUTION INTRAMUSCULAR; INTRAVENOUS at 21:32

## 2025-06-16 RX ADMIN — MORPHINE SULFATE 4 MG: 4 INJECTION, SOLUTION INTRAMUSCULAR; INTRAVENOUS at 14:26

## 2025-06-16 RX ADMIN — MORPHINE SULFATE 2 MG: 2 INJECTION, SOLUTION INTRAMUSCULAR; INTRAVENOUS at 17:42

## 2025-06-16 ASSESSMENT — PAIN SCALES - GENERAL: PAINLEVEL_OUTOF10: 4

## 2025-06-16 ASSESSMENT — PAIN DESCRIPTION - LOCATION: LOCATION: GENERALIZED

## 2025-06-16 ASSESSMENT — PAIN SCALES - WONG BAKER: WONGBAKER_NUMERICALRESPONSE: HURTS A LITTLE BIT

## 2025-06-16 NOTE — H&P
Connor Garcia MD  Medical Director, [address]  [phone]     History and Physical    Reason for admission: Terminal care     History Obtained From:  Medical  Records, Staff    HISTORY OF PRESENT ILLNESS:    Jethro Vázquez is a 65-year-old male with a history of hypertension, chronic kidney disease (stage III), and metastatic high-grade bladder cancer (status post TURP, chemotherapy, and radiation) who initially presented with altered mental status and confusion.    On arrival, the patient was found to have acute kidney injury on chronic kidney disease, severe electrolyte abnormalities (hypercalcemia, hypokalemia), and severe sepsis likely secondary to a complicated urinary tract infection. His hospital course was notable for worsening encephalopathy, prompting ICU admission.    Initially, the patient was on home hospice, but his spouse revoked his DNR-CC status, requesting aggressive measures to allow time for family to arrive. The patient subsequently developed respiratory failure, requiring intubation for airway protection. During this period, he exhibited neurological deterioration, including left gaze deviation, eyelid twitching, lateral nystagmus, and a generalized tonic-clonic seizure, which was managed with Versed and continuous EEG monitoring, revealing a high seizure burden (65%).    Following further discussions with the intensivist, the family elected to transition the patient back to comfort care, prioritizing symptom management and dignity. The patient underwent terminal extubation on 6/14,    Past Medical History:        Diagnosis Date    Acid reflux     History of kidney stones     \"had kidney stone 12/2017- passed it\"    Hypertension     \"on medication since age 51's\"    Malignant neoplasm of overlapping sites of bladder (HCC) 01/04/2024    No natural teeth        Past Surgical History:        Procedure Laterality Date    ANKLE SURGERY Left     age

## 2025-06-17 VITALS
TEMPERATURE: 99 F | RESPIRATION RATE: 26 BRPM | WEIGHT: 156.53 LBS | HEIGHT: 69 IN | HEART RATE: 120 BPM | SYSTOLIC BLOOD PRESSURE: 169 MMHG | OXYGEN SATURATION: 92 % | DIASTOLIC BLOOD PRESSURE: 100 MMHG | BODY MASS INDEX: 23.18 KG/M2

## 2025-06-17 PROCEDURE — 6360000002 HC RX W HCPCS: Performed by: GENERAL PRACTICE

## 2025-06-17 PROCEDURE — 94761 N-INVAS EAR/PLS OXIMETRY MLT: CPT

## 2025-06-17 PROCEDURE — 2500000003 HC RX 250 WO HCPCS: Performed by: GENERAL PRACTICE

## 2025-06-17 RX ADMIN — SODIUM CHLORIDE, PRESERVATIVE FREE 10 ML: 5 INJECTION INTRAVENOUS at 08:05

## 2025-06-17 RX ADMIN — MORPHINE SULFATE 4 MG: 4 INJECTION, SOLUTION INTRAMUSCULAR; INTRAVENOUS at 12:14

## 2025-06-17 RX ADMIN — MORPHINE SULFATE 4 MG: 4 INJECTION, SOLUTION INTRAMUSCULAR; INTRAVENOUS at 08:04

## 2025-06-17 ASSESSMENT — PAIN SCALES - GENERAL: PAINLEVEL_OUTOF10: 0

## 2025-06-17 NOTE — PLAN OF CARE
Problem: Discharge Planning  Goal: Discharge to home or other facility with appropriate resources  6/17/2025 1103 by Marv Singh RN  Outcome: Progressing  6/16/2025 2237 by Renae Arriola LPN  Outcome: Progressing     Problem: Safety - Adult  Goal: Free from fall injury  6/17/2025 1103 by Marv Singh RN  Outcome: Progressing  6/16/2025 2237 by Renae Arriola LPN  Outcome: Progressing     Problem: Pain  Goal: Verbalizes/displays adequate comfort level or baseline comfort level  6/17/2025 1103 by Marv Singh RN  Outcome: Progressing  6/16/2025 2237 by Renae Arriola LPN  Outcome: Progressing     Problem: Skin/Tissue Integrity  Goal: Skin integrity remains intact  Description: 1.  Monitor for areas of redness and/or skin breakdown  2.  Assess vascular access sites hourly  3.  Every 4-6 hours minimum:  Change oxygen saturation probe site  4.  Every 4-6 hours:  If on nasal continuous positive airway pressure, respiratory therapy assess nares and determine need for appliance change or resting period  6/17/2025 1103 by Marv Singh RN  Outcome: Progressing  6/16/2025 2237 by Renae Arriola LPN  Outcome: Progressing

## 2025-06-17 NOTE — PROGRESS NOTES
Connor Garcia MD  Medical Director, [address]  [phone]     Hospice Progress Note        Jethro Finneyjeff   1960   Primary Care Physician:  Mono Schaffer MD  Admit Date: 6/15/2025     Subjective:   He was comfortable overnight.  He required morphine iv for comfort.       Objective:   BP (!) 188/119   Pulse (!) 104   Temp 98.2 °F (36.8 °C) (Oral)   Resp 18   Ht 1.753 m (5' 9\")   Wt 71 kg (156 lb 8.4 oz)   SpO2 95%   BMI 23.11 kg/m²    General appearance: unconscious.   Head: Normocephalic, without obvious abnormality, atraumatic  Neck: no adenopathy and supple, symmetrical, trachea midline  Lungs: clear to auscultation bilaterally, tachypneic  Heart: tachycardic  Abdomen: palpable area in lower abdomen,   Extremities: no clubbing, cyanosis or edema  Neurologic: unconscious, unresponsive to verbal and painful stimuli    Meds:    sodium chloride flush  5-40 mL IntraVENous 2 times per day     PRN Meds: diazePAM, morphine **OR** morphine, sodium chloride flush, acetaminophen, acetaminophen, glycopyrrolate    Assessment/Plan:   Patient Active Hospital Problem List:  Patient Active Problem List   Diagnosis    Non-recurrent bilateral inguinal hernia without obstruction or gangrene    Right inguinal hernia    Lipoma, spermatic cord    Benign essential microscopic hematuria    Chronic left-sided low back pain without sciatica    Overweight    Acute cystitis with hematuria    Primary hypertension    Pyelonephritis    Malignant neoplasm of overlapping sites of bladder (HCC)    Need for hepatitis B screening test    Bladder cancer (HCC)    Acute cholecystitis    Poor intravenous access    Abdominal adhesions    SHIRLEY (acute kidney injury)    Sepsis (HCC)    Complicated urinary tract infection    Bacteremia    Obstructive uropathy    Bacteremia due to Pseudomonas    Complicated UTI (urinary tract infection)    Status epilepticus (HCC)    Metabolic encephalopathy

## 2025-06-17 NOTE — PROGRESS NOTES
Patient Pronounced  at 1344. House supervisor notified. Attempted to notify Dr. Garcia no answer at this time. [facility] notified.

## 2025-06-18 NOTE — DISCHARGE SUMMARY
Connor Garcia MD  Medical Director, [address]  [phone]     Discharge Summary     Patient ID  Jethro Vázquez   1960  [id number]          Admit date: 6/15/2025   Discharge date: 2025      Admitting Physician: Connor Garcia MD   Discharge Physician: Connor Garcia MD MD    Discharge Diagnoses:   Non-recurrent bilateral inguinal hernia without obstruction or gangrene      Primary hypertension     Pyelonephritis     Malignant neoplasm of overlapping sites of bladder (HCC)      Bladder cancer (HCC)     SHIRLEY (acute kidney injury)     Sepsis (HCC)     Complicated urinary tract infection     Bacteremia     Obstructive uropathy     Status epilepticus (HCC)     Metabolic encephalopathy      Discharged Condition: Pt     Hospital Course: Pt was admitted to hospice service with above diagnoses. Inpatient hospice service was required for symptom management. Comfort medications were started. Patient was kept comfortable. Patient passed away peacefully.       Signed: Connor Garcia MD

## 2025-06-19 LAB
MISCELLANEOUS LAB TEST RESULT: ABNORMAL
TEST NAME: ABNORMAL

## 2025-06-21 NOTE — PROGRESS NOTES
Physician Progress Note      PATIENT:               BRISSA BULLOCK  CSN #:                  [id number]  :                       1960  ADMIT DATE:       2025 10:42 AM  DISCH DATE:        2025 12:23 PM  RESPONDING  PROVIDER #:        Catherine Parish MD          QUERY TEXT:    Sepsis is documented in the medical record H&P. Please provide additional   clinical indicators supportive of your documentation. Or please document if   the diagnosis of sepsis has been ruled out after study.    The clinical indicators include:  admission WBC 8.6,  and RR 24    H&P- Severe sepsis  - Lactic acid level 2.3 in the ED and has since normalized  - Was diagnosed with above in [place] ED  - Fluids and antibiotics as above    monitor labs, moniotor vitals, GI consult, Started meropenem IV  Options provided:  -- Sepsis present as evidenced by, Please document evidence.  -- Sepsis was ruled out after study  -- Other - I will add my own diagnosis  -- Disagree - Not applicable / Not valid  -- Disagree - Clinically unable to determine / Unknown  -- Refer to Clinical Documentation Reviewer    PROVIDER RESPONSE TEXT:    Sepsis is present as evidenced by tachycardia, tachypnea, lactic acidemia    Query created by: Cora Manzo on 2025 10:46 AM      Electronically signed by:  Catherine Parish MD 2025 8:01 AM

## (undated) PROCEDURE — 0BH17EZ INSERTION OF ENDOTRACHEAL AIRWAY INTO TRACHEA, VIA NATURAL OR ARTIFICIAL OPENING: ICD-10-PCS

## (undated) DEVICE — Z INACTIVE USE 2660664 SOLUTION IRRIG 3000ML 0.9% SOD CHL USP UROMATIC PLAS CONT

## (undated) DEVICE — ELECTRODE ROLL BALL ANGL 24 26FR

## (undated) DEVICE — Device

## (undated) DEVICE — YANKAUER,BULB TIP,W/O VENT,RIGID,STERILE: Brand: MEDLINE

## (undated) DEVICE — STERILE POLYISOPRENE POWDER-FREE SURGICAL GLOVES: Brand: PROTEXIS

## (undated) DEVICE — SEAL ENDOSCP INSTR 7FR BX SELF SEAL

## (undated) DEVICE — GLOVE ORANGE PI 8   MSG9080

## (undated) DEVICE — Z INACTIVE USE 2660663 SOLUTION IRRIG 1000ML STRIL H2O USP PLAS POUR BTL

## (undated) DEVICE — CONE TIP URETERAL CATHETER: Brand: URETERAL CATHETER

## (undated) DEVICE — COLUMN DRAPE

## (undated) DEVICE — DUAL LUMEN STOMACH TUBE: Brand: SALEM SUMP

## (undated) DEVICE — SYRINGE MED 20ML STD CLR PLAS LUERLOCK TIP N CTRL DISP

## (undated) DEVICE — GLOVE SURG SZ 7 L12IN FNGR THK79MIL GRN LTX FREE

## (undated) DEVICE — SOLUTION IV IRRIG WATER 1000ML POUR BRL 2F7114

## (undated) DEVICE — GOWN,SIRUS,POLYRNF,BRTHSLV,XLN/XL,20/CS: Brand: MEDLINE

## (undated) DEVICE — BAG SPEC REM 224ML W4XL6IN DIA10MM 1 HND GYN DISP ENDOPCH

## (undated) DEVICE — JELLY,LUBE,STERILE,FLIP TOP,TUBE,2-OZ: Brand: MEDLINE

## (undated) DEVICE — SET IRRIG L94IN ID0.281IN W/ 4.5IN DST FLX CONN 2 LD ON OFF

## (undated) DEVICE — DRAINBAG,ANTI-REFLUX TOWER,L/F,2000ML,LL: Brand: MEDLINE

## (undated) DEVICE — DILATOR/SHEATH SET: Brand: 8/10 DILATOR/SHEATH SET

## (undated) DEVICE — Z INACTIVE NO ACTIVE SUPPLIER APPLICATOR MEDICATED 26 CC TINT HI-LITE ORNG STRL CHLORAPREP

## (undated) DEVICE — SURGICEL ENDOSCP APPL

## (undated) DEVICE — SET TBNG DISP TIP FOR AHTO

## (undated) DEVICE — BLADELESS OBTURATOR: Brand: WECK VISTA

## (undated) DEVICE — LINER,SEMI-RIGID,3000CC,50EA/CS: Brand: MEDLINE

## (undated) DEVICE — PROGRASP FORCEPS: Brand: ENDOWRIST

## (undated) DEVICE — OPEN-END FLEXI-TIP URETERAL CATHETER: Brand: FLEXI-TIP

## (undated) DEVICE — Z INACTIVE USE 2635503 SOLUTION IRRIG 3000ML ST H2O USP UROMATIC PLAS CONT

## (undated) DEVICE — TIP COVER ACCESSORY

## (undated) DEVICE — PROTECTOR EYE PT SELF ADH NS OPT GRD LF

## (undated) DEVICE — SYRINGE MED 10ML LUERLOCK TIP W/O SFTY DISP

## (undated) DEVICE — TUBING FLTR PLUME AWAY EVAC W/ SUCT DEV DISP PUREVIEW

## (undated) DEVICE — GUIDEWIRE ENDOSCP L150CM DIA0.035IN TIP 3CM PTFE NIT

## (undated) DEVICE — ELECTRODE ES AD CRDLSS PT RET REM POLYHESIVE

## (undated) DEVICE — GLOVE ORANGE PI 7   MSG9070

## (undated) DEVICE — MARKER SURG SKIN UTIL REGULAR/FINE 2 TIP W/ RUL AND 9 LBL

## (undated) DEVICE — TRAY PREP DRY W/ PREM GLV 2 APPL 6 SPNG 2 UNDPD 1 OVERWRAP

## (undated) DEVICE — SOLUTION IV 1000ML 0.9% SOD CHL FOR IRRIG PLAS CONT

## (undated) DEVICE — CATHETERIZATION KIT 7 FRX20 CM 3L

## (undated) DEVICE — DBD-PACK,CYSTOSCOPY,PK VI,AURORA: Brand: MEDLINE

## (undated) DEVICE — TUBING, SUCTION, 9/32" X 10', STRAIGHT: Brand: MEDLINE

## (undated) DEVICE — UROLOGIC DRAIN BAG: Brand: UNBRANDED

## (undated) DEVICE — CONTAINER,SPECIMEN,OR STERILE,4OZ: Brand: MEDLINE

## (undated) DEVICE — GOWN,ECLIPSE,POLYRNF,BRTHSLV,L,30/CS: Brand: MEDLINE

## (undated) DEVICE — Z DISC USE 2764362 SEAL ENDOSCP INSTR DIA5-8MM UNIV FOR CANN DA VINCI XI

## (undated) DEVICE — CABLE ENDOSCP L10FT ACT DISP

## (undated) DEVICE — GLOVE SURG SZ 75 L12IN FNGR THK79MIL GRN LTX FREE

## (undated) DEVICE — BLADE CLIPPER GEN PURP NS

## (undated) DEVICE — TOWEL,OR,DSP,ST,BLUE,STD,6/PK,12PK/CS: Brand: MEDLINE

## (undated) DEVICE — NEEDLE HYPO 20GA L1.5IN YEL POLYPR HUB S STL REG BVL STR

## (undated) DEVICE — GUIDEWIRE UROLOGICAL STR 3 CM 0.038 INX150 CM NIT SENSOR

## (undated) DEVICE — CATHETER,FOLEY,100%SILICONE,16FR,10ML,LF: Brand: MEDLINE

## (undated) DEVICE — TOWEL OR BLUEE 16X26IN ST 8 PACK ORB08 16X26ORTWL

## (undated) DEVICE — SOLUTION ANTIFOG VIS SYS CLEARIFY LAPSCP

## (undated) DEVICE — SEAL

## (undated) DEVICE — MAT ABSRB W28XL48IN C6L FLR ULT W POLY BK

## (undated) DEVICE — ARM DRAPE

## (undated) DEVICE — Z DISCONTINUED NO SUB IDED TRAY PREP DRY W/ PREM GLV 2 APPL 6 SPNG 2 UNDPD 1 OVERWRAP

## (undated) DEVICE — URETERAL ACCESS SHEATH SET: Brand: NAVIGATOR HD

## (undated) DEVICE — SUTURE VICRYL SZ 4-0 L18IN ABSRB UD L19MM PS-2 3/8 CIR PRIM J496H

## (undated) DEVICE — Z DISCONTINUED USE 2220241 SUTURE SZ 0 27IN 5/8 CIR UR-6  TAPER PT VIOLET ABSRB VICRYL J603H

## (undated) DEVICE — CATHETER URET 5FR L70CM TIP 8FR OPN END CONE TIP INJ HUB

## (undated) DEVICE — SINGLE PORT MANIFOLD: Brand: NEPTUNE 2

## (undated) DEVICE — ELECTRODE ES 24-26FR BALL MPLR DISP

## (undated) DEVICE — AGENT HEMSTAT 3GM OXIDIZED REGENERATED CELOS ABSRB FOR CONT (ORDER MULTIPLES OF 5EA)

## (undated) DEVICE — POSITIONER HD AD W4.5XH8XL9IN HIGHLY RESILIENT FOAM CMFRT

## (undated) DEVICE — Z DUP USE 2641840 CLIP INT L POLYMER LOK LIG HEM O LOK

## (undated) DEVICE — ELECTRODE LOOP 24FR R ANG MPLR CUT

## (undated) DEVICE — REDUCER: Brand: ENDOWRIST

## (undated) DEVICE — EXCEL 10FT (3.05 M) INSUFFLATION TUBING SET WITH 0.1 MICRON FILTER: Brand: EXCEL

## (undated) DEVICE — SOLUTION IRRIG 3000ML 0.9% SOD CHL USP UROMATIC PLAS CONT

## (undated) DEVICE — LAPAROSCOPIC TROCAR SLEEVE/SINGLE USE: Brand: KII® OPTICAL ACCESS SYSTEM

## (undated) DEVICE — CORD ES L10FT BLU MPLR FT SWCH DISP ACMI